# Patient Record
Sex: FEMALE | Race: WHITE | NOT HISPANIC OR LATINO | Employment: OTHER | URBAN - METROPOLITAN AREA
[De-identification: names, ages, dates, MRNs, and addresses within clinical notes are randomized per-mention and may not be internally consistent; named-entity substitution may affect disease eponyms.]

---

## 2017-01-06 ENCOUNTER — HOSPITAL ENCOUNTER (OUTPATIENT)
Dept: CARDIOLOGY | Facility: HOSPITAL | Age: 82
Setting detail: RECURRING SERIES
Discharge: HOME | End: 2017-01-06

## 2017-01-06 PROCEDURE — 36416 COLLJ CAPILLARY BLOOD SPEC: CPT | Performed by: INTERNAL MEDICINE

## 2017-01-06 PROCEDURE — 85610 PROTHROMBIN TIME: CPT | Performed by: INTERNAL MEDICINE

## 2017-01-10 ENCOUNTER — TELEPHONE (OUTPATIENT)
Dept: CARDIOLOGY | Facility: CLINIC | Age: 82
End: 2017-01-10

## 2017-01-10 RX ORDER — WARFARIN SODIUM 5 MG/1
TABLET ORAL
Qty: 30 TABLET | Refills: 2 | Status: SHIPPED | OUTPATIENT
Start: 2017-01-10 | End: 2017-02-14

## 2017-01-10 NOTE — TELEPHONE ENCOUNTER
Pt called to let you know she took a tumble yesterday. She was wearing new slippers that had no  on them and slid and fell. She is fine but had to have EMS come help her. While they were there they did an EKG and said she had a HR of 138. She said she was not having any issues before or after but wanted you to be aware......Dana

## 2017-01-11 ENCOUNTER — HOSPITAL ENCOUNTER (OUTPATIENT)
Dept: CARDIOLOGY | Facility: HOSPITAL | Age: 82
Setting detail: RECURRING SERIES
Discharge: HOME | End: 2017-01-11

## 2017-01-11 PROCEDURE — 85610 PROTHROMBIN TIME: CPT | Performed by: INTERNAL MEDICINE

## 2017-01-11 PROCEDURE — 36416 COLLJ CAPILLARY BLOOD SPEC: CPT | Performed by: INTERNAL MEDICINE

## 2017-01-12 NOTE — TELEPHONE ENCOUNTER
We actually just got a remote on her on 1/9/2017. There was nothing unusual. No abnormal episode. She actually paces very little. Thanks, Amy

## 2017-01-20 ENCOUNTER — HOSPITAL ENCOUNTER (OUTPATIENT)
Dept: CARDIOLOGY | Facility: HOSPITAL | Age: 82
Setting detail: RECURRING SERIES
Discharge: HOME | End: 2017-01-20

## 2017-01-20 PROCEDURE — 36416 COLLJ CAPILLARY BLOOD SPEC: CPT | Performed by: INTERNAL MEDICINE

## 2017-01-20 PROCEDURE — 85610 PROTHROMBIN TIME: CPT | Performed by: INTERNAL MEDICINE

## 2017-01-24 ENCOUNTER — LAB (OUTPATIENT)
Dept: LAB | Facility: HOSPITAL | Age: 82
End: 2017-01-24
Attending: INTERNAL MEDICINE

## 2017-01-24 ENCOUNTER — TRANSCRIBE ORDERS (OUTPATIENT)
Dept: ADMINISTRATIVE | Facility: HOSPITAL | Age: 82
End: 2017-01-24

## 2017-01-24 DIAGNOSIS — I48.19 PERSISTENT ATRIAL FIBRILLATION (HCC): ICD-10-CM

## 2017-01-24 DIAGNOSIS — Z01.810 PRE-OPERATIVE CARDIOVASCULAR EXAMINATION: ICD-10-CM

## 2017-01-24 DIAGNOSIS — Z01.810 PRE-OPERATIVE CARDIOVASCULAR EXAMINATION: Primary | ICD-10-CM

## 2017-01-24 DIAGNOSIS — Z13.6 SCREENING FOR ISCHEMIC HEART DISEASE: ICD-10-CM

## 2017-01-24 LAB
ANION GAP SERPL CALCULATED.3IONS-SCNC: 11.6 MMOL/L
BASOPHILS # BLD AUTO: 0.04 10*3/MM3 (ref 0–0.2)
BASOPHILS NFR BLD AUTO: 0.7 % (ref 0–1.5)
BUN BLD-MCNC: 15 MG/DL (ref 8–23)
BUN/CREAT SERPL: 11.5 (ref 7–25)
CALCIUM SPEC-SCNC: 9.1 MG/DL (ref 8.6–10.5)
CHLORIDE SERPL-SCNC: 106 MMOL/L (ref 98–107)
CO2 SERPL-SCNC: 24.4 MMOL/L (ref 22–29)
CREAT BLD-MCNC: 1.3 MG/DL (ref 0.57–1)
DEPRECATED RDW RBC AUTO: 44 FL (ref 37–54)
EOSINOPHIL # BLD AUTO: 0.23 10*3/MM3 (ref 0–0.7)
EOSINOPHIL NFR BLD AUTO: 4 % (ref 0.3–6.2)
ERYTHROCYTE [DISTWIDTH] IN BLOOD BY AUTOMATED COUNT: 15 % (ref 11.7–13)
GFR SERPL CREATININE-BSD FRML MDRD: 39 ML/MIN/1.73
GLUCOSE BLD-MCNC: 107 MG/DL (ref 65–99)
HCT VFR BLD AUTO: 39 % (ref 35.6–45.5)
HGB BLD-MCNC: 12.2 G/DL (ref 11.9–15.5)
IMM GRANULOCYTES # BLD: 0 10*3/MM3 (ref 0–0.03)
IMM GRANULOCYTES NFR BLD: 0 % (ref 0–0.5)
LYMPHOCYTES # BLD AUTO: 1.98 10*3/MM3 (ref 0.9–4.8)
LYMPHOCYTES NFR BLD AUTO: 34.7 % (ref 19.6–45.3)
MCH RBC QN AUTO: 25.2 PG (ref 26.9–32)
MCHC RBC AUTO-ENTMCNC: 31.3 G/DL (ref 32.4–36.3)
MCV RBC AUTO: 80.6 FL (ref 80.5–98.2)
MONOCYTES # BLD AUTO: 0.64 10*3/MM3 (ref 0.2–1.2)
MONOCYTES NFR BLD AUTO: 11.2 % (ref 5–12)
NEUTROPHILS # BLD AUTO: 2.82 10*3/MM3 (ref 1.9–8.1)
NEUTROPHILS NFR BLD AUTO: 49.4 % (ref 42.7–76)
PLATELET # BLD AUTO: 252 10*3/MM3 (ref 140–500)
PMV BLD AUTO: 10.2 FL (ref 6–12)
POTASSIUM BLD-SCNC: 4.8 MMOL/L (ref 3.5–5.2)
RBC # BLD AUTO: 4.84 10*6/MM3 (ref 3.9–5.2)
SODIUM BLD-SCNC: 142 MMOL/L (ref 136–145)
WBC NRBC COR # BLD: 5.71 10*3/MM3 (ref 4.5–10.7)

## 2017-01-24 PROCEDURE — 85025 COMPLETE CBC W/AUTO DIFF WBC: CPT

## 2017-01-24 PROCEDURE — 80048 BASIC METABOLIC PNL TOTAL CA: CPT

## 2017-01-24 PROCEDURE — 36415 COLL VENOUS BLD VENIPUNCTURE: CPT

## 2017-01-25 NOTE — PERIOPERATIVE NURSING NOTE
SPOKE WITH PT, REMINDED NPO AFTER MN AND MAY TAKE AM MEDS WITH A SIP OF WATER. PT TO TAKE WARFARIN THIS EVENING AS SCHEDULED. DAUGHTER TO ACCOMPANY PT TO CCL IN THE AM

## 2017-01-26 ENCOUNTER — TELEPHONE (OUTPATIENT)
Dept: CARDIOLOGY | Facility: CLINIC | Age: 82
End: 2017-01-26

## 2017-01-26 ENCOUNTER — HOSPITAL ENCOUNTER (OUTPATIENT)
Dept: CARDIOLOGY | Facility: HOSPITAL | Age: 82
Discharge: HOME | End: 2017-01-26
Admitting: NURSE PRACTITIONER

## 2017-01-26 VITALS
DIASTOLIC BLOOD PRESSURE: 87 MMHG | RESPIRATION RATE: 18 BRPM | WEIGHT: 216 LBS | OXYGEN SATURATION: 98 % | TEMPERATURE: 98.5 F | SYSTOLIC BLOOD PRESSURE: 180 MMHG | BODY MASS INDEX: 38.27 KG/M2 | HEART RATE: 68 BPM | HEIGHT: 63 IN

## 2017-01-26 DIAGNOSIS — R06.02 SHORTNESS OF BREATH: ICD-10-CM

## 2017-01-26 DIAGNOSIS — I48.19 PERSISTENT ATRIAL FIBRILLATION (HCC): ICD-10-CM

## 2017-01-26 DIAGNOSIS — R53.83 FATIGUE, UNSPECIFIED TYPE: ICD-10-CM

## 2017-01-26 LAB
INR PPP: 2.3 (ref 0.8–1.2)
PROTHROMBIN TIME: 26.6 SECONDS (ref 12.8–15.2)

## 2017-01-26 PROCEDURE — 93010 ELECTROCARDIOGRAM REPORT: CPT | Performed by: INTERNAL MEDICINE

## 2017-01-26 PROCEDURE — 92960 CARDIOVERSION ELECTRIC EXT: CPT | Performed by: INTERNAL MEDICINE

## 2017-01-26 PROCEDURE — 93005 ELECTROCARDIOGRAM TRACING: CPT | Performed by: INTERNAL MEDICINE

## 2017-01-26 PROCEDURE — 92960 CARDIOVERSION ELECTRIC EXT: CPT

## 2017-01-26 PROCEDURE — 93000 ELECTROCARDIOGRAM COMPLETE: CPT | Performed by: NURSE PRACTITIONER

## 2017-01-26 RX ORDER — PROMETHAZINE HYDROCHLORIDE 25 MG/ML
12.5 INJECTION, SOLUTION INTRAMUSCULAR; INTRAVENOUS EVERY 4 HOURS PRN
Status: DISCONTINUED | OUTPATIENT
Start: 2017-01-26 | End: 2017-01-26 | Stop reason: HOSPADM

## 2017-01-26 RX ORDER — SODIUM CHLORIDE 9 MG/ML
75 INJECTION, SOLUTION INTRAVENOUS CONTINUOUS
Status: DISCONTINUED | OUTPATIENT
Start: 2017-01-26 | End: 2017-01-27 | Stop reason: HOSPADM

## 2017-01-26 RX ORDER — ACETAMINOPHEN 325 MG/1
650 TABLET ORAL EVERY 4 HOURS PRN
Status: DISCONTINUED | OUTPATIENT
Start: 2017-01-26 | End: 2017-01-26 | Stop reason: HOSPADM

## 2017-01-26 RX ORDER — ONDANSETRON 2 MG/ML
4 INJECTION INTRAMUSCULAR; INTRAVENOUS EVERY 6 HOURS PRN
Status: DISCONTINUED | OUTPATIENT
Start: 2017-01-26 | End: 2017-01-26 | Stop reason: HOSPADM

## 2017-01-26 RX ORDER — NITROGLYCERIN 0.4 MG/1
0.4 TABLET SUBLINGUAL
Status: DISCONTINUED | OUTPATIENT
Start: 2017-01-26 | End: 2017-01-26 | Stop reason: HOSPADM

## 2017-01-26 RX ORDER — METOPROLOL SUCCINATE 100 MG/1
50 TABLET, EXTENDED RELEASE ORAL 2 TIMES DAILY
Status: ON HOLD | COMMUNITY
End: 2017-03-23

## 2017-01-26 RX ORDER — LIDOCAINE HYDROCHLORIDE 10 MG/ML
0.1 INJECTION, SOLUTION EPIDURAL; INFILTRATION; INTRACAUDAL; PERINEURAL ONCE AS NEEDED
Status: DISCONTINUED | OUTPATIENT
Start: 2017-01-26 | End: 2017-01-26 | Stop reason: HOSPADM

## 2017-01-26 RX ORDER — HYDROCODONE BITARTRATE AND ACETAMINOPHEN 5; 325 MG/1; MG/1
1 TABLET ORAL EVERY 4 HOURS PRN
Status: DISCONTINUED | OUTPATIENT
Start: 2017-01-26 | End: 2017-01-26 | Stop reason: HOSPADM

## 2017-01-26 RX ORDER — SODIUM CHLORIDE 0.9 % (FLUSH) 0.9 %
1-10 SYRINGE (ML) INJECTION AS NEEDED
Status: DISCONTINUED | OUTPATIENT
Start: 2017-01-26 | End: 2017-01-26 | Stop reason: HOSPADM

## 2017-01-26 RX ORDER — METOPROLOL TARTRATE 5 MG/5ML
INJECTION INTRAVENOUS
Status: COMPLETED | OUTPATIENT
Start: 2017-01-26 | End: 2017-01-26

## 2017-01-26 RX ORDER — METOCLOPRAMIDE HYDROCHLORIDE 5 MG/ML
10 INJECTION INTRAMUSCULAR; INTRAVENOUS EVERY 6 HOURS PRN
Status: DISCONTINUED | OUTPATIENT
Start: 2017-01-26 | End: 2017-01-26 | Stop reason: HOSPADM

## 2017-01-26 RX ADMIN — SODIUM CHLORIDE 75 ML/HR: 9 INJECTION, SOLUTION INTRAVENOUS at 08:27

## 2017-01-26 RX ADMIN — METHOHEXITAL SODIUM 30 MG: 500 INJECTION, POWDER, LYOPHILIZED, FOR SOLUTION INTRAMUSCULAR; INTRAVENOUS; RECTAL at 09:11

## 2017-01-26 RX ADMIN — METHOHEXITAL SODIUM 60 MG: 500 INJECTION, POWDER, LYOPHILIZED, FOR SOLUTION INTRAMUSCULAR; INTRAVENOUS; RECTAL at 09:10

## 2017-01-26 RX ADMIN — METOPROLOL TARTRATE 5 MG: 5 INJECTION, SOLUTION INTRAVENOUS at 09:29

## 2017-01-26 NOTE — PLAN OF CARE
Problem: Patient Care Overview (Adult)  Goal: Discharge Needs Assessment  Outcome: Outcome(s) achieved Date Met:  01/26/17 01/26/17 1055   Discharge Needs Assessment   Concerns To Be Addressed no discharge needs identified

## 2017-01-26 NOTE — IP AVS SNAPSHOT
AFTER VISIT SUMMARY             Miss Anastacia Sarah           About your hospitalization     You were admitted on:  January 26, 2017 You last received care in the:  [facility] CATH LAB       Procedures & Surgeries      * No procedures listed *     1/26/2017     * No surgeons listed *  -------------------      Medications    If you or your caregiver advised us that you are currently taking a medication and that medication is marked below as “Resume”, this simply indicates that we have reviewed those medications to make sure our new therapy recommendations do not interfere.  If you have concerns about medications other than those new ones which we are prescribing today, please consult the physician who prescribed them (or your primary physician).  Our review of your home medications is not meant to indicate that we are directing their use.             Your Medications      CONTINUE taking these medications     CALCIUM PO   Take 600 Units by mouth Daily. With D3, listed separetly           cholecalciferol 1000 UNITS tablet   Take 1,000 Units by mouth daily. In addition to the calcium -D3 pill   Commonly known as:  VITAMIN D3           cycloSPORINE 0.05 % ophthalmic emulsion   Administer 1 drop to both eyes 2 (Two) Times a Day.   Commonly known as:  RESTASIS           HYDROcodone-acetaminophen 7.5-325 MG per tablet   1-2 po q 4-6 hr prn pain   Commonly known as:  NORCO           loperamide 2 MG capsule   Take 2 mg by mouth 4 (four) times a day as needed for diarrhea.   Commonly known as:  IMODIUM           loratadine 10 MG tablet   Take 10 mg by mouth Daily.   Commonly known as:  CLARITIN           metoprolol succinate  MG 24 hr tablet   Take 100 mg by mouth 2 (Two) Times a Day.   Commonly known as:  TOPROL-XL           metoprolol tartrate 100 MG tablet   Take 1 tablet by mouth 2 (Two) Times a Day.   Commonly known as:  LOPRESSOR           NEXIUM 40 MG capsule   Take 1 capsule by mouth every  morning before breakfast.   Generic drug:  esomeprazole           OCUVITE EXTRA PO   Take 1 tablet by mouth Daily.           SUPER B COMPLEX PO   Take 1 tablet by mouth Daily.           SYNTHROID 112 MCG tablet   Take 112 mcg by mouth Daily.   Generic drug:  levothyroxine           VITAMIN B-12 PO   Take 1 tablet by mouth daily.           warfarin 2.5 MG tablet   Take 2.5 mg by mouth See Admin Instructions. 2.5 MG DAILY EXCEPT 5 MG ON SUNDAYS   Commonly known as:  COUMADIN           warfarin 5 MG tablet   Take 5 mg by mouth 1 (One) Time Per Week. 5 MG WEEKLY ON SUNDAYS AND 2.5 MG ALL OTHER DAYS   Commonly known as:  COUMADIN           warfarin 5 MG tablet   TAKE ONE-HALF TABLET BY MOUTH DAILY OR AS DIRECTED   Commonly known as:  COUMADIN                      Your Medications      Your Medication List           Morning Noon Evening Bedtime As Needed    CALCIUM PO   Take 600 Units by mouth Daily. With D3, listed separetly                                cholecalciferol 1000 UNITS tablet   Take 1,000 Units by mouth daily. In addition to the calcium -D3 pill   Commonly known as:  VITAMIN D3                                cycloSPORINE 0.05 % ophthalmic emulsion   Administer 1 drop to both eyes 2 (Two) Times a Day.   Commonly known as:  RESTASIS                                HYDROcodone-acetaminophen 7.5-325 MG per tablet   1-2 po q 4-6 hr prn pain   Commonly known as:  NORCO                                loperamide 2 MG capsule   Take 2 mg by mouth 4 (four) times a day as needed for diarrhea.   Commonly known as:  IMODIUM                                loratadine 10 MG tablet   Take 10 mg by mouth Daily.   Commonly known as:  CLARITIN                                metoprolol succinate  MG 24 hr tablet   Take 100 mg by mouth 2 (Two) Times a Day.   Commonly known as:  TOPROL-XL                                metoprolol tartrate 100 MG tablet   Take 1 tablet by mouth 2 (Two) Times a Day.   Commonly known as:   LOPRESSOR                                NEXIUM 40 MG capsule   Take 1 capsule by mouth every morning before breakfast.   Generic drug:  esomeprazole                                OCUVITE EXTRA PO   Take 1 tablet by mouth Daily.                                SUPER B COMPLEX PO   Take 1 tablet by mouth Daily.                                SYNTHROID 112 MCG tablet   Take 112 mcg by mouth Daily.   Generic drug:  levothyroxine                                VITAMIN B-12 PO   Take 1 tablet by mouth daily.                                * warfarin 2.5 MG tablet   Take 2.5 mg by mouth See Admin Instructions. 2.5 MG DAILY EXCEPT 5 MG ON SUNDAYS   Commonly known as:  COUMADIN                                * warfarin 5 MG tablet   Take 5 mg by mouth 1 (One) Time Per Week. 5 MG WEEKLY ON SUNDAYS AND 2.5 MG ALL OTHER DAYS   Commonly known as:  COUMADIN                                * warfarin 5 MG tablet   TAKE ONE-HALF TABLET BY MOUTH DAILY OR AS DIRECTED   Commonly known as:  COUMADIN                                * Notice:  This list has 3 medication(s) that are the same as other medications prescribed for you. Read the directions carefully, and ask your doctor or other care provider to review them with you.             Instructions for After Discharge        Discharge References/Attachments     CONSCIOUS SEDATION, ADULT, CARE AFTER (ENGLISH)    ELECTRICAL CARDIOVERSION, CARE AFTER (ENGLISH)       Follow-ups for After Discharge        Follow-up Information     Follow up with ALLISON Hanna Follow up in 1 week(s).    Specialty:  Cardiology    Contact information:    [address]  [place] [id number] [phone]        Scheduled Appointments     Mar 09, 2017  1:00 PM EST   PACEMAKER IN OFFICE with FABIO PARRA [facility] CARDIOLOGY (--)    [address] [id number] [phone]            Mar 28, 2017  2:00 PM EDT   Follow Up with Tommy MANZANO  MD [place]   [facility] [place] [facility] (--)    [address]  [facility] [address] [phone]           Arrive 15 minutes prior to appointment.            May 25, 2017 10:45 AM EDT   Consult Sleep Clinic with REYES GANN SLEEP LAB PROVIDER SCHEDULE   [facility] ([place])    [address]  [facility] [address] [phone]              MyChart Signup     Our records indicate that you have an active [facility] Sporterpilot account.    You can view your After Visit Summary by going to Socrative and logging in with your Sporterpilot username and password.  If you don't have a Sporterpilot username and password but a parent or guardian has access to your record, the parent or guardian should login with their own Sporterpilot username and password and access your record to view the After Visit Summary.    If you have questions, you can email [email or web address] or call [phone] to talk to our Sporterpilot staff.  Remember, Sporterpilot is NOT to be used for urgent needs.  For medical emergencies, dial 911.           Summary of Your Hospitalization        Reason for Hospitalization     Your primary diagnosis was:  Not on File    Your diagnoses also included:  Atrial Fibrillation (Irregular Heartbeat)      Care Providers     Not on file      Your Allergies  Date Reviewed: 1/26/2017    Allergen Reactions    Adhesive Tape Other (See Comments)    SKIN BLISTERS         Levaquin (Levofloxacin) Hives         Sulfa Antibiotics Swelling    FACE AND TONGUE      Patient Belongings Returned     Document Return of Belongings Flowsheet     Were the patient bedside belongings sent home?   --   Belongings Retrieved from Security & Sent Home   --    Belongings Sent to Safe   --   Medications Retrieved from Pharmacy & Sent Home   --              More Information      Moderate Conscious Sedation, Adult, Care After  Refer to this sheet in the next few  weeks. These instructions provide you with information on caring for yourself after your procedure. Your health care provider may also give you more specific instructions. Your treatment has been planned according to current medical practices, but problems sometimes occur. Call your health care provider if you have any problems or questions after your procedure.  WHAT TO EXPECT AFTER THE PROCEDURE   After your procedure:  · You may feel sleepy, clumsy, and have poor balance for several hours.  · Vomiting may occur if you eat too soon after the procedure.  HOME CARE INSTRUCTIONS  · Do not participate in any activities where you could become injured for at least 24 hours. Do not:    Drive.    Swim.    Ride a bicycle.    Operate heavy machinery.    Cook.    Use power tools.    Climb ladders.    Work from a high place.  · Do not make important decisions or sign legal documents until you are improved.  · If you vomit, drink water, juice, or soup when you can drink without vomiting. Make sure you have little or no nausea before eating solid foods.  · Only take over-the-counter or prescription medicines for pain, discomfort, or fever as directed by your health care provider.  · Make sure you and your family fully understand everything about the medicines given to you, including what side effects may occur.  · You should not drink alcohol, take sleeping pills, or take medicines that cause drowsiness for at least 24 hours.  · If you smoke, do not smoke without supervision.  · If you are feeling better, you may resume normal activities 24 hours after you were sedated.  · Keep all appointments with your health care provider.  SEEK MEDICAL CARE IF:  · Your skin is pale or bluish in color.  · You continue to feel nauseous or vomit.  · Your pain is getting worse and is not helped by medicine.  · You have bleeding or swelling.  · You are still sleepy or feeling clumsy after 24 hours.  SEEK IMMEDIATE MEDICAL CARE IF:  · You develop  a rash.  · You have difficulty breathing.  · You develop any type of allergic problem.  · You have a fever.  MAKE SURE YOU:  · Understand these instructions.  · Will watch your condition.  · Will get help right away if you are not doing well or get worse.     This information is not intended to replace advice given to you by your health care provider. Make sure you discuss any questions you have with your health care provider.     Document Released: 10/08/2014 Document Revised: 01/08/2016 Document Reviewed: 10/08/2014  [employer] Interactive Patient Education ©2016 [employer]          Electrical Cardioversion, Care After  Refer to this sheet in the next few weeks. These instructions provide you with information on caring for yourself after your procedure. Your health care provider may also give you more specific instructions. Your treatment has been planned according to current medical practices, but problems sometimes occur. Call your health care provider if you have any problems or questions after your procedure.  WHAT TO EXPECT AFTER THE PROCEDURE  After your procedure, it is typical to have the following sensations:  · Some redness on the skin where the shocks were delivered. If this is tender, a sunburn lotion or hydrocortisone cream may help.  · Possible return of an abnormal heart rhythm within hours or days after the procedure.  HOME CARE INSTRUCTIONS  · Take medicines only as directed by your health care provider. Be sure you understand how and when to take your medicine.  · Learn how to feel your pulse and check it often.  · Limit your activity for 48 hours after the procedure or as directed by your health care provider.  · Avoid or minimize caffeine and other stimulants as directed by your health care provider.  SEEK MEDICAL CARE IF:  · You feel like your heart is beating too fast or your pulse is not regular.  · You have any questions about your medicines.  · You have bleeding that will not stop.  SEEK  IMMEDIATE MEDICAL CARE IF:  · You are dizzy or feel faint.  · It is hard to breathe or you feel short of breath.  · There is a change in discomfort in your chest.  · Your speech is slurred or you have trouble moving an arm or leg on one side of your body.  · You get a serious muscle cramp that does not go away.  · Your fingers or toes turn cold or blue.     This information is not intended to replace advice given to you by your health care provider. Make sure you discuss any questions you have with your health care provider.     Document Released: 10/08/2014 Document Revised: 01/08/2016 Document Reviewed: 10/08/2014  [employer] Interactive Patient Education ©2016 [employer] Inc.         PREVENTING SURGICAL SITE INFECTIONS     Surgical Site Infections FAQs  What is a Surgical Site Infection (SSI)?  A surgical site infection is an infection that occurs after surgery in the part of the body where the surgery took place. Most patients who have surgery do not develop an infection. However, infections develop in about 1 to 3 out of every 100 patients who have surgery.  Some of the common symptoms of a surgical site infection are:  · Redness and pain around the area where you had surgery  · Drainage of cloudy fluid from your surgical wound  · Fever  Can SSIs be treated?  Yes. Most surgical site infections can be treated with antibiotics. The antibiotic given to you depends on the bacteria (germs) causing the infection. Sometimes patients with SSIs also need another surgery to treat the infection.  What are some of the things that hospitals are doing to prevent SSIs?  To prevent SSIs, doctors, nurses, and other healthcare providers:  · Clean their hands and arms up to their elbows with an antiseptic agent just before the surgery.  · Clean their hands with soap and water or an alcohol-based hand rub before and after caring for each patient.  · May remove some of your hair immediately before your surgery using electric clippers  if the hair is in the same area where the procedure will occur. They should not shave you with a razor.  · Wear special hair covers, masks, gowns, and gloves during surgery to keep the surgery area clean.  · Give you antibiotics before your surgery starts. In most cases, you should get antibiotics within 60 minutes before the surgery starts and the antibiotics should be stopped within 24 hours after surgery.  · Clean the skin at the site of your surgery with a special soap that kills germs.  What can I do to help prevent SSIs?  Before your surgery:  · Tell your doctor about other medical problems you may have. Health problems such as allergies, diabetes, and obesity could affect your surgery and your treatment.  · Quit smoking. Patients who smoke get more infections. Talk to your doctor about how you can quit before your surgery.  · Do not shave near where you will have surgery. Shaving with a razor can irritate your skin and make it easier to develop an infection.  At the time of your surgery:  · Speak up if someone tries to shave you with a razor before surgery. Ask why you need to be shaved and talk with your surgeon if you have any concerns.  · Ask if you will get antibiotics before surgery.  After your surgery:  · Make sure that your healthcare providers clean their hands before examining you, either with soap and water or an alcohol-based hand rub.    If you do not see your providers clean their hands, please ask them to do so.  · Family and friends who visit you should not touch the surgical wound or dressings.  · Family and friends should clean their hands with soap and water or an alcohol-based hand rub before and after visiting you. If you do not see them clean their hands, ask them to clean their hands.  What do I need to do when I go home from the hospital?  · Before you go home, your doctor or nurse should explain everything you need to know about taking care of your wound. Make sure you understand how  to care for your wound before you leave the hospital.  · Always clean your hands before and after caring for your wound.  · Before you go home, make sure you know who to contact if you have questions or problems after you get home.  · If you have any symptoms of an infection, such as redness and pain at the surgery site, drainage, or fever, call your doctor immediately.  If you have additional questions, please ask your doctor or nurse.  Developed and co-sponsored by The Society for Healthcare Epidemiology of [place] (SHEA); Infectious Diseases Society of [place] (IDSA); American Hospital Association; Association for Professionals in Infection Control and Epidemiology (APIC); Centers for Disease Control and Prevention (CDC); and The Joint Commission.     This information is not intended to replace advice given to you by your health care provider. Make sure you discuss any questions you have with your health care provider.     Document Released: 12/23/2014 Document Revised: 01/08/2016 Document Reviewed: 03/02/2016  [employer] Interactive Patient Education ©2016 [employer] Inc.             SYMPTOMS OF A STROKE    Call 911 or have someone take you to the Emergency Department if you have any of the following:    · Sudden numbness or weakness of your face, arm or leg especially on one side of the body  · Sudden confusion, diffiiculty speaking or trouble understanding   · Changes in your vision or loss of sight in one eye  · Sudden severe headache with no known cause  · sudden dizziness, trouble walking, loss of balance or coordination    It is important to seek emergency care right away if you have further stroke symptoms. If you get emergency help quickly, the powerful clot-dissolving medicines can reduce the disabilities caused by a stroke.     For more information:    American Stroke Association  4-718-2-STROKE  www.strokeassociation.org           IF YOU SMOKE OR USE TOBACCO PLEASE READ THE FOLLOWING:    Why is smoking  bad for me?  Smoking increases the risk of heart disease, lung disease, vascular disease, stroke, and cancer.     If you smoke, STOP!    If you would like more information on quitting smoking, please visit the [employer] (formerly [employer]) website: www.O-CODES/Neronoteate/healthier-together/smoke   This link will provide additional resources including the QUIT line and the Beat the Pack support groups.     For more information:    American Cancer Society  (312) 950-3312    American Heart Association  1-402.339.7002               YOU ARE THE MOST IMPORTANT FACTOR IN YOUR RECOVERY.     Follow all instructions carefully.     I have reviewed my discharge instructions with my nurse, including the following information, if applicable:     Information about my illness and diagnosis   Follow up appointments (including lab draws)   Wound Care   Equipment Needs   Medications (new and continuing) along with side effects   Preventative information such as vaccines and smoking cessations   Diet   Pain   I know when to contact my Doctor's office or seek emergency care      I want my nurse to describe the side effects of my medications: YES NO   If the answer is no, I understand the side effects of my medications: YES NO   My nurse described the side effects of my medications in a way that I could understand: YES NO   I have taken my personal belongings and my own medications with me at discharge: YES NO            I have received this information and my questions have been answered. I have discussed any concerns I see with this plan with the nurse or physician. I understand these instructions.    Signature of Patient or Responsible Person: _____________________________________    Date: _________________  Time: __________________    Signature of Healthcare Provider: _______________________________________  Date: _________________  Time: __________________

## 2017-01-26 NOTE — PLAN OF CARE
Problem: Patient Care Overview (Adult)  Goal: Adult Individualization and Mutuality  Outcome: Outcome(s) achieved Date Met:  01/26/17

## 2017-01-26 NOTE — MR AVS SNAPSHOT
[facility] CATH LAB  [phone]                    Anastacia Sarah   1/26/2017  8:30 AM   Cardioversion visit    Dept Phone:  [phone]   Encounter #:  [id number]    Provider:  AZEEM MATAMOROS   Department:  [facility] CATH LAB                Your Full Care Plan              Your Updated Medication List      TAKE these medications     CALCIUM PO       cholecalciferol 1000 UNITS tablet   Commonly known as:  VITAMIN D3       cycloSPORINE 0.05 % ophthalmic emulsion   Commonly known as:  RESTASIS       HYDROcodone-acetaminophen 7.5-325 MG per tablet   Commonly known as:  NORCO   1-2 po q 4-6 hr prn pain       loperamide 2 MG capsule   Commonly known as:  IMODIUM       loratadine 10 MG tablet   Commonly known as:  CLARITIN       metoprolol succinate  MG 24 hr tablet   Commonly known as:  TOPROL-XL       metoprolol tartrate 100 MG tablet   Commonly known as:  LOPRESSOR   Take 1 tablet by mouth 2 (Two) Times a Day.       NEXIUM 40 MG capsule   Generic drug:  esomeprazole       OCUVITE EXTRA PO       SUPER B COMPLEX PO       SYNTHROID 112 MCG tablet   Generic drug:  levothyroxine       VITAMIN B-12 PO       * warfarin 2.5 MG tablet   Commonly known as:  COUMADIN       * warfarin 5 MG tablet   Commonly known as:  COUMADIN       * warfarin 5 MG tablet   Commonly known as:  COUMADIN   TAKE ONE-HALF TABLET BY MOUTH DAILY OR AS DIRECTED       * Notice:  This list has 3 medication(s) that are the same as other medications prescribed for you. Read the directions carefully, and ask your doctor or other care provider to review them with you.            We Performed the Following     Cardioversion External     Discharge patient     ECG 12 Lead     POC Protime / INR     Saline Lock & Maintain IV Access       You Were Diagnosed With        Codes Comments    Persistent atrial fibrillation     ICD-10-CM: I48.1  ICD-9-CM: 427.31     Shortness of breath     ICD-10-CM: R06.02  ICD-9-CM: 786.05      Fatigue, unspecified type     ICD-10-CM: R53.83  ICD-9-CM: 780.79       Instructions     None    Patient Instructions History      Upcoming Appointments     Visit Type Date Time Department     AZEEM C[facility]ERT [facility]26[facility]2017  [facility]    PACEMAKER ICD - IN OFFICE 1/26/2017  8:00 AM [facility]    PACEMAKER ICD - IN OFFICE 3/9/2017  1:00 PM [facility]    FOLLOW UP 3/28/2017  2:00 PM [facility] AZEEM    CONSULT SLEEP CLINIC 5/25/2017 10:45 AM [facility] SLEEP LAB      MyChart Signup     Our records indicate that you have an active [facility] [employer] account.    You can view your After Visit Summary by going to [employer] and logging in with your [employer] username and password.  If you don't have a [employer] username and password but a parent or guardian has access to your record, the parent or guardian should login with their own [employer] username and password and access your record to view the After Visit Summary.    If you have questions, you can email [email or web address] or call [phone] to talk to our [employer] staff.  Remember, [employer] is NOT to be used for urgent needs.  For medical emergencies, dial 911.               Other Info from Your Visit           Your Appointments     Mar 09, 2017  1:00 PM EST   PACEMAKER IN OFFICE with FABIO PARRA   [facility] CARDIOLOGY (--)    [address]  [place] [facility] [address] [phone]            Mar 28, 2017  2:00 PM EDT   Follow Up with Tommy Avila MD   [facility] (--)    [address]  [facility] [id number] [phone]           Arrive 15 minutes prior to appointment.            May 25, 2017 10:45 AM EDT   Consult Sleep Clinic with  AZEEM SLEEP LAB PROVIDER SCHEDULE   [facility] SLEEP CENTER ([place])    [address] [address] [phone]              Allergies      "Adhesive Tape  Other (See Comments)    SKIN BLISTERS    Levaquin [Levofloxacin]  Hives    Sulfa Antibiotics  Swelling    FACE AND TONGUE      Vital Signs     Blood Pressure Pulse Temperature Respirations Height Weight    180/87 (BP Location: Left arm, Patient Position: Lying) 68 98.5 °F (36.9 °C) (Temporal Artery ) 18 63\" (160 cm) 216 lb (98 kg)    Oxygen Saturation Body Mass Index Smoking Status             98% 38.26 kg/m2 Never Smoker         Problems and Diagnoses Noted     Atrial fibrillation (irregular heartbeat)    Atrial fibrillation (irregular heartbeat)        Shortness of breath        Tiredness          Medications Administered     methohexital (BREVITAL SODIUM) injection                  methohexital (BREVITAL SODIUM) injection                  metoprolol tartrate (LOPRESSOR) injection                  sodium chloride 0.9 % infusion                    Results     Cardioversion External           ECG 12 Lead           POC Protime / INR      Component Value Standard Range & Units    Protime 26.6 12.8 - 15.2 seconds    Serial Number: [id number]    : [id number]    INR 2.3 0.8 - 1.2                    "

## 2017-01-26 NOTE — TELEPHONE ENCOUNTER
----- Message from Charlie Kraft sent at 1/26/2017  2:42 PM EST -----  Pt has question about medication, can you please call her at your earliest convenience. [phone]

## 2017-01-26 NOTE — TELEPHONE ENCOUNTER
I just returned patient's call. She said the procedure went well. She was wondering if she should have her INR checked tomorrow.  She took her first dose of warfarin yesterday and one today.  I've recommended that she come in on Monday to have her INR check.  I told her that she no longer needs to have weekly INR checks.  She can just have her INR level checked according to the nursing protocol and her INR clinic.  She verbalized understanding.

## 2017-01-26 NOTE — PLAN OF CARE
Problem: Patient Care Overview (Adult)  Goal: Plan of Care Review  Outcome: Outcome(s) achieved Date Met:  01/26/17

## 2017-01-27 ENCOUNTER — TELEPHONE (OUTPATIENT)
Dept: CARDIOLOGY | Facility: CLINIC | Age: 82
End: 2017-01-27

## 2017-01-27 NOTE — TELEPHONE ENCOUNTER
Pt called because she is having trouble keeping her BP down. She wants to know if she can go back on her Lisinopril now that her Cardioversion is over and she is doing better. She was on 10mg daily when you D/C'ed it.....Dana

## 2017-01-30 ENCOUNTER — HOSPITAL ENCOUNTER (OUTPATIENT)
Dept: CARDIOLOGY | Facility: HOSPITAL | Age: 82
Setting detail: RECURRING SERIES
Discharge: HOME | End: 2017-01-30

## 2017-01-30 PROCEDURE — 36416 COLLJ CAPILLARY BLOOD SPEC: CPT | Performed by: INTERNAL MEDICINE

## 2017-01-30 PROCEDURE — 85610 PROTHROMBIN TIME: CPT | Performed by: INTERNAL MEDICINE

## 2017-02-06 ENCOUNTER — TELEPHONE (OUTPATIENT)
Dept: CARDIOLOGY | Facility: CLINIC | Age: 82
End: 2017-02-06

## 2017-02-06 ENCOUNTER — HOSPITAL ENCOUNTER (OUTPATIENT)
Dept: CARDIOLOGY | Facility: HOSPITAL | Age: 82
Setting detail: RECURRING SERIES
Discharge: HOME | End: 2017-02-06

## 2017-02-06 DIAGNOSIS — I48.91 ATRIAL FIBRILLATION, UNSPECIFIED TYPE (HCC): ICD-10-CM

## 2017-02-06 DIAGNOSIS — R00.0 TACHYCARDIA: ICD-10-CM

## 2017-02-06 PROCEDURE — 85610 PROTHROMBIN TIME: CPT

## 2017-02-06 PROCEDURE — 36416 COLLJ CAPILLARY BLOOD SPEC: CPT

## 2017-02-06 RX ORDER — METOPROLOL TARTRATE 100 MG/1
TABLET ORAL
Qty: 60 TABLET | Refills: 0 | Status: SHIPPED | OUTPATIENT
Start: 2017-02-06 | End: 2017-02-14

## 2017-02-06 NOTE — TELEPHONE ENCOUNTER
Pt stopped by today to drop off her INR results she had done today in the coumadin clinic.  She c/o being lightheaded since being on the Lopressor.  She is not having any of the symptoms while in the office.   She reported that her Lopressor decreased to 50mg qd.     Pt had a recent cardioversion.

## 2017-02-07 NOTE — TELEPHONE ENCOUNTER
Patient did not come for her appointment this past Friday. I have left her a message to return my call.

## 2017-02-14 ENCOUNTER — OFFICE VISIT (OUTPATIENT)
Dept: CARDIOLOGY | Facility: CLINIC | Age: 82
End: 2017-02-14

## 2017-02-14 ENCOUNTER — HOSPITAL ENCOUNTER (OUTPATIENT)
Dept: CARDIOLOGY | Facility: HOSPITAL | Age: 82
Setting detail: RECURRING SERIES
Discharge: HOME | End: 2017-02-14

## 2017-02-14 VITALS
BODY MASS INDEX: 38.62 KG/M2 | WEIGHT: 218 LBS | HEIGHT: 63 IN | DIASTOLIC BLOOD PRESSURE: 98 MMHG | SYSTOLIC BLOOD PRESSURE: 150 MMHG | HEART RATE: 71 BPM

## 2017-02-14 DIAGNOSIS — E66.9 OBESITY, UNSPECIFIED OBESITY SEVERITY, UNSPECIFIED OBESITY TYPE: ICD-10-CM

## 2017-02-14 DIAGNOSIS — R42 DIZZINESS: ICD-10-CM

## 2017-02-14 DIAGNOSIS — I49.5 SICK SINUS SYNDROME (HCC): ICD-10-CM

## 2017-02-14 DIAGNOSIS — I48.91 ATRIAL FIBRILLATION, UNSPECIFIED TYPE (HCC): Primary | ICD-10-CM

## 2017-02-14 DIAGNOSIS — R53.83 FATIGUE, UNSPECIFIED TYPE: ICD-10-CM

## 2017-02-14 DIAGNOSIS — Z79.01 ON WARFARIN THERAPY: ICD-10-CM

## 2017-02-14 DIAGNOSIS — I10 ESSENTIAL HYPERTENSION: ICD-10-CM

## 2017-02-14 PROCEDURE — 36416 COLLJ CAPILLARY BLOOD SPEC: CPT

## 2017-02-14 PROCEDURE — 99215 OFFICE O/P EST HI 40 MIN: CPT | Performed by: NURSE PRACTITIONER

## 2017-02-14 PROCEDURE — 85610 PROTHROMBIN TIME: CPT

## 2017-02-14 RX ORDER — CARBOXYMETHYLCELLULOSE SODIUM 5 MG/ML
1 SOLUTION/ DROPS OPHTHALMIC 2 TIMES DAILY PRN
COMMUNITY
End: 2018-04-17

## 2017-02-14 NOTE — PROGRESS NOTES
Date of Office Visit: 2017  Encounter Provider: ALLISON Hanna  Place of Service: [facility] CARDIOLOGY  Patient Name: Anastacia Sarah  :1935    Chief Complaint   Patient presents with   • Atrial Fibrillation   :     HPI: Anastacia Sarah is a 81 y.o. female who presents today for Anastacia Sarah is a 81 y.o. female who presents today for evaluation of atrial fibrillation. The patient has a history of paroxysmal atrial fibrillation and status post cardiac ablation. She sees Dr. Maldonado Melendez of our practice.  The patient's symptoms of atrial fibrillation in the past have included shortness of breath with exertion, occasional palpitations, and worsening fatigue.  She remains on warfarin and her INR levels are followed here at our office.  The patient INR levels have been up and down.  Her warfarin level second longer time to become therapeutic and finally in January she was ready to undergo electrical cardioversion.    She had a electrical cardioversion completed 2017 by Dr. Maldonado Melendez which was successful.  The patient was supposed to follow-up with me on 17 and 2/3/17 and her appointments were canceled by the patient.  I then recommended that she make the appointment today cycle reassess her heart rhythm.    The patient is accompanied today by her daughter.  She says she felt good for 3 days after cardioversion.  Since then she has been experiencing extreme fatigue, dizziness, and shortness of breath.  She also says that she has a trouble with urinary frequency and when she is in atrial fibrillation her urinary frequency actually increases.  She does experience wheezing.  Her blood pressures have remained elevated.  She denies chest pain, paroxysmal nocturnal dyspnea, orthopnea, cough, edema, or syncope.  Her weight is up 2 pounds from last visit.  Her INR checked today was subtherapeutic at 1.3.  She denies any bleeding.    Previous  studies:     In November, I ordered a CTA of the chest to rule out pulmonary emboli and this was negative. The CTA did reveal small mediastinal lymph nodes without evidence of mediastinal/hilar or thoracic adenopathy and lungs were clear of acute process. I ordered a 2-D echocardiogram which showed an ejection fraction of 56-60%, mild concentric hypertrophy, right ventricle cavity mildly dilated, left atrial cavity mildly dilated, mild to moderate mitral valve regurgitation, and mild tricuspid valve regurgitation, and mild pulmonary hypertension with RVSP 49 mmHg. She also had a Lexiscan Myoview stress test completed which showed normal ejection fraction and no evidence of ischemia. The patient has sleep apnea and I referred her for sleep apnea consultation to have her settings are machine double checked for accuracy. She has remained on warfarin for stroke prevention. I also ordered some blood work which included a comprehensive metabolic panel which was normal except for elevated creatinine of 1.18 which is been about her baseline.  The patient only has one kidney.  Her pro-BNP with 1730 and within normal limits for her age. CBC revealed a normal hemoglobin, hematocrit, RBC, WBC, and platelet count.        Past Medical History   Diagnosis Date   • Acute bronchitis    • Acute sinusitis    • Anticoagulated on warfarin    • Atrial fibrillation    • Bladder dysfunction    • Cancer of left kidney    • Chronic dysfunction of left eustachian tube    • Diarrhea    • Esophageal reflux    • Fatigue    • Frequent urination    • GERD (gastroesophageal reflux disease)    • Goiter      thyroid removed in november 2015   • Health care maintenance    • History of shingles    • History of transfusion    • Hypertension    • Insomnia    • Left atrial enlargement    • Left ventricular hypertrophy    • Mitral regurgitation      mild to moderate per echocardiogram 2016   • Nerve pain      LUE D/T SHINGLES   • Neuropathy      ARMS   •  NANCY (obstructive sleep apnea)      CPAP   • Paroxysmal atrial fibrillation    • Pulmonary hypertension      per echo 2016   • Right atrial enlargement    • URI (upper respiratory infection)    • Urinary urgency        Past Surgical History   Procedure Laterality Date   • Eye surgery       CATARACT   • Atrial ablation surgery       EXTENSIVE; CATH ATRIAL FIBRILLATION LEFT   • Atrial ablation surgery       2004 FLUTTER   • Hernia repair     • Other surgical history       NEUROMUSCULAR BLOCKERS BOTULINUM TOXIN ONABOTULINUMTOXIN A   • Total thyroidectomy     • Nephrectomy Left      MALIGNANT   • Knee arthroplasty Bilateral    • Tubal abdominal ligation     • Laparoscopic cholecystectomy     • Insert / replace / remove pacemaker       x2   • Cardiac electrophysiology procedure N/A 5/17/2016     Procedure: PPM generator change - dual BOSTON;  Surgeon: Maldonado Melendez MD;  Location: [facility] CATH INVASIVE LOCATION;  Service:    • Cystoscopy botox injection of bladder N/A 6/27/2016     Procedure: CYSTOSCOPY WITH BOTOX DETRUSOR INJECTION;  Surgeon: Salome Bowen MD;  Location: [facility] OR;  Service:    • Knee mini revision Right 9/28/2016     Procedure: RT KNEE POLY INSERT CHANGE ;  Surgeon: Tommy Avila MD;  Location: [facility] OR;  Service:    • Pacemaker implantation         Social History     Social History   • Marital status:      Spouse name: N/A   • Number of children: N/A   • Years of education: N/A     Occupational History   • Not on file.     Social History Main Topics   • Smoking status: Never Smoker   • Smokeless tobacco: Never Used      Comment: caffeine use   • Alcohol use No      Comment: STOPPED   • Drug use: No   • Sexual activity: Defer     Other Topics Concern   • Not on file     Social History Narrative       Family History   Problem Relation Age of Onset   • Heart disease Other    • Breast cancer Sister    • Breast cancer Maternal Aunt    • Breast cancer Sister    • Heart disease Mother     • Hypertension Mother    • Heart disease Father    • Hypertension Father    • Heart disease Daughter    • Hypertension Daughter    • Heart disease Son    • Hypertension Son        Review of Systems   Constitution: Positive for malaise/fatigue. Negative for chills, diaphoresis, fever, night sweats, weight gain and weight loss.   HENT: Negative for hearing loss, nosebleeds, sore throat and tinnitus.    Eyes: Negative for blurred vision, double vision, pain and visual disturbance.   Cardiovascular: Positive for dyspnea on exertion, leg swelling and palpitations. Negative for chest pain, claudication, cyanosis, irregular heartbeat, near-syncope, orthopnea, paroxysmal nocturnal dyspnea and syncope.   Respiratory: Positive for cough and wheezing. Negative for hemoptysis, shortness of breath and snoring.    Endocrine: Positive for cold intolerance and heat intolerance. Negative for polyuria.   Hematologic/Lymphatic: Negative for bleeding problem. Does not bruise/bleed easily.   Skin: Negative for color change, dry skin, flushing and itching.   Musculoskeletal: Negative for falls, joint pain, joint swelling, muscle cramps, muscle weakness and myalgias.   Gastrointestinal: Negative for abdominal pain, constipation, heartburn, melena, nausea and vomiting.   Genitourinary: Positive for frequency. Negative for dysuria and hematuria.   Neurological: Positive for excessive daytime sleepiness and dizziness. Negative for light-headedness, loss of balance, numbness, paresthesias, seizures and vertigo.   Psychiatric/Behavioral: Negative for altered mental status, depression, memory loss and substance abuse. The patient does not have insomnia and is not nervous/anxious.    Allergic/Immunologic: Negative for environmental allergies.       Allergies   Allergen Reactions   • Adhesive Tape Other (See Comments)     SKIN BLISTERS   • Levaquin [Levofloxacin] Hives   • Sulfa Antibiotics Swelling     FACE AND TONGUE         Current  "Outpatient Prescriptions:   •  B Complex-C (SUPER B COMPLEX PO), Take 1 tablet by mouth Daily., Disp: , Rfl:   •  CALCIUM PO, Take 600 Units by mouth Daily. With D3, listed separetly, Disp: , Rfl:   •  carboxymethylcellulose (REFRESH PLUS) 0.5 % solution, 1 drop 2 (Two) Times a Day As Needed for dry eyes., Disp: , Rfl:   •  cholecalciferol (VITAMIN D3) 1000 UNITS tablet, Take 1,000 Units by mouth daily. In addition to the calcium -D3 pill, Disp: , Rfl:   •  Cyanocobalamin (VITAMIN B-12 PO), Take 1 tablet by mouth daily., Disp: , Rfl:   •  esomeprazole (NEXIUM) 40 MG capsule, Take 1 capsule by mouth every morning before breakfast., Disp: , Rfl:   •  HYDROcodone-acetaminophen (NORCO) 7.5-325 MG per tablet, 1-2 po q 4-6 hr prn pain, Disp: 75 tablet, Rfl: 0  •  loperamide (IMODIUM) 2 MG capsule, Take 2 mg by mouth 4 (four) times a day as needed for diarrhea., Disp: , Rfl:   •  loratadine (CLARITIN) 10 MG tablet, Take 10 mg by mouth Daily., Disp: , Rfl:   •  metoprolol succinate XL (TOPROL-XL) 100 MG 24 hr tablet, Take 50 mg by mouth 2 (Two) Times a Day., Disp: , Rfl:   •  Multiple Vitamins-Minerals (OCUVITE EXTRA PO), Take 1 tablet by mouth Daily., Disp: , Rfl:   •  SYNTHROID 112 MCG tablet, Take 112 mcg by mouth Daily., Disp: , Rfl:   •  warfarin (COUMADIN) 2.5 MG tablet, Take 2.5 mg by mouth See Admin Instructions. 2.5 MG DAILY EXCEPT 5 MG ON SUNDAYS, Disp: , Rfl:   •  warfarin (COUMADIN) 5 MG tablet, Take 5 mg by mouth 1 (One) Time Per Week. 5 MG WEEKLY ON SUNDAYS AND 2.5 MG ALL OTHER DAYS, Disp: , Rfl:      Objective:     Vitals:    02/14/17 1421 02/14/17 1422 02/14/17 1423 02/14/17 1424   BP: 146/100 150/100 160/90 150/98   BP Location: Right arm Left arm Left arm Left arm   Patient Position:   Standing Lying   Pulse: 100  82 71   Weight: 218 lb (98.9 kg)      Height: 63\" (160 cm)        Body mass index is 38.62 kg/(m^2).    PHYSICAL EXAM:    Vitals Reviewed.   General Appearance: No acute distress, well developed " and well nourished. Obese.   Eyes: Conjunctiva and lids: No erythema, swelling, or discharge. Sclera non-icteric.   HENT: Atraumatic, normocephalic. External eyes, ears, and nose normal. No hearing loss noted. Mucous membranes normal. Lips not cyanotic. Neck supple with no tenderness.  Respiratory: No signs of respiratory distress. Respiration rhythm and depth normal.   Clear to auscultation. No rales, crackles, rhonchi, or wheezing auscultated.   Cardiovascular:  Jugular Venous Pressure: Normal  Heart Rate and Rhythm: Irregularly, irregular. Heart Sounds: Normal S1 and S2. No S3 or S4 noted.  Murmurs: No murmurs noted. No rubs, thrills, or gallops.   Arterial Pulses: Carotid pulses normal. No carotid bruit noted. Posterior tibialis and dorsalis pedis pulses normal.   Lower Extremities: +1 bilateral lower extremity edema noted around ankles.   Gastrointestinal:  Abdomen soft, non-distended, non-tender. Normal bowel sounds. No hepatomegaly.   Musculoskeletal: Normal movement of extremities  Skin and Nails: General appearance normal. No pallor, cyanosis, diaphoresis. Skin temperature normal. No clubbing of fingernails.   Psychiatric: Patient alert and oriented to person, place, and time. Speech and behavior appropriate. Normal mood and affect.       ECG 12 Lead  Date/Time: 1/26/2017 8:11 AM  Performed by: NILE MARTINEZ  Authorized by: NILE MARTINEZ   Comparison: compared with previous ECG from 2/14/2017  Similar to previous ECG  Rhythm: atrial fibrillation  Ectopy: PVCs  Rate: normal  BPM: 89  Conduction: conduction normal  QRS axis: left  Clinical impression: abnormal ECG  Comments: ST-T abnormalities              Assessment:       Diagnosis Plan   1. Atrial fibrillation, unspecified type     2. Sick sinus syndrome     3. Essential hypertension     4. Dizziness     5. Fatigue, unspecified type     6. Obesity, unspecified obesity severity, unspecified obesity type     7. On warfarin therapy            Plan:        1.   Atrial Fibrillation and Atrial Flutter  Assessment  • The patient has atrial fibrillation-initial episode  • This is non-valvular in etiology  • The patient's CHADS2-VASc score is 5  • A [id number] score of 2 or more is considered a high risk for a thromboembolic event  • Warfarin prescribed    Plan  • Attempt to maintain sinus rhythm  • Continue warfarin for antithrombotic therapy, bleeding issues discussed  • Continue beta blocker for rhythm control  • Continue beta blocker for rate control    Subjective - Objective  • The patient underwent cardioversion   • The patient had atrial fibrillation ablation   • The patient had a recurrence of atrial fibrillation since ablation   •   Despite having a recent electrical cardioversion, the patient continues to be in persistent atrial fibrillation.  Unfortunately, when the patient's atrial fibrillation she is quite symptomatic.  She experiences extreme fatigue, dizziness, shortness of breath, palpitations, and increased urinary frequency.  This really affects her overall quality of life.  She does have a history of sick sinus syndrome and on her beta blocker therapy she is rate controlled.  I need to further discuss with Dr. Maldonado Melendez the next steps in the patient's plan of care.  The patient may benefit from a repeat electrical cardioversion and may need to be on antiarrhythmic therapy.  I am not sure if she is another candidate for an atrial fibrillation ablation.    I'm also quite concerned about her warfarin therapy.  She is subtherapeutic often on her warfarin therapy.  She only has one kidney.  Her last creatinine level was 1.3.  I will further discuss with Dr. Maldonado Melendez if he would recommend one of the newer NOACs therapies.      2.  Essential Hypertension: The patient's blood pressure remains elevated.  I will further address this after my discussion with Dr. Maldonado Melendez.  I think she may benefit from diuretic therapy both for lower extremity  edema, shortness of breath, and her blood pressure.  However, she only has one kidney.  I will follow-up with the patient via telephone.    3.  Obesity: I did recommend weight loss to the patient.    4.  Lower extremity edema: I feel the patient may benefit from a diuretic.    5.  Obstructive Sleep Apnea: The patient is compliant with her CPAP machine.    6.  Follow Up: I will further discuss the patient's plan of care with Dr. Maldonado Melendez and call patient.     Greater than 40 minutes was spent with the patient.    As always, it has been a pleasure to participate in your patient's care.      Sincerely,         ALLISON Lema

## 2017-02-16 DIAGNOSIS — R00.0 TACHYCARDIA: ICD-10-CM

## 2017-02-16 DIAGNOSIS — I48.91 ATRIAL FIBRILLATION, UNSPECIFIED TYPE (HCC): ICD-10-CM

## 2017-02-16 RX ORDER — METOPROLOL TARTRATE 50 MG/1
50 TABLET, FILM COATED ORAL 2 TIMES DAILY
Qty: 180 TABLET | Refills: 1 | Status: SHIPPED | OUTPATIENT
Start: 2017-02-16 | End: 2017-03-22

## 2017-02-16 RX ORDER — METOPROLOL TARTRATE 50 MG/1
50 TABLET, FILM COATED ORAL 2 TIMES DAILY
Qty: 60 TABLET | Refills: 0 | Status: SHIPPED | OUTPATIENT
Start: 2017-02-16 | End: 2017-02-16 | Stop reason: SDUPTHER

## 2017-02-16 NOTE — TELEPHONE ENCOUNTER
Patient said she has been taking Metoprolol TAR 50mg twice daily not Succ like her LOV.  Is it ok to fill Metoprolol Tar 50mg twice daily?  Shauna

## 2017-02-17 ENCOUNTER — HOSPITAL ENCOUNTER (OUTPATIENT)
Dept: CARDIOLOGY | Facility: HOSPITAL | Age: 82
Setting detail: RECURRING SERIES
Discharge: HOME | End: 2017-02-17

## 2017-02-17 ENCOUNTER — DOCUMENTATION (OUTPATIENT)
Dept: CARDIOLOGY | Facility: CLINIC | Age: 82
End: 2017-02-17

## 2017-02-17 DIAGNOSIS — R06.02 SHORTNESS OF BREATH: ICD-10-CM

## 2017-02-17 DIAGNOSIS — I48.19 PERSISTENT ATRIAL FIBRILLATION (HCC): Primary | ICD-10-CM

## 2017-02-17 PROCEDURE — 36416 COLLJ CAPILLARY BLOOD SPEC: CPT

## 2017-02-17 PROCEDURE — 85610 PROTHROMBIN TIME: CPT

## 2017-02-17 RX ORDER — FUROSEMIDE 20 MG/1
20 TABLET ORAL DAILY
Qty: 30 TABLET | Refills: 1 | Status: ON HOLD | OUTPATIENT
Start: 2017-02-17 | End: 2017-03-23

## 2017-02-17 NOTE — PROGRESS NOTES
Discussed the patient's plan of care with Dr. Maldonado Melendez.  The patient remains in atrial fibrillation at times is tachycardic.  She is quite symptomatic. Dr. Melendez will review the patient's chart.  She has a history of cardiac ablation and is currently on beta blocker therapy.  He will determine the next steps for treatment of atrial fibrillation that has been persistent.  The patient remains short of breath and edematous.  I will initiate furosemide 20 mg 1 tablet daily.  I will then follow-up the patient via telephone to reassess her symptoms that week.  If she remains on the furosemide then she will need to have a repeat basic metabolic panel in 7-10 days.    The patient remains on warfarin therapy and her INR levels are frequently subtherapeutic.  We have recommended discontinuing the warfarin and starting the patient on Eliquis 5 mg twice daily.  Samples have been provided to the patient.  I've asked her to avoid aspirin and ibuprofen.

## 2017-02-24 ENCOUNTER — TELEPHONE (OUTPATIENT)
Dept: CARDIOLOGY | Facility: CLINIC | Age: 82
End: 2017-02-24

## 2017-02-24 DIAGNOSIS — R06.02 SHORTNESS OF BREATH: Primary | ICD-10-CM

## 2017-02-24 NOTE — TELEPHONE ENCOUNTER
----- Message from ALLISON May sent at 2/17/2017 12:32 PM EST -----    We discussed this today.  Please review the patient's chart and all the testing that I've done.  She has continued to experience persistent atrial fibrillation with symptoms.  I have switched her from warfarin to Eliquis today.  I'm going to give her a little low dose diuretic to help with her shortness of air.    You told me that he will contact the patient via telephone for the next steps in the plan of care.  Thank you so much.    ----- Message -----     From: ALLISON May     Sent: 2/14/2017   4:58 PM       To: ALLISON May      Discuss plan of care with Dr. Maldonado Melendez

## 2017-02-27 ENCOUNTER — LAB (OUTPATIENT)
Dept: LAB | Facility: HOSPITAL | Age: 82
End: 2017-02-27

## 2017-02-27 ENCOUNTER — TELEPHONE (OUTPATIENT)
Dept: CARDIOLOGY | Facility: HOSPITAL | Age: 82
End: 2017-02-27

## 2017-02-27 DIAGNOSIS — R06.02 SHORTNESS OF BREATH: ICD-10-CM

## 2017-02-27 LAB
ANION GAP SERPL CALCULATED.3IONS-SCNC: 11.3 MMOL/L
BUN BLD-MCNC: 15 MG/DL (ref 8–23)
BUN/CREAT SERPL: 11.8 (ref 7–25)
CALCIUM SPEC-SCNC: 9.2 MG/DL (ref 8.6–10.5)
CHLORIDE SERPL-SCNC: 104 MMOL/L (ref 98–107)
CO2 SERPL-SCNC: 26.7 MMOL/L (ref 22–29)
CREAT BLD-MCNC: 1.27 MG/DL (ref 0.57–1)
GFR SERPL CREATININE-BSD FRML MDRD: 40 ML/MIN/1.73
GLUCOSE BLD-MCNC: 95 MG/DL (ref 65–99)
POTASSIUM BLD-SCNC: 4.3 MMOL/L (ref 3.5–5.2)
SODIUM BLD-SCNC: 142 MMOL/L (ref 136–145)

## 2017-02-27 PROCEDURE — 80048 BASIC METABOLIC PNL TOTAL CA: CPT

## 2017-02-27 NOTE — TELEPHONE ENCOUNTER
Patient had repeat BMP completed. Creatinine still elevated, but no significant change with start of lasix. I will recommend patient continue with same medications.    I spoke with Dr. Maldonado Melendez about plan of care for atrial fibrillation.  He has asked that polyp of the pacemaker nurse interrogate her pacemaker on Wednesday.  He will then talk with her at that time.    I spoke with Amy and she will arrange.    I have left a message for patient to return my call.

## 2017-03-01 DIAGNOSIS — I48.0 PAROXYSMAL ATRIAL FIBRILLATION (HCC): Primary | ICD-10-CM

## 2017-03-01 DIAGNOSIS — I49.5 SICK SINUS SYNDROME (HCC): ICD-10-CM

## 2017-03-01 NOTE — PROGRESS NOTES
The patient came into the office today to have her pacemaker interrogated.  This revealed that she is having increased episodes of paroxysmal atrial fibrillation.  Her heart rates 40% of the time are above 90 and 100.  The patient has been quite symptomatic recently.     Dr. Maldonado Melendez reviewed the pacemaker interrogation.  He discussed the possibilities of treatment with the patient and her daughter.  He has recommended the patient undergo AV node ablation.  He does not feel that she would benefit from an atrial fibrillation ablation or further medication titration.  The patient verbalized understanding and would like to proceed with AV node ablation.

## 2017-03-04 DIAGNOSIS — R00.0 TACHYCARDIA: ICD-10-CM

## 2017-03-04 DIAGNOSIS — I48.91 ATRIAL FIBRILLATION, UNSPECIFIED TYPE (HCC): ICD-10-CM

## 2017-03-06 RX ORDER — METOPROLOL TARTRATE 100 MG/1
TABLET ORAL
Qty: 60 TABLET | Refills: 2 | Status: SHIPPED | OUTPATIENT
Start: 2017-03-06 | End: 2017-03-22

## 2017-03-16 ENCOUNTER — LAB (OUTPATIENT)
Dept: LAB | Facility: HOSPITAL | Age: 82
End: 2017-03-16
Attending: INTERNAL MEDICINE

## 2017-03-16 ENCOUNTER — TRANSCRIBE ORDERS (OUTPATIENT)
Dept: ADMINISTRATIVE | Facility: HOSPITAL | Age: 82
End: 2017-03-16

## 2017-03-16 DIAGNOSIS — Z13.6 SCREENING FOR ISCHEMIC HEART DISEASE: ICD-10-CM

## 2017-03-16 DIAGNOSIS — Z01.810 PRE-OPERATIVE CARDIOVASCULAR EXAMINATION: ICD-10-CM

## 2017-03-16 DIAGNOSIS — Z01.810 PRE-OPERATIVE CARDIOVASCULAR EXAMINATION: Primary | ICD-10-CM

## 2017-03-16 LAB
ANION GAP SERPL CALCULATED.3IONS-SCNC: 11 MMOL/L
BASOPHILS # BLD AUTO: 0.03 10*3/MM3 (ref 0–0.2)
BASOPHILS NFR BLD AUTO: 0.5 % (ref 0–1.5)
BUN BLD-MCNC: 13 MG/DL (ref 8–23)
BUN/CREAT SERPL: 11.6 (ref 7–25)
CALCIUM SPEC-SCNC: 9.7 MG/DL (ref 8.6–10.5)
CHLORIDE SERPL-SCNC: 102 MMOL/L (ref 98–107)
CO2 SERPL-SCNC: 27 MMOL/L (ref 22–29)
CREAT BLD-MCNC: 1.12 MG/DL (ref 0.57–1)
DEPRECATED RDW RBC AUTO: 50 FL (ref 37–54)
EOSINOPHIL # BLD AUTO: 0.24 10*3/MM3 (ref 0–0.7)
EOSINOPHIL NFR BLD AUTO: 4.3 % (ref 0.3–6.2)
ERYTHROCYTE [DISTWIDTH] IN BLOOD BY AUTOMATED COUNT: 17.3 % (ref 11.7–13)
GFR SERPL CREATININE-BSD FRML MDRD: 47 ML/MIN/1.73
GLUCOSE BLD-MCNC: 100 MG/DL (ref 65–99)
HCT VFR BLD AUTO: 40 % (ref 35.6–45.5)
HGB BLD-MCNC: 12.6 G/DL (ref 11.9–15.5)
IMM GRANULOCYTES # BLD: 0 10*3/MM3 (ref 0–0.03)
IMM GRANULOCYTES NFR BLD: 0 % (ref 0–0.5)
LYMPHOCYTES # BLD AUTO: 1.65 10*3/MM3 (ref 0.9–4.8)
LYMPHOCYTES NFR BLD AUTO: 29.6 % (ref 19.6–45.3)
MCH RBC QN AUTO: 25.2 PG (ref 26.9–32)
MCHC RBC AUTO-ENTMCNC: 31.5 G/DL (ref 32.4–36.3)
MCV RBC AUTO: 80 FL (ref 80.5–98.2)
MONOCYTES # BLD AUTO: 0.58 10*3/MM3 (ref 0.2–1.2)
MONOCYTES NFR BLD AUTO: 10.4 % (ref 5–12)
NEUTROPHILS # BLD AUTO: 3.08 10*3/MM3 (ref 1.9–8.1)
NEUTROPHILS NFR BLD AUTO: 55.2 % (ref 42.7–76)
PLATELET # BLD AUTO: 273 10*3/MM3 (ref 140–500)
PMV BLD AUTO: 10.1 FL (ref 6–12)
POTASSIUM BLD-SCNC: 4.8 MMOL/L (ref 3.5–5.2)
RBC # BLD AUTO: 5 10*6/MM3 (ref 3.9–5.2)
SODIUM BLD-SCNC: 140 MMOL/L (ref 136–145)
WBC NRBC COR # BLD: 5.58 10*3/MM3 (ref 4.5–10.7)

## 2017-03-16 PROCEDURE — 36415 COLL VENOUS BLD VENIPUNCTURE: CPT

## 2017-03-16 PROCEDURE — 85025 COMPLETE CBC W/AUTO DIFF WBC: CPT

## 2017-03-16 PROCEDURE — 80048 BASIC METABOLIC PNL TOTAL CA: CPT

## 2017-03-22 RX ORDER — METOPROLOL TARTRATE 50 MG/1
50 TABLET, FILM COATED ORAL 2 TIMES DAILY
Status: ON HOLD | COMMUNITY
End: 2017-03-23

## 2017-03-22 RX ORDER — HYDROCODONE BITARTRATE AND ACETAMINOPHEN 7.5; 325 MG/1; MG/1
.5-1 TABLET ORAL EVERY 6 HOURS PRN
COMMUNITY
End: 2018-05-04

## 2017-03-23 ENCOUNTER — CLINICAL SUPPORT NO REQUIREMENTS (OUTPATIENT)
Dept: CARDIOLOGY | Facility: CLINIC | Age: 82
End: 2017-03-23

## 2017-03-23 ENCOUNTER — HOSPITAL ENCOUNTER (OUTPATIENT)
Facility: HOSPITAL | Age: 82
Setting detail: HOSPITAL OUTPATIENT SURGERY
Discharge: HOME | End: 2017-03-23
Attending: INTERNAL MEDICINE | Admitting: INTERNAL MEDICINE

## 2017-03-23 VITALS
DIASTOLIC BLOOD PRESSURE: 73 MMHG | BODY MASS INDEX: 35.44 KG/M2 | HEIGHT: 63 IN | SYSTOLIC BLOOD PRESSURE: 148 MMHG | OXYGEN SATURATION: 98 % | RESPIRATION RATE: 16 BRPM | WEIGHT: 200 LBS | HEART RATE: 81 BPM | TEMPERATURE: 97.9 F

## 2017-03-23 DIAGNOSIS — I48.0 PAROXYSMAL ATRIAL FIBRILLATION (HCC): ICD-10-CM

## 2017-03-23 DIAGNOSIS — I49.5 SICK SINUS SYNDROME (HCC): ICD-10-CM

## 2017-03-23 PROCEDURE — 93650 ICAR CATH ABLTJ AV NODE FUNC: CPT | Performed by: INTERNAL MEDICINE

## 2017-03-23 PROCEDURE — C1732 CATH, EP, DIAG/ABL, 3D/VECT: HCPCS | Performed by: INTERNAL MEDICINE

## 2017-03-23 PROCEDURE — C1894 INTRO/SHEATH, NON-LASER: HCPCS | Performed by: INTERNAL MEDICINE

## 2017-03-23 PROCEDURE — 25010000002 FENTANYL CITRATE (PF) 100 MCG/2ML SOLUTION: Performed by: INTERNAL MEDICINE

## 2017-03-23 PROCEDURE — 93005 ELECTROCARDIOGRAM TRACING: CPT | Performed by: INTERNAL MEDICINE

## 2017-03-23 PROCEDURE — 93010 ELECTROCARDIOGRAM REPORT: CPT | Performed by: INTERNAL MEDICINE

## 2017-03-23 PROCEDURE — C1893 INTRO/SHEATH, FIXED,NON-PEEL: HCPCS | Performed by: INTERNAL MEDICINE

## 2017-03-23 PROCEDURE — 25010000002 MIDAZOLAM PER 1 MG: Performed by: INTERNAL MEDICINE

## 2017-03-23 PROCEDURE — 25010000002 HYDRALAZINE PER 20 MG: Performed by: INTERNAL MEDICINE

## 2017-03-23 PROCEDURE — 93286 PERI-PX EVAL PM/LDLS PM IP: CPT | Performed by: INTERNAL MEDICINE

## 2017-03-23 RX ORDER — SODIUM CHLORIDE 0.9 % (FLUSH) 0.9 %
1-10 SYRINGE (ML) INJECTION AS NEEDED
Status: DISCONTINUED | OUTPATIENT
Start: 2017-03-23 | End: 2017-03-23 | Stop reason: HOSPADM

## 2017-03-23 RX ORDER — ONDANSETRON 2 MG/ML
4 INJECTION INTRAMUSCULAR; INTRAVENOUS EVERY 6 HOURS PRN
Status: DISCONTINUED | OUTPATIENT
Start: 2017-03-23 | End: 2017-03-23 | Stop reason: HOSPADM

## 2017-03-23 RX ORDER — ACETAMINOPHEN 325 MG/1
650 TABLET ORAL EVERY 4 HOURS PRN
Status: DISCONTINUED | OUTPATIENT
Start: 2017-03-23 | End: 2017-03-23 | Stop reason: HOSPADM

## 2017-03-23 RX ORDER — LIDOCAINE HYDROCHLORIDE 10 MG/ML
0.1 INJECTION, SOLUTION EPIDURAL; INFILTRATION; INTRACAUDAL; PERINEURAL ONCE AS NEEDED
Status: DISCONTINUED | OUTPATIENT
Start: 2017-03-23 | End: 2017-03-23 | Stop reason: HOSPADM

## 2017-03-23 RX ORDER — MIDAZOLAM HYDROCHLORIDE 1 MG/ML
INJECTION INTRAMUSCULAR; INTRAVENOUS AS NEEDED
Status: DISCONTINUED | OUTPATIENT
Start: 2017-03-23 | End: 2017-03-23 | Stop reason: HOSPADM

## 2017-03-23 RX ORDER — NALOXONE HCL 0.4 MG/ML
0.4 VIAL (ML) INJECTION
Status: DISCONTINUED | OUTPATIENT
Start: 2017-03-23 | End: 2017-03-23 | Stop reason: HOSPADM

## 2017-03-23 RX ORDER — LIDOCAINE HYDROCHLORIDE AND EPINEPHRINE 10; 10 MG/ML; UG/ML
INJECTION, SOLUTION INFILTRATION; PERINEURAL AS NEEDED
Status: DISCONTINUED | OUTPATIENT
Start: 2017-03-23 | End: 2017-03-23 | Stop reason: HOSPADM

## 2017-03-23 RX ORDER — HYDROCODONE BITARTRATE AND ACETAMINOPHEN 5; 325 MG/1; MG/1
1 TABLET ORAL EVERY 4 HOURS PRN
Status: DISCONTINUED | OUTPATIENT
Start: 2017-03-23 | End: 2017-03-23 | Stop reason: HOSPADM

## 2017-03-23 RX ORDER — HYDRALAZINE HYDROCHLORIDE 20 MG/ML
INJECTION INTRAMUSCULAR; INTRAVENOUS AS NEEDED
Status: DISCONTINUED | OUTPATIENT
Start: 2017-03-23 | End: 2017-03-23 | Stop reason: HOSPADM

## 2017-03-23 RX ORDER — METOPROLOL TARTRATE 50 MG/1
TABLET, FILM COATED ORAL
Start: 2017-03-23 | End: 2017-05-24

## 2017-03-23 RX ORDER — ACETAMINOPHEN 500 MG
1000 TABLET ORAL ONCE AS NEEDED
Status: COMPLETED | OUTPATIENT
Start: 2017-03-23 | End: 2017-03-23

## 2017-03-23 RX ORDER — SODIUM CHLORIDE 9 MG/ML
75 INJECTION, SOLUTION INTRAVENOUS CONTINUOUS
Status: DISCONTINUED | OUTPATIENT
Start: 2017-03-23 | End: 2017-03-23 | Stop reason: HOSPADM

## 2017-03-23 RX ORDER — FENTANYL CITRATE 50 UG/ML
INJECTION, SOLUTION INTRAMUSCULAR; INTRAVENOUS AS NEEDED
Status: DISCONTINUED | OUTPATIENT
Start: 2017-03-23 | End: 2017-03-23 | Stop reason: HOSPADM

## 2017-03-23 RX ORDER — HYDROCODONE BITARTRATE AND ACETAMINOPHEN 7.5; 325 MG/1; MG/1
1 TABLET ORAL EVERY 4 HOURS PRN
Status: DISCONTINUED | OUTPATIENT
Start: 2017-03-23 | End: 2017-03-23 | Stop reason: HOSPADM

## 2017-03-23 RX ORDER — LISINOPRIL 10 MG/1
10 TABLET ORAL DAILY
COMMUNITY
End: 2017-08-14 | Stop reason: SDUPTHER

## 2017-03-23 RX ADMIN — SODIUM CHLORIDE 75 ML/HR: 9 INJECTION, SOLUTION INTRAVENOUS at 11:29

## 2017-03-23 RX ADMIN — ACETAMINOPHEN 1000 MG: 500 TABLET ORAL at 15:44

## 2017-03-23 NOTE — DISCHARGE INSTRUCTIONS
[facility]  Cardiology  [address] [place] [address]  [phone]    Coronary Ablation After Care    Refer to this sheet in the next few weeks. These instructions provide you with information on caring for yourself after your procedure. Your health care provider may also give you more specific instructions. Your treatment has been planned according to current medical practices, but problems sometimes occur. Call your health care provider if you have any problems or questions after your procedure.      What to Expect After the Procedure:  After your procedure, it is typical to have the following sensations:  · Minor discomfort or tenderness and a small bump at the catheter insertion site(s). The bump(s) should usually decrease in size and tenderness within 1 to 2 weeks.  · Any bruising will usually fade within 2 to 4 weeks.  Home Care Instructions:  · Do not apply powder or lotion to the site.  · Do not take baths, swim, or use a hot tub until your health care provider approves and the site is completely healed.  · Do not bend, squat, or lift anything over 20 lb (9 kg) or as directed by your health care provider. However, we recommend lifting nothing heavier than a gallon of milk.    · You may shower 24 hours after the procedure. Remove the bandage (dressing) and gently wash the site with plain soap and water. Gently pat the site dry. You may apply a band aid daily for 2 days if desired.    · Inspect the site at least twice daily.  · Increase your fluid intake for the next 2 days.    · Limit your activity for the first 48 hours.   · Avoid strenuous activity for 1 week or as advised by your physician.    · Follow instructions about when you can drive or return to work as directed by your physician.    · Hold direct pressure over the site when you cough, sneeze, laugh or change positions.  Do this for the next 2 days.    Call Your Doctor If:  · You have drainage (other than a small amount of  blood on the dressing).  · You have chills or a fever > 101.  · You have redness, warmth, swelling (larger than a walnut), or pain at the insertion   · You develop palpitations, chest pain or shortness of breath, feel faint, or pass out.  · You develop pain, discoloration, coldness, numbness, tingling, or severe bruising in the leg that held the catheter.  · You develop bleeding from any other place, such as the bowels.  · You have heavy bleeding from the site.  If this happens, hold pressure on the site and call 911.        Make Sure You:  · Understand these instructions.  · Will watch your condition.  · Will get help right away if you are not doing well or get worse.    SEDATION DISCHARGE INSTRUCTIONS.    IMPORTANT: The following information will help you return to your best level of health.  Sedation.  You have had a procedure that called for some medicine to reduce anxiety and pain. This medicine (or medicines) is called  sedation. After receiving the medicine, you may be sleepy, but able to breathe on your own. The effects of the medicine may last for several hours.    Follow these instructions after sedation:   Go right home. Rest quietly at home today, then you can be up and about.   Do not drink alcohol, drive or operate machinery for 24 hours.   Do not do anything where light-headedness or clumsiness would be dangerous.   Do not make important decisions or sign any legal papers for the next 24 hours.   Make sure A RESPONSIBLE PERSON stays with you the rest of today and overnight for your protection and safety.   Start your diet with fluids and light foods (jello, soup, juice, toast). Then, slowly progress to your usual diet if you are not sick to your stomach.    Call your doctor if you have:   a gray or blue skin color.   excess sleepiness.   repeated vomiting.   trouble breathing.   any new problems or concerns.

## 2017-03-23 NOTE — PLAN OF CARE
Problem: Patient Care Overview (Adult)  Goal: Plan of Care Review  Outcome: Outcome(s) achieved Date Met:  03/23/17 03/23/17 6431   Coping/Psychosocial Response Interventions   Plan Of Care Reviewed With patient;daughter   Patient Care Overview   Progress improving   Outcome Evaluation   Outcome Summary/Follow up Plan Ready for discharge       Goal: Adult Individualization and Mutuality  Outcome: Outcome(s) achieved Date Met:  03/23/17  Goal: Discharge Needs Assessment  Outcome: Outcome(s) achieved Date Met:  03/23/17    Problem: Perioperative Period (Adult)  Goal: Signs and Symptoms of Listed Potential Problems Will be Absent or Manageable (Perioperative Period)  Outcome: Outcome(s) achieved Date Met:  03/23/17

## 2017-04-10 ENCOUNTER — TELEPHONE (OUTPATIENT)
Dept: CARDIOLOGY | Facility: CLINIC | Age: 82
End: 2017-04-10

## 2017-04-10 NOTE — TELEPHONE ENCOUNTER
04/10/17  Anastacia Sarah  1935    Home Phone [phone]   Mobile [phone]     Ms. Sarah is a patient of Dr. Melendez's who has seen Kenia Guan after getting her ppm checked on 3/1/17. She underwent AV node ablation with Dr. Melendez on 3/2/17. She has an in-clinic device check on 3/23/17 and has follow-up appt with Sayra Julian on 4/14/17.    She reports that she has been awakened during the past two nights due to having trouble breathing. She states she sleeps on her side. She states her chest feels heavy, but she has no chest pain. She has no means of measuring BP/ HR at home. She reports some mild edema in her feet and states her abdomen feels swollen. She also states she becomes SOA with walking.     I asked if she could seen in a remote device check. She has the home monitor but is unsure how to send in a remote check.    Does she need to come in for device check or can someone call her and give her instructions on how to send in a remote check?    Cara GOLDSMITH RN

## 2017-04-10 NOTE — TELEPHONE ENCOUNTER
Pt has a functioning Glow remote monitor. If there are events or measurements that are outside of her set parameters, it will automatically send us a remote transmission. We will interrogate her device prior to her apt with Sayra on 4/14/17 unless Sayra or Dr. Melendez feel she should be checked sooner.

## 2017-04-10 NOTE — TELEPHONE ENCOUNTER
Kriss,     Can you please change pt's apt with Sayra and with pacemaker to the time below? Thanks!

## 2017-04-10 NOTE — TELEPHONE ENCOUNTER
I spoke to patient, I am a little concerned as she says that she is more short of breath and just hasn't felt well since the pacemaker and ablation. She is currently not on diuretic as she only took it for 1 month, she has an appt Friday but I am going to move her up to Wednesday at 11am as that is the next time we are in the office. I informed patient of appt time and will have scheduling change her appt.

## 2017-04-12 ENCOUNTER — OFFICE VISIT (OUTPATIENT)
Dept: CARDIOLOGY | Facility: CLINIC | Age: 82
End: 2017-04-12

## 2017-04-12 ENCOUNTER — CLINICAL SUPPORT NO REQUIREMENTS (OUTPATIENT)
Dept: CARDIOLOGY | Facility: CLINIC | Age: 82
End: 2017-04-12

## 2017-04-12 VITALS
SYSTOLIC BLOOD PRESSURE: 134 MMHG | WEIGHT: 218.8 LBS | HEART RATE: 82 BPM | DIASTOLIC BLOOD PRESSURE: 84 MMHG | BODY MASS INDEX: 38.77 KG/M2 | HEIGHT: 63 IN

## 2017-04-12 DIAGNOSIS — I49.5 SINOATRIAL NODE DYSFUNCTION (HCC): Primary | ICD-10-CM

## 2017-04-12 DIAGNOSIS — I48.91 ATRIAL FIBRILLATION, UNSPECIFIED TYPE (HCC): Primary | ICD-10-CM

## 2017-04-12 PROCEDURE — 99214 OFFICE O/P EST MOD 30 MIN: CPT | Performed by: NURSE PRACTITIONER

## 2017-04-12 PROCEDURE — 93279 PRGRMG DEV EVAL PM/LDLS PM: CPT | Performed by: INTERNAL MEDICINE

## 2017-04-12 PROCEDURE — 93000 ELECTROCARDIOGRAM COMPLETE: CPT | Performed by: NURSE PRACTITIONER

## 2017-04-12 RX ORDER — FUROSEMIDE 40 MG/1
40 TABLET ORAL DAILY
Qty: 90 TABLET | Refills: 3 | Status: SHIPPED | OUTPATIENT
Start: 2017-04-12 | End: 2017-05-24

## 2017-04-12 NOTE — PROGRESS NOTES
Date of Office Visit: 2017  Encounter Provider: ALLISON Isaac  Place of Service: [facility] CARDIOLOGY  Patient Name: Anastacia Sarah  :1935    Chief Complaint   Patient presents with   • Atrial Fibrillation     S/p AV node ablation 3/23/2017   :     HPI: Anastacia Sarah is a 81 y.o. female who is a patient of Dr. Melendez's with a past medical history of PAF, s/p ablation in the past. She was last seen in the office by ALLISON Lema on . At that time she had complaints of incressing shortness of breath, palpitations and worsening fatigue. She has a history of SSS and s/p PPM. Her device interrogation showed and increase in her PAF episodes and 40% of the time her heart rates were above 90 and 100. Dr. Melendez reviewed the data and discussed with the patient and they decided on AV node ablation. She underwent AV node ablation on 2017. She presents today for post procedure follow-up. Today she says she does not feel any better since the procedure. She and her daughter both think her shortness of breath is worse. She is still fatigued and describes a fullness or pressure sensation in her chest/upper abdominal area. She has not had any palpitations. She does have edema and describes a couple of episodes of PND. She does have NANCY and uses her CPAP but she has had the same machine for 5 years and thinks she needs to be re-evaluated. She has an upcoming appointment.     She had a nuclear stress test in 2016 which showed no ischemia and EF 58%. An echocardiogram in 2016 showed mild to moderate MR, mild TR and EF 56-60%.     Exercise tolerance: Poor    There have been no hospital admission since the last visit.     Device interrogation today shows underlying atrial fibrillation, 100% v-paced since ablation and no high rate episodes. She did not have an R wave when decreased to lower rate of 40ppm.      There have been no  bleeding events but she did mention that when she went to bathroom yesterday she did not a very small amount of bright red blood on the toilet paper. She has only seen it once time and has had none today. CBC 3/2016, H/H 12.4/40.      Past Medical History:   Diagnosis Date   • Acute bronchitis    • Acute sinusitis    • Anticoagulated on warfarin    • Atrial fibrillation    • Bladder dysfunction    • Cancer of left kidney    • Chronic dysfunction of left eustachian tube    • Diarrhea    • Esophageal reflux    • Fatigue    • Frequent urination    • GERD (gastroesophageal reflux disease)    • Goiter     thyroid removed in november 2015   • Health care maintenance    • History of shingles    • History of transfusion    • Hypertension    • Insomnia    • Left atrial enlargement    • Left ventricular hypertrophy    • Mitral regurgitation     mild to moderate per echocardiogram 2016   • Nerve pain     LUE D/T SHINGLES   • Neuropathy     ARMS   • NANCY (obstructive sleep apnea)     CPAP   • Paroxysmal atrial fibrillation    • Pulmonary hypertension     per echo 2016   • Right atrial enlargement    • SSS (sick sinus syndrome)    • URI (upper respiratory infection)    • Urinary urgency        Past Surgical History:   Procedure Laterality Date   • ATRIAL ABLATION SURGERY      EXTENSIVE; CATH ATRIAL FIBRILLATION LEFT   • ATRIAL ABLATION SURGERY      2004 FLUTTER   • CARDIAC ELECTROPHYSIOLOGY PROCEDURE N/A 5/17/2016    Procedure: PPM generator change - dual BOSTON;  Surgeon: Maldonado Melendez MD;  Location: [facility] CATH INVASIVE LOCATION;  Service:    • CARDIAC ELECTROPHYSIOLOGY PROCEDURE N/A 3/23/2017    Procedure: AV node ablation pt has BOSTON PPM;  Surgeon: Maldonado Melendez MD;  Location: [facility] CATH INVASIVE LOCATION;  Service:    • CARDIOVERSION     • CYSTOSCOPY BOTOX INJECTION OF BLADDER N/A 6/27/2016    Procedure: CYSTOSCOPY WITH BOTOX DETRUSOR INJECTION;  Surgeon: Salome Bowen MD;  Location: [facility] OR;  Service:    •  EYE SURGERY      CATARACT   • HERNIA REPAIR     • INSERT / REPLACE / REMOVE PACEMAKER      x2   • KNEE ARTHROPLASTY Bilateral    • KNEE MINI REVISION Right 9/28/2016    Procedure: RT KNEE POLY INSERT CHANGE ;  Surgeon: Tommy Avila MD;  Location: [facility] OR;  Service:    • LAPAROSCOPIC CHOLECYSTECTOMY     • NEPHRECTOMY Left     MALIGNANT   • OTHER SURGICAL HISTORY      NEUROMUSCULAR BLOCKERS BOTULINUM TOXIN ONABOTULINUMTOXIN A   • PACEMAKER IMPLANTATION     • TOTAL THYROIDECTOMY     • TUBAL ABDOMINAL LIGATION         Social History     Social History   • Marital status:      Spouse name: N/A   • Number of children: N/A   • Years of education: N/A     Occupational History   • Not on file.     Social History Main Topics   • Smoking status: Never Smoker   • Smokeless tobacco: Never Used      Comment: caffeine use   • Alcohol use No      Comment: STOPPED   • Drug use: No   • Sexual activity: Defer     Other Topics Concern   • Not on file     Social History Narrative       Family History   Problem Relation Age of Onset   • Heart disease Other    • Breast cancer Sister    • Breast cancer Maternal Aunt    • Breast cancer Sister    • Heart disease Mother    • Hypertension Mother    • Heart disease Father    • Hypertension Father    • Heart disease Daughter    • Hypertension Daughter    • Heart disease Son    • Hypertension Son        Review of Systems   Constitution: Positive for weakness and malaise/fatigue. Negative for chills, fever, weight gain and weight loss.   HENT: Negative for ear pain, headaches, hearing loss, nosebleeds and sore throat.    Eyes: Negative for double vision, pain and visual disturbance.   Cardiovascular: Positive for dyspnea on exertion, leg swelling and paroxysmal nocturnal dyspnea. Negative for chest pain, irregular heartbeat, near-syncope, orthopnea, palpitations and syncope.   Respiratory: Positive for shortness of breath. Negative for cough, sleep disturbances due to breathing,  snoring and wheezing.    Endocrine: Negative for cold intolerance, heat intolerance and polyuria.   Hematologic/Lymphatic: Positive for bleeding problem (scant bright red blood on toilet paper yesterday once, no recurrence).   Skin: Negative for itching and rash.   Musculoskeletal: Negative for joint pain, joint swelling and myalgias.   Gastrointestinal: Negative for abdominal pain, diarrhea, melena, nausea and vomiting.   Genitourinary: Negative for frequency, hematuria and hesitancy.   Neurological: Negative for excessive daytime sleepiness, light-headedness, numbness, paresthesias and seizures.   Psychiatric/Behavioral: Negative for altered mental status and depression.   Allergic/Immunologic: Negative.    All other systems reviewed and are negative.      Allergies   Allergen Reactions   • Adhesive Tape Other (See Comments)     SKIN BLISTERS   • Levaquin [Levofloxacin] Hives   • Sulfa Antibiotics Swelling     FACE AND TONGUE         Current Outpatient Prescriptions:   •  apixaban (ELIQUIS) 5 MG tablet tablet, Take 1 tablet by mouth 2 (Two) Times a Day., Disp: 180 tablet, Rfl: 3  •  B Complex-C (SUPER B COMPLEX PO), Take 1 tablet by mouth Daily., Disp: , Rfl:   •  CALCIUM PO, Take 600 Units by mouth Daily. With D3, listed separetly, Disp: , Rfl:   •  carboxymethylcellulose (REFRESH PLUS) 0.5 % solution, 1 drop 2 (Two) Times a Day As Needed for dry eyes., Disp: , Rfl:   •  cholecalciferol (VITAMIN D3) 1000 UNITS tablet, Take 1,000 Units by mouth daily. In addition to the calcium -D3 pill, Disp: , Rfl:   •  Cyanocobalamin (VITAMIN B-12 PO), Take 1 tablet by mouth daily., Disp: , Rfl:   •  esomeprazole (NEXIUM) 40 MG capsule, Take 40 mg by mouth Every Morning Before Breakfast., Disp: , Rfl:   •  HYDROcodone-acetaminophen (NORCO) 7.5-325 MG per tablet, Take 1 tablet by mouth Every 6 (Six) Hours As Needed for Moderate Pain (4-6)., Disp: , Rfl:   •  lisinopril (PRINIVIL,ZESTRIL) 10 MG tablet, Take 10 mg by mouth Daily.,  "Disp: , Rfl:   •  loperamide (IMODIUM) 2 MG capsule, Take 2 mg by mouth 4 (four) times a day as needed for diarrhea., Disp: , Rfl:   •  loratadine (CLARITIN) 10 MG tablet, Take 10 mg by mouth Daily., Disp: , Rfl:   •  metoprolol tartrate (LOPRESSOR) 50 MG tablet, Take 1/2 tab q12 (25 mg in am and 25 mg in the pm), Disp: , Rfl:   •  Multiple Vitamins-Minerals (PRESERVISION AREDS PO), Take 1 tablet by mouth Daily., Disp: , Rfl:   •  SYNTHROID 112 MCG tablet, Take 112 mcg by mouth Daily., Disp: , Rfl:   •  furosemide (LASIX) 40 MG tablet, Take 1 tablet by mouth Daily., Disp: 90 tablet, Rfl: 3     Objective:     Vitals:    04/12/17 1053   BP: 134/84   Pulse: 82   Weight: 218 lb 12.8 oz (99.2 kg)   Height: 63\" (160 cm)     Body mass index is 38.76 kg/(m^2).    PHYSICAL EXAM:    Vitals Reviewed.   General Appearance: No acute distress, well developed and well nourished.   Eyes: Conjunctiva and lids: No erythema, swelling, or discharge. Sclera non-icteric.   HENT: Atraumatic, normocephalic. External eyes, ears, and nose normal. No hearing loss noted. Mucous membranes normal. Lips not cyanotic. Neck supple with no tenderness.  Respiratory: No signs of respiratory distress. Respiration rhythm and depth normal.   Clear to auscultation. No rales, crackles, rhonchi, or wheezing auscultated.   Cardiovascular:  Jugular Venous Pressure: Normal  Heart Rate and Rhythm: Normal, Heart Sounds: Normal S1 and S2. No S3 or S4 noted.  Murmurs: No murmurs noted. No rubs, thrills, or gallops.   Arterial Pulses: Carotid pulses normal. No carotid bruit noted. Posterior tibialis and dorsalis pedis pulses normal.   Lower Extremities: No edema noted.  Gastrointestinal:  Abdomen soft, non-distended, non-tender. Normal bowel sounds. No hepatomegaly.   Musculoskeletal: Normal movement of extremities  Skin and Nails: General appearance normal. No pallor, cyanosis, diaphoresis. Skin temperature normal. No clubbing of fingernails.   Psychiatric: " Patient alert and oriented to person, place, and time. Speech and behavior appropriate. Normal mood and affect.       ECG 12 Lead  Date/Time: 4/12/2017 12:43 PM  Performed by: IRMA AYOUB  Authorized by: IRMA AYOUB   Comparison: compared with previous ECG from 3/23/2017  Comparison to previous ECG: She is now ventricularly paced with underlying atrial fibriallation  Rhythm: atrial fibrillation and paced  Ectopy: PVCs  Clinical impression: abnormal ECG  Comments: Clinical indication: atrial fibrillation              Assessment:       Diagnosis Plan   1. Atrial fibrillation, unspecified type  Basic Metabolic Panel          Plan:       1. Atrial Fibrillation and Atrial Flutter  Assessment  • The patient has persistent atrial fibrillation  • The patient's CHADS2-VASc score is 4  • A [id number] score of 2 or more is considered a high risk for a thromboembolic event  • Apixaban prescribed  • She recently had AV node ablation. She does not feel any better. We are going to decrease her lower pacing rate to 70 ppm and add furosemide 40mg daily. We will check a BMP in 1 week. Her last potassium was 4.8, she is on lisinopril so we will not add potassium supplementation at this time. It may be that she is not tolerating the ventricular pacing. She is going to call us in 1 month to let us know how she is doing. If she is no better we will check and echocardiogram.     Subjective - Objective  • The patient underwent cardioversion   • The patient had atrial fibrillation ablation           As always, it has been a pleasure to participate in your patient's care.      Sincerely,         ALLISON Winn

## 2017-04-13 ENCOUNTER — OFFICE VISIT (OUTPATIENT)
Dept: ORTHOPEDIC SURGERY | Facility: CLINIC | Age: 82
End: 2017-04-13

## 2017-04-13 VITALS — TEMPERATURE: 98.3 F | HEIGHT: 63 IN | WEIGHT: 200 LBS | BODY MASS INDEX: 35.44 KG/M2

## 2017-04-13 DIAGNOSIS — Z96.651 STATUS POST TOTAL RIGHT KNEE REPLACEMENT: Primary | ICD-10-CM

## 2017-04-13 PROCEDURE — 99213 OFFICE O/P EST LOW 20 MIN: CPT | Performed by: ORTHOPAEDIC SURGERY

## 2017-04-13 NOTE — PROGRESS NOTES
Patient: Anastacia Sarah  YOB: 1935 81 y.o. female  Medical Record Number: [id number]    Chief Complaints:   Chief Complaint   Patient presents with   • Right Knee - Follow-up, Pain       History of Present Illness:Anastacia Sarah is a 81 y.o. female who presents for follow-up of  Right knee poly-change 6 months out overall doing well but she's had a lot of medical issues and she has been off her feet quite a bit her legs are quite weak and she's having difficulty getting around.  Not much in way of knee pain.    Allergies:   Allergies   Allergen Reactions   • Adhesive Tape Other (See Comments)     SKIN BLISTERS   • Levaquin [Levofloxacin] Hives   • Sulfa Antibiotics Swelling     FACE AND TONGUE       Medications:   Current Outpatient Prescriptions   Medication Sig Dispense Refill   • apixaban (ELIQUIS) 5 MG tablet tablet Take 1 tablet by mouth 2 (Two) Times a Day. 180 tablet 3   • B Complex-C (SUPER B COMPLEX PO) Take 1 tablet by mouth Daily.     • CALCIUM PO Take 600 Units by mouth Daily. With D3, listed separetly     • carboxymethylcellulose (REFRESH PLUS) 0.5 % solution 1 drop 2 (Two) Times a Day As Needed for dry eyes.     • cholecalciferol (VITAMIN D3) 1000 UNITS tablet Take 1,000 Units by mouth daily. In addition to the calcium -D3 pill     • Cyanocobalamin (VITAMIN B-12 PO) Take 1 tablet by mouth daily.     • esomeprazole (NEXIUM) 40 MG capsule Take 40 mg by mouth Every Morning Before Breakfast.     • furosemide (LASIX) 40 MG tablet Take 1 tablet by mouth Daily. 90 tablet 3   • HYDROcodone-acetaminophen (NORCO) 7.5-325 MG per tablet Take 1 tablet by mouth Every 6 (Six) Hours As Needed for Moderate Pain (4-6).     • lisinopril (PRINIVIL,ZESTRIL) 10 MG tablet Take 10 mg by mouth Daily.     • loperamide (IMODIUM) 2 MG capsule Take 2 mg by mouth 4 (four) times a day as needed for diarrhea.     • loratadine (CLARITIN) 10 MG tablet Take 10 mg by mouth Daily.     • metoprolol tartrate  "(LOPRESSOR) 50 MG tablet Take 1/2 tab q12 (25 mg in am and 25 mg in the pm)     • Multiple Vitamins-Minerals (PRESERVISION AREDS PO) Take 1 tablet by mouth Daily.     • SYNTHROID 112 MCG tablet Take 112 mcg by mouth Daily.       No current facility-administered medications for this visit.          The following portions of the patient's history were reviewed and updated as appropriate: allergies, current medications, past family history, past medical history, past social history, past surgical history and problem list.    Review of Systems:   A 14 point review of systems was performed. All systems negative except pertinent positives/negative listed in HPI above    Physical Exam:   Vitals:    04/13/17 1124   Temp: 98.3 °F (36.8 °C)   Weight: 200 lb (90.7 kg)   Height: 63\" (160 cm)       General: A and O x 3, ASA, NAD    SCLERA:    Normal    DENTITION:   Normal  Right knee shows good range of motion 0-129 instability no effusion.  Her previous cellulitis is essentially resolved.        Assessment/Plan:  6 months out from right knee poly-change she needs physical therapy to work on strengthening her quads and range of motion.  I'll see her back in 6 months with repeat x-rays.  "

## 2017-04-18 ENCOUNTER — TELEPHONE (OUTPATIENT)
Dept: CARDIOLOGY | Facility: CLINIC | Age: 82
End: 2017-04-18

## 2017-04-18 NOTE — TELEPHONE ENCOUNTER
Spoke to patient, informed that the other NOAC's are basically as expensive as apixiban. The only other option would be to go back to warfarin which she was on before and had some trouble keeping her INR therapeutic. She elected to stay on apixiban for now. She said that she is about the same as far as her fatigue, weakness and shortness of breath. She is going to call us with a report in a few weeks..

## 2017-04-18 NOTE — TELEPHONE ENCOUNTER
Pt called because she cannot tolerate the 40 mg of Lasix you want her to take. She says that she is having back pain, pain in her calf muscles and her hand has started to draw back and twitch. She wants to know if she can go back to the 20mg.........Dana

## 2017-04-18 NOTE — TELEPHONE ENCOUNTER
I called and told pt to go back to the 20mg. She had another issue that came up after she left her message. She got a call from [facility] and her Eliquis will be $600.00. She can get it at [facility] for $150.00 but she is wondering if she should do that or change to something different all together........Dana

## 2017-04-28 DIAGNOSIS — I48.19 PERSISTENT ATRIAL FIBRILLATION (HCC): ICD-10-CM

## 2017-04-28 DIAGNOSIS — R06.02 SHORTNESS OF BREATH: ICD-10-CM

## 2017-04-28 RX ORDER — FUROSEMIDE 20 MG/1
TABLET ORAL
Qty: 30 TABLET | Refills: 3 | Status: SHIPPED | OUTPATIENT
Start: 2017-04-28 | End: 2017-05-24

## 2017-04-28 RX ORDER — APIXABAN 5 MG/1
TABLET, FILM COATED ORAL
Qty: 60 TABLET | Refills: 3 | Status: SHIPPED | OUTPATIENT
Start: 2017-04-28 | End: 2017-05-24

## 2017-05-04 ENCOUNTER — TELEPHONE (OUTPATIENT)
Dept: CARDIOLOGY | Facility: CLINIC | Age: 82
End: 2017-05-04

## 2017-05-04 DIAGNOSIS — R06.02 SHORTNESS OF BREATH: Primary | ICD-10-CM

## 2017-05-04 DIAGNOSIS — I48.20 CHRONIC ATRIAL FIBRILLATION (HCC): ICD-10-CM

## 2017-05-16 ENCOUNTER — HOSPITAL ENCOUNTER (OUTPATIENT)
Dept: CARDIOLOGY | Facility: HOSPITAL | Age: 82
Discharge: HOME | End: 2017-05-16
Admitting: NURSE PRACTITIONER

## 2017-05-16 VITALS
OXYGEN SATURATION: 98 % | DIASTOLIC BLOOD PRESSURE: 84 MMHG | SYSTOLIC BLOOD PRESSURE: 130 MMHG | HEART RATE: 70 BPM | HEIGHT: 63 IN | WEIGHT: 200 LBS | BODY MASS INDEX: 35.44 KG/M2

## 2017-05-16 DIAGNOSIS — I48.20 CHRONIC ATRIAL FIBRILLATION (HCC): ICD-10-CM

## 2017-05-16 DIAGNOSIS — R06.02 SHORTNESS OF BREATH: ICD-10-CM

## 2017-05-16 LAB
ASCENDING AORTA: 3.3 CM
BH CV ECHO MEAS - ACS: 1.9 CM
BH CV ECHO MEAS - AO MAX PG (FULL): 2.5 MMHG
BH CV ECHO MEAS - AO MAX PG: 5.2 MMHG
BH CV ECHO MEAS - AO MEAN PG (FULL): 1 MMHG
BH CV ECHO MEAS - AO MEAN PG: 2.7 MMHG
BH CV ECHO MEAS - AO ROOT AREA (BSA CORRECTED): 1.7
BH CV ECHO MEAS - AO ROOT AREA: 8.1 CM^2
BH CV ECHO MEAS - AO ROOT DIAM: 3.2 CM
BH CV ECHO MEAS - AO V2 MAX: 114.4 CM/SEC
BH CV ECHO MEAS - AO V2 MEAN: 78.4 CM/SEC
BH CV ECHO MEAS - AO V2 VTI: 24.3 CM
BH CV ECHO MEAS - AVA(I,A): 2 CM^2
BH CV ECHO MEAS - AVA(I,D): 2 CM^2
BH CV ECHO MEAS - AVA(V,A): 2.1 CM^2
BH CV ECHO MEAS - AVA(V,D): 2.1 CM^2
BH CV ECHO MEAS - BSA(HAYCOCK): 2 M^2
BH CV ECHO MEAS - BSA: 1.9 M^2
BH CV ECHO MEAS - BZI_BMI: 35.4 KILOGRAMS/M^2
BH CV ECHO MEAS - BZI_METRIC_HEIGHT: 160 CM
BH CV ECHO MEAS - BZI_METRIC_WEIGHT: 90.7 KG
BH CV ECHO MEAS - CONTRAST EF (2CH): 65.6 ML/M^2
BH CV ECHO MEAS - CONTRAST EF 4CH: 64 ML/M^2
BH CV ECHO MEAS - EDV(MOD-SP2): 90 ML
BH CV ECHO MEAS - EDV(MOD-SP4): 89 ML
BH CV ECHO MEAS - EDV(TEICH): 104.1 ML
BH CV ECHO MEAS - EF(CUBED): 74.6 %
BH CV ECHO MEAS - EF(MOD-SP2): 65.6 %
BH CV ECHO MEAS - EF(MOD-SP4): 64 %
BH CV ECHO MEAS - EF(TEICH): 66.5 %
BH CV ECHO MEAS - ESV(MOD-SP2): 31 ML
BH CV ECHO MEAS - ESV(MOD-SP4): 32 ML
BH CV ECHO MEAS - ESV(TEICH): 34.9 ML
BH CV ECHO MEAS - FS: 36.7 %
BH CV ECHO MEAS - IVS/LVPW: 1.1
BH CV ECHO MEAS - IVSD: 1.4 CM
BH CV ECHO MEAS - LAT PEAK E' VEL: 8 CM/SEC
BH CV ECHO MEAS - LV DIASTOLIC VOL/BSA (35-75): 46 ML/M^2
BH CV ECHO MEAS - LV MASS(C)D: 265.3 GRAMS
BH CV ECHO MEAS - LV MASS(C)DI: 137.2 GRAMS/M^2
BH CV ECHO MEAS - LV MAX PG: 2.8 MMHG
BH CV ECHO MEAS - LV MEAN PG: 1.7 MMHG
BH CV ECHO MEAS - LV SYSTOLIC VOL/BSA (12-30): 16.5 ML/M^2
BH CV ECHO MEAS - LV V1 MAX: 82.9 CM/SEC
BH CV ECHO MEAS - LV V1 MEAN: 61 CM/SEC
BH CV ECHO MEAS - LV V1 VTI: 17.2 CM
BH CV ECHO MEAS - LVIDD: 4.7 CM
BH CV ECHO MEAS - LVIDS: 3 CM
BH CV ECHO MEAS - LVLD AP2: 6.7 CM
BH CV ECHO MEAS - LVLD AP4: 7.2 CM
BH CV ECHO MEAS - LVLS AP2: 5.6 CM
BH CV ECHO MEAS - LVLS AP4: 6 CM
BH CV ECHO MEAS - LVOT AREA (M): 2.8 CM^2
BH CV ECHO MEAS - LVOT AREA: 2.8 CM^2
BH CV ECHO MEAS - LVOT DIAM: 1.9 CM
BH CV ECHO MEAS - LVPWD: 1.3 CM
BH CV ECHO MEAS - MED PEAK E' VEL: 7 CM/SEC
BH CV ECHO MEAS - MR MAX PG: 75.5 MMHG
BH CV ECHO MEAS - MR MAX VEL: 434.4 CM/SEC
BH CV ECHO MEAS - MV DEC SLOPE: 816.2 CM/SEC^2
BH CV ECHO MEAS - MV DEC TIME: 0.14 SEC
BH CV ECHO MEAS - MV E MAX VEL: 111.6 CM/SEC
BH CV ECHO MEAS - MV MAX PG: 6.6 MMHG
BH CV ECHO MEAS - MV MEAN PG: 2.2 MMHG
BH CV ECHO MEAS - MV P1/2T MAX VEL: 114.5 CM/SEC
BH CV ECHO MEAS - MV P1/2T: 41.1 MSEC
BH CV ECHO MEAS - MV V2 MAX: 128 CM/SEC
BH CV ECHO MEAS - MV V2 MEAN: 67 CM/SEC
BH CV ECHO MEAS - MV V2 VTI: 27.9 CM
BH CV ECHO MEAS - MVA P1/2T LCG: 1.9 CM^2
BH CV ECHO MEAS - MVA(P1/2T): 5.4 CM^2
BH CV ECHO MEAS - MVA(VTI): 1.8 CM^2
BH CV ECHO MEAS - PA ACC TIME: 0.08 SEC
BH CV ECHO MEAS - PA MAX PG (FULL): 1.5 MMHG
BH CV ECHO MEAS - PA MAX PG: 3.2 MMHG
BH CV ECHO MEAS - PA PR(ACCEL): 41 MMHG
BH CV ECHO MEAS - PA V2 MAX: 89.6 CM/SEC
BH CV ECHO MEAS - PULM DIAS VEL: 56.2 CM/SEC
BH CV ECHO MEAS - PULM S/D: 0.6
BH CV ECHO MEAS - PULM SYS VEL: 33.8 CM/SEC
BH CV ECHO MEAS - PVA(V,A): 2.8 CM^2
BH CV ECHO MEAS - PVA(V,D): 2.8 CM^2
BH CV ECHO MEAS - QP/QS: 1.1
BH CV ECHO MEAS - RAP SYSTOLE: 3 MMHG
BH CV ECHO MEAS - RV MAX PG: 1.7 MMHG
BH CV ECHO MEAS - RV MEAN PG: 0.74 MMHG
BH CV ECHO MEAS - RV V1 MAX: 65.5 CM/SEC
BH CV ECHO MEAS - RV V1 MEAN: 39.9 CM/SEC
BH CV ECHO MEAS - RV V1 VTI: 14.1 CM
BH CV ECHO MEAS - RVOT AREA: 3.8 CM^2
BH CV ECHO MEAS - RVOT DIAM: 2.2 CM
BH CV ECHO MEAS - RVSP: 38.9 MMHG
BH CV ECHO MEAS - SI(AO): 102.1 ML/M^2
BH CV ECHO MEAS - SI(CUBED): 41 ML/M^2
BH CV ECHO MEAS - SI(LVOT): 25.3 ML/M^2
BH CV ECHO MEAS - SI(MOD-SP2): 30.5 ML/M^2
BH CV ECHO MEAS - SI(MOD-SP4): 29.5 ML/M^2
BH CV ECHO MEAS - SI(TEICH): 35.8 ML/M^2
BH CV ECHO MEAS - SUP REN AO DIAM: 1.5 CM
BH CV ECHO MEAS - SV(AO): 197.5 ML
BH CV ECHO MEAS - SV(CUBED): 79.2 ML
BH CV ECHO MEAS - SV(LVOT): 48.8 ML
BH CV ECHO MEAS - SV(MOD-SP2): 59 ML
BH CV ECHO MEAS - SV(MOD-SP4): 57 ML
BH CV ECHO MEAS - SV(RVOT): 53.2 ML
BH CV ECHO MEAS - SV(TEICH): 69.2 ML
BH CV ECHO MEAS - TAPSE (>1.6): 1.3 CM2
BH CV ECHO MEAS - TR MAX VEL: 299.6 CM/SEC
BH CV XLRA - RV BASE: 3 CM
BH CV XLRA - TDI S': 9 CM/SEC
E/E' RATIO: 16
LEFT ATRIUM VOLUME INDEX: 36 ML/M2
LV EF 2D ECHO EST: 64 %
SINUS: 2.9 CM
STJ: 2.4 CM

## 2017-05-16 PROCEDURE — 0399T ADULT TRANSTHORACIC ECHO COMPLETE WITH CONTRAST: CPT | Performed by: INTERNAL MEDICINE

## 2017-05-16 PROCEDURE — 0399T HC MYOCARDL STRAIN IMAG QUAN ASSMT PER SESS: CPT

## 2017-05-16 PROCEDURE — 93306 TTE W/DOPPLER COMPLETE: CPT | Performed by: INTERNAL MEDICINE

## 2017-05-16 PROCEDURE — 25010000002 PERFLUTREN (DEFINITY) 8.476 MG IN SODIUM CHLORIDE 10 ML INJECTION: Performed by: NURSE PRACTITIONER

## 2017-05-16 PROCEDURE — C8929 TTE W OR WO FOL WCON,DOPPLER: HCPCS

## 2017-05-16 RX ADMIN — PERFLUTREN 1.5 ML: 6.52 INJECTION, SUSPENSION INTRAVENOUS at 10:32

## 2017-05-22 ENCOUNTER — TELEPHONE (OUTPATIENT)
Dept: CARDIOLOGY | Facility: CLINIC | Age: 82
End: 2017-05-22

## 2017-05-22 DIAGNOSIS — I48.19 PERSISTENT ATRIAL FIBRILLATION (HCC): Primary | ICD-10-CM

## 2017-05-22 RX ORDER — PROPAFENONE HYDROCHLORIDE 150 MG/1
150 TABLET, COATED ORAL EVERY 8 HOURS
Qty: 90 TABLET | Refills: 0 | Status: SHIPPED | OUTPATIENT
Start: 2017-05-22 | End: 2017-05-24

## 2017-05-23 ENCOUNTER — TRANSCRIBE ORDERS (OUTPATIENT)
Dept: CARDIOLOGY | Facility: CLINIC | Age: 82
End: 2017-05-23

## 2017-05-23 ENCOUNTER — LAB (OUTPATIENT)
Dept: LAB | Facility: HOSPITAL | Age: 82
End: 2017-05-23
Attending: INTERNAL MEDICINE

## 2017-05-23 DIAGNOSIS — Z13.6 SCREENING FOR ISCHEMIC HEART DISEASE: ICD-10-CM

## 2017-05-23 DIAGNOSIS — Z01.810 PRE-OPERATIVE CARDIOVASCULAR EXAMINATION: ICD-10-CM

## 2017-05-23 DIAGNOSIS — Z01.810 PRE-OPERATIVE CARDIOVASCULAR EXAMINATION: Primary | ICD-10-CM

## 2017-05-23 DIAGNOSIS — I48.91 ATRIAL FIBRILLATION, UNSPECIFIED TYPE (HCC): ICD-10-CM

## 2017-05-23 LAB
ANION GAP SERPL CALCULATED.3IONS-SCNC: 12.7 MMOL/L
BASOPHILS # BLD AUTO: 0.05 10*3/MM3 (ref 0–0.2)
BASOPHILS NFR BLD AUTO: 0.8 % (ref 0–1.5)
BUN BLD-MCNC: 14 MG/DL (ref 8–23)
BUN/CREAT SERPL: 11.1 (ref 7–25)
CALCIUM SPEC-SCNC: 9.6 MG/DL (ref 8.6–10.5)
CHLORIDE SERPL-SCNC: 104 MMOL/L (ref 98–107)
CO2 SERPL-SCNC: 27.3 MMOL/L (ref 22–29)
CREAT BLD-MCNC: 1.26 MG/DL (ref 0.57–1)
DEPRECATED RDW RBC AUTO: 48.1 FL (ref 37–54)
EOSINOPHIL # BLD AUTO: 0.28 10*3/MM3 (ref 0–0.7)
EOSINOPHIL NFR BLD AUTO: 4.6 % (ref 0.3–6.2)
ERYTHROCYTE [DISTWIDTH] IN BLOOD BY AUTOMATED COUNT: 16.6 % (ref 11.7–13)
GFR SERPL CREATININE-BSD FRML MDRD: 41 ML/MIN/1.73
GLUCOSE BLD-MCNC: 97 MG/DL (ref 65–99)
HCT VFR BLD AUTO: 38.4 % (ref 35.6–45.5)
HGB BLD-MCNC: 12.3 G/DL (ref 11.9–15.5)
IMM GRANULOCYTES # BLD: 0 10*3/MM3 (ref 0–0.03)
IMM GRANULOCYTES NFR BLD: 0 % (ref 0–0.5)
LYMPHOCYTES # BLD AUTO: 1.93 10*3/MM3 (ref 0.9–4.8)
LYMPHOCYTES NFR BLD AUTO: 32 % (ref 19.6–45.3)
MCH RBC QN AUTO: 25.4 PG (ref 26.9–32)
MCHC RBC AUTO-ENTMCNC: 32 G/DL (ref 32.4–36.3)
MCV RBC AUTO: 79.3 FL (ref 80.5–98.2)
MONOCYTES # BLD AUTO: 0.53 10*3/MM3 (ref 0.2–1.2)
MONOCYTES NFR BLD AUTO: 8.8 % (ref 5–12)
NEUTROPHILS # BLD AUTO: 3.24 10*3/MM3 (ref 1.9–8.1)
NEUTROPHILS NFR BLD AUTO: 53.8 % (ref 42.7–76)
PLATELET # BLD AUTO: 274 10*3/MM3 (ref 140–500)
PMV BLD AUTO: 9.9 FL (ref 6–12)
POTASSIUM BLD-SCNC: 4.5 MMOL/L (ref 3.5–5.2)
RBC # BLD AUTO: 4.84 10*6/MM3 (ref 3.9–5.2)
SODIUM BLD-SCNC: 144 MMOL/L (ref 136–145)
WBC NRBC COR # BLD: 6.03 10*3/MM3 (ref 4.5–10.7)

## 2017-05-23 PROCEDURE — 85025 COMPLETE CBC W/AUTO DIFF WBC: CPT

## 2017-05-23 PROCEDURE — 80048 BASIC METABOLIC PNL TOTAL CA: CPT

## 2017-05-23 PROCEDURE — 36415 COLL VENOUS BLD VENIPUNCTURE: CPT

## 2017-05-24 RX ORDER — PROPAFENONE HYDROCHLORIDE 150 MG/1
150 TABLET, COATED ORAL EVERY 8 HOURS
COMMUNITY
End: 2017-06-21

## 2017-05-24 RX ORDER — METOPROLOL TARTRATE 50 MG/1
25 TABLET, FILM COATED ORAL 2 TIMES DAILY
COMMUNITY
End: 2017-10-12 | Stop reason: DRUGHIGH

## 2017-05-24 RX ORDER — FUROSEMIDE 20 MG/1
20 TABLET ORAL 2 TIMES DAILY
COMMUNITY
End: 2017-09-21 | Stop reason: SDUPTHER

## 2017-05-24 RX ORDER — FUROSEMIDE 40 MG/1
40 TABLET ORAL 2 TIMES DAILY
COMMUNITY
End: 2017-06-13

## 2017-05-25 ENCOUNTER — CLINICAL SUPPORT NO REQUIREMENTS (OUTPATIENT)
Dept: CARDIOLOGY | Facility: CLINIC | Age: 82
End: 2017-05-25

## 2017-05-25 ENCOUNTER — APPOINTMENT (OUTPATIENT)
Dept: SLEEP MEDICINE | Facility: HOSPITAL | Age: 82
End: 2017-05-25

## 2017-05-25 ENCOUNTER — HOSPITAL ENCOUNTER (OUTPATIENT)
Dept: CARDIOLOGY | Facility: HOSPITAL | Age: 82
Setting detail: HOSPITAL OUTPATIENT SURGERY
Discharge: HOME | End: 2017-05-25
Admitting: INTERNAL MEDICINE

## 2017-05-25 VITALS
DIASTOLIC BLOOD PRESSURE: 80 MMHG | BODY MASS INDEX: 34.16 KG/M2 | SYSTOLIC BLOOD PRESSURE: 169 MMHG | HEIGHT: 65 IN | HEART RATE: 86 BPM | OXYGEN SATURATION: 96 % | WEIGHT: 205 LBS | TEMPERATURE: 98.6 F | RESPIRATION RATE: 16 BRPM

## 2017-05-25 DIAGNOSIS — I48.19 PERSISTENT ATRIAL FIBRILLATION (HCC): ICD-10-CM

## 2017-05-25 PROCEDURE — 93005 ELECTROCARDIOGRAM TRACING: CPT | Performed by: INTERNAL MEDICINE

## 2017-05-25 PROCEDURE — 99152 MOD SED SAME PHYS/QHP 5/>YRS: CPT | Performed by: INTERNAL MEDICINE

## 2017-05-25 PROCEDURE — 92960 CARDIOVERSION ELECTRIC EXT: CPT | Performed by: INTERNAL MEDICINE

## 2017-05-25 PROCEDURE — S0260 H&P FOR SURGERY: HCPCS | Performed by: INTERNAL MEDICINE

## 2017-05-25 PROCEDURE — 92960 CARDIOVERSION ELECTRIC EXT: CPT

## 2017-05-25 RX ORDER — PROPAFENONE HYDROCHLORIDE 150 MG/1
150 TABLET, COATED ORAL EVERY 8 HOURS
COMMUNITY
End: 2017-06-13

## 2017-05-25 RX ORDER — LIDOCAINE HYDROCHLORIDE 10 MG/ML
0.1 INJECTION, SOLUTION EPIDURAL; INFILTRATION; INTRACAUDAL; PERINEURAL ONCE AS NEEDED
Status: DISCONTINUED | OUTPATIENT
Start: 2017-05-25 | End: 2017-05-25 | Stop reason: HOSPADM

## 2017-05-25 RX ORDER — SODIUM CHLORIDE 9 MG/ML
75 INJECTION, SOLUTION INTRAVENOUS CONTINUOUS
Status: DISCONTINUED | OUTPATIENT
Start: 2017-05-25 | End: 2017-05-26 | Stop reason: HOSPADM

## 2017-05-25 RX ADMIN — METHOHEXITAL SODIUM 60 MG: 500 INJECTION, POWDER, LYOPHILIZED, FOR SOLUTION INTRAMUSCULAR; INTRAVENOUS; RECTAL at 13:14

## 2017-05-25 RX ADMIN — SODIUM CHLORIDE 75 ML/HR: 9 INJECTION, SOLUTION INTRAVENOUS at 12:48

## 2017-05-31 ENCOUNTER — TELEPHONE (OUTPATIENT)
Dept: CARDIOLOGY | Facility: CLINIC | Age: 82
End: 2017-05-31

## 2017-06-03 ENCOUNTER — CLINICAL SUPPORT NO REQUIREMENTS (OUTPATIENT)
Dept: CARDIOLOGY | Facility: CLINIC | Age: 82
End: 2017-06-03

## 2017-06-05 ENCOUNTER — TELEPHONE (OUTPATIENT)
Dept: CARDIOLOGY | Facility: CLINIC | Age: 82
End: 2017-06-05

## 2017-06-10 ENCOUNTER — CLINICAL SUPPORT NO REQUIREMENTS (OUTPATIENT)
Dept: CARDIOLOGY | Facility: CLINIC | Age: 82
End: 2017-06-10

## 2017-06-13 ENCOUNTER — HOSPITAL ENCOUNTER (OUTPATIENT)
Dept: GENERAL RADIOLOGY | Facility: HOSPITAL | Age: 82
Discharge: HOME | End: 2017-06-13
Admitting: FAMILY MEDICINE

## 2017-06-13 ENCOUNTER — OFFICE VISIT (OUTPATIENT)
Dept: FAMILY MEDICINE CLINIC | Facility: CLINIC | Age: 82
End: 2017-06-13

## 2017-06-13 VITALS
BODY MASS INDEX: 36.15 KG/M2 | HEIGHT: 65 IN | DIASTOLIC BLOOD PRESSURE: 80 MMHG | OXYGEN SATURATION: 98 % | TEMPERATURE: 98.1 F | SYSTOLIC BLOOD PRESSURE: 132 MMHG | HEART RATE: 70 BPM | WEIGHT: 217 LBS

## 2017-06-13 DIAGNOSIS — R06.09 DYSPNEA ON EXERTION: Primary | ICD-10-CM

## 2017-06-13 PROCEDURE — 71020 HC CHEST PA AND LATERAL: CPT

## 2017-06-13 PROCEDURE — 99214 OFFICE O/P EST MOD 30 MIN: CPT | Performed by: FAMILY MEDICINE

## 2017-06-13 NOTE — PROGRESS NOTES
"Subjective   Anastacia Sarah is a 81 y.o. female.     Chief Complaint   Patient presents with   • Shortness of Breath     x 2 month it will even wake her up at night, even just sitting not doing anything        History of Present Illness    History of atrial fibrillation.  Previous ablation.  She underwent AV node ablation in March of this year.  At the follow-up appointment in May she stated there was no change in her shortness of breath and they thought it was worse.  Still fatigued.  She described episodes of PN D then, this was one month ago.  She has obstructive sleep apnea and uses CPAP.  However the machine has not been reevaluated.  She has reported upcoming pulmonary visit.  Nuclear stress test in 2016 demonstrated no ischemia and ejection fraction of 50%.  Echocardiogram November 2016 revealed mild to moderate MR, and ejection fraction of 56-60%.  They adjusted her pacer rate.  They added Frohse my 40 mg a day.  She is to follow-up with the cardiologist later this month.    Upon further history she had only one episode of PND.  Was about 4 weeks ago.  Felt like \"trouble breathing in\".  She has an appointment coming up with sleep medicine specialist.  Possibly pulmonologist.  They don't know the name.  She has had worsening dyspnea with exertion including walking across the parking lot.  She's gained about 20 pounds.  She definitely has physical conditioning issues.  She denies any exertional chest pain.  No diaphoresis.  No focal neurological deficits.  She complains of some anxiety and depression symptoms.    Family history is concerned that she scheduled to go to visit family in [place] in about a month.  They are concerned about the altitude.      The following portions of the patient's history were reviewed and updated as appropriate: allergies, current medications, past family history, past medical history, past social history, past surgical history and problem list.          Review of " "Systems   Constitutional: Negative for fever.   HENT: Negative.    Respiratory: Positive for shortness of breath. Negative for cough, wheezing and stridor.    Cardiovascular: Negative.    Musculoskeletal: Positive for back pain.   Neurological: Negative.    Psychiatric/Behavioral: Positive for dysphoric mood and sleep disturbance. The patient is nervous/anxious.        Objective   Blood pressure 132/80, pulse 70, temperature 98.1 °F (36.7 °C), temperature source Oral, height 65\" (165.1 cm), weight 217 lb (98.4 kg), SpO2 98 %, not currently breastfeeding.  Physical Exam   Constitutional: She appears well-developed and well-nourished. No distress.   HENT:   Head: Atraumatic.   Eyes: Conjunctivae are normal.   Neck: No thyromegaly present.   Cardiovascular: Normal rate, regular rhythm, normal heart sounds and intact distal pulses.    Pulmonary/Chest: Effort normal and breath sounds normal.   Musculoskeletal: She exhibits no edema.   Skin: Skin is warm and dry.   Psychiatric: She has a normal mood and affect. Her behavior is normal. Judgment and thought content normal.   Nursing note and vitals reviewed.      Assessment/Plan   Anastacia was seen today for shortness of breath.    Diagnoses and all orders for this visit:    Dyspnea on exertion  -     XR Chest PA & Lateral  -     Ambulatory Referral to Pulmonology  -     Full Pulmonary Function Test With Bronchodilator; Future    Dyspnea on exertion.  At this time cardiac cause is likely ruled out.  I'm concerned about physical deconditioning as a #1 cause.  However pulmonary evaluation is needed.  I'm ordering pulmonary function testing a chest x-ray.  Also referring to a pulmonologist.  She does have a sleep medicine specialist coming up later this month.  Hopefully that is 1 and the same.  We'll see her back as needed otherwise.  To consider a long-acting bronchodilator if the pulmonary function testing warrants.         "

## 2017-06-19 RX ORDER — PROPAFENONE HYDROCHLORIDE 150 MG/1
TABLET, COATED ORAL
Qty: 90 TABLET | Refills: 0 | Status: SHIPPED | OUTPATIENT
Start: 2017-06-19 | End: 2017-06-21 | Stop reason: SDUPTHER

## 2017-06-21 ENCOUNTER — OFFICE VISIT (OUTPATIENT)
Dept: CARDIOLOGY | Facility: CLINIC | Age: 82
End: 2017-06-21

## 2017-06-21 ENCOUNTER — CLINICAL SUPPORT NO REQUIREMENTS (OUTPATIENT)
Dept: CARDIOLOGY | Facility: CLINIC | Age: 82
End: 2017-06-21

## 2017-06-21 VITALS
WEIGHT: 220 LBS | HEART RATE: 72 BPM | HEIGHT: 65 IN | BODY MASS INDEX: 36.65 KG/M2 | SYSTOLIC BLOOD PRESSURE: 138 MMHG | DIASTOLIC BLOOD PRESSURE: 80 MMHG

## 2017-06-21 DIAGNOSIS — Z95.0 S/P PLACEMENT OF CARDIAC PACEMAKER: ICD-10-CM

## 2017-06-21 DIAGNOSIS — I48.19 PERSISTENT ATRIAL FIBRILLATION (HCC): Primary | ICD-10-CM

## 2017-06-21 DIAGNOSIS — R06.02 SHORTNESS OF BREATH: ICD-10-CM

## 2017-06-21 DIAGNOSIS — I48.0 PAROXYSMAL ATRIAL FIBRILLATION (HCC): Primary | ICD-10-CM

## 2017-06-21 DIAGNOSIS — I49.5 SICK SINUS SYNDROME (HCC): ICD-10-CM

## 2017-06-21 PROCEDURE — 93280 PM DEVICE PROGR EVAL DUAL: CPT | Performed by: INTERNAL MEDICINE

## 2017-06-21 PROCEDURE — 93000 ELECTROCARDIOGRAM COMPLETE: CPT | Performed by: NURSE PRACTITIONER

## 2017-06-21 PROCEDURE — 99214 OFFICE O/P EST MOD 30 MIN: CPT | Performed by: NURSE PRACTITIONER

## 2017-06-21 NOTE — PROGRESS NOTES
Date of Office Visit: 2017  Encounter Provider: ALLISON Isaac  Place of Service: [facility] CARDIOLOGY  Patient Name: Anastacia Sarah  :1935    Chief Complaint   Patient presents with   • Atrial Fibrillation   • Shortness of Breath   • Pacemaker Check       HPI: Anastacia Sarah is a 81 y.o. female who is a patient of Dr. Melendez's, who I have seen as well. She has a past medical history of PAF, s/p ablation in the past. She has been seen in the office a couple of times starting in February with complaints of increasing shortness of breath, palpitations and worsening fatigue. She had undergone a cardioversion on 17 but only stayed in SR for about 3 days. She has a history of SSS and s/p PPM. Her device was interrogated and showed and increase in her PAF episodes and 40% of the time her heart rates were above 90 and 100. Dr. Melendez reviewed the data and discussed with the patient and they decided on AV node ablation. She underwent AV node ablation on 2017. I saw her on 17 and she did not feel any better since the procedure. She had a nuclear stress test in 2016 which showed no ischemia and EF 58%. An echocardiogram in 2016 showed mild to moderate MR, mild TR and EF 56-60%. I repeated her echo to be sure she was not decompensating due to she was now 100% ventricularly pacing. The 17 echo showed normal LV systolic function, EF = 64%, all left ventricular wall segments contract normally, mild-to-moderate concentric hypertrophy, mild-to-moderate mitral valve regurgitation is present with a posteriorly-directed jet noted and mild to moderate tricuspid valve regurgitation.     She had said that when she had her cardioversion in January that she felt better for about 3 days, which is how long she stayed in SR. I discussed with Dr. Melendez and we decided to try to get her back into SR. We started her on propafenone and had her  come in for cardioversion on 5/25/2017 a couple of days after starting the propafenone. She did convert to SR and she was discharged home after the procedure. She had called on 5/31/17 to double check that she was taking her medications correctly. I spoke with her that day and she said that she maybe felt a little better but no real improvement in her shortness of breath since the cardioversion. We then received an alert from her pacemaker that she had converted back into afib on 5/31/17. I decided to wait until her follow up visit to see how she felt, which is why she is here today.     She is accompanied by her daughter. She and her daughter both say that she really is no better and that she really did not notice any difference after the cardioversion. She did maintain SR for about 5-6 days but her symptoms did not improve. She still has significant shortness of breath with minimal activity. Her daughter said she got to thinking that maybe it wasn't her heart and maybe it was her lungs. She called her PCP and they are actually initiating a pulmonary work up. She is having some testing tomorrow.     Her device interrogation today shows that she is in atrial flutter. She has been in AF since 5/31/2017 continuously. She had 2:1 atrial tracking at a rate of 105 bpm on presentation. We reprogrammed her to DDIR and increased her ventricular output to 5.0V to allow for a 2x safety margin. She is pacer dependent. Likely in the future if she remains in chronic AF them we can program her to VVIR.            Past Medical History:   Diagnosis Date   • Acute bronchitis    • Acute sinusitis    • Anticoagulated on warfarin    • Atrial fibrillation    • Bladder dysfunction    • Cancer of left kidney    • Chronic dysfunction of left eustachian tube    • Diarrhea    • Esophageal reflux    • Fatigue    • Frequent urination    • GERD (gastroesophageal reflux disease)    • Goiter     thyroid removed in november 2015   • Health care  maintenance    • History of shingles    • History of transfusion    • Hypertension    • Insomnia    • Left atrial enlargement    • Left ventricular hypertrophy    • Mitral regurgitation     mild to moderate per echocardiogram 2016   • Nerve pain     LUE D/T SHINGLES   • Neuropathy     ARMS   • NANCY (obstructive sleep apnea)     CPAP   • Paroxysmal atrial fibrillation    • Pulmonary hypertension     per echo 2016   • Right atrial enlargement    • SSS (sick sinus syndrome)    • URI (upper respiratory infection)    • Urinary urgency        Past Surgical History:   Procedure Laterality Date   • ATRIAL ABLATION SURGERY      EXTENSIVE; CATH ATRIAL FIBRILLATION LEFT   • ATRIAL ABLATION SURGERY      2004 FLUTTER   • CARDIAC ELECTROPHYSIOLOGY PROCEDURE N/A 5/17/2016    Procedure: PPM generator change - dual BOSTON;  Surgeon: Maldonado Melendez MD;  Location: [facility] CATH INVASIVE LOCATION;  Service:    • CARDIAC ELECTROPHYSIOLOGY PROCEDURE N/A 3/23/2017    Procedure: AV node ablation pt has BOSTON PPM;  Surgeon: Maldonado Melendez MD;  Location: [facility] CATH INVASIVE LOCATION;  Service:    • CARDIOVERSION     • CYSTOSCOPY BOTOX INJECTION OF BLADDER N/A 6/27/2016    Procedure: CYSTOSCOPY WITH BOTOX DETRUSOR INJECTION;  Surgeon: Salome Bowen MD;  Location: [facility] OR;  Service:    • EYE SURGERY      CATARACT   • HERNIA REPAIR     • INSERT / REPLACE / REMOVE PACEMAKER      x2   • KNEE ARTHROPLASTY Bilateral    • KNEE MINI REVISION Right 9/28/2016    Procedure: RT KNEE POLY INSERT CHANGE ;  Surgeon: Tommy Avila MD;  Location: [facility] OR;  Service:    • LAPAROSCOPIC CHOLECYSTECTOMY     • NEPHRECTOMY Left     MALIGNANT   • OTHER SURGICAL HISTORY      NEUROMUSCULAR BLOCKERS BOTULINUM TOXIN ONABOTULINUMTOXIN A   • PACEMAKER IMPLANTATION     • TOTAL THYROIDECTOMY     • TUBAL ABDOMINAL LIGATION         Social History     Social History   • Marital status:      Spouse name: N/A   • Number of children: N/A   • Years of  education: N/A     Occupational History   • Not on file.     Social History Main Topics   • Smoking status: Never Smoker   • Smokeless tobacco: Never Used      Comment: caffeine use   • Alcohol use No      Comment: STOPPED   • Drug use: No   • Sexual activity: Defer     Other Topics Concern   • Not on file     Social History Narrative       Family History   Problem Relation Age of Onset   • Heart disease Other    • Breast cancer Sister    • Breast cancer Maternal Aunt    • Breast cancer Sister    • Heart disease Mother    • Hypertension Mother    • Heart disease Father    • Hypertension Father    • Heart disease Daughter    • Hypertension Daughter    • Heart disease Son    • Hypertension Son        Review of Systems   Constitution: Positive for malaise/fatigue. Negative for chills, fever, weight gain and weight loss.   HENT: Negative for ear pain, headaches, hearing loss, nosebleeds and sore throat.    Eyes: Negative for double vision, pain and visual disturbance.   Cardiovascular: Positive for dyspnea on exertion. Negative for chest pain, irregular heartbeat, leg swelling, near-syncope, orthopnea, palpitations, paroxysmal nocturnal dyspnea and syncope.   Respiratory: Positive for shortness of breath. Negative for cough, sleep disturbances due to breathing, snoring and wheezing.    Endocrine: Negative for cold intolerance, heat intolerance and polyuria.   Hematologic/Lymphatic: Negative.    Skin: Negative for itching and rash.   Musculoskeletal: Negative for joint pain, joint swelling and myalgias.   Gastrointestinal: Negative for abdominal pain, diarrhea, melena, nausea and vomiting.   Genitourinary: Negative for frequency, hematuria and hesitancy.   Neurological: Negative for excessive daytime sleepiness, light-headedness, numbness, paresthesias and seizures.   Psychiatric/Behavioral: Negative for altered mental status and depression.   Allergic/Immunologic: Negative.    All other systems reviewed and are  "negative.      Allergies   Allergen Reactions   • Adhesive Tape Other (See Comments)     SKIN BLISTERS   • Levaquin [Levofloxacin] Hives   • Sulfa Antibiotics Swelling     FACE AND TONGUE         Current Outpatient Prescriptions:   •  apixaban (ELIQUIS) 5 MG tablet tablet, Take 5 mg by mouth 2 (Two) Times a Day., Disp: , Rfl:   •  B Complex-C (SUPER B COMPLEX PO), Take 1 tablet by mouth Daily., Disp: , Rfl:   •  CALCIUM PO, Take 600 Units by mouth Daily. With D3, listed separetly, Disp: , Rfl:   •  carboxymethylcellulose (REFRESH PLUS) 0.5 % solution, 1 drop 2 (Two) Times a Day As Needed for dry eyes., Disp: , Rfl:   •  cholecalciferol (VITAMIN D3) 1000 UNITS tablet, Take 1,000 Units by mouth daily. In addition to the calcium -D3 pill, Disp: , Rfl:   •  Cyanocobalamin (VITAMIN B-12 PO), Take 1 tablet by mouth daily., Disp: , Rfl:   •  esomeprazole (NEXIUM) 40 MG capsule, Take 40 mg by mouth Every Morning Before Breakfast., Disp: , Rfl:   •  furosemide (LASIX) 20 MG tablet, Take 20 mg by mouth 2 (Two) Times a Day., Disp: , Rfl:   •  HYDROcodone-acetaminophen (NORCO) 7.5-325 MG per tablet, Take 1 tablet by mouth Every 6 (Six) Hours As Needed for Moderate Pain (4-6)., Disp: , Rfl:   •  lisinopril (PRINIVIL,ZESTRIL) 10 MG tablet, Take 10 mg by mouth Daily., Disp: , Rfl:   •  loperamide (IMODIUM) 2 MG capsule, Take 2 mg by mouth 4 (four) times a day as needed for diarrhea., Disp: , Rfl:   •  loratadine (CLARITIN) 10 MG tablet, Take 10 mg by mouth Daily., Disp: , Rfl:   •  Multiple Vitamins-Minerals (PRESERVISION AREDS PO), Take 1 tablet by mouth Daily., Disp: , Rfl:   •  SYNTHROID 112 MCG tablet, Take 112 mcg by mouth Daily., Disp: , Rfl:   •  metoprolol tartrate (LOPRESSOR) 50 MG tablet, Take 25 mg by mouth 2 (Two) Times a Day., Disp: , Rfl:      Objective:     Vitals:    06/21/17 1136   BP: 138/80   Pulse: 72   Weight: 220 lb (99.8 kg)   Height: 65\" (165.1 cm)     Body mass index is 36.61 kg/(m^2).    PHYSICAL " EXAM:    Vitals Reviewed.   General Appearance: No acute distress, well developed and well nourished.   Eyes: Conjunctiva and lids: No erythema, swelling, or discharge. Sclera non-icteric.   HENT: Atraumatic, normocephalic. External eyes, ears, and nose normal. No hearing loss noted. Mucous membranes normal. Lips not cyanotic. Neck supple with no tenderness.  Respiratory: No signs of respiratory distress. Respiration rhythm and depth normal.   Clear to auscultation. No rales, crackles, rhonchi, or wheezing auscultated.   Cardiovascular:  Jugular Venous Pressure: Normal  Heart Rate and Rhythm: Normal, Heart Sounds: Normal S1 and S2. No S3 or S4 noted.  Murmurs: II/VI systolic murmur noted. No rubs, thrills, or gallops.   Arterial Pulses: Carotid pulses normal. No carotid bruit noted. Posterior tibialis and dorsalis pedis pulses normal.   Lower Extremities: No edema noted.  Gastrointestinal:  Abdomen soft, non-distended, non-tender. Normal bowel sounds. No hepatomegaly.   Musculoskeletal: Normal movement of extremities  Skin and Nails: General appearance normal. No pallor, cyanosis, diaphoresis. Skin temperature normal. No clubbing of fingernails.   Psychiatric: Patient alert and oriented to person, place, and time. Speech and behavior appropriate. Normal mood and affect.       ECG 12 Lead  Date/Time: 2017 1:41 PM  Performed by: IRMA AYOUB  Authorized by: IRMA AYOUB   Comparison: compared with previous ECG from 2017  Similar to previous ECG  Rhythm: atrial flutter and paced  Pacin% capture  Clinical impression: abnormal ECG  Comments: Clinical indication; PAF              Assessment:       Diagnosis Plan   1. Persistent atrial fibrillation     2. Sick sinus syndrome     3. S/P placement of cardiac pacemaker     4. Shortness of breath            Plan:       1. Atrial Fibrillation and Atrial Flutter  Assessment  • The patient has persistent atrial fibrillation  • The patient's CHADS2-VASc score  is 4  • A [id number] score of 2 or more is considered a high risk for a thromboembolic event  • Apixaban prescribed  • She converted back into AF shortly after cardioversion and felt no better in SR. Reprogrammed to DDIR, will stop propafenone and continue apixiban and metoprolol. She is going to pursue pulmonary work up, which I think is a great idea and we will see her back in 6 months. I will review with Dr. Melendez to see if he has any further recommendations.     Plan  • Continue in atrial fibrillation with rate control  • Continue apixaban for antithrombotic therapy, bleeding issues discussed  • Continue beta blocker for rate control    Subjective - Objective  • The patient underwent cardioversion on 5/25/2017   • The patient had atrial fibrillation ablation   • The patient had a recurrence of atrial fibrillation since ablation         2. & 3. Pacemaker function wnl. Remote check in 3 months.     4. Shortness of breath, pulmonary work up in progress.     As always, it has been a pleasure to participate in your patient's care.      Sincerely,         ALLISON Winn

## 2017-06-22 ENCOUNTER — HOSPITAL ENCOUNTER (OUTPATIENT)
Dept: SLEEP MEDICINE | Facility: HOSPITAL | Age: 82
Discharge: HOME | End: 2017-06-22
Admitting: INTERNAL MEDICINE

## 2017-06-22 DIAGNOSIS — E61.1 IRON DEFICIENCY: ICD-10-CM

## 2017-06-22 DIAGNOSIS — G25.81 RLS (RESTLESS LEGS SYNDROME): Primary | ICD-10-CM

## 2017-06-22 PROCEDURE — G0463 HOSPITAL OUTPT CLINIC VISIT: HCPCS

## 2017-06-22 RX ORDER — ROPINIROLE 0.5 MG/1
0.5 TABLET, FILM COATED ORAL NIGHTLY
Qty: 30 TABLET | Refills: 3 | Status: SHIPPED | OUTPATIENT
Start: 2017-06-22 | End: 2017-11-10 | Stop reason: SDUPTHER

## 2017-06-23 NOTE — PROGRESS NOTES
PRIMARY CARE PHYSICIAN/REFERRING PHYSICIAN: Shree Loza MD    CHIEF COMPLAINT: Shortness of breath.     HISTORY OF PRESENT ILLNESS: This is an 81-year-old female patient with a history of obstructive sleep apnea, on CPAP therapy, who presented for evaluation of her sleep apnea. She has not been seen by a sleep physician for a while. Apparently she was diagnosed with mild obstructive sleep apnea back in 2011, and has been on CPAP therapy since then. She has recently started to experience significant shortness of breath at rest as well as on exertion. She had a chest x-ray by her primary care physician which was unrevealing. I have reviewed the report of the chest x-ray. She thought that this could be related to her CPAP and stopped using it intermittently over the last month. She has noted that her breathing has improved a little bit during the day. However, she has not been able to sleep well at night and she has been feeling sleepy during the day when she goes off CPAP. Her dyspnea occurs on minimal exertion as reported above, as well as at night when she sleeps. She did not have any specific problems with her CPAP. She uses nasal pillows. Her [employer] is [employer] She denies significant air leak or dryness. Without the CPAP, she would snore loudly and stop breathing.     Her sleep schedule is viable. She goes to bed anywhere in between 11 p.m. and 1 in the morning. She can usually fall asleep quickly with the CPAP, but it takes her up to an hour to fall asleep without it. She usually wakes up about 8:30 a.m. She feels sleepy sometimes if she does not use her CPAP. She does take 1-2 naps during the day as well.     She also reported frequent leg jerks and urge to move the legs at night. This usually improves with movement. In addition to that she reported some aching in her legs as well. She thinks that the urge to move the legs interferes with her sleep about a few times a month. She has previously been  diagnosed with iron deficiency anemia. As a matter of fact, she has received blood transfusion and iron transfusion about 5 years ago when she had her knee surgery. She endorsed craving for ice as well as salt.     She denies sleep paralysis, hallucination, or cataplexy.     She reported frequent awakening at night for up to 5 times to use the restroom. She does take Lasix 20 mg b.i.d. and her latest dose is around suppertime (5-6 p.m.).     REVIEW OF SYSTEMS:  CONSTITUTIONAL: No fever or changes in appetite.   HEENT: She reported postnasal drip, but denies nasal congestion.   CARDIOVASCULAR: She reported irregular heartbeats, but denies chest pain. She endorsed leg swelling.   RESPIRATORY: She has shortness of breath on minimal exertion and cough.   NEUROLOGIC/PSYCHIATRY: She reported dizziness, but denies anxiety or depression.   MUSCULOSKELETAL: She reported pain and swelling in her joints.   GENITOURINARY: She denies dysuria or blood in urine, but reported frequent urination.   GASTROINTESTINAL: She reported heartburn but denies diarrhea or constipation.   SKIN: She denies rash.   ENDOCRINE: She reported excessive thirst but denies cold or warm intolerance.   HEMATOLOGY/LYMPHATIC: She denies swollen glands.     PAST MEDICAL HISTORY:  1.  Atrial fibrillation.   2.  Hypertension.   3.  Sick sinus syndrome.   4.  Gastroesophageal reflux disease.   5.  Status post pacemaker.     MEDICATIONS:  1.  Eliquis.   2.  Vitamin D   3.  Omeprazole.   4.  Lasix 20 mg b.i.d.   5.  Lortab.   6.  Lisinopril.   7.  Loratadine.   8.  Multivitamin.   9.  Synthroid.     ALLERGIES:   1.  LEVOFLOXACIN.   2.  SULFA ANTIBIOTICS.     FAMILY HISTORY: Positive for hypertension, obesity, and sleep apnea.     SOCIAL HISTORY: She is retired. She does not smoke. She does not drink alcohol either. She drinks about 1 caffeinated beverage during the day, either tea or soda.         PHYSICAL EXAMINATION:  VITAL SIGNS: Blood pressure was not  recorded, her weight is 224 pounds, height 62 inches, BMI is 41, neck size is 16.25 inches.   CONSTITUTIONAL: Patient is not in acute distress.   EYES: Injected conjunctivae. Extraocular movements intact.   HENT: She has a Rivera score of 3 with narrow distance in between the posterior pharyngeal pillars estimated to be less than 50% of the base of the tongue.   NECK: Large. No thyromegaly.   LYMPHATIC: No palpable cervical lymph nodes.   LUNGS: Clear to auscultation bilaterally. No crackles or wheezing. There is decreased air entry bilaterally at the bases.   HEART: Regular rhythm and rate. No audible murmur.   MUSCULOSKELETAL: No cyanosis or clubbing. She has grade 2 pitting edema in both feet.   NEUROLOGIC: She is conscious, alert, and oriented. She has appropriate mood and affect.     DIAGNOSTIC DATA: In-lab polysomnography was performed at [facility] on 04/21/2011. AHI equals 5.4 events per hour; RDI equals 16.6 events per hour; PLM index equals 131 events per hour; PLM arousal index equals 25.5 events per hour. Sleep efficiency was 54%; REM sleep was 0%; her weight was 187 pounds at that time.    CPAP titration was performed on 04/28/2011. PLM index was 145 events per hour with PLM arousal index of 0.4 events per hour; REM sleep was 3.6%; optimal CPAP pressure was not reached due to lack of REM sleep. However, at a CPAP pressure of 15 cmH2O, the patient slept for 1 hour and 4 minutes with residual AHI of 0.9 events per hour.     CPAP download was performed in the clinic today. Usage was 47/60 days (78%); usage more than 4 hours was 51%; average use on the days used was 5 hours; leak equals 6 minutes; average AHI equals 1.7; CPAP pressure is set at 11 cmH2O.     ASSESSMENT:  1.  Obstructive sleep apnea, overall mild per previous study done in 2011.   2.  Restless leg syndrome.  3.  Hypertension.   4.  Morbid obesity (BMI equals 41).   5.  Dyspnea.     RECOMMENDATIONS: The patient's CPAP  pressure is too old and needs to be replaced. I will provide her with a new CPAP set at the same pressure at 11 cmH2O using nasal pillows. I explained the importance of CPAP compliance, especially in the setting of hypertension and cardiac disease (sick sinus syndrome). I explained that her dyspnea is unlikely to be related to the CPAP machine and, as a matter of fact, the CPAP should help her breathing rather than causing breathing issues.     I explained the association between obstructive sleep apnea and cardiovascular disease, including worsening hypertension or major cardiovascular events and urged her to use the CPAP every night.     It appears that she has a major component of restless leg syndrome/periodic limb movement disorder and therefore, I will place her on Ropinirole 0.5 mg 1 hour prior to bedtime. I will also check a ferritin level on her especially in the setting of previous iron deficiency. I reviewed her laboratory workup and her MCV was reduced down to 79 on the last CBC performed on 05/23/2017.     Concerning her dyspnea, this appears to be likely related to heart disease versus restrictive lung disease from obesity. She was never a smoker. She would benefit from pulmonary function test which is currently being investigated by her primary care physician.          Debby Cuevas MD  RJ:co  D:   06/22/2017 17:29:21  T:   06/23/2017 01:33:14  Job ID:   [id number]  Document ID:   [id number]  cc:   Shree Loza M.D.

## 2017-06-27 ENCOUNTER — APPOINTMENT (OUTPATIENT)
Dept: RESPIRATORY THERAPY | Facility: HOSPITAL | Age: 82
End: 2017-06-27

## 2017-07-07 ENCOUNTER — LAB (OUTPATIENT)
Dept: LAB | Facility: HOSPITAL | Age: 82
End: 2017-07-07

## 2017-07-07 ENCOUNTER — TRANSCRIBE ORDERS (OUTPATIENT)
Dept: ADMINISTRATIVE | Facility: HOSPITAL | Age: 82
End: 2017-07-07

## 2017-07-07 DIAGNOSIS — E89.0 POSTSURGICAL HYPOTHYROIDISM: ICD-10-CM

## 2017-07-07 DIAGNOSIS — E61.1 IRON DEFICIENCY: ICD-10-CM

## 2017-07-07 DIAGNOSIS — C73 MALIGNANT NEOPLASM OF THYROID GLAND (HCC): ICD-10-CM

## 2017-07-07 DIAGNOSIS — C73 MALIGNANT NEOPLASM OF THYROID GLAND (HCC): Primary | ICD-10-CM

## 2017-07-07 LAB
FERRITIN SERPL-MCNC: 21.11 NG/ML (ref 13–150)
T3FREE SERPL-MCNC: 2.59 PG/ML (ref 2–4.4)
T4 SERPL-MCNC: 7.94 MCG/DL (ref 4.5–11.7)
TSH SERPL DL<=0.05 MIU/L-ACNC: 4.14 MIU/ML (ref 0.27–4.2)

## 2017-07-07 PROCEDURE — 84443 ASSAY THYROID STIM HORMONE: CPT

## 2017-07-07 PROCEDURE — 84436 ASSAY OF TOTAL THYROXINE: CPT

## 2017-07-07 PROCEDURE — 82728 ASSAY OF FERRITIN: CPT

## 2017-07-07 PROCEDURE — 36415 COLL VENOUS BLD VENIPUNCTURE: CPT

## 2017-07-07 PROCEDURE — 84481 FREE ASSAY (FT-3): CPT

## 2017-07-07 PROCEDURE — 84432 ASSAY OF THYROGLOBULIN: CPT

## 2017-07-12 LAB — THYROGLOBULIN (TG-RIA): <2 NG/ML

## 2017-07-24 RX ORDER — PROPAFENONE HYDROCHLORIDE 150 MG/1
TABLET, COATED ORAL
Qty: 90 TABLET | Refills: 0 | Status: SHIPPED | OUTPATIENT
Start: 2017-07-24 | End: 2017-08-07

## 2017-08-07 ENCOUNTER — OFFICE VISIT (OUTPATIENT)
Dept: FAMILY MEDICINE CLINIC | Facility: CLINIC | Age: 82
End: 2017-08-07

## 2017-08-07 VITALS
DIASTOLIC BLOOD PRESSURE: 80 MMHG | SYSTOLIC BLOOD PRESSURE: 126 MMHG | WEIGHT: 217.5 LBS | HEIGHT: 65 IN | HEART RATE: 81 BPM | BODY MASS INDEX: 36.24 KG/M2 | RESPIRATION RATE: 16 BRPM | OXYGEN SATURATION: 96 % | TEMPERATURE: 97.7 F

## 2017-08-07 DIAGNOSIS — R53.83 FATIGUE, UNSPECIFIED TYPE: ICD-10-CM

## 2017-08-07 DIAGNOSIS — R39.9 UTI SYMPTOMS: Primary | ICD-10-CM

## 2017-08-07 DIAGNOSIS — N30.00 ACUTE CYSTITIS WITHOUT HEMATURIA: Primary | ICD-10-CM

## 2017-08-07 LAB
ALBUMIN SERPL-MCNC: 3.5 G/DL (ref 3.5–5.2)
ALBUMIN/GLOB SERPL: 1 G/DL
ALP SERPL-CCNC: 101 U/L (ref 39–117)
ALT SERPL-CCNC: 15 U/L (ref 1–33)
AST SERPL-CCNC: 17 U/L (ref 1–32)
BASOPHILS # BLD AUTO: 0.04 10*3/MM3 (ref 0–0.2)
BASOPHILS NFR BLD AUTO: 0.6 % (ref 0–1.5)
BILIRUB BLD-MCNC: NEGATIVE MG/DL
BILIRUB SERPL-MCNC: 0.4 MG/DL (ref 0.1–1.2)
BUN SERPL-MCNC: 15 MG/DL (ref 8–23)
BUN/CREAT SERPL: 12.5 (ref 7–25)
CALCIUM SERPL-MCNC: 9 MG/DL (ref 8.6–10.5)
CHLORIDE SERPL-SCNC: 106 MMOL/L (ref 98–107)
CLARITY, POC: ABNORMAL
CO2 SERPL-SCNC: 25 MMOL/L (ref 22–29)
COLOR UR: YELLOW
CREAT SERPL-MCNC: 1.2 MG/DL (ref 0.57–1)
EOSINOPHIL # BLD AUTO: 0.18 10*3/MM3 (ref 0–0.7)
EOSINOPHIL NFR BLD AUTO: 2.9 % (ref 0.3–6.2)
ERYTHROCYTE [DISTWIDTH] IN BLOOD BY AUTOMATED COUNT: 16.1 % (ref 11.7–13)
GLOBULIN SER CALC-MCNC: 3.4 GM/DL
GLUCOSE SERPL-MCNC: 102 MG/DL (ref 65–99)
GLUCOSE UR STRIP-MCNC: NEGATIVE MG/DL
HCT VFR BLD AUTO: 36.8 % (ref 35.6–45.5)
HGB BLD-MCNC: 11.1 G/DL (ref 11.9–15.5)
IMM GRANULOCYTES # BLD: 0 10*3/MM3 (ref 0–0.03)
IMM GRANULOCYTES NFR BLD: 0 % (ref 0–0.5)
KETONES UR QL: NEGATIVE
LEUKOCYTE EST, POC: ABNORMAL
LYMPHOCYTES # BLD AUTO: 1.61 10*3/MM3 (ref 0.9–4.8)
LYMPHOCYTES NFR BLD AUTO: 26.1 % (ref 19.6–45.3)
MCH RBC QN AUTO: 25.3 PG (ref 26.9–32)
MCHC RBC AUTO-ENTMCNC: 30.2 G/DL (ref 32.4–36.3)
MCV RBC AUTO: 83.8 FL (ref 80.5–98.2)
MONOCYTES # BLD AUTO: 0.58 10*3/MM3 (ref 0.2–1.2)
MONOCYTES NFR BLD AUTO: 9.4 % (ref 5–12)
NEUTROPHILS # BLD AUTO: 3.77 10*3/MM3 (ref 1.9–8.1)
NEUTROPHILS NFR BLD AUTO: 61 % (ref 42.7–76)
NITRITE UR-MCNC: POSITIVE MG/ML
PH UR: 6 [PH] (ref 5–8)
PLATELET # BLD AUTO: 373 10*3/MM3 (ref 140–500)
POTASSIUM SERPL-SCNC: 4.5 MMOL/L (ref 3.5–5.2)
PROT SERPL-MCNC: 6.9 G/DL (ref 6–8.5)
PROT UR STRIP-MCNC: ABNORMAL MG/DL
RBC # BLD AUTO: 4.39 10*6/MM3 (ref 3.9–5.2)
RBC # UR STRIP: ABNORMAL /UL
SODIUM SERPL-SCNC: 146 MMOL/L (ref 136–145)
SP GR UR: 1 (ref 1–1.03)
UROBILINOGEN UR QL: NORMAL
WBC # BLD AUTO: 6.18 10*3/MM3 (ref 4.5–10.7)

## 2017-08-07 PROCEDURE — 81003 URINALYSIS AUTO W/O SCOPE: CPT | Performed by: INTERNAL MEDICINE

## 2017-08-07 PROCEDURE — 99213 OFFICE O/P EST LOW 20 MIN: CPT | Performed by: INTERNAL MEDICINE

## 2017-08-07 RX ORDER — CEFUROXIME AXETIL 250 MG/1
250 TABLET ORAL 2 TIMES DAILY
Qty: 10 TABLET | Refills: 0 | Status: SHIPPED | OUTPATIENT
Start: 2017-08-07 | End: 2017-09-21

## 2017-08-07 RX ORDER — LEVOTHYROXINE SODIUM 125 MCG
TABLET ORAL
COMMUNITY
Start: 2017-07-20 | End: 2017-08-14 | Stop reason: DRUGHIGH

## 2017-08-07 NOTE — PROGRESS NOTES
"Subjective   Patient ID: Anastacia Sarah is a 81 y.o. female presents with   Chief Complaint   Patient presents with   • Urinary Tract Infection       HPI - This patient has had her history of renal cell cancer has only one kidney.  She feels like she's got a UTI that's been going on for about a week she has dysuria and frequency no flank pain or fever.  She's allergic to Levaquin and sulfa she's off Rythmol.    Assessment plan    Acute cystitis urinalysis was suggestive of a UTI Ceftin 250 twice daily for 5 days we'll get a urine culture    Fatigue fatigue labs.    Allergies   Allergen Reactions   • Adhesive Tape Other (See Comments)     SKIN BLISTERS   • Levaquin [Levofloxacin] Hives   • Sulfa Antibiotics Swelling     FACE AND TONGUE       The following portions of the patient's history were reviewed and updated as appropriate: allergies, current medications, past family history, past medical history, past social history, past surgical history and problem list.      Review of Systems   Constitutional: Positive for fatigue and fever.   Genitourinary: Positive for dysuria and frequency. Negative for flank pain.   Psychiatric/Behavioral: Negative.        Objective     Vitals:    08/07/17 1051   BP: 126/80   Pulse: 81   Resp: 16   Temp: 97.7 °F (36.5 °C)   TempSrc: Oral   SpO2: 96%   Weight: 217 lb 8 oz (98.7 kg)   Height: 65\" (165.1 cm)         Physical Exam   Constitutional: She is oriented to person, place, and time. She appears well-developed and well-nourished.   Cardiovascular: Normal rate, regular rhythm and normal heart sounds.    Neurological: She is alert and oriented to person, place, and time.   Psychiatric: She has a normal mood and affect. Her behavior is normal.   Nursing note and vitals reviewed.        Anastacia was seen today for urinary tract infection.    Diagnoses and all orders for this visit:    Acute cystitis without hematuria  -     CBC & Differential  -     Comprehensive Metabolic " Panel    Fatigue, unspecified type  -     CBC & Differential  -     Comprehensive Metabolic Panel    Other orders  -     cefuroxime (CEFTIN) 250 MG tablet; Take 1 tablet by mouth 2 (Two) Times a Day.        Call or return to clinic prn if these symptoms worsen or fail to improve as anticipated.

## 2017-08-10 LAB
BACTERIA UR CULT: ABNORMAL
BACTERIA UR CULT: ABNORMAL
OTHER ANTIBIOTIC SUSC ISLT: ABNORMAL

## 2017-08-14 ENCOUNTER — OFFICE VISIT (OUTPATIENT)
Dept: SURGERY | Facility: CLINIC | Age: 82
End: 2017-08-14

## 2017-08-14 VITALS — HEART RATE: 73 BPM | OXYGEN SATURATION: 98 % | WEIGHT: 218.2 LBS | HEIGHT: 63 IN | BODY MASS INDEX: 38.66 KG/M2

## 2017-08-14 DIAGNOSIS — R10.13 EPIGASTRIC PAIN: ICD-10-CM

## 2017-08-14 DIAGNOSIS — K21.9 GASTROESOPHAGEAL REFLUX DISEASE, ESOPHAGITIS PRESENCE NOT SPECIFIED: ICD-10-CM

## 2017-08-14 DIAGNOSIS — D50.9 IRON DEFICIENCY ANEMIA, UNSPECIFIED IRON DEFICIENCY ANEMIA TYPE: ICD-10-CM

## 2017-08-14 DIAGNOSIS — R06.02 SHORTNESS OF BREATH: ICD-10-CM

## 2017-08-14 DIAGNOSIS — I48.19 PERSISTENT ATRIAL FIBRILLATION (HCC): Primary | ICD-10-CM

## 2017-08-14 PROBLEM — Z79.01 ON WARFARIN THERAPY: Status: RESOLVED | Noted: 2017-02-14 | Resolved: 2017-08-14

## 2017-08-14 PROCEDURE — 99214 OFFICE O/P EST MOD 30 MIN: CPT | Performed by: SURGERY

## 2017-08-14 RX ORDER — LEVOTHYROXINE SODIUM 112 UG/1
112 TABLET ORAL DAILY
COMMUNITY
End: 2018-04-17 | Stop reason: DRUGHIGH

## 2017-08-14 RX ORDER — SODIUM CHLORIDE 0.9 % (FLUSH) 0.9 %
1-10 SYRINGE (ML) INJECTION AS NEEDED
Status: CANCELLED | OUTPATIENT
Start: 2017-09-27

## 2017-08-14 RX ORDER — LISINOPRIL 10 MG/1
TABLET ORAL
Qty: 90 TABLET | Refills: 2 | Status: SHIPPED | OUTPATIENT
Start: 2017-08-14 | End: 2017-08-14 | Stop reason: SDUPTHER

## 2017-08-14 RX ORDER — LISINOPRIL 10 MG/1
10 TABLET ORAL DAILY
Qty: 90 TABLET | Refills: 2 | Status: SHIPPED | OUTPATIENT
Start: 2017-08-14 | End: 2018-10-22

## 2017-08-14 NOTE — PROGRESS NOTES
"SURGERY  Anastacia Sarah   1935  08/14/17     Chief Complaint:  \"i feel bad\"    HPI    Patient is a 81 y.o. female who states she just doesn't feel well.  She has fatigue and is worried that she has a recurrent hernia.  She had an incarcerated incisional hernia repair with ventralex ST mesh, 4.3 cm, in June of 2015.  I reviewed the old records from our prior system in order to get this information.    She has upper abdominal pain, associated with eating spicy foods.  She is s/p lap chol by someone else maybe 10 years ago.  She has a history of anemia, with a large antral polyp, that i had to remove piecemeal over 4 interventions in the past.  The last intervention was in 2015.  She had to go off coumadin each time.  She is now on eliquis.    She has anemia again, now 11.1, vs 12.6 in 3/17 and 13.4 in 6/16.  She received iron infusions for her anemia previously.  She also describes shortness of breath, and she has an appt with the pulmonologist.  It will sometimes awaken her at night, she having a new CPAP machine.  At times, it occurs during the day, as well, not necessarily associated with exertion.    Past Medical History:   Diagnosis Date   • Acute bronchitis    • Acute sinusitis    • Anemia    • Anticoagulated on warfarin    • Atrial fibrillation    • Bladder dysfunction    • Cancer of left kidney 06/08/2004    S/P Left Radical Neprectomy   • Chronic dysfunction of left eustachian tube    • Depression    • Diarrhea    • Diverticulosis 08/08/2011    Descending Colon & Sigmoid Colon multiple diverticula   • Fatigue    • Frequent urination    • GERD (gastroesophageal reflux disease)    • Goiter     thyroid removed in november 2015   • Health care maintenance    • History of shingles    • History of transfusion    • Hypertension    • Insomnia    • Left atrial enlargement    • Left ventricular hypertrophy    • Mitral regurgitation     mild to moderate per echocardiogram 2016   • Nerve pain     LUE D/T " SHINGLES   • Neuropathy     ARMS   • NANCY (obstructive sleep apnea)     CPAP   • Paroxysmal atrial fibrillation    • Pulmonary hypertension     per echo 2016   • Right atrial enlargement    • SSS (sick sinus syndrome)    • URI (upper respiratory infection)    • Urinary urgency      Past Surgical History:   Procedure Laterality Date   • ATRIAL ABLATION SURGERY N/A 09/13/2011    Comprehensive electrophysiology study, Left atrial pace & sense, 3-dimensional cardia mapping, Radiofrequency catheter ablation, Transseptal hear catheterization, Dr. Maldonado Melendez   • ATRIAL ABLATION SURGERY N/A 02/11/2004    Dr. Maldonado Melendez   • CARDIAC ELECTROPHYSIOLOGY PROCEDURE N/A 5/17/2016    Procedure: PPM generator change - dual BOSTON;  Surgeon: Maldonado Melendez MD;  Location: [facility] CATH INVASIVE LOCATION;  Service:    • CARDIAC ELECTROPHYSIOLOGY PROCEDURE N/A 3/23/2017    Procedure: AV node ablation pt has BOSTON PPM;  Surgeon: Maldonado Melendez MD;  Location: [facility] CATH INVASIVE LOCATION;  Service:    • CARDIOVERSION     • COLONOSCOPY N/A 04/01/2003    Normal colonoscopy except for an occasional diverticulosis, Dr. Nathan Macias   • CYSTOSCOPY BOTOX INJECTION OF BLADDER N/A 6/27/2016    Procedure: CYSTOSCOPY WITH BOTOX DETRUSOR INJECTION;  Surgeon: Salome Bowen MD;  Location: [facility] OR;  Service:    • CYSTOSCOPY BOTOX INJECTION OF BLADDER N/A 05/29/2015    Dr. Salome Bowen   • ENDOSCOPY N/A 06/10/2015    Antral Polyp: Gastric Mucosa w/ marked cautery artifact,  Hiatal Hernia, Dr. Rebeca Curran   • ENDOSCOPY N/A 10/28/2013    Gastric polyp: polypoid hyperplastic gastric mucosa w/ focal ulceration, mixed acute & chronic inflammation.  Negative for intestinal metaplasia, no helicobacter organisms identified on diff-wuik stain, Dr. Rebeca Curran   • ENDOSCOPY N/A 03/18/2013    Large prepyloric polyp, partially removed: fragment of polypoid hyperplastic gastric mucosa w/ foci of erosion & patchy mixed acute & chronic  inflammation. No helicobacter organisms identified on diff-quick stain. Negative for intestinal metaplasia, negative for dysplasia, Dr. Rebeca Curran   • ENDOSCOPY N/A 10/24/2012    large prepyloric mass (3cm) w/ adjacent nodules: hyperplastic polyp w/ mild chronic active gastritis, focal erosion & associated, Multiple fundic polyps: polypoid mucosal hyperplasia, Dr. Rebeca Curran   • ENDOSCOPY N/A 03/11/2003    Gastric Antral Biopsies: Fragments of Gastric Mucosa & Necrotic Tissue (Compatible w/ Ulcer) w/ chronic active inflammation.  Negative stain for Helicobacter organisms. Dr. Nathan Macias   • ENDOSCOPY AND COLONOSCOPY N/A 08/08/2011    4cm hiatal hernia, Schatzki's ring, Diverticulosis, Large antral polyps, Dr. Rebeca Curran   • EYE SURGERY      CATARACT   • INCISIONAL HERNIA REPAIR N/A 06/11/2015    Defect approximately 2.5 cm w/ a mass of incarcerated tissue approximately the size of a nectarine, Dr. Rebeca Curran   • KNEE MINI REVISION Right 9/28/2016    Procedure: RT KNEE POLY INSERT CHANGE ;  Surgeon: Tommy Avila MD;  Location: [facility];  Service:    • LAPAROSCOPIC CHOLECYSTECTOMY     • NEPHRECTOMY RADICAL Left 06/08/2004    Incidentially found left renal mass 3-4 cm renal cell carcinoma, left lower pole, Dr. Salome Bowen   • OTHER SURGICAL HISTORY      NEUROMUSCULAR BLOCKERS BOTULINUM TOXIN ONABOTULINUMTOXIN A   • PACEMAKER IMPLANTATION     • PACEMAKER REPLACEMENT N/A 09/24/2010    Dr. Chavez Jimenez   • TOTAL KNEE ARTHROPLASTY Left 05/21/2013    Dr. Miguel Pacheco   • TOTAL THYROIDECTOMY N/A 11/16/2015    Dr. Gideon Ashley, [facility]   • TRANSVAGINAL TAPING SUSPENSION N/A 03/16/2009    Mini Sling, Closed suprapubic cystostomy, Dr. Salome Bowen   • TUBAL ABDOMINAL LIGATION       Family History   Problem Relation Age of Onset   • Heart disease Other    • Breast cancer Sister    • Breast cancer Maternal Aunt    • Breast cancer Sister    • Heart disease Mother    • Hypertension Mother    • Heart disease Father     • Hypertension Father    • Heart disease Daughter    • Hypertension Daughter    • Heart disease Son    • Hypertension Son      Social History     Social History   • Marital status:      Spouse name: N/A   • Number of children: N/A   • Years of education: N/A     Occupational History   • Homemaker      Social History Main Topics   • Smoking status: Former Smoker   • Smokeless tobacco: Never Used      Comment: caffeine use   • Alcohol use No      Comment: STOPPED   • Drug use: No   • Sexual activity: Defer     Other Topics Concern   • Not on file     Social History Narrative       Current Outpatient Prescriptions:   •  apixaban (ELIQUIS) 5 MG tablet tablet, Take 5 mg by mouth 2 (Two) Times a Day., Disp: , Rfl:   •  B Complex-C (SUPER B COMPLEX PO), Take 1 tablet by mouth Daily., Disp: , Rfl:   •  CALCIUM PO, Take 600 Units by mouth Daily. With D3, listed separetly, Disp: , Rfl:   •  carboxymethylcellulose (REFRESH PLUS) 0.5 % solution, 1 drop 2 (Two) Times a Day As Needed for dry eyes., Disp: , Rfl:   •  cefuroxime (CEFTIN) 250 MG tablet, Take 1 tablet by mouth 2 (Two) Times a Day., Disp: 10 tablet, Rfl: 0  •  cholecalciferol (VITAMIN D3) 1000 UNITS tablet, Take 1,000 Units by mouth daily. In addition to the calcium -D3 pill, Disp: , Rfl:   •  Cyanocobalamin (VITAMIN B-12 PO), Take 1 tablet by mouth daily., Disp: , Rfl:   •  esomeprazole (NEXIUM) 40 MG capsule, Take 40 mg by mouth Every Morning Before Breakfast., Disp: , Rfl:   •  furosemide (LASIX) 20 MG tablet, Take 20 mg by mouth 2 (Two) Times a Day., Disp: , Rfl:   •  levothyroxine (SYNTHROID, LEVOTHROID) 112 MCG tablet, Take 112 mcg by mouth Daily., Disp: , Rfl:   •  lisinopril (PRINIVIL,ZESTRIL) 10 MG tablet, Take 1 tablet by mouth Daily., Disp: 90 tablet, Rfl: 2  •  loratadine (CLARITIN) 10 MG tablet, Take 10 mg by mouth Daily., Disp: , Rfl:   •  metoprolol tartrate (LOPRESSOR) 50 MG tablet, Take 25 mg by mouth 2 (Two) Times a Day., Disp: , Rfl:   •   "Multiple Vitamins-Minerals (PRESERVISION AREDS PO), Take 1 tablet by mouth Daily., Disp: , Rfl:   •  HYDROcodone-acetaminophen (NORCO) 7.5-325 MG per tablet, Take 1 tablet by mouth Every 6 (Six) Hours As Needed for Moderate Pain (4-6)., Disp: , Rfl:   •  loperamide (IMODIUM) 2 MG capsule, Take 2 mg by mouth 4 (four) times a day as needed for diarrhea., Disp: , Rfl:   •  rOPINIRole (REQUIP) 0.5 MG tablet, Take 1 tablet by mouth Every Night. Take 1 hour before bedtime. (Patient taking differently: Take 0.5 mg by mouth At Night As Needed. Take 1 hour before bedtime.), Disp: 30 tablet, Rfl: 3    Allergies   Allergen Reactions   • Adhesive Tape Other (See Comments)     SKIN BLISTERS   • Levaquin [Levofloxacin] Hives   • Sulfa Antibiotics Swelling     FACE AND TONGUE     Review of Systems   Constitutional: Positive for chills, fatigue and unexpected weight change.   HENT: Positive for postnasal drip, rhinorrhea and sneezing.    Eyes: Positive for photophobia, redness, itching and visual disturbance.   Respiratory: Positive for apnea, shortness of breath and wheezing.    Cardiovascular: Positive for palpitations and leg swelling.   Gastrointestinal: Positive for constipation and diarrhea.   Endocrine: Positive for polydipsia, polyphagia and polyuria.   Genitourinary: Positive for decreased urine volume, dysuria, enuresis, frequency and urgency.   Musculoskeletal: Positive for joint swelling.   Neurological: Positive for weakness, light-headedness and headaches.   Hematological: Bruises/bleeds easily.   Psychiatric/Behavioral: Positive for sleep disturbance.   All other systems reviewed and are negative.      Vitals:    08/14/17 1323   Pulse: 73   SpO2: 98%   Weight: 218 lb 3.2 oz (99 kg)   Height: 62.5\" (158.8 cm)       PHYSICAL EXAM:    Pulse 73  Ht 62.5\" (158.8 cm)  Wt 218 lb 3.2 oz (99 kg)  SpO2 98%  BMI 39.27 kg/m2  Body mass index is 39.27 kg/(m^2).    Constitutional: well developed, well nourished, Appears stated " age  Eyes: sclera nonicteric, conjunctiva not injected or edematous  ENMT: Hearing intact, trachea midline, thyroid without masses  CVS: RRR, no murmur, peripheral edema not present  Respiratory: CTA, normal respiratory effort   Gastrointestinal: no hepatosplenomegaly, abdomen soft, she does have a role at the upper abdomen, in a line with her prior incisional scar, that does protrude, but I think it is actually subcutaneous adipose tissue not a recurrent hernia, there being none that's palpably defined.  She has an incisional scar at the upper abdomen, slightly larger than what would be expected for a trocar site, were her original incision was for her cholecystectomy  Genitourinary: inguinal hernia not detected  Musculoskeletal: gait a little slow, muscle mass normal for her age  Skin: warm and dry, no rashes visible  Neurological: awake and alert, seems to have reasonable capacity for understanding for medical decision making  Psychiatric: appears to have reasonable judgement, pleasant  Lymphatics: no cervical adenopathy     Radiographic Findings: None    Lab Findings: Hemoglobin 11.1 down from 13.4    IMPRESSION:  · Recurrent anemia, with prior large prepyloric polyp, that had to be removed piecemeal, there was felt to be a source of her anemia previously  · History of iron infusions, with none presently  · A. fib on Eliquis  · Hypertension  · Sleep apnea on CPAP  · New shortness of breath, with visit planned with pulmonologist  · Depression  · Chronic kidney disease status post left kidney removal    PLAN:  · EGD if OK with pulmonary.  I think is quite possible that she has recurrence of this tissue which in fact could make her tend towards anemia.  On the other hand I think her shortness of breath needs to be worked up first to make sure there is not something else going on that would make her intervention endoscopically unwise, particularly in the context of her sleep apnea.  I've asked her to schedule only  after pulmonary agrees.  Case request entered  · Hold eliquis 3 days.

## 2017-09-02 DIAGNOSIS — R06.02 SHORTNESS OF BREATH: ICD-10-CM

## 2017-09-02 DIAGNOSIS — I48.19 PERSISTENT ATRIAL FIBRILLATION (HCC): ICD-10-CM

## 2017-09-05 NOTE — TELEPHONE ENCOUNTER
In patient's chart I saw that Furosemide 20mg twice daily was prescribed. I spoke with patient and she is only taking this medication once daily because it causes her to use the restroom too much.  Is it ok to fill Furosemide 20mg daily?  Shauna

## 2017-09-07 NOTE — TELEPHONE ENCOUNTER
I haven't seen her since February. She is a patient of Sayra Julian NP and Dr. Maldonado Melendez. Can you send to them to verify? Thanks.

## 2017-09-08 RX ORDER — FUROSEMIDE 20 MG/1
TABLET ORAL
Qty: 30 TABLET | Refills: 5 | Status: SHIPPED | OUTPATIENT
Start: 2017-09-08 | End: 2017-09-25 | Stop reason: SDUPTHER

## 2017-09-21 ENCOUNTER — OFFICE VISIT (OUTPATIENT)
Dept: ONCOLOGY | Facility: CLINIC | Age: 82
End: 2017-09-21

## 2017-09-21 ENCOUNTER — LAB (OUTPATIENT)
Dept: LAB | Facility: HOSPITAL | Age: 82
End: 2017-09-21

## 2017-09-21 VITALS
OXYGEN SATURATION: 97 % | SYSTOLIC BLOOD PRESSURE: 152 MMHG | DIASTOLIC BLOOD PRESSURE: 86 MMHG | RESPIRATION RATE: 14 BRPM | WEIGHT: 219.2 LBS | HEIGHT: 63 IN | TEMPERATURE: 98.1 F | HEART RATE: 69 BPM | BODY MASS INDEX: 38.84 KG/M2

## 2017-09-21 DIAGNOSIS — R53.82 CHRONIC FATIGUE: ICD-10-CM

## 2017-09-21 DIAGNOSIS — D50.9 IRON DEFICIENCY ANEMIA, UNSPECIFIED IRON DEFICIENCY ANEMIA TYPE: ICD-10-CM

## 2017-09-21 DIAGNOSIS — D63.1 ANEMIA IN STAGE 3 CHRONIC KIDNEY DISEASE (HCC): ICD-10-CM

## 2017-09-21 DIAGNOSIS — D50.8 OTHER IRON DEFICIENCY ANEMIA: Primary | ICD-10-CM

## 2017-09-21 DIAGNOSIS — N18.30 ANEMIA IN STAGE 3 CHRONIC KIDNEY DISEASE (HCC): ICD-10-CM

## 2017-09-21 DIAGNOSIS — I48.19 PERSISTENT ATRIAL FIBRILLATION (HCC): Primary | ICD-10-CM

## 2017-09-21 LAB
BASOPHILS # BLD AUTO: 0.05 10*3/MM3 (ref 0–0.1)
BASOPHILS NFR BLD AUTO: 0.8 % (ref 0–1.1)
DEPRECATED RDW RBC AUTO: 44 FL (ref 37–49)
EOSINOPHIL # BLD AUTO: 0.34 10*3/MM3 (ref 0–0.36)
EOSINOPHIL NFR BLD AUTO: 5.3 % (ref 1–5)
ERYTHROCYTE [DISTWIDTH] IN BLOOD BY AUTOMATED COUNT: 15.4 % (ref 11.7–14.5)
FERRITIN SERPL-MCNC: 20.5 NG/ML
FOLATE SERPL-MCNC: 10.52 NG/ML (ref 4.78–24.2)
HCT VFR BLD AUTO: 37 % (ref 34–45)
HGB BLD-MCNC: 12 G/DL (ref 11.5–14.9)
IMM GRANULOCYTES # BLD: 0.03 10*3/MM3 (ref 0–0.03)
IMM GRANULOCYTES NFR BLD: 0.5 % (ref 0–0.5)
IRON 24H UR-MRATE: 37 MCG/DL (ref 37–145)
IRON SATN MFR SERPL: 8 % (ref 14–48)
LYMPHOCYTES # BLD AUTO: 1.6 10*3/MM3 (ref 1–3.5)
LYMPHOCYTES NFR BLD AUTO: 24.8 % (ref 20–49)
MCH RBC QN AUTO: 25.7 PG (ref 27–33)
MCHC RBC AUTO-ENTMCNC: 32.4 G/DL (ref 32–35)
MCV RBC AUTO: 79.2 FL (ref 83–97)
MONOCYTES # BLD AUTO: 0.69 10*3/MM3 (ref 0.25–0.8)
MONOCYTES NFR BLD AUTO: 10.7 % (ref 4–12)
NEUTROPHILS # BLD AUTO: 3.74 10*3/MM3 (ref 1.5–7)
NEUTROPHILS NFR BLD AUTO: 57.9 % (ref 39–75)
NRBC BLD MANUAL-RTO: 0 /100 WBC (ref 0–0)
PLATELET # BLD AUTO: 237 10*3/MM3 (ref 150–375)
PMV BLD AUTO: 10.2 FL (ref 8.9–12.1)
RBC # BLD AUTO: 4.67 10*6/MM3 (ref 3.9–5)
TIBC SERPL-MCNC: 441 MCG/DL (ref 249–505)
TRANSFERRIN SERPL-MCNC: 315 MG/DL (ref 200–360)
VIT B12 BLD-MCNC: 398 PG/ML (ref 211–946)
WBC NRBC COR # BLD: 6.45 10*3/MM3 (ref 4–10)

## 2017-09-21 PROCEDURE — 82607 VITAMIN B-12: CPT | Performed by: INTERNAL MEDICINE

## 2017-09-21 PROCEDURE — 82746 ASSAY OF FOLIC ACID SERUM: CPT | Performed by: INTERNAL MEDICINE

## 2017-09-21 PROCEDURE — 82728 ASSAY OF FERRITIN: CPT | Performed by: INTERNAL MEDICINE

## 2017-09-21 PROCEDURE — 83540 ASSAY OF IRON: CPT | Performed by: INTERNAL MEDICINE

## 2017-09-21 PROCEDURE — 36415 COLL VENOUS BLD VENIPUNCTURE: CPT | Performed by: INTERNAL MEDICINE

## 2017-09-21 PROCEDURE — 85025 COMPLETE CBC W/AUTO DIFF WBC: CPT | Performed by: INTERNAL MEDICINE

## 2017-09-21 PROCEDURE — 99214 OFFICE O/P EST MOD 30 MIN: CPT | Performed by: INTERNAL MEDICINE

## 2017-09-21 PROCEDURE — 84466 ASSAY OF TRANSFERRIN: CPT | Performed by: INTERNAL MEDICINE

## 2017-09-22 ENCOUNTER — CLINICAL SUPPORT NO REQUIREMENTS (OUTPATIENT)
Dept: CARDIOLOGY | Facility: CLINIC | Age: 82
End: 2017-09-22

## 2017-09-22 ENCOUNTER — TELEPHONE (OUTPATIENT)
Dept: CARDIOLOGY | Facility: CLINIC | Age: 82
End: 2017-09-22

## 2017-09-22 DIAGNOSIS — I49.5 SICK SINUS SYNDROME (HCC): Primary | ICD-10-CM

## 2017-09-22 LAB — ETHNIC BACKGROUND STATED: 32.6 MIU/ML (ref 2.6–18.5)

## 2017-09-22 PROCEDURE — 93296 REM INTERROG EVL PM/IDS: CPT | Performed by: INTERNAL MEDICINE

## 2017-09-22 PROCEDURE — 93294 REM INTERROG EVL PM/LDLS PM: CPT | Performed by: INTERNAL MEDICINE

## 2017-09-22 NOTE — PROGRESS NOTES
Subjective .     REASONS FOR FOLLOWUP:    1. Anemia secondary to iron deficiency, intolerant of oral iron, status post Venofer 100 mg on 09/21/11, followed by Feraheme x two doses, 09/28 and 10/05/11.  Recurrent iron deficiency requiring Feraheme again on 09/24/12 and 10/05/12.    2. No definitive evidence of GI blood loss, stool cards negative for occult blood, status post GI evaluation with Dr. Curran with no bleeding source identified.   3. History of gastric polyps, status post staged excision via EGD.    4. Status post left nephrectomy in 2004 for renal cell carcinoma.   5. Stage III chronic kidney disease.     HISTORY OF PRESENT ILLNESS:  The patient is a 81 y.o. year old female who is here for follow-up with the above-mentioned history.    History of Present Illness   the patient is here today for a delayed follow-up visit due to recurrent iron deficiency.  The patient has had recent difficulty with fatigue, reports that this has been ongoing for about the past year.  She does have associated dyspnea on exertion.  She was diagnosed with obstructive sleep apnea and is been continuing on CPAP.  This has not helped.  She has been following up with cardiology in regards to her atrial fibrillation and is felt that this may be part of her difficulty.  She underwent AV deshawn ablation in March 2017 and subsequent cardioversion in May 2017 however this has not led to any improvement. Labs 7/7/17 showed a ferritin of 21.1. On 8/7/17 hemoglobin was 11.1.   She denies any clinical evidence of GI blood loss. She does remain on Eliquis for atrial fibrillation. Plans for upcoming EGD with Dr Curran.    PAST MEDICAL HISTORY:    1.  Atrial fibrillation.  The patient underwent ablation procedure in 2004, repeat ablation procedure on 09/13/11 by Dr. Melendez.  Subsequent AV deshawn ablation and March 2017 and subsequent cardioversion in May 2017.  2.  Gastroesophageal reflux disease.  3.  Hypertension.   4.  Status post left  knee surgery.   5.  Status post pacemaker placement.   6.  Status post laparoscopic cholecystectomy.   7.  Status post bladder tacking procedure.   8.  Status post EGD and colonoscopy with Dr. Curran 08/08/11.  Finding of gastric polyps, one of which was resected showing a hyperplastic polyp with gastritis.  There was no source of bleeding identified.  Schatzki's ring was identified as well as diverticulosis.  Repeat EGD on 03/18/13 with resection of a 3 cm prepyloric hyperplastic polyp of acute and chronic inflammatory change. Repeat EGD, October 2013 with further resection of polyp, benign findings.    9.  Bladder suspension surgery in 5/12.  10.  Status post left knee replacement in May 2013.    Past Medical History:   Diagnosis Date   • Acute bronchitis    • Acute sinusitis    • Anemia     Iron deficiency   • Anticoagulated on warfarin    • Atrial fibrillation    • Bladder dysfunction    • Cancer of left kidney 06/08/2004    S/P Left Radical Neprectomy   • Chronic dysfunction of left eustachian tube    • CKD (chronic kidney disease)     stage 3   • Depression    • Diarrhea    • Diverticulosis 08/08/2011    Descending Colon & Sigmoid Colon multiple diverticula   • Fatigue    • Frequent urination    • GERD (gastroesophageal reflux disease)    • Goiter     thyroid removed in november 2015   • Health care maintenance    • History of shingles    • History of transfusion    • Hypertension    • Insomnia    • Left atrial enlargement    • Left ventricular hypertrophy    • Mitral regurgitation     mild to moderate per echocardiogram 2016   • Nerve pain     LUE D/T SHINGLES   • Neuropathy     ARMS   • NANCY (obstructive sleep apnea)     CPAP   • Paroxysmal atrial fibrillation    • Pulmonary hypertension     per echo 2016   • Right atrial enlargement    • SSS (sick sinus syndrome)    • URI (upper respiratory infection)    • Urinary urgency      Past Surgical History:   Procedure Laterality Date   • ATRIAL ABLATION SURGERY  N/A 09/13/2011    Comprehensive electrophysiology study, Left atrial pace & sense, 3-dimensional cardia mapping, Radiofrequency catheter ablation, Transseptal hear catheterization, Dr. Maldonado Melendez   • ATRIAL ABLATION SURGERY N/A 02/11/2004    Dr. Maldonado Melendez   • BLADDER SURGERY      Bladder tacking procedure   • BLADDER SUSPENSION  05/2012   • CARDIAC ELECTROPHYSIOLOGY PROCEDURE N/A 5/17/2016    Procedure: PPM generator change - dual BOSTON;  Surgeon: Maldonado Melendez MD;  Location:  [facility];  Service:    • CARDIAC ELECTROPHYSIOLOGY PROCEDURE N/A 3/23/2017    Procedure: AV node ablation pt has BOSTON PPM;  Surgeon: Maldonado Melendez MD;  Location:  [facility];  Service:    • CARDIOVERSION     • COLONOSCOPY N/A 04/01/2003    Normal colonoscopy except for an occasional diverticulosis, Dr. Nathan Macias   • CYSTOSCOPY BOTOX INJECTION OF BLADDER N/A 6/27/2016    Procedure: CYSTOSCOPY WITH BOTOX DETRUSOR INJECTION;  Surgeon: Salome Bowen MD;  Location: [facility] OR;  Service:    • CYSTOSCOPY BOTOX INJECTION OF BLADDER N/A 05/29/2015    Dr. Salome Bowen   • ENDOSCOPY N/A 06/10/2015    Antral Polyp: Gastric Mucosa w/ marked cautery artifact,  Hiatal Hernia, Dr. Rebeca Curran   • ENDOSCOPY N/A 10/28/2013    Gastric polyp: polypoid hyperplastic gastric mucosa w/ focal ulceration, mixed acute & chronic inflammation.  Negative for intestinal metaplasia, no helicobacter organisms identified on diff-wuik stain, Dr. Rebeca Curran   • ENDOSCOPY N/A 03/18/2013    Large prepyloric polyp, partially removed: fragment of polypoid hyperplastic gastric mucosa w/ foci of erosion & patchy mixed acute & chronic inflammation. No helicobacter organisms identified on diff-quick stain. Negative for intestinal metaplasia, negative for dysplasia, Dr. Rebeca Curran   • ENDOSCOPY N/A 10/24/2012    large prepyloric mass (3cm) w/ adjacent nodules: hyperplastic polyp w/ mild chronic active gastritis, focal  erosion & associated, Multiple fundic polyps: polypoid mucosal hyperplasia, Dr. Rebeca Curran   • ENDOSCOPY N/A 03/11/2003    Gastric Antral Biopsies: Fragments of Gastric Mucosa & Necrotic Tissue (Compatible w/ Ulcer) w/ chronic active inflammation.  Negative stain for Helicobacter organisms. Dr. Nathan Macias   • ENDOSCOPY AND COLONOSCOPY N/A 08/08/2011    4cm hiatal hernia, Schatzki's ring, Diverticulosis, Large antral polyps, Dr. Rebeca Curran   • EYE SURGERY      CATARACT   • INCISIONAL HERNIA REPAIR N/A 06/11/2015    Defect approximately 2.5 cm w/ a mass of incarcerated tissue approximately the size of a nectarine, Dr. Rebeca Curran   • KNEE MINI REVISION Right 9/28/2016    Procedure: RT KNEE POLY INSERT CHANGE ;  Surgeon: Tommy Avila MD;  Location: [facility];  Service:    • LAPAROSCOPIC CHOLECYSTECTOMY     • NEPHRECTOMY RADICAL Left 06/08/2004    Incidentially found left renal mass 3-4 cm renal cell carcinoma, left lower pole, Dr. Salome Bowen   • OTHER SURGICAL HISTORY      NEUROMUSCULAR BLOCKERS BOTULINUM TOXIN ONABOTULINUMTOXIN A   • PACEMAKER IMPLANTATION     • PACEMAKER REPLACEMENT N/A 09/24/2010    Dr. Chavez Jimenez   • TOTAL KNEE ARTHROPLASTY Left 05/21/2013    Dr. Miguel Pacheco   • TOTAL THYROIDECTOMY N/A 11/16/2015    Dr. Gideon Ashley, [facility]   • TRANSVAGINAL TAPING SUSPENSION N/A 03/16/2009    Mini Sling, Closed suprapubic cystostomy, Dr. Salome Bowen   • TUBAL ABDOMINAL LIGATION           HEMATOLOGICONCOLOGIC HISTORY:    The patient was seen during a hospitalization at [facility] in September 2011.  She was admitted with left lower lobe pneumonia following a cardiac ablation procedure.  She has anemia dating back to 2006, at that time with a hematocrit of 36.7 and MCV of 77.  Her platelets and white count have remained normal.  Hemoglobin in September 2010 was 10.3, MCV 74.  In December 2010 hemoglobin was 10.5 with MCV of 72.  On 08/09/11 hemoglobin was down to 9.8 with MCV of 73.   During the hospitalization hemoglobin had dropped to 8.5 with MCV of 72 and decreased further to 8.0 at which point she was transfused two units of PRBCs.  She was on Coumadin but had no obvious clinical evidence of GI blood loss.  She was evaluated by Dr. Curran on 08/08/11 with EGD and colonoscopy showing evidence of gastric polyps.  One polyp was biopsied showing evidence of a hyperplastic polyp with gastritis.  There was no definitive evidence of bleeding.  She was having some dysphagia and had a Schatzki's ring which was dilated.  She developed rapid ventricular response and therefore the procedure was stopped.  She then underwent cardiac ablation and subsequently developed pneumonia.      During her hospitalization, lab studies were sent including iron studies showing an iron of 11, TIBC 438, transferrin 294, iron percent sat of 3 with a ferritin of 10.8.  B12 was low-normal at 291, folate normal.  Creatinine was in the 1.2 range with estimated GFR in the 40 range (stage III chronic kidney disease).  Erythropoietin level was 66.  A hemoglobin electrophoresis was sent showing hemoglobin A1 of 97.5%, and hemoglobin A2 of 2.2%.  The patient had stool checked for occult blood in the hospital which again was negative, therefore, with no definitive evidence of GI blood loss.  I suspect she may have a malabsorption issue.  She was intolerant of oral iron and treated with Venofer 100 mg on 09/21/11 and subsequently was discharged from the hospital and treated as an outpatient with Feraheme 510 mg on 09/28/11 and again on 10/05/11.      The patient returned for followup studies on 10/19/11 with hemoglobin that improved to the 13 range and MCV up to 79.  TIBC was 284, iron percent sat 32, ferritin 335.  Reticulated hemoglobin had improved to 32.9.  B12 was 476.  We will follow her counts over time.  She will followup with Dr. Curran regarding her gastric polyps.      Laboratory studies on 02/15/12 showed a ferritin  stable at 112.  We will recheck at a 6 month interval.     Lab studies from 9/10/12 shows a hemoglobin 11.9, reticulated hemoglobin 29.9, TIBC 325, iron percent SAT of 11.  Ferritin down to 18.      Feraheme administered on 9/24/12 and 10/5/12.  Repeat iron studies on 3/15/13 showed a ferritin of 101, iron percent saturation of 31.       Labs 10/10/2013 with a ferritin of 85, iron percent sat down to 15, hemoglobin of 12.9. Recheck in six months.    Labs 04/20/2015 showed a ferritin stable in the normal range at 76.           ONCOLOGIC HISTORY:   Status post left nephrectomy in 2004 for renal cell carcinoma with Dr. Bowen.  This was a Darnell grade 2 lesion measuring 3.0 cm, confined to the kidney, with no extension to perinephric adipose tissue, and no evidence of lymphovascular invasion.  Margins were negative.  Adrenal gland was negative.      CT scan of the abdomen and pelvis o 02/26/12 showed no evidence of recurrent disease.            Current Outpatient Prescriptions on File Prior to Visit   Medication Sig Dispense Refill   • apixaban (ELIQUIS) 5 MG tablet tablet Take 5 mg by mouth 2 (Two) Times a Day.     • B Complex-C (SUPER B COMPLEX PO) Take 1 tablet by mouth Daily.     • CALCIUM PO Take 600 Units by mouth Daily. With D3, listed separetly     • carboxymethylcellulose (REFRESH PLUS) 0.5 % solution 1 drop 2 (Two) Times a Day As Needed for dry eyes.     • cholecalciferol (VITAMIN D3) 1000 UNITS tablet Take 1,000 Units by mouth daily. In addition to the calcium -D3 pill     • Cyanocobalamin (VITAMIN B-12 PO) Take 1 tablet by mouth daily.     • esomeprazole (NEXIUM) 40 MG capsule Take 40 mg by mouth Every Morning Before Breakfast.     • furosemide (LASIX) 20 MG tablet TAKE ONE TABLET BY MOUTH DAILY 30 tablet 5   • HYDROcodone-acetaminophen (NORCO) 7.5-325 MG per tablet Take 1 tablet by mouth Every 6 (Six) Hours As Needed for Moderate Pain (4-6).     • levothyroxine (SYNTHROID, LEVOTHROID) 112 MCG tablet Take  112 mcg by mouth Daily.     • lisinopril (PRINIVIL,ZESTRIL) 10 MG tablet Take 1 tablet by mouth Daily. 90 tablet 2   • loperamide (IMODIUM) 2 MG capsule Take 2 mg by mouth 4 (four) times a day as needed for diarrhea.     • loratadine (CLARITIN) 10 MG tablet Take 10 mg by mouth Daily.     • metoprolol tartrate (LOPRESSOR) 50 MG tablet Take 25 mg by mouth 2 (Two) Times a Day.     • Multiple Vitamins-Minerals (PRESERVISION AREDS PO) Take 1 tablet by mouth Daily.     • rOPINIRole (REQUIP) 0.5 MG tablet Take 1 tablet by mouth Every Night. Take 1 hour before bedtime. (Patient taking differently: Take 0.5 mg by mouth At Night As Needed. Take 1 hour before bedtime.) 30 tablet 3   • [DISCONTINUED] cefuroxime (CEFTIN) 250 MG tablet Take 1 tablet by mouth 2 (Two) Times a Day. 10 tablet 0   • [DISCONTINUED] furosemide (LASIX) 20 MG tablet Take 20 mg by mouth 2 (Two) Times a Day.       No current facility-administered medications on file prior to visit.        ALLERGIES:     Allergies   Allergen Reactions   • Adhesive Tape Other (See Comments)     SKIN BLISTERS   • Levaquin [Levofloxacin] Hives   • Sulfa Antibiotics Swelling     FACE AND TONGUE     Social History     Social History   • Marital status:      Spouse name: N/A   • Number of children: N/A   • Years of education: N/A     Occupational History   • Homemaker      Social History Main Topics   • Smoking status: Never Smoker   • Smokeless tobacco: Never Used      Comment: caffeine use   • Alcohol use No      Comment: STOPPED   • Drug use: No   • Sexual activity: Defer     Other Topics Concern   • Not on file     Social History Narrative     Family History   Problem Relation Age of Onset   • Heart disease Other    • Breast cancer Sister      In her 60s.   • Breast cancer Maternal Aunt    • Breast cancer Sister      In her 60s.   • Heart disease Mother    • Hypertension Mother    • Heart disease Father    • Hypertension Father    • Heart disease Daughter    •  Hypertension Daughter    • Heart disease Son    • Hypertension Son        Review of Systems   Constitutional: Positive for fatigue. Negative for activity change, appetite change, fever and unexpected weight change.   HENT: Negative for congestion, mouth sores, nosebleeds, sore throat and voice change.    Respiratory: Positive for shortness of breath. Negative for cough and wheezing.    Cardiovascular: Negative for chest pain, palpitations and leg swelling.   Gastrointestinal: Negative for abdominal distention, abdominal pain, blood in stool, constipation, diarrhea, nausea and vomiting.   Endocrine: Negative for cold intolerance and heat intolerance.   Genitourinary: Negative for difficulty urinating, dysuria, frequency and hematuria.   Musculoskeletal: Negative for arthralgias, back pain, joint swelling and myalgias.   Skin: Negative for rash.   Neurological: Negative for dizziness, syncope, weakness, light-headedness, numbness and headaches.   Hematological: Negative for adenopathy. Does not bruise/bleed easily.   Psychiatric/Behavioral: Negative for confusion and sleep disturbance. The patient is not nervous/anxious.          Objective      Vitals:    09/21/17 0957   BP: 152/86   Pulse: 69   Resp: 14   Temp: 98.1 °F (36.7 °C)   SpO2: 97%      Current Status 9/21/2017   ECOG score 0       Physical Exam   Constitutional: She is oriented to person, place, and time. She appears well-developed and well-nourished.   HENT:   Mouth/Throat: Oropharynx is clear and moist.   Eyes: Conjunctivae are normal.   Neck: No thyromegaly present.   Cardiovascular: Normal rate and regular rhythm.  Exam reveals no gallop and no friction rub.    No murmur heard.  Pulmonary/Chest: Breath sounds normal. No respiratory distress.   Abdominal: Soft. Bowel sounds are normal. She exhibits no distension. There is no tenderness.   Musculoskeletal: She exhibits no edema.   Lymphadenopathy:        Head (right side): No submandibular adenopathy  present.     She has no cervical adenopathy.     She has no axillary adenopathy.        Right: No inguinal and no supraclavicular adenopathy present.        Left: No inguinal and no supraclavicular adenopathy present.   Neurological: She is alert and oriented to person, place, and time. She displays normal reflexes. No cranial nerve deficit. She exhibits normal muscle tone.   Skin: Skin is warm and dry. No rash noted.   Psychiatric: She has a normal mood and affect. Her behavior is normal.       RECENT LABS:  Hematology WBC   Date Value Ref Range Status   09/21/2017 6.45 4.00 - 10.00 10*3/mm3 Final     RBC   Date Value Ref Range Status   09/21/2017 4.67 3.90 - 5.00 10*6/mm3 Final     Hemoglobin   Date Value Ref Range Status   09/21/2017 12.0 11.5 - 14.9 g/dL Final     Hematocrit   Date Value Ref Range Status   09/21/2017 37.0 34.0 - 45.0 % Final     Platelets   Date Value Ref Range Status   09/21/2017 237 150 - 375 10*3/mm3 Final          Assessment/Plan      1. History of recurrent iron deficiency anemia: The patient is intolerant of oral iron. Prior GI evaluation showed no definitive evidence of GI blood loss. She has underlying stage III chronic kidney disease, which is a contributing factor to her mild borderline anemia. She has been treated in the past with IV iron in 2011 and 2012.  She has not been seen in our office since 2015.  She returns currently with ongoing symptoms of fatigue and dyspnea.  It is felt that this is likely related to her persistent atrial fibrillation.  She does however have evidence of recurrent iron deficiency, likely related to malabsorption.  Her ferritin on 7/7/17 was 21 and hemoglobin on 8/7/17 was 11.1.  Hemoglobin today however is improved at 12.0.  She has had no clinical signs of GI blood loss.  Given her history however Dr. Curran plans to proceed with EGD in the near future.  I have asked her to collect stool cards for occult blood ×3.  We will repeat an anemia evaluation  today with iron panel, ferritin, B12, and folate.  I will see her back in 2 weeks and we will have potential plans that day to proceed with the first dose of Feraheme anticipating a second dose the following week.  2. History of gastric polyps: The patient is planning to proceed with EGD with Dr. Curran in the near future for reevaluation.  3. History of renal cell carcinoma: The patient underwent a left nephrectomy in 2004 with no clinical evidence of recurrent disease.  Note that CT chest from 11/14/16 showed no evidence of recurrence.    PLAN:     1. Additional labs today with iron count, ferritin, B12, folate.  The patient will also return stool cards for occult blood ×3.  2. The patient will proceed with EGD with Dr. Curran as scheduled  3. M.D. visit in 2 weeks with CBC and we will plan to proceed likely with the first dose of Feraheme pending her iron studies.

## 2017-09-25 DIAGNOSIS — I48.19 PERSISTENT ATRIAL FIBRILLATION (HCC): ICD-10-CM

## 2017-09-25 DIAGNOSIS — R06.02 SHORTNESS OF BREATH: ICD-10-CM

## 2017-09-25 RX ORDER — FUROSEMIDE 20 MG/1
40 TABLET ORAL DAILY
Qty: 60 TABLET | Refills: 5 | Status: SHIPPED | OUTPATIENT
Start: 2017-09-25 | End: 2018-04-05 | Stop reason: SDUPTHER

## 2017-10-04 PROBLEM — T45.4X5A IRON AND ITS COMPOUNDS CAUSING ADVERSE EFFECT IN THERAPEUTIC USE: Status: ACTIVE | Noted: 2017-10-04

## 2017-10-05 ENCOUNTER — OFFICE VISIT (OUTPATIENT)
Dept: ONCOLOGY | Facility: CLINIC | Age: 82
End: 2017-10-05

## 2017-10-05 ENCOUNTER — INFUSION (OUTPATIENT)
Dept: ONCOLOGY | Facility: HOSPITAL | Age: 82
End: 2017-10-05

## 2017-10-05 ENCOUNTER — LAB (OUTPATIENT)
Dept: LAB | Facility: HOSPITAL | Age: 82
End: 2017-10-05

## 2017-10-05 VITALS
TEMPERATURE: 98 F | DIASTOLIC BLOOD PRESSURE: 90 MMHG | BODY MASS INDEX: 39.16 KG/M2 | HEIGHT: 63 IN | HEART RATE: 72 BPM | WEIGHT: 221 LBS | RESPIRATION RATE: 16 BRPM | SYSTOLIC BLOOD PRESSURE: 142 MMHG

## 2017-10-05 VITALS — DIASTOLIC BLOOD PRESSURE: 86 MMHG | HEART RATE: 74 BPM | SYSTOLIC BLOOD PRESSURE: 156 MMHG

## 2017-10-05 DIAGNOSIS — D63.1 ANEMIA IN STAGE 3 CHRONIC KIDNEY DISEASE (HCC): ICD-10-CM

## 2017-10-05 DIAGNOSIS — N18.30 ANEMIA IN STAGE 3 CHRONIC KIDNEY DISEASE (HCC): ICD-10-CM

## 2017-10-05 DIAGNOSIS — IMO0001 IRON AND ITS COMPOUNDS CAUSING ADVERSE EFFECT IN THERAPEUTIC USE, SUBSEQUENT ENCOUNTER: ICD-10-CM

## 2017-10-05 DIAGNOSIS — IMO0001 IRON AND ITS COMPOUNDS CAUSING ADVERSE EFFECT IN THERAPEUTIC USE, SUBSEQUENT ENCOUNTER: Primary | ICD-10-CM

## 2017-10-05 DIAGNOSIS — R53.82 CHRONIC FATIGUE: ICD-10-CM

## 2017-10-05 DIAGNOSIS — D50.8 OTHER IRON DEFICIENCY ANEMIA: Primary | ICD-10-CM

## 2017-10-05 DIAGNOSIS — D50.8 OTHER IRON DEFICIENCY ANEMIA: ICD-10-CM

## 2017-10-05 LAB
BASOPHILS # BLD AUTO: 0.06 10*3/MM3 (ref 0–0.1)
BASOPHILS NFR BLD AUTO: 1 % (ref 0–1.1)
COLLECT DATE SP2 STL: NORMAL
COLLECT DATE STL: NORMAL
DEPRECATED RDW RBC AUTO: 42.1 FL (ref 37–49)
EOSINOPHIL # BLD AUTO: 0.27 10*3/MM3 (ref 0–0.36)
EOSINOPHIL NFR BLD AUTO: 4.3 % (ref 1–5)
ERYTHROCYTE [DISTWIDTH] IN BLOOD BY AUTOMATED COUNT: 14.8 % (ref 11.7–14.5)
HCT VFR BLD AUTO: 35.3 % (ref 34–45)
HEMOCCULT STL QL: NEGATIVE
HGB BLD-MCNC: 11.4 G/DL (ref 11.5–14.9)
IMM GRANULOCYTES # BLD: 0.03 10*3/MM3 (ref 0–0.03)
IMM GRANULOCYTES NFR BLD: 0.5 % (ref 0–0.5)
LYMPHOCYTES # BLD AUTO: 1.7 10*3/MM3 (ref 1–3.5)
LYMPHOCYTES NFR BLD AUTO: 27.2 % (ref 20–49)
Lab: NORMAL
Lab: NORMAL
MCH RBC QN AUTO: 25.3 PG (ref 27–33)
MCHC RBC AUTO-ENTMCNC: 32.3 G/DL (ref 32–35)
MCV RBC AUTO: 78.3 FL (ref 83–97)
MONOCYTES # BLD AUTO: 0.65 10*3/MM3 (ref 0.25–0.8)
MONOCYTES NFR BLD AUTO: 10.4 % (ref 4–12)
NEUTROPHILS # BLD AUTO: 3.54 10*3/MM3 (ref 1.5–7)
NEUTROPHILS NFR BLD AUTO: 56.6 % (ref 39–75)
NRBC BLD MANUAL-RTO: 0 /100 WBC (ref 0–0)
PLATELET # BLD AUTO: 245 10*3/MM3 (ref 150–375)
PMV BLD AUTO: 10.4 FL (ref 8.9–12.1)
RBC # BLD AUTO: 4.51 10*6/MM3 (ref 3.9–5)
WBC NRBC COR # BLD: 6.25 10*3/MM3 (ref 4–10)

## 2017-10-05 PROCEDURE — 85025 COMPLETE CBC W/AUTO DIFF WBC: CPT | Performed by: INTERNAL MEDICINE

## 2017-10-05 PROCEDURE — 96375 TX/PRO/DX INJ NEW DRUG ADDON: CPT

## 2017-10-05 PROCEDURE — 36416 COLLJ CAPILLARY BLOOD SPEC: CPT | Performed by: INTERNAL MEDICINE

## 2017-10-05 PROCEDURE — 63710000001 DIPHENHYDRAMINE PER 50 MG: Performed by: INTERNAL MEDICINE

## 2017-10-05 PROCEDURE — 25010000002 FERUMOXYTOL 510 MG/17ML SOLUTION 510 MG VIAL: Performed by: INTERNAL MEDICINE

## 2017-10-05 PROCEDURE — 82272 OCCULT BLD FECES 1-3 TESTS: CPT | Performed by: INTERNAL MEDICINE

## 2017-10-05 PROCEDURE — 99213 OFFICE O/P EST LOW 20 MIN: CPT | Performed by: INTERNAL MEDICINE

## 2017-10-05 PROCEDURE — 96374 THER/PROPH/DIAG INJ IV PUSH: CPT

## 2017-10-05 RX ORDER — FAMOTIDINE 10 MG/ML
20 INJECTION, SOLUTION INTRAVENOUS ONCE
Status: COMPLETED | OUTPATIENT
Start: 2017-10-05 | End: 2017-10-05

## 2017-10-05 RX ORDER — DIPHENHYDRAMINE HCL 25 MG
25 CAPSULE ORAL ONCE
Status: CANCELLED | OUTPATIENT
Start: 2017-10-12

## 2017-10-05 RX ORDER — DIPHENHYDRAMINE HCL 25 MG
25 CAPSULE ORAL ONCE
Status: COMPLETED | OUTPATIENT
Start: 2017-10-05 | End: 2017-10-05

## 2017-10-05 RX ORDER — FAMOTIDINE 10 MG/ML
20 INJECTION, SOLUTION INTRAVENOUS ONCE
Status: CANCELLED | OUTPATIENT
Start: 2017-10-05

## 2017-10-05 RX ORDER — SODIUM CHLORIDE 9 MG/ML
250 INJECTION, SOLUTION INTRAVENOUS ONCE
Status: CANCELLED | OUTPATIENT
Start: 2017-10-05

## 2017-10-05 RX ORDER — SODIUM CHLORIDE 9 MG/ML
250 INJECTION, SOLUTION INTRAVENOUS ONCE
Status: CANCELLED | OUTPATIENT
Start: 2017-10-12

## 2017-10-05 RX ORDER — DIPHENHYDRAMINE HCL 25 MG
25 CAPSULE ORAL ONCE
Status: CANCELLED | OUTPATIENT
Start: 2017-10-05

## 2017-10-05 RX ORDER — SODIUM CHLORIDE 9 MG/ML
250 INJECTION, SOLUTION INTRAVENOUS ONCE
Status: COMPLETED | OUTPATIENT
Start: 2017-10-05 | End: 2017-10-05

## 2017-10-05 RX ADMIN — FAMOTIDINE 20 MG: 10 INJECTION, SOLUTION INTRAVENOUS at 10:52

## 2017-10-05 RX ADMIN — DIPHENHYDRAMINE HYDROCHLORIDE 25 MG: 25 CAPSULE ORAL at 10:52

## 2017-10-05 RX ADMIN — SODIUM CHLORIDE 250 ML: 900 INJECTION, SOLUTION INTRAVENOUS at 10:52

## 2017-10-05 RX ADMIN — FERUMOXYTOL 510 MG: 510 INJECTION INTRAVENOUS at 11:19

## 2017-10-06 NOTE — PROGRESS NOTES
Subjective .     REASONS FOR FOLLOWUP:    1. Anemia secondary to iron deficiency, intolerant of oral iron, status post Venofer 100 mg on 09/21/11, followed by Feraheme x two doses, 09/28 and 10/05/11.  Recurrent iron deficiency requiring Feraheme again on 09/24/12 and 10/05/12.    2. No definitive evidence of GI blood loss, stool cards negative for occult blood, status post GI evaluation with Dr. Curran with no bleeding source identified.   3. History of gastric polyps, status post staged excision via EGD.    4. Status post left nephrectomy in 2004 for renal cell carcinoma.   5. Stage III chronic kidney disease.     HISTORY OF PRESENT ILLNESS:  The patient is a 81 y.o. year old female who is here for follow-up with the above-mentioned history.    History of Present Illness   the patient returns in follow-up after having been seen in our office on 9/21/17 after a lengthy delay.  The patient has had recent difficulty with fatigue, reports that this has been ongoing for about the past year.  She does have associated dyspnea on exertion.  She was diagnosed with obstructive sleep apnea and is been continuing on CPAP.  This has not helped.  She has been following up with cardiology in regards to her atrial fibrillation and is felt that this may be part of her difficulty.  She underwent AV deshawn ablation in March 2017 and subsequent cardioversion in May 2017 however this has not led to any improvement. Labs 7/7/17 showed a ferritin of 21.1. On 8/7/17 hemoglobin was 11.1.   She denies any clinical evidence of GI blood loss. She does remain on Eliquis for atrial fibrillation. Plans for upcoming EGD with Dr Curran which is scheduled for 10/23/17.  At her last visit, we did pursue anemia evaluation and she is back today to begin treatment with Feraheme for recurrent iron deficiency.  She also has return stool cards today for occult blood that are negative ×3.  She has no new complaints in the interval since the last visit  with the exception of her ongoing fatigue.    PAST MEDICAL HISTORY:    1.  Atrial fibrillation.  The patient underwent ablation procedure in 2004, repeat ablation procedure on 09/13/11 by Dr. Melendez.  Subsequent AV deshawn ablation and March 2017 and subsequent cardioversion in May 2017.  2.  Gastroesophageal reflux disease.  3.  Hypertension.   4.  Status post left knee surgery.   5.  Status post pacemaker placement.   6.  Status post laparoscopic cholecystectomy.   7.  Status post bladder tacking procedure.   8.  Status post EGD and colonoscopy with Dr. Curran 08/08/11.  Finding of gastric polyps, one of which was resected showing a hyperplastic polyp with gastritis.  There was no source of bleeding identified.  Schatzki's ring was identified as well as diverticulosis.  Repeat EGD on 03/18/13 with resection of a 3 cm prepyloric hyperplastic polyp of acute and chronic inflammatory change. Repeat EGD, October 2013 with further resection of polyp, benign findings.    9.  Bladder suspension surgery in 5/12.  10.  Status post left knee replacement in May 2013.    Past Medical History:   Diagnosis Date   • Acute bronchitis    • Acute sinusitis    • Anemia     Iron deficiency   • Anticoagulated on warfarin    • Atrial fibrillation    • Bladder dysfunction    • Cancer of left kidney 06/08/2004    S/P Left Radical Neprectomy   • Chronic dysfunction of left eustachian tube    • CKD (chronic kidney disease)     stage 3   • Depression    • Diarrhea    • Diverticulosis 08/08/2011    Descending Colon & Sigmoid Colon multiple diverticula   • Fatigue    • Frequent urination    • GERD (gastroesophageal reflux disease)    • Goiter     thyroid removed in november 2015   • Health care maintenance    • History of shingles    • History of transfusion    • Hypertension    • Insomnia    • Left atrial enlargement    • Left ventricular hypertrophy    • Mitral regurgitation     mild to moderate per echocardiogram 2016   • Nerve pain      LUE D/T SHINGLES   • Neuropathy     ARMS   • NANCY (obstructive sleep apnea)     CPAP   • Paroxysmal atrial fibrillation    • Pulmonary hypertension     per echo 2016   • Right atrial enlargement    • SSS (sick sinus syndrome)    • URI (upper respiratory infection)    • Urinary urgency      Past Surgical History:   Procedure Laterality Date   • ATRIAL ABLATION SURGERY N/A 09/13/2011    Comprehensive electrophysiology study, Left atrial pace & sense, 3-dimensional cardia mapping, Radiofrequency catheter ablation, Transseptal hear catheterization, Dr. Maldonado Melendez   • ATRIAL ABLATION SURGERY N/A 02/11/2004    Dr. Maldonado Melendez   • BLADDER SURGERY      Bladder tacking procedure   • BLADDER SUSPENSION  05/2012   • CARDIAC ELECTROPHYSIOLOGY PROCEDURE N/A 5/17/2016    Procedure: PPM generator change - dual BOSTON;  Surgeon: Maldonado Melendez MD;  Location: [facility] CATH INVASIVE LOCATION;  Service:    • CARDIAC ELECTROPHYSIOLOGY PROCEDURE N/A 3/23/2017    Procedure: AV node ablation pt has BOSTON PPM;  Surgeon: Maldonado Melendez MD;  Location: [facility] CATH INVASIVE LOCATION;  Service:    • CARDIOVERSION     • COLONOSCOPY N/A 04/01/2003    Normal colonoscopy except for an occasional diverticulosis, Dr. Nathan Macias   • CYSTOSCOPY BOTOX INJECTION OF BLADDER N/A 6/27/2016    Procedure: CYSTOSCOPY WITH BOTOX DETRUSOR INJECTION;  Surgeon: Salome Bowen MD;  Location: [facility] OR;  Service:    • CYSTOSCOPY BOTOX INJECTION OF BLADDER N/A 05/29/2015    Dr. Salome Bowen   • ENDOSCOPY N/A 06/10/2015    Antral Polyp: Gastric Mucosa w/ marked cautery artifact,  Hiatal Hernia, Dr. Rebeca Curran   • ENDOSCOPY N/A 10/28/2013    Gastric polyp: polypoid hyperplastic gastric mucosa w/ focal ulceration, mixed acute & chronic inflammation.  Negative for intestinal metaplasia, no helicobacter organisms identified on diff-wuik stain, Dr. Rebeca Curran   • ENDOSCOPY N/A 03/18/2013    Large prepyloric polyp, partially removed: fragment of polypoid  hyperplastic gastric mucosa w/ foci of erosion & patchy mixed acute & chronic inflammation. No helicobacter organisms identified on diff-quick stain. Negative for intestinal metaplasia, negative for dysplasia, Dr. Rebeca Curran   • ENDOSCOPY N/A 10/24/2012    large prepyloric mass (3cm) w/ adjacent nodules: hyperplastic polyp w/ mild chronic active gastritis, focal erosion & associated, Multiple fundic polyps: polypoid mucosal hyperplasia, Dr. Rebeca Curran   • ENDOSCOPY N/A 03/11/2003    Gastric Antral Biopsies: Fragments of Gastric Mucosa & Necrotic Tissue (Compatible w/ Ulcer) w/ chronic active inflammation.  Negative stain for Helicobacter organisms. Dr. Nathan Macias   • ENDOSCOPY AND COLONOSCOPY N/A 08/08/2011    4cm hiatal hernia, Schatzki's ring, Diverticulosis, Large antral polyps, Dr. Rebeca Curran   • EYE SURGERY      CATARACT   • INCISIONAL HERNIA REPAIR N/A 06/11/2015    Defect approximately 2.5 cm w/ a mass of incarcerated tissue approximately the size of a nectarine, Dr. Rebeca Curran   • KNEE MINI REVISION Right 9/28/2016    Procedure: RT KNEE POLY INSERT CHANGE ;  Surgeon: Tommy Avila MD;  Location: [facility];  Service:    • LAPAROSCOPIC CHOLECYSTECTOMY     • NEPHRECTOMY RADICAL Left 06/08/2004    Incidentially found left renal mass 3-4 cm renal cell carcinoma, left lower pole, Dr. Salome Bowen   • OTHER SURGICAL HISTORY      NEUROMUSCULAR BLOCKERS BOTULINUM TOXIN ONABOTULINUMTOXIN A   • PACEMAKER IMPLANTATION     • PACEMAKER REPLACEMENT N/A 09/24/2010    Dr. Chavez Jimenez   • TOTAL KNEE ARTHROPLASTY Left 05/21/2013    Dr. Miguel Pacheco   • TOTAL THYROIDECTOMY N/A 11/16/2015    Dr. Gideon Ashley, [facility]   • TRANSVAGINAL TAPING SUSPENSION N/A 03/16/2009    Mini Sling, Closed suprapubic cystostomy, Dr. Salome Bowen   • TUBAL ABDOMINAL LIGATION           HEMATOLOGICONCOLOGIC HISTORY:    The patient was seen during a hospitalization at [facility] in September 2011.  She was admitted with left  lower lobe pneumonia following a cardiac ablation procedure.  She has anemia dating back to 2006, at that time with a hematocrit of 36.7 and MCV of 77.  Her platelets and white count have remained normal.  Hemoglobin in September 2010 was 10.3, MCV 74.  In December 2010 hemoglobin was 10.5 with MCV of 72.  On 08/09/11 hemoglobin was down to 9.8 with MCV of 73.  During the hospitalization hemoglobin had dropped to 8.5 with MCV of 72 and decreased further to 8.0 at which point she was transfused two units of PRBCs.  She was on Coumadin but had no obvious clinical evidence of GI blood loss.  She was evaluated by Dr. Curran on 08/08/11 with EGD and colonoscopy showing evidence of gastric polyps.  One polyp was biopsied showing evidence of a hyperplastic polyp with gastritis.  There was no definitive evidence of bleeding.  She was having some dysphagia and had a Schatzki's ring which was dilated.  She developed rapid ventricular response and therefore the procedure was stopped.  She then underwent cardiac ablation and subsequently developed pneumonia.      During her hospitalization, lab studies were sent including iron studies showing an iron of 11, TIBC 438, transferrin 294, iron percent sat of 3 with a ferritin of 10.8.  B12 was low-normal at 291, folate normal.  Creatinine was in the 1.2 range with estimated GFR in the 40 range (stage III chronic kidney disease).  Erythropoietin level was 66.  A hemoglobin electrophoresis was sent showing hemoglobin A1 of 97.5%, and hemoglobin A2 of 2.2%.  The patient had stool checked for occult blood in the hospital which again was negative, therefore, with no definitive evidence of GI blood loss.  I suspect she may have a malabsorption issue.  She was intolerant of oral iron and treated with Venofer 100 mg on 09/21/11 and subsequently was discharged from the hospital and treated as an outpatient with Feraheme 510 mg on 09/28/11 and again on 10/05/11.      The patient returned for  followup studies on 10/19/11 with hemoglobin that improved to the 13 range and MCV up to 79.  TIBC was 284, iron percent sat 32, ferritin 335.  Reticulated hemoglobin had improved to 32.9.  B12 was 476.  We will follow her counts over time.  She will followup with Dr. Curran regarding her gastric polyps.      Laboratory studies on 02/15/12 showed a ferritin stable at 112.  We will recheck at a 6 month interval.     Lab studies from 9/10/12 shows a hemoglobin 11.9, reticulated hemoglobin 29.9, TIBC 325, iron percent SAT of 11.  Ferritin down to 18.      Feraheme administered on 9/24/12 and 10/5/12.  Repeat iron studies on 3/15/13 showed a ferritin of 101, iron percent saturation of 31.       Labs 10/10/2013 with a ferritin of 85, iron percent sat down to 15, hemoglobin of 12.9. Recheck in six months.    Labs 04/20/2015 showed a ferritin stable in the normal range at 76.     The patient returned after a prolonged absence from our office on 9/21/17 with hemoglobin of 12.0, MCV 79.2, iron 37, ferritin 20, iron saturation 8%, TIBC 441 CONSISTENT with recurrent iron deficiency.  Folate was normal at 10.52, B12 normal at 398, erythropoietin 32.6.  Additional Feraheme initiated on 10/5/17 for 2 doses.  Repeat EGD scheduled with Dr. Curran on 10/23/17.       ONCOLOGIC HISTORY:   Status post left nephrectomy in 2004 for renal cell carcinoma with Dr. Bowen.  This was a Darnell grade 2 lesion measuring 3.0 cm, confined to the kidney, with no extension to perinephric adipose tissue, and no evidence of lymphovascular invasion.  Margins were negative.  Adrenal gland was negative.      CT scan of the abdomen and pelvis o 02/26/12 showed no evidence of recurrent disease.            Current Outpatient Prescriptions on File Prior to Visit   Medication Sig Dispense Refill   • apixaban (ELIQUIS) 5 MG tablet tablet Take 5 mg by mouth 2 (Two) Times a Day.     • B Complex-C (SUPER B COMPLEX PO) Take 1 tablet by mouth Daily.     • CALCIUM  PO Take 600 Units by mouth Daily. With D3, listed separetly     • carboxymethylcellulose (REFRESH PLUS) 0.5 % solution 1 drop 2 (Two) Times a Day As Needed for dry eyes.     • cholecalciferol (VITAMIN D3) 1000 UNITS tablet Take 1,000 Units by mouth daily. In addition to the calcium -D3 pill     • Cyanocobalamin (VITAMIN B-12 PO) Take 1 tablet by mouth daily.     • esomeprazole (NEXIUM) 40 MG capsule Take 40 mg by mouth Every Morning Before Breakfast.     • furosemide (LASIX) 20 MG tablet Take 2 tablets by mouth Daily. 60 tablet 5   • HYDROcodone-acetaminophen (NORCO) 7.5-325 MG per tablet Take 1 tablet by mouth Every 6 (Six) Hours As Needed for Moderate Pain (4-6).     • levothyroxine (SYNTHROID, LEVOTHROID) 112 MCG tablet Take 112 mcg by mouth Daily.     • lisinopril (PRINIVIL,ZESTRIL) 10 MG tablet Take 1 tablet by mouth Daily. 90 tablet 2   • loperamide (IMODIUM) 2 MG capsule Take 2 mg by mouth 4 (four) times a day as needed for diarrhea.     • loratadine (CLARITIN) 10 MG tablet Take 10 mg by mouth Daily.     • metoprolol tartrate (LOPRESSOR) 50 MG tablet Take 25 mg by mouth 2 (Two) Times a Day.     • Multiple Vitamins-Minerals (PRESERVISION AREDS PO) Take 1 tablet by mouth Daily.     • rOPINIRole (REQUIP) 0.5 MG tablet Take 1 tablet by mouth Every Night. Take 1 hour before bedtime. (Patient taking differently: Take 0.5 mg by mouth At Night As Needed. Take 1 hour before bedtime.) 30 tablet 3     No current facility-administered medications on file prior to visit.        ALLERGIES:     Allergies   Allergen Reactions   • Adhesive Tape Other (See Comments)     SKIN BLISTERS   • Levaquin [Levofloxacin] Hives   • Sulfa Antibiotics Swelling     FACE AND TONGUE     Social History     Social History   • Marital status:      Spouse name: N/A   • Number of children: N/A   • Years of education: N/A     Occupational History   • Homemaker      Social History Main Topics   • Smoking status: Never Smoker   • Smokeless  tobacco: Never Used      Comment: caffeine use   • Alcohol use No      Comment: STOPPED   • Drug use: No   • Sexual activity: Defer     Other Topics Concern   • Not on file     Social History Narrative     Family History   Problem Relation Age of Onset   • Heart disease Other    • Breast cancer Sister      In her 60s.   • Breast cancer Maternal Aunt    • Breast cancer Sister      In her 60s.   • Heart disease Mother    • Hypertension Mother    • Heart disease Father    • Hypertension Father    • Heart disease Daughter    • Hypertension Daughter    • Heart disease Son    • Hypertension Son        Review of Systems   Constitutional: Positive for fatigue. Negative for activity change, appetite change, fever and unexpected weight change.   HENT: Negative for congestion, mouth sores, nosebleeds, sore throat and voice change.    Respiratory: Positive for shortness of breath. Negative for cough and wheezing.    Cardiovascular: Negative for chest pain, palpitations and leg swelling.   Gastrointestinal: Negative for abdominal distention, abdominal pain, blood in stool, constipation, diarrhea, nausea and vomiting.   Endocrine: Negative for cold intolerance and heat intolerance.   Genitourinary: Negative for difficulty urinating, dysuria, frequency and hematuria.   Musculoskeletal: Negative for arthralgias, back pain, joint swelling and myalgias.   Skin: Negative for rash.   Neurological: Negative for dizziness, syncope, weakness, light-headedness, numbness and headaches.   Hematological: Negative for adenopathy. Does not bruise/bleed easily.   Psychiatric/Behavioral: Negative for confusion and sleep disturbance. The patient is not nervous/anxious.          Objective      Vitals:    10/05/17 0945   BP: 142/90   Pulse: 72   Resp: 16   Temp: 98 °F (36.7 °C)      Current Status 10/5/2017   ECOG score 0       Physical Exam   Constitutional: She is oriented to person, place, and time. She appears well-developed and well-nourished.    HENT:   Mouth/Throat: Oropharynx is clear and moist.   Eyes: Conjunctivae are normal.   Neck: No thyromegaly present.   Cardiovascular: Normal rate and regular rhythm.  Exam reveals no gallop and no friction rub.    No murmur heard.  Pulmonary/Chest: Breath sounds normal. No respiratory distress.   Abdominal: Soft. Bowel sounds are normal. She exhibits no distension. There is no tenderness.   Musculoskeletal: She exhibits no edema.   Lymphadenopathy:        Head (right side): No submandibular adenopathy present.     She has no cervical adenopathy.     She has no axillary adenopathy.        Right: No inguinal and no supraclavicular adenopathy present.        Left: No inguinal and no supraclavicular adenopathy present.   Neurological: She is alert and oriented to person, place, and time. She displays normal reflexes. No cranial nerve deficit. She exhibits normal muscle tone.   Skin: Skin is warm and dry. No rash noted.   Psychiatric: She has a normal mood and affect. Her behavior is normal.       RECENT LABS:  Hematology WBC   Date Value Ref Range Status   10/05/2017 6.25 4.00 - 10.00 10*3/mm3 Final     RBC   Date Value Ref Range Status   10/05/2017 4.51 3.90 - 5.00 10*6/mm3 Final     Hemoglobin   Date Value Ref Range Status   10/05/2017 11.4 (L) 11.5 - 14.9 g/dL Final     Hematocrit   Date Value Ref Range Status   10/05/2017 35.3 34.0 - 45.0 % Final     Platelets   Date Value Ref Range Status   10/05/2017 245 150 - 375 10*3/mm3 Final        Lab Results   Component Value Date    GLUCOSE 97 05/23/2017    BUN 15 08/07/2017    CREATININE 1.20 (H) 08/07/2017    EGFRIFNONA 43 (L) 08/07/2017    EGFRIFAFRI 52 (L) 08/07/2017    BCR 12.5 08/07/2017    K 4.5 08/07/2017    CO2 25.0 08/07/2017    CALCIUM 9.0 08/07/2017    PROTENTOTREF 6.9 08/07/2017    ALBUMIN 3.50 08/07/2017    LABIL2 1.0 08/07/2017    AST 17 08/07/2017    ALT 15 08/07/2017     Additional labs 9/21/17: Iron 37, ferritin 20.5, iron saturation 8%, TIBC 441,  folate 10.52, B12 398, erythropoietin 32.6.    Stool cards negative for occult blood ×3    Assessment/Plan      1. Recurrent iron deficiency anemia: The patient is intolerant of oral iron. Prior GI evaluation showed no definitive evidence of GI blood loss. She has underlying stage III chronic kidney disease, which is a contributing factor to her mild borderline anemia. She has been treated in the past with IV iron in 2011 and 2012.  She had a lengthy absence from our office from 2015 until she was referred back on 9/21/17 with ongoing symptoms of fatigue and dyspnea.  It is felt that this is likely related to her persistent atrial fibrillation.  She does however have evidence of recurrent iron deficiency, likely related to malabsorption.  Her ferritin on 7/7/17 was 21 and hemoglobin on 8/7/17 was 11.1.  Her degree of anemia is mild with a hemoglobin today of 11.4, microcytic indices with an MCV of 78.  Labs on 9/21/17 show ferritin 20.5, iron saturation 8%, TIBC 441.  We did also check B12 and folate which were normal.  Erythropoietin was 32.  Therefore she does have a component of anemia secondary to chronic kidney disease however we do need to correct her iron deficiency at this point.  Today, we will proceed with the first dose of Feraheme 510 mg IV.  She will return in 1 week for her second dose.  She did return stool cards today for occult blood were negative ×3.  She does have a follow-up EGD scheduled with Dr. Curran on 10/23/17.  I will plan to see her back in 3 months and just prior to this we will draw a repeat iron studies.  2. History of gastric polyps: The patient is planning to proceed with EGD with Dr. Curran 10/23/17 for reevaluation.  3. History of renal cell carcinoma: The patient underwent a left nephrectomy in 2004 with no clinical evidence of recurrent disease.  Note that CT chest from 11/14/16 showed no evidence of recurrence.    PLAN:     1. Proceed with Feraheme 510 mg IV today and return  in one week for second dose.  2. EGD with Dr. Curran on 10/23/17  3. M.D. visit in 3 months.  One week prior we'll draw labs with CBC, iron panel, ferritin, reticulated hemoglobin.

## 2017-10-09 RX ORDER — APIXABAN 5 MG/1
TABLET, FILM COATED ORAL
Qty: 60 TABLET | Refills: 2 | Status: SHIPPED | OUTPATIENT
Start: 2017-10-09 | End: 2018-01-16 | Stop reason: SDUPTHER

## 2017-10-10 NOTE — TELEPHONE ENCOUNTER
Called, home number busy, left message on cell. Will have scheduling call with appt in next 1-2 weeks.

## 2017-10-11 DIAGNOSIS — I48.91 ATRIAL FIBRILLATION, UNSPECIFIED TYPE (HCC): ICD-10-CM

## 2017-10-11 DIAGNOSIS — R00.0 TACHYCARDIA: ICD-10-CM

## 2017-10-12 ENCOUNTER — INFUSION (OUTPATIENT)
Dept: ONCOLOGY | Facility: HOSPITAL | Age: 82
End: 2017-10-12

## 2017-10-12 VITALS
HEART RATE: 93 BPM | SYSTOLIC BLOOD PRESSURE: 159 MMHG | DIASTOLIC BLOOD PRESSURE: 90 MMHG | TEMPERATURE: 98.6 F | BODY MASS INDEX: 39.05 KG/M2 | WEIGHT: 220.4 LBS

## 2017-10-12 DIAGNOSIS — D50.8 OTHER IRON DEFICIENCY ANEMIA: ICD-10-CM

## 2017-10-12 DIAGNOSIS — IMO0001 IRON AND ITS COMPOUNDS CAUSING ADVERSE EFFECT IN THERAPEUTIC USE, SUBSEQUENT ENCOUNTER: Primary | ICD-10-CM

## 2017-10-12 LAB
COLLECT DATE SP2 STL: ABNORMAL
COLLECT DATE STL: ABNORMAL
HEMOCCULT STL QL: ABNORMAL
HEMOCCULT STL QL: ABNORMAL
HEMOCCULT STL QL: POSITIVE
Lab: ABNORMAL
Lab: ABNORMAL

## 2017-10-12 PROCEDURE — 96374 THER/PROPH/DIAG INJ IV PUSH: CPT

## 2017-10-12 PROCEDURE — 63710000001 DIPHENHYDRAMINE PER 50 MG: Performed by: INTERNAL MEDICINE

## 2017-10-12 PROCEDURE — 25010000002 FERUMOXYTOL 510 MG/17ML SOLUTION 510 MG VIAL: Performed by: INTERNAL MEDICINE

## 2017-10-12 PROCEDURE — 82270 OCCULT BLOOD FECES: CPT

## 2017-10-12 RX ORDER — METOPROLOL TARTRATE 50 MG/1
TABLET, FILM COATED ORAL
Qty: 60 TABLET | Refills: 0 | OUTPATIENT
Start: 2017-10-12

## 2017-10-12 RX ORDER — DIPHENHYDRAMINE HCL 25 MG
25 CAPSULE ORAL ONCE
Status: COMPLETED | OUTPATIENT
Start: 2017-10-12 | End: 2017-10-12

## 2017-10-12 RX ORDER — SODIUM CHLORIDE 9 MG/ML
250 INJECTION, SOLUTION INTRAVENOUS ONCE
Status: COMPLETED | OUTPATIENT
Start: 2017-10-12 | End: 2017-10-12

## 2017-10-12 RX ADMIN — SODIUM CHLORIDE 250 ML: 900 INJECTION, SOLUTION INTRAVENOUS at 13:15

## 2017-10-12 RX ADMIN — FERUMOXYTOL 510 MG: 510 INJECTION INTRAVENOUS at 13:36

## 2017-10-12 RX ADMIN — DIPHENHYDRAMINE HYDROCHLORIDE 25 MG: 25 CAPSULE ORAL at 13:21

## 2017-10-12 NOTE — PROGRESS NOTES
Pt here for week 2 of Feraheme. A large brown discoloration was noted on her right arm. Pt states that last week her IV infiltrated. No redness or swelling to effected arm. Arm just appears discolored. S/W pharmacy and there is no suggested treatment for this. Picture obtained so that we can document any changes in discoloration.    Pt's occult blood card came back positive (one card). Pt has an apt for an EGD with Dr. Curran scheduled for 10/23. She is currently on Eliquis for Afib. Reviewed with Gloria Johnston NP. Per lGoria, have pt call her cardiologist to let them know she has a positive occult blood card. She also messaged Dr. Brower to see what he recommends. Informed pt and she v/u.

## 2017-10-23 ENCOUNTER — ANESTHESIA EVENT (OUTPATIENT)
Dept: GASTROENTEROLOGY | Facility: HOSPITAL | Age: 82
End: 2017-10-23

## 2017-10-23 ENCOUNTER — ANESTHESIA (OUTPATIENT)
Dept: GASTROENTEROLOGY | Facility: HOSPITAL | Age: 82
End: 2017-10-23

## 2017-10-23 ENCOUNTER — HOSPITAL ENCOUNTER (OUTPATIENT)
Facility: HOSPITAL | Age: 82
Setting detail: HOSPITAL OUTPATIENT SURGERY
Discharge: HOME | End: 2017-10-23
Attending: SURGERY | Admitting: SURGERY

## 2017-10-23 VITALS
TEMPERATURE: 97.6 F | BODY MASS INDEX: 38.33 KG/M2 | OXYGEN SATURATION: 95 % | HEIGHT: 63 IN | SYSTOLIC BLOOD PRESSURE: 110 MMHG | DIASTOLIC BLOOD PRESSURE: 72 MMHG | RESPIRATION RATE: 16 BRPM | HEART RATE: 70 BPM | WEIGHT: 216.31 LBS

## 2017-10-23 DIAGNOSIS — R10.13 EPIGASTRIC PAIN: ICD-10-CM

## 2017-10-23 PROCEDURE — 43235 EGD DIAGNOSTIC BRUSH WASH: CPT | Performed by: SURGERY

## 2017-10-23 PROCEDURE — 25010000002 PROPOFOL 10 MG/ML EMULSION: Performed by: ANESTHESIOLOGY

## 2017-10-23 RX ORDER — LIDOCAINE HYDROCHLORIDE 20 MG/ML
INJECTION, SOLUTION INFILTRATION; PERINEURAL AS NEEDED
Status: DISCONTINUED | OUTPATIENT
Start: 2017-10-23 | End: 2017-10-23 | Stop reason: SURG

## 2017-10-23 RX ORDER — SODIUM CHLORIDE 0.9 % (FLUSH) 0.9 %
1-10 SYRINGE (ML) INJECTION AS NEEDED
Status: DISCONTINUED | OUTPATIENT
Start: 2017-10-23 | End: 2017-10-23 | Stop reason: HOSPADM

## 2017-10-23 RX ORDER — PROPOFOL 10 MG/ML
VIAL (ML) INTRAVENOUS CONTINUOUS PRN
Status: DISCONTINUED | OUTPATIENT
Start: 2017-10-23 | End: 2017-10-23 | Stop reason: SURG

## 2017-10-23 RX ORDER — SODIUM CHLORIDE 9 MG/ML
30 INJECTION, SOLUTION INTRAVENOUS CONTINUOUS PRN
Status: DISCONTINUED | OUTPATIENT
Start: 2017-10-23 | End: 2017-10-23 | Stop reason: HOSPADM

## 2017-10-23 RX ORDER — PROPOFOL 10 MG/ML
VIAL (ML) INTRAVENOUS AS NEEDED
Status: DISCONTINUED | OUTPATIENT
Start: 2017-10-23 | End: 2017-10-23 | Stop reason: SURG

## 2017-10-23 RX ORDER — SODIUM CHLORIDE, SODIUM LACTATE, POTASSIUM CHLORIDE, CALCIUM CHLORIDE 600; 310; 30; 20 MG/100ML; MG/100ML; MG/100ML; MG/100ML
30 INJECTION, SOLUTION INTRAVENOUS CONTINUOUS PRN
Status: DISCONTINUED | OUTPATIENT
Start: 2017-10-23 | End: 2017-10-23

## 2017-10-23 RX ADMIN — PROPOFOL 150 MG: 10 INJECTION, EMULSION INTRAVENOUS at 11:43

## 2017-10-23 RX ADMIN — Medication 100 ML: at 11:36

## 2017-10-23 RX ADMIN — SODIUM CHLORIDE 30 ML/HR: 9 INJECTION, SOLUTION INTRAVENOUS at 10:52

## 2017-10-23 RX ADMIN — PROPOFOL 140 MCG/KG/MIN: 10 INJECTION, EMULSION INTRAVENOUS at 11:43

## 2017-10-23 RX ADMIN — LIDOCAINE HYDROCHLORIDE 50 MG: 20 INJECTION, SOLUTION INFILTRATION; PERINEURAL at 11:43

## 2017-10-23 NOTE — PLAN OF CARE
Problem: Patient Care Overview (Adult)  Goal: Plan of Care Review  Outcome: Ongoing (interventions implemented as appropriate)    10/23/17 1029   Coping/Psychosocial Response Interventions   Plan Of Care Reviewed With patient   Patient Care Overview   Progress improving       Goal: Adult Individualization and Mutuality  Outcome: Ongoing (interventions implemented as appropriate)  Goal: Discharge Needs Assessment  Outcome: Ongoing (interventions implemented as appropriate)    10/23/17 1029   Discharge Needs Assessment   Concerns To Be Addressed no discharge needs identified   Discharge Disposition home or self-care   Living Environment   Transportation Available car;family or friend will provide         Problem: GI Endoscopy (Adult)  Goal: Signs and Symptoms of Listed Potential Problems Will be Absent or Manageable (GI Endoscopy)  Outcome: Ongoing (interventions implemented as appropriate)    10/23/17 1029   GI Endoscopy   Problems Present (GI Endoscopy) none

## 2017-10-23 NOTE — OP NOTE
EGD Procedure Note    Pre-operative Diagnosis:   · Anemia, iron deficiency, requiring iron infusions  · History of large gastric polyps    Post-operative Diagnosis:  · Minimal prepyloric inflammation  · Small gastric polyps, less than 6 mm    Procedure:  EGD     Surgeon: Bina    Anesthetic: MAC per Maldonado Segovia MD    Indications:   · Same     Findings/Treatments:  · Minimal prepyloric inflammation dramatic improvement over prior endoscopies;    · Small gastric polyps, less than 6 mm    Recommendations:  · Continue with iron infusions as felt appropriate by hematology  · Give patient a copy of the procedural photographs    Technique:    MAC anesthesia was induced.  Bite block was placed and the gastroscope was inserted thru such and advanced under direct vision to the second portion of the duodenum .  A careful inspection was made as the scope was withdrawn and photographs taken, including a retroflexed view of the stomach.   Findings and interventions are described above.      The area of inflammation was somewhat longitudinally oriented, only about a centimeter and a half in length, characterized by erythema and induration, dramatically improved over prior endoscopies.  Blue ink was identified.  Very small gastric polyps were identified on retroflexion, there were no bleeding.    Air was removed at the end of the procedure    Rebeca Curran MD  11:43 AM

## 2017-10-23 NOTE — ANESTHESIA PREPROCEDURE EVALUATION
Anesthesia Evaluation     Patient summary reviewed          Airway   Mallampati: III  TM distance: <3 FB  Neck ROM: limited  possible difficult intubation  Dental - normal exam     Pulmonary     breath sounds clear to auscultation  Cardiovascular   Exercise tolerance: poor (<4 METS)    Rhythm: regular  Rate: normal        Neuro/Psych  GI/Hepatic/Renal/Endo      Musculoskeletal     Abdominal    Substance History      OB/GYN          Other                                        Anesthesia Plan    ASA 3     MAC     intravenous induction   Anesthetic plan and risks discussed with patient.

## 2017-10-23 NOTE — H&P
"SURGERY  Anastacia Sarah   1935  08/14/17      Chief Complaint:  \"i feel bad\"     HPI    Patient here for EGD with prior history of large gastric polyps, with the instruction to hold her eliquis for 3 days.  Unfortunately, thru rescheduling several times and her daughter fixing her meds, and the patient forgetting to mention to the daughter, she took her eliquis yesterday.  We discussed today that she will likely only be able to undergo a scope without intervention.  We did decide to proceed, noting the risk of going off the eliquis, as well.    Her prior history is as listed below.  Dr Fish did OK her to go ahead with the procedure.     Patient is a 81 y.o. female who states she just doesn't feel well.  She has fatigue and is worried that she has a recurrent hernia.  She had an incarcerated incisional hernia repair with ventralex ST mesh, 4.3 cm, in June of 2015.  I reviewed the old records from our prior system in order to get this information.     She has upper abdominal pain, associated with eating spicy foods.  She is s/p lap chol by someone else maybe 10 years ago.  She has a history of anemia, with a large antral polyp, that i had to remove piecemeal over 4 interventions in the past.  The last intervention was in 2015.  She had to go off coumadin each time.  She is now on eliquis.     She has anemia again, now 11.1, vs 12.6 in 3/17 and 13.4 in 6/16.  She received iron infusions for her anemia previously.  She also describes shortness of breath, and she has an appt with the pulmonologist.  It will sometimes awaken her at night, she having a new CPAP machine.  At times, it occurs during the day, as well, not necessarily associated with exertion.      Medical History         Past Medical History:   Diagnosis Date   • Acute bronchitis     • Acute sinusitis     • Anemia     • Anticoagulated on warfarin     • Atrial fibrillation     • Bladder dysfunction     • Cancer of left kidney 06/08/2004     S/P " Left Radical Neprectomy   • Chronic dysfunction of left eustachian tube     • Depression     • Diarrhea     • Diverticulosis 08/08/2011     Descending Colon & Sigmoid Colon multiple diverticula   • Fatigue     • Frequent urination     • GERD (gastroesophageal reflux disease)     • Goiter       thyroid removed in november 2015   • Health care maintenance     • History of shingles     • History of transfusion     • Hypertension     • Insomnia     • Left atrial enlargement     • Left ventricular hypertrophy     • Mitral regurgitation       mild to moderate per echocardiogram 2016   • Nerve pain       LUE D/T SHINGLES   • Neuropathy       ARMS   • NANCY (obstructive sleep apnea)       CPAP   • Paroxysmal atrial fibrillation     • Pulmonary hypertension       per echo 2016   • Right atrial enlargement     • SSS (sick sinus syndrome)     • URI (upper respiratory infection)     • Urinary urgency            Surgical History    Past Surgical History:   Procedure Laterality Date   • ATRIAL ABLATION SURGERY N/A 09/13/2011     Comprehensive electrophysiology study, Left atrial pace & sense, 3-dimensional cardia mapping, Radiofrequency catheter ablation, Transseptal hear catheterization, Dr. Maldonado Melendez   • ATRIAL ABLATION SURGERY N/A 02/11/2004     Dr. Maldonado Melendez   • CARDIAC ELECTROPHYSIOLOGY PROCEDURE N/A 5/17/2016     Procedure: PPM generator change - dual BOSTON;  Surgeon: Maldonado Melendez MD;  Location: [facility] INVASIVE LOCATION;  Service:    • CARDIAC ELECTROPHYSIOLOGY PROCEDURE N/A 3/23/2017     Procedure: AV node ablation pt has BOSTON PPM;  Surgeon: Maldonado Melendez MD;  Location: [facility] INVASIVE LOCATION;  Service:    • CARDIOVERSION       • COLONOSCOPY N/A 04/01/2003     Normal colonoscopy except for an occasional diverticulosis, Dr. Nathan Macias   • CYSTOSCOPY BOTOX INJECTION OF BLADDER N/A 6/27/2016     Procedure: CYSTOSCOPY WITH BOTOX DETRUSOR INJECTION;  Surgeon: Salome Bowen MD;  Location: [facility]  OR;  Service:    • CYSTOSCOPY BOTOX INJECTION OF BLADDER N/A 05/29/2015     Dr. Salome Bowen   • ENDOSCOPY N/A 06/10/2015     Antral Polyp: Gastric Mucosa w/ marked cautery artifact,  Hiatal Hernia, Dr. Rebeca Curran   • ENDOSCOPY N/A 10/28/2013     Gastric polyp: polypoid hyperplastic gastric mucosa w/ focal ulceration, mixed acute & chronic inflammation.  Negative for intestinal metaplasia, no helicobacter organisms identified on diff-wuik stain, Dr. Rebeca Curran   • ENDOSCOPY N/A 03/18/2013     Large prepyloric polyp, partially removed: fragment of polypoid hyperplastic gastric mucosa w/ foci of erosion & patchy mixed acute & chronic inflammation. No helicobacter organisms identified on diff-quick stain. Negative for intestinal metaplasia, negative for dysplasia, Dr. Rebeca Curran   • ENDOSCOPY N/A 10/24/2012     large prepyloric mass (3cm) w/ adjacent nodules: hyperplastic polyp w/ mild chronic active gastritis, focal erosion & associated, Multiple fundic polyps: polypoid mucosal hyperplasia, Dr. Rebeca Curran   • ENDOSCOPY N/A 03/11/2003     Gastric Antral Biopsies: Fragments of Gastric Mucosa & Necrotic Tissue (Compatible w/ Ulcer) w/ chronic active inflammation.  Negative stain for Helicobacter organisms. Dr. Nathan Macias   • ENDOSCOPY AND COLONOSCOPY N/A 08/08/2011     4cm hiatal hernia, Schatzki's ring, Diverticulosis, Large antral polyps, Dr. Rebeca Curran   • EYE SURGERY         CATARACT   • INCISIONAL HERNIA REPAIR N/A 06/11/2015     Defect approximately 2.5 cm w/ a mass of incarcerated tissue approximately the size of a nectarine, Dr. Rebeca Curran   • KNEE MINI REVISION Right 9/28/2016     Procedure: RT KNEE POLY INSERT CHANGE ;  Surgeon: Tommy Avila MD;  Location: [facility] OR;  Service:    • LAPAROSCOPIC CHOLECYSTECTOMY       • NEPHRECTOMY RADICAL Left 06/08/2004     Incidentially found left renal mass 3-4 cm renal cell carcinoma, left lower pole, Dr. Salome Bowen   • OTHER SURGICAL HISTORY          NEUROMUSCULAR BLOCKERS BOTULINUM TOXIN ONABOTULINUMTOXIN A   • PACEMAKER IMPLANTATION       • PACEMAKER REPLACEMENT N/A 09/24/2010     Dr. Chavez Jimenez   • TOTAL KNEE ARTHROPLASTY Left 05/21/2013     Dr. Miguel Pacheco   • TOTAL THYROIDECTOMY N/A 11/16/2015     Dr. Gideon Ashley, [facility]   • TRANSVAGINAL TAPING SUSPENSION N/A 03/16/2009     Mini Sling, Closed suprapubic cystostomy, Dr. Salome Bowen   • TUBAL ABDOMINAL LIGATION                   Family History   Problem Relation Age of Onset   • Heart disease Other     • Breast cancer Sister     • Breast cancer Maternal Aunt     • Breast cancer Sister     • Heart disease Mother     • Hypertension Mother     • Heart disease Father     • Hypertension Father     • Heart disease Daughter     • Hypertension Daughter     • Heart disease Son     • Hypertension Son         Social History    Social History            Social History   • Marital status:        Spouse name: N/A   • Number of children: N/A   • Years of education: N/A           Occupational History   • Homemaker        Social History Main Topics   • Smoking status: Former Smoker   • Smokeless tobacco: Never Used         Comment: caffeine use   • Alcohol use No         Comment: STOPPED   • Drug use: No   • Sexual activity: Defer           Other Topics Concern   • Not on file      Social History Narrative            Current Outpatient Prescriptions:   •  apixaban (ELIQUIS) 5 MG tablet tablet, Take 5 mg by mouth 2 (Two) Times a Day., Disp: , Rfl:   •  B Complex-C (SUPER B COMPLEX PO), Take 1 tablet by mouth Daily., Disp: , Rfl:   •  CALCIUM PO, Take 600 Units by mouth Daily. With D3, listed separetly, Disp: , Rfl:   •  carboxymethylcellulose (REFRESH PLUS) 0.5 % solution, 1 drop 2 (Two) Times a Day As Needed for dry eyes., Disp: , Rfl:   •  cefuroxime (CEFTIN) 250 MG tablet, Take 1 tablet by mouth 2 (Two) Times a Day., Disp: 10 tablet, Rfl: 0  •  cholecalciferol (VITAMIN D3) 1000 UNITS tablet, Take 1,000 Units by  mouth daily. In addition to the calcium -D3 pill, Disp: , Rfl:   •  Cyanocobalamin (VITAMIN B-12 PO), Take 1 tablet by mouth daily., Disp: , Rfl:   •  esomeprazole (NEXIUM) 40 MG capsule, Take 40 mg by mouth Every Morning Before Breakfast., Disp: , Rfl:   •  furosemide (LASIX) 20 MG tablet, Take 20 mg by mouth 2 (Two) Times a Day., Disp: , Rfl:   •  levothyroxine (SYNTHROID, LEVOTHROID) 112 MCG tablet, Take 112 mcg by mouth Daily., Disp: , Rfl:   •  lisinopril (PRINIVIL,ZESTRIL) 10 MG tablet, Take 1 tablet by mouth Daily., Disp: 90 tablet, Rfl: 2  •  loratadine (CLARITIN) 10 MG tablet, Take 10 mg by mouth Daily., Disp: , Rfl:   •  metoprolol tartrate (LOPRESSOR) 50 MG tablet, Take 25 mg by mouth 2 (Two) Times a Day., Disp: , Rfl:   •  Multiple Vitamins-Minerals (PRESERVISION AREDS PO), Take 1 tablet by mouth Daily., Disp: , Rfl:   •  HYDROcodone-acetaminophen (NORCO) 7.5-325 MG per tablet, Take 1 tablet by mouth Every 6 (Six) Hours As Needed for Moderate Pain (4-6)., Disp: , Rfl:   •  loperamide (IMODIUM) 2 MG capsule, Take 2 mg by mouth 4 (four) times a day as needed for diarrhea., Disp: , Rfl:   •  rOPINIRole (REQUIP) 0.5 MG tablet, Take 1 tablet by mouth Every Night. Take 1 hour before bedtime. (Patient taking differently: Take 0.5 mg by mouth At Night As Needed. Take 1 hour before bedtime.), Disp: 30 tablet, Rfl: 3           Allergies   Allergen Reactions   • Adhesive Tape Other (See Comments)       SKIN BLISTERS   • Levaquin [Levofloxacin] Hives   • Sulfa Antibiotics Swelling       FACE AND TONGUE      Review of Systems   Constitutional: Positive for chills, fatigue and unexpected weight change.   HENT: Positive for postnasal drip, rhinorrhea and sneezing.    Eyes: Positive for photophobia, redness, itching and visual disturbance.   Respiratory: Positive for apnea, shortness of breath and wheezing.    Cardiovascular: Positive for palpitations and leg swelling.   Gastrointestinal: Positive for constipation and  "diarrhea.   Endocrine: Positive for polydipsia, polyphagia and polyuria.   Genitourinary: Positive for decreased urine volume, dysuria, enuresis, frequency and urgency.   Musculoskeletal: Positive for joint swelling.   Neurological: Positive for weakness, light-headedness and headaches.   Hematological: Bruises/bleeds easily.   Psychiatric/Behavioral: Positive for sleep disturbance.   All other systems reviewed and are negative.         Vitals        Vitals:     08/14/17 1323   Pulse: 73   SpO2: 98%   Weight: 218 lb 3.2 oz (99 kg)   Height: 62.5\" (158.8 cm)            PHYSICAL EXAM:     Pulse 73  Ht 62.5\" (158.8 cm)  Wt 218 lb 3.2 oz (99 kg)  SpO2 98%  BMI 39.27 kg/m2  Body mass index is 39.27 kg/(m^2).     Constitutional: well developed, well nourished, Appears stated age  Eyes: sclera nonicteric, conjunctiva not injected or edematous  ENMT: Hearing intact, trachea midline, thyroid without masses  CVS: RRR, no murmur, peripheral edema not present  Respiratory: CTA, normal respiratory effort   Gastrointestinal: no hepatosplenomegaly, abdomen soft, she does have a role at the upper abdomen, in a line with her prior incisional scar, that does protrude, but I think it is actually subcutaneous adipose tissue not a recurrent hernia, there being none that's palpably defined.  She has an incisional scar at the upper abdomen, slightly larger than what would be expected for a trocar site, were her original incision was for her cholecystectomy  Genitourinary: inguinal hernia not detected  Musculoskeletal: gait a little slow, muscle mass normal for her age  Skin: warm and dry, no rashes visible  Neurological: awake and alert, seems to have reasonable capacity for understanding for medical decision making  Psychiatric: appears to have reasonable judgement, pleasant  Lymphatics: no cervical adenopathy      Radiographic Findings: None     Lab Findings: Hemoglobin 11.1 down from 13.4     IMPRESSION:  · Recurrent anemia, with " prior large prepyloric polyp, that had to be removed piecemeal, there was felt to be a source of her anemia previously  · History of iron infusions, with none presently  · A. fib on Eliquis  · Hypertension  · Sleep apnea on CPAP  · New shortness of breath, with visit planned with pulmonologist  · Depression  · Chronic kidney disease status post left kidney removal     PLAN:  · EGD if OK with pulmonary.  I think is quite possible that she has recurrence of this tissue which in fact could make her tend towards anemia.  On the other hand I think her shortness of breath needs to be worked up first to make sure there is not something else going on that would make her intervention endoscopically unwise, particularly in the context of her sleep apnea.  I've asked her to schedule only after pulmonary agrees.  Case request entered  · Hold eliquis 3 days.

## 2017-10-23 NOTE — ANESTHESIA POSTPROCEDURE EVALUATION
"Patient: Anastacia Sarah    Procedure Summary     Date Anesthesia Start Anesthesia Stop Room / Location    10/23/17 1136 1151  [facility] /  [facility]       Procedure Diagnosis Surgeon Provider    ESOPHAGOGASTRODUODENOSCOPY (N/A Esophagus) Epigastric pain  (Epigastric pain [R10.13]) MD Maldonado Long MD          Anesthesia Type: MAC  Last vitals  BP   110/72 (10/23/17 1215)   Temp   36.4 °C (97.6 °F) (10/23/17 1209)   Pulse   70 (10/23/17 1215)   Resp   16 (10/23/17 1215)     SpO2   95 % (10/23/17 1215)     Post Anesthesia Care and Evaluation    Patient location during evaluation: PACU  Patient participation: complete - patient participated  Level of consciousness: awake  Pain score: 0  Pain management: adequate  Airway patency: patent  Anesthetic complications: No anesthetic complications  PONV Status: none  Cardiovascular status: acceptable  Respiratory status: acceptable  Hydration status: acceptable    Comments: /72 (BP Location: Left arm, Patient Position: Lying)  Pulse 70  Temp 36.4 °C (97.6 °F) (Oral)   Resp 16  Ht 62.5\" (158.8 cm)  Wt 216 lb 5 oz (98.1 kg)  SpO2 95%  BMI 38.93 kg/m2      "

## 2017-10-27 ENCOUNTER — OFFICE VISIT (OUTPATIENT)
Dept: ORTHOPEDIC SURGERY | Facility: CLINIC | Age: 82
End: 2017-10-27

## 2017-10-27 VITALS — TEMPERATURE: 97.9 F | HEIGHT: 63 IN | BODY MASS INDEX: 37.92 KG/M2 | WEIGHT: 214 LBS

## 2017-10-27 DIAGNOSIS — M70.51 PES ANSERINUS BURSITIS OF BOTH KNEES: ICD-10-CM

## 2017-10-27 DIAGNOSIS — M70.52 PES ANSERINUS BURSITIS OF BOTH KNEES: ICD-10-CM

## 2017-10-27 DIAGNOSIS — Z96.653 S/P TOTAL KNEE ARTHROPLASTY, BILATERAL: Primary | ICD-10-CM

## 2017-10-27 PROCEDURE — 73562 X-RAY EXAM OF KNEE 3: CPT | Performed by: NURSE PRACTITIONER

## 2017-10-27 PROCEDURE — 99213 OFFICE O/P EST LOW 20 MIN: CPT | Performed by: NURSE PRACTITIONER

## 2017-10-27 NOTE — PROGRESS NOTES
Patient: Anastacia Sarah  YOB: 1935 82 y.o. female  Medical Record Number: [id number]    Chief Complaints:   Chief Complaint   Patient presents with   • Right Knee - Follow-up, Pain       History of Present Illness:Anastacia Sarah is a 82 y.o. female who presents for follow-up of  Bilateral total knee replacements with recent right knee poly-exchange.  Overall her knees been feeling okay, started with bilateral anterior knee pain a couple months ago.  She denies any injury.  She describes the knee pain as a mild to moderate dull ache with some mild swelling.    Allergies:   Allergies   Allergen Reactions   • Adhesive Tape Other (See Comments)     SKIN BLISTERS   • Levaquin [Levofloxacin] Hives   • Sulfa Antibiotics Swelling     FACE AND TONGUE       Medications:   Current Outpatient Prescriptions   Medication Sig Dispense Refill   • CALCIUM PO Take 600 Units by mouth Daily. With D3, listed separetly     • carboxymethylcellulose (REFRESH PLUS) 0.5 % solution 1 drop 2 (Two) Times a Day As Needed for dry eyes.     • cholecalciferol (VITAMIN D3) 1000 UNITS tablet Take 1,000 Units by mouth daily. In addition to the calcium -D3 pill     • Cyanocobalamin (VITAMIN B-12 PO) Take 1 tablet by mouth daily.     • ELIQUIS 5 MG tablet tablet TAKE ONE TABLET BY MOUTH EVERY 12 HOURS 60 tablet 2   • esomeprazole (NEXIUM) 40 MG capsule Take 40 mg by mouth Every Morning Before Breakfast.     • furosemide (LASIX) 20 MG tablet Take 2 tablets by mouth Daily. 60 tablet 5   • levothyroxine (SYNTHROID, LEVOTHROID) 112 MCG tablet Take 112 mcg by mouth Daily.     • lisinopril (PRINIVIL,ZESTRIL) 10 MG tablet Take 1 tablet by mouth Daily. 90 tablet 2   • metoprolol tartrate (LOPRESSOR) 25 MG tablet Take 1 tablet by mouth 2 (Two) Times a Day. 180 tablet 1   • Multiple Vitamins-Minerals (PRESERVISION AREDS PO) Take 1 tablet by mouth Daily.     • B Complex-C (SUPER B COMPLEX PO) Take 1 tablet by mouth Daily.     •  "HYDROcodone-acetaminophen (NORCO) 7.5-325 MG per tablet Take 1 tablet by mouth Every 6 (Six) Hours As Needed for Moderate Pain (4-6).     • loperamide (IMODIUM) 2 MG capsule Take 2 mg by mouth 4 (four) times a day as needed for diarrhea.     • rOPINIRole (REQUIP) 0.5 MG tablet Take 1 tablet by mouth Every Night. Take 1 hour before bedtime. (Patient taking differently: Take 0.5 mg by mouth At Night As Needed. Take 1 hour before bedtime.) 30 tablet 3     No current facility-administered medications for this visit.          The following portions of the patient's history were reviewed and updated as appropriate: allergies, current medications, past family history, past medical history, past social history, past surgical history and problem list.    Review of Systems:   A 14 point review of systems was performed. All systems negative except pertinent positives/negative listed in HPI above    Physical Exam:   Vitals:    10/27/17 1548   Temp: 97.9 °F (36.6 °C)   TempSrc: Temporal Artery    Weight: 214 lb (97.1 kg)   Height: 62.5\" (158.8 cm)       General: A and O x 3, ASA, NAD    SCLERA:    Normal    DENTITION:   Normal  Skin clear no unusual lesions noted  Bilateral knees patient has well-healed midline surgical incisions noted with excellent range of motion no instability however she is very tender over bilateral PES ANSERINE BURSA    Radiology:  Xrays 3views (ap,lateral, sunrise) right knee was ordered and reviewed today secondary to recent surgery and shows well-placed well-positioned bilateral total knee replacements.  Comparative views are unchanged     Assessment/Plan:  Status post bilateral TKA with recent right knee poly-exchange  Bilateral knee pes anserine bursitis    Patient was given samples of Voltaren gel to use twice a day as needed, was referred to outpatient physical therapy, and will otherwise return to the office as needed    "

## 2017-11-02 ENCOUNTER — OFFICE VISIT (OUTPATIENT)
Dept: CARDIOLOGY | Facility: CLINIC | Age: 82
End: 2017-11-02

## 2017-11-02 ENCOUNTER — CLINICAL SUPPORT NO REQUIREMENTS (OUTPATIENT)
Dept: CARDIOLOGY | Facility: CLINIC | Age: 82
End: 2017-11-02

## 2017-11-02 VITALS
DIASTOLIC BLOOD PRESSURE: 82 MMHG | BODY MASS INDEX: 38.62 KG/M2 | WEIGHT: 218 LBS | SYSTOLIC BLOOD PRESSURE: 134 MMHG | HEIGHT: 63 IN | HEART RATE: 72 BPM

## 2017-11-02 DIAGNOSIS — I49.5 SICK SINUS SYNDROME (HCC): ICD-10-CM

## 2017-11-02 DIAGNOSIS — E66.09 NON MORBID OBESITY DUE TO EXCESS CALORIES: ICD-10-CM

## 2017-11-02 DIAGNOSIS — G47.33 OSA ON CPAP: ICD-10-CM

## 2017-11-02 DIAGNOSIS — I48.21 PERMANENT ATRIAL FIBRILLATION (HCC): Primary | ICD-10-CM

## 2017-11-02 DIAGNOSIS — I49.5 SICK SINUS SYNDROME (HCC): Primary | ICD-10-CM

## 2017-11-02 DIAGNOSIS — I10 ESSENTIAL HYPERTENSION: ICD-10-CM

## 2017-11-02 DIAGNOSIS — D50.8 OTHER IRON DEFICIENCY ANEMIA: ICD-10-CM

## 2017-11-02 DIAGNOSIS — Z99.89 OSA ON CPAP: ICD-10-CM

## 2017-11-02 DIAGNOSIS — R06.02 SHORTNESS OF BREATH: ICD-10-CM

## 2017-11-02 DIAGNOSIS — Z95.0 S/P PLACEMENT OF CARDIAC PACEMAKER: ICD-10-CM

## 2017-11-02 PROCEDURE — 93000 ELECTROCARDIOGRAM COMPLETE: CPT | Performed by: NURSE PRACTITIONER

## 2017-11-02 PROCEDURE — 93279 PRGRMG DEV EVAL PM/LDLS PM: CPT | Performed by: INTERNAL MEDICINE

## 2017-11-02 PROCEDURE — 99214 OFFICE O/P EST MOD 30 MIN: CPT | Performed by: NURSE PRACTITIONER

## 2017-11-02 NOTE — PROGRESS NOTES
Date of Office Visit: 2017  Encounter Provider: ALLISON Isaac  Place of Service: [facility] CARDIOLOGY  Patient Name: Anastacia Sarah  :1935    No chief complaint on file.  :     HPI: Anastacia Sarah is a 82 y.o. female who  is a patient of Dr. Clement, who is know to me. She has a past medical history of PAF, s/p ablation in the past. She has been seen in the office a couple of times starting in February with complaints of increasing shortness of breath, palpitations and worsening fatigue. She had undergone a cardioversion on 17 but only stayed in SR for about 3 days. She has a history of SSS and s/p PPM. Her device was interrogated and showed and increase in her PAF episodes and 40% of the time her heart rates were above 90 and 100. Dr. Melendez reviewed the data and discussed with the patient and they decided on AV node ablation. She underwent AV node ablation on 2017. I saw her on 17 and she did not feel any better since the procedure. She had a nuclear stress test in 2016 which showed no ischemia and EF 58%. An echocardiogram in 2016 showed mild to moderate MR, mild TR and EF 56-60%. I repeated her echo to be sure she was not decompensating due to she was now 100% ventricularly pacing. The 17 echo showed normal LV systolic function, EF = 64%, all left ventricular wall segments contract normally, mild-to-moderate concentric hypertrophy, mild-to-moderate mitral valve regurgitation is present with a posteriorly-directed jet noted and mild to moderate tricuspid valve regurgitation.      She had said that when she had her cardioversion in January that she felt better for about 3 days, which is how long she stayed in SR. I discussed with Dr. Melendez and we decided to try to get her back into SR. We started her on propafenone and had her come in for cardioversion on 2017 a couple of days after starting the  propafenone. She did convert to SR and she was discharged home after the procedure. She had called on 5/31/17 to double check that she was taking her medications correctly. I spoke with her that day and she said that she maybe felt a little better but no real improvement in her shortness of breath since the cardioversion. We then received an alert from her pacemaker that she had converted back into afib on 5/31/17. I saw her on 6/21/17 and she was really no better and that she really did not notice any difference after the cardioversion. She did maintain SR for about 5-6 days but her symptoms did not improve. She still had significant shortness of breath with minimal activity.      She has been to see pulmonary for her sleep apnea. She does use her CPAP. PFT's did not show any restrictive lung disease but they felt like she had vocal cord dysfunction that was contributing to her shortness of breath and recommended evaluation. She does not remember who they referred her to but she said she had enough doctors so she has not followed up. She has been taking the increased dose of furosemide about 4 days a week. She says that if she takes it more than that it hurts her one kidney. She thinks it helps some of her abdominal bloating but really does not make a difference in her shortness of breath. She has not done well with weight loss as she says she eats too many sweets. She had not had any chest pain, dizziness or palpitations.    Device interrogation today shows normal pacemaker function and testing. Her RV threshold continues to be elevated but stable and she is pacer dependent.           Past Medical History:   Diagnosis Date   • Acute bronchitis    • Acute sinusitis    • Anemia     Iron deficiency   • Anticoagulated on warfarin    • Atrial fibrillation    • Bladder dysfunction    • Cancer of left kidney 06/08/2004    S/P Left Radical Neprectomy   • Chronic dysfunction of left eustachian tube    • CKD (chronic kidney  disease)     stage 3   • Depression    • Diarrhea    • Diverticulosis 08/08/2011    Descending Colon & Sigmoid Colon multiple diverticula   • Epigastric pain    • Fatigue    • Frequent urination    • GERD (gastroesophageal reflux disease)    • Goiter     thyroid removed in november 2015   • Health care maintenance    • History of shingles    • History of transfusion    • Hypertension    • Insomnia    • Left atrial enlargement    • Left ventricular hypertrophy    • Mitral regurgitation     mild to moderate per echocardiogram 2016   • Nerve pain     LUE D/T SHINGLES   • Neuropathy     ARMS   • NANCY (obstructive sleep apnea)     CPAP   • Paroxysmal atrial fibrillation    • Pulmonary hypertension     per echo 2016   • Right atrial enlargement    • SSS (sick sinus syndrome)    • URI (upper respiratory infection)    • Urinary urgency        Past Surgical History:   Procedure Laterality Date   • ATRIAL ABLATION SURGERY N/A 09/13/2011    Comprehensive electrophysiology study, Left atrial pace & sense, 3-dimensional cardia mapping, Radiofrequency catheter ablation, Transseptal hear catheterization, Dr. Maldonado Melendez   • ATRIAL ABLATION SURGERY N/A 02/11/2004    Dr. Maldonado Melendez   • BLADDER SURGERY      Bladder tacking procedure   • BLADDER SUSPENSION  05/2012   • CARDIAC ELECTROPHYSIOLOGY PROCEDURE N/A 5/17/2016    Procedure: PPM generator change - dual BOSTON;  Surgeon: Maldonado Melendez MD;  Location: [facility] INVASIVE LOCATION;  Service:    • CARDIAC ELECTROPHYSIOLOGY PROCEDURE N/A 3/23/2017    Procedure: AV node ablation pt has BOSTON PPM;  Surgeon: Maldonado Melendez MD;  Location: [facility] INVASIVE LOCATION;  Service:    • CARDIOVERSION     • COLONOSCOPY N/A 04/01/2003    Normal colonoscopy except for an occasional diverticulosis, Dr. Nathan Macias   • CYSTOSCOPY BOTOX INJECTION OF BLADDER N/A 6/27/2016    Procedure: CYSTOSCOPY WITH BOTOX DETRUSOR INJECTION;  Surgeon: Salome Bowen MD;  Location: [facility] OR;   Service:    • CYSTOSCOPY BOTOX INJECTION OF BLADDER N/A 05/29/2015    Dr. Salome Bowen   • ENDOSCOPY N/A 06/10/2015    Antral Polyp: Gastric Mucosa w/ marked cautery artifact,  Hiatal Hernia, Dr. Rebeca Curran   • ENDOSCOPY N/A 10/28/2013    Gastric polyp: polypoid hyperplastic gastric mucosa w/ focal ulceration, mixed acute & chronic inflammation.  Negative for intestinal metaplasia, no helicobacter organisms identified on diff-wuik stain, Dr. Rebeca Curran   • ENDOSCOPY N/A 03/18/2013    Large prepyloric polyp, partially removed: fragment of polypoid hyperplastic gastric mucosa w/ foci of erosion & patchy mixed acute & chronic inflammation. No helicobacter organisms identified on diff-quick stain. Negative for intestinal metaplasia, negative for dysplasia, Dr. Rebeca Curran   • ENDOSCOPY N/A 10/24/2012    large prepyloric mass (3cm) w/ adjacent nodules: hyperplastic polyp w/ mild chronic active gastritis, focal erosion & associated, Multiple fundic polyps: polypoid mucosal hyperplasia, Dr. Rebeca Curran   • ENDOSCOPY N/A 03/11/2003    Gastric Antral Biopsies: Fragments of Gastric Mucosa & Necrotic Tissue (Compatible w/ Ulcer) w/ chronic active inflammation.  Negative stain for Helicobacter organisms. Dr. Nathan Macias   • ENDOSCOPY N/A 10/23/2017    Procedure: ESOPHAGOGASTRODUODENOSCOPY;  Surgeon: Rebeca Curran MD;  Location: [facility] ENDOSCOPY;  Service:    • ENDOSCOPY AND COLONOSCOPY N/A 08/08/2011    4cm hiatal hernia, Schatzki's ring, Diverticulosis, Large antral polyps, Dr. Rebeca Curran   • EYE SURGERY      CATARACT   • INCISIONAL HERNIA REPAIR N/A 06/11/2015    Defect approximately 2.5 cm w/ a mass of incarcerated tissue approximately the size of a nectarine, Dr. Rebeca Curran   • KNEE MINI REVISION Right 9/28/2016    Procedure: RT KNEE POLY INSERT CHANGE ;  Surgeon: Tommy Avila MD;  Location: [facility] OR;  Service:    • LAPAROSCOPIC CHOLECYSTECTOMY     • NEPHRECTOMY RADICAL Left 06/08/2004     Incidentially found left renal mass 3-4 cm renal cell carcinoma, left lower pole, Dr. Salome Bowen   • OTHER SURGICAL HISTORY      NEUROMUSCULAR BLOCKERS BOTULINUM TOXIN ONABOTULINUMTOXIN A   • PACEMAKER IMPLANTATION     • PACEMAKER REPLACEMENT N/A 09/24/2010    Dr. Chavez Jimenez   • TOTAL KNEE ARTHROPLASTY Left 05/21/2013    Dr. Miguel Pacheco   • TOTAL THYROIDECTOMY N/A 11/16/2015    Dr. Gideon Ashley, [facility]   • TRANSVAGINAL TAPING SUSPENSION N/A 03/16/2009    Mini Sling, Closed suprapubic cystostomy, Dr. Salome Bowen   • TUBAL ABDOMINAL LIGATION         Social History     Social History   • Marital status:      Spouse name: N/A   • Number of children: N/A   • Years of education: N/A     Occupational History   • Homemaker      Social History Main Topics   • Smoking status: Never Smoker   • Smokeless tobacco: Never Used      Comment: caffeine use   • Alcohol use No      Comment: STOPPED   • Drug use: No   • Sexual activity: Defer     Other Topics Concern   • Not on file     Social History Narrative       Family History   Problem Relation Age of Onset   • Heart disease Other    • Breast cancer Sister      In her 60s.   • Breast cancer Maternal Aunt    • Breast cancer Sister      In her 60s.   • Heart disease Mother    • Hypertension Mother    • Heart disease Father    • Hypertension Father    • Heart disease Daughter    • Hypertension Daughter    • Heart disease Son    • Hypertension Son        Review of Systems   Constitution: Positive for malaise/fatigue. Negative for chills, fever, weight gain and weight loss.   HENT: Negative for ear pain, hearing loss, nosebleeds and sore throat.    Eyes: Negative for double vision, pain and visual disturbance.   Cardiovascular: Positive for dyspnea on exertion and leg swelling. Negative for chest pain, irregular heartbeat, near-syncope, orthopnea, palpitations, paroxysmal nocturnal dyspnea and syncope.   Respiratory: Negative for cough, shortness of breath, sleep disturbances  due to breathing, snoring and wheezing.    Endocrine: Positive for cold intolerance and heat intolerance. Negative for polyuria.   Hematologic/Lymphatic: Negative.    Skin: Negative for itching and rash.   Musculoskeletal: Positive for joint pain and joint swelling. Negative for myalgias.   Gastrointestinal: Negative for abdominal pain, diarrhea, melena, nausea and vomiting.   Genitourinary: Positive for frequency. Negative for hematuria and hesitancy.   Neurological: Negative for excessive daytime sleepiness, headaches, light-headedness, numbness, paresthesias and seizures.   Psychiatric/Behavioral: Negative for altered mental status and depression.   Allergic/Immunologic: Negative.    All other systems reviewed and are negative.      Allergies   Allergen Reactions   • Adhesive Tape Other (See Comments)     SKIN BLISTERS   • Levaquin [Levofloxacin] Hives   • Sulfa Antibiotics Swelling     FACE AND TONGUE         Current Outpatient Prescriptions:   •  B Complex-C (SUPER B COMPLEX PO), Take 1 tablet by mouth Daily., Disp: , Rfl:   •  CALCIUM PO, Take 600 Units by mouth Daily. With D3, listed separetly, Disp: , Rfl:   •  carboxymethylcellulose (REFRESH PLUS) 0.5 % solution, 1 drop 2 (Two) Times a Day As Needed for dry eyes., Disp: , Rfl:   •  cholecalciferol (VITAMIN D3) 1000 UNITS tablet, Take 1,000 Units by mouth daily. In addition to the calcium -D3 pill, Disp: , Rfl:   •  Cyanocobalamin (VITAMIN B-12 PO), Take 1 tablet by mouth daily., Disp: , Rfl:   •  ELIQUIS 5 MG tablet tablet, TAKE ONE TABLET BY MOUTH EVERY 12 HOURS, Disp: 60 tablet, Rfl: 2  •  esomeprazole (NEXIUM) 40 MG capsule, Take 40 mg by mouth Every Morning Before Breakfast., Disp: , Rfl:   •  furosemide (LASIX) 20 MG tablet, Take 2 tablets by mouth Daily., Disp: 60 tablet, Rfl: 5  •  HYDROcodone-acetaminophen (NORCO) 7.5-325 MG per tablet, Take 1 tablet by mouth Every 6 (Six) Hours As Needed for Moderate Pain (4-6)., Disp: , Rfl:   •  levothyroxine  "(SYNTHROID, LEVOTHROID) 112 MCG tablet, Take 112 mcg by mouth Daily., Disp: , Rfl:   •  lisinopril (PRINIVIL,ZESTRIL) 10 MG tablet, Take 1 tablet by mouth Daily., Disp: 90 tablet, Rfl: 2  •  loperamide (IMODIUM) 2 MG capsule, Take 2 mg by mouth 4 (four) times a day as needed for diarrhea., Disp: , Rfl:   •  metoprolol tartrate (LOPRESSOR) 25 MG tablet, Take 1 tablet by mouth 2 (Two) Times a Day., Disp: 180 tablet, Rfl: 1  •  Multiple Vitamins-Minerals (PRESERVISION AREDS PO), Take 1 tablet by mouth Daily., Disp: , Rfl:   •  rOPINIRole (REQUIP) 0.5 MG tablet, Take 1 tablet by mouth Every Night. Take 1 hour before bedtime. (Patient taking differently: Take 0.5 mg by mouth At Night As Needed. Take 1 hour before bedtime.), Disp: 30 tablet, Rfl: 3     Objective:     Vitals:    11/02/17 0959   BP: 134/82   Pulse: 72   Weight: 218 lb (98.9 kg)   Height: 62.5\" (158.8 cm)     Body mass index is 39.24 kg/(m^2).    PHYSICAL EXAM:    Vitals Reviewed.   General Appearance: No acute distress, well developed and well nourished.   Eyes: Conjunctiva and lids: No erythema, swelling, or discharge. Sclera non-icteric.   HENT: Atraumatic, normocephalic. External eyes, ears, and nose normal. Lips not cyanotic. Neck supple with no tenderness.  Respiratory: No signs of respiratory distress. Respiration rhythm and depth normal.   Clear to auscultation. No rales, crackles, rhonchi, or wheezing auscultated.   Cardiovascular:  Heart Rate and Rhythm: Normal, Heart Sounds: Normal S1 and S2. No S3 or S4 noted.  Murmurs: No murmurs noted. No rubs, thrills, or gallops.   Arterial Pulses:  Posterior tibialis and dorsalis pedis pulses normal.   Lower Extremities: trace to 1+bilateral pretibial edema.   Gastrointestinal:  Abdomen soft, non-distended, non-tender.   Musculoskeletal: Normal movement of extremities  Skin and Nails: General appearance normal. No pallor, cyanosis, diaphoresis. Skin temperature normal. No clubbing of fingernails. "   Psychiatric: Patient alert and oriented to person, place, and time. Speech and behavior appropriate. Normal mood and affect.       ECG 12 Lead  Date/Time: 2017 12:06 PM  Performed by: IRMA AYOUB  Authorized by: IRMA AYOUB   Comparison: compared with previous ECG from 2017  Similar to previous ECG  Rhythm: paced  BPM: 72  Pacin% capture  Comments: 100% Vpaced              Assessment:       Diagnosis Plan   1. Permanent atrial fibrillation     2. Sick sinus syndrome     3. S/P placement of cardiac pacemaker     4. Shortness of breath     5. Non morbid obesity due to excess calories     6. Other iron deficiency anemia     7. NANCY on CPAP     8. Essential hypertension            Plan:       1.-4.   Atrial Fibrillation and Atrial Flutter  Assessment  • The patient has permanent atrial fibrillation  • The patient's CHADS2-VASc score is 4  • A [id number] score of 2 or more is considered a high risk for a thromboembolic event  • Apixaban prescribed  • Permanent AF. S/p AV node ablation. Pacer dependent. Normal pacemaker function and testing. She continues to have chronic dyspnea with minimal exertion. This is likely multifactorial but she also may have vocal cord dysfunction that may be playing a roll. She already sees Dr. Ashley, ENT. I have ask her to call and make an appointment to see him and let him evaluate her. She says that she is agreeable. We discussed risk factor modification in detail, especially weight loss.     Subjective - Objective  • The patient underwent cardioversion   • The patient had atrial fibrillation ablation         5. For the problem of overweight/obesity, we discussed the importance of lifestyle measures and strategies for weight loss, such as improved nutrition, regular exercise and sleep hygiene.      6. PRITESH, she is followed by Dr. Brower and is doing intermittent iron infusions.    7. HTN, controlled.    She has appointment with Dr. Melendez in December and pacemaker  check at that time.     As always, it has been a pleasure to participate in your patient's care.      Sincerely,         ALLISON Winn

## 2017-11-10 RX ORDER — ROPINIROLE 0.5 MG/1
TABLET, FILM COATED ORAL
Qty: 30 TABLET | Refills: 2 | Status: SHIPPED | OUTPATIENT
Start: 2017-11-10 | End: 2018-10-22 | Stop reason: ALTCHOICE

## 2017-12-04 ENCOUNTER — TELEPHONE (OUTPATIENT)
Dept: FAMILY MEDICINE CLINIC | Facility: CLINIC | Age: 82
End: 2017-12-04

## 2017-12-04 NOTE — TELEPHONE ENCOUNTER
Anastacia said that she has been very dizzy. She wants to know if you can either call something in or do you want to see her?

## 2017-12-06 ENCOUNTER — OFFICE VISIT (OUTPATIENT)
Dept: FAMILY MEDICINE CLINIC | Facility: CLINIC | Age: 82
End: 2017-12-06

## 2017-12-06 VITALS
RESPIRATION RATE: 16 BRPM | HEART RATE: 73 BPM | TEMPERATURE: 97.8 F | SYSTOLIC BLOOD PRESSURE: 110 MMHG | BODY MASS INDEX: 51.35 KG/M2 | HEIGHT: 55 IN | DIASTOLIC BLOOD PRESSURE: 80 MMHG | OXYGEN SATURATION: 95 % | WEIGHT: 221.9 LBS

## 2017-12-06 DIAGNOSIS — R39.9 UTI SYMPTOMS: Primary | ICD-10-CM

## 2017-12-06 DIAGNOSIS — I10 ESSENTIAL HYPERTENSION: ICD-10-CM

## 2017-12-06 DIAGNOSIS — E89.0 POSTOPERATIVE HYPOTHYROIDISM: ICD-10-CM

## 2017-12-06 DIAGNOSIS — Z23 NEED FOR VACCINATION: ICD-10-CM

## 2017-12-06 DIAGNOSIS — I48.21 PERMANENT ATRIAL FIBRILLATION (HCC): ICD-10-CM

## 2017-12-06 LAB
BILIRUB BLD-MCNC: NEGATIVE MG/DL
CLARITY, POC: CLEAR
COLOR UR: YELLOW
GLUCOSE UR STRIP-MCNC: NEGATIVE MG/DL
KETONES UR QL: NEGATIVE
LEUKOCYTE EST, POC: ABNORMAL
NITRITE UR-MCNC: POSITIVE MG/ML
PH UR: 5 [PH] (ref 5–8)
PROT UR STRIP-MCNC: NEGATIVE MG/DL
RBC # UR STRIP: NEGATIVE /UL
SP GR UR: 1 (ref 1–1.03)
UROBILINOGEN UR QL: NORMAL

## 2017-12-06 PROCEDURE — 99214 OFFICE O/P EST MOD 30 MIN: CPT | Performed by: INTERNAL MEDICINE

## 2017-12-06 PROCEDURE — 81003 URINALYSIS AUTO W/O SCOPE: CPT | Performed by: INTERNAL MEDICINE

## 2017-12-06 PROCEDURE — G0008 ADMIN INFLUENZA VIRUS VAC: HCPCS | Performed by: INTERNAL MEDICINE

## 2017-12-06 PROCEDURE — 90662 IIV NO PRSV INCREASED AG IM: CPT | Performed by: INTERNAL MEDICINE

## 2017-12-06 RX ORDER — NITROFURANTOIN 25; 75 MG/1; MG/1
100 CAPSULE ORAL 2 TIMES DAILY
Qty: 10 CAPSULE | Refills: 0 | Status: SHIPPED | OUTPATIENT
Start: 2017-12-06 | End: 2018-04-03

## 2017-12-06 NOTE — PROGRESS NOTES
Subjective   Patient ID: Anastacia Sarah is a 82 y.o. female presents with   Chief Complaint   Patient presents with   • Dizziness       HPI - This patient's been having dizziness fatigue and dysuria for about 2 weeks.  She also has urinary frequency.  We checked a urinalysis and it was suggestive of a UTI.  She does self catheterizations she has only one kidney because of having had renal cell carcinoma.  She has had no fever.    Assessment plan    UTI-we will culture the urine and put her on Macrobid    Need for vaccination she received an influenza vaccine    History of atrial fibrillation her pulse and blood pressure are good she is on Lasix but she does not appear to be dehydrated I will check a CMP and she is anticoagulated    Hypothyroidism-we'll check a thyroid panel.  If she's not getting better with treatment of the UTI she her daughter are to let me know and we will work this up further.  They agreed.        Allergies   Allergen Reactions   • Adhesive Tape Other (See Comments)     SKIN BLISTERS   • Levaquin [Levofloxacin] Hives   • Sulfa Antibiotics Swelling     FACE AND TONGUE       The following portions of the patient's history were reviewed and updated as appropriate: allergies, current medications, past family history, past medical history, past social history, past surgical history and problem list.      Review of Systems   Constitutional: Positive for fatigue. Negative for fever.   HENT: Negative.    Eyes: Negative.    Respiratory: Negative.    Cardiovascular: Negative.    Gastrointestinal: Negative.    Endocrine: Negative.    Genitourinary: Negative.    Musculoskeletal: Negative.    Skin: Negative.    Allergic/Immunologic: Negative.    Neurological: Positive for dizziness and light-headedness. Negative for weakness, numbness and headaches.   Hematological: Negative.    Psychiatric/Behavioral: Negative.        Objective     Vitals:    12/06/17 1104   BP: 110/80   Pulse: 73   Resp: 16   Temp: 97.8  "°F (36.6 °C)   TempSrc: Oral   SpO2: 95%   Weight: 101 kg (221 lb 14.4 oz)   Height: 62.5 cm (24.61\")         Physical Exam   Constitutional: She is oriented to person, place, and time. She appears well-developed and well-nourished. No distress.   HENT:   Head: Normocephalic and atraumatic.   Eyes: Conjunctivae and EOM are normal. Pupils are equal, round, and reactive to light. Right eye exhibits no discharge. Left eye exhibits no discharge. No scleral icterus.   Neck: Normal range of motion. Neck supple. No tracheal deviation present. No thyromegaly present.   Cardiovascular: Normal rate, regular rhythm, normal heart sounds and normal pulses.  Exam reveals no gallop.    No murmur heard.  Pulmonary/Chest: Effort normal and breath sounds normal. No respiratory distress. She has no wheezes. She has no rales.   Abdominal: Soft. Bowel sounds are normal. There is no tenderness.   Musculoskeletal: Normal range of motion.   Neurological: She is alert and oriented to person, place, and time.   Skin: Skin is warm and dry. No rash noted. No erythema. No pallor.   Psychiatric: She has a normal mood and affect. Her behavior is normal. Judgment and thought content normal.   Nursing note and vitals reviewed.        Anastacia was seen today for dizziness.    Diagnoses and all orders for this visit:    UTI symptoms  -     Urine Culture - Urine, Urine, Clean Catch  -     POCT urinalysis dipstick, automated  -     CBC & Differential  -     Comprehensive Metabolic Panel  -     TSH+Free T4    Need for vaccination  -     Flu Vaccine High Dose PF 65YR+ ([id number])  -     CBC & Differential  -     Comprehensive Metabolic Panel  -     TSH+Free T4    Permanent atrial fibrillation  -     CBC & Differential  -     Comprehensive Metabolic Panel  -     TSH+Free T4    Essential hypertension  -     CBC & Differential  -     Comprehensive Metabolic Panel  -     TSH+Free T4    Postoperative hypothyroidism  -     CBC & Differential  -     " Comprehensive Metabolic Panel  -     TSH+Free T4    Other orders  -     nitrofurantoin, macrocrystal-monohydrate, (MACROBID) 100 MG capsule; Take 1 capsule by mouth 2 (Two) Times a Day.        Call or return to clinic prn if these symptoms worsen or fail to improve as anticipated.

## 2017-12-07 LAB
ALBUMIN SERPL-MCNC: 4.2 G/DL (ref 3.5–5.2)
ALBUMIN/GLOB SERPL: 1.3 G/DL
ALP SERPL-CCNC: 93 U/L (ref 39–117)
ALT SERPL-CCNC: 19 U/L (ref 1–33)
AST SERPL-CCNC: 21 U/L (ref 1–32)
BASOPHILS # BLD AUTO: 0.04 10*3/MM3 (ref 0–0.2)
BASOPHILS NFR BLD AUTO: 0.7 % (ref 0–1.5)
BILIRUB SERPL-MCNC: 0.5 MG/DL (ref 0.1–1.2)
BUN SERPL-MCNC: 21 MG/DL (ref 8–23)
BUN/CREAT SERPL: 16.8 (ref 7–25)
CALCIUM SERPL-MCNC: 9.5 MG/DL (ref 8.6–10.5)
CHLORIDE SERPL-SCNC: 102 MMOL/L (ref 98–107)
CO2 SERPL-SCNC: 28.5 MMOL/L (ref 22–29)
CREAT SERPL-MCNC: 1.25 MG/DL (ref 0.57–1)
EOSINOPHIL # BLD AUTO: 0.3 10*3/MM3 (ref 0–0.7)
EOSINOPHIL NFR BLD AUTO: 5 % (ref 0.3–6.2)
ERYTHROCYTE [DISTWIDTH] IN BLOOD BY AUTOMATED COUNT: 21.2 % (ref 11.7–13)
GFR SERPLBLD CREATININE-BSD FMLA CKD-EPI: 41 ML/MIN/1.73
GFR SERPLBLD CREATININE-BSD FMLA CKD-EPI: 50 ML/MIN/1.73
GLOBULIN SER CALC-MCNC: 3.2 GM/DL
GLUCOSE SERPL-MCNC: 107 MG/DL (ref 65–99)
HCT VFR BLD AUTO: 41.7 % (ref 35.6–45.5)
HGB BLD-MCNC: 13.3 G/DL (ref 11.9–15.5)
IMM GRANULOCYTES # BLD: 0 10*3/MM3 (ref 0–0.03)
IMM GRANULOCYTES NFR BLD: 0 % (ref 0–0.5)
LYMPHOCYTES # BLD AUTO: 1.69 10*3/MM3 (ref 0.9–4.8)
LYMPHOCYTES NFR BLD AUTO: 28.4 % (ref 19.6–45.3)
MCH RBC QN AUTO: 28.3 PG (ref 26.9–32)
MCHC RBC AUTO-ENTMCNC: 31.9 G/DL (ref 32.4–36.3)
MCV RBC AUTO: 88.7 FL (ref 80.5–98.2)
MONOCYTES # BLD AUTO: 0.62 10*3/MM3 (ref 0.2–1.2)
MONOCYTES NFR BLD AUTO: 10.4 % (ref 5–12)
NEUTROPHILS # BLD AUTO: 3.31 10*3/MM3 (ref 1.9–8.1)
NEUTROPHILS NFR BLD AUTO: 55.5 % (ref 42.7–76)
PLATELET # BLD AUTO: 248 10*3/MM3 (ref 140–500)
POTASSIUM SERPL-SCNC: 4.6 MMOL/L (ref 3.5–5.2)
PROT SERPL-MCNC: 7.4 G/DL (ref 6–8.5)
RBC # BLD AUTO: 4.7 10*6/MM3 (ref 3.9–5.2)
SODIUM SERPL-SCNC: 143 MMOL/L (ref 136–145)
T4 FREE SERPL-MCNC: 1.37 NG/DL (ref 0.93–1.7)
TSH SERPL DL<=0.005 MIU/L-ACNC: 2.16 MIU/ML (ref 0.27–4.2)
WBC # BLD AUTO: 5.96 10*3/MM3 (ref 4.5–10.7)

## 2017-12-10 LAB
BACTERIA UR CULT: ABNORMAL
BACTERIA UR CULT: ABNORMAL
OTHER ANTIBIOTIC SUSC ISLT: ABNORMAL

## 2017-12-15 ENCOUNTER — OFFICE VISIT (OUTPATIENT)
Dept: CARDIOLOGY | Facility: CLINIC | Age: 82
End: 2017-12-15

## 2017-12-15 ENCOUNTER — CLINICAL SUPPORT NO REQUIREMENTS (OUTPATIENT)
Dept: CARDIOLOGY | Facility: CLINIC | Age: 82
End: 2017-12-15

## 2017-12-15 VITALS
WEIGHT: 218 LBS | HEIGHT: 62 IN | DIASTOLIC BLOOD PRESSURE: 82 MMHG | BODY MASS INDEX: 40.12 KG/M2 | HEART RATE: 70 BPM | SYSTOLIC BLOOD PRESSURE: 154 MMHG

## 2017-12-15 DIAGNOSIS — Z95.0 S/P PLACEMENT OF CARDIAC PACEMAKER: ICD-10-CM

## 2017-12-15 DIAGNOSIS — I44.2 THIRD DEGREE AV BLOCK (HCC): ICD-10-CM

## 2017-12-15 DIAGNOSIS — I48.21 PERMANENT ATRIAL FIBRILLATION (HCC): Primary | ICD-10-CM

## 2017-12-15 DIAGNOSIS — I10 ESSENTIAL HYPERTENSION: ICD-10-CM

## 2017-12-15 PROCEDURE — 93000 ELECTROCARDIOGRAM COMPLETE: CPT | Performed by: INTERNAL MEDICINE

## 2017-12-15 PROCEDURE — 93279 PRGRMG DEV EVAL PM/LDLS PM: CPT | Performed by: INTERNAL MEDICINE

## 2017-12-15 PROCEDURE — 99213 OFFICE O/P EST LOW 20 MIN: CPT | Performed by: INTERNAL MEDICINE

## 2017-12-15 RX ORDER — SPIRONOLACTONE 25 MG/1
25 TABLET ORAL DAILY
Qty: 90 TABLET | Refills: 3 | Status: SHIPPED | OUTPATIENT
Start: 2017-12-15 | End: 2018-12-18 | Stop reason: SDUPTHER

## 2017-12-15 NOTE — PROGRESS NOTES
Date of Office Visit: 12/15/2017  Encounter Provider: Maldonado Melendez MD  Place of Service: [facility] CARDIOLOGY  Patient Name: Anastacia Sarah  : 1935    Subjective:     Encounter Date:12/15/2017      Patient ID: Anastacia Sarah is a 82 y.o. female who has a cc of perm af and now heart block with a pacer.     High RV threshold but stable.     Still soa with exertion. Vocal cord test-- ok.     The patient had a good year.   No anginal chest pain,    No soa,   No fainting,  No orthostasis.   No edema.   Exercise tolerance: poor     There have been no hospital admission since the last visit.     There have been no bleeding events.       Past Medical History:   Diagnosis Date   • Acute bronchitis    • Acute sinusitis    • Anemia     Iron deficiency   • Anticoagulated on warfarin    • Atrial fibrillation    • Bladder dysfunction    • Cancer of left kidney 2004    S/P Left Radical Neprectomy   • Chronic dysfunction of left eustachian tube    • CKD (chronic kidney disease)     stage 3   • Depression    • Diarrhea    • Diverticulosis 2011    Descending Colon & Sigmoid Colon multiple diverticula   • Epigastric pain    • Fatigue    • Frequent urination    • GERD (gastroesophageal reflux disease)    • Goiter     thyroid removed in 2015   • Health care maintenance    • History of shingles    • History of transfusion    • Hypertension    • Insomnia    • Left atrial enlargement    • Left ventricular hypertrophy    • Mitral regurgitation     mild to moderate per echocardiogram 2016   • Nerve pain     LUE D/T SHINGLES   • Neuropathy     ARMS   • NANCY (obstructive sleep apnea)     CPAP   • Paroxysmal atrial fibrillation    • Pulmonary hypertension     per echo 2016   • Right atrial enlargement    • SSS (sick sinus syndrome)    • URI (upper respiratory infection)    • Urinary urgency        Social History     Social History   • Marital status:      Spouse name: N/A  "  • Number of children: N/A   • Years of education: N/A     Occupational History   • Homemaker      Social History Main Topics   • Smoking status: Never Smoker   • Smokeless tobacco: Never Used      Comment: caffeine use   • Alcohol use No      Comment: STOPPED   • Drug use: No   • Sexual activity: Defer     Other Topics Concern   • Not on file     Social History Narrative       Review of Systems   Constitution: Negative for fever and night sweats.   HENT: Negative for ear pain and stridor.    Eyes: Negative for discharge and visual halos.   Cardiovascular: Negative for cyanosis.   Respiratory: Negative for hemoptysis and sputum production.    Hematologic/Lymphatic: Negative for adenopathy.   Skin: Negative for nail changes and unusual hair distribution.   Musculoskeletal: Negative for gout and joint swelling.   Gastrointestinal: Negative for bowel incontinence and flatus.   Genitourinary: Negative for dysuria and flank pain.   Neurological: Negative for seizures and tremors.   Psychiatric/Behavioral: Negative for altered mental status. The patient is not nervous/anxious.             Objective:     Vitals:    12/15/17 0849   BP: 154/82   Pulse: 70   Weight: 98.9 kg (218 lb)   Height: 157.5 cm (62\")         Physical Exam   Constitutional: She is oriented to person, place, and time.   HENT:   Head: Normocephalic and atraumatic.   Eyes: Right eye exhibits no discharge. Left eye exhibits no discharge.   Neck: No JVD present. No thyromegaly present.   Cardiovascular: Normal rate and regular rhythm.  Exam reveals no gallop and no friction rub.    No murmur heard.  Pulmonary/Chest: Effort normal and breath sounds normal. She has no rales.   Abdominal: Soft. Bowel sounds are normal. There is no tenderness.   Musculoskeletal: Normal range of motion. She exhibits no edema or deformity.   Neurological: She is alert and oriented to person, place, and time. She exhibits normal muscle tone.   Skin: Skin is warm and dry. No " erythema.   Psychiatric: She has a normal mood and affect. Her behavior is normal. Thought content normal.         ECG 12 Lead  Date/Time: 12/15/2017 9:30 AM  Performed by: ANGELITA BARNES  Authorized by: ANGELITA BARNES   Comparison: compared with previous ECG   Rhythm: atrial fibrillation and paced  Clinical impression: abnormal ECG            Lab Review:       Assessment:          Diagnosis Plan   1. Permanent atrial fibrillation     2. Essential hypertension     3. S/P placement of cardiac pacemaker     4. Third degree AV block            Plan:         I think the KIM is multifactorial and mostly due to obesity and htn  I did make two changes today -- one was to increase rate responsiveness of the pacer -- the idea being to give her higher heart rate with exercise, as the -egrams show mostly pacing at the lower rate.  The other change is to add aldactone and then recheck labs in a week. Here the idea is to relieve right heart pressure as she is obese.

## 2017-12-20 ENCOUNTER — LAB (OUTPATIENT)
Dept: LAB | Facility: HOSPITAL | Age: 82
End: 2017-12-20

## 2017-12-20 DIAGNOSIS — I44.2 THIRD DEGREE AV BLOCK (HCC): ICD-10-CM

## 2017-12-20 DIAGNOSIS — I48.21 PERMANENT ATRIAL FIBRILLATION (HCC): ICD-10-CM

## 2017-12-20 DIAGNOSIS — Z95.0 S/P PLACEMENT OF CARDIAC PACEMAKER: ICD-10-CM

## 2017-12-20 DIAGNOSIS — N18.30 ANEMIA IN STAGE 3 CHRONIC KIDNEY DISEASE (HCC): ICD-10-CM

## 2017-12-20 DIAGNOSIS — D63.1 ANEMIA IN STAGE 3 CHRONIC KIDNEY DISEASE (HCC): ICD-10-CM

## 2017-12-20 DIAGNOSIS — I10 ESSENTIAL HYPERTENSION: ICD-10-CM

## 2017-12-20 DIAGNOSIS — D50.8 OTHER IRON DEFICIENCY ANEMIA: ICD-10-CM

## 2017-12-20 LAB
ANION GAP SERPL CALCULATED.3IONS-SCNC: 12.6 MMOL/L
BASOPHILS # BLD AUTO: 0.05 10*3/MM3 (ref 0–0.1)
BASOPHILS NFR BLD AUTO: 0.7 % (ref 0–1.1)
BUN BLD-MCNC: 16 MG/DL (ref 6–20)
BUN/CREAT SERPL: 13.1 (ref 7.3–30)
CALCIUM SPEC-SCNC: 9 MG/DL (ref 8.5–10.2)
CHLORIDE SERPL-SCNC: 103 MMOL/L (ref 98–107)
CO2 SERPL-SCNC: 23.4 MMOL/L (ref 22–29)
CREAT BLD-MCNC: 1.22 MG/DL (ref 0.6–1.1)
DEPRECATED RDW RBC AUTO: 61.7 FL (ref 37–49)
EOSINOPHIL # BLD AUTO: 0.22 10*3/MM3 (ref 0–0.36)
EOSINOPHIL NFR BLD AUTO: 3.2 % (ref 1–5)
ERYTHROCYTE [DISTWIDTH] IN BLOOD BY AUTOMATED COUNT: 20 % (ref 11.7–14.5)
FERRITIN SERPL-MCNC: 97.9 NG/ML
GFR SERPL CREATININE-BSD FRML MDRD: 42 ML/MIN/1.73
GLUCOSE BLD-MCNC: 107 MG/DL (ref 74–124)
HCT VFR BLD AUTO: 40.4 % (ref 34–45)
HGB BLD-MCNC: 13.3 G/DL (ref 11.5–14.9)
HGB RETIC QN: 33.4 PG (ref 29.8–36.1)
IMM GRANULOCYTES # BLD: 0.01 10*3/MM3 (ref 0–0.03)
IMM GRANULOCYTES NFR BLD: 0.1 % (ref 0–0.5)
IMM RETICS NFR: 8.7 % (ref 3–15.8)
IRON 24H UR-MRATE: 54 MCG/DL (ref 37–145)
IRON SATN MFR SERPL: 16 % (ref 14–48)
LYMPHOCYTES # BLD AUTO: 1.59 10*3/MM3 (ref 1–3.5)
LYMPHOCYTES NFR BLD AUTO: 22.8 % (ref 20–49)
MCH RBC QN AUTO: 28.7 PG (ref 27–33)
MCHC RBC AUTO-ENTMCNC: 32.9 G/DL (ref 32–35)
MCV RBC AUTO: 87.1 FL (ref 83–97)
MONOCYTES # BLD AUTO: 0.61 10*3/MM3 (ref 0.25–0.8)
MONOCYTES NFR BLD AUTO: 8.8 % (ref 4–12)
NEUTROPHILS # BLD AUTO: 4.48 10*3/MM3 (ref 1.5–7)
NEUTROPHILS NFR BLD AUTO: 64.4 % (ref 39–75)
NRBC BLD MANUAL-RTO: 0 /100 WBC (ref 0–0)
PLATELET # BLD AUTO: 261 10*3/MM3 (ref 150–375)
PMV BLD AUTO: 9.5 FL (ref 8.9–12.1)
POTASSIUM BLD-SCNC: 4.2 MMOL/L (ref 3.5–4.7)
RBC # BLD AUTO: 4.64 10*6/MM3 (ref 3.9–5)
RETICS/RBC NFR AUTO: 1.39 % (ref 0.6–2)
SODIUM BLD-SCNC: 139 MMOL/L (ref 134–145)
T3FREE SERPL-MCNC: 2.67 PG/ML (ref 2–4.4)
T4 FREE SERPL-MCNC: 1.43 NG/DL (ref 0.93–1.7)
TIBC SERPL-MCNC: 336 MCG/DL (ref 249–505)
TRANSFERRIN SERPL-MCNC: 240 MG/DL (ref 200–360)
TSH SERPL DL<=0.05 MIU/L-ACNC: 2.28 MIU/ML (ref 0.27–4.2)
WBC NRBC COR # BLD: 6.96 10*3/MM3 (ref 4–10)

## 2017-12-20 PROCEDURE — 82728 ASSAY OF FERRITIN: CPT | Performed by: INTERNAL MEDICINE

## 2017-12-20 PROCEDURE — 80048 BASIC METABOLIC PNL TOTAL CA: CPT | Performed by: INTERNAL MEDICINE

## 2017-12-20 PROCEDURE — 36415 COLL VENOUS BLD VENIPUNCTURE: CPT | Performed by: INTERNAL MEDICINE

## 2017-12-20 PROCEDURE — 83540 ASSAY OF IRON: CPT | Performed by: INTERNAL MEDICINE

## 2017-12-20 PROCEDURE — 85025 COMPLETE CBC W/AUTO DIFF WBC: CPT | Performed by: INTERNAL MEDICINE

## 2017-12-20 PROCEDURE — 84466 ASSAY OF TRANSFERRIN: CPT | Performed by: INTERNAL MEDICINE

## 2017-12-20 PROCEDURE — 85046 RETICYTE/HGB CONCENTRATE: CPT | Performed by: INTERNAL MEDICINE

## 2017-12-20 PROCEDURE — 84439 ASSAY OF FREE THYROXINE: CPT | Performed by: OTOLARYNGOLOGY

## 2017-12-20 PROCEDURE — 84481 FREE ASSAY (FT-3): CPT | Performed by: OTOLARYNGOLOGY

## 2017-12-20 PROCEDURE — 84443 ASSAY THYROID STIM HORMONE: CPT | Performed by: OTOLARYNGOLOGY

## 2017-12-27 ENCOUNTER — OFFICE VISIT (OUTPATIENT)
Dept: ONCOLOGY | Facility: CLINIC | Age: 82
End: 2017-12-27

## 2017-12-27 ENCOUNTER — APPOINTMENT (OUTPATIENT)
Dept: LAB | Facility: HOSPITAL | Age: 82
End: 2017-12-27

## 2017-12-27 VITALS
TEMPERATURE: 97.5 F | HEIGHT: 63 IN | SYSTOLIC BLOOD PRESSURE: 148 MMHG | BODY MASS INDEX: 38.48 KG/M2 | HEART RATE: 71 BPM | RESPIRATION RATE: 16 BRPM | WEIGHT: 217.2 LBS | OXYGEN SATURATION: 96 % | DIASTOLIC BLOOD PRESSURE: 78 MMHG

## 2017-12-27 DIAGNOSIS — D50.8 OTHER IRON DEFICIENCY ANEMIA: Primary | ICD-10-CM

## 2017-12-27 DIAGNOSIS — N18.30 ANEMIA IN STAGE 3 CHRONIC KIDNEY DISEASE (HCC): ICD-10-CM

## 2017-12-27 DIAGNOSIS — D63.1 ANEMIA IN STAGE 3 CHRONIC KIDNEY DISEASE (HCC): ICD-10-CM

## 2017-12-27 PROCEDURE — 99213 OFFICE O/P EST LOW 20 MIN: CPT | Performed by: INTERNAL MEDICINE

## 2017-12-27 PROCEDURE — G0463 HOSPITAL OUTPT CLINIC VISIT: HCPCS | Performed by: INTERNAL MEDICINE

## 2017-12-28 NOTE — PROGRESS NOTES
Subjective .     REASONS FOR FOLLOWUP:    1. Anemia secondary to iron deficiency, intolerant of oral iron, status post Venofer 100 mg on 09/21/11, followed by Feraheme x two doses, 09/28 and 10/05/11.  Recurrent iron deficiency requiring Feraheme again on 09/24/12 and 10/05/12.  Further recurrence of iron deficiency requiring Feraheme on 10/5 in 10/12/17.  2. No definitive evidence of GI blood loss, stool cards negative for occult blood, status post GI evaluation with Dr. Curran with no bleeding source identified.   3. History of gastric polyps, status post staged excision via EGD.    4. Status post left nephrectomy in 2004 for renal cell carcinoma.   5. Stage III chronic kidney disease.     HISTORY OF PRESENT ILLNESS:  The patient is a 82 y.o. year old female who is here for follow-up with the above-mentioned history.    History of Present Illness  patient returns today in follow-up having received in the interval Feraheme on 10/5 in 10/12/17.  Unfortunately, she did have infiltration of her IV in the right arm and has an area of discoloration.  She did not experience any discomfort from this.  She did see Dr. Curran and underwent an EGD on 10/23/17 which showed some small gastric polyps less than 6 mm which were not removed.  She did have a urinary tract infection and completed a course of antibiotics.  She does report some intermittent occasional dysuria.  She is receiving diuretic therapy with Lasix and spironolactone with improvement in her lower extremity edema and shortness of breath.  She does continue with persistent fatigue despite correction of her iron deficiency.  She does continue on anticoagulation with eliquis, no obvious evidence of GI blood loss.    PAST MEDICAL HISTORY:    1.  Atrial fibrillation.  The patient underwent ablation procedure in 2004, repeat ablation procedure on 09/13/11 by Dr. Melendez.  Subsequent AV deshawn ablation and March 2017 and subsequent cardioversion in May 2017.  2.   Gastroesophageal reflux disease.  3.  Hypertension.   4.  Status post left knee surgery.   5.  Status post pacemaker placement.   6.  Status post laparoscopic cholecystectomy.   7.  Status post bladder tacking procedure.   8.  Status post EGD and colonoscopy with Dr. Curran 08/08/11.  Finding of gastric polyps, one of which was resected showing a hyperplastic polyp with gastritis.  There was no source of bleeding identified.  Schatzki's ring was identified as well as diverticulosis.  Repeat EGD on 03/18/13 with resection of a 3 cm prepyloric hyperplastic polyp of acute and chronic inflammatory change. Repeat EGD, October 2013 with further resection of polyp, benign findings.  EGD 10/23/17 with small gastric polyps identified, no evidence of active bleeding.  9.  Bladder suspension surgery in 5/12.  10.  Status post left knee replacement in May 2013.    Past Medical History:   Diagnosis Date   • Acute bronchitis    • Acute sinusitis    • Anemia     Iron deficiency   • Anticoagulated on warfarin    • Atrial fibrillation    • Bladder dysfunction    • Cancer of left kidney 06/08/2004    S/P Left Radical Neprectomy   • Chronic dysfunction of left eustachian tube    • CKD (chronic kidney disease)     stage 3   • Depression    • Diarrhea    • Diverticulosis 08/08/2011    Descending Colon & Sigmoid Colon multiple diverticula   • Epigastric pain    • Fatigue    • Frequent urination    • GERD (gastroesophageal reflux disease)    • Goiter     thyroid removed in november 2015   • Health care maintenance    • History of shingles    • History of transfusion    • Hypertension    • Insomnia    • Left atrial enlargement    • Left ventricular hypertrophy    • Mitral regurgitation     mild to moderate per echocardiogram 2016   • Nerve pain     LUE D/T SHINGLES   • Neuropathy     ARMS   • NANCY (obstructive sleep apnea)     CPAP   • Paroxysmal atrial fibrillation    • Pulmonary hypertension     per echo 2016   • Right atrial enlargement     • SSS (sick sinus syndrome)    • URI (upper respiratory infection)    • Urinary urgency      Past Surgical History:   Procedure Laterality Date   • ATRIAL ABLATION SURGERY N/A 09/13/2011    Comprehensive electrophysiology study, Left atrial pace & sense, 3-dimensional cardia mapping, Radiofrequency catheter ablation, Transseptal hear catheterization, Dr. Maldonado Melendez   • ATRIAL ABLATION SURGERY N/A 02/11/2004    Dr. Maldonado Melendez   • BLADDER SURGERY      Bladder tacking procedure   • BLADDER SUSPENSION  05/2012   • CARDIAC ELECTROPHYSIOLOGY PROCEDURE N/A 5/17/2016    Procedure: PPM generator change - dual BOSTON;  Surgeon: Maldonado Melendez MD;  Location: [facility] CATH INVASIVE LOCATION;  Service:    • CARDIAC ELECTROPHYSIOLOGY PROCEDURE N/A 3/23/2017    Procedure: AV node ablation pt has BOSTON PPM;  Surgeon: Maldonado Melendez MD;  Location: [facility] CATH INVASIVE LOCATION;  Service:    • CARDIOVERSION     • COLONOSCOPY N/A 04/01/2003    Normal colonoscopy except for an occasional diverticulosis, Dr. Nathan Macias   • CYSTOSCOPY BOTOX INJECTION OF BLADDER N/A 6/27/2016    Procedure: CYSTOSCOPY WITH BOTOX DETRUSOR INJECTION;  Surgeon: Salome Bowen MD;  Location: [facility] OR;  Service:    • CYSTOSCOPY BOTOX INJECTION OF BLADDER N/A 05/29/2015    Dr. Salome Bowen   • ENDOSCOPY N/A 06/10/2015    Antral Polyp: Gastric Mucosa w/ marked cautery artifact,  Hiatal Hernia, Dr. Rebeca Curran   • ENDOSCOPY N/A 10/28/2013    Gastric polyp: polypoid hyperplastic gastric mucosa w/ focal ulceration, mixed acute & chronic inflammation.  Negative for intestinal metaplasia, no helicobacter organisms identified on diff-wuik stain, Dr. Rebeca Curran   • ENDOSCOPY N/A 03/18/2013    Large prepyloric polyp, partially removed: fragment of polypoid hyperplastic gastric mucosa w/ foci of erosion & patchy mixed acute & chronic inflammation. No helicobacter organisms identified on diff-quick stain. Negative for intestinal metaplasia,  negative for dysplasia, Dr. Rebeca Curran   • ENDOSCOPY N/A 10/24/2012    large prepyloric mass (3cm) w/ adjacent nodules: hyperplastic polyp w/ mild chronic active gastritis, focal erosion & associated, Multiple fundic polyps: polypoid mucosal hyperplasia, Dr. Rebeca Curran   • ENDOSCOPY N/A 03/11/2003    Gastric Antral Biopsies: Fragments of Gastric Mucosa & Necrotic Tissue (Compatible w/ Ulcer) w/ chronic active inflammation.  Negative stain for Helicobacter organisms. Dr. Nathan Macias   • ENDOSCOPY N/A 10/23/2017    Procedure: ESOPHAGOGASTRODUODENOSCOPY;  Surgeon: Rebeca Curran MD;  Location: [facility] ENDOSCOPY;  Service:    • ENDOSCOPY AND COLONOSCOPY N/A 08/08/2011    4cm hiatal hernia, Schatzki's ring, Diverticulosis, Large antral polyps, Dr. Rebeca Curran   • EYE SURGERY      CATARACT   • INCISIONAL HERNIA REPAIR N/A 06/11/2015    Defect approximately 2.5 cm w/ a mass of incarcerated tissue approximately the size of a nectarine, Dr. Rebeca Curran   • KNEE MINI REVISION Right 9/28/2016    Procedure: RT KNEE POLY INSERT CHANGE ;  Surgeon: Tommy Avila MD;  Location: [facility] OR;  Service:    • LAPAROSCOPIC CHOLECYSTECTOMY     • NEPHRECTOMY RADICAL Left 06/08/2004    Incidentially found left renal mass 3-4 cm renal cell carcinoma, left lower pole, Dr. Salome Bowen   • OTHER SURGICAL HISTORY      NEUROMUSCULAR BLOCKERS BOTULINUM TOXIN ONABOTULINUMTOXIN A   • PACEMAKER IMPLANTATION     • PACEMAKER REPLACEMENT N/A 09/24/2010    Dr. Chavez Jimenez   • TOTAL KNEE ARTHROPLASTY Left 05/21/2013    Dr. Miguel Pacheco   • TOTAL THYROIDECTOMY N/A 11/16/2015    Dr. Gideon Ashley, [facility]   • TRANSVAGINAL TAPING SUSPENSION N/A 03/16/2009    Mini Sling, Closed suprapubic cystostomy, Dr. Salome Bowen   • TUBAL ABDOMINAL LIGATION           HEMATOLOGICONCOLOGIC HISTORY:    The patient was seen during a hospitalization at [facility] in September 2011.  She was admitted with left lower lobe pneumonia following a cardiac  ablation procedure.  She has anemia dating back to 2006, at that time with a hematocrit of 36.7 and MCV of 77.  Her platelets and white count have remained normal.  Hemoglobin in September 2010 was 10.3, MCV 74.  In December 2010 hemoglobin was 10.5 with MCV of 72.  On 08/09/11 hemoglobin was down to 9.8 with MCV of 73.  During the hospitalization hemoglobin had dropped to 8.5 with MCV of 72 and decreased further to 8.0 at which point she was transfused two units of PRBCs.  She was on Coumadin but had no obvious clinical evidence of GI blood loss.  She was evaluated by Dr. Curran on 08/08/11 with EGD and colonoscopy showing evidence of gastric polyps.  One polyp was biopsied showing evidence of a hyperplastic polyp with gastritis.  There was no definitive evidence of bleeding.  She was having some dysphagia and had a Schatzki's ring which was dilated.  She developed rapid ventricular response and therefore the procedure was stopped.  She then underwent cardiac ablation and subsequently developed pneumonia.      During her hospitalization, lab studies were sent including iron studies showing an iron of 11, TIBC 438, transferrin 294, iron percent sat of 3 with a ferritin of 10.8.  B12 was low-normal at 291, folate normal.  Creatinine was in the 1.2 range with estimated GFR in the 40 range (stage III chronic kidney disease).  Erythropoietin level was 66.  A hemoglobin electrophoresis was sent showing hemoglobin A1 of 97.5%, and hemoglobin A2 of 2.2%.  The patient had stool checked for occult blood in the hospital which again was negative, therefore, with no definitive evidence of GI blood loss.  I suspect she may have a malabsorption issue.  She was intolerant of oral iron and treated with Venofer 100 mg on 09/21/11 and subsequently was discharged from the hospital and treated as an outpatient with Feraheme 510 mg on 09/28/11 and again on 10/05/11.      The patient returned for followup studies on 10/19/11 with  hemoglobin that improved to the 13 range and MCV up to 79.  TIBC was 284, iron percent sat 32, ferritin 335.  Reticulated hemoglobin had improved to 32.9.  B12 was 476.  We will follow her counts over time.  She will followup with Dr. Curran regarding her gastric polyps.      Laboratory studies on 02/15/12 showed a ferritin stable at 112.  We will recheck at a 6 month interval.     Lab studies from 9/10/12 shows a hemoglobin 11.9, reticulated hemoglobin 29.9, TIBC 325, iron percent SAT of 11.  Ferritin down to 18.      Feraheme administered on 9/24/12 and 10/5/12.  Repeat iron studies on 3/15/13 showed a ferritin of 101, iron percent saturation of 31.       Labs 10/10/2013 with a ferritin of 85, iron percent sat down to 15, hemoglobin of 12.9. Recheck in six months.    Labs 04/20/2015 showed a ferritin stable in the normal range at 76.     The patient returned after a prolonged absence from our office on 9/21/17 with hemoglobin of 12.0, MCV 79.2, iron 37, ferritin 20, iron saturation 8%, TIBC 441 CONSISTENT with recurrent iron deficiency.  Folate was normal at 10.52, B12 normal at 398, erythropoietin 32.6.  Additional Feraheme initiated on 10/5/17 for 2 doses.  Repeat EGD with Dr. Curran on 10/23/17 with no active bleeding, small less than 6 mm gastric polyps identified.       ONCOLOGIC HISTORY:   Status post left nephrectomy in 2004 for renal cell carcinoma with Dr. Bowen.  This was a Darnell grade 2 lesion measuring 3.0 cm, confined to the kidney, with no extension to perinephric adipose tissue, and no evidence of lymphovascular invasion.  Margins were negative.  Adrenal gland was negative.      CT scan of the abdomen and pelvis o 02/26/12 showed no evidence of recurrent disease.            Current Outpatient Prescriptions on File Prior to Visit   Medication Sig Dispense Refill   • B Complex-C (SUPER B COMPLEX PO) Take 1 tablet by mouth Daily.     • CALCIUM PO Take 600 Units by mouth Daily. With D3, listed  separetly     • carboxymethylcellulose (REFRESH PLUS) 0.5 % solution 1 drop 2 (Two) Times a Day As Needed for dry eyes.     • cholecalciferol (VITAMIN D3) 1000 UNITS tablet Take 1,000 Units by mouth daily. In addition to the calcium -D3 pill     • Cyanocobalamin (VITAMIN B-12 PO) Take 1 tablet by mouth daily.     • ELIQUIS 5 MG tablet tablet TAKE ONE TABLET BY MOUTH EVERY 12 HOURS 60 tablet 2   • esomeprazole (NEXIUM) 40 MG capsule Take 40 mg by mouth Every Morning Before Breakfast.     • furosemide (LASIX) 20 MG tablet Take 2 tablets by mouth Daily. 60 tablet 5   • HYDROcodone-acetaminophen (NORCO) 7.5-325 MG per tablet Take 1 tablet by mouth Every 6 (Six) Hours As Needed for Moderate Pain (4-6).     • levothyroxine (SYNTHROID, LEVOTHROID) 112 MCG tablet Take 112 mcg by mouth Daily.     • lisinopril (PRINIVIL,ZESTRIL) 10 MG tablet Take 1 tablet by mouth Daily. 90 tablet 2   • loperamide (IMODIUM) 2 MG capsule Take 2 mg by mouth 4 (four) times a day as needed for diarrhea.     • metoprolol tartrate (LOPRESSOR) 25 MG tablet Take 1 tablet by mouth 2 (Two) Times a Day. 180 tablet 1   • Multiple Vitamins-Minerals (PRESERVISION AREDS PO) Take 1 tablet by mouth Daily.     • nitrofurantoin, macrocrystal-monohydrate, (MACROBID) 100 MG capsule Take 1 capsule by mouth 2 (Two) Times a Day. 10 capsule 0   • rOPINIRole (REQUIP) 0.5 MG tablet Take 1 hour before bedtime. 30 tablet 2   • spironolactone (ALDACTONE) 25 MG tablet Take 1 tablet by mouth Daily. 90 tablet 3     No current facility-administered medications on file prior to visit.        ALLERGIES:     Allergies   Allergen Reactions   • Adhesive Tape Other (See Comments)     SKIN BLISTERS   • Levaquin [Levofloxacin] Hives   • Sulfa Antibiotics Swelling     FACE AND TONGUE     Social History     Social History   • Marital status:      Spouse name: N/A   • Number of children: N/A   • Years of education: N/A     Occupational History   • Homemaker      Social History  Main Topics   • Smoking status: Never Smoker   • Smokeless tobacco: Never Used      Comment: caffeine use   • Alcohol use No      Comment: STOPPED   • Drug use: No   • Sexual activity: Defer     Other Topics Concern   • Not on file     Social History Narrative     Family History   Problem Relation Age of Onset   • Heart disease Other    • Breast cancer Sister      In her 60s.   • Breast cancer Maternal Aunt    • Breast cancer Sister      In her 60s.   • Heart disease Mother    • Hypertension Mother    • Heart disease Father    • Hypertension Father    • Heart disease Daughter    • Hypertension Daughter    • Heart disease Son    • Hypertension Son        Review of Systems   Constitutional: Positive for fatigue. Negative for activity change, appetite change, fever and unexpected weight change.   HENT: Negative for congestion, mouth sores, nosebleeds, sore throat and voice change.    Respiratory: Positive for shortness of breath. Negative for cough and wheezing.    Cardiovascular: Negative for chest pain, palpitations and leg swelling.   Gastrointestinal: Negative for abdominal distention, abdominal pain, blood in stool, constipation, diarrhea, nausea and vomiting.   Endocrine: Negative for cold intolerance and heat intolerance.   Genitourinary: Positive for dysuria. Negative for difficulty urinating, frequency and hematuria.   Musculoskeletal: Negative for arthralgias, back pain, joint swelling and myalgias.   Skin: Negative for rash.   Neurological: Negative for dizziness, syncope, weakness, light-headedness, numbness and headaches.   Hematological: Negative for adenopathy. Does not bruise/bleed easily.   Psychiatric/Behavioral: Negative for confusion and sleep disturbance. The patient is not nervous/anxious.          Objective      Vitals:    12/27/17 1156   BP: 148/78   Pulse: 71   Resp: 16   Temp: 97.5 °F (36.4 °C)   SpO2: 96%      Current Status 12/27/2017   ECOG score 0       Physical Exam   Constitutional: She  is oriented to person, place, and time. She appears well-developed and well-nourished.   HENT:   Mouth/Throat: Oropharynx is clear and moist.   Eyes: Conjunctivae are normal.   Neck: No thyromegaly present.   Cardiovascular: Normal rate and regular rhythm.  Exam reveals no gallop and no friction rub.    No murmur heard.  Pulmonary/Chest: Breath sounds normal. No respiratory distress.   Abdominal: Soft. Bowel sounds are normal. She exhibits no distension. There is no tenderness.   Musculoskeletal: She exhibits no edema.   Lymphadenopathy:        Head (right side): No submandibular adenopathy present.     She has no cervical adenopathy.     She has no axillary adenopathy.        Right: No inguinal and no supraclavicular adenopathy present.        Left: No inguinal and no supraclavicular adenopathy present.   Neurological: She is alert and oriented to person, place, and time. She displays normal reflexes. No cranial nerve deficit. She exhibits normal muscle tone.   Skin: Skin is warm and dry. No rash noted.   Psychiatric: She has a normal mood and affect. Her behavior is normal.       RECENT LABS:  Hematology WBC   Date Value Ref Range Status   12/20/2017 6.96 4.00 - 10.00 10*3/mm3 Final     RBC   Date Value Ref Range Status   12/20/2017 4.64 3.90 - 5.00 10*6/mm3 Final     Hemoglobin   Date Value Ref Range Status   12/20/2017 13.3 11.5 - 14.9 g/dL Final     Hematocrit   Date Value Ref Range Status   12/20/2017 40.4 34.0 - 45.0 % Final     Platelets   Date Value Ref Range Status   12/20/2017 261 150 - 375 10*3/mm3 Final        Lab Results   Component Value Date    GLUCOSE 107 12/20/2017    BUN 16 12/20/2017    CREATININE 1.22 (H) 12/20/2017    EGFRIFNONA 42 (L) 12/20/2017    EGFRIFAFRI 50 (L) 12/06/2017    BCR 13.1 12/20/2017    K 4.2 12/20/2017    CO2 23.4 12/20/2017    CALCIUM 9.0 12/20/2017    PROTENTOTREF 7.4 12/06/2017    ALBUMIN 4.20 12/06/2017    LABIL2 1.3 12/06/2017    AST 21 12/06/2017    ALT 19 12/06/2017      Additional labs 9/21/17: Iron 37, ferritin 20.5, iron saturation 8%, TIBC 441, folate 10.52, B12 398, erythropoietin 32.6.    Stool cards negative for occult blood ×3    Additional labs 12/20/17: Iron 54, ferritin 97.9, iron saturation 16%, TIBC 336    Assessment/Plan      1. Recurrent iron deficiency anemia: The patient is intolerant of oral iron. Prior GI evaluation showed no definitive evidence of GI blood loss. She has underlying stage III chronic kidney disease, which is a contributing factor to her mild borderline anemia. She has been treated in the past with IV iron in 2011 and 2012.  She had a lengthy absence from our office from 2015 until she was referred back on 9/21/17 with ongoing symptoms of fatigue and dyspnea.  It is felt that this is likely related to her persistent atrial fibrillation.  She does however have evidence of recurrent iron deficiency, likely related to malabsorption.  Her ferritin on 7/7/17 was 21 and hemoglobin on 8/7/17 was 11.1.  Her degree of anemia was mild with a hemoglobin of 11.4, microcytic indices with an MCV of 78.  Labs on 9/21/17 showed ferritin 20.5, iron saturation 8%, TIBC 441.  We did also check B12 and folate which were normal.  Erythropoietin was 32.  Therefore she does have a component of anemia secondary to chronic kidney disease. She did return stool cards for occult blood whichwere negative ×3 10/5/17.  She received additional Feraheme on 10/5 and 10/12/17.  She underwent EGD with Dr. Curran 10/23/17 with no evidence of active bleeding, small gastric polyps identified.  She returns today with improvement in her hemoglobin up to 13.3.  Ferritin is up to 97.9 from 12/20/17 with an iron saturation of 16%.  At this point, we will monitor her given in 4 months, anticipating that likely in the future she will require additional Feraheme at some point.  2. History of gastric polyps: EGD 10/23/17 with small less than 6 mm gastric polyps, not resected.  3. History of  renal cell carcinoma: The patient underwent a left nephrectomy in 2004 with no clinical evidence of recurrent disease.  Note that CT chest from 11/14/16 showed no evidence of recurrence.    PLAN:     1. MD visit in 4 months.  Labs 1 week before with CBC, iron panel, ferritin.

## 2018-01-03 ENCOUNTER — TELEPHONE (OUTPATIENT)
Dept: ORTHOPEDIC SURGERY | Facility: CLINIC | Age: 83
End: 2018-01-03

## 2018-01-04 DIAGNOSIS — M25.561 CHRONIC PAIN OF BOTH KNEES: Primary | ICD-10-CM

## 2018-01-04 DIAGNOSIS — G89.29 CHRONIC PAIN OF BOTH KNEES: Primary | ICD-10-CM

## 2018-01-04 DIAGNOSIS — M25.562 CHRONIC PAIN OF BOTH KNEES: Primary | ICD-10-CM

## 2018-01-08 ENCOUNTER — CLINICAL SUPPORT NO REQUIREMENTS (OUTPATIENT)
Dept: CARDIOLOGY | Facility: CLINIC | Age: 83
End: 2018-01-08

## 2018-01-08 DIAGNOSIS — Z98.890 H/O ATRIOVENTRICULAR NODAL ABLATION: Primary | ICD-10-CM

## 2018-01-08 PROCEDURE — 93280 PM DEVICE PROGR EVAL DUAL: CPT | Performed by: INTERNAL MEDICINE

## 2018-01-17 RX ORDER — APIXABAN 5 MG/1
TABLET, FILM COATED ORAL
Qty: 180 TABLET | Refills: 0 | Status: SHIPPED | OUTPATIENT
Start: 2018-01-17 | End: 2018-03-16 | Stop reason: SDUPTHER

## 2018-01-17 RX ORDER — APIXABAN 5 MG/1
TABLET, FILM COATED ORAL
Qty: 60 TABLET | Refills: 1 | Status: ON HOLD | OUTPATIENT
Start: 2018-01-17 | End: 2018-04-25

## 2018-01-29 RX ORDER — OSELTAMIVIR PHOSPHATE 75 MG/1
75 CAPSULE ORAL 2 TIMES DAILY
Qty: 10 CAPSULE | Refills: 0 | Status: SHIPPED | OUTPATIENT
Start: 2018-01-29 | End: 2018-04-03

## 2018-03-16 ENCOUNTER — OFFICE VISIT (OUTPATIENT)
Dept: CARDIOLOGY | Facility: CLINIC | Age: 83
End: 2018-03-16

## 2018-03-16 ENCOUNTER — CLINICAL SUPPORT NO REQUIREMENTS (OUTPATIENT)
Dept: CARDIOLOGY | Facility: CLINIC | Age: 83
End: 2018-03-16

## 2018-03-16 VITALS
SYSTOLIC BLOOD PRESSURE: 130 MMHG | HEIGHT: 63 IN | BODY MASS INDEX: 38.09 KG/M2 | WEIGHT: 215 LBS | HEART RATE: 70 BPM | DIASTOLIC BLOOD PRESSURE: 68 MMHG

## 2018-03-16 DIAGNOSIS — E66.09 NON MORBID OBESITY DUE TO EXCESS CALORIES: ICD-10-CM

## 2018-03-16 DIAGNOSIS — Z95.0 S/P PLACEMENT OF CARDIAC PACEMAKER: ICD-10-CM

## 2018-03-16 DIAGNOSIS — I49.5 SICK SINUS SYNDROME (HCC): Primary | ICD-10-CM

## 2018-03-16 DIAGNOSIS — I49.5 SICK SINUS SYNDROME (HCC): ICD-10-CM

## 2018-03-16 DIAGNOSIS — Z99.89 OSA ON CPAP: ICD-10-CM

## 2018-03-16 DIAGNOSIS — G47.33 OSA ON CPAP: ICD-10-CM

## 2018-03-16 DIAGNOSIS — I44.2 THIRD DEGREE AV BLOCK (HCC): ICD-10-CM

## 2018-03-16 DIAGNOSIS — I48.21 PERMANENT ATRIAL FIBRILLATION (HCC): Primary | ICD-10-CM

## 2018-03-16 DIAGNOSIS — I10 ESSENTIAL HYPERTENSION: ICD-10-CM

## 2018-03-16 PROCEDURE — 93000 ELECTROCARDIOGRAM COMPLETE: CPT | Performed by: NURSE PRACTITIONER

## 2018-03-16 PROCEDURE — 99213 OFFICE O/P EST LOW 20 MIN: CPT | Performed by: NURSE PRACTITIONER

## 2018-03-16 PROCEDURE — 93279 PRGRMG DEV EVAL PM/LDLS PM: CPT | Performed by: INTERNAL MEDICINE

## 2018-03-16 RX ORDER — LEVOTHYROXINE SODIUM 112 MCG
125 TABLET ORAL EVERY OTHER DAY
COMMUNITY
Start: 2018-03-06

## 2018-03-16 RX ORDER — LIFITEGRAST 50 MG/ML
1 SOLUTION/ DROPS OPHTHALMIC 2 TIMES DAILY
COMMUNITY
Start: 2018-02-20 | End: 2020-06-29

## 2018-03-16 NOTE — PROGRESS NOTES
Date of Office Visit: 2018  Encounter Provider: ALLISON Isaac  Place of Service: [facility] CARDIOLOGY  Patient Name: Anastacia Sarah  :1935    No chief complaint on file.  :     HPI: Anastacia Sarah is a 82 y.o. female who is a patient of Dr. Clement, who is well known to me. She has a past medical history of PAF, s/p ablation in the past. She has been seen in the office a couple of times starting in 2017 with complaints of increasing shortness of breath, palpitations and worsening fatigue. She had undergone a cardioversion on 17 but only stayed in SR for about 3 days. She has a history of SSS and s/p PPM. Her device was interrogated and showed and increase in her PAF episodes and 40% of the time her heart rates were above 90 and 100. Dr. Melendez reviewed the data and discussed with the patient and they decided on AV node ablation. She underwent AV node ablation on 2017. There have been multiple attempts to keep her is SR but she is now in permanent AF.      She has been to see pulmonary for her sleep apnea. She does use her CPAP. PFT's did not show any restrictive lung disease but they felt like she had vocal cord dysfunction that was contributing to her shortness of breath and recommended evaluation. She was evaluated and her vocal cords were okay.     She saws Dr. Melendez in 2017 and he continued to encourage her to work on weight loss and he made some pacer adjustments. Today she reports that she is some better. Her dyspnea with exertion is not as bad and she still has fatigue but maybe a little less. She does not have chest pain, dizziness or edema.      Device interrogation today shows normal pacemaker function and testing with good HR response on her histogram. Her RV threshold continues to be elevated but stable and she is pacer dependent. She is in permanent AF so we programmed her to VVIR from DDIR for battery  conservation.           Past Medical History:   Diagnosis Date   • Acute bronchitis    • Acute sinusitis    • Anemia     Iron deficiency   • Anticoagulated on warfarin    • Atrial fibrillation    • Bladder dysfunction    • Cancer of left kidney 06/08/2004    S/P Left Radical Neprectomy   • Chronic dysfunction of left eustachian tube    • CKD (chronic kidney disease)     stage 3   • Depression    • Diarrhea    • Diverticulosis 08/08/2011    Descending Colon & Sigmoid Colon multiple diverticula   • Epigastric pain    • Fatigue    • Frequent urination    • GERD (gastroesophageal reflux disease)    • Goiter     thyroid removed in november 2015   • Health care maintenance    • History of shingles    • History of transfusion    • Hypertension    • Insomnia    • Left atrial enlargement    • Left ventricular hypertrophy    • Mitral regurgitation     mild to moderate per echocardiogram 2016   • Nerve pain     LUE D/T SHINGLES   • Neuropathy     ARMS   • NANCY (obstructive sleep apnea)     CPAP   • Paroxysmal atrial fibrillation    • Pulmonary hypertension     per echo 2016   • Right atrial enlargement    • SSS (sick sinus syndrome)    • URI (upper respiratory infection)    • Urinary urgency        Past Surgical History:   Procedure Laterality Date   • ATRIAL ABLATION SURGERY N/A 09/13/2011    Comprehensive electrophysiology study, Left atrial pace & sense, 3-dimensional cardia mapping, Radiofrequency catheter ablation, Transseptal hear catheterization, Dr. Maldonado Melendez   • ATRIAL ABLATION SURGERY N/A 02/11/2004    Dr. Maldonado Melendez   • BLADDER SURGERY      Bladder tacking procedure   • BLADDER SUSPENSION  05/2012   • CARDIAC ELECTROPHYSIOLOGY PROCEDURE N/A 5/17/2016    Procedure: PPM generator change - dual BOSTON;  Surgeon: Maldonado Melendez MD;  Location: [facility] INVASIVE LOCATION;  Service:    • CARDIAC ELECTROPHYSIOLOGY PROCEDURE N/A 3/23/2017    Procedure: AV node ablation pt has BOSTON PPM;  Surgeon: Maldonado Melendez MD;   Location: [facility] CATH INVASIVE LOCATION;  Service:    • CARDIOVERSION     • COLONOSCOPY N/A 04/01/2003    Normal colonoscopy except for an occasional diverticulosis, Dr. Nathan Macias   • CYSTOSCOPY BOTOX INJECTION OF BLADDER N/A 6/27/2016    Procedure: CYSTOSCOPY WITH BOTOX DETRUSOR INJECTION;  Surgeon: Salome Bowen MD;  Location: [facility] OR;  Service:    • CYSTOSCOPY BOTOX INJECTION OF BLADDER N/A 05/29/2015    Dr. Salome Bowen   • ENDOSCOPY N/A 06/10/2015    Antral Polyp: Gastric Mucosa w/ marked cautery artifact,  Hiatal Hernia, Dr. Rebeca Curran   • ENDOSCOPY N/A 10/28/2013    Gastric polyp: polypoid hyperplastic gastric mucosa w/ focal ulceration, mixed acute & chronic inflammation.  Negative for intestinal metaplasia, no helicobacter organisms identified on diff-wuik stain, Dr. Rebeca Curran   • ENDOSCOPY N/A 03/18/2013    Large prepyloric polyp, partially removed: fragment of polypoid hyperplastic gastric mucosa w/ foci of erosion & patchy mixed acute & chronic inflammation. No helicobacter organisms identified on diff-quick stain. Negative for intestinal metaplasia, negative for dysplasia, Dr. Rebeca Curran   • ENDOSCOPY N/A 10/24/2012    large prepyloric mass (3cm) w/ adjacent nodules: hyperplastic polyp w/ mild chronic active gastritis, focal erosion & associated, Multiple fundic polyps: polypoid mucosal hyperplasia, Dr. Rebeca Curran   • ENDOSCOPY N/A 03/11/2003    Gastric Antral Biopsies: Fragments of Gastric Mucosa & Necrotic Tissue (Compatible w/ Ulcer) w/ chronic active inflammation.  Negative stain for Helicobacter organisms. Dr. Nathan Macias   • ENDOSCOPY N/A 10/23/2017    Procedure: ESOPHAGOGASTRODUODENOSCOPY;  Surgeon: Rebeca Curran MD;  Location: [facility] ENDOSCOPY;  Service:    • ENDOSCOPY AND COLONOSCOPY N/A 08/08/2011    4cm hiatal hernia, Schatzki's ring, Diverticulosis, Large antral polyps, Dr. Rebeca Curran   • EYE SURGERY      CATARACT   • INCISIONAL HERNIA REPAIR N/A 06/11/2015     Defect approximately 2.5 cm w/ a mass of incarcerated tissue approximately the size of a nectarine, Dr. Rebeca Curran   • KNEE MINI REVISION Right 9/28/2016    Procedure: RT KNEE POLY INSERT CHANGE ;  Surgeon: Tommy Avila MD;  Location: [facility] OR;  Service:    • LAPAROSCOPIC CHOLECYSTECTOMY     • NEPHRECTOMY RADICAL Left 06/08/2004    Incidentially found left renal mass 3-4 cm renal cell carcinoma, left lower pole, Dr. Salome Bowen   • OTHER SURGICAL HISTORY      NEUROMUSCULAR BLOCKERS BOTULINUM TOXIN ONABOTULINUMTOXIN A   • PACEMAKER IMPLANTATION     • PACEMAKER REPLACEMENT N/A 09/24/2010    Dr. Chavez Jimenez   • TOTAL KNEE ARTHROPLASTY Left 05/21/2013    Dr. Miguel Pacheco   • TOTAL THYROIDECTOMY N/A 11/16/2015    Dr. Gideon Ashley, [facility]   • TRANSVAGINAL TAPING SUSPENSION N/A 03/16/2009    Mini Sling, Closed suprapubic cystostomy, Dr. Salome Bowen   • TUBAL ABDOMINAL LIGATION         Social History     Social History   • Marital status:      Spouse name: N/A   • Number of children: N/A   • Years of education: N/A     Occupational History   • Homemaker      Social History Main Topics   • Smoking status: Never Smoker   • Smokeless tobacco: Never Used      Comment: caffeine use   • Alcohol use No      Comment: STOPPED   • Drug use: No   • Sexual activity: Defer     Other Topics Concern   • Not on file     Social History Narrative   • No narrative on file       Family History   Problem Relation Age of Onset   • Heart disease Other    • Breast cancer Sister      In her 60s.   • Breast cancer Maternal Aunt    • Breast cancer Sister      In her 60s.   • Heart disease Mother    • Hypertension Mother    • Heart disease Father    • Hypertension Father    • Heart disease Daughter    • Hypertension Daughter    • Heart disease Son    • Hypertension Son        Review of Systems   Constitution: Positive for malaise/fatigue. Negative for chills and fever.   Cardiovascular: Positive for dyspnea on exertion. Negative for  chest pain, leg swelling, near-syncope, orthopnea, palpitations, paroxysmal nocturnal dyspnea and syncope.   Respiratory: Negative for cough and shortness of breath.    Hematologic/Lymphatic: Negative.    Musculoskeletal: Negative for joint pain, joint swelling and myalgias.   Gastrointestinal: Negative for abdominal pain, diarrhea, melena, nausea and vomiting.   Genitourinary: Negative for frequency and hematuria.   Neurological: Negative for light-headedness, numbness, paresthesias and seizures.   Allergic/Immunologic: Negative.    All other systems reviewed and are negative.      Allergies   Allergen Reactions   • Adhesive Tape Other (See Comments)     SKIN BLISTERS   • Levaquin [Levofloxacin] Hives   • Sulfa Antibiotics Swelling     FACE AND TONGUE         Current Outpatient Prescriptions:   •  B Complex-C (SUPER B COMPLEX PO), Take 1 tablet by mouth Daily., Disp: , Rfl:   •  CALCIUM PO, Take 600 Units by mouth Daily. With D3, listed separetly, Disp: , Rfl:   •  carboxymethylcellulose (REFRESH PLUS) 0.5 % solution, 1 drop 2 (Two) Times a Day As Needed for dry eyes., Disp: , Rfl:   •  cholecalciferol (VITAMIN D3) 1000 UNITS tablet, Take 1,000 Units by mouth daily. In addition to the calcium -D3 pill, Disp: , Rfl:   •  Cyanocobalamin (VITAMIN B-12 PO), Take 1 tablet by mouth daily., Disp: , Rfl:   •  ELIQUIS 5 MG tablet tablet, TAKE ONE TABLET BY MOUTH EVERY 12 HOURS, Disp: 60 tablet, Rfl: 1  •  esomeprazole (NEXIUM) 40 MG capsule, Take 40 mg by mouth Every Morning Before Breakfast., Disp: , Rfl:   •  furosemide (LASIX) 20 MG tablet, Take 2 tablets by mouth Daily., Disp: 60 tablet, Rfl: 5  •  HYDROcodone-acetaminophen (NORCO) 7.5-325 MG per tablet, Take 1 tablet by mouth Every 6 (Six) Hours As Needed for Moderate Pain (4-6)., Disp: , Rfl:   •  levothyroxine (SYNTHROID, LEVOTHROID) 112 MCG tablet, Take 112 mcg by mouth Daily., Disp: , Rfl:   •  lisinopril (PRINIVIL,ZESTRIL) 10 MG tablet, Take 1 tablet by mouth  "Daily., Disp: 90 tablet, Rfl: 2  •  loperamide (IMODIUM) 2 MG capsule, Take 2 mg by mouth 4 (four) times a day as needed for diarrhea., Disp: , Rfl:   •  metoprolol tartrate (LOPRESSOR) 25 MG tablet, Take 1 tablet by mouth 2 (Two) Times a Day., Disp: 180 tablet, Rfl: 1  •  Multiple Vitamins-Minerals (PRESERVISION AREDS PO), Take 1 tablet by mouth Daily., Disp: , Rfl:   •  nitrofurantoin, macrocrystal-monohydrate, (MACROBID) 100 MG capsule, Take 1 capsule by mouth 2 (Two) Times a Day., Disp: 10 capsule, Rfl: 0  •  oseltamivir (TAMIFLU) 75 MG capsule, Take 1 capsule by mouth 2 (Two) Times a Day., Disp: 10 capsule, Rfl: 0  •  rOPINIRole (REQUIP) 0.5 MG tablet, Take 1 hour before bedtime., Disp: 30 tablet, Rfl: 2  •  spironolactone (ALDACTONE) 25 MG tablet, Take 1 tablet by mouth Daily., Disp: 90 tablet, Rfl: 3  •  SYNTHROID 125 MCG tablet, , Disp: , Rfl:   •  XIIDRA 5 % solution, , Disp: , Rfl:       Objective:     Vitals:    03/16/18 1035   BP: 130/68   Pulse: 70   Weight: 97.5 kg (215 lb)   Height: 160 cm (62.99\")     Body mass index is 38.1 kg/m².    PHYSICAL EXAM:    Vitals Reviewed.   General Appearance: No acute distress, well developed and well nourished.   Eyes: Conjunctiva and lids: No erythema, swelling, or discharge. Sclera non-icteric.   HENT: Atraumatic, normocephalic. External eyes, ears, and nose normal.   Respiratory: No signs of respiratory distress. Respiration rhythm and depth normal.   Clear to auscultation. No rales, crackles, rhonchi, or wheezing auscultated.   Cardiovascular:  Jugular Venous Pressure: Normal  Heart Rate and Rhythm: Normal, Heart Sounds: Normal S1 and S2. No S3 or S4 noted.  Murmurs: No murmurs noted. No rubs, thrills, or gallops.   Arterial Pulses:  Posterior tibialis and dorsalis pedis pulses normal.   Lower Extremities: No edema noted.  Gastrointestinal:  Abdomen soft, non-distended, non-tender.   Musculoskeletal: Normal movement of extremities  Skin and Nails: General " appearance normal. No pallor, cyanosis, diaphoresis. Skin temperature normal. No clubbing of fingernails.   Psychiatric: Patient alert and oriented to person, place, and time. Speech and behavior appropriate. Normal mood and affect.       ECG 12 Lead  Date/Time: 3/16/2018 2:03 PM  Performed by: IRMA AYOUB  Authorized by: IRMA AYOUB   Comparison: compared with previous ECG from 12/15/2017  Similar to previous ECG  Rhythm: atrial fibrillation and paced  BPM: 70  Pacin% capture                Assessment:       Diagnosis Plan   1. Permanent atrial fibrillation     2. Sick sinus syndrome     3. Third degree AV block     4. S/P placement of cardiac pacemaker     5. Essential hypertension     6. NANCY on CPAP     7. Non morbid obesity due to excess calories            Plan:       1.-4. Atrial Fibrillation and Atrial Flutter  Assessment  • The patient has permanent atrial fibrillation  • The patient's CHADS2-VASc score is 5  • A [id number] score of 2 or more is considered a high risk for a thromboembolic event  • Apixaban prescribed  • Permanent AF/SSS/PPM/CHB-S/p AV node ablation, PPM with normal function and testing. Rate response adjustments made at there last visit seem to have helped her some. Encourage her to continue with weight loss and exercise to increase her stamina. Return in 6 months with remote pacer check in 3 months.     Subjective - Objective  • The patient underwent cardioversion         5. HTN, controlled.    6. NANCY, compliant with CPAP.    7. For the problem of overweight/obesity, we discussed the importance of lifestyle measures and strategies for weight loss, such as improved nutrition, regular exercise and sleep hygiene.      As always, it has been a pleasure to participate in your patient's care.      Sincerely,         ALLISON Winn

## 2018-04-03 ENCOUNTER — OFFICE VISIT (OUTPATIENT)
Dept: FAMILY MEDICINE CLINIC | Facility: CLINIC | Age: 83
End: 2018-04-03

## 2018-04-03 VITALS
OXYGEN SATURATION: 98 % | DIASTOLIC BLOOD PRESSURE: 60 MMHG | TEMPERATURE: 98.3 F | SYSTOLIC BLOOD PRESSURE: 120 MMHG | WEIGHT: 219.5 LBS | HEIGHT: 63 IN | BODY MASS INDEX: 38.89 KG/M2 | HEART RATE: 70 BPM

## 2018-04-03 DIAGNOSIS — N30.90 CYSTITIS: Primary | ICD-10-CM

## 2018-04-03 LAB
BILIRUB BLD-MCNC: NEGATIVE MG/DL
CLARITY, POC: ABNORMAL
COLOR UR: YELLOW
GLUCOSE UR STRIP-MCNC: NEGATIVE MG/DL
KETONES UR QL: NEGATIVE
LEUKOCYTE EST, POC: ABNORMAL
NITRITE UR-MCNC: NEGATIVE MG/ML
PH UR: 6 [PH] (ref 5–8)
PROT UR STRIP-MCNC: NEGATIVE MG/DL
RBC # UR STRIP: ABNORMAL /UL
SP GR UR: 1 (ref 1–1.03)
UROBILINOGEN UR QL: NORMAL

## 2018-04-03 PROCEDURE — 81003 URINALYSIS AUTO W/O SCOPE: CPT | Performed by: FAMILY MEDICINE

## 2018-04-03 PROCEDURE — 99213 OFFICE O/P EST LOW 20 MIN: CPT | Performed by: FAMILY MEDICINE

## 2018-04-03 RX ORDER — NITROFURANTOIN 25; 75 MG/1; MG/1
100 CAPSULE ORAL 2 TIMES DAILY
Qty: 14 CAPSULE | Refills: 0 | Status: SHIPPED | OUTPATIENT
Start: 2018-04-03 | End: 2018-04-13 | Stop reason: SDUPTHER

## 2018-04-03 NOTE — PROGRESS NOTES
"Burt Sarah is a 82 y.o. female.     Chief Complaint   Patient presents with   • Cystitis     X2 wks   • Blood in Urine   • Fatigue   • Diarrhea   • Back Pain        History of Present Illness      Cystitis symptoms.  2 weeks.  Burning with urination frequently.  Some darker urine.  Perhaps some blood in the urine.  Some ongoing fatigue which is long-standing.  Some low back pain recently.  Ongoing diarrhea syndrome.  Followed by specialists.  No fever.  Otherwise feels okay.  The patient's primary care physician is out of town this week.    The following portions of the patient's history were reviewed and updated as appropriate: allergies, current medications, past family history, past medical history, past social history, past surgical history and problem list.          Review of Systems   Constitutional: Negative for fever.   Genitourinary: Positive for difficulty urinating, dysuria and frequency.       Objective   Blood pressure 120/60, pulse 70, temperature 98.3 °F (36.8 °C), temperature source Oral, height 160 cm (62.99\"), weight 99.6 kg (219 lb 8 oz), SpO2 98 %, not currently breastfeeding.  Physical Exam   Constitutional: No distress.   Abdominal: Soft. There is no tenderness.   No CVA percussion tenderness.   Psychiatric: She has a normal mood and affect. Her behavior is normal.       Urinalysis today with trace blood, negative nitrates, moderate leukocytes.    Assessment/Plan   Anastacia was seen today for cystitis, blood in urine, fatigue, diarrhea and back pain.    Diagnoses and all orders for this visit:    Cystitis  -     POC Urinalysis Dipstick, Automated  -     Urine Culture - Urine, Urine, Clean Catch    Other orders  -     nitrofurantoin, macrocrystal-monohydrate, (MACROBID) 100 MG capsule; Take 1 capsule by mouth 2 (Two) Times a Day.        Very probable cystitis.  Previous urine culture showed Escherichia coli sensitive to all medication.  I'm prescribing Macrobid twice a day " for 7 days.  Urine culture should be back within 72 hours.  The patient and family will call us if they do not hear from us within 72 hours.  With severe symptoms seek medical attention

## 2018-04-05 DIAGNOSIS — I48.19 PERSISTENT ATRIAL FIBRILLATION (HCC): ICD-10-CM

## 2018-04-05 DIAGNOSIS — R06.02 SHORTNESS OF BREATH: ICD-10-CM

## 2018-04-05 RX ORDER — FUROSEMIDE 20 MG/1
TABLET ORAL
Qty: 60 TABLET | Refills: 4 | Status: SHIPPED | OUTPATIENT
Start: 2018-04-05 | End: 2018-04-26

## 2018-04-06 LAB
BACTERIA UR CULT: ABNORMAL
BACTERIA UR CULT: ABNORMAL
OTHER ANTIBIOTIC SUSC ISLT: ABNORMAL

## 2018-04-09 ENCOUNTER — TRANSCRIBE ORDERS (OUTPATIENT)
Dept: ADMINISTRATIVE | Facility: HOSPITAL | Age: 83
End: 2018-04-09

## 2018-04-09 DIAGNOSIS — Z12.39 SCREENING BREAST EXAMINATION: Primary | ICD-10-CM

## 2018-04-12 ENCOUNTER — TELEPHONE (OUTPATIENT)
Dept: FAMILY MEDICINE CLINIC | Facility: CLINIC | Age: 83
End: 2018-04-12

## 2018-04-12 NOTE — TELEPHONE ENCOUNTER
Pt called and stated that she needed another dose of nitrofurantoin because she feels that she still has the uti

## 2018-04-13 RX ORDER — NITROFURANTOIN 25; 75 MG/1; MG/1
100 CAPSULE ORAL 2 TIMES DAILY
Qty: 14 CAPSULE | Refills: 0 | Status: SHIPPED | OUTPATIENT
Start: 2018-04-13 | End: 2018-05-04

## 2018-04-17 ENCOUNTER — OFFICE VISIT (OUTPATIENT)
Dept: SURGERY | Facility: CLINIC | Age: 83
End: 2018-04-17

## 2018-04-17 ENCOUNTER — TELEPHONE (OUTPATIENT)
Dept: CARDIOLOGY | Facility: CLINIC | Age: 83
End: 2018-04-17

## 2018-04-17 VITALS — WEIGHT: 218 LBS | HEIGHT: 62 IN | BODY MASS INDEX: 40.12 KG/M2 | OXYGEN SATURATION: 98 % | HEART RATE: 71 BPM

## 2018-04-17 DIAGNOSIS — Z99.89 OSA ON CPAP: ICD-10-CM

## 2018-04-17 DIAGNOSIS — I48.21 PERMANENT ATRIAL FIBRILLATION (HCC): ICD-10-CM

## 2018-04-17 DIAGNOSIS — Z85.528 HISTORY OF KIDNEY CANCER: ICD-10-CM

## 2018-04-17 DIAGNOSIS — R19.7 DIARRHEA, UNSPECIFIED TYPE: Primary | ICD-10-CM

## 2018-04-17 DIAGNOSIS — G47.33 OSA ON CPAP: ICD-10-CM

## 2018-04-17 PROCEDURE — 99214 OFFICE O/P EST MOD 30 MIN: CPT | Performed by: PHYSICIAN ASSISTANT

## 2018-04-17 NOTE — TELEPHONE ENCOUNTER
Pt is scheduled to have a colonoscopy wed 4/25 and he is wanting to know if it is ok to DC xarelto 3 days prior. Pt has NO history of stroke or valve replacement...Jeannette

## 2018-04-17 NOTE — PROGRESS NOTES
"CC:     Chief Complaint   Patient presents with   • Diarrhea     x3 weeks   • Rectal Bleeding     one occurance of bright red blood from rectum    • Nausea        Subjective:   Anastacia Sarah is a very pleasant 82 y.o. female who is seen today for intermittent diarrhea for 3 weeks. She was accompanied to today's office visit by her daughter, Hien.     She describes a history of \"continuous\" diarrhea that has been present for approximately 3 weeks. This is accompanied by occasional diffuse cramping pain, bloating and occasional nausea. She describes frequent fecal urgency, with or without defecation, and the need to move her bowels even when she urinates. The consistency of her stools is \"like brown water\" but also occasionally small pieces. She has been wearing pads on her underwear and describes that even though she wipes thoroughly after a bowel movement she will have a brown stain on them the next time that she goes to the restroom. She has tried Immodium which she said she thinks helped with the cramping but did not completely eradicate the diarrhea. She has not tried anything else like Pepto Bismol. She denies any recent travel or any food triggers. She relates one episode of bright red blood on the toilet tissue when she wiped during this time, but that it hasn't happened since. She has never had an episode of GI distress like this before.     She has GERD, for which she takes Nexium. She states that it is well-controlled and she has no refractory symptoms. She did not feel the need to have an EGD performed at the time of her colonoscopy.     She underwent an EGD in 2017 with Dr. Curran due to iron deficiency anemia that required infusion as well as a history of large gastric polyps. Findings included prepyloric inflammation that had improved over prior endoscopies and a small gastric polyp of less than 6 mm.     Last colonoscopy was performed in 2011 for screening. An EGD and dilatation was performed at " "the same time for dysphagia. Findings included diverticulosis. On the EGD she was found to have a 4 cm hiatal hernia, a Schatzki's ring, and large antral polyps that were found to be hyperplastic on pathology.     Her medical history is significant for kidney cancer, for which she had a nephrectomy approximately 14 years ago.     She also has a history of A-fib, for which she takes Eliquis, prescribed by Dr. Melendez.     She also has Sleep apnea, for which she uses a CPAP, and says that she \"loves\" her CPAP, which her pulmonologist told her that not many patients say.     Currently she has a gum infection, which has been going on \"for a while.\" She relates that she will be off her Eliquis for 2 days for that procedure.     She describes that her A-fib \"acted up\" during an endoscopy in 2011 as a reaction to the anesthesia. She is not aware of any issues that occurred during her endoscopy in 2017.     Family history is significant for a daughter with Ulcerative Colitis. She denies any other family history of Crohn's or Celiac disease.     Past Medical History:   Diagnosis Date   • Acute bronchitis    • Acute sinusitis    • Anemia     Iron deficiency   • Anesthesia complication    • Anticoagulated on warfarin    • Arthritis    • Bladder dysfunction    • Cancer of left kidney 06/08/2004    S/P Left Radical Neprectomy   • Chronic dysfunction of left eustachian tube    • CKD (chronic kidney disease)     stage 3   • Depression    • Diarrhea    • Diverticulosis 08/08/2011    Descending Colon & Sigmoid Colon multiple diverticula   • Epigastric pain    • Fatigue    • Frequent urination    • GERD (gastroesophageal reflux disease)    • Goiter     thyroid removed in november 2015   • Health care maintenance    • History of shingles    • History of transfusion    • Hypertension    • Insomnia    • Left atrial enlargement    • Left ventricular hypertrophy    • Mitral regurgitation     mild to moderate per echocardiogram 2016   • " Nerve pain     LUE D/T SHINGLES   • Neuropathy     ARMS   • NANCY (obstructive sleep apnea)     uses CPAP   • Paroxysmal atrial fibrillation    • Pulmonary hypertension     per echo 2016   • Right atrial enlargement    • SSS (sick sinus syndrome)    • Thyroid cancer    • URI (upper respiratory infection)    • Urinary urgency      Past Surgical History:   Procedure Laterality Date   • ATRIAL ABLATION SURGERY N/A 09/13/2011    Comprehensive electrophysiology study, Left atrial pace & sense, 3-dimensional cardia mapping, Radiofrequency catheter ablation, Transseptal hear catheterization, Dr. Maldonado Melendez   • ATRIAL ABLATION SURGERY N/A 02/11/2004    Dr. Maldonado Melendez   • BLADDER SURGERY      Bladder tacking procedure   • BLADDER SUSPENSION N/A 05/2012   • CARDIAC ELECTROPHYSIOLOGY PROCEDURE N/A 5/17/2016    Procedure: PPM generator change - dual BOSTON;  Surgeon: Maldonado Melendez MD;  Location: [facility] CATH INVASIVE LOCATION;  Service:    • CARDIAC ELECTROPHYSIOLOGY PROCEDURE N/A 3/23/2017    Procedure: AV node ablation pt has BOSTON PPM;  Surgeon: Maldonado Melendez MD;  Location: [facility] CATH INVASIVE LOCATION;  Service:    • CARDIOVERSION     • CATARACT EXTRACTION Bilateral     Dr. Gramajo   • COLONOSCOPY N/A 04/01/2003    Normal colonoscopy except for an occasional diverticulosis, Dr. Nathan Macias   • CYSTOSCOPY BOTOX INJECTION OF BLADDER N/A 6/27/2016    Procedure: CYSTOSCOPY WITH BOTOX DETRUSOR INJECTION;  Surgeon: Salome Bowen MD;  Location: [facility] OR;  Service:    • CYSTOSCOPY BOTOX INJECTION OF BLADDER N/A 05/29/2015    Dr. Salome Bowen   • ENDOSCOPY N/A 06/10/2015    Antral Polyp: Gastric Mucosa w/ marked cautery artifact,  Hiatal Hernia, Dr. Rebeca Curran   • ENDOSCOPY N/A 10/28/2013    Gastric polyp: polypoid hyperplastic gastric mucosa w/ focal ulceration, mixed acute & chronic inflammation.  Negative for intestinal metaplasia, no helicobacter organisms identified on diff-wuik stain, Dr. Rebeca Curran    • ENDOSCOPY N/A 03/18/2013    Large prepyloric polyp, partially removed: fragment of polypoid hyperplastic gastric mucosa w/ foci of erosion & patchy mixed acute & chronic inflammation. No helicobacter organisms identified on diff-quick stain. Negative for intestinal metaplasia, negative for dysplasia, Dr. Rebeca Curran   • ENDOSCOPY N/A 10/24/2012    large prepyloric mass (3cm) w/ adjacent nodules: hyperplastic polyp w/ mild chronic active gastritis, focal erosion & associated, Multiple fundic polyps: polypoid mucosal hyperplasia, Dr. Rebeca Curran   • ENDOSCOPY N/A 03/11/2003    Gastric Antral Biopsies: Fragments of Gastric Mucosa & Necrotic Tissue (Compatible w/ Ulcer) w/ chronic active inflammation.  Negative stain for Helicobacter organisms. Dr. Nathan Macias   • ENDOSCOPY N/A 10/23/2017    Procedure: ESOPHAGOGASTRODUODENOSCOPY;  Surgeon: Rebeca Curran MD;  Location: [facility] ENDOSCOPY;  Service:    • ENDOSCOPY AND COLONOSCOPY N/A 08/08/2011    4cm hiatal hernia, Schatzki's ring, Diverticulosis, Large antral polyps, Dr. Rebeca Curran   • INCISIONAL HERNIA REPAIR N/A 06/11/2015    Defect approximately 2.5 cm w/ a mass of incarcerated tissue approximately the size of a nectarine, Dr. Rebeca Curran   • KNEE MINI REVISION Right 9/28/2016    Procedure: RT KNEE POLY INSERT CHANGE ;  Surgeon: Tommy Avila MD;  Location: [facility] OR;  Service:    • LAPAROSCOPIC CHOLECYSTECTOMY N/A    • NEPHRECTOMY RADICAL Left 06/08/2004    Incidentially found left renal mass 3-4 cm renal cell carcinoma, left lower pole, Dr. Salome Bowen   • OTHER SURGICAL HISTORY      NEUROMUSCULAR BLOCKERS BOTULINUM TOXIN ONABOTULINUMTOXIN A   • PACEMAKER IMPLANTATION N/A     Dr. Casillas   • PACEMAKER REPLACEMENT N/A 09/24/2010    Implanted Generator is a [employer] ALTRUA 60 model S603DR, serial # [id number], Dr. Chavez Jimenez   • TOTAL KNEE ARTHROPLASTY Left 05/21/2013    Dr. Miguel Pacheco   • TOTAL THYROIDECTOMY N/A 11/16/2015    Dr. Meneses  [place], [facility]   • TRANSVAGINAL TAPING SUSPENSION N/A 03/16/2009    Mini Sling, Closed suprapubic cystostomy, Dr. Salome Bowen   • TUBAL ABDOMINAL LIGATION Bilateral        Current Outpatient Prescriptions:   •  B Complex-C (SUPER B COMPLEX PO), Take 1 tablet by mouth Daily., Disp: , Rfl:   •  CALCIUM PO, Take 600 Units by mouth Daily. With D3, listed separetly, Disp: , Rfl:   •  cholecalciferol (VITAMIN D3) 1000 UNITS tablet, Take 1,000 Units by mouth daily. In addition to the calcium -D3 pill, Disp: , Rfl:   •  ELIQUIS 5 MG tablet tablet, TAKE ONE TABLET BY MOUTH EVERY 12 HOURS, Disp: 60 tablet, Rfl: 1  •  esomeprazole (NEXIUM) 40 MG capsule, Take 40 mg by mouth Every Morning Before Breakfast., Disp: , Rfl:   •  furosemide (LASIX) 20 MG tablet, TAKE TWO TABLETS BY MOUTH DAILY, Disp: 60 tablet, Rfl: 4  •  GLUCOSAMINE-CHONDROITIN PO, Take 1 tablet by mouth Daily., Disp: , Rfl:   •  HYDROcodone-acetaminophen (NORCO) 7.5-325 MG per tablet, Take 0.5-1 tablets by mouth Every 6 (Six) Hours As Needed for Moderate Pain ., Disp: , Rfl:   •  lisinopril (PRINIVIL,ZESTRIL) 10 MG tablet, Take 1 tablet by mouth Daily., Disp: 90 tablet, Rfl: 2  •  loperamide (IMODIUM) 2 MG capsule, Take 2 mg by mouth 4 (four) times a day as needed for diarrhea., Disp: , Rfl:   •  metoprolol tartrate (LOPRESSOR) 25 MG tablet, TAKE ONE TABLET BY MOUTH TWICE A DAY, Disp: 180 tablet, Rfl: 0  •  Multiple Vitamins-Minerals (PRESERVISION AREDS PO), Take 1 tablet by mouth Daily., Disp: , Rfl:   •  nitrofurantoin, macrocrystal-monohydrate, (MACROBID) 100 MG capsule, Take 1 capsule by mouth 2 (Two) Times a Day., Disp: 14 capsule, Rfl: 0  •  spironolactone (ALDACTONE) 25 MG tablet, Take 1 tablet by mouth Daily., Disp: 90 tablet, Rfl: 3  •  SYNTHROID 125 MCG tablet, Take 125 mcg by mouth Daily., Disp: , Rfl:   •  XIIDRA 5 % solution, Apply 1 drop to eye 2 (Two) Times a Day., Disp: , Rfl:   •  rOPINIRole (REQUIP) 0.5 MG tablet, Take 1 hour before bedtime., Disp:  "30 tablet, Rfl: 2    Allergies   Allergen Reactions   • Sulfa Antibiotics Swelling     FACE AND TONGUE   • Adhesive Tape Rash   • Levaquin [Levofloxacin] Rash     Family History   Problem Relation Age of Onset   • Heart disease Other    • Breast cancer Sister      In her 60s.   • Breast cancer Maternal Aunt    • Breast cancer Sister      In her 60s.   • Heart disease Mother    • Hypertension Mother    • Heart disease Father    • Hypertension Father    • Heart disease Daughter    • Hypertension Daughter    • Heart disease Son    • Hypertension Son      Social History   Substance Use Topics   • Smoking status: Never Smoker   • Smokeless tobacco: Never Used      Comment: caffeine use   • Alcohol use No      Comment: STOPPED     Occupation/Social: She was accompanied by her daughter, Hien Rowan, to today's office visit. She does not smoke or drink and she is under a mild level of stress.     Preventative Medicine  Colonoscopy: 2011 with Dr. Curran, diverticulosis found    Review of Systems   Constitutional: Positive for chills and fatigue.   HENT: Positive for dental problem, postnasal drip and rhinorrhea.    Eyes: Positive for photophobia, itching and visual disturbance.   Respiratory: Positive for shortness of breath and stridor.    Cardiovascular: Positive for palpitations.   Gastrointestinal: Positive for anal bleeding, diarrhea and nausea.   Endocrine: Positive for polyuria.   Genitourinary: Positive for dysuria, flank pain and frequency.   Musculoskeletal: Positive for arthralgias, back pain, gait problem, neck pain and neck stiffness.   Neurological: Positive for dizziness, tremors, weakness, numbness and headaches.   Hematological: Bruises/bleeds easily.   Psychiatric/Behavioral: Positive for confusion and decreased concentration.   All other systems reviewed and are negative.    Objective:   Vitals:    04/17/18 1031   Pulse: 71   SpO2: 98%   Weight: 98.9 kg (218 lb)   Height: 157.5 cm (62\")     Body mass index " is 39.87 kg/m².    Physical Exam   Constitutional: She is oriented to person, place, and time. She appears well-developed and well-nourished.   HENT:   Head: Normocephalic and atraumatic.   Eyes: Conjunctivae are normal. No scleral icterus.   Cardiovascular: Normal rate and regular rhythm.    No murmur heard.  Pulmonary/Chest: Effort normal and breath sounds normal. She has no wheezes.   Abdominal: Soft. She exhibits no distension. There is no tenderness.   Hyperactive, gurgling bowel sounds noted   Musculoskeletal: Normal range of motion. She exhibits no deformity.   Neurological: She is alert and oriented to person, place, and time.   Skin: Skin is warm and dry.   Psychiatric: She has a normal mood and affect. Her behavior is normal.     1. Diarrhea    2. A-fib on Eliquis    3. NANCY on CPAP    4. History of kidney cancer s/p nephrectomy      Discussed today were her symptoms of diarrhea that include fecal urgency, abdominal bloating, cramping, and nausea. It was discussed that this would be best evaluated with a colonoscopy.     The risks and rationale associated with colonoscopy were discussed with the patient at the time of the visit. Any additional follow up will be arranged after the results of the colonoscopy have been reviewed    Also discussed was her Eliquis for A-fib. She was instructed to call her prescribing physician, Dr. Melendez, to discuss holding for 3 days prior to the procedure.     She was instructed to bring her CPAP the day of the procedure.     She verbalized agreement with and understanding of the plan.

## 2018-04-17 NOTE — PATIENT INSTRUCTIONS
Please call Dr. Melendez's office to ensure that it is ok to hold your Eliquis for 3 days prior to the procedure.     Please bring your CPAP the day of the procedure.

## 2018-04-18 ENCOUNTER — APPOINTMENT (OUTPATIENT)
Dept: LAB | Facility: HOSPITAL | Age: 83
End: 2018-04-18

## 2018-04-19 ENCOUNTER — APPOINTMENT (OUTPATIENT)
Dept: LAB | Facility: HOSPITAL | Age: 83
End: 2018-04-19

## 2018-04-20 NOTE — TELEPHONE ENCOUNTER
Pt returned your call    I returned call and informed pt to stop Apixaban 2 days prior to colonoscopy

## 2018-04-23 ENCOUNTER — TELEPHONE (OUTPATIENT)
Dept: SURGERY | Facility: CLINIC | Age: 83
End: 2018-04-23

## 2018-04-23 NOTE — TELEPHONE ENCOUNTER
Addend    She should be OK.  If she is having trouble tomorrow, she should call to cancel prior to starting her prep.    Rebeca Curran MD  Pt having tooth pulled today wants to know if she is ok to go through with c-scope on Wed 4/25.

## 2018-04-25 ENCOUNTER — PREP FOR SURGERY (OUTPATIENT)
Dept: OTHER | Facility: HOSPITAL | Age: 83
End: 2018-04-25

## 2018-04-25 ENCOUNTER — ANESTHESIA EVENT (OUTPATIENT)
Dept: GASTROENTEROLOGY | Facility: HOSPITAL | Age: 83
End: 2018-04-25

## 2018-04-25 ENCOUNTER — HOSPITAL ENCOUNTER (OUTPATIENT)
Facility: HOSPITAL | Age: 83
Setting detail: HOSPITAL OUTPATIENT SURGERY
Discharge: HOME | End: 2018-04-25
Attending: SURGERY | Admitting: SURGERY

## 2018-04-25 ENCOUNTER — ANESTHESIA (OUTPATIENT)
Dept: GASTROENTEROLOGY | Facility: HOSPITAL | Age: 83
End: 2018-04-25

## 2018-04-25 ENCOUNTER — APPOINTMENT (OUTPATIENT)
Dept: ONCOLOGY | Facility: CLINIC | Age: 83
End: 2018-04-25

## 2018-04-25 ENCOUNTER — APPOINTMENT (OUTPATIENT)
Dept: GENERAL RADIOLOGY | Facility: HOSPITAL | Age: 83
End: 2018-04-25

## 2018-04-25 ENCOUNTER — APPOINTMENT (OUTPATIENT)
Dept: LAB | Facility: HOSPITAL | Age: 83
End: 2018-04-25

## 2018-04-25 VITALS
DIASTOLIC BLOOD PRESSURE: 87 MMHG | WEIGHT: 214.4 LBS | OXYGEN SATURATION: 99 % | SYSTOLIC BLOOD PRESSURE: 145 MMHG | HEIGHT: 62 IN | HEART RATE: 71 BPM | RESPIRATION RATE: 22 BRPM | BODY MASS INDEX: 39.45 KG/M2 | TEMPERATURE: 98.2 F

## 2018-04-25 DIAGNOSIS — K63.5 COLON POLYP: Primary | ICD-10-CM

## 2018-04-25 DIAGNOSIS — R19.7 DIARRHEA, UNSPECIFIED TYPE: ICD-10-CM

## 2018-04-25 DIAGNOSIS — K63.5 POLYP OF COLON, UNSPECIFIED PART OF COLON, UNSPECIFIED TYPE: Primary | ICD-10-CM

## 2018-04-25 LAB
ABO GROUP BLD: NORMAL
ALBUMIN SERPL-MCNC: 4 G/DL (ref 3.5–5.2)
ALBUMIN/GLOB SERPL: 1.1 G/DL
ALP SERPL-CCNC: 90 U/L (ref 39–117)
ALT SERPL W P-5'-P-CCNC: 23 U/L (ref 1–33)
ANION GAP SERPL CALCULATED.3IONS-SCNC: 16.5 MMOL/L
AST SERPL-CCNC: 26 U/L (ref 1–32)
BASOPHILS # BLD AUTO: 0.04 10*3/MM3 (ref 0–0.2)
BASOPHILS NFR BLD AUTO: 0.3 % (ref 0–1.5)
BILIRUB SERPL-MCNC: 0.4 MG/DL (ref 0.1–1.2)
BLD GP AB SCN SERPL QL: NEGATIVE
BUN BLD-MCNC: 18 MG/DL (ref 8–23)
BUN/CREAT SERPL: 13.1 (ref 7–25)
CALCIUM SPEC-SCNC: 9.5 MG/DL (ref 8.6–10.5)
CEA SERPL-MCNC: 1.65 NG/ML
CHLORIDE SERPL-SCNC: 106 MMOL/L (ref 98–107)
CO2 SERPL-SCNC: 22.5 MMOL/L (ref 22–29)
CREAT BLD-MCNC: 1.37 MG/DL (ref 0.57–1)
DEPRECATED RDW RBC AUTO: 45.1 FL (ref 37–54)
EOSINOPHIL # BLD AUTO: 0.14 10*3/MM3 (ref 0–0.7)
EOSINOPHIL NFR BLD AUTO: 1.2 % (ref 0.3–6.2)
ERYTHROCYTE [DISTWIDTH] IN BLOOD BY AUTOMATED COUNT: 13.5 % (ref 11.7–13)
GFR SERPL CREATININE-BSD FRML MDRD: 37 ML/MIN/1.73
GLOBULIN UR ELPH-MCNC: 3.5 GM/DL
GLUCOSE BLD-MCNC: 128 MG/DL (ref 65–99)
HCT VFR BLD AUTO: 39.8 % (ref 35.6–45.5)
HGB BLD-MCNC: 12.6 G/DL (ref 11.9–15.5)
IMM GRANULOCYTES # BLD: 0.02 10*3/MM3 (ref 0–0.03)
IMM GRANULOCYTES NFR BLD: 0.2 % (ref 0–0.5)
LYMPHOCYTES # BLD AUTO: 2.25 10*3/MM3 (ref 0.9–4.8)
LYMPHOCYTES NFR BLD AUTO: 18.8 % (ref 19.6–45.3)
MCH RBC QN AUTO: 29.1 PG (ref 26.9–32)
MCHC RBC AUTO-ENTMCNC: 31.7 G/DL (ref 32.4–36.3)
MCV RBC AUTO: 91.9 FL (ref 80.5–98.2)
MONOCYTES # BLD AUTO: 0.88 10*3/MM3 (ref 0.2–1.2)
MONOCYTES NFR BLD AUTO: 7.3 % (ref 5–12)
NEUTROPHILS # BLD AUTO: 8.65 10*3/MM3 (ref 1.9–8.1)
NEUTROPHILS NFR BLD AUTO: 72.2 % (ref 42.7–76)
PLATELET # BLD AUTO: 318 10*3/MM3 (ref 140–500)
PMV BLD AUTO: 9.7 FL (ref 6–12)
POTASSIUM BLD-SCNC: 4.6 MMOL/L (ref 3.5–5.2)
PROT SERPL-MCNC: 7.5 G/DL (ref 6–8.5)
RBC # BLD AUTO: 4.33 10*6/MM3 (ref 3.9–5.2)
RH BLD: NEGATIVE
SODIUM BLD-SCNC: 145 MMOL/L (ref 136–145)
T&S EXPIRATION DATE: NORMAL
WBC NRBC COR # BLD: 11.98 10*3/MM3 (ref 4.5–10.7)

## 2018-04-25 PROCEDURE — 86900 BLOOD TYPING SEROLOGIC ABO: CPT | Performed by: SURGERY

## 2018-04-25 PROCEDURE — 45385 COLONOSCOPY W/LESION REMOVAL: CPT | Performed by: SURGERY

## 2018-04-25 PROCEDURE — 86901 BLOOD TYPING SEROLOGIC RH(D): CPT | Performed by: SURGERY

## 2018-04-25 PROCEDURE — 86850 RBC ANTIBODY SCREEN: CPT | Performed by: SURGERY

## 2018-04-25 PROCEDURE — 45388 COLONOSCOPY W/ABLATION: CPT | Performed by: SURGERY

## 2018-04-25 PROCEDURE — 85025 COMPLETE CBC W/AUTO DIFF WBC: CPT | Performed by: SURGERY

## 2018-04-25 PROCEDURE — 80053 COMPREHEN METABOLIC PANEL: CPT | Performed by: SURGERY

## 2018-04-25 PROCEDURE — 82378 CARCINOEMBRYONIC ANTIGEN: CPT | Performed by: SURGERY

## 2018-04-25 PROCEDURE — 25010000002 GLUCAGON (RDNA) PER 1 MG: Performed by: SURGERY

## 2018-04-25 PROCEDURE — 25010000002 PROPOFOL 1000 MG/ML EMULSION: Performed by: ANESTHESIOLOGY

## 2018-04-25 PROCEDURE — 45380 COLONOSCOPY AND BIOPSY: CPT | Performed by: SURGERY

## 2018-04-25 PROCEDURE — 45381 COLONOSCOPY SUBMUCOUS NJX: CPT | Performed by: SURGERY

## 2018-04-25 PROCEDURE — 71045 X-RAY EXAM CHEST 1 VIEW: CPT

## 2018-04-25 PROCEDURE — 25010000002 PROPOFOL 10 MG/ML EMULSION: Performed by: ANESTHESIOLOGY

## 2018-04-25 PROCEDURE — 88305 TISSUE EXAM BY PATHOLOGIST: CPT | Performed by: SURGERY

## 2018-04-25 DEVICE — DEV CLIP ENDO RESOLUTION360 CONTRL ROT 235CM: Type: IMPLANTABLE DEVICE | Site: CECUM | Status: FUNCTIONAL

## 2018-04-25 RX ORDER — PROPOFOL 10 MG/ML
VIAL (ML) INTRAVENOUS AS NEEDED
Status: DISCONTINUED | OUTPATIENT
Start: 2018-04-25 | End: 2018-04-25 | Stop reason: SURG

## 2018-04-25 RX ORDER — SODIUM CHLORIDE 9 MG/ML
1000 INJECTION, SOLUTION INTRAVENOUS CONTINUOUS
Status: DISCONTINUED | OUTPATIENT
Start: 2018-04-25 | End: 2018-04-25 | Stop reason: HOSPADM

## 2018-04-25 RX ORDER — ALVIMOPAN 12 MG/1
12 CAPSULE ORAL ONCE
Status: CANCELLED | OUTPATIENT
Start: 2018-04-25 | End: 2018-04-25

## 2018-04-25 RX ORDER — LIDOCAINE HYDROCHLORIDE 10 MG/ML
INJECTION, SOLUTION INFILTRATION; PERINEURAL AS NEEDED
Status: DISCONTINUED | OUTPATIENT
Start: 2018-04-25 | End: 2018-04-25 | Stop reason: SURG

## 2018-04-25 RX ORDER — SODIUM CHLORIDE 0.9 % (FLUSH) 0.9 %
3 SYRINGE (ML) INJECTION AS NEEDED
Status: DISCONTINUED | OUTPATIENT
Start: 2018-04-25 | End: 2018-04-25 | Stop reason: HOSPADM

## 2018-04-25 RX ADMIN — SODIUM CHLORIDE 1000 ML: 9 INJECTION, SOLUTION INTRAVENOUS at 06:47

## 2018-04-25 RX ADMIN — PROPOFOL 130 MG: 10 INJECTION, EMULSION INTRAVENOUS at 07:57

## 2018-04-25 RX ADMIN — LIDOCAINE HYDROCHLORIDE 40 MG: 10 INJECTION, SOLUTION INFILTRATION; PERINEURAL at 07:57

## 2018-04-25 RX ADMIN — PROPOFOL 140 MCG/KG/MIN: 10 INJECTION, EMULSION INTRAVENOUS at 07:57

## 2018-04-25 NOTE — ANESTHESIA PREPROCEDURE EVALUATION
Anesthesia Evaluation     Patient summary reviewed   NPO Solid Status: > 8 hours  NPO Liquid Status: > 8 hours           Airway   Mallampati: II  TM distance: >3 FB  Dental      Pulmonary    (+) shortness of breath, sleep apnea,   Cardiovascular     Rhythm: regular  Rate: normal    (+) pacemaker, hypertension, dysrhythmias Atrial Fib,       Neuro/Psych  GI/Hepatic/Renal/Endo    (+) obesity,  GERD,  hypothyroidism,     Musculoskeletal     Abdominal    Substance History      OB/GYN          Other   (+) arthritis   history of cancer remission                    Anesthesia Plan    ASA 3     MAC   total IV anesthesia  Anesthetic plan and risks discussed with patient.

## 2018-04-25 NOTE — ANESTHESIA POSTPROCEDURE EVALUATION
"Patient: Anastacia Sarah    Procedure Summary     Date:  04/25/18 Room / Location:  [facility] ENDOSCOPY 10 /  [facility]    Anesthesia Start:  0748 Anesthesia Stop:  0832    Procedure:  COLONOSCOPY INTO CECUM AND TI WITH SALINE LIFT, HOT SNARE POLYPECTOMIES, BX'S, SPOT INJECTION, RESOLUTION CLIPS X2 (N/A ) Diagnosis:       Diarrhea, unspecified type      (Diarrhea, unspecified type [R19.7])    Surgeon:  Rebeca Curran MD Provider:  Cely Solis MD    Anesthesia Type:  MAC ASA Status:  3          Anesthesia Type: MAC  Last vitals  BP   145/87 (04/25/18 0903)   Temp   36.8 °C (98.2 °F) (04/25/18 0903)   Pulse   71 (04/25/18 0903)   Resp   22 (04/25/18 0635)     SpO2   99 % (04/25/18 0903)     Post Anesthesia Care and Evaluation    Patient location during evaluation: PACU  Patient participation: complete - patient participated  Level of consciousness: awake  Pain score: 0  Pain management: adequate  Airway patency: patent  Anesthetic complications: No anesthetic complications    Cardiovascular status: acceptable  Respiratory status: acceptable  Hydration status: acceptable    Comments: Blood pressure 145/87, pulse 71, temperature 36.8 °C (98.2 °F), temperature source Oral, resp. rate 22, height 157.5 cm (62\"), weight 97.3 kg (214 lb 6.4 oz), SpO2 99 %, not currently breastfeeding.    No anesthesia care post op    "

## 2018-04-26 ENCOUNTER — HOSPITAL ENCOUNTER (OUTPATIENT)
Dept: CT IMAGING | Facility: HOSPITAL | Age: 83
Discharge: HOME | End: 2018-04-26
Attending: SURGERY | Admitting: SURGERY

## 2018-04-26 ENCOUNTER — APPOINTMENT (OUTPATIENT)
Dept: PREADMISSION TESTING | Facility: HOSPITAL | Age: 83
End: 2018-04-26

## 2018-04-26 DIAGNOSIS — K63.5 POLYP OF COLON, UNSPECIFIED PART OF COLON, UNSPECIFIED TYPE: ICD-10-CM

## 2018-04-26 LAB
CYTO UR: NORMAL
LAB AP CASE REPORT: NORMAL
Lab: NORMAL
PATH REPORT.FINAL DX SPEC: NORMAL
PATH REPORT.GROSS SPEC: NORMAL

## 2018-04-26 PROCEDURE — 74176 CT ABD & PELVIS W/O CONTRAST: CPT

## 2018-04-26 RX ORDER — FUROSEMIDE 40 MG/1
40 TABLET ORAL DAILY
COMMUNITY
End: 2019-11-20

## 2018-04-27 ENCOUNTER — ANESTHESIA EVENT (OUTPATIENT)
Dept: PERIOP | Facility: HOSPITAL | Age: 83
End: 2018-04-27

## 2018-04-27 ENCOUNTER — ANESTHESIA (OUTPATIENT)
Dept: PERIOP | Facility: HOSPITAL | Age: 83
End: 2018-04-27

## 2018-04-27 ENCOUNTER — APPOINTMENT (OUTPATIENT)
Dept: MAMMOGRAPHY | Facility: HOSPITAL | Age: 83
End: 2018-04-27

## 2018-04-27 ENCOUNTER — HOSPITAL ENCOUNTER (INPATIENT)
Facility: HOSPITAL | Age: 83
LOS: 3 days | Discharge: HOME | End: 2018-04-30
Attending: SURGERY | Admitting: SURGERY

## 2018-04-27 DIAGNOSIS — K63.5 COLON POLYP: ICD-10-CM

## 2018-04-27 PROCEDURE — 25010000002 PHENYLEPHRINE PER 1 ML: Performed by: NURSE ANESTHETIST, CERTIFIED REGISTERED

## 2018-04-27 PROCEDURE — 25010000002 HYDROMORPHONE PER 4 MG: Performed by: NURSE ANESTHETIST, CERTIFIED REGISTERED

## 2018-04-27 PROCEDURE — 25010000002 DEXAMETHASONE PER 1 MG: Performed by: NURSE ANESTHETIST, CERTIFIED REGISTERED

## 2018-04-27 PROCEDURE — 25010000002 ONDANSETRON PER 1 MG: Performed by: NURSE ANESTHETIST, CERTIFIED REGISTERED

## 2018-04-27 PROCEDURE — 44204 LAPARO PARTIAL COLECTOMY: CPT | Performed by: PHYSICIAN ASSISTANT

## 2018-04-27 PROCEDURE — 25010000002 HYDRALAZINE PER 20 MG: Performed by: NURSE ANESTHETIST, CERTIFIED REGISTERED

## 2018-04-27 PROCEDURE — 25010000002 CEFOXITIN PER 1 G: Performed by: SURGERY

## 2018-04-27 PROCEDURE — 25010000002 HYDROMORPHONE PER 4 MG: Performed by: SURGERY

## 2018-04-27 PROCEDURE — 44204 LAPARO PARTIAL COLECTOMY: CPT | Performed by: SURGERY

## 2018-04-27 PROCEDURE — 25010000002 FENTANYL CITRATE (PF) 100 MCG/2ML SOLUTION: Performed by: ANESTHESIOLOGY

## 2018-04-27 PROCEDURE — 0DTF4ZZ RESECTION OF RIGHT LARGE INTESTINE, PERCUTANEOUS ENDOSCOPIC APPROACH: ICD-10-PCS | Performed by: SURGERY

## 2018-04-27 PROCEDURE — 25010000002 FENTANYL CITRATE (PF) 100 MCG/2ML SOLUTION: Performed by: NURSE ANESTHETIST, CERTIFIED REGISTERED

## 2018-04-27 PROCEDURE — 88309 TISSUE EXAM BY PATHOLOGIST: CPT | Performed by: SURGERY

## 2018-04-27 PROCEDURE — 25010000002 PROPOFOL 10 MG/ML EMULSION: Performed by: NURSE ANESTHETIST, CERTIFIED REGISTERED

## 2018-04-27 RX ORDER — NALOXONE HCL 0.4 MG/ML
0.2 VIAL (ML) INJECTION AS NEEDED
Status: DISCONTINUED | OUTPATIENT
Start: 2018-04-27 | End: 2018-04-27 | Stop reason: HOSPADM

## 2018-04-27 RX ORDER — HYDROMORPHONE HCL 110MG/55ML
0.5 PATIENT CONTROLLED ANALGESIA SYRINGE INTRAVENOUS
Status: DISCONTINUED | OUTPATIENT
Start: 2018-04-27 | End: 2018-04-27 | Stop reason: HOSPADM

## 2018-04-27 RX ORDER — OXYCODONE AND ACETAMINOPHEN 7.5; 325 MG/1; MG/1
1 TABLET ORAL ONCE AS NEEDED
Status: DISCONTINUED | OUTPATIENT
Start: 2018-04-27 | End: 2018-04-30 | Stop reason: HOSPADM

## 2018-04-27 RX ORDER — FLUMAZENIL 0.1 MG/ML
0.2 INJECTION INTRAVENOUS AS NEEDED
Status: DISCONTINUED | OUTPATIENT
Start: 2018-04-27 | End: 2018-04-27 | Stop reason: HOSPADM

## 2018-04-27 RX ORDER — SPIRONOLACTONE 25 MG/1
25 TABLET ORAL DAILY
Status: DISCONTINUED | OUTPATIENT
Start: 2018-04-27 | End: 2018-04-30 | Stop reason: HOSPADM

## 2018-04-27 RX ORDER — DEXTROSE, SODIUM CHLORIDE, AND POTASSIUM CHLORIDE 5; .45; .15 G/100ML; G/100ML; G/100ML
50 INJECTION INTRAVENOUS CONTINUOUS
Status: DISCONTINUED | OUTPATIENT
Start: 2018-04-27 | End: 2018-04-30 | Stop reason: HOSPADM

## 2018-04-27 RX ORDER — HYDRALAZINE HYDROCHLORIDE 20 MG/ML
5 INJECTION INTRAMUSCULAR; INTRAVENOUS
Status: DISCONTINUED | OUTPATIENT
Start: 2018-04-27 | End: 2018-04-27 | Stop reason: HOSPADM

## 2018-04-27 RX ORDER — OXYCODONE AND ACETAMINOPHEN 7.5; 325 MG/1; MG/1
1 TABLET ORAL ONCE AS NEEDED
Status: DISCONTINUED | OUTPATIENT
Start: 2018-04-27 | End: 2018-04-27 | Stop reason: HOSPADM

## 2018-04-27 RX ORDER — PROPOFOL 10 MG/ML
VIAL (ML) INTRAVENOUS AS NEEDED
Status: DISCONTINUED | OUTPATIENT
Start: 2018-04-27 | End: 2018-04-27 | Stop reason: SURG

## 2018-04-27 RX ORDER — PROMETHAZINE HYDROCHLORIDE 25 MG/1
25 SUPPOSITORY RECTAL ONCE AS NEEDED
Status: DISCONTINUED | OUTPATIENT
Start: 2018-04-27 | End: 2018-04-27 | Stop reason: HOSPADM

## 2018-04-27 RX ORDER — ONDANSETRON 4 MG/1
4 TABLET, ORALLY DISINTEGRATING ORAL EVERY 6 HOURS PRN
Status: DISCONTINUED | OUTPATIENT
Start: 2018-04-27 | End: 2018-04-30 | Stop reason: HOSPADM

## 2018-04-27 RX ORDER — LISINOPRIL 10 MG/1
10 TABLET ORAL DAILY
Status: DISCONTINUED | OUTPATIENT
Start: 2018-04-28 | End: 2018-04-30 | Stop reason: HOSPADM

## 2018-04-27 RX ORDER — HYDROCODONE BITARTRATE AND ACETAMINOPHEN 5; 325 MG/1; MG/1
1 TABLET ORAL EVERY 4 HOURS PRN
Status: COMPLETED | OUTPATIENT
Start: 2018-04-27 | End: 2018-04-28

## 2018-04-27 RX ORDER — SODIUM CHLORIDE, SODIUM LACTATE, POTASSIUM CHLORIDE, CALCIUM CHLORIDE 600; 310; 30; 20 MG/100ML; MG/100ML; MG/100ML; MG/100ML
INJECTION, SOLUTION INTRAVENOUS CONTINUOUS PRN
Status: DISCONTINUED | OUTPATIENT
Start: 2018-04-27 | End: 2018-04-27 | Stop reason: SURG

## 2018-04-27 RX ORDER — ROCURONIUM BROMIDE 10 MG/ML
INJECTION, SOLUTION INTRAVENOUS AS NEEDED
Status: DISCONTINUED | OUTPATIENT
Start: 2018-04-27 | End: 2018-04-27 | Stop reason: SURG

## 2018-04-27 RX ORDER — PANTOPRAZOLE SODIUM 40 MG/1
40 TABLET, DELAYED RELEASE ORAL EVERY MORNING
Status: DISCONTINUED | OUTPATIENT
Start: 2018-04-27 | End: 2018-04-30 | Stop reason: HOSPADM

## 2018-04-27 RX ORDER — ROPINIROLE 0.5 MG/1
0.5 TABLET, FILM COATED ORAL NIGHTLY
Status: DISCONTINUED | OUTPATIENT
Start: 2018-04-27 | End: 2018-04-30 | Stop reason: HOSPADM

## 2018-04-27 RX ORDER — PROMETHAZINE HYDROCHLORIDE 25 MG/ML
12.5 INJECTION, SOLUTION INTRAMUSCULAR; INTRAVENOUS ONCE AS NEEDED
Status: DISCONTINUED | OUTPATIENT
Start: 2018-04-27 | End: 2018-04-27 | Stop reason: HOSPADM

## 2018-04-27 RX ORDER — LIDOCAINE HYDROCHLORIDE 10 MG/ML
0.5 INJECTION, SOLUTION EPIDURAL; INFILTRATION; INTRACAUDAL; PERINEURAL ONCE AS NEEDED
Status: DISCONTINUED | OUTPATIENT
Start: 2018-04-27 | End: 2018-04-27 | Stop reason: HOSPADM

## 2018-04-27 RX ORDER — FENTANYL CITRATE 50 UG/ML
50 INJECTION, SOLUTION INTRAMUSCULAR; INTRAVENOUS
Status: DISCONTINUED | OUTPATIENT
Start: 2018-04-27 | End: 2018-04-27 | Stop reason: HOSPADM

## 2018-04-27 RX ORDER — EPHEDRINE SULFATE 50 MG/ML
5 INJECTION, SOLUTION INTRAVENOUS ONCE AS NEEDED
Status: DISCONTINUED | OUTPATIENT
Start: 2018-04-27 | End: 2018-04-27 | Stop reason: HOSPADM

## 2018-04-27 RX ORDER — ALVIMOPAN 12 MG/1
12 CAPSULE ORAL ONCE
Status: DISCONTINUED | OUTPATIENT
Start: 2018-04-27 | End: 2018-04-27 | Stop reason: HOSPADM

## 2018-04-27 RX ORDER — METOCLOPRAMIDE HYDROCHLORIDE 5 MG/ML
10 INJECTION INTRAMUSCULAR; INTRAVENOUS EVERY 6 HOURS PRN
Status: DISCONTINUED | OUTPATIENT
Start: 2018-04-27 | End: 2018-04-30 | Stop reason: HOSPADM

## 2018-04-27 RX ORDER — LABETALOL HYDROCHLORIDE 5 MG/ML
5 INJECTION, SOLUTION INTRAVENOUS
Status: DISCONTINUED | OUTPATIENT
Start: 2018-04-27 | End: 2018-04-27 | Stop reason: HOSPADM

## 2018-04-27 RX ORDER — DIPHENHYDRAMINE HYDROCHLORIDE 50 MG/ML
12.5 INJECTION INTRAMUSCULAR; INTRAVENOUS
Status: DISCONTINUED | OUTPATIENT
Start: 2018-04-27 | End: 2018-04-27 | Stop reason: HOSPADM

## 2018-04-27 RX ORDER — FAMOTIDINE 10 MG/ML
20 INJECTION, SOLUTION INTRAVENOUS ONCE
Status: COMPLETED | OUTPATIENT
Start: 2018-04-27 | End: 2018-04-27

## 2018-04-27 RX ORDER — SODIUM CHLORIDE 9 MG/ML
INJECTION, SOLUTION INTRAVENOUS AS NEEDED
Status: DISCONTINUED | OUTPATIENT
Start: 2018-04-27 | End: 2018-04-27 | Stop reason: HOSPADM

## 2018-04-27 RX ORDER — ONDANSETRON 2 MG/ML
4 INJECTION INTRAMUSCULAR; INTRAVENOUS EVERY 6 HOURS PRN
Status: DISCONTINUED | OUTPATIENT
Start: 2018-04-27 | End: 2018-04-30 | Stop reason: HOSPADM

## 2018-04-27 RX ORDER — SODIUM CHLORIDE 0.9 % (FLUSH) 0.9 %
1-10 SYRINGE (ML) INJECTION AS NEEDED
Status: DISCONTINUED | OUTPATIENT
Start: 2018-04-27 | End: 2018-04-27 | Stop reason: HOSPADM

## 2018-04-27 RX ORDER — ALBUTEROL SULFATE 2.5 MG/3ML
2.5 SOLUTION RESPIRATORY (INHALATION) ONCE AS NEEDED
Status: DISCONTINUED | OUTPATIENT
Start: 2018-04-27 | End: 2018-04-27 | Stop reason: HOSPADM

## 2018-04-27 RX ORDER — LIDOCAINE HYDROCHLORIDE 20 MG/ML
INJECTION, SOLUTION INFILTRATION; PERINEURAL AS NEEDED
Status: DISCONTINUED | OUTPATIENT
Start: 2018-04-27 | End: 2018-04-27 | Stop reason: SURG

## 2018-04-27 RX ORDER — DEXAMETHASONE SODIUM PHOSPHATE 10 MG/ML
INJECTION INTRAMUSCULAR; INTRAVENOUS AS NEEDED
Status: DISCONTINUED | OUTPATIENT
Start: 2018-04-27 | End: 2018-04-27 | Stop reason: SURG

## 2018-04-27 RX ORDER — ONDANSETRON 2 MG/ML
INJECTION INTRAMUSCULAR; INTRAVENOUS AS NEEDED
Status: DISCONTINUED | OUTPATIENT
Start: 2018-04-27 | End: 2018-04-27 | Stop reason: SURG

## 2018-04-27 RX ORDER — HYDROMORPHONE HYDROCHLORIDE 1 MG/ML
0.5 INJECTION, SOLUTION INTRAMUSCULAR; INTRAVENOUS; SUBCUTANEOUS
Status: DISCONTINUED | OUTPATIENT
Start: 2018-04-27 | End: 2018-04-30 | Stop reason: HOSPADM

## 2018-04-27 RX ORDER — PROMETHAZINE HYDROCHLORIDE 25 MG/1
12.5 TABLET ORAL ONCE AS NEEDED
Status: DISCONTINUED | OUTPATIENT
Start: 2018-04-27 | End: 2018-04-27 | Stop reason: HOSPADM

## 2018-04-27 RX ORDER — SODIUM CHLORIDE, SODIUM LACTATE, POTASSIUM CHLORIDE, CALCIUM CHLORIDE 600; 310; 30; 20 MG/100ML; MG/100ML; MG/100ML; MG/100ML
9 INJECTION, SOLUTION INTRAVENOUS CONTINUOUS
Status: DISCONTINUED | OUTPATIENT
Start: 2018-04-27 | End: 2018-04-27

## 2018-04-27 RX ORDER — NALOXONE HCL 0.4 MG/ML
0.1 VIAL (ML) INJECTION
Status: DISCONTINUED | OUTPATIENT
Start: 2018-04-27 | End: 2018-04-30 | Stop reason: HOSPADM

## 2018-04-27 RX ORDER — ONDANSETRON 4 MG/1
4 TABLET, FILM COATED ORAL EVERY 6 HOURS PRN
Status: DISCONTINUED | OUTPATIENT
Start: 2018-04-27 | End: 2018-04-30 | Stop reason: HOSPADM

## 2018-04-27 RX ORDER — PROMETHAZINE HYDROCHLORIDE 25 MG/1
25 TABLET ORAL ONCE AS NEEDED
Status: DISCONTINUED | OUTPATIENT
Start: 2018-04-27 | End: 2018-04-27 | Stop reason: HOSPADM

## 2018-04-27 RX ORDER — HYDROCODONE BITARTRATE AND ACETAMINOPHEN 5; 325 MG/1; MG/1
2 TABLET ORAL EVERY 4 HOURS PRN
Status: COMPLETED | OUTPATIENT
Start: 2018-04-27 | End: 2018-04-28

## 2018-04-27 RX ORDER — HYDROCODONE BITARTRATE AND ACETAMINOPHEN 5; 325 MG/1; MG/1
1 TABLET ORAL EVERY 4 HOURS PRN
Status: DISCONTINUED | OUTPATIENT
Start: 2018-04-27 | End: 2018-04-27 | Stop reason: SDUPTHER

## 2018-04-27 RX ORDER — FUROSEMIDE 40 MG/1
40 TABLET ORAL DAILY
Status: DISCONTINUED | OUTPATIENT
Start: 2018-04-27 | End: 2018-04-30 | Stop reason: HOSPADM

## 2018-04-27 RX ORDER — BUPIVACAINE HYDROCHLORIDE AND EPINEPHRINE 5; 5 MG/ML; UG/ML
INJECTION, SOLUTION PERINEURAL AS NEEDED
Status: DISCONTINUED | OUTPATIENT
Start: 2018-04-27 | End: 2018-04-27 | Stop reason: HOSPADM

## 2018-04-27 RX ORDER — MAGNESIUM HYDROXIDE 1200 MG/15ML
LIQUID ORAL AS NEEDED
Status: DISCONTINUED | OUTPATIENT
Start: 2018-04-27 | End: 2018-04-27 | Stop reason: HOSPADM

## 2018-04-27 RX ORDER — LEVOTHYROXINE SODIUM 0.12 MG/1
125 TABLET ORAL
Status: DISCONTINUED | OUTPATIENT
Start: 2018-04-28 | End: 2018-04-30 | Stop reason: HOSPADM

## 2018-04-27 RX ORDER — HYDROMORPHONE HCL 110MG/55ML
PATIENT CONTROLLED ANALGESIA SYRINGE INTRAVENOUS AS NEEDED
Status: DISCONTINUED | OUTPATIENT
Start: 2018-04-27 | End: 2018-04-27 | Stop reason: SURG

## 2018-04-27 RX ORDER — HYDROCODONE BITARTRATE AND ACETAMINOPHEN 7.5; 325 MG/1; MG/1
1 TABLET ORAL ONCE AS NEEDED
Status: DISCONTINUED | OUTPATIENT
Start: 2018-04-27 | End: 2018-04-27 | Stop reason: HOSPADM

## 2018-04-27 RX ORDER — ONDANSETRON 2 MG/ML
4 INJECTION INTRAMUSCULAR; INTRAVENOUS ONCE AS NEEDED
Status: DISCONTINUED | OUTPATIENT
Start: 2018-04-27 | End: 2018-04-27 | Stop reason: HOSPADM

## 2018-04-27 RX ADMIN — CEFOXITIN SODIUM 2 G: 2 POWDER, FOR SOLUTION INTRAVENOUS at 22:11

## 2018-04-27 RX ADMIN — HYDROMORPHONE HYDROCHLORIDE 0.5 MG: 2 INJECTION INTRAMUSCULAR; INTRAVENOUS; SUBCUTANEOUS at 11:26

## 2018-04-27 RX ADMIN — HYDROMORPHONE HYDROCHLORIDE 0.5 MG: 1 INJECTION, SOLUTION INTRAMUSCULAR; INTRAVENOUS; SUBCUTANEOUS at 22:15

## 2018-04-27 RX ADMIN — ROCURONIUM BROMIDE 30 MG: 10 INJECTION INTRAVENOUS at 09:23

## 2018-04-27 RX ADMIN — FAMOTIDINE 20 MG: 10 INJECTION, SOLUTION INTRAVENOUS at 08:06

## 2018-04-27 RX ADMIN — FENTANYL CITRATE 50 MCG: 50 INJECTION INTRAMUSCULAR; INTRAVENOUS at 09:23

## 2018-04-27 RX ADMIN — ROPINIROLE 0.5 MG: 0.5 TABLET, FILM COATED ORAL at 20:58

## 2018-04-27 RX ADMIN — ROCURONIUM BROMIDE 15 MG: 10 INJECTION INTRAVENOUS at 11:01

## 2018-04-27 RX ADMIN — HYDROMORPHONE HYDROCHLORIDE 0.5 MG: 2 INJECTION INTRAMUSCULAR; INTRAVENOUS; SUBCUTANEOUS at 14:35

## 2018-04-27 RX ADMIN — FENTANYL CITRATE 50 MCG: 50 INJECTION INTRAMUSCULAR; INTRAVENOUS at 13:03

## 2018-04-27 RX ADMIN — PHENYLEPHRINE HYDROCHLORIDE 100 MCG: 10 INJECTION INTRAVENOUS at 09:35

## 2018-04-27 RX ADMIN — HYDROMORPHONE HYDROCHLORIDE 0.5 MG: 2 INJECTION INTRAMUSCULAR; INTRAVENOUS; SUBCUTANEOUS at 10:21

## 2018-04-27 RX ADMIN — HYDROMORPHONE HYDROCHLORIDE 0.5 MG: 1 INJECTION, SOLUTION INTRAMUSCULAR; INTRAVENOUS; SUBCUTANEOUS at 17:05

## 2018-04-27 RX ADMIN — FENTANYL CITRATE 25 MCG: 50 INJECTION INTRAMUSCULAR; INTRAVENOUS at 10:05

## 2018-04-27 RX ADMIN — POTASSIUM CHLORIDE, DEXTROSE MONOHYDRATE AND SODIUM CHLORIDE 100 ML/HR: 150; 5; 450 INJECTION, SOLUTION INTRAVENOUS at 21:01

## 2018-04-27 RX ADMIN — METOPROLOL TARTRATE 25 MG: 25 TABLET ORAL at 20:58

## 2018-04-27 RX ADMIN — PROPOFOL 130 MG: 10 INJECTION, EMULSION INTRAVENOUS at 09:23

## 2018-04-27 RX ADMIN — ROCURONIUM BROMIDE 15 MG: 10 INJECTION INTRAVENOUS at 10:24

## 2018-04-27 RX ADMIN — FENTANYL CITRATE 50 MCG: 50 INJECTION INTRAMUSCULAR; INTRAVENOUS at 13:55

## 2018-04-27 RX ADMIN — SODIUM CHLORIDE, POTASSIUM CHLORIDE, SODIUM LACTATE AND CALCIUM CHLORIDE 9 ML/HR: 600; 310; 30; 20 INJECTION, SOLUTION INTRAVENOUS at 08:06

## 2018-04-27 RX ADMIN — CEFOXITIN SODIUM 2 G: 2 POWDER, FOR SOLUTION INTRAVENOUS at 17:50

## 2018-04-27 RX ADMIN — SODIUM CHLORIDE, POTASSIUM CHLORIDE, SODIUM LACTATE AND CALCIUM CHLORIDE: 600; 310; 30; 20 INJECTION, SOLUTION INTRAVENOUS at 08:43

## 2018-04-27 RX ADMIN — LIDOCAINE HYDROCHLORIDE 100 MG: 20 INJECTION, SOLUTION INFILTRATION; PERINEURAL at 09:23

## 2018-04-27 RX ADMIN — ONDANSETRON 4 MG: 4 TABLET, FILM COATED ORAL at 17:05

## 2018-04-27 RX ADMIN — FENTANYL CITRATE 25 MCG: 50 INJECTION INTRAMUSCULAR; INTRAVENOUS at 10:09

## 2018-04-27 RX ADMIN — CEFOXITIN SODIUM 2 G: 1 POWDER, FOR SOLUTION INTRAVENOUS at 09:19

## 2018-04-27 RX ADMIN — POTASSIUM CHLORIDE, DEXTROSE MONOHYDRATE AND SODIUM CHLORIDE 100 ML/HR: 150; 5; 450 INJECTION, SOLUTION INTRAVENOUS at 17:05

## 2018-04-27 RX ADMIN — SUGAMMADEX 200 MG: 100 INJECTION, SOLUTION INTRAVENOUS at 12:11

## 2018-04-27 RX ADMIN — ONDANSETRON 4 MG: 2 INJECTION INTRAMUSCULAR; INTRAVENOUS at 12:10

## 2018-04-27 RX ADMIN — SODIUM CHLORIDE, POTASSIUM CHLORIDE, SODIUM LACTATE AND CALCIUM CHLORIDE: 600; 310; 30; 20 INJECTION, SOLUTION INTRAVENOUS at 10:33

## 2018-04-27 RX ADMIN — DEXAMETHASONE SODIUM PHOSPHATE 6 MG: 10 INJECTION INTRAMUSCULAR; INTRAVENOUS at 09:44

## 2018-04-27 RX ADMIN — Medication 5 MG: at 14:03

## 2018-04-27 RX ADMIN — PHENYLEPHRINE HYDROCHLORIDE 100 MCG: 10 INJECTION INTRAVENOUS at 09:52

## 2018-04-27 NOTE — ANESTHESIA POSTPROCEDURE EVALUATION
"Patient: Anastacia Sarah    Procedure Summary     Date:  04/27/18 Room / Location:  [facility] OR 09 / [facility] MAIN OR    Anesthesia Start:  0917 Anesthesia Stop:  1231    Procedure:  COLON RESECTION RIGHT LAPAROSCOPIC, possible open (Right Abdomen) Diagnosis:       Colon polyp      (Colon polyp [K63.5])    Surgeon:  Rebeca Curran MD Provider:  Campbell Dey MD    Anesthesia Type:  general ASA Status:  3          Anesthesia Type: general  Last vitals  BP   162/72 (04/27/18 1415)   Temp   36.4 °C (97.6 °F) (04/27/18 1235)   Pulse   70 (04/27/18 1415)   Resp   16 (04/27/18 1415)     SpO2   94 % (04/27/18 1415)     Post Anesthesia Care and Evaluation    Patient location during evaluation: bedside  Patient participation: complete - patient participated  Level of consciousness: awake and alert  Pain management: adequate  Airway patency: patent  Anesthetic complications: No anesthetic complications    Cardiovascular status: acceptable  Respiratory status: acceptable  Hydration status: acceptable    Comments: /72   Pulse 70   Temp 36.4 °C (97.6 °F) (Oral)   Resp 16   Ht 158.8 cm (62.5\")   Wt 96.2 kg (212 lb 1.6 oz)   SpO2 94%   BMI 38.18 kg/m²       "

## 2018-04-27 NOTE — ANESTHESIA PREPROCEDURE EVALUATION
Anesthesia Evaluation     Patient summary reviewed and Nursing notes reviewed   no history of anesthetic complications:  NPO Solid Status: > 8 hours  NPO Liquid Status: > 2 hours           Airway   Mallampati: II  TM distance: >3 FB  Neck ROM: full  no difficulty expected  Dental - normal exam     Pulmonary - normal exam   (+) shortness of breath, recent URI resolved, sleep apnea on CPAP,   (-) COPD, asthma, not a smoker  Cardiovascular - normal exam  Exercise tolerance: good (4-7 METS)    ECG reviewed  PT is on anticoagulation therapy  Patient on routine beta blocker and Beta blocker given within 24 hours of surgery  Rhythm: irregular  Rate: abnormal    (+) pacemaker pacemaker interrogated unknown, hypertension, valvular problems/murmurs MR, dysrhythmias Atrial Fib,       Neuro/Psych  (+) dizziness/light headedness, numbness, psychiatric history Depression,     (-) seizures, TIA, CVA  GI/Hepatic/Renal/Endo    (+) obesity,  GERD well controlled,  renal disease CRI, hypothyroidism,   (-) hepatitis, liver disease    ROS Comment: Hx/o of Renal cell CA, s/p nephrectomy.  Now with CRI.    Musculoskeletal     Abdominal  - normal exam   Substance History - negative use     OB/GYN          Other   (+) arthritis   history of cancer      Other Comment: Hx/o RCC and thyroid CA.                  Anesthesia Plan    ASA 3     general     intravenous induction   Anesthetic plan and risks discussed with patient and child.    Plan discussed with CRNA and attending.

## 2018-04-27 NOTE — ANESTHESIA PROCEDURE NOTES
Airway  Urgency: elective    Date/Time: 4/27/2018 9:26 AM  Airway not difficult    General Information and Staff    Patient location during procedure: OR  Anesthesiologist: MUKUND BLACK  CRNA: HELEN YOUNG    Indications and Patient Condition  Indications for airway management: airway protection    Preoxygenated: yes  MILS not maintained throughout  Mask difficulty assessment: 1 - vent by mask    Final Airway Details  Final airway type: endotracheal airway      Successful airway: ETT  Cuffed: yes   Successful intubation technique: direct laryngoscopy  Facilitating devices/methods: anterior pressure/BURP  Endotracheal tube insertion site: oral  Blade: Juan M  Blade size: #3  ETT size: 7.0 mm  Cormack-Lehane Classification: grade I - full view of glottis  Placement verified by: chest auscultation   Inital cuff pressure (cm H2O): 5  Measured from: lips  ETT to lips (cm): 20  Number of attempts at approach: 1    Additional Comments  Pre O2, SIAI

## 2018-04-28 LAB
ANION GAP SERPL CALCULATED.3IONS-SCNC: 11.1 MMOL/L
BASOPHILS # BLD AUTO: 0.01 10*3/MM3 (ref 0–0.2)
BASOPHILS NFR BLD AUTO: 0.1 % (ref 0–1.5)
BUN BLD-MCNC: 19 MG/DL (ref 8–23)
BUN/CREAT SERPL: 13.2 (ref 7–25)
CALCIUM SPEC-SCNC: 8.8 MG/DL (ref 8.6–10.5)
CHLORIDE SERPL-SCNC: 105 MMOL/L (ref 98–107)
CO2 SERPL-SCNC: 21.9 MMOL/L (ref 22–29)
CREAT BLD-MCNC: 1.44 MG/DL (ref 0.57–1)
DEPRECATED RDW RBC AUTO: 45.8 FL (ref 37–54)
EOSINOPHIL # BLD AUTO: 0 10*3/MM3 (ref 0–0.7)
EOSINOPHIL NFR BLD AUTO: 0 % (ref 0.3–6.2)
ERYTHROCYTE [DISTWIDTH] IN BLOOD BY AUTOMATED COUNT: 13.9 % (ref 11.7–13)
GFR SERPL CREATININE-BSD FRML MDRD: 35 ML/MIN/1.73
GLUCOSE BLD-MCNC: 163 MG/DL (ref 65–99)
HCT VFR BLD AUTO: 35.7 % (ref 35.6–45.5)
HGB BLD-MCNC: 11.4 G/DL (ref 11.9–15.5)
IMM GRANULOCYTES # BLD: 0.04 10*3/MM3 (ref 0–0.03)
IMM GRANULOCYTES NFR BLD: 0.2 % (ref 0–0.5)
LYMPHOCYTES # BLD AUTO: 1.06 10*3/MM3 (ref 0.9–4.8)
LYMPHOCYTES NFR BLD AUTO: 6.4 % (ref 19.6–45.3)
MCH RBC QN AUTO: 29.2 PG (ref 26.9–32)
MCHC RBC AUTO-ENTMCNC: 31.9 G/DL (ref 32.4–36.3)
MCV RBC AUTO: 91.3 FL (ref 80.5–98.2)
MONOCYTES # BLD AUTO: 1.13 10*3/MM3 (ref 0.2–1.2)
MONOCYTES NFR BLD AUTO: 6.8 % (ref 5–12)
NEUTROPHILS # BLD AUTO: 14.43 10*3/MM3 (ref 1.9–8.1)
NEUTROPHILS NFR BLD AUTO: 86.7 % (ref 42.7–76)
PLATELET # BLD AUTO: 290 10*3/MM3 (ref 140–500)
PMV BLD AUTO: 10 FL (ref 6–12)
POTASSIUM BLD-SCNC: 5.1 MMOL/L (ref 3.5–5.2)
RBC # BLD AUTO: 3.91 10*6/MM3 (ref 3.9–5.2)
SODIUM BLD-SCNC: 138 MMOL/L (ref 136–145)
WBC NRBC COR # BLD: 16.63 10*3/MM3 (ref 4.5–10.7)

## 2018-04-28 PROCEDURE — 85025 COMPLETE CBC W/AUTO DIFF WBC: CPT | Performed by: SURGERY

## 2018-04-28 PROCEDURE — 80048 BASIC METABOLIC PNL TOTAL CA: CPT | Performed by: SURGERY

## 2018-04-28 PROCEDURE — 25010000002 CEFOXITIN PER 1 G: Performed by: SURGERY

## 2018-04-28 PROCEDURE — 99024 POSTOP FOLLOW-UP VISIT: CPT | Performed by: SURGERY

## 2018-04-28 RX ORDER — HYDROCODONE BITARTRATE AND ACETAMINOPHEN 5; 325 MG/1; MG/1
2 TABLET ORAL EVERY 4 HOURS PRN
Status: DISCONTINUED | OUTPATIENT
Start: 2018-04-28 | End: 2018-04-30 | Stop reason: HOSPADM

## 2018-04-28 RX ORDER — HYDROCODONE BITARTRATE AND ACETAMINOPHEN 5; 325 MG/1; MG/1
1 TABLET ORAL EVERY 4 HOURS PRN
Status: DISCONTINUED | OUTPATIENT
Start: 2018-04-28 | End: 2018-04-30 | Stop reason: HOSPADM

## 2018-04-28 RX ADMIN — CEFOXITIN SODIUM 2 G: 2 POWDER, FOR SOLUTION INTRAVENOUS at 03:54

## 2018-04-28 RX ADMIN — HYDROCODONE BITARTRATE AND ACETAMINOPHEN 2 TABLET: 5; 325 TABLET ORAL at 16:53

## 2018-04-28 RX ADMIN — ONDANSETRON 4 MG: 4 TABLET, FILM COATED ORAL at 06:03

## 2018-04-28 RX ADMIN — POTASSIUM CHLORIDE, DEXTROSE MONOHYDRATE AND SODIUM CHLORIDE 100 ML/HR: 150; 5; 450 INJECTION, SOLUTION INTRAVENOUS at 09:34

## 2018-04-28 RX ADMIN — HYDROCODONE BITARTRATE AND ACETAMINOPHEN 1 TABLET: 5; 325 TABLET ORAL at 11:03

## 2018-04-28 RX ADMIN — METOPROLOL TARTRATE 25 MG: 25 TABLET ORAL at 09:34

## 2018-04-28 RX ADMIN — HYDROCODONE BITARTRATE AND ACETAMINOPHEN 2 TABLET: 5; 325 TABLET ORAL at 22:18

## 2018-04-28 RX ADMIN — SPIRONOLACTONE 25 MG: 25 TABLET ORAL at 09:34

## 2018-04-28 RX ADMIN — LEVOTHYROXINE SODIUM 125 MCG: 125 TABLET ORAL at 06:00

## 2018-04-28 RX ADMIN — LISINOPRIL 10 MG: 10 TABLET ORAL at 09:34

## 2018-04-28 RX ADMIN — METOPROLOL TARTRATE 25 MG: 25 TABLET ORAL at 21:25

## 2018-04-28 RX ADMIN — HYDROCODONE BITARTRATE AND ACETAMINOPHEN 1 TABLET: 5; 325 TABLET ORAL at 09:34

## 2018-04-28 RX ADMIN — FUROSEMIDE 40 MG: 40 TABLET ORAL at 09:34

## 2018-04-28 RX ADMIN — POTASSIUM CHLORIDE, DEXTROSE MONOHYDRATE AND SODIUM CHLORIDE 100 ML/HR: 150; 5; 450 INJECTION, SOLUTION INTRAVENOUS at 18:11

## 2018-04-28 RX ADMIN — PANTOPRAZOLE SODIUM 40 MG: 40 TABLET, DELAYED RELEASE ORAL at 06:00

## 2018-04-29 PROCEDURE — 99024 POSTOP FOLLOW-UP VISIT: CPT | Performed by: SURGERY

## 2018-04-29 RX ADMIN — FUROSEMIDE 40 MG: 40 TABLET ORAL at 09:29

## 2018-04-29 RX ADMIN — LISINOPRIL 10 MG: 10 TABLET ORAL at 09:29

## 2018-04-29 RX ADMIN — METOPROLOL TARTRATE 25 MG: 25 TABLET ORAL at 09:29

## 2018-04-29 RX ADMIN — SPIRONOLACTONE 25 MG: 25 TABLET ORAL at 09:29

## 2018-04-29 RX ADMIN — HYDROCODONE BITARTRATE AND ACETAMINOPHEN 2 TABLET: 5; 325 TABLET ORAL at 19:22

## 2018-04-29 RX ADMIN — METOPROLOL TARTRATE 25 MG: 25 TABLET ORAL at 22:23

## 2018-04-29 RX ADMIN — ONDANSETRON 4 MG: 4 TABLET, FILM COATED ORAL at 19:22

## 2018-04-29 RX ADMIN — POTASSIUM CHLORIDE, DEXTROSE MONOHYDRATE AND SODIUM CHLORIDE 100 ML/HR: 150; 5; 450 INJECTION, SOLUTION INTRAVENOUS at 03:17

## 2018-04-29 RX ADMIN — PANTOPRAZOLE SODIUM 40 MG: 40 TABLET, DELAYED RELEASE ORAL at 06:31

## 2018-04-29 RX ADMIN — POTASSIUM CHLORIDE, DEXTROSE MONOHYDRATE AND SODIUM CHLORIDE 50 ML/HR: 150; 5; 450 INJECTION, SOLUTION INTRAVENOUS at 13:51

## 2018-04-29 RX ADMIN — LEVOTHYROXINE SODIUM 125 MCG: 125 TABLET ORAL at 06:31

## 2018-04-29 RX ADMIN — ROPINIROLE 0.5 MG: 0.5 TABLET, FILM COATED ORAL at 22:23

## 2018-04-29 RX ADMIN — HYDROCODONE BITARTRATE AND ACETAMINOPHEN 2 TABLET: 5; 325 TABLET ORAL at 09:33

## 2018-04-30 VITALS
BODY MASS INDEX: 37.58 KG/M2 | SYSTOLIC BLOOD PRESSURE: 156 MMHG | RESPIRATION RATE: 16 BRPM | HEART RATE: 70 BPM | WEIGHT: 212.1 LBS | TEMPERATURE: 98.5 F | OXYGEN SATURATION: 95 % | DIASTOLIC BLOOD PRESSURE: 76 MMHG | HEIGHT: 63 IN

## 2018-04-30 LAB
ANION GAP SERPL CALCULATED.3IONS-SCNC: 10.1 MMOL/L
BASOPHILS # BLD AUTO: 0.03 10*3/MM3 (ref 0–0.2)
BASOPHILS NFR BLD AUTO: 0.4 % (ref 0–1.5)
BUN BLD-MCNC: 14 MG/DL (ref 8–23)
BUN/CREAT SERPL: 11.2 (ref 7–25)
CALCIUM SPEC-SCNC: 8.8 MG/DL (ref 8.6–10.5)
CHLORIDE SERPL-SCNC: 103 MMOL/L (ref 98–107)
CO2 SERPL-SCNC: 22.9 MMOL/L (ref 22–29)
CREAT BLD-MCNC: 1.25 MG/DL (ref 0.57–1)
DEPRECATED RDW RBC AUTO: 45.6 FL (ref 37–54)
EOSINOPHIL # BLD AUTO: 0.32 10*3/MM3 (ref 0–0.7)
EOSINOPHIL NFR BLD AUTO: 4.4 % (ref 0.3–6.2)
ERYTHROCYTE [DISTWIDTH] IN BLOOD BY AUTOMATED COUNT: 13.6 % (ref 11.7–13)
GFR SERPL CREATININE-BSD FRML MDRD: 41 ML/MIN/1.73
GLUCOSE BLD-MCNC: 103 MG/DL (ref 65–99)
HCT VFR BLD AUTO: 31.7 % (ref 35.6–45.5)
HGB BLD-MCNC: 10.2 G/DL (ref 11.9–15.5)
IMM GRANULOCYTES # BLD: 0.02 10*3/MM3 (ref 0–0.03)
IMM GRANULOCYTES NFR BLD: 0.3 % (ref 0–0.5)
LYMPHOCYTES # BLD AUTO: 1.62 10*3/MM3 (ref 0.9–4.8)
LYMPHOCYTES NFR BLD AUTO: 22.3 % (ref 19.6–45.3)
MCH RBC QN AUTO: 29.5 PG (ref 26.9–32)
MCHC RBC AUTO-ENTMCNC: 32.2 G/DL (ref 32.4–36.3)
MCV RBC AUTO: 91.6 FL (ref 80.5–98.2)
MONOCYTES # BLD AUTO: 0.6 10*3/MM3 (ref 0.2–1.2)
MONOCYTES NFR BLD AUTO: 8.3 % (ref 5–12)
NEUTROPHILS # BLD AUTO: 4.7 10*3/MM3 (ref 1.9–8.1)
NEUTROPHILS NFR BLD AUTO: 64.6 % (ref 42.7–76)
PLATELET # BLD AUTO: 223 10*3/MM3 (ref 140–500)
PMV BLD AUTO: 10.3 FL (ref 6–12)
POTASSIUM BLD-SCNC: 4.4 MMOL/L (ref 3.5–5.2)
RBC # BLD AUTO: 3.46 10*6/MM3 (ref 3.9–5.2)
SODIUM BLD-SCNC: 136 MMOL/L (ref 136–145)
WBC NRBC COR # BLD: 7.27 10*3/MM3 (ref 4.5–10.7)

## 2018-04-30 PROCEDURE — 99024 POSTOP FOLLOW-UP VISIT: CPT | Performed by: SURGERY

## 2018-04-30 PROCEDURE — 85025 COMPLETE CBC W/AUTO DIFF WBC: CPT | Performed by: SURGERY

## 2018-04-30 PROCEDURE — 80048 BASIC METABOLIC PNL TOTAL CA: CPT | Performed by: SURGERY

## 2018-04-30 RX ORDER — HYDROCODONE BITARTRATE AND ACETAMINOPHEN 5; 325 MG/1; MG/1
1 TABLET ORAL EVERY 4 HOURS PRN
Qty: 20 TABLET | Refills: 0 | Status: SHIPPED | OUTPATIENT
Start: 2018-04-30 | End: 2018-05-14

## 2018-04-30 RX ADMIN — HYDROCODONE BITARTRATE AND ACETAMINOPHEN 2 TABLET: 5; 325 TABLET ORAL at 02:35

## 2018-04-30 RX ADMIN — FUROSEMIDE 40 MG: 40 TABLET ORAL at 08:03

## 2018-04-30 RX ADMIN — SPIRONOLACTONE 25 MG: 25 TABLET ORAL at 08:03

## 2018-04-30 RX ADMIN — METOPROLOL TARTRATE 25 MG: 25 TABLET ORAL at 08:03

## 2018-04-30 RX ADMIN — LISINOPRIL 10 MG: 10 TABLET ORAL at 08:03

## 2018-04-30 RX ADMIN — LEVOTHYROXINE SODIUM 125 MCG: 125 TABLET ORAL at 06:41

## 2018-04-30 RX ADMIN — PANTOPRAZOLE SODIUM 40 MG: 40 TABLET, DELAYED RELEASE ORAL at 06:41

## 2018-05-02 NOTE — DISCHARGE SUMMARY
Discharge Summary    Patient name: Anastacia Sarah    Medical record number: [id number]    Admission date: 4/27/2018  Discharge date:  4/30/2018    Attending physician: Dr. Rebeca Curran    Primary care physician: Shree Loza MD    Referring physician: Rebeca Curran MD  [address] [address]    Consulting physician(s):  none    Primary Diagnoses:    · Right colon cancer    Secondary Diagnoses:     · History of thyroid cancer, papillary  · History of renal cell cancer, status post nephrectomy  · Stage III chronic kidney disease  · Body mass index is 38.18 kg/m².  · Sleep apnea on CPAP  · A. fib     Procedure/Proc Date:      · Laparoscopic right hemicolectomy: 4/27/2018    Hospital Course:   The patient is a very pleasant 82 y.o. female that was admitted to the hospital on 4/27/2018 for the elective procedure named above, due to a right colon mass.  Her pathology revealed a T3 lesion, with only 10 lymph nodes, but all negative.  She did extremely well postoperatively.  She has already localized that she will not do chemotherapy.    She was found on CT scan to have a new mass in the right kidney with recommendation for MRI evaluation, which she is unable to do based on her pacer.  In addition her creatinine is elevated and thus I'm uncertain if this is worthy of his CT with IV contrast.  She sees Dr. Brower on a routine basis, and I will defer to him.    Having done extremely well postoperatively, she is discharged on postoperative day 3, and I'll follow up with her in the office...    Pathology:   · T3 colon cancer, N0 10 lymph nodes    Discharge medications:      Your medication list      CHANGE how you take these medications      Instructions Last Dose Given Next Dose Due   HYDROcodone-acetaminophen 5-325 MG per tablet  Commonly known as:  NORCO  What changed:  You were already taking a medication with the same name, and this prescription was added. Make sure you understand how and when  to take each.      Take 1 tablet by mouth Every 4 (Four) Hours As Needed for Moderate Pain  for up to 20 days.       HYDROcodone-acetaminophen 7.5-325 MG per tablet  Commonly known as:  NORCO  What changed:  Another medication with the same name was added. Make sure you understand how and when to take each.      Take 0.5-1 tablets by mouth Every 6 (Six) Hours As Needed for Moderate Pain .          CONTINUE taking these medications      Instructions Last Dose Given Next Dose Due   apixaban 5 MG tablet tablet  Commonly known as:  ELIQUIS      Take 1 tablet by mouth Every 12 (Twelve) Hours.       CALCIUM PO      Take 600 Units by mouth Daily. With D3, listed separetly       cholecalciferol 1000 units tablet  Commonly known as:  VITAMIN D3      Take 1,000 Units by mouth daily. In addition to the calcium -D3 pill       furosemide 40 MG tablet  Commonly known as:  LASIX      Take 40 mg by mouth Daily.       GLUCOSAMINE-CHONDROITIN PO      Take 1 tablet by mouth Daily.       lisinopril 10 MG tablet  Commonly known as:  PRINIVIL,ZESTRIL      Take 1 tablet by mouth Daily.       loperamide 2 MG capsule  Commonly known as:  IMODIUM      Take 2 mg by mouth 4 (four) times a day as needed for diarrhea.       metoprolol tartrate 25 MG tablet  Commonly known as:  LOPRESSOR      Take 25 mg by mouth 2 (Two) Times a Day.       NEXIUM 40 MG capsule  Generic drug:  esomeprazole      Take 40 mg by mouth Every Morning Before Breakfast.       nitrofurantoin (macrocrystal-monohydrate) 100 MG capsule  Commonly known as:  MACROBID      Take 1 capsule by mouth 2 (Two) Times a Day.       PRESERVISION AREDS PO      Take 1 tablet by mouth Daily.       rOPINIRole 0.5 MG tablet  Commonly known as:  REQUIP      Take 1 hour before bedtime.       spironolactone 25 MG tablet  Commonly known as:  ALDACTONE      Take 1 tablet by mouth Daily.       SUPER B COMPLEX PO      Take 1 tablet by mouth Daily.       SYNTHROID 125 MCG tablet  Generic drug:   levothyroxine      Take 125 mcg by mouth Daily.       XIIDRA 5 % solution  Generic drug:  Lifitegrast      Apply 1 drop to eye 2 (Two) Times a Day.             Where to Get Your Medications      These medications were sent to [address] [facility] - [phone] [facility] [phone]   [address] [place] [id number]    Phone:  [phone]    HYDROcodone-acetaminophen 5-325 MG per tablet         Discharge diet:  · Low residue    Recommendations:    · Resume Eliquis tomorrow        Rebeca Curran MD   Office Number: [phone].

## 2018-05-04 ENCOUNTER — APPOINTMENT (OUTPATIENT)
Dept: LAB | Facility: HOSPITAL | Age: 83
End: 2018-05-04
Attending: INTERNAL MEDICINE

## 2018-05-04 ENCOUNTER — OFFICE VISIT (OUTPATIENT)
Dept: ONCOLOGY | Facility: CLINIC | Age: 83
End: 2018-05-04

## 2018-05-04 VITALS
BODY MASS INDEX: 37.95 KG/M2 | TEMPERATURE: 98 F | SYSTOLIC BLOOD PRESSURE: 130 MMHG | WEIGHT: 214.2 LBS | RESPIRATION RATE: 16 BRPM | HEIGHT: 63 IN | OXYGEN SATURATION: 99 % | HEART RATE: 73 BPM | DIASTOLIC BLOOD PRESSURE: 68 MMHG

## 2018-05-04 DIAGNOSIS — C18.2 MALIGNANT NEOPLASM OF ASCENDING COLON (HCC): ICD-10-CM

## 2018-05-04 DIAGNOSIS — D50.8 OTHER IRON DEFICIENCY ANEMIA: Primary | ICD-10-CM

## 2018-05-04 DIAGNOSIS — D63.1 ANEMIA IN STAGE 3 CHRONIC KIDNEY DISEASE (HCC): ICD-10-CM

## 2018-05-04 DIAGNOSIS — N18.30 ANEMIA IN STAGE 3 CHRONIC KIDNEY DISEASE (HCC): ICD-10-CM

## 2018-05-04 LAB
ALBUMIN SERPL-MCNC: 3.7 G/DL (ref 3.5–5.2)
ALBUMIN/GLOB SERPL: 1.1 G/DL (ref 1.1–2.4)
ALP SERPL-CCNC: 81 U/L (ref 38–116)
ALT SERPL W P-5'-P-CCNC: 15 U/L (ref 0–33)
ANION GAP SERPL CALCULATED.3IONS-SCNC: 11.6 MMOL/L
AST SERPL-CCNC: 20 U/L (ref 0–32)
BASOPHILS # BLD AUTO: 0.04 10*3/MM3 (ref 0–0.1)
BASOPHILS NFR BLD AUTO: 0.5 % (ref 0–1.1)
BILIRUB SERPL-MCNC: 0.4 MG/DL (ref 0.1–1.2)
BUN BLD-MCNC: 15 MG/DL (ref 6–20)
BUN/CREAT SERPL: 10.4 (ref 7.3–30)
CALCIUM SPEC-SCNC: 9.4 MG/DL (ref 8.5–10.2)
CHLORIDE SERPL-SCNC: 98 MMOL/L (ref 98–107)
CO2 SERPL-SCNC: 28.4 MMOL/L (ref 22–29)
CREAT BLD-MCNC: 1.44 MG/DL (ref 0.6–1.1)
DEPRECATED RDW RBC AUTO: 43 FL (ref 37–49)
EOSINOPHIL # BLD AUTO: 0.45 10*3/MM3 (ref 0–0.36)
EOSINOPHIL NFR BLD AUTO: 6 % (ref 1–5)
ERYTHROCYTE [DISTWIDTH] IN BLOOD BY AUTOMATED COUNT: 12.9 % (ref 11.7–14.5)
FERRITIN SERPL-MCNC: 99.4 NG/ML
GFR SERPL CREATININE-BSD FRML MDRD: 35 ML/MIN/1.73
GLOBULIN UR ELPH-MCNC: 3.4 GM/DL (ref 1.8–3.5)
GLUCOSE BLD-MCNC: 114 MG/DL (ref 74–124)
HCT VFR BLD AUTO: 36.4 % (ref 34–45)
HGB BLD-MCNC: 11.4 G/DL (ref 11.5–14.9)
IMM GRANULOCYTES # BLD: 0.03 10*3/MM3 (ref 0–0.03)
IMM GRANULOCYTES NFR BLD: 0.4 % (ref 0–0.5)
IRON 24H UR-MRATE: 36 MCG/DL (ref 37–145)
IRON SATN MFR SERPL: 10 % (ref 14–48)
LYMPHOCYTES # BLD AUTO: 1.79 10*3/MM3 (ref 1–3.5)
LYMPHOCYTES NFR BLD AUTO: 24 % (ref 20–49)
MCH RBC QN AUTO: 28.5 PG (ref 27–33)
MCHC RBC AUTO-ENTMCNC: 31.3 G/DL (ref 32–35)
MCV RBC AUTO: 91 FL (ref 83–97)
MONOCYTES # BLD AUTO: 0.8 10*3/MM3 (ref 0.25–0.8)
MONOCYTES NFR BLD AUTO: 10.7 % (ref 4–12)
NEUTROPHILS # BLD AUTO: 4.36 10*3/MM3 (ref 1.5–7)
NEUTROPHILS NFR BLD AUTO: 58.4 % (ref 39–75)
NRBC BLD MANUAL-RTO: 0 /100 WBC (ref 0–0)
PLATELET # BLD AUTO: 359 10*3/MM3 (ref 150–375)
PMV BLD AUTO: 9.2 FL (ref 8.9–12.1)
POTASSIUM BLD-SCNC: 5.1 MMOL/L (ref 3.5–4.7)
PROT SERPL-MCNC: 7.1 G/DL (ref 6.3–8)
RBC # BLD AUTO: 4 10*6/MM3 (ref 3.9–5)
SODIUM BLD-SCNC: 138 MMOL/L (ref 134–145)
TIBC SERPL-MCNC: 349 MCG/DL (ref 249–505)
TRANSFERRIN SERPL-MCNC: 249 MG/DL (ref 200–360)
WBC NRBC COR # BLD: 7.47 10*3/MM3 (ref 4–10)

## 2018-05-04 PROCEDURE — 82728 ASSAY OF FERRITIN: CPT | Performed by: INTERNAL MEDICINE

## 2018-05-04 PROCEDURE — 36415 COLL VENOUS BLD VENIPUNCTURE: CPT | Performed by: INTERNAL MEDICINE

## 2018-05-04 PROCEDURE — 85025 COMPLETE CBC W/AUTO DIFF WBC: CPT | Performed by: INTERNAL MEDICINE

## 2018-05-04 PROCEDURE — 84466 ASSAY OF TRANSFERRIN: CPT | Performed by: INTERNAL MEDICINE

## 2018-05-04 PROCEDURE — 80053 COMPREHEN METABOLIC PANEL: CPT | Performed by: INTERNAL MEDICINE

## 2018-05-04 PROCEDURE — 83540 ASSAY OF IRON: CPT | Performed by: INTERNAL MEDICINE

## 2018-05-04 PROCEDURE — 99215 OFFICE O/P EST HI 40 MIN: CPT | Performed by: INTERNAL MEDICINE

## 2018-05-04 NOTE — PROGRESS NOTES
"  Subjective .     REASONS FOR FOLLOWUP:    1. Anemia secondary to iron deficiency, intolerant of oral iron, status post Venofer 100 mg on 09/21/11, followed by Feraheme x two doses, 09/28 and 10/05/11.  Recurrent iron deficiency requiring Feraheme again on 09/24/12 and 10/05/12.  Further recurrence of iron deficiency requiring Feraheme on 10/5 in 10/12/17.  2. No definitive evidence of GI blood loss, stool cards negative for occult blood, status post GI evaluation with Dr. Curran with no bleeding source identified.   3. History of gastric polyps, status post staged excision via EGD.    4. Status post left nephrectomy in 2004 for renal cell carcinoma.   5. Stage III chronic kidney disease.   6. Stage I (T1tH2iV5) left papillary thyroid cancer (3mm): Status post total thyroidectomy 11/16/15, 2 lymph nodes removed.  7. Stage IIa (T3N0M0) ascending colon cancer: Preoperative CEA 1.65 on 4/25/18.  Right hemicolectomy 4/27/18, invasive moderately differentiated adenocarcinoma, negative margins, positive lymphovascular invasion, negative perineural invasion, 10 lymph nodes negative, ulcerated small adjacent pericolonic abscess (microscopic perforation).  MMR IHC pending.  8. 2.2 cm right renal lesion, indeterminate on CT 4/26/18, urology evaluation pending.  9. Atrial fibrillation continuing on anticoagulation with Eliquis.    HISTORY OF PRESENT ILLNESS:  The patient is a 82 y.o. year old female who is here for follow-up with the above-mentioned history.    History of Present Illness the patient returns today in follow-up with multiple issues to address since her last visit here in December 2017.  She developed severe diarrhea as well as some minimal bright red blood per rectum associated with fatigue and shortness of breath.  She underwent colonoscopy 4/25/18 with identification of polyps in the rectum, cecal polyp, mass in the right colon.  The cecal polyp was a sessile serrated adenoma, ascending colon \"mass\" showed " hyperplastic polyp, rectal polyp was a tubulovillous adenoma with low-grade dysplasia.  CEA on 4/25/18 was normal at 1.65.  Chest x-ray 4/25/18 was unrevealing.  CT of the abdomen and pelvis 4/26/18 showed a 4 cm annular mass in the ascending colon with adjacent haziness in the mesentery but no lymphadenopathy, no evidence of metastatic disease.  There was also an incidental note of a 2.2 cm hyperdense right renal lesion which was indeterminate, may represent proteinaceous cyst however could not exclude solid mass and recommended CT renal mass protocol or MRI.  The patient underwent a right hemicolectomy on 4/27/18 with pathology showing an invasive moderately differentiated adenocarcinoma arising in a sessile serrated adenoma with negative margins measuring 2.5 cm extending through the muscularis propria into brad-colorectal tissue, positive lymphovascular invasion, negative perineural invasion, 10 lymph nodes negative.  There was a comment regarding an ulcerated small adjacent pericolonic abscess, raising the question of possible microscopic perforation.    The patient today has recovered remarkably well from her surgery.  She notes some continued loose stools.  She has some ongoing fatigue.  Her postoperative pain is improving.  She is adamant that she would not want to take any adjuvant chemotherapy today even prior to our conversation.  She is accompanied by her daughter.    PAST MEDICAL HISTORY:    1.  Atrial fibrillation.  The patient underwent ablation procedure in 2004, repeat ablation procedure on 09/13/11 by Dr. Melendez.  Subsequent AV deshawn ablation and March 2017 and subsequent cardioversion in May 2017.  Continuation of anticoagulation with Eliquis  2.  Gastroesophageal reflux disease.  3.  Hypertension.   4.  Status post left knee surgery.   5.  Status post pacemaker placement.   6.  Status post laparoscopic cholecystectomy.   7.  Status post bladder tacking procedure.   8.  Status post EGD and  colonoscopy with Dr. Curran 08/08/11.  Finding of gastric polyps, one of which was resected showing a hyperplastic polyp with gastritis.  There was no source of bleeding identified.  Schatzki's ring was identified as well as diverticulosis.  Repeat EGD on 03/18/13 with resection of a 3 cm prepyloric hyperplastic polyp of acute and chronic inflammatory change. Repeat EGD, October 2013 with further resection of polyp, benign findings.  EGD 10/23/17 with small gastric polyps identified, no evidence of active bleeding.  9.  Bladder suspension surgery in 5/12.  10.  Status post left knee replacement in May 2013.    Past Medical History:   Diagnosis Date   • Acute bronchitis    • Acute sinusitis    • Anemia     Iron deficiency   • Anesthesia complication    • Anticoagulated on warfarin    • Arthritis    • Bladder dysfunction    • Cancer of left kidney 06/08/2004    S/P Left Radical Neprectomy   • Chronic dysfunction of left eustachian tube    • CKD (chronic kidney disease)     stage 3   • Depression    • Diarrhea    • Diverticulosis 08/08/2011    Descending Colon & Sigmoid Colon multiple diverticula   • Epigastric pain    • Fatigue    • Frequent urination    • GERD (gastroesophageal reflux disease)    • Goiter     thyroid removed in november 2015   • Health care maintenance    • History of shingles    • History of transfusion    • Hypertension    • Insomnia    • Left atrial enlargement    • Left ventricular hypertrophy    • Mitral regurgitation     mild to moderate per echocardiogram 2016   • Nerve pain     LUE D/T SHINGLES   • Neuropathy     ARMS   • NANCY (obstructive sleep apnea)     uses CPAP   • Paroxysmal atrial fibrillation    • Pulmonary hypertension     per echo 2016   • Right atrial enlargement    • SSS (sick sinus syndrome)    • Thyroid cancer    • URI (upper respiratory infection)    • Urinary urgency      Past Surgical History:   Procedure Laterality Date   • ATRIAL ABLATION SURGERY N/A 09/13/2011     Comprehensive electrophysiology study, Left atrial pace & sense, 3-dimensional cardia mapping, Radiofrequency catheter ablation, Transseptal hear catheterization, Dr. Maldonado Melendez   • ATRIAL ABLATION SURGERY N/A 02/11/2004    Dr. Maldonado Melendez   • BLADDER SURGERY      Bladder tacking procedure   • BLADDER SUSPENSION N/A 05/2012   • CARDIAC ELECTROPHYSIOLOGY PROCEDURE N/A 5/17/2016    Procedure: PPM generator change - dual BOSTON;  Surgeon: Maldonado Melendez MD;  Location: [facility] CATH INVASIVE LOCATION;  Service:    • CARDIAC ELECTROPHYSIOLOGY PROCEDURE N/A 3/23/2017    Procedure: AV node ablation pt has BOSTON PPM;  Surgeon: Maldonado Melendez MD;  Location: [facility] CATH INVASIVE LOCATION;  Service:    • CARDIOVERSION     • CATARACT EXTRACTION Bilateral     Dr. Gramajo   • COLON RESECTION Right 4/27/2018    Procedure: COLON RESECTION RIGHT LAPAROSCOPIC, possible open;  Surgeon: Rebeca Curran MD;  Location: [facility] OR;  Service: General   • COLONOSCOPY N/A 04/01/2003    Normal colonoscopy except for an occasional diverticulosis, Dr. Nathan Macias   • COLONOSCOPY N/A 4/25/2018    Procedure: COLONOSCOPY INTO CECUM AND TI WITH SALINE LIFT, HOT SNARE POLYPECTOMIES, BX'S, SPOT INJECTION, RESOLUTION CLIPS X2;  Surgeon: Rebeca Curran MD;  Location: [facility] ENDOSCOPY;  Service: Gastroenterology   • CYSTOSCOPY BOTOX INJECTION OF BLADDER N/A 6/27/2016    Procedure: CYSTOSCOPY WITH BOTOX DETRUSOR INJECTION;  Surgeon: Salome Bowen MD;  Location: [facility] OR;  Service:    • CYSTOSCOPY BOTOX INJECTION OF BLADDER N/A 05/29/2015    Dr. Salome Bowen   • ENDOSCOPY N/A 06/10/2015    Antral Polyp: Gastric Mucosa w/ marked cautery artifact,  Hiatal Hernia, Dr. Rebeca Curran   • ENDOSCOPY N/A 10/28/2013    Gastric polyp: polypoid hyperplastic gastric mucosa w/ focal ulceration, mixed acute & chronic inflammation.  Negative for intestinal metaplasia, no helicobacter organisms identified on diff-wuik stain, Dr. Rebeca Curran   •  ENDOSCOPY N/A 03/18/2013    Large prepyloric polyp, partially removed: fragment of polypoid hyperplastic gastric mucosa w/ foci of erosion & patchy mixed acute & chronic inflammation. No helicobacter organisms identified on diff-quick stain. Negative for intestinal metaplasia, negative for dysplasia, Dr. Rebeca Curran   • ENDOSCOPY N/A 10/24/2012    large prepyloric mass (3cm) w/ adjacent nodules: hyperplastic polyp w/ mild chronic active gastritis, focal erosion & associated, Multiple fundic polyps: polypoid mucosal hyperplasia, Dr. Rebeca Curran   • ENDOSCOPY N/A 03/11/2003    Gastric Antral Biopsies: Fragments of Gastric Mucosa & Necrotic Tissue (Compatible w/ Ulcer) w/ chronic active inflammation.  Negative stain for Helicobacter organisms. Dr. Nathan Macias   • ENDOSCOPY N/A 10/23/2017    Procedure: ESOPHAGOGASTRODUODENOSCOPY;  Surgeon: Rebeca Curran MD;  Location: [facility] ENDOSCOPY;  Service:    • ENDOSCOPY AND COLONOSCOPY N/A 08/08/2011    4cm hiatal hernia, Schatzki's ring, Diverticulosis, Large antral polyps, Dr. Rebeca Curran   • INCISIONAL HERNIA REPAIR N/A 06/11/2015    Defect approximately 2.5 cm w/ a mass of incarcerated tissue approximately the size of a nectarine, Dr. Rebeca Curran   • KNEE MINI REVISION Right 9/28/2016    Procedure: RT KNEE POLY INSERT CHANGE ;  Surgeon: Tommy Avila MD;  Location: [facility] OR;  Service:    • LAPAROSCOPIC CHOLECYSTECTOMY N/A    • NEPHRECTOMY RADICAL Left 06/08/2004    Incidentially found left renal mass 3-4 cm renal cell carcinoma, left lower pole, Dr. Salome Bowen   • OTHER SURGICAL HISTORY      NEUROMUSCULAR BLOCKERS BOTULINUM TOXIN ONABOTULINUMTOXIN A   • PACEMAKER IMPLANTATION N/A     Dr. Casillas   • PACEMAKER REPLACEMENT N/A 09/24/2010    Implanted Generator is a Little Black Bag ALTRUA 60 model S603DR, serial # [id number], Dr. Chavez Jimenez   • TOTAL KNEE ARTHROPLASTY Left 05/21/2013    Dr. Miguel Pacheco   • TOTAL THYROIDECTOMY N/A 11/16/2015    Dr. Gideon Ashley,  [facility]   • TRANSVAGINAL TAPING SUSPENSION N/A 03/16/2009    Mini Sling, Closed suprapubic cystostomy, Dr. Salome Bowen   • TUBAL ABDOMINAL LIGATION Bilateral          HEMATOLOGICONCOLOGIC HISTORY:    The patient was seen during a hospitalization at [facility], [place] in September 2011.  She was admitted with left lower lobe pneumonia following a cardiac ablation procedure.  She has anemia dating back to 2006, at that time with a hematocrit of 36.7 and MCV of 77.  Her platelets and white count have remained normal.  Hemoglobin in September 2010 was 10.3, MCV 74.  In December 2010 hemoglobin was 10.5 with MCV of 72.  On 08/09/11 hemoglobin was down to 9.8 with MCV of 73.  During the hospitalization hemoglobin had dropped to 8.5 with MCV of 72 and decreased further to 8.0 at which point she was transfused two units of PRBCs.  She was on Coumadin but had no obvious clinical evidence of GI blood loss.  She was evaluated by Dr. Curran on 08/08/11 with EGD and colonoscopy showing evidence of gastric polyps.  One polyp was biopsied showing evidence of a hyperplastic polyp with gastritis.  There was no definitive evidence of bleeding.  She was having some dysphagia and had a Schatzki's ring which was dilated.  She developed rapid ventricular response and therefore the procedure was stopped.  She then underwent cardiac ablation and subsequently developed pneumonia.      During her hospitalization, lab studies were sent including iron studies showing an iron of 11, TIBC 438, transferrin 294, iron percent sat of 3 with a ferritin of 10.8.  B12 was low-normal at 291, folate normal.  Creatinine was in the 1.2 range with estimated GFR in the 40 range (stage III chronic kidney disease).  Erythropoietin level was 66.  A hemoglobin electrophoresis was sent showing hemoglobin A1 of 97.5%, and hemoglobin A2 of 2.2%.  The patient had stool checked for occult blood in the hospital which again was negative, therefore, with no definitive  evidence of GI blood loss.  I suspect she may have a malabsorption issue.  She was intolerant of oral iron and treated with Venofer 100 mg on 09/21/11 and subsequently was discharged from the hospital and treated as an outpatient with Feraheme 510 mg on 09/28/11 and again on 10/05/11.      The patient returned for followup studies on 10/19/11 with hemoglobin that improved to the 13 range and MCV up to 79.  TIBC was 284, iron percent sat 32, ferritin 335.  Reticulated hemoglobin had improved to 32.9.  B12 was 476.  We will follow her counts over time.  She will followup with Dr. Curran regarding her gastric polyps.      Laboratory studies on 02/15/12 showed a ferritin stable at 112.  We will recheck at a 6 month interval.     Lab studies from 9/10/12 shows a hemoglobin 11.9, reticulated hemoglobin 29.9, TIBC 325, iron percent SAT of 11.  Ferritin down to 18.      Feraheme administered on 9/24/12 and 10/5/12.  Repeat iron studies on 3/15/13 showed a ferritin of 101, iron percent saturation of 31.       Labs 10/10/2013 with a ferritin of 85, iron percent sat down to 15, hemoglobin of 12.9. Recheck in six months.    Labs 04/20/2015 showed a ferritin stable in the normal range at 76.     The patient returned after a prolonged absence from our office on 9/21/17 with hemoglobin of 12.0, MCV 79.2, iron 37, ferritin 20, iron saturation 8%, TIBC 441 CONSISTENT with recurrent iron deficiency.  Folate was normal at 10.52, B12 normal at 398, erythropoietin 32.6.  Additional Feraheme initiated on 10/5/17 for 2 doses.  Repeat EGD with Dr. Curran on 10/23/17 with no active bleeding, small less than 6 mm gastric polyps identified.       ONCOLOGIC HISTORY:   Status post left nephrectomy in 2004 for renal cell carcinoma with Dr. Bowen.  This was a Darnell grade 2 lesion measuring 3.0 cm, confined to the kidney, with no extension to perinephric adipose tissue, and no evidence of lymphovascular invasion.  Margins were negative.   Adrenal gland was negative.      CT scan of the abdomen and pelvis o 02/26/12 showed no evidence of recurrent disease.      Stage I ([id number]) left papillary thyroid cancer (3mm): Status post total thyroidectomy 11/16/15, 2 lymph nodes removed.      Stage IIa (T3N0M0) ascending colon cancer: Preoperative CEA 1.65 on 4/25/18.  Right hemicolectomy 4/27/18, invasive moderately differentiated adenocarcinoma, negative margins, positive lymphovascular invasion, negative perineural invasion, 10 lymph nodes negative, ulcerated small adjacent pericolonic abscess (microscopic perforation).  MMR IHC pending.    Current Outpatient Prescriptions on File Prior to Visit   Medication Sig Dispense Refill   • apixaban (ELIQUIS) 5 MG tablet tablet Take 1 tablet by mouth Every 12 (Twelve) Hours. 60 tablet 1   • B Complex-C (SUPER B COMPLEX PO) Take 1 tablet by mouth Daily.     • CALCIUM PO Take 600 Units by mouth Daily. With D3, listed separetly     • cholecalciferol (VITAMIN D3) 1000 UNITS tablet Take 1,000 Units by mouth daily. In addition to the calcium -D3 pill     • esomeprazole (NEXIUM) 40 MG capsule Take 40 mg by mouth Every Morning Before Breakfast.     • furosemide (LASIX) 40 MG tablet Take 40 mg by mouth Daily.     • GLUCOSAMINE-CHONDROITIN PO Take 1 tablet by mouth Daily.     • HYDROcodone-acetaminophen (NORCO) 5-325 MG per tablet Take 1 tablet by mouth Every 4 (Four) Hours As Needed for Moderate Pain  for up to 20 days. 20 tablet 0   • lisinopril (PRINIVIL,ZESTRIL) 10 MG tablet Take 1 tablet by mouth Daily. 90 tablet 2   • loperamide (IMODIUM) 2 MG capsule Take 2 mg by mouth 4 (four) times a day as needed for diarrhea.     • metoprolol tartrate (LOPRESSOR) 25 MG tablet Take 25 mg by mouth 2 (Two) Times a Day.     • Multiple Vitamins-Minerals (PRESERVISION AREDS PO) Take 1 tablet by mouth Daily.     • rOPINIRole (REQUIP) 0.5 MG tablet Take 1 hour before bedtime. 30 tablet 2   • spironolactone (ALDACTONE) 25 MG tablet Take 1  tablet by mouth Daily. 90 tablet 3   • SYNTHROID 125 MCG tablet Take 125 mcg by mouth Daily.     • XIIDRA 5 % solution Apply 1 drop to eye 2 (Two) Times a Day.     • [DISCONTINUED] HYDROcodone-acetaminophen (NORCO) 7.5-325 MG per tablet Take 0.5-1 tablets by mouth Every 6 (Six) Hours As Needed for Moderate Pain .     • [DISCONTINUED] nitrofurantoin, macrocrystal-monohydrate, (MACROBID) 100 MG capsule Take 1 capsule by mouth 2 (Two) Times a Day. 14 capsule 0     No current facility-administered medications on file prior to visit.        ALLERGIES:     Allergies   Allergen Reactions   • Sulfa Antibiotics Swelling     FACE AND TONGUE   • Adhesive Tape Rash   • Levaquin [Levofloxacin] Rash     Social History     Social History   • Marital status:      Spouse name: N/A   • Number of children: 0   • Years of education: N/A     Occupational History   • Homemaker Unemployed     Social History Main Topics   • Smoking status: Never Smoker   • Smokeless tobacco: Never Used      Comment: caffeine use   • Alcohol use No   • Drug use: No   • Sexual activity: Defer     Other Topics Concern   • Not on file     Social History Narrative   • No narrative on file     Family History   Problem Relation Age of Onset   • Heart disease Other    • Breast cancer Sister      In her 60s.   • Breast cancer Maternal Aunt    • Breast cancer Sister      In her 60s.   • Heart disease Mother    • Hypertension Mother    • Heart disease Father    • Hypertension Father    • Heart disease Daughter    • Hypertension Daughter    • Heart disease Son    • Hypertension Son    • Malig Hyperthermia Neg Hx        Review of Systems   Constitutional: Positive for fatigue. Negative for activity change, appetite change, fever and unexpected weight change.   HENT: Negative for congestion, mouth sores, nosebleeds, sore throat and voice change.    Respiratory: Positive for shortness of breath. Negative for cough and wheezing.    Cardiovascular: Negative for  chest pain, palpitations and leg swelling.   Gastrointestinal: Positive for abdominal pain and diarrhea. Negative for abdominal distention, blood in stool, constipation, nausea and vomiting.   Endocrine: Negative for cold intolerance and heat intolerance.   Genitourinary: Positive for dysuria. Negative for difficulty urinating, frequency and hematuria.   Musculoskeletal: Negative for arthralgias, back pain, joint swelling and myalgias.   Skin: Negative for rash.   Neurological: Negative for dizziness, syncope, weakness, light-headedness, numbness and headaches.   Hematological: Negative for adenopathy. Does not bruise/bleed easily.   Psychiatric/Behavioral: Negative for confusion and sleep disturbance. The patient is not nervous/anxious.          Objective      Vitals:    05/04/18 1510   BP: 130/68   Pulse: 73   Resp: 16   Temp: 98 °F (36.7 °C)   SpO2: 99%      Current Status 5/4/2018   ECOG score 0   pain 0/10    Physical Exam   Constitutional: She is oriented to person, place, and time. She appears well-developed and well-nourished.   HENT:   Mouth/Throat: Oropharynx is clear and moist.   Eyes: Conjunctivae are normal.   Neck: No thyromegaly present.   Cardiovascular: Normal rate and regular rhythm.  Exam reveals no gallop and no friction rub.    No murmur heard.  Pulmonary/Chest: Breath sounds normal. No respiratory distress.   Abdominal: Soft. Bowel sounds are normal. She exhibits no distension. There is no tenderness.   Laparoscopic surgical incisions healing well.   Musculoskeletal: She exhibits no edema.   Lymphadenopathy:        Head (right side): No submandibular adenopathy present.     She has no cervical adenopathy.     She has no axillary adenopathy.        Right: No inguinal and no supraclavicular adenopathy present.        Left: No inguinal and no supraclavicular adenopathy present.   Neurological: She is alert and oriented to person, place, and time. She displays normal reflexes. No cranial nerve  deficit. She exhibits normal muscle tone.   Skin: Skin is warm and dry. No rash noted.   Psychiatric: She has a normal mood and affect. Her behavior is normal.       RECENT LABS:  Hematology WBC   Date Value Ref Range Status   05/04/2018 7.47 4.00 - 10.00 10*3/mm3 Final     RBC   Date Value Ref Range Status   05/04/2018 4.00 3.90 - 5.00 10*6/mm3 Final     Hemoglobin   Date Value Ref Range Status   05/04/2018 11.4 (L) 11.5 - 14.9 g/dL Final     Hematocrit   Date Value Ref Range Status   05/04/2018 36.4 34.0 - 45.0 % Final     Platelets   Date Value Ref Range Status   05/04/2018 359 150 - 375 10*3/mm3 Final        Lab Results   Component Value Date    GLUCOSE 114 05/04/2018    BUN 15 05/04/2018    CREATININE 1.44 (H) 05/04/2018    EGFRIFNONA 35 (L) 05/04/2018    EGFRIFAFRI 50 (L) 12/06/2017    BCR 10.4 05/04/2018    K 5.1 (H) 05/04/2018    CO2 28.4 05/04/2018    CALCIUM 9.4 05/04/2018    PROTENTOTREF 7.4 12/06/2017    ALBUMIN 3.70 05/04/2018    LABIL2 1.1 05/04/2018    AST 20 05/04/2018    ALT 15 05/04/2018     Records reviewed including office notes from general surgery, colonoscopy report, operative note from right hemicolectomy, laboratory studies, CT abdomen and pelvis 4/26/18, pathology results 4/27/18 and 4/25/18 all as outlined in the history of present illness above.    Assessment/Plan      1. Recurrent iron deficiency anemia: The patient is intolerant of oral iron. Prior GI evaluation showed no definitive evidence of GI blood loss. She has underlying stage III chronic kidney disease, which is a contributing factor to her mild borderline anemia. She has been treated in the past with IV iron in 2011 and 2012.  She had a lengthy absence from our office from 2015 until she was referred back on 9/21/17 with evidence of recurrent iron deficiency, likely related to malabsorption.  Her ferritin on 7/7/17 was 21 and hemoglobin on 8/7/17 was 11.1.  Her degree of anemia was mild with a hemoglobin of 11.4, microcytic  "indices with an MCV of 78.  Labs on 9/21/17 showed ferritin 20.5, iron saturation 8%, TIBC 441.  We did also check B12 and folate which were normal.  Erythropoietin was 32 consistent with a component of anemia secondary to chronic kidney disease. She did return stool cards for occult blood which were negative ×3 10/5/17.  She received additional Feraheme on 10/5 and 10/12/17.  She underwent EGD with Dr. Curran 10/23/17 with no evidence of active bleeding, small gastric polyps identified.  Labs 12/20/17 with hemoglobin up to 13.3, ferritin 97.9, iron saturation of 16%.  Since then, the patient has been diagnosed with colon cancer as will be discussed below having undergone right hemicolectomy on 4/27/18.  Preoperative hemoglobin was 12.6, hemoglobin today is 11.4.  I did suggest that we obtain repeat iron studies today.  2. Stage IIa (T3N0M0) ascending colon cancer: She develop significant diarrhea with blood per rectum.  She underwent colonoscopy 4/25/18 with identification of polyps in the rectum, cecal polyp, mass in the right colon.  The cecal polyp was a sessile serrated adenoma, ascending colon \"mass\" showed hyperplastic polyp, rectal polyp was a tubulovillous adenoma with low-grade dysplasia.  CEA on 4/25/18 was normal at 1.65.  Chest x-ray 4/25/18 was unrevealing.  CT of the abdomen and pelvis 4/26/18 showed a 4 cm annular mass in the ascending colon with adjacent haziness in the mesentery but no lymphadenopathy, no evidence of metastatic disease.  The patient underwent a right hemicolectomy on 4/27/18 with pathology showing an invasive moderately differentiated adenocarcinoma arising in a sessile serrated adenoma with negative margins measuring 2.5 cm extending through the muscularis propria into brad-colorectal tissue, positive lymphovascular invasion, negative perineural invasion, 10 lymph nodes negative.  There was a comment regarding an ulcerated small adjacent pericolonic abscess, raising the question " of possible microscopic perforation.  I had a lengthy conversation with the patient and her daughter today regarding her pathologic findings.  We discussed stage of her disease.  We discussed the survival benefit from adjuvant chemotherapy in stage III colon cancer in the lack of definitive statistically significant benefit for stage II disease.  We discussed risk stratification of stage II colon cancer in consideration of adjuvant chemotherapy.  She does have a few high risk features with less than 12 lymph nodes removed, positive lymphovascular invasion, and some possibility of microscopic perforation.  We will check MMR IHC.  Given her age and comorbidities however, the patient is a poor candidate for adjuvant chemotherapy and I have not recommended this to her.  Independent of this recommendation, the patient states she is not interested in taking any adjuvant chemotherapy.  Therefore we will plan to monitor her clinically for evidence of recurrent disease.  Standard monitoring his with routine CEA levels however this does not appear to be informative for her given her normal preoperative value.  We will consider annual interval follow-up CT and will discuss this in the future.  3. 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  We did review her family history which includes a sister with breast cancer around age 60, another sister with breast cancer around age 60, maternal aunt with breast cancer over the age of 50, maternal uncle with lung cancer.  Given the patient's multiple malignancies and family history, we are going to refer to the genetics clinic to discuss potential genetic testing, mainly for benefit of the patient's children and other family members.  We are sending off MMR IHC her pathology  specimen today in relation to her colon cancer.  4. History of gastric polyps: EGD 10/23/17 with small less than 6 mm gastric polyps, not resected.  5. History of renal cell carcinoma: The patient underwent a left nephrectomy in 2004 with no clinical evidence of recurrent disease.  CT chest from 11/14/16 showed no evidence of recurrence.  Recent CT abdomen and pelvis however from 4/26/18 shows a 2.2 cm right renal lesion which was indeterminate, possibly a proteinaceous cyst but could not rule out mass lesion.  The study was performed without contrast due to the patient's underlying chronic kidney disease.  She is unable to undergo renal mass protocol CT with contrast.  She is unable to undergo MRI due to her pacemaker.  Therefore we discussed possible ultrasound for further characterization or interval follow-up CT in 3 months.  She has been referred to urology and is scheduled to see Dr. Trujillo next week.    PLAN:     1. Additional labs today with iron panel, ferritin, CMP.  2. Send pathology from 4/27/18 surgical resection for MMR IHC analysis.  3. Await upcoming urology evaluation next week regarding right renal lesion seen on recent CT 4/26/18.  Unable to perform CT renal mass protocol or MRI.  4. Referral to genetics clinic as noted above  5. M.D. visit in 3 weeks with CBC.

## 2018-05-10 ENCOUNTER — TELEPHONE (OUTPATIENT)
Dept: SURGERY | Facility: CLINIC | Age: 83
End: 2018-05-10

## 2018-05-11 ENCOUNTER — EPISODE CHANGES (OUTPATIENT)
Dept: CASE MANAGEMENT | Facility: OTHER | Age: 83
End: 2018-05-11

## 2018-05-11 NOTE — TELEPHONE ENCOUNTER
Addend  I called her this morning, and she relates that it is related to the meds that dr daugherty prescribed her, and she is better since she quit taking those.  She was weak and couldn't eat.  no abdo pain and eating better this morning.  No redness at incision, no fever.  She states she will see me on Monday.  She was started on meds due to a suspected UTI.  I suggested that she contact his office to check on the culture and perhaps a different abx (other than macrobid) could be initiated.    Please let the daughter know.  Rebeca Curran MD

## 2018-05-14 ENCOUNTER — OFFICE VISIT (OUTPATIENT)
Dept: SURGERY | Facility: CLINIC | Age: 83
End: 2018-05-14

## 2018-05-14 DIAGNOSIS — C18.2 MALIGNANT NEOPLASM OF ASCENDING COLON (HCC): Primary | ICD-10-CM

## 2018-05-14 PROCEDURE — 99024 POSTOP FOLLOW-UP VISIT: CPT | Performed by: SURGERY

## 2018-05-14 RX ORDER — TOLTERODINE 2 MG/1
2 CAPSULE, EXTENDED RELEASE ORAL DAILY
COMMUNITY
Start: 2018-05-08 | End: 2018-10-22 | Stop reason: ALTCHOICE

## 2018-05-14 RX ORDER — NITROFURANTOIN 25; 75 MG/1; MG/1
CAPSULE ORAL
COMMUNITY
Start: 2018-05-08 | End: 2018-05-14

## 2018-05-14 NOTE — PROGRESS NOTES
SURGERY: [facility]  Post op Visit  Anastacia Sarah  05/14/18    Ariana comes in today after a laparoscopic right hemicolectomy on 4/27/18 for which turned out to be a T3 N0, stage IIA colon cancer.  Her specimen had 10 lymph nodes in it and thus is not felt to be ideal in representation as to her deshawn status.  She had already indicated prior to surgery that she wasn't going to do chemotherapy, and she still maintains that.  She's been in to see Dr. Brower who is aware as well.    She has a history of a left renal cancer, status post nephrectomy, history of a thyroid papillary cancer as well.  Happily she's going to go forward genetic testing.    Her incision looks great and I think she's done really well.  I'll see her in 1 year for colonoscopy.     Rebeca Curran MD

## 2018-05-15 RX ORDER — LISINOPRIL 10 MG/1
TABLET ORAL
Qty: 90 TABLET | Refills: 1 | Status: SHIPPED | OUTPATIENT
Start: 2018-05-15 | End: 2018-11-08 | Stop reason: SDUPTHER

## 2018-05-16 ENCOUNTER — HOSPITAL ENCOUNTER (OUTPATIENT)
Dept: MAMMOGRAPHY | Facility: HOSPITAL | Age: 83
Discharge: HOME | End: 2018-05-16
Admitting: INTERNAL MEDICINE

## 2018-05-16 DIAGNOSIS — Z12.39 SCREENING BREAST EXAMINATION: ICD-10-CM

## 2018-05-16 PROCEDURE — 77067 SCR MAMMO BI INCL CAD: CPT

## 2018-05-16 PROCEDURE — 77063 BREAST TOMOSYNTHESIS BI: CPT

## 2018-05-22 LAB
CYTO UR: NORMAL
LAB AP CASE REPORT: NORMAL
Lab: NORMAL
Lab: NORMAL
PATH REPORT.ADDENDUM SPEC: NORMAL
PATH REPORT.FINAL DX SPEC: NORMAL
PATH REPORT.GROSS SPEC: NORMAL

## 2018-05-30 ENCOUNTER — OFFICE VISIT (OUTPATIENT)
Dept: ONCOLOGY | Facility: CLINIC | Age: 83
End: 2018-05-30
Attending: INTERNAL MEDICINE

## 2018-05-30 ENCOUNTER — LAB (OUTPATIENT)
Dept: LAB | Facility: HOSPITAL | Age: 83
End: 2018-05-30
Attending: INTERNAL MEDICINE

## 2018-05-30 VITALS
HEIGHT: 63 IN | TEMPERATURE: 97.8 F | OXYGEN SATURATION: 97 % | DIASTOLIC BLOOD PRESSURE: 80 MMHG | SYSTOLIC BLOOD PRESSURE: 136 MMHG | HEART RATE: 75 BPM | WEIGHT: 208 LBS | BODY MASS INDEX: 36.86 KG/M2

## 2018-05-30 DIAGNOSIS — D63.1 ANEMIA IN STAGE 3 CHRONIC KIDNEY DISEASE (HCC): ICD-10-CM

## 2018-05-30 DIAGNOSIS — N18.30 ANEMIA IN STAGE 3 CHRONIC KIDNEY DISEASE (HCC): ICD-10-CM

## 2018-05-30 DIAGNOSIS — D50.8 OTHER IRON DEFICIENCY ANEMIA: ICD-10-CM

## 2018-05-30 DIAGNOSIS — C18.2 MALIGNANT NEOPLASM OF ASCENDING COLON (HCC): Primary | ICD-10-CM

## 2018-05-30 DIAGNOSIS — C18.2 MALIGNANT NEOPLASM OF ASCENDING COLON (HCC): ICD-10-CM

## 2018-05-30 LAB
BASOPHILS # BLD AUTO: 0.04 10*3/MM3 (ref 0–0.1)
BASOPHILS NFR BLD AUTO: 0.6 % (ref 0–1.1)
DEPRECATED RDW RBC AUTO: 40.9 FL (ref 37–49)
EOSINOPHIL # BLD AUTO: 0.49 10*3/MM3 (ref 0–0.36)
EOSINOPHIL NFR BLD AUTO: 7.7 % (ref 1–5)
ERYTHROCYTE [DISTWIDTH] IN BLOOD BY AUTOMATED COUNT: 13.2 % (ref 11.7–14.5)
HCT VFR BLD AUTO: 36.4 % (ref 34–45)
HGB BLD-MCNC: 12.3 G/DL (ref 11.5–14.9)
IMM GRANULOCYTES # BLD: 0.02 10*3/MM3 (ref 0–0.03)
IMM GRANULOCYTES NFR BLD: 0.3 % (ref 0–0.5)
LYMPHOCYTES # BLD AUTO: 1.79 10*3/MM3 (ref 1–3.5)
LYMPHOCYTES NFR BLD AUTO: 28 % (ref 20–49)
MCH RBC QN AUTO: 29 PG (ref 27–33)
MCHC RBC AUTO-ENTMCNC: 33.8 G/DL (ref 32–35)
MCV RBC AUTO: 85.8 FL (ref 83–97)
MONOCYTES # BLD AUTO: 0.63 10*3/MM3 (ref 0.25–0.8)
MONOCYTES NFR BLD AUTO: 9.9 % (ref 4–12)
NEUTROPHILS # BLD AUTO: 3.42 10*3/MM3 (ref 1.5–7)
NEUTROPHILS NFR BLD AUTO: 53.5 % (ref 39–75)
NRBC BLD MANUAL-RTO: 0 /100 WBC (ref 0–0)
PLATELET # BLD AUTO: 178 10*3/MM3 (ref 150–375)
PMV BLD AUTO: 10.7 FL (ref 8.9–12.1)
RBC # BLD AUTO: 4.24 10*6/MM3 (ref 3.9–5)
WBC NRBC COR # BLD: 6.39 10*3/MM3 (ref 4–10)

## 2018-05-30 PROCEDURE — 99214 OFFICE O/P EST MOD 30 MIN: CPT | Performed by: INTERNAL MEDICINE

## 2018-05-30 PROCEDURE — 85025 COMPLETE CBC W/AUTO DIFF WBC: CPT | Performed by: INTERNAL MEDICINE

## 2018-05-30 PROCEDURE — 36415 COLL VENOUS BLD VENIPUNCTURE: CPT | Performed by: INTERNAL MEDICINE

## 2018-05-31 NOTE — PROGRESS NOTES
Subjective .     REASONS FOR FOLLOWUP:    1. Anemia secondary to iron deficiency, intolerant of oral iron, status post Venofer 100 mg on 09/21/11, followed by Feraheme x two doses, 09/28 and 10/05/11.  Recurrent iron deficiency requiring Feraheme again on 09/24/12 and 10/05/12.  Further recurrence of iron deficiency requiring Feraheme on 10/5 in 10/12/17.  2. No definitive evidence of GI blood loss, stool cards negative for occult blood, status post GI evaluation with Dr. Curran with no bleeding source identified.   3. History of gastric polyps, status post staged excision via EGD.    4. Status post left nephrectomy in 2004 for renal cell carcinoma.   5. Stage III chronic kidney disease.   6. Stage I ([id number]) left papillary thyroid cancer (3mm): Status post total thyroidectomy 11/16/15, 2 lymph nodes removed.  7. Stage IIa (T3N0M0) ascending colon cancer: Preoperative CEA 1.65 on 4/25/18.  Right hemicolectomy 4/27/18, invasive moderately differentiated adenocarcinoma, negative margins, positive lymphovascular invasion, negative perineural invasion, 10 lymph nodes negative, ulcerated small adjacent pericolonic abscess (microscopic perforation).  Microsatellite stable.  8. 2.2 cm right renal lesion, indeterminate on CT 4/26/18, felt to represent proteinaceous cyst by urology.  An one-year follow-up CT.  9. Atrial fibrillation continuing on anticoagulation with Eliquis.    HISTORY OF PRESENT ILLNESS:  The patient is a 82 y.o. year old female who is here for follow-up with the above-mentioned history.    History of Present Illness the patient returns today in follow-up with laboratory studies to review.  In the interval, she has continued to recover well from her surgery.  She has only some minimal pain around her incision when she coughs.  Her performance status is back to her previous baseline area did she has no complaints today.  She did return to see Dr. Trujillo in urology and he felt that the finding on CT  represented a proteinaceous cyst and plans a one year follow-up CT.  The patient has not yet scheduled her visit in genetics.      PAST MEDICAL HISTORY:    1.  Atrial fibrillation.  The patient underwent ablation procedure in 2004, repeat ablation procedure on 09/13/11 by Dr. Melendez.  Subsequent AV deshawn ablation and March 2017 and subsequent cardioversion in May 2017.  Continuation of anticoagulation with Eliquis  2.  Gastroesophageal reflux disease.  3.  Hypertension.   4.  Status post left knee surgery.   5.  Status post pacemaker placement.   6.  Status post laparoscopic cholecystectomy.   7.  Status post bladder tacking procedure.   8.  Status post EGD and colonoscopy with Dr. Curran 08/08/11.  Finding of gastric polyps, one of which was resected showing a hyperplastic polyp with gastritis.  There was no source of bleeding identified.  Schatzki's ring was identified as well as diverticulosis.  Repeat EGD on 03/18/13 with resection of a 3 cm prepyloric hyperplastic polyp of acute and chronic inflammatory change. Repeat EGD, October 2013 with further resection of polyp, benign findings.  EGD 10/23/17 with small gastric polyps identified, no evidence of active bleeding.  9.  Bladder suspension surgery in 5/12.  10.  Status post left knee replacement in May 2013.    Past Medical History:   Diagnosis Date   • Acute bronchitis    • Acute sinusitis    • Anemia     Iron deficiency   • Anesthesia complication    • Anticoagulated on warfarin    • Arthritis    • Bladder dysfunction    • Cancer of left kidney 06/08/2004    S/P Left Radical Neprectomy   • Chronic dysfunction of left eustachian tube    • CKD (chronic kidney disease)     stage 3   • Depression    • Diarrhea    • Diverticulosis 08/08/2011    Descending Colon & Sigmoid Colon multiple diverticula   • Epigastric pain    • Fatigue    • Frequent urination    • GERD (gastroesophageal reflux disease)    • Goiter     thyroid removed in november 2015   • Health care  maintenance    • History of shingles    • History of transfusion    • Hypertension    • Insomnia    • Left atrial enlargement    • Left ventricular hypertrophy    • Mitral regurgitation     mild to moderate per echocardiogram 2016   • Nerve pain     LUE D/T SHINGLES   • Neuropathy     ARMS   • NANCY (obstructive sleep apnea)     uses CPAP   • Paroxysmal atrial fibrillation    • Pulmonary hypertension     per echo 2016   • Right atrial enlargement    • SSS (sick sinus syndrome)    • Thyroid cancer    • URI (upper respiratory infection)    • Urinary urgency      Past Surgical History:   Procedure Laterality Date   • ATRIAL ABLATION SURGERY N/A 09/13/2011    Comprehensive electrophysiology study, Left atrial pace & sense, 3-dimensional cardia mapping, Radiofrequency catheter ablation, Transseptal hear catheterization, Dr. Maldonado Melendez   • ATRIAL ABLATION SURGERY N/A 02/11/2004    Dr. Maldonado Melendez   • BLADDER SURGERY      Bladder tacking procedure   • BLADDER SUSPENSION N/A 05/2012   • CARDIAC ELECTROPHYSIOLOGY PROCEDURE N/A 5/17/2016    Procedure: PPM generator change - dual BOSTON;  Surgeon: Maldonado Melendez MD;  Location: [facility] CATH INVASIVE LOCATION;  Service:    • CARDIAC ELECTROPHYSIOLOGY PROCEDURE N/A 3/23/2017    Procedure: AV node ablation pt has BOSTON PPM;  Surgeon: Maldonado Melendez MD;  Location: [facility] CATH INVASIVE LOCATION;  Service:    • CARDIOVERSION     • CATARACT EXTRACTION Bilateral     Dr. Gramajo   • COLON RESECTION Right 4/27/2018    Procedure: COLON RESECTION RIGHT LAPAROSCOPIC, possible open;  Surgeon: Rebeca Curran MD;  Location: [facility] MAIN OR;  Service: General   • COLONOSCOPY N/A 04/01/2003    Normal colonoscopy except for an occasional diverticulosis, Dr. Nathan Macias   • COLONOSCOPY N/A 4/25/2018    Procedure: COLONOSCOPY INTO CECUM AND TI WITH SALINE LIFT, HOT SNARE POLYPECTOMIES, BX'S, SPOT INJECTION, RESOLUTION CLIPS X2;  Surgeon: Rebeca Curran MD;  Location: [facility] ENDOSCOPY;  Service:  Gastroenterology   • CYSTOSCOPY BOTOX INJECTION OF BLADDER N/A 6/27/2016    Procedure: CYSTOSCOPY WITH BOTOX DETRUSOR INJECTION;  Surgeon: Salome Bowen MD;  Location: [facility] OR;  Service:    • CYSTOSCOPY BOTOX INJECTION OF BLADDER N/A 05/29/2015    Dr. Salome Bowen   • ENDOSCOPY N/A 06/10/2015    Antral Polyp: Gastric Mucosa w/ marked cautery artifact,  Hiatal Hernia, Dr. Rebeca Curran   • ENDOSCOPY N/A 10/28/2013    Gastric polyp: polypoid hyperplastic gastric mucosa w/ focal ulceration, mixed acute & chronic inflammation.  Negative for intestinal metaplasia, no helicobacter organisms identified on diff-wuik stain, Dr. Rebeca Curran   • ENDOSCOPY N/A 03/18/2013    Large prepyloric polyp, partially removed: fragment of polypoid hyperplastic gastric mucosa w/ foci of erosion & patchy mixed acute & chronic inflammation. No helicobacter organisms identified on diff-quick stain. Negative for intestinal metaplasia, negative for dysplasia, Dr. Rebeca Curran   • ENDOSCOPY N/A 10/24/2012    large prepyloric mass (3cm) w/ adjacent nodules: hyperplastic polyp w/ mild chronic active gastritis, focal erosion & associated, Multiple fundic polyps: polypoid mucosal hyperplasia, Dr. Rebeca Curran   • ENDOSCOPY N/A 03/11/2003    Gastric Antral Biopsies: Fragments of Gastric Mucosa & Necrotic Tissue (Compatible w/ Ulcer) w/ chronic active inflammation.  Negative stain for Helicobacter organisms. Dr. Nathan Macias   • ENDOSCOPY N/A 10/23/2017    Procedure: ESOPHAGOGASTRODUODENOSCOPY;  Surgeon: Rebeca Curran MD;  Location: [facility] ENDOSCOPY;  Service:    • ENDOSCOPY AND COLONOSCOPY N/A 08/08/2011    4cm hiatal hernia, Schatzki's ring, Diverticulosis, Large antral polyps, Dr. Rebeca Curran   • INCISIONAL HERNIA REPAIR N/A 06/11/2015    Defect approximately 2.5 cm w/ a mass of incarcerated tissue approximately the size of a nectarine, Dr. Rebeca Curran   • KNEE MINI REVISION Right 9/28/2016    Procedure: RT KNEE POLY INSERT  CHANGE ;  Surgeon: Tommy Avila MD;  Location: [facility] OR;  Service:    • LAPAROSCOPIC CHOLECYSTECTOMY N/A    • NEPHRECTOMY RADICAL Left 06/08/2004    Incidentially found left renal mass 3-4 cm renal cell carcinoma, left lower pole, Dr. Salome Bowen   • OTHER SURGICAL HISTORY      NEUROMUSCULAR BLOCKERS BOTULINUM TOXIN ONABOTULINUMTOXIN A   • PACEMAKER IMPLANTATION N/A     Dr. Casillas   • PACEMAKER REPLACEMENT N/A 09/24/2010    Implanted Generator is a [employer] ALTRUA 60 model S603DR, serial # [id number], Dr. Chavez Jimenez   • TOTAL KNEE ARTHROPLASTY Left 05/21/2013    Dr. Miguel Pacheco   • TOTAL THYROIDECTOMY N/A 11/16/2015    Dr. Gideon Ashley, [facility]   • TRANSVAGINAL TAPING SUSPENSION N/A 03/16/2009    Mini Sling, Closed suprapubic cystostomy, Dr. Salome Bowen   • TUBAL ABDOMINAL LIGATION Bilateral          HEMATOLOGICONCOLOGIC HISTORY:    The patient was seen during a hospitalization at [facility] in September 2011.  She was admitted with left lower lobe pneumonia following a cardiac ablation procedure.  She has anemia dating back to 2006, at that time with a hematocrit of 36.7 and MCV of 77.  Her platelets and white count have remained normal.  Hemoglobin in September 2010 was 10.3, MCV 74.  In December 2010 hemoglobin was 10.5 with MCV of 72.  On 08/09/11 hemoglobin was down to 9.8 with MCV of 73.  During the hospitalization hemoglobin had dropped to 8.5 with MCV of 72 and decreased further to 8.0 at which point she was transfused two units of PRBCs.  She was on Coumadin but had no obvious clinical evidence of GI blood loss.  She was evaluated by Dr. Curran on 08/08/11 with EGD and colonoscopy showing evidence of gastric polyps.  One polyp was biopsied showing evidence of a hyperplastic polyp with gastritis.  There was no definitive evidence of bleeding.  She was having some dysphagia and had a Schatzki's ring which was dilated.  She developed rapid ventricular response and therefore the procedure  was stopped.  She then underwent cardiac ablation and subsequently developed pneumonia.      During her hospitalization, lab studies were sent including iron studies showing an iron of 11, TIBC 438, transferrin 294, iron percent sat of 3 with a ferritin of 10.8.  B12 was low-normal at 291, folate normal.  Creatinine was in the 1.2 range with estimated GFR in the 40 range (stage III chronic kidney disease).  Erythropoietin level was 66.  A hemoglobin electrophoresis was sent showing hemoglobin A1 of 97.5%, and hemoglobin A2 of 2.2%.  The patient had stool checked for occult blood in the hospital which again was negative, therefore, with no definitive evidence of GI blood loss.  I suspect she may have a malabsorption issue.  She was intolerant of oral iron and treated with Venofer 100 mg on 09/21/11 and subsequently was discharged from the hospital and treated as an outpatient with Feraheme 510 mg on 09/28/11 and again on 10/05/11.      The patient returned for followup studies on 10/19/11 with hemoglobin that improved to the 13 range and MCV up to 79.  TIBC was 284, iron percent sat 32, ferritin 335.  Reticulated hemoglobin had improved to 32.9.  B12 was 476.  We will follow her counts over time.  She will followup with Dr. Curran regarding her gastric polyps.      Laboratory studies on 02/15/12 showed a ferritin stable at 112.  We will recheck at a 6 month interval.     Lab studies from 9/10/12 shows a hemoglobin 11.9, reticulated hemoglobin 29.9, TIBC 325, iron percent SAT of 11.  Ferritin down to 18.      Feraheme administered on 9/24/12 and 10/5/12.  Repeat iron studies on 3/15/13 showed a ferritin of 101, iron percent saturation of 31.       Labs 10/10/2013 with a ferritin of 85, iron percent sat down to 15, hemoglobin of 12.9. Recheck in six months.    Labs 04/20/2015 showed a ferritin stable in the normal range at 76.     The patient returned after a prolonged absence from our office on 9/21/17 with  hemoglobin of 12.0, MCV 79.2, iron 37, ferritin 20, iron saturation 8%, TIBC 441 CONSISTENT with recurrent iron deficiency.  Folate was normal at 10.52, B12 normal at 398, erythropoietin 32.6.  Additional Feraheme initiated on 10/5/17 for 2 doses.  Repeat EGD with Dr. Curran on 10/23/17 with no active bleeding, small less than 6 mm gastric polyps identified.       ONCOLOGIC HISTORY:   Status post left nephrectomy in 2004 for renal cell carcinoma with Dr. Bowen.  This was a Darnell grade 2 lesion measuring 3.0 cm, confined to the kidney, with no extension to perinephric adipose tissue, and no evidence of lymphovascular invasion.  Margins were negative.  Adrenal gland was negative.      CT scan of the abdomen and pelvis o 02/26/12 showed no evidence of recurrent disease.      Stage I ([id number]) left papillary thyroid cancer (3mm): Status post total thyroidectomy 11/16/15, 2 lymph nodes removed.      Stage IIa (T3N0M0) ascending colon cancer: Preoperative CEA 1.65 on 4/25/18.  Right hemicolectomy 4/27/18, invasive moderately differentiated adenocarcinoma, negative margins, positive lymphovascular invasion, negative perineural invasion, 10 lymph nodes negative, ulcerated small adjacent pericolonic abscess (microscopic perforation).  Microsatellite stable.    Current Outpatient Prescriptions on File Prior to Visit   Medication Sig Dispense Refill   • apixaban (ELIQUIS) 5 MG tablet tablet Take 1 tablet by mouth Every 12 (Twelve) Hours. 60 tablet 1   • B Complex-C (SUPER B COMPLEX PO) Take 1 tablet by mouth Daily.     • CALCIUM PO Take 600 Units by mouth Daily. With D3, listed separetly     • cholecalciferol (VITAMIN D3) 1000 UNITS tablet Take 1,000 Units by mouth daily. In addition to the calcium -D3 pill     • esomeprazole (NEXIUM) 40 MG capsule Take 40 mg by mouth Every Morning Before Breakfast.     • furosemide (LASIX) 40 MG tablet Take 40 mg by mouth Daily.     • GLUCOSAMINE-CHONDROITIN PO Take 1 tablet by mouth  Daily.     • lisinopril (PRINIVIL,ZESTRIL) 10 MG tablet Take 1 tablet by mouth Daily. 90 tablet 2   • lisinopril (PRINIVIL,ZESTRIL) 10 MG tablet TAKE ONE TABLET BY MOUTH DAILY 90 tablet 1   • loperamide (IMODIUM) 2 MG capsule Take 2 mg by mouth 4 (four) times a day as needed for diarrhea.     • metoprolol tartrate (LOPRESSOR) 25 MG tablet Take 25 mg by mouth 2 (Two) Times a Day.     • Multiple Vitamins-Minerals (PRESERVISION AREDS PO) Take 1 tablet by mouth Daily.     • rOPINIRole (REQUIP) 0.5 MG tablet Take 1 hour before bedtime. 30 tablet 2   • spironolactone (ALDACTONE) 25 MG tablet Take 1 tablet by mouth Daily. 90 tablet 3   • SYNTHROID 125 MCG tablet Take 125 mcg by mouth Daily.     • tolterodine LA (DETROL LA) 2 MG 24 hr capsule Take 2 mg by mouth Daily.     • XIIDRA 5 % solution Apply 1 drop to eye 2 (Two) Times a Day.       No current facility-administered medications on file prior to visit.        ALLERGIES:     Allergies   Allergen Reactions   • Sulfa Antibiotics Swelling     FACE AND TONGUE   • Adhesive Tape Rash   • Levaquin [Levofloxacin] Rash     Social History     Social History   • Marital status:      Spouse name: N/A   • Number of children: 0   • Years of education: N/A     Occupational History   • Homemaker Unemployed     Social History Main Topics   • Smoking status: Never Smoker   • Smokeless tobacco: Never Used      Comment: caffeine use   • Alcohol use No   • Drug use: No   • Sexual activity: Defer     Other Topics Concern   • Not on file     Social History Narrative   • No narrative on file     Family History   Problem Relation Age of Onset   • Heart disease Other    • Breast cancer Sister         In her 60s.   • Breast cancer Maternal Aunt    • Breast cancer Sister         In her 60s.   • Heart disease Mother    • Hypertension Mother    • Heart disease Father    • Hypertension Father    • Heart disease Daughter    • Hypertension Daughter    • Heart disease Son    • Hypertension Son     • Malig Hyperthermia Neg Hx        Review of Systems   Constitutional: Positive for fatigue. Negative for activity change, appetite change, fever and unexpected weight change.   HENT: Negative for congestion, mouth sores, nosebleeds, sore throat and voice change.    Respiratory: Negative for cough, shortness of breath and wheezing.    Cardiovascular: Negative for chest pain, palpitations and leg swelling.   Gastrointestinal: Positive for abdominal pain. Negative for abdominal distention, blood in stool, constipation, diarrhea, nausea and vomiting.   Endocrine: Negative for cold intolerance and heat intolerance.   Genitourinary: Negative for difficulty urinating, dysuria, frequency and hematuria.   Musculoskeletal: Negative for arthralgias, back pain, joint swelling and myalgias.   Skin: Negative for rash.   Neurological: Negative for dizziness, syncope, weakness, light-headedness, numbness and headaches.   Hematological: Negative for adenopathy. Does not bruise/bleed easily.   Psychiatric/Behavioral: Negative for confusion and sleep disturbance. The patient is not nervous/anxious.          Objective      Vitals:    05/30/18 1407   BP: 136/80   Pulse: 75   Temp: 97.8 °F (36.6 °C)   SpO2: 97%      Current Status 5/30/2018   ECOG score 0   pain 0/10    Physical Exam   Constitutional: She is oriented to person, place, and time. She appears well-developed and well-nourished.   HENT:   Mouth/Throat: Oropharynx is clear and moist.   Eyes: Conjunctivae are normal.   Neck: No thyromegaly present.   Cardiovascular: Normal rate and regular rhythm.  Exam reveals no gallop and no friction rub.    No murmur heard.  Pulmonary/Chest: Breath sounds normal. No respiratory distress.   Abdominal: Soft. Bowel sounds are normal. She exhibits no distension. There is no tenderness.   Surgical incisions healed   Musculoskeletal: She exhibits no edema.   Lymphadenopathy:        Head (right side): No submandibular adenopathy present.      She has no cervical adenopathy.     She has no axillary adenopathy.        Right: No inguinal and no supraclavicular adenopathy present.        Left: No inguinal and no supraclavicular adenopathy present.   Neurological: She is alert and oriented to person, place, and time. She displays normal reflexes. No cranial nerve deficit. She exhibits normal muscle tone.   Skin: Skin is warm and dry. No rash noted.   Psychiatric: She has a normal mood and affect. Her behavior is normal.       RECENT LABS:  Hematology WBC   Date Value Ref Range Status   05/30/2018 6.39 4.00 - 10.00 10*3/mm3 Final     RBC   Date Value Ref Range Status   05/30/2018 4.24 3.90 - 5.00 10*6/mm3 Final     Hemoglobin   Date Value Ref Range Status   05/30/2018 12.3 11.5 - 14.9 g/dL Final     Hematocrit   Date Value Ref Range Status   05/30/2018 36.4 34.0 - 45.0 % Final     Platelets   Date Value Ref Range Status   05/30/2018 178 150 - 375 10*3/mm3 Final        Lab Results   Component Value Date    GLUCOSE 114 05/04/2018    BUN 15 05/04/2018    CREATININE 1.44 (H) 05/04/2018    EGFRIFNONA 35 (L) 05/04/2018    EGFRIFAFRI 50 (L) 12/06/2017    BCR 10.4 05/04/2018    K 5.1 (H) 05/04/2018    CO2 28.4 05/04/2018    CALCIUM 9.4 05/04/2018    PROTENTOTREF 7.4 12/06/2017    ALBUMIN 3.70 05/04/2018    LABIL2 1.1 05/04/2018    AST 20 05/04/2018    ALT 15 05/04/2018       Assessment/Plan      1. Recurrent iron deficiency anemia: The patient is intolerant of oral iron. Prior GI evaluation showed no definitive evidence of GI blood loss. She has underlying stage III chronic kidney disease, which is a contributing factor to her mild borderline anemia. She has been treated in the past with IV iron in 2011 and 2012.  She had a lengthy absence from our office from 2015 until she was referred back on 9/21/17 with evidence of recurrent iron deficiency, likely related to malabsorption.  Her ferritin on 7/7/17 was 21 and hemoglobin on 8/7/17 was 11.1.  Her degree of anemia  "was mild with a hemoglobin of 11.4, microcytic indices with an MCV of 78.  Labs on 9/21/17 showed ferritin 20.5, iron saturation 8%, TIBC 441.  We did also check B12 and folate which were normal.  Erythropoietin was 32 consistent with a component of anemia secondary to chronic kidney disease. She did return stool cards for occult blood which were negative ×3 10/5/17.  She received additional Feraheme on 10/5 and 10/12/17.  She underwent EGD with Dr. Curran 10/23/17 with no evidence of active bleeding, small gastric polyps identified.  Labs 12/20/17 with hemoglobin up to 13.3, ferritin 97.9, iron saturation of 16%.  Repeat labs performed postoperatively from colon resection on 5/4/18 showed iron 36, ferritin 99, iron saturation 10%, TIBC 349.  Today, hemoglobin has improved up to 12.3.  We will repeat iron studies just prior to the patient's return visit in 3 months.  2. Stage IIa (T3N0M0) ascending colon cancer: She develop significant diarrhea with blood per rectum.  She underwent colonoscopy 4/25/18 with identification of polyps in the rectum, cecal polyp, mass in the right colon.  The cecal polyp was a sessile serrated adenoma, ascending colon \"mass\" showed hyperplastic polyp, rectal polyp was a tubulovillous adenoma with low-grade dysplasia.  CEA on 4/25/18 was normal at 1.65.  Chest x-ray 4/25/18 was unrevealing.  CT of the abdomen and pelvis 4/26/18 showed a 4 cm annular mass in the ascending colon with adjacent haziness in the mesentery but no lymphadenopathy, no evidence of metastatic disease.  The patient underwent a right hemicolectomy on 4/27/18 with pathology showing an invasive moderately differentiated adenocarcinoma arising in a sessile serrated adenoma with negative margins measuring 2.5 cm extending through the muscularis propria into brad-colorectal tissue, positive lymphovascular invasion, negative perineural invasion, 10 lymph nodes negative.  There was a comment regarding an ulcerated small " adjacent pericolonic abscess, raising the question of possible microscopic perforation.  Patient did have a few high risk features with less than 12 lymph nodes removed, positive lymphovascular invasion, and some possibility of microscopic perforation.  The tumor was microsatellite stable.  Given her age and comorbidities, the patient was a poor candidate for adjuvant chemotherapy and was not recommended.  Independent of this recommendation, the patient stated she was not interested in taking any adjuvant chemotherapy.  Therefore we will plan to monitor her clinically for evidence of recurrent disease.  Standard monitoring is with routine CEA levels however this does not appear to be informative for her given her normal preoperative value.  We will consider annual interval follow-up CT and will discuss this in the future.  The patient has currently recovered well from surgery.  I will see her back in 3 months for follow-up with repeat CEA to be performed one week prior to the visit.  3. 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  We did review her family history which includes a sister with breast cancer around age 60, another sister with breast cancer around age 60, maternal aunt with breast cancer over the age of 50, maternal uncle with lung cancer.  Given the patient's multiple malignancies and family history, she was referred to the genetics clinic to discuss potential genetic testing, mainly for benefit of the patient's children and other family members.  Her colon cancer was microsatellite stable.  She has not yet arranged the appointment in the genetics clinic, likely will not occur until August.  4. History of gastric polyps: EGD 10/23/17 with small less than 6 mm gastric polyps, not  resected.  5. History of renal cell carcinoma: The patient underwent a left nephrectomy in 2004 with no clinical evidence of recurrent disease.  CT chest from 11/14/16 showed no evidence of recurrence.  CT abdomen and pelvis however from 4/26/18 showed a 2.2 cm right renal lesion which was indeterminate, possibly a proteinaceous cyst but could not rule out mass lesion.  The study was performed without contrast due to the patient's underlying chronic kidney disease.  She is unable to undergo renal mass protocol CT with contrast.  She is unable to undergo MRI due to her pacemaker.  The patient did see urology with confirmation of proteinaceous cyst with repeat CT planned in one year.    PLAN:     1. The patient will arrange appointment in genetics clinic  2. M.D. visit in 3 months.  One week prior we will draw labs with CBC, CMP, CEA, iron panel, ferritin.

## 2018-06-15 ENCOUNTER — CLINICAL SUPPORT NO REQUIREMENTS (OUTPATIENT)
Dept: CARDIOLOGY | Facility: CLINIC | Age: 83
End: 2018-06-15

## 2018-06-15 DIAGNOSIS — I48.21 PERMANENT ATRIAL FIBRILLATION (HCC): Primary | ICD-10-CM

## 2018-06-15 PROCEDURE — 93296 REM INTERROG EVL PM/IDS: CPT | Performed by: INTERNAL MEDICINE

## 2018-06-15 PROCEDURE — 93294 REM INTERROG EVL PM/LDLS PM: CPT | Performed by: INTERNAL MEDICINE

## 2018-06-18 ENCOUNTER — TELEPHONE (OUTPATIENT)
Dept: SURGERY | Facility: CLINIC | Age: 83
End: 2018-06-18

## 2018-06-18 NOTE — TELEPHONE ENCOUNTER
ADDEND  Not unless it recurs.  Only thing we could do is proceed with a cscope.  Rebeca Curran MD  Patient called stating she had an episode of rectal bleeding over the weekend s/p laparoscopic right hemicolectomy for colon cancer on 4/27. On Saturday pt was using the restroom, passed gas (no bowel movement) and noticed bright red blood on the toilet paper. She has not experienced any other symptoms and it has not happened again since then, she just wanted you to be aware. Would you like to see in the office? Please advise.

## 2018-06-29 RX ORDER — APIXABAN 5 MG/1
TABLET, FILM COATED ORAL
Qty: 60 TABLET | Refills: 0 | OUTPATIENT
Start: 2018-06-29

## 2018-07-31 ENCOUNTER — CLINICAL SUPPORT (OUTPATIENT)
Dept: OTHER | Facility: HOSPITAL | Age: 83
End: 2018-07-31
Attending: INTERNAL MEDICINE

## 2018-07-31 DIAGNOSIS — C18.2 MALIGNANT NEOPLASM OF ASCENDING COLON (HCC): Primary | ICD-10-CM

## 2018-07-31 PROCEDURE — G0463 HOSPITAL OUTPT CLINIC VISIT: HCPCS

## 2018-07-31 NOTE — PROGRESS NOTES
Pt. Here for genetic counseling. Labs drawn per right AC vein per Dr. DANIA Omalley. Sterile 2 x 2 applied. 1 large lavender tube sent to [facility].

## 2018-08-15 ENCOUNTER — LAB (OUTPATIENT)
Dept: LAB | Facility: HOSPITAL | Age: 83
End: 2018-08-15
Attending: INTERNAL MEDICINE

## 2018-08-15 DIAGNOSIS — D63.1 ANEMIA IN STAGE 3 CHRONIC KIDNEY DISEASE (HCC): ICD-10-CM

## 2018-08-15 DIAGNOSIS — D50.8 OTHER IRON DEFICIENCY ANEMIA: ICD-10-CM

## 2018-08-15 DIAGNOSIS — N18.30 ANEMIA IN STAGE 3 CHRONIC KIDNEY DISEASE (HCC): ICD-10-CM

## 2018-08-15 DIAGNOSIS — C18.2 MALIGNANT NEOPLASM OF ASCENDING COLON (HCC): ICD-10-CM

## 2018-08-15 LAB
ALBUMIN SERPL-MCNC: 3.8 G/DL (ref 3.5–5.2)
ALBUMIN/GLOB SERPL: 1.2 G/DL (ref 1.1–2.4)
ALP SERPL-CCNC: 90 U/L (ref 38–116)
ALT SERPL W P-5'-P-CCNC: 22 U/L (ref 0–33)
ANION GAP SERPL CALCULATED.3IONS-SCNC: 13.3 MMOL/L
AST SERPL-CCNC: 24 U/L (ref 0–32)
BASOPHILS # BLD AUTO: 0.04 10*3/MM3 (ref 0–0.1)
BASOPHILS NFR BLD AUTO: 0.6 % (ref 0–1.1)
BILIRUB SERPL-MCNC: 0.4 MG/DL (ref 0.1–1.2)
BUN BLD-MCNC: 30 MG/DL (ref 6–20)
BUN/CREAT SERPL: 19 (ref 7.3–30)
CALCIUM SPEC-SCNC: 9 MG/DL (ref 8.5–10.2)
CEA SERPL-MCNC: 1.33 NG/ML
CHLORIDE SERPL-SCNC: 104 MMOL/L (ref 98–107)
CO2 SERPL-SCNC: 21.7 MMOL/L (ref 22–29)
CREAT BLD-MCNC: 1.58 MG/DL (ref 0.6–1.1)
DEPRECATED RDW RBC AUTO: 47.9 FL (ref 37–49)
EOSINOPHIL # BLD AUTO: 0.27 10*3/MM3 (ref 0–0.36)
EOSINOPHIL NFR BLD AUTO: 4.1 % (ref 1–5)
ERYTHROCYTE [DISTWIDTH] IN BLOOD BY AUTOMATED COUNT: 14.9 % (ref 11.7–14.5)
FERRITIN SERPL-MCNC: 53 NG/ML
GFR SERPL CREATININE-BSD FRML MDRD: 31 ML/MIN/1.73
GLOBULIN UR ELPH-MCNC: 3.3 GM/DL (ref 1.8–3.5)
GLUCOSE BLD-MCNC: 93 MG/DL (ref 74–124)
HCT VFR BLD AUTO: 37.2 % (ref 34–45)
HGB BLD-MCNC: 12.2 G/DL (ref 11.5–14.9)
IMM GRANULOCYTES # BLD: 0.03 10*3/MM3 (ref 0–0.03)
IMM GRANULOCYTES NFR BLD: 0.5 % (ref 0–0.5)
IRON 24H UR-MRATE: 59 MCG/DL (ref 37–145)
IRON SATN MFR SERPL: 16 % (ref 14–48)
LYMPHOCYTES # BLD AUTO: 1.97 10*3/MM3 (ref 1–3.5)
LYMPHOCYTES NFR BLD AUTO: 30.2 % (ref 20–49)
MCH RBC QN AUTO: 28.6 PG (ref 27–33)
MCHC RBC AUTO-ENTMCNC: 32.8 G/DL (ref 32–35)
MCV RBC AUTO: 87.3 FL (ref 83–97)
MONOCYTES # BLD AUTO: 0.76 10*3/MM3 (ref 0.25–0.8)
MONOCYTES NFR BLD AUTO: 11.7 % (ref 4–12)
NEUTROPHILS # BLD AUTO: 3.45 10*3/MM3 (ref 1.5–7)
NEUTROPHILS NFR BLD AUTO: 52.9 % (ref 39–75)
NRBC BLD MANUAL-RTO: 0 /100 WBC (ref 0–0)
PLATELET # BLD AUTO: 259 10*3/MM3 (ref 150–375)
PMV BLD AUTO: 9.5 FL (ref 8.9–12.1)
POTASSIUM BLD-SCNC: 4.7 MMOL/L (ref 3.5–4.7)
PROT SERPL-MCNC: 7.1 G/DL (ref 6.3–8)
RBC # BLD AUTO: 4.26 10*6/MM3 (ref 3.9–5)
SODIUM BLD-SCNC: 139 MMOL/L (ref 134–145)
TIBC SERPL-MCNC: 363 MCG/DL (ref 249–505)
TRANSFERRIN SERPL-MCNC: 259 MG/DL (ref 200–360)
WBC NRBC COR # BLD: 6.52 10*3/MM3 (ref 4–10)

## 2018-08-15 PROCEDURE — 82378 CARCINOEMBRYONIC ANTIGEN: CPT | Performed by: INTERNAL MEDICINE

## 2018-08-15 PROCEDURE — 84466 ASSAY OF TRANSFERRIN: CPT | Performed by: INTERNAL MEDICINE

## 2018-08-15 PROCEDURE — 82728 ASSAY OF FERRITIN: CPT | Performed by: INTERNAL MEDICINE

## 2018-08-15 PROCEDURE — 36415 COLL VENOUS BLD VENIPUNCTURE: CPT | Performed by: INTERNAL MEDICINE

## 2018-08-15 PROCEDURE — 85025 COMPLETE CBC W/AUTO DIFF WBC: CPT | Performed by: INTERNAL MEDICINE

## 2018-08-15 PROCEDURE — 80053 COMPREHEN METABOLIC PANEL: CPT | Performed by: INTERNAL MEDICINE

## 2018-08-15 PROCEDURE — 83540 ASSAY OF IRON: CPT | Performed by: INTERNAL MEDICINE

## 2018-08-22 ENCOUNTER — OFFICE VISIT (OUTPATIENT)
Dept: ONCOLOGY | Facility: CLINIC | Age: 83
End: 2018-08-22
Attending: INTERNAL MEDICINE

## 2018-08-22 ENCOUNTER — APPOINTMENT (OUTPATIENT)
Dept: LAB | Facility: HOSPITAL | Age: 83
End: 2018-08-22
Attending: INTERNAL MEDICINE

## 2018-08-22 VITALS
HEIGHT: 63 IN | RESPIRATION RATE: 12 BRPM | TEMPERATURE: 98 F | OXYGEN SATURATION: 98 % | SYSTOLIC BLOOD PRESSURE: 120 MMHG | HEART RATE: 70 BPM | DIASTOLIC BLOOD PRESSURE: 70 MMHG | BODY MASS INDEX: 37.42 KG/M2 | WEIGHT: 211.2 LBS

## 2018-08-22 DIAGNOSIS — C18.2 MALIGNANT NEOPLASM OF ASCENDING COLON (HCC): Primary | ICD-10-CM

## 2018-08-22 DIAGNOSIS — D50.8 OTHER IRON DEFICIENCY ANEMIA: ICD-10-CM

## 2018-08-22 PROCEDURE — 99214 OFFICE O/P EST MOD 30 MIN: CPT | Performed by: INTERNAL MEDICINE

## 2018-08-22 PROCEDURE — G0463 HOSPITAL OUTPT CLINIC VISIT: HCPCS | Performed by: INTERNAL MEDICINE

## 2018-08-23 ENCOUNTER — OFFICE VISIT (OUTPATIENT)
Dept: ORTHOPEDIC SURGERY | Facility: CLINIC | Age: 83
End: 2018-08-23

## 2018-08-23 VITALS — HEIGHT: 63 IN | WEIGHT: 211 LBS | TEMPERATURE: 97.7 F | BODY MASS INDEX: 37.39 KG/M2

## 2018-08-23 DIAGNOSIS — G89.29 BILATERAL CHRONIC KNEE PAIN: Primary | ICD-10-CM

## 2018-08-23 DIAGNOSIS — M25.562 BILATERAL CHRONIC KNEE PAIN: Primary | ICD-10-CM

## 2018-08-23 DIAGNOSIS — M25.561 BILATERAL CHRONIC KNEE PAIN: Primary | ICD-10-CM

## 2018-08-23 PROCEDURE — 20610 DRAIN/INJ JOINT/BURSA W/O US: CPT | Performed by: ORTHOPAEDIC SURGERY

## 2018-08-23 PROCEDURE — 99213 OFFICE O/P EST LOW 20 MIN: CPT | Performed by: ORTHOPAEDIC SURGERY

## 2018-08-23 PROCEDURE — 73562 X-RAY EXAM OF KNEE 3: CPT | Performed by: ORTHOPAEDIC SURGERY

## 2018-08-23 RX ORDER — LIDOCAINE HYDROCHLORIDE 10 MG/ML
2 INJECTION, SOLUTION EPIDURAL; INFILTRATION; INTRACAUDAL; PERINEURAL
Status: COMPLETED | OUTPATIENT
Start: 2018-08-23 | End: 2018-08-23

## 2018-08-23 RX ORDER — METHYLPREDNISOLONE ACETATE 80 MG/ML
80 INJECTION, SUSPENSION INTRA-ARTICULAR; INTRALESIONAL; INTRAMUSCULAR; SOFT TISSUE
Status: COMPLETED | OUTPATIENT
Start: 2018-08-23 | End: 2018-08-23

## 2018-08-23 RX ADMIN — METHYLPREDNISOLONE ACETATE 80 MG: 80 INJECTION, SUSPENSION INTRA-ARTICULAR; INTRALESIONAL; INTRAMUSCULAR; SOFT TISSUE at 11:59

## 2018-08-23 RX ADMIN — LIDOCAINE HYDROCHLORIDE 2 ML: 10 INJECTION, SOLUTION EPIDURAL; INFILTRATION; INTRACAUDAL; PERINEURAL at 11:59

## 2018-08-23 NOTE — PROGRESS NOTES
Patient: Anastacia Sarah  YOB: 1935 82 y.o. female  Medical Record Number: [id number]    Chief Complaints:   Chief Complaint   Patient presents with   • Left Knee - Establish Care, Pain   • Right Knee - Establish Care, Pain       History of Present Illness:Anastacia Sarah is a 82 y.o. female who presents for follow-up of  right medial knee pain.  She had a knee poly-exchange about a year ago her see [facility] in the past and is undergone some physical therapy and applied gel to the medial aspect of the right knee with mild intermittent relief.  She now has an aching pain worse with activity which is slightly progressed over the last month or 2    Allergies:   Allergies   Allergen Reactions   • Sulfa Antibiotics Swelling     FACE AND TONGUE   • Adhesive Tape Rash   • Levaquin [Levofloxacin] Rash       Medications:   Current Outpatient Prescriptions   Medication Sig Dispense Refill   • apixaban (ELIQUIS) 5 MG tablet tablet Take 1 tablet by mouth Every 12 (Twelve) Hours. 60 tablet 3   • B Complex-C (SUPER B COMPLEX PO) Take 1 tablet by mouth Daily.     • CALCIUM PO Take 600 Units by mouth Daily. With D3, listed separetly     • cholecalciferol (VITAMIN D3) 1000 UNITS tablet Take 1,000 Units by mouth daily. In addition to the calcium -D3 pill     • esomeprazole (NEXIUM) 40 MG capsule Take 40 mg by mouth Every Morning Before Breakfast.     • furosemide (LASIX) 40 MG tablet Take 40 mg by mouth Daily.     • GLUCOSAMINE-CHONDROITIN PO Take 1 tablet by mouth Daily.     • lisinopril (PRINIVIL,ZESTRIL) 10 MG tablet Take 1 tablet by mouth Daily. 90 tablet 2   • lisinopril (PRINIVIL,ZESTRIL) 10 MG tablet TAKE ONE TABLET BY MOUTH DAILY 90 tablet 1   • loperamide (IMODIUM) 2 MG capsule Take 2 mg by mouth 4 (four) times a day as needed for diarrhea.     • metoprolol tartrate (LOPRESSOR) 25 MG tablet Take 25 mg by mouth 2 (Two) Times a Day.     • metoprolol tartrate (LOPRESSOR) 25 MG tablet TAKE ONE TABLET BY MOUTH  "TWICE A  tablet 0   • Multiple Vitamins-Minerals (PRESERVISION AREDS PO) Take 1 tablet by mouth Daily.     • rOPINIRole (REQUIP) 0.5 MG tablet Take 1 hour before bedtime. 30 tablet 2   • spironolactone (ALDACTONE) 25 MG tablet Take 1 tablet by mouth Daily. 90 tablet 3   • SYNTHROID 125 MCG tablet Take 125 mcg by mouth Daily.     • tolterodine LA (DETROL LA) 2 MG 24 hr capsule Take 2 mg by mouth Daily.     • XIIDRA 5 % solution Apply 1 drop to eye 2 (Two) Times a Day.       No current facility-administered medications for this visit.          The following portions of the patient's history were reviewed and updated as appropriate: allergies, current medications, past family history, past medical history, past social history, past surgical history and problem list.    Review of Systems:   A 14 point review of systems was performed. All systems negative except pertinent positives/negative listed in HPI above    Physical Exam:   Vitals:    08/23/18 1132   Temp: 97.7 °F (36.5 °C)   Weight: 95.7 kg (211 lb)   Height: 160 cm (63\")       General: A and O x 3, ASA, NAD    SCLERA:    Normal    DENTITION:   Normal  Knee:  right    ALIGNMENT:     Neutral  ,   Patella tracks   midline    GAIT:     Nonantalgic    SKIN:    No abnormality, well-healed midline incision    RANGE OF MOTION:   0  -  135   DEG    STRENGTH:   5 / 5    LIGAMENTS:    No varus / valgus instability.   Negative  Lachman.    MENISCUS:     Negative   Donnie       DISTAL PULSES:    Paplable    DISTAL SENSATION :   Intact    LYMPHATICS:     No   lymphadenopathy    OTHER:          - No  effusion      - No crepitance with ROM      +Swelling and tenderness to palpation pes anserine bursa     Radiology:  Xrays 3views both knees (ap,lateral, sunrise) were ordered and reviewed for evaluation of knee pain demonstrating well positioned knee replacements without evidence of wear, loosening or osteolysis  todays xrays were compared to previous xrays and " demonstrate no change    Assessment/Plan:  Right knee pad as anserine bursitis ROM total knee replacement.  I injected the peds anserine bursa as below.  She does have some back issues to plan I have encouraged her to talk to her spine specialist for further evaluation  Large Joint Arthrocentesis  Date/Time: 8/23/2018 11:59 AM  Consent given by: patient  Site marked: site marked  Timeout: Immediately prior to procedure a time out was called to verify the correct patient, procedure, equipment, support staff and site/side marked as required   Supporting Documentation  Indications: pain and joint swelling   Procedure Details  Location: knee - R knee  Preparation: Patient was prepped and draped in the usual sterile fashion  Needle size: 18 G  Approach: medial  Medications administered: 2 mL lidocaine PF 1% 1 %; 80 mg methylPREDNISolone acetate 80 MG/ML

## 2018-08-23 NOTE — PROGRESS NOTES
Subjective .     REASONS FOR FOLLOWUP:    1. Anemia secondary to iron deficiency, intolerant of oral iron, status post Venofer 100 mg on 09/21/11, followed by Feraheme x two doses, 09/28 and 10/05/11.  Recurrent iron deficiency requiring Feraheme again on 09/24/12 and 10/05/12.  Further recurrence of iron deficiency requiring Feraheme on 10/5 in 10/12/17.  2. No definitive evidence of GI blood loss, stool cards negative for occult blood, status post GI evaluation with Dr. Curran with no bleeding source identified.   3. History of gastric polyps, status post staged excision via EGD.    4. Status post left nephrectomy in 2004 for renal cell carcinoma.   5. Stage III chronic kidney disease.   6. Stage I ([id number]) left papillary thyroid cancer (3mm): Status post total thyroidectomy 11/16/15, 2 lymph nodes removed.  7. Stage IIa (T3N0M0) ascending colon cancer: Preoperative CEA 1.65 on 4/25/18.  Right hemicolectomy 4/27/18, invasive moderately differentiated adenocarcinoma, negative margins, positive lymphovascular invasion, negative perineural invasion, 10 lymph nodes negative, ulcerated small adjacent pericolonic abscess (microscopic perforation).  Microsatellite stable.  8. 2.2 cm right renal lesion, indeterminate on CT 4/26/18, felt to represent proteinaceous cyst by urology.  Plan one-year follow-up CT.  9. Atrial fibrillation continuing on anticoagulation with Eliquis.    HISTORY OF PRESENT ILLNESS:  The patient is a 82 y.o. year old female who is here for follow-up with the above-mentioned history.    History of Present Illness the patient returns today in follow-up with laboratory studies to review.  In the interval, she has done very well.  She does have some intermittent difficulties with constipation ever since time of her surgery.  She did have one episode of bright red blood per rectum and contacted Dr. Curran's office.  It was felt that this likely was hemorrhoidal in nature and she was instructed to  call with any further bleeding however that has not occurred.  She does note mild fatigue which is unchanged.  She did undergo genetic testing 7/31/18 however has not yet heard the results.      PAST MEDICAL HISTORY:    1.  Atrial fibrillation.  The patient underwent ablation procedure in 2004, repeat ablation procedure on 09/13/11 by Dr. Melendez.  Subsequent AV dehsawn ablation and March 2017 and subsequent cardioversion in May 2017.  Continuation of anticoagulation with Eliquis  2.  Gastroesophageal reflux disease.  3.  Hypertension.   4.  Status post left knee surgery.   5.  Status post pacemaker placement.   6.  Status post laparoscopic cholecystectomy.   7.  Status post bladder tacking procedure.   8.  Status post EGD and colonoscopy with Dr. Curran 08/08/11.  Finding of gastric polyps, one of which was resected showing a hyperplastic polyp with gastritis.  There was no source of bleeding identified.  Schatzki's ring was identified as well as diverticulosis.  Repeat EGD on 03/18/13 with resection of a 3 cm prepyloric hyperplastic polyp of acute and chronic inflammatory change. Repeat EGD, October 2013 with further resection of polyp, benign findings.  EGD 10/23/17 with small gastric polyps identified, no evidence of active bleeding.  9.  Bladder suspension surgery in 5/12.  10.  Status post left knee replacement in May 2013.    Past Medical History:   Diagnosis Date   • Acute bronchitis    • Acute sinusitis    • Anemia     Iron deficiency   • Anesthesia complication    • Anticoagulated on warfarin    • Arthritis    • Bladder dysfunction    • Cancer of left kidney (CMS/HCC) 06/08/2004    S/P Left Radical Neprectomy   • Chronic dysfunction of left eustachian tube    • CKD (chronic kidney disease)     stage 3   • Depression    • Diarrhea    • Diverticulosis 08/08/2011    Descending Colon & Sigmoid Colon multiple diverticula   • Epigastric pain    • Fatigue    • Frequent urination    • GERD (gastroesophageal reflux  disease)    • Goiter     thyroid removed in november 2015   • Health care maintenance    • History of shingles    • History of transfusion    • Hypertension    • Insomnia    • Left atrial enlargement    • Left ventricular hypertrophy    • Mitral regurgitation     mild to moderate per echocardiogram 2016   • Nerve pain     LUE D/T SHINGLES   • Neuropathy     ARMS   • NANCY (obstructive sleep apnea)     uses CPAP   • Paroxysmal atrial fibrillation (CMS/HCC)    • Pulmonary hypertension     per echo 2016   • Right atrial enlargement    • SSS (sick sinus syndrome) (CMS/HCC)    • Thyroid cancer (CMS/HCC)    • URI (upper respiratory infection)    • Urinary urgency      Past Surgical History:   Procedure Laterality Date   • ATRIAL ABLATION SURGERY N/A 09/13/2011    Comprehensive electrophysiology study, Left atrial pace & sense, 3-dimensional cardia mapping, Radiofrequency catheter ablation, Transseptal hear catheterization, Dr. Maldonado Melendez   • ATRIAL ABLATION SURGERY N/A 02/11/2004    Dr. Maldonado Melendez   • BLADDER SURGERY      Bladder tacking procedure   • BLADDER SUSPENSION N/A 05/2012   • CARDIAC ELECTROPHYSIOLOGY PROCEDURE N/A 5/17/2016    Procedure: PPM generator change - dual BOSTON;  Surgeon: Maldonado Melendez MD;  Location: [facility] CATH INVASIVE LOCATION;  Service:    • CARDIAC ELECTROPHYSIOLOGY PROCEDURE N/A 3/23/2017    Procedure: AV node ablation pt has BOSTON PPM;  Surgeon: Maldonado Melendez MD;  Location: [facility] CATH INVASIVE LOCATION;  Service:    • CARDIOVERSION     • CATARACT EXTRACTION Bilateral     Dr. Gramaoj   • COLON RESECTION Right 4/27/2018    Procedure: COLON RESECTION RIGHT LAPAROSCOPIC, possible open;  Surgeon: Rebeca Curran MD;  Location: [facility] MAIN OR;  Service: General   • COLONOSCOPY N/A 04/01/2003    Normal colonoscopy except for an occasional diverticulosis, Dr. Nathan Macias   • COLONOSCOPY N/A 4/25/2018    Procedure: COLONOSCOPY INTO CECUM AND TI WITH SALINE LIFT, HOT SNARE POLYPECTOMIES, BX'S,  SPOT INJECTION, RESOLUTION CLIPS X2;  Surgeon: Rebeca Curran MD;  Location: [facility] ENDOSCOPY;  Service: Gastroenterology   • CYSTOSCOPY BOTOX INJECTION OF BLADDER N/A 6/27/2016    Procedure: CYSTOSCOPY WITH BOTOX DETRUSOR INJECTION;  Surgeon: Salome Bowen MD;  Location: [facility] OR;  Service:    • CYSTOSCOPY BOTOX INJECTION OF BLADDER N/A 05/29/2015    Dr. Salome Bowen   • ENDOSCOPY N/A 06/10/2015    Antral Polyp: Gastric Mucosa w/ marked cautery artifact,  Hiatal Hernia, Dr. Rebeca Curran   • ENDOSCOPY N/A 10/28/2013    Gastric polyp: polypoid hyperplastic gastric mucosa w/ focal ulceration, mixed acute & chronic inflammation.  Negative for intestinal metaplasia, no helicobacter organisms identified on diff-wuik stain, Dr. Rebeca Curran   • ENDOSCOPY N/A 03/18/2013    Large prepyloric polyp, partially removed: fragment of polypoid hyperplastic gastric mucosa w/ foci of erosion & patchy mixed acute & chronic inflammation. No helicobacter organisms identified on diff-quick stain. Negative for intestinal metaplasia, negative for dysplasia, Dr. Rebeca Curran   • ENDOSCOPY N/A 10/24/2012    large prepyloric mass (3cm) w/ adjacent nodules: hyperplastic polyp w/ mild chronic active gastritis, focal erosion & associated, Multiple fundic polyps: polypoid mucosal hyperplasia, Dr. Rebeca Curran   • ENDOSCOPY N/A 03/11/2003    Gastric Antral Biopsies: Fragments of Gastric Mucosa & Necrotic Tissue (Compatible w/ Ulcer) w/ chronic active inflammation.  Negative stain for Helicobacter organisms. Dr. Nathan Macias   • ENDOSCOPY N/A 10/23/2017    Procedure: ESOPHAGOGASTRODUODENOSCOPY;  Surgeon: Rebeca Curran MD;  Location: [facility] ENDOSCOPY;  Service:    • ENDOSCOPY AND COLONOSCOPY N/A 08/08/2011    4cm hiatal hernia, Schatzki's ring, Diverticulosis, Large antral polyps, Dr. Rebeca Curran   • INCISIONAL HERNIA REPAIR N/A 06/11/2015    Defect approximately 2.5 cm w/ a mass of incarcerated tissue approximately the size of a  joel, Dr. Rebeca Curran   • KNEE MINI REVISION Right 9/28/2016    Procedure: RT KNEE POLY INSERT CHANGE ;  Surgeon: Tommy Avila MD;  Location: [facility] OR;  Service:    • LAPAROSCOPIC CHOLECYSTECTOMY N/A    • NEPHRECTOMY RADICAL Left 06/08/2004    Incidentially found left renal mass 3-4 cm renal cell carcinoma, left lower pole, Dr. Salome Bowen   • OTHER SURGICAL HISTORY      NEUROMUSCULAR BLOCKERS BOTULINUM TOXIN ONABOTULINUMTOXIN A   • PACEMAKER IMPLANTATION N/A     Dr. Casillas   • PACEMAKER REPLACEMENT N/A 09/24/2010    Implanted Generator is a [employer] ALTRUA 60 model S603DR, serial # [id number], Dr. Chavez Jimenez   • TOTAL KNEE ARTHROPLASTY Left 05/21/2013    Dr. Miguel Pacheco   • TOTAL THYROIDECTOMY N/A 11/16/2015    Dr. Gideon Ashley, [facility]   • TRANSVAGINAL TAPING SUSPENSION N/A 03/16/2009    Mini Sling, Closed suprapubic cystostomy, Dr. Salome Bowen   • TUBAL ABDOMINAL LIGATION Bilateral          HEMATOLOGICONCOLOGIC HISTORY:    The patient was seen during a hospitalization at [facility] in September 2011.  She was admitted with left lower lobe pneumonia following a cardiac ablation procedure.  She has anemia dating back to 2006, at that time with a hematocrit of 36.7 and MCV of 77.  Her platelets and white count have remained normal.  Hemoglobin in September 2010 was 10.3, MCV 74.  In December 2010 hemoglobin was 10.5 with MCV of 72.  On 08/09/11 hemoglobin was down to 9.8 with MCV of 73.  During the hospitalization hemoglobin had dropped to 8.5 with MCV of 72 and decreased further to 8.0 at which point she was transfused two units of PRBCs.  She was on Coumadin but had no obvious clinical evidence of GI blood loss.  She was evaluated by Dr. Curran on 08/08/11 with EGD and colonoscopy showing evidence of gastric polyps.  One polyp was biopsied showing evidence of a hyperplastic polyp with gastritis.  There was no definitive evidence of bleeding.  She was having some dysphagia and had a  Schatzki's ring which was dilated.  She developed rapid ventricular response and therefore the procedure was stopped.  She then underwent cardiac ablation and subsequently developed pneumonia.      During her hospitalization, lab studies were sent including iron studies showing an iron of 11, TIBC 438, transferrin 294, iron percent sat of 3 with a ferritin of 10.8.  B12 was low-normal at 291, folate normal.  Creatinine was in the 1.2 range with estimated GFR in the 40 range (stage III chronic kidney disease).  Erythropoietin level was 66.  A hemoglobin electrophoresis was sent showing hemoglobin A1 of 97.5%, and hemoglobin A2 of 2.2%.  The patient had stool checked for occult blood in the hospital which again was negative, therefore, with no definitive evidence of GI blood loss.  I suspect she may have a malabsorption issue.  She was intolerant of oral iron and treated with Venofer 100 mg on 09/21/11 and subsequently was discharged from the hospital and treated as an outpatient with Feraheme 510 mg on 09/28/11 and again on 10/05/11.      The patient returned for followup studies on 10/19/11 with hemoglobin that improved to the 13 range and MCV up to 79.  TIBC was 284, iron percent sat 32, ferritin 335.  Reticulated hemoglobin had improved to 32.9.  B12 was 476.  We will follow her counts over time.  She will followup with Dr. Curran regarding her gastric polyps.      Laboratory studies on 02/15/12 showed a ferritin stable at 112.  We will recheck at a 6 month interval.     Lab studies from 9/10/12 shows a hemoglobin 11.9, reticulated hemoglobin 29.9, TIBC 325, iron percent SAT of 11.  Ferritin down to 18.      Feraheme administered on 9/24/12 and 10/5/12.  Repeat iron studies on 3/15/13 showed a ferritin of 101, iron percent saturation of 31.       Labs 10/10/2013 with a ferritin of 85, iron percent sat down to 15, hemoglobin of 12.9. Recheck in six months.    Labs 04/20/2015 showed a ferritin stable in the normal  range at 76.     The patient returned after a prolonged absence from our office on 9/21/17 with hemoglobin of 12.0, MCV 79.2, iron 37, ferritin 20, iron saturation 8%, TIBC 441 CONSISTENT with recurrent iron deficiency.  Folate was normal at 10.52, B12 normal at 398, erythropoietin 32.6.  Additional Feraheme initiated on 10/5/17 for 2 doses.  Repeat EGD with Dr. Curran on 10/23/17 with no active bleeding, small less than 6 mm gastric polyps identified.       ONCOLOGIC HISTORY:   Status post left nephrectomy in 2004 for renal cell carcinoma with Dr. Bowen.  This was a Darnell grade 2 lesion measuring 3.0 cm, confined to the kidney, with no extension to perinephric adipose tissue, and no evidence of lymphovascular invasion.  Margins were negative.  Adrenal gland was negative.      CT scan of the abdomen and pelvis o 02/26/12 showed no evidence of recurrent disease.      Stage I ([id number]) left papillary thyroid cancer (3mm): Status post total thyroidectomy 11/16/15, 2 lymph nodes removed.      Stage IIa (T3N0M0) ascending colon cancer: Preoperative CEA 1.65 on 4/25/18.  Right hemicolectomy 4/27/18, invasive moderately differentiated adenocarcinoma, negative margins, positive lymphovascular invasion, negative perineural invasion, 10 lymph nodes negative, ulcerated small adjacent pericolonic abscess (microscopic perforation).  Microsatellite stable.    Current Outpatient Prescriptions on File Prior to Visit   Medication Sig Dispense Refill   • apixaban (ELIQUIS) 5 MG tablet tablet Take 1 tablet by mouth Every 12 (Twelve) Hours. 60 tablet 3   • B Complex-C (SUPER B COMPLEX PO) Take 1 tablet by mouth Daily.     • CALCIUM PO Take 600 Units by mouth Daily. With D3, listed separetly     • cholecalciferol (VITAMIN D3) 1000 UNITS tablet Take 1,000 Units by mouth daily. In addition to the calcium -D3 pill     • esomeprazole (NEXIUM) 40 MG capsule Take 40 mg by mouth Every Morning Before Breakfast.     • furosemide (LASIX) 40  MG tablet Take 40 mg by mouth Daily.     • GLUCOSAMINE-CHONDROITIN PO Take 1 tablet by mouth Daily.     • lisinopril (PRINIVIL,ZESTRIL) 10 MG tablet Take 1 tablet by mouth Daily. 90 tablet 2   • lisinopril (PRINIVIL,ZESTRIL) 10 MG tablet TAKE ONE TABLET BY MOUTH DAILY 90 tablet 1   • loperamide (IMODIUM) 2 MG capsule Take 2 mg by mouth 4 (four) times a day as needed for diarrhea.     • metoprolol tartrate (LOPRESSOR) 25 MG tablet Take 25 mg by mouth 2 (Two) Times a Day.     • metoprolol tartrate (LOPRESSOR) 25 MG tablet TAKE ONE TABLET BY MOUTH TWICE A  tablet 0   • Multiple Vitamins-Minerals (PRESERVISION AREDS PO) Take 1 tablet by mouth Daily.     • spironolactone (ALDACTONE) 25 MG tablet Take 1 tablet by mouth Daily. 90 tablet 3   • SYNTHROID 125 MCG tablet Take 125 mcg by mouth Daily.     • tolterodine LA (DETROL LA) 2 MG 24 hr capsule Take 2 mg by mouth Daily.     • XIIDRA 5 % solution Apply 1 drop to eye 2 (Two) Times a Day.     • rOPINIRole (REQUIP) 0.5 MG tablet Take 1 hour before bedtime. 30 tablet 2     No current facility-administered medications on file prior to visit.        ALLERGIES:     Allergies   Allergen Reactions   • Sulfa Antibiotics Swelling     FACE AND TONGUE   • Adhesive Tape Rash   • Levaquin [Levofloxacin] Rash     Social History     Social History   • Marital status:      Spouse name: N/A   • Number of children: 0   • Years of education: N/A     Occupational History   • Homemaker Unemployed     Social History Main Topics   • Smoking status: Never Smoker   • Smokeless tobacco: Never Used      Comment: caffeine use   • Alcohol use No   • Drug use: No   • Sexual activity: Defer     Other Topics Concern   • Not on file     Social History Narrative   • No narrative on file     Family History   Problem Relation Age of Onset   • Heart disease Other    • Breast cancer Sister         In her 60s.   • Breast cancer Maternal Aunt    • Breast cancer Sister         In her 60s.   • Heart  disease Mother    • Hypertension Mother    • Heart disease Father    • Hypertension Father    • Heart disease Daughter    • Hypertension Daughter    • Heart disease Son    • Hypertension Son    • Malig Hyperthermia Neg Hx        Review of Systems   Constitutional: Positive for fatigue. Negative for activity change, appetite change, fever and unexpected weight change.   HENT: Negative for congestion, mouth sores, nosebleeds, sore throat and voice change.    Respiratory: Negative for cough, shortness of breath and wheezing.    Cardiovascular: Negative for chest pain, palpitations and leg swelling.   Gastrointestinal: Positive for constipation. Negative for abdominal distention, abdominal pain, blood in stool, diarrhea, nausea and vomiting.   Endocrine: Negative for cold intolerance and heat intolerance.   Genitourinary: Negative for difficulty urinating, dysuria, frequency and hematuria.   Musculoskeletal: Negative for arthralgias, back pain, joint swelling and myalgias.   Skin: Negative for rash.   Neurological: Negative for dizziness, syncope, weakness, light-headedness, numbness and headaches.   Hematological: Negative for adenopathy. Does not bruise/bleed easily.   Psychiatric/Behavioral: Negative for confusion and sleep disturbance. The patient is not nervous/anxious.          Objective      Vitals:    08/22/18 1514   BP: 120/70   Pulse: 70   Resp: 12   Temp: 98 °F (36.7 °C)   SpO2: 98%      Current Status 8/22/2018   ECOG score 0   pain 0/10    Physical Exam   Constitutional: She is oriented to person, place, and time. She appears well-developed and well-nourished.   HENT:   Mouth/Throat: Oropharynx is clear and moist.   Eyes: Conjunctivae are normal.   Neck: No thyromegaly present.   Cardiovascular: Normal rate and regular rhythm.  Exam reveals no gallop and no friction rub.    No murmur heard.  Pulmonary/Chest: Breath sounds normal. No respiratory distress.   Abdominal: Soft. Bowel sounds are normal. She  exhibits no distension. There is no tenderness.   Musculoskeletal: She exhibits no edema.   Lymphadenopathy:        Head (right side): No submandibular adenopathy present.     She has no cervical adenopathy.     She has no axillary adenopathy.        Right: No inguinal and no supraclavicular adenopathy present.        Left: No inguinal and no supraclavicular adenopathy present.   Neurological: She is alert and oriented to person, place, and time. She displays normal reflexes. No cranial nerve deficit. She exhibits normal muscle tone.   Skin: Skin is warm and dry. No rash noted.   Psychiatric: She has a normal mood and affect. Her behavior is normal.       RECENT LABS:  Hematology WBC   Date Value Ref Range Status   08/15/2018 6.52 4.00 - 10.00 10*3/mm3 Final     RBC   Date Value Ref Range Status   08/15/2018 4.26 3.90 - 5.00 10*6/mm3 Final     Hemoglobin   Date Value Ref Range Status   08/15/2018 12.2 11.5 - 14.9 g/dL Final     Hematocrit   Date Value Ref Range Status   08/15/2018 37.2 34.0 - 45.0 % Final     Platelets   Date Value Ref Range Status   08/15/2018 259 150 - 375 10*3/mm3 Final        Lab Results   Component Value Date    GLUCOSE 93 08/15/2018    BUN 30 (H) 08/15/2018    CREATININE 1.58 (H) 08/15/2018    EGFRIFNONA 31 (L) 08/15/2018    EGFRIFAFRI 50 (L) 12/06/2017    BCR 19.0 08/15/2018    K 4.7 08/15/2018    CO2 21.7 (L) 08/15/2018    CALCIUM 9.0 08/15/2018    PROTENTOTREF 7.4 12/06/2017    ALBUMIN 3.80 08/15/2018    LABIL2 1.3 12/06/2017    AST 24 08/15/2018    ALT 22 08/15/2018     Additional labs 8/15/18: Iron 59, ferritin 53, iron saturation 16%, TIBC 363, CEA 1.33    Assessment/Plan      1. Recurrent iron deficiency anemia: The patient is intolerant of oral iron. Prior GI evaluation showed no definitive evidence of GI blood loss. She has underlying stage III chronic kidney disease, which is a contributing factor to her mild borderline anemia. She has been treated in the past with IV iron in 2011  "and 2012.  She had a lengthy absence from our office from 2015 until she was referred back on 9/21/17 with evidence of recurrent iron deficiency, likely related to malabsorption.  Her ferritin on 7/7/17 was 21 and hemoglobin on 8/7/17 was 11.1.  Her degree of anemia was mild with a hemoglobin of 11.4, microcytic indices with an MCV of 78.  Labs on 9/21/17 showed ferritin 20.5, iron saturation 8%, TIBC 441.  We did also check B12 and folate which were normal.  Erythropoietin was 32 consistent with a component of anemia secondary to chronic kidney disease. She did return stool cards for occult blood which were negative ×3 10/5/17.  She received additional Feraheme on 10/5 and 10/12/17.  She underwent EGD with Dr. Curran 10/23/17 with no evidence of active bleeding, small gastric polyps identified.  Labs 12/20/17 with hemoglobin up to 13.3, ferritin 97.9, iron saturation of 16%.  Repeat labs performed postoperatively from colon resection on 5/4/18 showed iron 36, ferritin 99, iron saturation 10%, TIBC 349.  The patient returns today with repeat labs from 8/15/18 showing a ferritin that has declined to 53 however iron saturation is slightly improved at 16%, TIBC of 363.  Hemoglobin is stable at 12.2.  We did discuss that the patient likely will require additional IV iron in the future however does not yet require iron treatment and we will recheck studies when she returns in 3 months.  2. Stage IIa (T3N0M0) ascending colon cancer: She develop significant diarrhea with blood per rectum.  She underwent colonoscopy 4/25/18 with identification of polyps in the rectum, cecal polyp, mass in the right colon.  The cecal polyp was a sessile serrated adenoma, ascending colon \"mass\" showed hyperplastic polyp, rectal polyp was a tubulovillous adenoma with low-grade dysplasia.  CEA on 4/25/18 was normal at 1.65.  Chest x-ray 4/25/18 was unrevealing.  CT of the abdomen and pelvis 4/26/18 showed a 4 cm annular mass in the ascending " colon with adjacent haziness in the mesentery but no lymphadenopathy, no evidence of metastatic disease.  The patient underwent a right hemicolectomy on 4/27/18 with pathology showing an invasive moderately differentiated adenocarcinoma arising in a sessile serrated adenoma with negative margins measuring 2.5 cm extending through the muscularis propria into brad-colorectal tissue, positive lymphovascular invasion, negative perineural invasion, 10 lymph nodes negative.  There was a comment regarding an ulcerated small adjacent pericolonic abscess, raising the question of possible microscopic perforation.  Patient did have a few high risk features with less than 12 lymph nodes removed, positive lymphovascular invasion, and some possibility of microscopic perforation.  The tumor was microsatellite stable.  Given her age and comorbidities, the patient was a poor candidate for adjuvant chemotherapy and was not recommended.  Independent of this recommendation, the patient stated she was not interested in taking any adjuvant chemotherapy.  Therefore we will plan to monitor her clinically for evidence of recurrent disease.  Standard monitoring is with routine CEA levels however this does not appear to be informative for her given her normal preoperative value.  We will consider annual interval follow-up CT and will discuss this in the future.  The patient returns today with no clinical evidence of recurrent disease and a normal CEA from 8/15/18 at 1.33.  3. 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  We did review her family history which includes a sister with breast cancer around age 60, another sister with breast cancer around age 60, maternal aunt with breast cancer over the age of 50, maternal uncle with lung  cancer.  Given the patient's multiple malignancies and family history, she was referred to the genetics clinic to discuss potential genetic testing, mainly for benefit of the patient's children and other family members.  Her colon cancer was microsatellite stable.  The patient did have genetic testing drawn 7/31/18 however has not yet been notified of the results.  4. History of gastric polyps: EGD 10/23/17 with small less than 6 mm gastric polyps, not resected.  5. History of renal cell carcinoma: The patient underwent a left nephrectomy in 2004 with no clinical evidence of recurrent disease.  CT chest from 11/14/16 showed no evidence of recurrence.  CT abdomen and pelvis however from 4/26/18 showed a 2.2 cm right renal lesion which was indeterminate, possibly a proteinaceous cyst but could not rule out mass lesion.  The study was performed without contrast due to the patient's underlying chronic kidney disease.  She is unable to undergo renal mass protocol CT with contrast.  She is unable to undergo MRI due to her pacemaker.  The patient did see urology with confirmation of proteinaceous cyst with repeat CT planned in one year.    PLAN:     1. Await results from genetic testing performed 7/31/18  2. M.D. visit in 3 months.  One week prior we will draw labs with CBC, CMP, CEA, iron panel, ferritin.

## 2018-09-04 ENCOUNTER — APPOINTMENT (OUTPATIENT)
Dept: OTHER | Facility: HOSPITAL | Age: 83
End: 2018-09-04

## 2018-09-04 DIAGNOSIS — I48.19 PERSISTENT ATRIAL FIBRILLATION (HCC): ICD-10-CM

## 2018-09-04 DIAGNOSIS — R06.02 SHORTNESS OF BREATH: ICD-10-CM

## 2018-09-06 ENCOUNTER — TRANSCRIBE ORDERS (OUTPATIENT)
Dept: ADMINISTRATIVE | Facility: HOSPITAL | Age: 83
End: 2018-09-06

## 2018-09-06 ENCOUNTER — LAB (OUTPATIENT)
Dept: LAB | Facility: HOSPITAL | Age: 83
End: 2018-09-06
Attending: OTOLARYNGOLOGY

## 2018-09-06 DIAGNOSIS — C73 THYROID CANCER (HCC): ICD-10-CM

## 2018-09-06 DIAGNOSIS — E03.9 HYPOTHYROIDISM, ADULT: Primary | ICD-10-CM

## 2018-09-06 DIAGNOSIS — E03.9 HYPOTHYROIDISM, ADULT: ICD-10-CM

## 2018-09-06 LAB
T3FREE SERPL-MCNC: 2.69 PG/ML (ref 2–4.4)
T4 FREE SERPL-MCNC: 1.79 NG/DL (ref 0.93–1.7)
TSH SERPL DL<=0.05 MIU/L-ACNC: 0.06 MIU/ML (ref 0.27–4.2)

## 2018-09-06 PROCEDURE — 84481 FREE ASSAY (FT-3): CPT

## 2018-09-06 PROCEDURE — 84439 ASSAY OF FREE THYROXINE: CPT

## 2018-09-06 PROCEDURE — 84443 ASSAY THYROID STIM HORMONE: CPT

## 2018-09-06 PROCEDURE — 36415 COLL VENOUS BLD VENIPUNCTURE: CPT

## 2018-09-06 RX ORDER — FUROSEMIDE 20 MG/1
TABLET ORAL
Qty: 60 TABLET | Refills: 3 | Status: SHIPPED | OUTPATIENT
Start: 2018-09-06 | End: 2019-05-28 | Stop reason: SDUPTHER

## 2018-10-19 ENCOUNTER — CLINICAL SUPPORT NO REQUIREMENTS (OUTPATIENT)
Dept: CARDIOLOGY | Facility: CLINIC | Age: 83
End: 2018-10-19

## 2018-10-19 ENCOUNTER — OFFICE VISIT (OUTPATIENT)
Dept: CARDIOLOGY | Facility: CLINIC | Age: 83
End: 2018-10-19

## 2018-10-19 VITALS
DIASTOLIC BLOOD PRESSURE: 82 MMHG | SYSTOLIC BLOOD PRESSURE: 120 MMHG | HEART RATE: 70 BPM | WEIGHT: 207 LBS | HEIGHT: 62 IN | BODY MASS INDEX: 38.09 KG/M2

## 2018-10-19 DIAGNOSIS — I49.5 SICK SINUS SYNDROME (HCC): ICD-10-CM

## 2018-10-19 DIAGNOSIS — I48.21 PERMANENT ATRIAL FIBRILLATION (HCC): Primary | ICD-10-CM

## 2018-10-19 DIAGNOSIS — I10 ESSENTIAL HYPERTENSION: ICD-10-CM

## 2018-10-19 DIAGNOSIS — I44.2 THIRD DEGREE AV BLOCK (HCC): ICD-10-CM

## 2018-10-19 DIAGNOSIS — I48.20 CHRONIC ATRIAL FIBRILLATION (HCC): Primary | ICD-10-CM

## 2018-10-19 PROCEDURE — 99213 OFFICE O/P EST LOW 20 MIN: CPT | Performed by: INTERNAL MEDICINE

## 2018-10-19 PROCEDURE — 93000 ELECTROCARDIOGRAM COMPLETE: CPT | Performed by: INTERNAL MEDICINE

## 2018-10-19 PROCEDURE — 93279 PRGRMG DEV EVAL PM/LDLS PM: CPT | Performed by: INTERNAL MEDICINE

## 2018-10-19 NOTE — PROGRESS NOTES
Date of Office Visit: 10/19/2018  Encounter Provider: Maldonado Melendez MD  Place of Service: [facility] CARDIOLOGY  Patient Name: Anastacia Sarah  : 1935    Subjective:     Encounter Date:10/19/2018      Patient ID: Anastacia Sarah is a 83 y.o. female who has a cc of perm af and av node ablation and singe chamber pacer     Had 12 inches colon removed in April by dr keenan for colon ca       No anginal chest pain,   Mild winters,   No soa,   No fainting,  No orthostasis.   No edema.   Exercise tolerance: limited but stable --     There have been no bleeding events.       Past Medical History:   Diagnosis Date   • Acute bronchitis    • Acute sinusitis    • Anemia     Iron deficiency   • Anesthesia complication    • Anticoagulated on warfarin    • Arthritis    • Bladder dysfunction    • Cancer of left kidney (CMS/HCC) 2004    S/P Left Radical Neprectomy   • Cardiac disease    • Chronic dysfunction of left eustachian tube    • CKD (chronic kidney disease)     stage 3   • Colon cancer (CMS/HCC)    • Depression    • Diarrhea    • Diverticulosis 2011    Descending Colon & Sigmoid Colon multiple diverticula   • Epigastric pain    • Fatigue    • Frequent urination    • GERD (gastroesophageal reflux disease)    • Goiter     thyroid removed in 2015   • Health care maintenance    • History of colitis    • History of shingles    • History of transfusion    • Hypertension    • Insomnia    • Left atrial enlargement    • Left ventricular hypertrophy    • Mitral regurgitation     mild to moderate per echocardiogram 2016   • Nerve pain     LUE D/T SHINGLES   • Neuropathy     ARMS   • NANCY (obstructive sleep apnea)     uses CPAP   • Paroxysmal atrial fibrillation (CMS/HCC)    • Pulmonary hypertension (CMS/HCC)     per echo 2016   • Right atrial enlargement    • Sleep apnea    • SSS (sick sinus syndrome) (CMS/HCC)    • Thyroid cancer (CMS/HCC)    • URI (upper respiratory  "infection)    • Urinary urgency        Social History     Social History   • Marital status:      Spouse name: N/A   • Number of children: 0   • Years of education: N/A     Occupational History   • Homemaker Retired     Social History Main Topics   • Smoking status: Never Smoker   • Smokeless tobacco: Never Used      Comment: caffeine use   • Alcohol use No   • Drug use: No   • Sexual activity: Defer     Other Topics Concern   • Not on file     Social History Narrative   • No narrative on file       Review of Systems   Constitution: Negative for fever and night sweats.   HENT: Negative for ear pain and stridor.    Eyes: Negative for discharge and visual halos.   Cardiovascular: Negative for cyanosis.   Respiratory: Negative for hemoptysis and sputum production.    Hematologic/Lymphatic: Negative for adenopathy.   Skin: Negative for nail changes and unusual hair distribution.   Musculoskeletal: Negative for gout and joint swelling.   Gastrointestinal: Negative for bowel incontinence and flatus.   Genitourinary: Negative for dysuria and flank pain.   Neurological: Negative for seizures and tremors.   Psychiatric/Behavioral: Negative for altered mental status. The patient is not nervous/anxious.             Objective:     Vitals:    10/19/18 1110   BP: 120/82   Pulse: 70   Weight: 93.9 kg (207 lb)   Height: 157.5 cm (62\")         Physical Exam   Constitutional: She is oriented to person, place, and time.   HENT:   Head: Normocephalic and atraumatic.   Eyes: Right eye exhibits no discharge. Left eye exhibits no discharge.   Neck: No JVD present. No thyromegaly present.   Cardiovascular: Normal rate and regular rhythm.  Exam reveals no gallop and no friction rub.    No murmur heard.  Pulmonary/Chest: Effort normal and breath sounds normal. She has no rales.   Abdominal: Soft. Bowel sounds are normal. There is no tenderness.   Musculoskeletal: Normal range of motion. She exhibits no edema or deformity. "   Neurological: She is alert and oriented to person, place, and time. She exhibits normal muscle tone.   Skin: Skin is warm and dry. No erythema.   Psychiatric: She has a normal mood and affect. Her behavior is normal. Thought content normal.         ECG 12 Lead  Date/Time: 10/19/2018 11:45 AM  Performed by: ANGELITA BARNES  Authorized by: ANGELITA BARNES   Comparison: compared with previous ECG   Similar to previous ECG  Rhythm: atrial flutter and paced  Clinical impression: abnormal ECG            Lab Review:       Assessment:          Diagnosis Plan   1. Permanent atrial fibrillation (CMS/HCC)     2. Essential hypertension     3. Sick sinus syndrome (CMS/HCC)     4. Third degree AV block (CMS/HCC)            Plan:         Atrial Fibrillation and Atrial Flutter  Assessment  • The patient has permanent atrial fibrillation  • This is non-valvular in etiology    Plan  • Continue apixaban for antithrombotic therapy, bleeding issues discussed        She is doing ok  No HF  No concerning symptoms   Pacer ok

## 2018-10-22 ENCOUNTER — OFFICE VISIT (OUTPATIENT)
Dept: INTERNAL MEDICINE | Facility: CLINIC | Age: 83
End: 2018-10-22

## 2018-10-22 VITALS
WEIGHT: 205 LBS | DIASTOLIC BLOOD PRESSURE: 80 MMHG | TEMPERATURE: 98.2 F | OXYGEN SATURATION: 98 % | BODY MASS INDEX: 37.73 KG/M2 | HEART RATE: 87 BPM | SYSTOLIC BLOOD PRESSURE: 124 MMHG | HEIGHT: 62 IN

## 2018-10-22 DIAGNOSIS — G47.33 OSA ON CPAP: ICD-10-CM

## 2018-10-22 DIAGNOSIS — E55.9 VITAMIN D DEFICIENCY: ICD-10-CM

## 2018-10-22 DIAGNOSIS — E89.0 POSTOPERATIVE HYPOTHYROIDISM: ICD-10-CM

## 2018-10-22 DIAGNOSIS — I10 ESSENTIAL HYPERTENSION: Primary | ICD-10-CM

## 2018-10-22 DIAGNOSIS — Z99.89 OSA ON CPAP: ICD-10-CM

## 2018-10-22 DIAGNOSIS — R05.9 COUGH: ICD-10-CM

## 2018-10-22 PROBLEM — N30.00 ACUTE CYSTITIS WITHOUT HEMATURIA: Status: RESOLVED | Noted: 2017-08-07 | Resolved: 2018-10-22

## 2018-10-22 PROBLEM — R53.83 FATIGUE: Status: RESOLVED | Noted: 2017-02-14 | Resolved: 2018-10-22

## 2018-10-22 PROBLEM — Z23 NEED FOR VACCINATION: Status: RESOLVED | Noted: 2017-12-06 | Resolved: 2018-10-22

## 2018-10-22 PROBLEM — R10.13 EPIGASTRIC PAIN: Status: RESOLVED | Noted: 2017-08-14 | Resolved: 2018-10-22

## 2018-10-22 PROBLEM — R19.7 DIARRHEA: Status: RESOLVED | Noted: 2018-04-17 | Resolved: 2018-10-22

## 2018-10-22 PROBLEM — R06.02 SHORTNESS OF BREATH: Status: RESOLVED | Noted: 2017-06-21 | Resolved: 2018-10-22

## 2018-10-22 PROBLEM — E66.9 OBESITY: Status: RESOLVED | Noted: 2017-02-14 | Resolved: 2018-10-22

## 2018-10-22 PROBLEM — R39.9 UTI SYMPTOMS: Status: RESOLVED | Noted: 2017-12-06 | Resolved: 2018-10-22

## 2018-10-22 LAB
EXPIRATION DATE: NORMAL
FLUAV AG NPH QL: NORMAL
FLUBV AG NPH QL: NORMAL
INTERNAL CONTROL: NORMAL
Lab: NORMAL

## 2018-10-22 PROCEDURE — 87804 INFLUENZA ASSAY W/OPTIC: CPT | Performed by: FAMILY MEDICINE

## 2018-10-22 PROCEDURE — 99214 OFFICE O/P EST MOD 30 MIN: CPT | Performed by: FAMILY MEDICINE

## 2018-10-22 PROCEDURE — G0438 PPPS, INITIAL VISIT: HCPCS | Performed by: FAMILY MEDICINE

## 2018-10-22 NOTE — PROGRESS NOTES
Subjective   Anastacia Sarah is a 83 y.o. female.   Chief Complaint   Patient presents with   • Hypertension   • Cough   • Vitamin D Deficiency       History of Present Illness     This is a new patient in our office.    #1 hypertension-patient was diagnosed years ago.  She has hypertension and A. fib and she follows up with  and Dr. Holt.  She is on furosemide 20 mg twice a day, spironolactone 25 mg once a day, lisinopril 10 mg a day and metoprolol 25 mg twice a day.  She is also on Eliquids at 5 mg twice a day and she takes it as prescribed.  She has no chest pain.  She reports easy bruising.    #2 sleep apnea-patient has CPAP and uses it almost every night.    #3 hypothyroidism-patient has a history of thyroid cancer.  She is post total thyroidectomy in 2015.  She is treated with Synthroid 125 µg a day.  It is managed by her ENT.    #4 Cough-started 3 days ago after flu vaccine and pneumonia vaccine.  It is a dry cough,. She has fatigue, chills and body aches, but no fever.  Cough is dry.  No other symptoms associated.  She used Delsym last night and it helped a little.    #5 vitamin D deficiency-patient takes vitamin D 3 at 1000 units a day.     #6 chronic kidney disease-patient has chronic kidney disease.  She avoids NSAIDs.    She has iron deficiency anemia which is treated with iron infusions by Dr. Brower.  Patient has a history of renal cancer diagnosed in 2003.  She is post total nephrectomy.     She was recently diagnosed with colon cancer.  She had colon resection in April 2018.  No chemotherapy or radiation were needed.  She follows up with Dr. Brower.  She had renal cancer, thyroid cancer and colon cancer.  She had genetic tests done - negative.    The following portions of the patient's history were reviewed and updated as appropriate: allergies, current medications, past family history, past medical history, past social history, past surgical history and problem list.    Review of Systems    Constitutional: Positive for chills. Negative for fever.   HENT: Positive for postnasal drip and rhinorrhea. Negative for sore throat.    Respiratory: Positive for cough. Negative for shortness of breath and wheezing.    Cardiovascular: Negative.          Objective   Wt Readings from Last 3 Encounters:   10/22/18 93 kg (205 lb)   10/19/18 93.9 kg (207 lb)   08/23/18 95.7 kg (211 lb)      Vitals:    10/22/18 1355   BP: 124/80   Pulse: 87   Temp: 98.2 °F (36.8 °C)   SpO2: 98%     Temp Readings from Last 3 Encounters:   10/22/18 98.2 °F (36.8 °C)   08/23/18 97.7 °F (36.5 °C)   08/22/18 98 °F (36.7 °C) (Oral)     BP Readings from Last 3 Encounters:   10/22/18 124/80   10/19/18 120/82   08/22/18 120/70     Pulse Readings from Last 3 Encounters:   10/22/18 87   10/19/18 70   08/22/18 70       Physical Exam   Constitutional: She is oriented to person, place, and time. She appears well-developed and well-nourished.   HENT:   Head: Normocephalic and atraumatic.   Right Ear: Tympanic membrane, external ear and ear canal normal.   Left Ear: Tympanic membrane, external ear and ear canal normal.   Nose: Rhinorrhea present. No mucosal edema.   Mouth/Throat: No posterior oropharyngeal erythema.   Neck: Neck supple. Carotid bruit is not present.   Cardiovascular: Normal rate, regular rhythm and normal heart sounds.    Pulmonary/Chest: Effort normal and breath sounds normal.   Lymphadenopathy:     She has no cervical adenopathy.   Neurological: She is alert and oriented to person, place, and time.   Skin: Skin is warm, dry and intact.   Psychiatric: She has a normal mood and affect. Her behavior is normal.       Assessment/Plan   Anastacia was seen today for hypertension, cough, vitamin d deficiency and sleep apnea.    Diagnoses and all orders for this visit:    Essential hypertension  -     Lipid Panel With LDL / HDL Ratio    Cough  -     POCT Influenza A/B    Vitamin D deficiency    Postoperative hypothyroidism    NANCY on  CPAP        #1 hypertension-will continue current treatment.  We are checking cholesterol level.  Follow-up in 6-12 months.      #2 vitamin D deficiency-check labs.  Follow-up in 12 months.      #3 URI/ side effects of flu vaccine-symptomatic treatment.  Flu test negative.  Tylenol if needed, Mucinex is congested.  Delsym for night cough only.  Return to clinic symptoms are not better in a few days, or sooner if worsening.    #4 hypothyroidism-by ENT.     #5 sleep apnea-continue CPAP.  Follow-up with pulmonologist.

## 2018-10-22 NOTE — PROGRESS NOTES
QUICK REFERENCE INFORMATION:  The ABCs of the Annual Wellness Visit    Initial Medicare Wellness Visit    HEALTH RISK ASSESSMENT    1935    Recent Hospitalizations:  Recently treated at the following:  [facility].        Current Medical Providers:  Patient Care Team:  Gabriella Paige MD as PCP - General (Family Medicine)  Kenia Guan APRN as PCP - Claims Attributed  Maldonado Melendez MD as Referring Physician (Cardiology)  Maldonado Brower Jr., MD as Consulting Physician (Hematology and Oncology)  Maldonado Trujillo Jr., MD as Consulting Physician (Urology)  Rebeca Curran MD as Surgeon (General Surgery)  Gideon Ashley MD as Consulting Physician (Otolaryngology)        Smoking Status:  History   Smoking Status   • Never Smoker   Smokeless Tobacco   • Never Used     Comment: caffeine use       Alcohol Consumption:  History   Alcohol Use No       Depression Screen:   PHQ-2/PHQ-9 Depression Screening 10/22/2018   Little interest or pleasure in doing things 0   Feeling down, depressed, or hopeless 1   Trouble falling or staying asleep, or sleeping too much 0   Feeling tired or having little energy 0   Poor appetite or overeating 0   Feeling bad about yourself - or that you are a failure or have let yourself or your family down 0   Trouble concentrating on things, such as reading the newspaper or watching television 0   Moving or speaking so slowly that other people could have noticed. Or the opposite - being so fidgety or restless that you have been moving around a lot more than usual 0   Thoughts that you would be better off dead, or of hurting yourself in some way 0   Total Score 1   If you checked off any problems, how difficult have these problems made it for you to do your work, take care of things at home, or get along with other people? Not difficult at all       Health Habits and Functional and Cognitive Screening:  Functional & Cognitive Status 10/22/2018   Do you have difficulty  preparing food and eating? No   Do you have difficulty bathing yourself, getting dressed or grooming yourself? No   Do you have difficulty using the toilet? No   Do you have difficulty moving around from place to place? No   Do you have trouble with steps or getting out of a bed or a chair? No   In the past year have you fallen or experienced a near fall? No   Current Diet Well Balanced Diet   Dental Exam Up to date   Eye Exam Up to date   Exercise (times per week) 0 times per week   Current Exercise Activities Include None   Do you need help using the phone?  No   Are you deaf or do you have serious difficulty hearing?  No   Do you need help with transportation? Yes   Do you need help shopping? No   Do you need help preparing meals?  No   Do you need help with housework?  No   Do you need help with laundry? No   Do you need help taking your medications? Yes   Do you need help managing money? No   Do you ever drive or ride in a car without wearing a seat belt? No   Have you felt unusual stress, anger or loneliness in the last month? No   Who do you live with? Alone   If you need help, do you have trouble finding someone available to you? No   Have you been bothered in the last four weeks by sexual problems? No   Do you have difficulty concentrating, remembering or making decisions? No           Does the patient have evidence of cognitive impairment? No          Recent Lab Results:    Visual Acuity:  No exam data present    Age-appropriate Screening Schedule:  Refer to the list below for future screening recommendations based on patient's age, sex and/or medical conditions. Orders for these recommended tests are listed in the plan section. The patient has been provided with a written plan.    Health Maintenance   Topic Date Due   • ZOSTER VACCINE (2 of 2) 10/02/2017   • PNEUMOCOCCAL VACCINES (65+ LOW/MEDIUM RISK) (2 of 2 - PPSV23) 08/07/2018   • COLONOSCOPY  04/25/2019   • MAMMOGRAM  05/16/2020   • TDAP/TD VACCINES  (2 - Td) 08/07/2027   • INFLUENZA VACCINE  Completed        Subjective   History of Present Illness    Anastacia Sarah is a 83 y.o. female who presents for an Annual Wellness Visit.    The following portions of the patient's history were reviewed and updated as appropriate: allergies, current medications, past family history, past medical history, past social history, past surgical history and problem list.    Outpatient Medications Prior to Visit   Medication Sig Dispense Refill   • apixaban (ELIQUIS) 5 MG tablet tablet Take 1 tablet by mouth Every 12 (Twelve) Hours. 60 tablet 3   • B Complex-C (SUPER B COMPLEX PO) Take 1 tablet by mouth Daily.     • CALCIUM PO Take 600 Units by mouth Daily. With D3, listed separetly     • cholecalciferol (VITAMIN D3) 1000 UNITS tablet Take 1,000 Units by mouth daily. In addition to the calcium -D3 pill     • esomeprazole (NEXIUM) 40 MG capsule Take 40 mg by mouth Every Morning Before Breakfast.     • furosemide (LASIX) 20 MG tablet TAKE TWO TABLETS BY MOUTH DAILY 60 tablet 3   • furosemide (LASIX) 40 MG tablet Take 40 mg by mouth Daily.     • GLUCOSAMINE-CHONDROITIN PO Take 1 tablet by mouth Daily.     • lisinopril (PRINIVIL,ZESTRIL) 10 MG tablet TAKE ONE TABLET BY MOUTH DAILY 90 tablet 1   • loperamide (IMODIUM) 2 MG capsule Take 2 mg by mouth 4 (four) times a day as needed for diarrhea.     • metoprolol tartrate (LOPRESSOR) 25 MG tablet TAKE ONE TABLET BY MOUTH TWICE A  tablet 1   • Multiple Vitamins-Minerals (PRESERVISION AREDS PO) Take 1 tablet by mouth Daily.     • spironolactone (ALDACTONE) 25 MG tablet Take 1 tablet by mouth Daily. 90 tablet 3   • SYNTHROID 125 MCG tablet Take 125 mcg by mouth Daily.     • XIIDRA 5 % solution Apply 1 drop to eye 2 (Two) Times a Day.     • lisinopril (PRINIVIL,ZESTRIL) 10 MG tablet Take 1 tablet by mouth Daily. 90 tablet 2   • metoprolol tartrate (LOPRESSOR) 25 MG tablet Take 25 mg by mouth 2 (Two) Times a Day.     • rOPINIRole  "(REQUIP) 0.5 MG tablet Take 1 hour before bedtime. 30 tablet 2   • tolterodine LA (DETROL LA) 2 MG 24 hr capsule Take 2 mg by mouth Daily.       No facility-administered medications prior to visit.        Patient Active Problem List   Diagnosis   • Atrial fibrillation (CMS/HCC)   • Bladder dysfunction   • Gastroesophageal reflux disease   • Essential hypertension   • Insomnia   • Non morbid obesity due to excess calories   • Sick sinus syndrome (CMS/HCC)   • Knee pain, chronic   • Dizziness   • S/P placement of cardiac pacemaker   • Iron deficiency anemia   • Anemia in stage 3 chronic kidney disease (CMS/HCC)   • Iron and its compounds causing adverse effect in therapeutic use   • NANCY on CPAP   • Postoperative hypothyroidism   • Third degree AV block (CMS/HCC)   • Diarrhea   • Colon polyp   • Malignant neoplasm of ascending colon (CMS/HCC)   • Vitamin D deficiency       Advance Care Planning:  has an advance directive - a copy has been provided and is in file    Identification of Risk Factors:  Risk factors include: weight , cardiovascular risk, inactivity, increased fall risk, vision limitations, hearing limitations and incontinence.    Review of Systems    Compared to one year ago, the patient feels her physical health is the same.  Compared to one year ago, the patient feels her mental health is the same.    Objective     Physical Exam    Vitals:    10/22/18 1355   BP: 124/80   Pulse: 87   Temp: 98.2 °F (36.8 °C)   SpO2: 98%   Weight: 93 kg (205 lb)   Height: 157.5 cm (62.01\")   PainSc:   6       Patient's Body mass index is 37.49 kg/m². BMI is above normal parameters. Recommendations include: exercise counseling and nutrition counseling.      Assessment/Plan   Patient Self-Management and Personalized Health Advice  The patient has been provided with information about: diet, exercise, weight management, prevention of cardiac or vascular disease and fall prevention and preventive services including:   · Bone " densitometry screening, Exercise counseling provided, Fall Risk assessment done, Fall Risk plan of care done, Zostavax vaccine (Herpes Zoster).  · DEXA declined.  · Start to exercise with 5 minutes on the bike 5 days a week, adjust as tolerated.  Consider Silver Snickers, yoga for seniors.  · Fall prevention discussed.  Use cane all the time.  · Patient is advised to get shingrix at the pharmacy.  · Smaler portion size, less sweets, more fruits and vegetables.    Visit Diagnoses:    ICD-10-CM ICD-9-CM   1. Essential hypertension I10 401.9   2. Cough R05 786.2   3. Vitamin D deficiency E55.9 268.9       Orders Placed This Encounter   Procedures   • Lipid Panel With LDL / HDL Ratio   • POCT Influenza A/B       Outpatient Encounter Prescriptions as of 10/22/2018   Medication Sig Dispense Refill   • apixaban (ELIQUIS) 5 MG tablet tablet Take 1 tablet by mouth Every 12 (Twelve) Hours. 60 tablet 3   • B Complex-C (SUPER B COMPLEX PO) Take 1 tablet by mouth Daily.     • CALCIUM PO Take 600 Units by mouth Daily. With D3, listed separetly     • cholecalciferol (VITAMIN D3) 1000 UNITS tablet Take 1,000 Units by mouth daily. In addition to the calcium -D3 pill     • esomeprazole (NEXIUM) 40 MG capsule Take 40 mg by mouth Every Morning Before Breakfast.     • furosemide (LASIX) 20 MG tablet TAKE TWO TABLETS BY MOUTH DAILY 60 tablet 3   • furosemide (LASIX) 40 MG tablet Take 40 mg by mouth Daily.     • GLUCOSAMINE-CHONDROITIN PO Take 1 tablet by mouth Daily.     • lisinopril (PRINIVIL,ZESTRIL) 10 MG tablet TAKE ONE TABLET BY MOUTH DAILY 90 tablet 1   • loperamide (IMODIUM) 2 MG capsule Take 2 mg by mouth 4 (four) times a day as needed for diarrhea.     • metoprolol tartrate (LOPRESSOR) 25 MG tablet TAKE ONE TABLET BY MOUTH TWICE A  tablet 1   • Multiple Vitamins-Minerals (PRESERVISION AREDS PO) Take 1 tablet by mouth Daily.     • spironolactone (ALDACTONE) 25 MG tablet Take 1 tablet by mouth Daily. 90 tablet 3   •  SYNTHROID 125 MCG tablet Take 125 mcg by mouth Daily.     • XIIDRA 5 % solution Apply 1 drop to eye 2 (Two) Times a Day.     • [DISCONTINUED] lisinopril (PRINIVIL,ZESTRIL) 10 MG tablet Take 1 tablet by mouth Daily. 90 tablet 2   • [DISCONTINUED] metoprolol tartrate (LOPRESSOR) 25 MG tablet Take 25 mg by mouth 2 (Two) Times a Day.     • [DISCONTINUED] rOPINIRole (REQUIP) 0.5 MG tablet Take 1 hour before bedtime. 30 tablet 2   • [DISCONTINUED] tolterodine LA (DETROL LA) 2 MG 24 hr capsule Take 2 mg by mouth Daily.       No facility-administered encounter medications on file as of 10/22/2018.        Reviewed use of high risk medication in the elderly: yes  Reviewed for potential of harmful drug interactions in the elderly: yes    Follow Up:  Return in about 1 year (around 10/22/2019).     An After Visit Summary and PPPS with all of these plans were given to the patient.

## 2018-10-22 NOTE — PATIENT INSTRUCTIONS
Medicare Wellness  Personal Prevention Plan of Service     Date of Office Visit:  10/22/2018  Encounter Provider:  Gabriella Paige MD  Place of Service:  [facility] INTERNAL MEDICINE  Patient Name: Anastacia Sarah  :  1935    As part of the Medicare Wellness portion of your visit today, we are providing you with this personalized preventive plan of services (PPPS). This plan is based upon recommendations of the [place] Preventive Services Task Force (USPSTF) and the Advisory Committee on Immunization Practices (ACIP).    This lists the preventive care services that should be considered, and provides dates of when you are due. Items listed as completed are up-to-date and do not require any further intervention.    Health Maintenance   Topic Date Due   • ZOSTER VACCINE (2 of 2) 10/02/2017   • MEDICARE ANNUAL WELLNESS  2018   • PNEUMOCOCCAL VACCINES (65+ LOW/MEDIUM RISK) (2 of 2 - PPSV23) 2018   • COLONOSCOPY  2019   • MAMMOGRAM  2020   • TDAP/TD VACCINES (2 - Td) 2027   • INFLUENZA VACCINE  Completed       Orders Placed This Encounter   Procedures   • Lipid Panel With LDL / HDL Ratio   • POCT Influenza A/B       Return in about 1 year (around 10/22/2019).

## 2018-10-23 RX ORDER — APIXABAN 5 MG/1
TABLET, FILM COATED ORAL
Qty: 60 TABLET | Refills: 2 | Status: SHIPPED | OUTPATIENT
Start: 2018-10-23 | End: 2019-01-25 | Stop reason: SDUPTHER

## 2018-11-05 DIAGNOSIS — C18.2 MALIGNANT NEOPLASM OF ASCENDING COLON (HCC): ICD-10-CM

## 2018-11-05 DIAGNOSIS — D50.8 OTHER IRON DEFICIENCY ANEMIA: ICD-10-CM

## 2018-11-07 ENCOUNTER — APPOINTMENT (OUTPATIENT)
Dept: LAB | Facility: HOSPITAL | Age: 83
End: 2018-11-07
Attending: INTERNAL MEDICINE

## 2018-11-07 ENCOUNTER — TELEPHONE (OUTPATIENT)
Dept: ONCOLOGY | Facility: HOSPITAL | Age: 83
End: 2018-11-07

## 2018-11-07 LAB
ALBUMIN SERPL-MCNC: 3.8 G/DL (ref 3.5–5.2)
ALBUMIN/GLOB SERPL: 1.1 G/DL (ref 1.1–2.4)
ALP SERPL-CCNC: 98 U/L (ref 38–116)
ALT SERPL W P-5'-P-CCNC: 19 U/L (ref 0–33)
ANION GAP SERPL CALCULATED.3IONS-SCNC: 12.6 MMOL/L
AST SERPL-CCNC: 24 U/L (ref 0–32)
BASOPHILS # BLD AUTO: 0.07 10*3/MM3 (ref 0–0.1)
BASOPHILS NFR BLD AUTO: 1.2 % (ref 0–1.1)
BILIRUB SERPL-MCNC: 0.4 MG/DL (ref 0.1–1.2)
BUN BLD-MCNC: 32 MG/DL (ref 6–20)
BUN/CREAT SERPL: 19.9 (ref 7.3–30)
CALCIUM SPEC-SCNC: 9.1 MG/DL (ref 8.5–10.2)
CEA SERPL-MCNC: 1.42 NG/ML
CHLORIDE SERPL-SCNC: 104 MMOL/L (ref 98–107)
CO2 SERPL-SCNC: 21.4 MMOL/L (ref 22–29)
CREAT BLD-MCNC: 1.61 MG/DL (ref 0.6–1.1)
DEPRECATED RDW RBC AUTO: 47.5 FL (ref 37–49)
EOSINOPHIL # BLD AUTO: 0.25 10*3/MM3 (ref 0–0.36)
EOSINOPHIL NFR BLD AUTO: 4.1 % (ref 1–5)
ERYTHROCYTE [DISTWIDTH] IN BLOOD BY AUTOMATED COUNT: 14.2 % (ref 11.7–14.5)
FERRITIN SERPL-MCNC: 126.4 NG/ML
GFR SERPL CREATININE-BSD FRML MDRD: 31 ML/MIN/1.73
GLOBULIN UR ELPH-MCNC: 3.5 GM/DL (ref 1.8–3.5)
GLUCOSE BLD-MCNC: 112 MG/DL (ref 74–124)
HCT VFR BLD AUTO: 36.3 % (ref 34–45)
HGB BLD-MCNC: 11.5 G/DL (ref 11.5–14.9)
IMM GRANULOCYTES # BLD: 0.02 10*3/MM3 (ref 0–0.03)
IMM GRANULOCYTES NFR BLD: 0.3 % (ref 0–0.5)
IRON 24H UR-MRATE: 109 MCG/DL (ref 37–145)
IRON SATN MFR SERPL: 30 % (ref 14–48)
LYMPHOCYTES # BLD AUTO: 1.52 10*3/MM3 (ref 1–3.5)
LYMPHOCYTES NFR BLD AUTO: 25.2 % (ref 20–49)
MCH RBC QN AUTO: 28.9 PG (ref 27–33)
MCHC RBC AUTO-ENTMCNC: 31.7 G/DL (ref 32–35)
MCV RBC AUTO: 91.2 FL (ref 83–97)
MONOCYTES # BLD AUTO: 0.57 10*3/MM3 (ref 0.25–0.8)
MONOCYTES NFR BLD AUTO: 9.5 % (ref 4–12)
NEUTROPHILS # BLD AUTO: 3.6 10*3/MM3 (ref 1.5–7)
NEUTROPHILS NFR BLD AUTO: 59.7 % (ref 39–75)
NRBC BLD MANUAL-RTO: 0 /100 WBC (ref 0–0)
PLATELET # BLD AUTO: 355 10*3/MM3 (ref 150–375)
PMV BLD AUTO: 9.1 FL (ref 8.9–12.1)
POTASSIUM BLD-SCNC: 5.2 MMOL/L (ref 3.5–4.7)
PROT SERPL-MCNC: 7.3 G/DL (ref 6.3–8)
RBC # BLD AUTO: 3.98 10*6/MM3 (ref 3.9–5)
SODIUM BLD-SCNC: 138 MMOL/L (ref 134–145)
TIBC SERPL-MCNC: 361 MCG/DL (ref 249–505)
TRANSFERRIN SERPL-MCNC: 258 MG/DL (ref 200–360)
WBC NRBC COR # BLD: 6.03 10*3/MM3 (ref 4–10)

## 2018-11-07 PROCEDURE — 82378 CARCINOEMBRYONIC ANTIGEN: CPT | Performed by: INTERNAL MEDICINE

## 2018-11-07 PROCEDURE — 83540 ASSAY OF IRON: CPT | Performed by: INTERNAL MEDICINE

## 2018-11-07 PROCEDURE — 85025 COMPLETE CBC W/AUTO DIFF WBC: CPT | Performed by: INTERNAL MEDICINE

## 2018-11-07 PROCEDURE — 84466 ASSAY OF TRANSFERRIN: CPT | Performed by: INTERNAL MEDICINE

## 2018-11-07 PROCEDURE — 36415 COLL VENOUS BLD VENIPUNCTURE: CPT | Performed by: FAMILY MEDICINE

## 2018-11-07 PROCEDURE — 80053 COMPREHEN METABOLIC PANEL: CPT | Performed by: INTERNAL MEDICINE

## 2018-11-07 PROCEDURE — 82728 ASSAY OF FERRITIN: CPT | Performed by: INTERNAL MEDICINE

## 2018-11-07 NOTE — TELEPHONE ENCOUNTER
Called patient, no answer.  Left message.  Labs forwarded to Dr. Paige via [employer].     ----- Message from Maldonado Brower Jr., MD sent at 11/7/2018  3:40 PM EST -----  Please make sure patient is not taking potassium supplement and forward result on cmp to Dr Paige  ----- Message -----  From: Lab, Background User  Sent: 11/7/2018   2:38 PM  To: Maldonado Brower Jr., MD

## 2018-11-08 LAB
CHOLEST SERPL-MCNC: 179 MG/DL (ref 100–199)
HDLC SERPL-MCNC: 36 MG/DL
LDLC SERPL CALC-MCNC: 91 MG/DL (ref 0–99)
LDLC/HDLC SERPL: 2.5 RATIO (ref 0–3.2)
TRIGL SERPL-MCNC: 260 MG/DL (ref 0–149)
VLDLC SERPL-MCNC: 52 MG/DL (ref 5–40)

## 2018-11-08 RX ORDER — LISINOPRIL 10 MG/1
TABLET ORAL
Qty: 90 TABLET | Refills: 0 | Status: SHIPPED | OUTPATIENT
Start: 2018-11-08 | End: 2019-02-08 | Stop reason: SDUPTHER

## 2018-11-09 RX ORDER — LISINOPRIL 10 MG/1
TABLET ORAL
Qty: 90 TABLET | Refills: 0 | Status: SHIPPED | OUTPATIENT
Start: 2018-11-09 | End: 2019-04-26

## 2018-11-10 ENCOUNTER — PREP FOR SURGERY (OUTPATIENT)
Dept: OTHER | Facility: HOSPITAL | Age: 83
End: 2018-11-10

## 2018-11-10 DIAGNOSIS — K62.5 RECTAL BLEEDING: Primary | ICD-10-CM

## 2018-11-10 RX ORDER — SODIUM CHLORIDE 0.9 % (FLUSH) 0.9 %
3 SYRINGE (ML) INJECTION EVERY 12 HOURS SCHEDULED
Status: CANCELLED | OUTPATIENT
Start: 2018-12-17

## 2018-11-10 RX ORDER — SODIUM CHLORIDE 0.9 % (FLUSH) 0.9 %
1-10 SYRINGE (ML) INJECTION AS NEEDED
Status: CANCELLED | OUTPATIENT
Start: 2018-12-17

## 2018-11-12 PROBLEM — K62.5 RECTAL BLEEDING: Status: ACTIVE | Noted: 2018-11-12

## 2018-11-14 ENCOUNTER — APPOINTMENT (OUTPATIENT)
Dept: LAB | Facility: HOSPITAL | Age: 83
End: 2018-11-14
Attending: INTERNAL MEDICINE

## 2018-11-14 ENCOUNTER — OFFICE VISIT (OUTPATIENT)
Dept: ONCOLOGY | Facility: CLINIC | Age: 83
End: 2018-11-14
Attending: INTERNAL MEDICINE

## 2018-11-14 VITALS
TEMPERATURE: 99 F | BODY MASS INDEX: 36.62 KG/M2 | HEIGHT: 62 IN | SYSTOLIC BLOOD PRESSURE: 125 MMHG | RESPIRATION RATE: 12 BRPM | DIASTOLIC BLOOD PRESSURE: 60 MMHG | WEIGHT: 199 LBS | OXYGEN SATURATION: 97 % | HEART RATE: 70 BPM

## 2018-11-14 DIAGNOSIS — D50.8 OTHER IRON DEFICIENCY ANEMIA: ICD-10-CM

## 2018-11-14 DIAGNOSIS — N18.30 ANEMIA IN STAGE 3 CHRONIC KIDNEY DISEASE (HCC): ICD-10-CM

## 2018-11-14 DIAGNOSIS — D63.1 ANEMIA IN STAGE 3 CHRONIC KIDNEY DISEASE (HCC): ICD-10-CM

## 2018-11-14 DIAGNOSIS — C18.2 MALIGNANT NEOPLASM OF ASCENDING COLON (HCC): Primary | ICD-10-CM

## 2018-11-14 PROCEDURE — 99214 OFFICE O/P EST MOD 30 MIN: CPT | Performed by: INTERNAL MEDICINE

## 2018-11-14 PROCEDURE — G0463 HOSPITAL OUTPT CLINIC VISIT: HCPCS | Performed by: INTERNAL MEDICINE

## 2018-11-15 ENCOUNTER — TELEPHONE (OUTPATIENT)
Dept: SURGERY | Facility: CLINIC | Age: 83
End: 2018-11-15

## 2018-11-15 NOTE — PROGRESS NOTES
Subjective .     REASONS FOR FOLLOWUP:    1. Anemia secondary to iron deficiency, intolerant of oral iron, status post Venofer 100 mg on 09/21/11, followed by Feraheme x two doses, 09/28 and 10/05/11.  Recurrent iron deficiency requiring Feraheme again on 09/24/12 and 10/05/12.  Further recurrence of iron deficiency requiring Feraheme on 10/5 in 10/12/17.  2. No definitive evidence of GI blood loss, stool cards negative for occult blood, status post GI evaluation with Dr. Curran with no bleeding source identified.   3. History of gastric polyps, status post staged excision via EGD.    4. Status post left nephrectomy in 2004 for renal cell carcinoma.   5. Stage III chronic kidney disease.   6. Stage I ([id number]) left papillary thyroid cancer (3mm): Status post total thyroidectomy 11/16/15, 2 lymph nodes removed.  7. Stage IIa (T3N0M0) ascending colon cancer: Preoperative CEA 1.65 on 4/25/18.  Right hemicolectomy 4/27/18, invasive moderately differentiated adenocarcinoma, negative margins, positive lymphovascular invasion, negative perineural invasion, 10 lymph nodes negative, ulcerated small adjacent pericolonic abscess (microscopic perforation).  Microsatellite stable.  8. Genetics evaluation 7/31/18 with INVITAE panel test showing VUS MLH3 and MSH6  9. 2.2 cm right renal lesion, indeterminate on CT 4/26/18, felt to represent proteinaceous cyst by urology.  Plan one-year follow-up CT.  10. Atrial fibrillation continuing on anticoagulation with Eliquis.    HISTORY OF PRESENT ILLNESS:  The patient is a 83 y.o. year old female who is here for follow-up with the above-mentioned history.    History of Present Illness the patient returns today in follow-up with laboratory studies to review.  In the interval, she did follow-up in the genetics clinic and underwent gene panel test indicating variant of unknown significance in MLH3 and MSH6.  She has had recent difficulty with diarrhea and bright red blood per rectum that  is presumed to be hemorrhoidal in nature.  She has noted a decline in her appetite and had lost proximally 6 pounds.  She has had fatigue.  She notes that the bright red blood per rectum recurs with increased levels of activity.  She is managing the diarrhea with Imodium and Kaopectate per Dr. Curran's recommendations with some improvement.  She is scheduled for colonoscopy with Dr. Curran on 12/17/18 however is not sure whether she is going to proceed with this due to the prep.      PAST MEDICAL HISTORY:    1.  Atrial fibrillation.  The patient underwent ablation procedure in 2004, repeat ablation procedure on 09/13/11 by Dr. Melendez.  Subsequent AV deshawn ablation and March 2017 and subsequent cardioversion in May 2017.  Continuation of anticoagulation with Eliquis  2.  Gastroesophageal reflux disease.  3.  Hypertension.   4.  Status post left knee surgery.   5.  Status post pacemaker placement.   6.  Status post laparoscopic cholecystectomy.   7.  Status post bladder tacking procedure.   8.  Status post EGD and colonoscopy with Dr. Curran 08/08/11.  Finding of gastric polyps, one of which was resected showing a hyperplastic polyp with gastritis.  There was no source of bleeding identified.  Schatzki's ring was identified as well as diverticulosis.  Repeat EGD on 03/18/13 with resection of a 3 cm prepyloric hyperplastic polyp of acute and chronic inflammatory change. Repeat EGD, October 2013 with further resection of polyp, benign findings.  EGD 10/23/17 with small gastric polyps identified, no evidence of active bleeding.  9.  Bladder suspension surgery in 5/12.  10.  Status post left knee replacement in May 2013.    Past Medical History:   Diagnosis Date   • Acute bronchitis    • Acute sinusitis    • Anemia     Iron deficiency   • Anesthesia complication    • Anticoagulated on warfarin    • Arthritis    • Bladder dysfunction    • Cancer of left kidney (CMS/HCC) 06/08/2004    S/P Left Radical Neprectomy   •  Cardiac disease    • Chronic dysfunction of left eustachian tube    • CKD (chronic kidney disease)     stage 3   • Colon cancer (CMS/HCC)    • Depression    • Diarrhea    • Diverticulosis 08/08/2011    Descending Colon & Sigmoid Colon multiple diverticula   • Epigastric pain    • Fatigue    • Frequent urination    • GERD (gastroesophageal reflux disease)    • Goiter     thyroid removed in november 2015   • Health care maintenance    • History of colitis    • History of shingles    • History of transfusion    • Hypertension    • Insomnia    • Left atrial enlargement    • Left ventricular hypertrophy    • Mitral regurgitation     mild to moderate per echocardiogram 2016   • Nerve pain     LUE D/T SHINGLES   • Neuropathy     ARMS   • NANCY (obstructive sleep apnea)     uses CPAP   • Paroxysmal atrial fibrillation (CMS/HCC)    • Pulmonary hypertension (CMS/HCC)     per echo 2016   • Right atrial enlargement    • Sleep apnea    • SSS (sick sinus syndrome) (CMS/HCC)    • Thyroid cancer (CMS/HCC)    • URI (upper respiratory infection)    • Urinary urgency      Past Surgical History:   Procedure Laterality Date   • ATRIAL ABLATION SURGERY N/A 09/13/2011    Comprehensive electrophysiology study, Left atrial pace & sense, 3-dimensional cardia mapping, Radiofrequency catheter ablation, Transseptal hear catheterization, Dr. Maldonado Melendez   • ATRIAL ABLATION SURGERY N/A 02/11/2004    Dr. Maldonado Melendez   • BLADDER SURGERY      Bladder tacking procedure   • BLADDER SUSPENSION N/A 05/2012   • CARDIOVERSION     • CATARACT EXTRACTION Bilateral     Dr. Gramajo   • COLONOSCOPY N/A 04/01/2003    Normal colonoscopy except for an occasional diverticulosis, Dr. Nathan Macias   • CYSTOSCOPY BOTOX INJECTION OF BLADDER N/A 05/29/2015    Dr. Salome Bowen   • ENDOSCOPY N/A 06/10/2015    Antral Polyp: Gastric Mucosa w/ marked cautery artifact,  Hiatal Hernia, Dr. Rebeca Curran   • ENDOSCOPY N/A 10/28/2013    Gastric polyp: polypoid hyperplastic  gastric mucosa w/ focal ulceration, mixed acute & chronic inflammation.  Negative for intestinal metaplasia, no helicobacter organisms identified on diff-wuik stain, Dr. Rebeca Curran   • ENDOSCOPY N/A 03/18/2013    Large prepyloric polyp, partially removed: fragment of polypoid hyperplastic gastric mucosa w/ foci of erosion & patchy mixed acute & chronic inflammation. No helicobacter organisms identified on diff-quick stain. Negative for intestinal metaplasia, negative for dysplasia, Dr. Rebeca Curran   • ENDOSCOPY N/A 10/24/2012    large prepyloric mass (3cm) w/ adjacent nodules: hyperplastic polyp w/ mild chronic active gastritis, focal erosion & associated, Multiple fundic polyps: polypoid mucosal hyperplasia, Dr. Rebeca Curran   • ENDOSCOPY N/A 03/11/2003    Gastric Antral Biopsies: Fragments of Gastric Mucosa & Necrotic Tissue (Compatible w/ Ulcer) w/ chronic active inflammation.  Negative stain for Helicobacter organisms. Dr. Nathan Macias   • ENDOSCOPY AND COLONOSCOPY N/A 08/08/2011    4cm hiatal hernia, Schatzki's ring, Diverticulosis, Large antral polyps, Dr. Rebeca Curran   • INCISIONAL HERNIA REPAIR N/A 06/11/2015    Defect approximately 2.5 cm w/ a mass of incarcerated tissue approximately the size of a nectarine, Dr. Rebeca Curran   • KNEE ARTHROPLASTY     • LAPAROSCOPIC CHOLECYSTECTOMY N/A    • NEPHRECTOMY RADICAL Left 06/08/2004    Incidentially found left renal mass 3-4 cm renal cell carcinoma, left lower pole, Dr. Salome Bowen   • OTHER SURGICAL HISTORY      NEUROMUSCULAR BLOCKERS BOTULINUM TOXIN ONABOTULINUMTOXIN A   • PACEMAKER IMPLANTATION N/A     Dr. Casillas   • PACEMAKER REPLACEMENT N/A 09/24/2010    Implanted Generator is a [employer] ALTRUA 60 model [id number], serial # [id number], Dr. Chavez Jimenez   • TOTAL KNEE ARTHROPLASTY Left 05/21/2013    Dr. Miguel Pacheco   • TOTAL THYROIDECTOMY N/A 11/16/2015    Dr. Gideon Ashley, [facility]   • TRANSVAGINAL TAPING SUSPENSION N/A 03/16/2009    Mini Sling, Closed  suprapubic cystostomy, Dr. Salome Bowen   • TUBAL ABDOMINAL LIGATION Bilateral          HEMATOLOGICONCOLOGIC HISTORY:    The patient was seen during a hospitalization at [facility] in September 2011.  She was admitted with left lower lobe pneumonia following a cardiac ablation procedure.  She has anemia dating back to 2006, at that time with a hematocrit of 36.7 and MCV of 77.  Her platelets and white count have remained normal.  Hemoglobin in September 2010 was 10.3, MCV 74.  In December 2010 hemoglobin was 10.5 with MCV of 72.  On 08/09/11 hemoglobin was down to 9.8 with MCV of 73.  During the hospitalization hemoglobin had dropped to 8.5 with MCV of 72 and decreased further to 8.0 at which point she was transfused two units of PRBCs.  She was on Coumadin but had no obvious clinical evidence of GI blood loss.  She was evaluated by Dr. Curran on 08/08/11 with EGD and colonoscopy showing evidence of gastric polyps.  One polyp was biopsied showing evidence of a hyperplastic polyp with gastritis.  There was no definitive evidence of bleeding.  She was having some dysphagia and had a Schatzki's ring which was dilated.  She developed rapid ventricular response and therefore the procedure was stopped.  She then underwent cardiac ablation and subsequently developed pneumonia.      During her hospitalization, lab studies were sent including iron studies showing an iron of 11, TIBC 438, transferrin 294, iron percent sat of 3 with a ferritin of 10.8.  B12 was low-normal at 291, folate normal.  Creatinine was in the 1.2 range with estimated GFR in the 40 range (stage III chronic kidney disease).  Erythropoietin level was 66.  A hemoglobin electrophoresis was sent showing hemoglobin A1 of 97.5%, and hemoglobin A2 of 2.2%.  The patient had stool checked for occult blood in the hospital which again was negative, therefore, with no definitive evidence of GI blood loss.  I suspect she may have a malabsorption issue.   She was intolerant of oral iron and treated with Venofer 100 mg on 09/21/11 and subsequently was discharged from the hospital and treated as an outpatient with Feraheme 510 mg on 09/28/11 and again on 10/05/11.      The patient returned for followup studies on 10/19/11 with hemoglobin that improved to the 13 range and MCV up to 79.  TIBC was 284, iron percent sat 32, ferritin 335.  Reticulated hemoglobin had improved to 32.9.  B12 was 476.  We will follow her counts over time.  She will followup with Dr. Curran regarding her gastric polyps.      Laboratory studies on 02/15/12 showed a ferritin stable at 112.  We will recheck at a 6 month interval.     Lab studies from 9/10/12 shows a hemoglobin 11.9, reticulated hemoglobin 29.9, TIBC 325, iron percent SAT of 11.  Ferritin down to 18.      Feraheme administered on 9/24/12 and 10/5/12.  Repeat iron studies on 3/15/13 showed a ferritin of 101, iron percent saturation of 31.       Labs 10/10/2013 with a ferritin of 85, iron percent sat down to 15, hemoglobin of 12.9. Recheck in six months.    Labs 04/20/2015 showed a ferritin stable in the normal range at 76.     The patient returned after a prolonged absence from our office on 9/21/17 with hemoglobin of 12.0, MCV 79.2, iron 37, ferritin 20, iron saturation 8%, TIBC 441 CONSISTENT with recurrent iron deficiency.  Folate was normal at 10.52, B12 normal at 398, erythropoietin 32.6.  Additional Feraheme initiated on 10/5/17 for 2 doses.  Repeat EGD with Dr. Curran on 10/23/17 with no active bleeding, small less than 6 mm gastric polyps identified.       ONCOLOGIC HISTORY:   Status post left nephrectomy in 2004 for renal cell carcinoma with Dr. Bowen.  This was a Darnell grade 2 lesion measuring 3.0 cm, confined to the kidney, with no extension to perinephric adipose tissue, and no evidence of lymphovascular invasion.  Margins were negative.  Adrenal gland was negative.      CT scan of the abdomen and pelvis o 02/26/12  showed no evidence of recurrent disease.      Stage I ([id number]) left papillary thyroid cancer (3mm): Status post total thyroidectomy 11/16/15, 2 lymph nodes removed.      Stage IIa (T3N0M0) ascending colon cancer: Preoperative CEA 1.65 on 4/25/18.  Right hemicolectomy 4/27/18, invasive moderately differentiated adenocarcinoma, negative margins, positive lymphovascular invasion, negative perineural invasion, 10 lymph nodes negative, ulcerated small adjacent pericolonic abscess (microscopic perforation).  Microsatellite stable.    Genetics evaluation 7/31/18 with INVITAE panel test showing VUS MLH3 and MSH6      Current Outpatient Medications on File Prior to Visit   Medication Sig Dispense Refill   • B Complex-C (SUPER B COMPLEX PO) Take 1 tablet by mouth Daily.     • CALCIUM PO Take 600 Units by mouth Daily. With D3, listed separetly     • cholecalciferol (VITAMIN D3) 1000 UNITS tablet Take 1,000 Units by mouth daily. In addition to the calcium -D3 pill     • ELIQUIS 5 MG tablet tablet TAKE ONE TABLET BY MOUTH EVERY 12 HOURS 60 tablet 2   • esomeprazole (NEXIUM) 40 MG capsule Take 40 mg by mouth Every Morning Before Breakfast.     • furosemide (LASIX) 20 MG tablet TAKE TWO TABLETS BY MOUTH DAILY 60 tablet 3   • furosemide (LASIX) 40 MG tablet Take 40 mg by mouth Daily.     • GLUCOSAMINE-CHONDROITIN PO Take 1 tablet by mouth Daily.     • lisinopril (PRINIVIL,ZESTRIL) 10 MG tablet TAKE ONE TABLET BY MOUTH DAILY 90 tablet 0   • lisinopril (PRINIVIL,ZESTRIL) 10 MG tablet TAKE ONE TABLET BY MOUTH DAILY 90 tablet 0   • loperamide (IMODIUM) 2 MG capsule Take 2 mg by mouth 4 (four) times a day as needed for diarrhea.     • metoprolol tartrate (LOPRESSOR) 25 MG tablet TAKE ONE TABLET BY MOUTH TWICE A  tablet 1   • Multiple Vitamins-Minerals (PRESERVISION AREDS PO) Take 1 tablet by mouth Daily.     • spironolactone (ALDACTONE) 25 MG tablet Take 1 tablet by mouth Daily. 90 tablet 3   • SYNTHROID 125 MCG tablet Take 125  mcg by mouth Daily.     • XIIDRA 5 % solution Apply 1 drop to eye 2 (Two) Times a Day.       No current facility-administered medications on file prior to visit.        ALLERGIES:     Allergies   Allergen Reactions   • Sulfa Antibiotics Swelling     FACE AND TONGUE   • Adhesive Tape Rash   • Levaquin [Levofloxacin] Rash     Social History     Socioeconomic History   • Marital status:      Spouse name: Not on file   • Number of children: 0   • Years of education: Not on file   • Highest education level: Not on file   Social Needs   • Financial resource strain: Not on file   • Food insecurity - worry: Not on file   • Food insecurity - inability: Not on file   • Transportation needs - medical: Not on file   • Transportation needs - non-medical: Not on file   Occupational History   • Occupation: Homemaker     Employer: RETIRED   Tobacco Use   • Smoking status: Never Smoker   • Smokeless tobacco: Never Used   • Tobacco comment: caffeine use   Substance and Sexual Activity   • Alcohol use: No   • Drug use: No   • Sexual activity: Defer   Other Topics Concern   • Not on file   Social History Narrative   • Not on file     Family History   Problem Relation Age of Onset   • Heart disease Other    • Deep vein thrombosis Other    • Hypertension Other    • Breast cancer Sister         In her 60s.   • Breast cancer Maternal Aunt    • Breast cancer Sister         In her 60s.   • Heart disease Mother    • Hypertension Mother    • Heart disease Father    • Hypertension Father    • Heart disease Daughter    • Hypertension Daughter    • Heart disease Son    • Hypertension Son    • Malig Hyperthermia Neg Hx        Review of Systems   Constitutional: Positive for fatigue. Negative for activity change, appetite change, fever and unexpected weight change.   HENT: Negative for congestion, mouth sores, nosebleeds, sore throat and voice change.    Respiratory: Negative for cough, shortness of breath and wheezing.    Cardiovascular:  Negative for chest pain, palpitations and leg swelling.   Gastrointestinal: Positive for blood in stool and diarrhea. Negative for abdominal distention, abdominal pain, constipation, nausea and vomiting.   Endocrine: Negative for cold intolerance and heat intolerance.   Genitourinary: Negative for difficulty urinating, dysuria, frequency and hematuria.   Musculoskeletal: Negative for arthralgias, back pain, joint swelling and myalgias.   Skin: Negative for rash.   Neurological: Negative for dizziness, syncope, weakness, light-headedness, numbness and headaches.   Hematological: Negative for adenopathy. Does not bruise/bleed easily.   Psychiatric/Behavioral: Negative for confusion and sleep disturbance. The patient is not nervous/anxious.          Objective      Vitals:    11/14/18 1407   BP: 125/60   Pulse: 70   Resp: 12   Temp: 99 °F (37.2 °C)   SpO2: 97%      Current Status 11/14/2018   ECOG score 0   pain 0/10    Physical Exam   Constitutional: She is oriented to person, place, and time. She appears well-developed and well-nourished.   HENT:   Mouth/Throat: Oropharynx is clear and moist.   Eyes: Conjunctivae are normal.   Neck: No thyromegaly present.   Cardiovascular: Normal rate and regular rhythm. Exam reveals no gallop and no friction rub.   No murmur heard.  Pulmonary/Chest: Breath sounds normal. No respiratory distress.   Abdominal: Soft. Bowel sounds are normal. She exhibits no distension. There is no tenderness.   Musculoskeletal: She exhibits no edema.   Lymphadenopathy:        Head (right side): No submandibular adenopathy present.     She has no cervical adenopathy.     She has no axillary adenopathy.        Right: No inguinal and no supraclavicular adenopathy present.        Left: No inguinal and no supraclavicular adenopathy present.   Neurological: She is alert and oriented to person, place, and time. She displays normal reflexes. No cranial nerve deficit. She exhibits normal muscle tone.   Skin:  Skin is warm and dry. No rash noted.   Psychiatric: She has a normal mood and affect. Her behavior is normal.       RECENT LABS:  Hematology WBC   Date Value Ref Range Status   11/07/2018 6.03 4.00 - 10.00 10*3/mm3 Final     RBC   Date Value Ref Range Status   11/07/2018 3.98 3.90 - 5.00 10*6/mm3 Final     Hemoglobin   Date Value Ref Range Status   11/07/2018 11.5 11.5 - 14.9 g/dL Final     Hematocrit   Date Value Ref Range Status   11/07/2018 36.3 34.0 - 45.0 % Final     Platelets   Date Value Ref Range Status   11/07/2018 355 150 - 375 10*3/mm3 Final        Lab Results   Component Value Date    GLUCOSE 112 11/07/2018    BUN 32 (H) 11/07/2018    CREATININE 1.61 (H) 11/07/2018    EGFRIFNONA 31 (L) 11/07/2018    EGFRIFAFRI 50 (L) 12/06/2017    BCR 19.9 11/07/2018    K 5.2 (H) 11/07/2018    CO2 21.4 (L) 11/07/2018    CALCIUM 9.1 11/07/2018    PROTENTOTREF 7.4 12/06/2017    ALBUMIN 3.80 11/07/2018    LABIL2 1.3 12/06/2017    AST 24 11/07/2018    ALT 19 11/07/2018     Additional labs 11/7/18: Iron 109, ferritin 126.4, iron saturation 30, TIBC 361, CEA 1.42    Assessment/Plan      1. Recurrent iron deficiency anemia: The patient is intolerant of oral iron. Prior GI evaluation showed no definitive evidence of GI blood loss. She has underlying stage III chronic kidney disease, which is a contributing factor to her mild borderline anemia. She has been treated in the past with IV iron in 2011 and 2012.  She had a lengthy absence from our office from 2015 until she was referred back on 9/21/17 with evidence of recurrent iron deficiency, likely related to malabsorption.  Her ferritin on 7/7/17 was 21 and hemoglobin on 8/7/17 was 11.1.  Her degree of anemia was mild with a hemoglobin of 11.4, microcytic indices with an MCV of 78.  Labs on 9/21/17 showed ferritin 20.5, iron saturation 8%, TIBC 441.  We did also check B12 and folate which were normal.  Erythropoietin was 32 consistent with a component of anemia secondary to  "chronic kidney disease. She did return stool cards for occult blood which were negative ×3 10/5/17.  She received additional Feraheme on 10/5 and 10/12/17.  She underwent EGD with Dr. Curran 10/23/17 with no evidence of active bleeding, small gastric polyps identified.  Labs 12/20/17 with hemoglobin up to 13.3, ferritin 97.9, iron saturation of 16%.  Repeat labs performed postoperatively from colon resection on 5/4/18 showed iron 36, ferritin 99, iron saturation 10%, TIBC 349.  The patient returns today with repeat iron studies from 11/7/18 showing ferritin that is increased from 53 up to 126.4, iron saturation up to 30%, TIBC 361.  Hemoglobin is slightly decreased at 11.5 although this is likely related to stage 3 CKD. The patient will return again in 3 months with repeat iron studies prior to the visit  2. Stage IIa (T3N0M0) ascending colon cancer: She develop significant diarrhea with blood per rectum.  She underwent colonoscopy 4/25/18 with identification of polyps in the rectum, cecal polyp, mass in the right colon.  The cecal polyp was a sessile serrated adenoma, ascending colon \"mass\" showed hyperplastic polyp, rectal polyp was a tubulovillous adenoma with low-grade dysplasia.  CEA on 4/25/18 was normal at 1.65.  Chest x-ray 4/25/18 was unrevealing.  CT of the abdomen and pelvis 4/26/18 showed a 4 cm annular mass in the ascending colon with adjacent haziness in the mesentery but no lymphadenopathy, no evidence of metastatic disease.  The patient underwent a right hemicolectomy on 4/27/18 with pathology showing an invasive moderately differentiated adenocarcinoma arising in a sessile serrated adenoma with negative margins measuring 2.5 cm extending through the muscularis propria into brad-colorectal tissue, positive lymphovascular invasion, negative perineural invasion, 10 lymph nodes negative.  There was a comment regarding an ulcerated small adjacent pericolonic abscess, raising the question of possible " microscopic perforation.  Patient did have a few high risk features with less than 12 lymph nodes removed, positive lymphovascular invasion, and some possibility of microscopic perforation.  The tumor was microsatellite stable.  Given her age and comorbidities, the patient was a poor candidate for adjuvant chemotherapy and was not recommended.  Independent of this recommendation, the patient stated she was not interested in taking any adjuvant chemotherapy.  Therefore we will plan to monitor her clinically for evidence of recurrent disease.  Standard monitoring is with routine CEA levels however this does not appear to be informative for her given her normal preoperative value.  We will consider annual interval follow-up CT and will discuss this in the future.  The patient has no clinical evidence of recurrent disease at this time.  It is suspected that her recent diarrhea issues are postoperative in nature and that her bright blood per rectum his hemorrhoidal in nature.  She will be undergoing an upcoming colonoscopy with Dr. Curran in December.  I did encourage her to proceed with this.   CEA is currently normal at 1.42.  Will repeat this when she returns in 3 months.  3. 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  We did review her family history which includes a sister with breast cancer around age 60, another sister with breast cancer around age 60, maternal aunt with breast cancer over the age of 50, maternal uncle with lung cancer.  Given the patient's multiple malignancies and family history, she was referred to the genetics clinic to discuss potential genetic testing, mainly for benefit of the patient's children and other family members.  Her colon cancer was microsatellite stable.  She did follow-up  in the genetics clinic and underwent INVITAE panel test 7/31/18 showing VUS in MLH3 and MSH6.  We did discuss this result today and his current lack of significance.   4. History of gastric polyps: EGD 10/23/17 with small less than 6 mm gastric polyps, not resected.  5. History of renal cell carcinoma: The patient underwent a left nephrectomy in 2004 with no clinical evidence of recurrent disease.  CT chest from 11/14/16 showed no evidence of recurrence.  CT abdomen and pelvis however from 4/26/18 showed a 2.2 cm right renal lesion which was indeterminate, possibly a proteinaceous cyst but could not rule out mass lesion.  The study was performed without contrast due to the patient's underlying chronic kidney disease.  She is unable to undergo renal mass protocol CT with contrast.  She is unable to undergo MRI due to her pacemaker.  The patient did see urology with confirmation of proteinaceous cyst with repeat CT planned in one year.  6. Mild hypokalemia: Potassium is 5.2.  Verify that the patient is not receiving potassium supplement.  Likely related to lisinopril and spironolactone use.    PLAN:     1. Patient will follow-up with Dr. Curran with colonoscopy 12/17/18 as scheduled.  2. M.D. visit in 3 months.  One week prior we will draw labs with CBC, CMP, CEA, iron panel, ferritin.

## 2018-11-15 NOTE — TELEPHONE ENCOUNTER
Mrs. Sarah called the office today to cancel her colonoscopy, but didn't give a full reason for cancelling. Due to the fact that the patient had intermittent rectal bleeding reported by her daughter, Hien, I called Hien to just inquire why the colonoscopy was cancelled just to ensure the patient's symptoms were being addressed. Per Hien, Dr. Brower saw the patient yesterday and said the patient's symptoms were more equivalent to hemorrhoids. Her iron counts are up and her symptoms have subsided this past week. This is why she is cancelling, but will call us if her symptoms recur. I thanked Hien and they will be in contact if they need Dr. Curran in the future.

## 2018-11-19 ENCOUNTER — TELEPHONE (OUTPATIENT)
Dept: INTERNAL MEDICINE | Facility: CLINIC | Age: 83
End: 2018-11-19

## 2018-11-19 DIAGNOSIS — E87.5 HYPERKALEMIA: Primary | ICD-10-CM

## 2018-11-19 NOTE — TELEPHONE ENCOUNTER
We received lab result from dr Brower with elevated potassium. Pls call pt to come for nonfasting labs to recheck potassium level.

## 2018-11-26 ENCOUNTER — APPOINTMENT (OUTPATIENT)
Dept: LAB | Facility: HOSPITAL | Age: 83
End: 2018-11-26

## 2018-11-26 ENCOUNTER — TRANSCRIBE ORDERS (OUTPATIENT)
Dept: LAB | Facility: HOSPITAL | Age: 83
End: 2018-11-26

## 2018-11-26 ENCOUNTER — LAB (OUTPATIENT)
Dept: LAB | Facility: HOSPITAL | Age: 83
End: 2018-11-26

## 2018-11-26 DIAGNOSIS — C73: ICD-10-CM

## 2018-11-26 DIAGNOSIS — E87.6 HYPOKALEMIA: Primary | ICD-10-CM

## 2018-11-26 DIAGNOSIS — C73: Primary | ICD-10-CM

## 2018-11-26 DIAGNOSIS — E03.9 HYPOTHYROIDISM, UNSPECIFIED TYPE: ICD-10-CM

## 2018-11-26 LAB
POTASSIUM BLD-SCNC: 4.5 MMOL/L (ref 3.5–5.2)
T3FREE SERPL-MCNC: 2.67 PG/ML (ref 2–4.4)
T4 FREE SERPL-MCNC: 1.7 NG/DL (ref 0.93–1.7)
TSH SERPL DL<=0.05 MIU/L-ACNC: 0.02 MIU/ML (ref 0.27–4.2)

## 2018-11-26 PROCEDURE — 84481 FREE ASSAY (FT-3): CPT

## 2018-11-26 PROCEDURE — 84443 ASSAY THYROID STIM HORMONE: CPT

## 2018-11-26 PROCEDURE — 86800 THYROGLOBULIN ANTIBODY: CPT | Performed by: PHYSICIAN ASSISTANT

## 2018-11-26 PROCEDURE — 84439 ASSAY OF FREE THYROXINE: CPT

## 2018-11-26 PROCEDURE — 84432 ASSAY OF THYROGLOBULIN: CPT | Performed by: PHYSICIAN ASSISTANT

## 2018-11-26 PROCEDURE — 36415 COLL VENOUS BLD VENIPUNCTURE: CPT

## 2018-11-26 PROCEDURE — 84132 ASSAY OF SERUM POTASSIUM: CPT | Performed by: FAMILY MEDICINE

## 2018-11-27 LAB
THYROGLOB AB SERPL-ACNC: <1 IU/ML (ref 0–0.9)
THYROGLOBULIN BY IMA: 0.1 NG/ML (ref 1.5–38.5)

## 2018-12-18 RX ORDER — SPIRONOLACTONE 25 MG/1
TABLET ORAL
Qty: 90 TABLET | Refills: 2 | Status: SHIPPED | OUTPATIENT
Start: 2018-12-18 | End: 2019-09-03

## 2019-01-24 ENCOUNTER — CLINICAL SUPPORT NO REQUIREMENTS (OUTPATIENT)
Dept: CARDIOLOGY | Facility: CLINIC | Age: 84
End: 2019-01-24

## 2019-01-24 DIAGNOSIS — I44.2 COMPLETE HEART BLOCK (HCC): Primary | ICD-10-CM

## 2019-01-24 PROCEDURE — 93294 REM INTERROG EVL PM/LDLS PM: CPT | Performed by: INTERNAL MEDICINE

## 2019-01-24 PROCEDURE — 93296 REM INTERROG EVL PM/IDS: CPT | Performed by: INTERNAL MEDICINE

## 2019-01-30 ENCOUNTER — LAB (OUTPATIENT)
Dept: LAB | Facility: HOSPITAL | Age: 84
End: 2019-01-30
Attending: INTERNAL MEDICINE

## 2019-01-30 DIAGNOSIS — D50.8 OTHER IRON DEFICIENCY ANEMIA: ICD-10-CM

## 2019-01-30 DIAGNOSIS — C18.2 MALIGNANT NEOPLASM OF ASCENDING COLON (HCC): ICD-10-CM

## 2019-01-30 DIAGNOSIS — N18.30 ANEMIA IN STAGE 3 CHRONIC KIDNEY DISEASE (HCC): ICD-10-CM

## 2019-01-30 DIAGNOSIS — D63.1 ANEMIA IN STAGE 3 CHRONIC KIDNEY DISEASE (HCC): ICD-10-CM

## 2019-01-30 LAB
BASOPHILS # BLD AUTO: 0.06 10*3/MM3 (ref 0–0.1)
BASOPHILS NFR BLD AUTO: 0.9 % (ref 0–1.1)
CEA SERPL-MCNC: 1.49 NG/ML
DEPRECATED RDW RBC AUTO: 42.5 FL (ref 37–49)
EOSINOPHIL # BLD AUTO: 0.38 10*3/MM3 (ref 0–0.36)
EOSINOPHIL NFR BLD AUTO: 5.8 % (ref 1–5)
ERYTHROCYTE [DISTWIDTH] IN BLOOD BY AUTOMATED COUNT: 13 % (ref 11.7–14.5)
FERRITIN SERPL-MCNC: 53.4 NG/ML
HCT VFR BLD AUTO: 38.6 % (ref 34–45)
HGB BLD-MCNC: 12.1 G/DL (ref 11.5–14.9)
IMM GRANULOCYTES # BLD AUTO: 0.02 10*3/MM3 (ref 0–0.03)
IMM GRANULOCYTES NFR BLD AUTO: 0.3 % (ref 0–0.5)
IRON 24H UR-MRATE: 65 MCG/DL (ref 37–145)
IRON SATN MFR SERPL: 16 % (ref 14–48)
LYMPHOCYTES # BLD AUTO: 1.73 10*3/MM3 (ref 1–3.5)
LYMPHOCYTES NFR BLD AUTO: 26.4 % (ref 20–49)
MCH RBC QN AUTO: 28.7 PG (ref 27–33)
MCHC RBC AUTO-ENTMCNC: 31.3 G/DL (ref 32–35)
MCV RBC AUTO: 91.5 FL (ref 83–97)
MONOCYTES # BLD AUTO: 0.64 10*3/MM3 (ref 0.25–0.8)
MONOCYTES NFR BLD AUTO: 9.8 % (ref 4–12)
NEUTROPHILS # BLD AUTO: 3.73 10*3/MM3 (ref 1.5–7)
NEUTROPHILS NFR BLD AUTO: 56.8 % (ref 39–75)
NRBC BLD AUTO-RTO: 0 /100 WBC (ref 0–0)
PLATELET # BLD AUTO: 261 10*3/MM3 (ref 150–375)
PMV BLD AUTO: 9.5 FL (ref 8.9–12.1)
RBC # BLD AUTO: 4.22 10*6/MM3 (ref 3.9–5)
TIBC SERPL-MCNC: 406 MCG/DL (ref 249–505)
TRANSFERRIN SERPL-MCNC: 290 MG/DL (ref 200–360)
WBC NRBC COR # BLD: 6.56 10*3/MM3 (ref 4–10)

## 2019-01-30 PROCEDURE — 82378 CARCINOEMBRYONIC ANTIGEN: CPT | Performed by: INTERNAL MEDICINE

## 2019-01-30 PROCEDURE — 83540 ASSAY OF IRON: CPT

## 2019-01-30 PROCEDURE — 36415 COLL VENOUS BLD VENIPUNCTURE: CPT | Performed by: INTERNAL MEDICINE

## 2019-01-30 PROCEDURE — 82728 ASSAY OF FERRITIN: CPT

## 2019-01-30 PROCEDURE — 84466 ASSAY OF TRANSFERRIN: CPT

## 2019-01-30 PROCEDURE — 85025 COMPLETE CBC W/AUTO DIFF WBC: CPT | Performed by: INTERNAL MEDICINE

## 2019-02-06 ENCOUNTER — OFFICE VISIT (OUTPATIENT)
Dept: ONCOLOGY | Facility: CLINIC | Age: 84
End: 2019-02-06
Attending: INTERNAL MEDICINE

## 2019-02-06 ENCOUNTER — APPOINTMENT (OUTPATIENT)
Dept: LAB | Facility: HOSPITAL | Age: 84
End: 2019-02-06
Attending: INTERNAL MEDICINE

## 2019-02-06 VITALS
RESPIRATION RATE: 16 BRPM | HEIGHT: 62 IN | OXYGEN SATURATION: 96 % | WEIGHT: 206 LBS | SYSTOLIC BLOOD PRESSURE: 130 MMHG | DIASTOLIC BLOOD PRESSURE: 78 MMHG | HEART RATE: 74 BPM | BODY MASS INDEX: 37.91 KG/M2 | TEMPERATURE: 98.2 F

## 2019-02-06 DIAGNOSIS — C18.2 MALIGNANT NEOPLASM OF ASCENDING COLON (HCC): Primary | ICD-10-CM

## 2019-02-06 DIAGNOSIS — D50.8 OTHER IRON DEFICIENCY ANEMIA: ICD-10-CM

## 2019-02-06 DIAGNOSIS — N18.30 ANEMIA IN STAGE 3 CHRONIC KIDNEY DISEASE (HCC): ICD-10-CM

## 2019-02-06 DIAGNOSIS — D63.1 ANEMIA IN STAGE 3 CHRONIC KIDNEY DISEASE (HCC): ICD-10-CM

## 2019-02-06 PROCEDURE — 99214 OFFICE O/P EST MOD 30 MIN: CPT | Performed by: INTERNAL MEDICINE

## 2019-02-06 PROCEDURE — G0463 HOSPITAL OUTPT CLINIC VISIT: HCPCS | Performed by: INTERNAL MEDICINE

## 2019-02-06 NOTE — PROGRESS NOTES
Subjective .     REASONS FOR FOLLOWUP:    1. Anemia secondary to iron deficiency, intolerant of oral iron, status post Venofer 100 mg on 09/21/11, followed by Feraheme x two doses, 09/28 and 10/05/11.  Recurrent iron deficiency requiring Feraheme again on 09/24/12 and 10/05/12.  Further recurrence of iron deficiency requiring Feraheme on 10/5 in 10/12/17.  2. No definitive evidence of GI blood loss, stool cards negative for occult blood, status post GI evaluation with Dr. Curran with no bleeding source identified.   3. History of gastric polyps, status post staged excision via EGD.    4. Status post left nephrectomy in 2004 for renal cell carcinoma.   5. Stage III chronic kidney disease.   6. Stage I ([id number]) left papillary thyroid cancer (3mm): Status post total thyroidectomy 11/16/15, 2 lymph nodes removed.  7. Stage IIa (T3N0M0) ascending colon cancer: Preoperative CEA 1.65 on 4/25/18.  Right hemicolectomy 4/27/18, invasive moderately differentiated adenocarcinoma, negative margins, positive lymphovascular invasion, negative perineural invasion, 10 lymph nodes negative, ulcerated small adjacent pericolonic abscess (microscopic perforation).  Microsatellite stable.  8. Genetics evaluation 7/31/18 with INVITAE panel test showing VUS MLH3 and MSH6  9. 2.2 cm right renal lesion, indeterminate on CT 4/26/18, felt to represent proteinaceous cyst by urology.  Plan one-year follow-up CT.  10. Atrial fibrillation continuing on anticoagulation with Eliquis.    HISTORY OF PRESENT ILLNESS:  The patient is a 83 y.o. year old female who is here for follow-up with the above-mentioned history.    History of Present Illness the patient returns today in follow-up with laboratory studies to review.  In the interval, she was to have undergone colonoscopy with Dr. Curran in December however the patient canceled the appointment.  It appears by the notes that she indicated I had instructed her to do so however that was not the  case.  In fact I encouraged her to pursue the colonoscopy due to her ongoing bright red blood per rectum and diarrhea.  The patient appears canceled the visit as she did not wish to pursue the colonoscopy around the holiday season.  In the interval she has decreased her Synthroid dose and her diarrhea has actually improved.  She does continue with intermittent bright blood per rectum.  She does note some difficulty with frequent urination.  She has no other complaints today.      PAST MEDICAL HISTORY:    1.  Atrial fibrillation.  The patient underwent ablation procedure in 2004, repeat ablation procedure on 09/13/11 by Dr. Melendez.  Subsequent AV deshawn ablation and March 2017 and subsequent cardioversion in May 2017.  Continuation of anticoagulation with Eliquis  2.  Gastroesophageal reflux disease.  3.  Hypertension.   4.  Status post left knee surgery.   5.  Status post pacemaker placement.   6.  Status post laparoscopic cholecystectomy.   7.  Status post bladder tacking procedure.   8.  Status post EGD and colonoscopy with Dr. Curran 08/08/11.  Finding of gastric polyps, one of which was resected showing a hyperplastic polyp with gastritis.  There was no source of bleeding identified.  Schatzki's ring was identified as well as diverticulosis.  Repeat EGD on 03/18/13 with resection of a 3 cm prepyloric hyperplastic polyp of acute and chronic inflammatory change. Repeat EGD, October 2013 with further resection of polyp, benign findings.  EGD 10/23/17 with small gastric polyps identified, no evidence of active bleeding.  9.  Bladder suspension surgery in 5/12.  10.  Status post left knee replacement in May 2013.    Past Medical History:   Diagnosis Date   • Acute bronchitis    • Acute sinusitis    • Anemia     Iron deficiency   • Anesthesia complication    • Anticoagulated on warfarin    • Arthritis    • Bladder dysfunction    • Cancer of left kidney (CMS/HCC) 06/08/2004    S/P Left Radical Neprectomy   • Cardiac  disease    • Chronic dysfunction of left eustachian tube    • CKD (chronic kidney disease)     stage 3   • Colon cancer (CMS/HCC)    • Depression    • Diarrhea    • Diverticulosis 08/08/2011    Descending Colon & Sigmoid Colon multiple diverticula   • Epigastric pain    • Fatigue    • Frequent urination    • GERD (gastroesophageal reflux disease)    • Goiter     thyroid removed in november 2015   • Health care maintenance    • History of colitis    • History of shingles    • History of transfusion    • Hypertension    • Insomnia    • Left atrial enlargement    • Left ventricular hypertrophy    • Mitral regurgitation     mild to moderate per echocardiogram 2016   • Nerve pain     LUE D/T SHINGLES   • Neuropathy     ARMS   • NANCY (obstructive sleep apnea)     uses CPAP   • Paroxysmal atrial fibrillation (CMS/HCC)    • Pulmonary hypertension (CMS/HCC)     per echo 2016   • Right atrial enlargement    • Sleep apnea    • SSS (sick sinus syndrome) (CMS/HCC)    • Thyroid cancer (CMS/HCC)    • URI (upper respiratory infection)    • Urinary urgency      Past Surgical History:   Procedure Laterality Date   • ATRIAL ABLATION SURGERY N/A 09/13/2011    Comprehensive electrophysiology study, Left atrial pace & sense, 3-dimensional cardia mapping, Radiofrequency catheter ablation, Transseptal hear catheterization, Dr. Maldonado Melendez   • ATRIAL ABLATION SURGERY N/A 02/11/2004    Dr. Maldonado Melendez   • BLADDER SURGERY      Bladder tacking procedure   • BLADDER SUSPENSION N/A 05/2012   • CARDIAC ELECTROPHYSIOLOGY PROCEDURE N/A 5/17/2016    Procedure: PPM generator change - dual BOSTON;  Surgeon: Maldonado Melendez MD;  Location: [facility] CATH INVASIVE LOCATION;  Service:    • CARDIAC ELECTROPHYSIOLOGY PROCEDURE N/A 3/23/2017    Procedure: AV node ablation pt has BOSTON PPM;  Surgeon: Maldonado Melendez MD;  Location: [facility] CATH INVASIVE LOCATION;  Service:    • CARDIOVERSION     • CATARACT EXTRACTION Bilateral     Dr. Gramajo   • COLON  RESECTION Right 4/27/2018    Procedure: COLON RESECTION RIGHT LAPAROSCOPIC, possible open;  Surgeon: Rebeca Curran MD;  Location: [facility] MAIN OR;  Service: General   • COLONOSCOPY N/A 04/01/2003    Normal colonoscopy except for an occasional diverticulosis, Dr. Nathan Macias   • COLONOSCOPY N/A 4/25/2018    Procedure: COLONOSCOPY INTO CECUM AND TI WITH SALINE LIFT, HOT SNARE POLYPECTOMIES, BX'S, SPOT INJECTION, RESOLUTION CLIPS X2;  Surgeon: Rebeca Curran MD;  Location: [facility] ENDOSCOPY;  Service: Gastroenterology   • CYSTOSCOPY BOTOX INJECTION OF BLADDER N/A 6/27/2016    Procedure: CYSTOSCOPY WITH BOTOX DETRUSOR INJECTION;  Surgeon: Salome Bowen MD;  Location: [facility] MAIN OR;  Service:    • CYSTOSCOPY BOTOX INJECTION OF BLADDER N/A 05/29/2015    Dr. Salome Bowen   • ENDOSCOPY N/A 06/10/2015    Antral Polyp: Gastric Mucosa w/ marked cautery artifact,  Hiatal Hernia, Dr. Rebeca Curran   • ENDOSCOPY N/A 10/28/2013    Gastric polyp: polypoid hyperplastic gastric mucosa w/ focal ulceration, mixed acute & chronic inflammation.  Negative for intestinal metaplasia, no helicobacter organisms identified on diff-wuik stain, Dr. Rebeca Curran   • ENDOSCOPY N/A 03/18/2013    Large prepyloric polyp, partially removed: fragment of polypoid hyperplastic gastric mucosa w/ foci of erosion & patchy mixed acute & chronic inflammation. No helicobacter organisms identified on diff-quick stain. Negative for intestinal metaplasia, negative for dysplasia, Dr. Rebeca Curran   • ENDOSCOPY N/A 10/24/2012    large prepyloric mass (3cm) w/ adjacent nodules: hyperplastic polyp w/ mild chronic active gastritis, focal erosion & associated, Multiple fundic polyps: polypoid mucosal hyperplasia, Dr. Reebca Curran   • ENDOSCOPY N/A 03/11/2003    Gastric Antral Biopsies: Fragments of Gastric Mucosa & Necrotic Tissue (Compatible w/ Ulcer) w/ chronic active inflammation.  Negative stain for Helicobacter organisms. Dr. Nathan Macias   • ENDOSCOPY N/A  10/23/2017    Procedure: ESOPHAGOGASTRODUODENOSCOPY;  Surgeon: Rebeca Curran MD;  Location: [facility] ENDOSCOPY;  Service:    • ENDOSCOPY AND COLONOSCOPY N/A 08/08/2011    4cm hiatal hernia, Schatzki's ring, Diverticulosis, Large antral polyps, Dr. Rebeca Curran   • INCISIONAL HERNIA REPAIR N/A 06/11/2015    Defect approximately 2.5 cm w/ a mass of incarcerated tissue approximately the size of a nectarine, Dr. Rebeca Curran   • KNEE ARTHROPLASTY     • KNEE MINI REVISION Right 9/28/2016    Procedure: RT KNEE POLY INSERT CHANGE ;  Surgeon: Tommy Avila MD;  Location: [facility] MAIN OR;  Service:    • LAPAROSCOPIC CHOLECYSTECTOMY N/A    • NEPHRECTOMY RADICAL Left 06/08/2004    Incidentially found left renal mass 3-4 cm renal cell carcinoma, left lower pole, Dr. Salome Bowen   • OTHER SURGICAL HISTORY      NEUROMUSCULAR BLOCKERS BOTULINUM TOXIN ONABOTULINUMTOXIN A   • PACEMAKER IMPLANTATION N/A     Dr. Casillas   • PACEMAKER REPLACEMENT N/A 09/24/2010    Implanted Generator is a [employer] ALTRUA 60 model S603DR, serial # [id number], Dr. Chavez Jimenez   • TOTAL KNEE ARTHROPLASTY Left 05/21/2013    Dr. Miguel Pacheco   • TOTAL THYROIDECTOMY N/A 11/16/2015    Dr. Gideon Ashley, [facility]   • TRANSVAGINAL TAPING SUSPENSION N/A 03/16/2009    Mini Sling, Closed suprapubic cystostomy, Dr. Salome Bowen   • TUBAL ABDOMINAL LIGATION Bilateral          HEMATOLOGICONCOLOGIC HISTORY:    The patient was seen during a hospitalization at [facility] in September 2011.  She was admitted with left lower lobe pneumonia following a cardiac ablation procedure.  She has anemia dating back to 2006, at that time with a hematocrit of 36.7 and MCV of 77.  Her platelets and white count have remained normal.  Hemoglobin in September 2010 was 10.3, MCV 74.  In December 2010 hemoglobin was 10.5 with MCV of 72.  On 08/09/11 hemoglobin was down to 9.8 with MCV of 73.  During the hospitalization hemoglobin had dropped to 8.5 with MCV of 72 and decreased  further to 8.0 at which point she was transfused two units of PRBCs.  She was on Coumadin but had no obvious clinical evidence of GI blood loss.  She was evaluated by Dr. Curran on 08/08/11 with EGD and colonoscopy showing evidence of gastric polyps.  One polyp was biopsied showing evidence of a hyperplastic polyp with gastritis.  There was no definitive evidence of bleeding.  She was having some dysphagia and had a Schatzki's ring which was dilated.  She developed rapid ventricular response and therefore the procedure was stopped.  She then underwent cardiac ablation and subsequently developed pneumonia.      During her hospitalization, lab studies were sent including iron studies showing an iron of 11, TIBC 438, transferrin 294, iron percent sat of 3 with a ferritin of 10.8.  B12 was low-normal at 291, folate normal.  Creatinine was in the 1.2 range with estimated GFR in the 40 range (stage III chronic kidney disease).  Erythropoietin level was 66.  A hemoglobin electrophoresis was sent showing hemoglobin A1 of 97.5%, and hemoglobin A2 of 2.2%.  The patient had stool checked for occult blood in the hospital which again was negative, therefore, with no definitive evidence of GI blood loss.  I suspect she may have a malabsorption issue.  She was intolerant of oral iron and treated with Venofer 100 mg on 09/21/11 and subsequently was discharged from the hospital and treated as an outpatient with Feraheme 510 mg on 09/28/11 and again on 10/05/11.      The patient returned for followup studies on 10/19/11 with hemoglobin that improved to the 13 range and MCV up to 79.  TIBC was 284, iron percent sat 32, ferritin 335.  Reticulated hemoglobin had improved to 32.9.  B12 was 476.  We will follow her counts over time.  She will followup with Dr. Curran regarding her gastric polyps.      Laboratory studies on 02/15/12 showed a ferritin stable at 112.  We will recheck at a 6 month interval.     Lab studies from 9/10/12  shows a hemoglobin 11.9, reticulated hemoglobin 29.9, TIBC 325, iron percent SAT of 11.  Ferritin down to 18.      Feraheme administered on 9/24/12 and 10/5/12.  Repeat iron studies on 3/15/13 showed a ferritin of 101, iron percent saturation of 31.       Labs 10/10/2013 with a ferritin of 85, iron percent sat down to 15, hemoglobin of 12.9. Recheck in six months.    Labs 04/20/2015 showed a ferritin stable in the normal range at 76.     The patient returned after a prolonged absence from our office on 9/21/17 with hemoglobin of 12.0, MCV 79.2, iron 37, ferritin 20, iron saturation 8%, TIBC 441 CONSISTENT with recurrent iron deficiency.  Folate was normal at 10.52, B12 normal at 398, erythropoietin 32.6.  Additional Feraheme initiated on 10/5/17 for 2 doses.  Repeat EGD with Dr. Curran on 10/23/17 with no active bleeding, small less than 6 mm gastric polyps identified.       ONCOLOGIC HISTORY:   Status post left nephrectomy in 2004 for renal cell carcinoma with Dr. Bowen.  This was a Darnell grade 2 lesion measuring 3.0 cm, confined to the kidney, with no extension to perinephric adipose tissue, and no evidence of lymphovascular invasion.  Margins were negative.  Adrenal gland was negative.      CT scan of the abdomen and pelvis o 02/26/12 showed no evidence of recurrent disease.      Stage I ([id number]) left papillary thyroid cancer (3mm): Status post total thyroidectomy 11/16/15, 2 lymph nodes removed.      Stage IIa (T3N0M0) ascending colon cancer: Preoperative CEA 1.65 on 4/25/18.  Right hemicolectomy 4/27/18, invasive moderately differentiated adenocarcinoma, negative margins, positive lymphovascular invasion, negative perineural invasion, 10 lymph nodes negative, ulcerated small adjacent pericolonic abscess (microscopic perforation).  Microsatellite stable.    Genetics evaluation 7/31/18 with INVITAE panel test showing VUS MLH3 and MSH6      Current Outpatient Medications on File Prior to Visit   Medication  Sig Dispense Refill   • apixaban (ELIQUIS) 5 MG tablet tablet Take 1 tablet by mouth Every 12 (Twelve) Hours. 60 tablet 2   • B Complex-C (SUPER B COMPLEX PO) Take 1 tablet by mouth Daily.     • CALCIUM PO Take 600 Units by mouth Daily. With D3, listed separetly     • cholecalciferol (VITAMIN D3) 1000 UNITS tablet Take 1,000 Units by mouth daily. In addition to the calcium -D3 pill     • esomeprazole (NEXIUM) 40 MG capsule Take 40 mg by mouth Every Morning Before Breakfast.     • furosemide (LASIX) 20 MG tablet TAKE TWO TABLETS BY MOUTH DAILY 60 tablet 3   • furosemide (LASIX) 40 MG tablet Take 40 mg by mouth Daily.     • GLUCOSAMINE-CHONDROITIN PO Take 1 tablet by mouth Daily.     • lisinopril (PRINIVIL,ZESTRIL) 10 MG tablet TAKE ONE TABLET BY MOUTH DAILY 90 tablet 0   • lisinopril (PRINIVIL,ZESTRIL) 10 MG tablet TAKE ONE TABLET BY MOUTH DAILY 90 tablet 0   • loperamide (IMODIUM) 2 MG capsule Take 2 mg by mouth 4 (four) times a day as needed for diarrhea.     • metoprolol tartrate (LOPRESSOR) 25 MG tablet TAKE ONE TABLET BY MOUTH TWICE A  tablet 1   • Multiple Vitamins-Minerals (PRESERVISION AREDS PO) Take 1 tablet by mouth Daily.     • spironolactone (ALDACTONE) 25 MG tablet TAKE ONE TABLET BY MOUTH DAILY 90 tablet 2   • SYNTHROID 125 MCG tablet Take 125 mcg by mouth Daily.     • XIIDRA 5 % solution Apply 1 drop to eye 2 (Two) Times a Day.       No current facility-administered medications on file prior to visit.        ALLERGIES:     Allergies   Allergen Reactions   • Sulfa Antibiotics Swelling     FACE AND TONGUE   • Adhesive Tape Rash   • Levaquin [Levofloxacin] Rash     Social History     Socioeconomic History   • Marital status:      Spouse name: Not on file   • Number of children: 0   • Years of education: Not on file   • Highest education level: Not on file   Social Needs   • Financial resource strain: Not on file   • Food insecurity - worry: Not on file   • Food insecurity - inability: Not on  file   • Transportation needs - medical: Not on file   • Transportation needs - non-medical: Not on file   Occupational History   • Occupation: Homemaker     Employer: RETIRED   Tobacco Use   • Smoking status: Never Smoker   • Smokeless tobacco: Never Used   • Tobacco comment: caffeine use   Substance and Sexual Activity   • Alcohol use: No   • Drug use: No   • Sexual activity: Defer   Other Topics Concern   • Not on file   Social History Narrative   • Not on file     Family History   Problem Relation Age of Onset   • Heart disease Other    • Deep vein thrombosis Other    • Hypertension Other    • Breast cancer Sister         In her 60s.   • Breast cancer Maternal Aunt    • Breast cancer Sister         In her 60s.   • Heart disease Mother    • Hypertension Mother    • Heart disease Father    • Hypertension Father    • Heart disease Daughter    • Hypertension Daughter    • Heart disease Son    • Hypertension Son    • Malig Hyperthermia Neg Hx        Review of Systems   Constitutional: Positive for fatigue. Negative for activity change, appetite change, fever and unexpected weight change.   HENT: Negative for congestion, mouth sores, nosebleeds, sore throat and voice change.    Respiratory: Negative for cough, shortness of breath and wheezing.    Cardiovascular: Negative for chest pain, palpitations and leg swelling.   Gastrointestinal: Positive for blood in stool and diarrhea. Negative for abdominal distention, abdominal pain, constipation, nausea and vomiting.   Endocrine: Negative for cold intolerance and heat intolerance.   Genitourinary: Negative for difficulty urinating, dysuria, frequency and hematuria.   Musculoskeletal: Negative for arthralgias, back pain, joint swelling and myalgias.   Skin: Negative for rash.   Neurological: Negative for dizziness, syncope, weakness, light-headedness, numbness and headaches.   Hematological: Negative for adenopathy. Does not bruise/bleed easily.   Psychiatric/Behavioral:  Negative for confusion and sleep disturbance. The patient is not nervous/anxious.          Objective      Vitals:    02/06/19 1405   BP: 130/78   Pulse: 74   Resp: 16   Temp: 98.2 °F (36.8 °C)   SpO2: 96%      Current Status 2/6/2019   ECOG score 0   pain 0/10    Physical Exam   Constitutional: She is oriented to person, place, and time. She appears well-developed and well-nourished.   HENT:   Mouth/Throat: Oropharynx is clear and moist.   Eyes: Conjunctivae are normal.   Neck: No thyromegaly present.   Cardiovascular: Normal rate and regular rhythm. Exam reveals no gallop and no friction rub.   No murmur heard.  Pulmonary/Chest: Breath sounds normal. No respiratory distress.   Abdominal: Soft. Bowel sounds are normal. She exhibits no distension. There is no tenderness.   Musculoskeletal: She exhibits no edema.   Lymphadenopathy:        Head (right side): No submandibular adenopathy present.     She has no cervical adenopathy.     She has no axillary adenopathy.        Right: No inguinal and no supraclavicular adenopathy present.        Left: No inguinal and no supraclavicular adenopathy present.   Neurological: She is alert and oriented to person, place, and time. She displays normal reflexes. No cranial nerve deficit. She exhibits normal muscle tone.   Skin: Skin is warm and dry. No rash noted.   Psychiatric: She has a normal mood and affect. Her behavior is normal.       RECENT LABS:  Hematology WBC   Date Value Ref Range Status   01/30/2019 6.56 4.00 - 10.00 10*3/mm3 Final     RBC   Date Value Ref Range Status   01/30/2019 4.22 3.90 - 5.00 10*6/mm3 Final     Hemoglobin   Date Value Ref Range Status   01/30/2019 12.1 11.5 - 14.9 g/dL Final     Hematocrit   Date Value Ref Range Status   01/30/2019 38.6 34.0 - 45.0 % Final     Platelets   Date Value Ref Range Status   01/30/2019 261 150 - 375 10*3/mm3 Final        Lab Results   Component Value Date    GLUCOSE 112 11/07/2018    BUN 32 (H) 11/07/2018    CREATININE  1.61 (H) 11/07/2018    EGFRIFNONA 31 (L) 11/07/2018    EGFRIFAFRI 50 (L) 12/06/2017    BCR 19.9 11/07/2018    K 4.5 11/26/2018    CO2 21.4 (L) 11/07/2018    CALCIUM 9.1 11/07/2018    PROTENTOTREF 7.4 12/06/2017    ALBUMIN 3.80 11/07/2018    LABIL2 1.3 12/06/2017    AST 24 11/07/2018    ALT 19 11/07/2018     Additional labs 1/30/19: Iron 65, ferritin 53.4, iron saturation 16%, TIBC 406, CEA 1.49    Assessment/Plan      1. Recurrent iron deficiency anemia: The patient is intolerant of oral iron. Prior GI evaluation showed no definitive evidence of GI blood loss. She has underlying stage III chronic kidney disease, which is a contributing factor to her mild borderline anemia. She has been treated in the past with IV iron in 2011 and 2012.  She had a lengthy absence from our office from 2015 until she was referred back on 9/21/17 with evidence of recurrent iron deficiency, likely related to malabsorption.  Her ferritin on 7/7/17 was 21 and hemoglobin on 8/7/17 was 11.1.  Her degree of anemia was mild with a hemoglobin of 11.4, microcytic indices with an MCV of 78.  Labs on 9/21/17 showed ferritin 20.5, iron saturation 8%, TIBC 441.  We did also check B12 and folate which were normal.  Erythropoietin was 32 consistent with a component of anemia secondary to chronic kidney disease. She did return stool cards for occult blood which were negative ×3 10/5/17.  She received additional Feraheme on 10/5 and 10/12/17.  She underwent EGD with Dr. Curran 10/23/17 with no evidence of active bleeding, small gastric polyps identified.  Labs 12/20/17 with hemoglobin up to 13.3, ferritin 97.9, iron saturation of 16%.  Repeat labs performed postoperatively from colon resection on 5/4/18 showed iron 36, ferritin 99, iron saturation 10%, TIBC 349.  The patient returns today with repeat iron studies from 1/30/19.  Her hemoglobin has actually improved up to 12.1.  Her ferritin however has declined from 126 down to 53 with iron saturation  "declining from 30 down to 16 and TIBC increasing up to 406.  This may be indicative of evolving recurrence of iron deficiency however she is not yet at a point where we would proceed with any further IV iron.  I did suggest monitoring her again in 3 more months and if she has declined further we would consider additional iron at that time.  2. Stage IIa (T3N0M0) ascending colon cancer: She develop significant diarrhea with blood per rectum.  She underwent colonoscopy 4/25/18 with identification of polyps in the rectum, cecal polyp, mass in the right colon.  The cecal polyp was a sessile serrated adenoma, ascending colon \"mass\" showed hyperplastic polyp, rectal polyp was a tubulovillous adenoma with low-grade dysplasia.  CEA on 4/25/18 was normal at 1.65.  Chest x-ray 4/25/18 was unrevealing.  CT of the abdomen and pelvis 4/26/18 showed a 4 cm annular mass in the ascending colon with adjacent haziness in the mesentery but no lymphadenopathy, no evidence of metastatic disease.  The patient underwent a right hemicolectomy on 4/27/18 with pathology showing an invasive moderately differentiated adenocarcinoma arising in a sessile serrated adenoma with negative margins measuring 2.5 cm extending through the muscularis propria into brad-colorectal tissue, positive lymphovascular invasion, negative perineural invasion, 10 lymph nodes negative.  There was a comment regarding an ulcerated small adjacent pericolonic abscess, raising the question of possible microscopic perforation.  Patient did have a few high risk features with less than 12 lymph nodes removed, positive lymphovascular invasion, and some possibility of microscopic perforation.  The tumor was microsatellite stable.  Given her age and comorbidities, the patient was a poor candidate for adjuvant chemotherapy and was not recommended.  Independent of this recommendation, the patient stated she was not interested in taking any adjuvant chemotherapy.  Therefore we " will plan to monitor her clinically for evidence of recurrent disease.  Standard monitoring is with routine CEA levels however this does not appear to be informative for her given her normal preoperative value.  We will consider annual interval follow-up CT.  The patient returns today in follow-up doing fairly well.  Her diarrhea is improved with reduction in her Synthroid dose.  She is still having intermittent episodes of bright blood per rectum.  Although this does seem like evidence of hemorrhoidal bleeding, I did encourage her to follow-up with Dr. Curran to pursue colonoscopy that was originally scheduled in December.  Her CEA is normal at 1.49 on 1/30/19.  We did discuss the possibility of annual surveillance CT scans given her stage II disease with high risk features.  She is agreeable.  Therefore in 3 months when she returns we will have a CT of the chest abdomen and pelvis to review.  3. 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  We did review her family history which includes a sister with breast cancer around age 60, another sister with breast cancer around age 60, maternal aunt with breast cancer over the age of 50, maternal uncle with lung cancer.  Given the patient's multiple malignancies and family history, she was referred to the genetics clinic to discuss potential genetic testing, mainly for benefit of the patient's children and other family members.  Her colon cancer was microsatellite stable.  She did follow-up in the genetics clinic and underwent INVITAE panel test 7/31/18 showing VUS in MLH3 and MSH6 with no known clinical significance at this time.   4. History of gastric polyps: EGD 10/23/17 with small less than 6 mm gastric polyps, not resected.  5. History of renal cell carcinoma:  The patient underwent a left nephrectomy in 2004 with no clinical evidence of recurrent disease.  CT chest from 11/14/16 showed no evidence of recurrence.  CT abdomen and pelvis however from 4/26/18 showed a 2.2 cm right renal lesion which was indeterminate, possibly a proteinaceous cyst but could not rule out mass lesion.  The study was performed without contrast due to the patient's underlying chronic kidney disease.  She is unable to undergo renal mass protocol CT with contrast.  She is unable to undergo MRI due to her pacemaker.  We are planning follow-up CT scan in 3 months for evaluation.    PLAN:     1. Patient will follow-up with Dr. Curran for colonoscopy that was originally to have been scheduled in December (patient canceled).  2. M.D. visit in 3 months.  One week prior we will draw labs with CBC, CMP, CEA, iron panel, ferritin.  The patient will also undergo CT chest abdomen pelvis the week prior.

## 2019-02-08 RX ORDER — LISINOPRIL 10 MG/1
TABLET ORAL
Qty: 90 TABLET | Refills: 0 | Status: SHIPPED | OUTPATIENT
Start: 2019-02-08 | End: 2019-08-02 | Stop reason: SDUPTHER

## 2019-03-22 NOTE — TELEPHONE ENCOUNTER
Ok -thanks  
Received remote transmission today that shows patient has been in persistent AF since 5/31/2017. She had a previous AV node ablation and had a cardioversion on 5/22/2017. She has a follow up appointment with you on 6/21/2017. Thanks, Amy  
No

## 2019-04-24 ENCOUNTER — HOSPITAL ENCOUNTER (OUTPATIENT)
Dept: PET IMAGING | Facility: HOSPITAL | Age: 84
Discharge: HOME | End: 2019-04-24
Admitting: INTERNAL MEDICINE

## 2019-04-24 ENCOUNTER — LAB (OUTPATIENT)
Dept: LAB | Facility: HOSPITAL | Age: 84
End: 2019-04-24
Attending: INTERNAL MEDICINE

## 2019-04-24 DIAGNOSIS — N18.30 ANEMIA IN STAGE 3 CHRONIC KIDNEY DISEASE (HCC): ICD-10-CM

## 2019-04-24 DIAGNOSIS — D63.1 ANEMIA IN STAGE 3 CHRONIC KIDNEY DISEASE (HCC): ICD-10-CM

## 2019-04-24 DIAGNOSIS — C18.2 MALIGNANT NEOPLASM OF ASCENDING COLON (HCC): ICD-10-CM

## 2019-04-24 DIAGNOSIS — D50.8 OTHER IRON DEFICIENCY ANEMIA: ICD-10-CM

## 2019-04-24 DIAGNOSIS — G47.00 INSOMNIA, UNSPECIFIED TYPE: Primary | ICD-10-CM

## 2019-04-24 LAB
ALBUMIN SERPL-MCNC: 4.1 G/DL (ref 3.5–5.2)
ALBUMIN/GLOB SERPL: 1.2 G/DL (ref 1.1–2.4)
ALP SERPL-CCNC: 111 U/L (ref 38–116)
ALT SERPL W P-5'-P-CCNC: 21 U/L (ref 0–33)
ANION GAP SERPL CALCULATED.3IONS-SCNC: 11.2 MMOL/L
AST SERPL-CCNC: 23 U/L (ref 0–32)
BASOPHILS # BLD AUTO: 0.04 10*3/MM3 (ref 0–0.2)
BASOPHILS NFR BLD AUTO: 0.6 % (ref 0–1.5)
BILIRUB SERPL-MCNC: 0.4 MG/DL (ref 0.2–1.2)
BUN BLD-MCNC: 36 MG/DL (ref 6–20)
BUN/CREAT SERPL: 19.5 (ref 7.3–30)
CALCIUM SPEC-SCNC: 9.2 MG/DL (ref 8.5–10.2)
CEA SERPL-MCNC: 1.63 NG/ML
CHLORIDE SERPL-SCNC: 103 MMOL/L (ref 98–107)
CO2 SERPL-SCNC: 23.8 MMOL/L (ref 22–29)
CREAT BLD-MCNC: 1.85 MG/DL (ref 0.6–1.1)
DEPRECATED RDW RBC AUTO: 49 FL (ref 37–54)
EOSINOPHIL # BLD AUTO: 0.22 10*3/MM3 (ref 0–0.4)
EOSINOPHIL NFR BLD AUTO: 3.4 % (ref 0.3–6.2)
ERYTHROCYTE [DISTWIDTH] IN BLOOD BY AUTOMATED COUNT: 15 % (ref 12.3–15.4)
FERRITIN SERPL-MCNC: 62.2 NG/ML (ref 13–150)
GFR SERPL CREATININE-BSD FRML MDRD: 26 ML/MIN/1.73
GLOBULIN UR ELPH-MCNC: 3.5 GM/DL (ref 1.8–3.5)
GLUCOSE BLD-MCNC: 99 MG/DL (ref 74–124)
HCT VFR BLD AUTO: 38.8 % (ref 34–46.6)
HGB BLD-MCNC: 12.5 G/DL (ref 12–15.9)
IMM GRANULOCYTES # BLD AUTO: 0.02 10*3/MM3 (ref 0–0.05)
IMM GRANULOCYTES NFR BLD AUTO: 0.3 % (ref 0–0.5)
IRON 24H UR-MRATE: 54 MCG/DL (ref 37–145)
IRON SATN MFR SERPL: 13 % (ref 14–48)
LYMPHOCYTES # BLD AUTO: 1.55 10*3/MM3 (ref 0.7–3.1)
LYMPHOCYTES NFR BLD AUTO: 24.1 % (ref 19.6–45.3)
MCH RBC QN AUTO: 28.9 PG (ref 26.6–33)
MCHC RBC AUTO-ENTMCNC: 32.2 G/DL (ref 31.5–35.7)
MCV RBC AUTO: 89.8 FL (ref 79–97)
MONOCYTES # BLD AUTO: 0.63 10*3/MM3 (ref 0.1–0.9)
MONOCYTES NFR BLD AUTO: 9.8 % (ref 5–12)
NEUTROPHILS # BLD AUTO: 3.98 10*3/MM3 (ref 1.7–7)
NEUTROPHILS NFR BLD AUTO: 61.8 % (ref 42.7–76)
NRBC BLD AUTO-RTO: 0 /100 WBC (ref 0–0.2)
PLATELET # BLD AUTO: 289 10*3/MM3 (ref 140–450)
PMV BLD AUTO: 10.2 FL (ref 6–12)
POTASSIUM BLD-SCNC: 5.2 MMOL/L (ref 3.5–4.7)
PROT SERPL-MCNC: 7.6 G/DL (ref 6.3–8)
RBC # BLD AUTO: 4.32 10*6/MM3 (ref 3.77–5.28)
SODIUM BLD-SCNC: 138 MMOL/L (ref 134–145)
T3FREE SERPL-MCNC: 2.85 PG/ML (ref 2–4.4)
T4 FREE SERPL-MCNC: 1.41 NG/DL (ref 0.93–1.7)
TIBC SERPL-MCNC: 407 MCG/DL (ref 249–505)
TRANSFERRIN SERPL-MCNC: 291 MG/DL (ref 200–360)
TSH SERPL DL<=0.05 MIU/L-ACNC: 0.5 MIU/ML (ref 0.27–4.2)
WBC NRBC COR # BLD: 6.44 10*3/MM3 (ref 3.4–10.8)

## 2019-04-24 PROCEDURE — 82378 CARCINOEMBRYONIC ANTIGEN: CPT | Performed by: INTERNAL MEDICINE

## 2019-04-24 PROCEDURE — 84481 FREE ASSAY (FT-3): CPT | Performed by: PHYSICIAN ASSISTANT

## 2019-04-24 PROCEDURE — 0 DIATRIZOATE MEGLUMINE & SODIUM PER 1 ML: Performed by: INTERNAL MEDICINE

## 2019-04-24 PROCEDURE — 36415 COLL VENOUS BLD VENIPUNCTURE: CPT | Performed by: INTERNAL MEDICINE

## 2019-04-24 PROCEDURE — 84443 ASSAY THYROID STIM HORMONE: CPT | Performed by: PHYSICIAN ASSISTANT

## 2019-04-24 PROCEDURE — 85025 COMPLETE CBC W/AUTO DIFF WBC: CPT | Performed by: INTERNAL MEDICINE

## 2019-04-24 PROCEDURE — 83540 ASSAY OF IRON: CPT | Performed by: INTERNAL MEDICINE

## 2019-04-24 PROCEDURE — 71250 CT THORAX DX C-: CPT

## 2019-04-24 PROCEDURE — 82728 ASSAY OF FERRITIN: CPT | Performed by: INTERNAL MEDICINE

## 2019-04-24 PROCEDURE — 84439 ASSAY OF FREE THYROXINE: CPT | Performed by: PHYSICIAN ASSISTANT

## 2019-04-24 PROCEDURE — 80053 COMPREHEN METABOLIC PANEL: CPT | Performed by: INTERNAL MEDICINE

## 2019-04-24 PROCEDURE — 74176 CT ABD & PELVIS W/O CONTRAST: CPT

## 2019-04-24 PROCEDURE — 84466 ASSAY OF TRANSFERRIN: CPT | Performed by: INTERNAL MEDICINE

## 2019-04-24 RX ADMIN — DIATRIZOATE MEGLUMINE AND DIATRIZOATE SODIUM 30 ML: 660; 100 LIQUID ORAL; RECTAL at 14:03

## 2019-04-26 ENCOUNTER — CLINICAL SUPPORT NO REQUIREMENTS (OUTPATIENT)
Dept: CARDIOLOGY | Facility: CLINIC | Age: 84
End: 2019-04-26

## 2019-04-26 ENCOUNTER — TELEPHONE (OUTPATIENT)
Dept: ONCOLOGY | Facility: CLINIC | Age: 84
End: 2019-04-26

## 2019-04-26 ENCOUNTER — OFFICE VISIT (OUTPATIENT)
Dept: CARDIOLOGY | Facility: CLINIC | Age: 84
End: 2019-04-26

## 2019-04-26 VITALS
HEART RATE: 70 BPM | DIASTOLIC BLOOD PRESSURE: 80 MMHG | BODY MASS INDEX: 38.64 KG/M2 | WEIGHT: 210 LBS | SYSTOLIC BLOOD PRESSURE: 132 MMHG | HEIGHT: 62 IN

## 2019-04-26 DIAGNOSIS — I44.2 THIRD DEGREE AV BLOCK (HCC): ICD-10-CM

## 2019-04-26 DIAGNOSIS — C18.2 MALIGNANT NEOPLASM OF ASCENDING COLON (HCC): Primary | ICD-10-CM

## 2019-04-26 DIAGNOSIS — I44.2 COMPLETE HEART BLOCK (HCC): Primary | ICD-10-CM

## 2019-04-26 DIAGNOSIS — Z98.890 S/P AV NODAL ABLATION: ICD-10-CM

## 2019-04-26 DIAGNOSIS — Z95.0 S/P PLACEMENT OF CARDIAC PACEMAKER: ICD-10-CM

## 2019-04-26 DIAGNOSIS — I48.21 PERMANENT ATRIAL FIBRILLATION (HCC): Primary | ICD-10-CM

## 2019-04-26 PROCEDURE — 93279 PRGRMG DEV EVAL PM/LDLS PM: CPT | Performed by: INTERNAL MEDICINE

## 2019-04-26 PROCEDURE — 93000 ELECTROCARDIOGRAM COMPLETE: CPT | Performed by: NURSE PRACTITIONER

## 2019-04-26 PROCEDURE — 99213 OFFICE O/P EST LOW 20 MIN: CPT | Performed by: NURSE PRACTITIONER

## 2019-04-26 NOTE — PROGRESS NOTES
Date of Office Visit: 2019  Encounter Provider: ALLISON Isaac  Place of Service: [facility] CARDIOLOGY  Patient Name: Anastacia Sarah  :1935    Chief Complaint   Patient presents with   • Pacemaker Check   • Atrial Fibrillation   :     HPI: Anastacia Sarah is a 83 y.o. female who is a patient of Dr. Clement.  She has a history of paroxysmal atrial fib which is now permanent, sick sinus syndrome status post permanent pacemaker and due to issues with rate control of her A. fib she underwent an AV node ablation in 2018.  She presents today for routine follow-up.    She is doing pretty good.  She denies any chest pain, shortness of breath at rest, PND, orthopnea, dizziness or edema.  She does have some mild dyspnea on exertion this is unchanged.  She did have a colon resection in 2018 for colon cancer and has problems with diarrhea since that time but otherwise is doing pretty good.  She also complains of some left shoulder and bicep that discomfort that she thought might be due to her pacemaker however it hurts in her shoulder joint and her bicep whenever she moves it certain ways and sometimes she notes whenever she is sleeping and laying on that side.    Device interrogation today shows normal function and testing.  She does have a chronically elevated RV threshold which is unchanged.          Past Medical History:   Diagnosis Date   • Acute bronchitis    • Acute sinusitis    • Anemia     Iron deficiency   • Anesthesia complication    • Anticoagulated on warfarin    • Arthritis    • Bladder dysfunction    • Cancer of left kidney (CMS/HCC) 2004    S/P Left Radical Neprectomy   • Cardiac disease    • Chronic dysfunction of left eustachian tube    • CKD (chronic kidney disease)     stage 3   • Colon cancer (CMS/HCC)    • Depression    • Diarrhea    • Diverticulosis 2011    Descending Colon & Sigmoid Colon multiple diverticula   • Epigastric  pain    • Fatigue    • Frequent urination    • GERD (gastroesophageal reflux disease)    • Goiter     thyroid removed in november 2015   • Health care maintenance    • History of colitis    • History of shingles    • History of transfusion    • Hypertension    • Insomnia    • Left atrial enlargement    • Left ventricular hypertrophy    • Mitral regurgitation     mild to moderate per echocardiogram 2016   • Nerve pain     LUE D/T SHINGLES   • Neuropathy     ARMS   • Non morbid obesity due to excess calories    • NANCY (obstructive sleep apnea)     uses CPAP   • Paroxysmal atrial fibrillation (CMS/HCC)    • Permanent atrial fibrillation (CMS/HCC)    • Pulmonary hypertension (CMS/HCC)     per echo 2016   • Right atrial enlargement    • Sleep apnea     on CPAP   • SSS (sick sinus syndrome) (CMS/HCC)    • Third degree AV block (CMS/HCC)    • Thyroid cancer (CMS/HCC)    • URI (upper respiratory infection)    • Urinary urgency        Past Surgical History:   Procedure Laterality Date   • ATRIAL ABLATION SURGERY N/A 09/13/2011    Comprehensive electrophysiology study, Left atrial pace & sense, 3-dimensional cardia mapping, Radiofrequency catheter ablation, Transseptal hear catheterization, Dr. Maldonado Melendez   • ATRIAL ABLATION SURGERY N/A 02/11/2004    Dr. Maldonado Melendez   • BLADDER SURGERY      Bladder tacking procedure   • BLADDER SUSPENSION N/A 05/2012   • CARDIAC ELECTROPHYSIOLOGY PROCEDURE N/A 5/17/2016    Procedure: PPM generator change - dual BOSTON;  Surgeon: Maldonado Melendez MD;  Location:  [facility];  Service:    • CARDIAC ELECTROPHYSIOLOGY PROCEDURE N/A 3/23/2017    Procedure: AV node ablation pt has BOSTON PPM;  Surgeon: Maldonado Melendez MD;  Location:  [facility];  Service:    • CARDIOVERSION  01/26/2017    Dr. Melendez   • CARDIOVERSION  05/25/2017    Dr. Melendez   • CATARACT EXTRACTION Bilateral     Dr. Gramajo   • COLON RESECTION Right 4/27/2018    Procedure: COLON RESECTION  RIGHT LAPAROSCOPIC, possible open;  Surgeon: Rebeca Curran MD;  Location: [facility] OR;  Service: General   • COLONOSCOPY N/A 04/01/2003    Normal colonoscopy except for an occasional diverticulosis, Dr. Nathan Macias   • COLONOSCOPY N/A 4/25/2018    Procedure: COLONOSCOPY INTO CECUM AND TI WITH SALINE LIFT, HOT SNARE POLYPECTOMIES, BX'S, SPOT INJECTION, RESOLUTION CLIPS X2;  Surgeon: Rebeca Curran MD;  Location: [facility] ENDOSCOPY;  Service: Gastroenterology   • CYSTOSCOPY BOTOX INJECTION OF BLADDER N/A 6/27/2016    Procedure: CYSTOSCOPY WITH BOTOX DETRUSOR INJECTION;  Surgeon: Salome Bowen MD;  Location: [facility] MAIN OR;  Service:    • CYSTOSCOPY BOTOX INJECTION OF BLADDER N/A 05/29/2015    Dr. Salome Bowen   • ENDOSCOPY N/A 06/10/2015    Antral Polyp: Gastric Mucosa w/ marked cautery artifact,  Hiatal Hernia, Dr. Rebeca Curran   • ENDOSCOPY N/A 10/28/2013    Gastric polyp: polypoid hyperplastic gastric mucosa w/ focal ulceration, mixed acute & chronic inflammation.  Negative for intestinal metaplasia, no helicobacter organisms identified on diff-wuik stain, Dr. Rebeca Curran   • ENDOSCOPY N/A 03/18/2013    Large prepyloric polyp, partially removed: fragment of polypoid hyperplastic gastric mucosa w/ foci of erosion & patchy mixed acute & chronic inflammation. No helicobacter organisms identified on diff-quick stain. Negative for intestinal metaplasia, negative for dysplasia, Dr. Rebeca Curran   • ENDOSCOPY N/A 10/24/2012    large prepyloric mass (3cm) w/ adjacent nodules: hyperplastic polyp w/ mild chronic active gastritis, focal erosion & associated, Multiple fundic polyps: polypoid mucosal hyperplasia, Dr. Rebeca Curran   • ENDOSCOPY N/A 03/11/2003    Gastric Antral Biopsies: Fragments of Gastric Mucosa & Necrotic Tissue (Compatible w/ Ulcer) w/ chronic active inflammation.  Negative stain for Helicobacter organisms. Dr. Nathan Macias   • ENDOSCOPY N/A 10/23/2017    Procedure: ESOPHAGOGASTRODUODENOSCOPY;   Surgeon: Rebeca Curran MD;  Location: [facility] ENDOSCOPY;  Service:    • ENDOSCOPY AND COLONOSCOPY N/A 08/08/2011    4cm hiatal hernia, Schatzki's ring, Diverticulosis, Large antral polyps, Dr. Rebeca Curran   • INCISIONAL HERNIA REPAIR N/A 06/11/2015    Defect approximately 2.5 cm w/ a mass of incarcerated tissue approximately the size of a nectarine, Dr. Rbeeca Curran   • KNEE ARTHROPLASTY     • KNEE MINI REVISION Right 9/28/2016    Procedure: RT KNEE POLY INSERT CHANGE ;  Surgeon: Tommy Avila MD;  Location: [facility] MAIN OR;  Service:    • LAPAROSCOPIC CHOLECYSTECTOMY N/A    • NEPHRECTOMY RADICAL Left 06/08/2004    Incidentially found left renal mass 3-4 cm renal cell carcinoma, left lower pole, Dr. Salome Bowen   • OTHER SURGICAL HISTORY      NEUROMUSCULAR BLOCKERS BOTULINUM TOXIN ONABOTULINUMTOXIN A   • PACEMAKER IMPLANTATION N/A     Dr. Casillas   • PACEMAKER REPLACEMENT N/A 09/24/2010    Implanted Generator is a [employer] ALTRUA 60 model [id number], serial # [id number], Dr. Chavez Jimenez   • TOTAL KNEE ARTHROPLASTY Left 05/21/2013    Dr. Miguel Pacheco   • TOTAL THYROIDECTOMY N/A 11/16/2015    Dr. Gideon Ashley, [facility]   • TRANSVAGINAL TAPING SUSPENSION N/A 03/16/2009    Mini Sling, Closed suprapubic cystostomy, Dr. Salome Bowen   • TUBAL ABDOMINAL LIGATION Bilateral        Social History     Socioeconomic History   • Marital status:      Spouse name: Not on file   • Number of children: 0   • Years of education: Not on file   • Highest education level: Not on file   Occupational History   • Occupation: Homemaker     Employer: RETIRED   Tobacco Use   • Smoking status: Never Smoker   • Smokeless tobacco: Never Used   • Tobacco comment: caffeine use   Substance and Sexual Activity   • Alcohol use: No   • Drug use: No   • Sexual activity: Defer       Family History   Problem Relation Age of Onset   • Heart disease Other    • Deep vein thrombosis Other    • Hypertension Other    • Breast cancer Sister         In her  60s.   • Breast cancer Maternal Aunt    • Breast cancer Sister         In her 60s.   • Heart disease Mother    • Hypertension Mother    • Heart disease Father    • Hypertension Father    • Heart disease Daughter    • Hypertension Daughter    • Heart disease Son    • Hypertension Son    • Malig Hyperthermia Neg Hx        Review of Systems   Constitution: Negative for chills, fever and malaise/fatigue.   Cardiovascular: Positive for dyspnea on exertion (stable). Negative for chest pain, leg swelling, near-syncope, orthopnea, palpitations, paroxysmal nocturnal dyspnea and syncope.   Respiratory: Negative for cough and shortness of breath.    Hematologic/Lymphatic: Negative.    Musculoskeletal: Positive for joint pain (left shoulder). Negative for joint swelling and myalgias.   Gastrointestinal: Positive for diarrhea. Negative for abdominal pain, melena, nausea and vomiting.   Genitourinary: Negative for frequency and hematuria.   Neurological: Negative for light-headedness, numbness, paresthesias and seizures.   Allergic/Immunologic: Negative.    All other systems reviewed and are negative.      Allergies   Allergen Reactions   • Sulfa Antibiotics Swelling     FACE AND TONGUE   • Adhesive Tape Rash   • Levaquin [Levofloxacin] Rash         Current Outpatient Medications:   •  apixaban (ELIQUIS) 5 MG tablet tablet, Take 1 tablet by mouth Every 12 (Twelve) Hours., Disp: 60 tablet, Rfl: 2  •  B Complex-C (SUPER B COMPLEX PO), Take 1 tablet by mouth Daily., Disp: , Rfl:   •  CALCIUM PO, Take 600 Units by mouth Daily. With D3, listed separetly, Disp: , Rfl:   •  cholecalciferol (VITAMIN D3) 1000 UNITS tablet, Take 1,000 Units by mouth daily. In addition to the calcium -D3 pill, Disp: , Rfl:   •  furosemide (LASIX) 20 MG tablet, TAKE TWO TABLETS BY MOUTH DAILY, Disp: 60 tablet, Rfl: 3  •  furosemide (LASIX) 40 MG tablet, Take 40 mg by mouth Daily., Disp: , Rfl:   •  GLUCOSAMINE-CHONDROITIN PO, Take 1 tablet by mouth Daily.,  "Disp: , Rfl:   •  lisinopril (PRINIVIL,ZESTRIL) 10 MG tablet, TAKE ONE TABLET BY MOUTH DAILY, Disp: 90 tablet, Rfl: 0  •  loperamide (IMODIUM) 2 MG capsule, Take 2 mg by mouth 4 (four) times a day as needed for diarrhea., Disp: , Rfl:   •  metoprolol tartrate (LOPRESSOR) 25 MG tablet, TAKE ONE TABLET BY MOUTH TWICE A DAY, Disp: 180 tablet, Rfl: 0  •  Multiple Vitamins-Minerals (PRESERVISION AREDS PO), Take 1 tablet by mouth Daily., Disp: , Rfl:   •  spironolactone (ALDACTONE) 25 MG tablet, TAKE ONE TABLET BY MOUTH DAILY, Disp: 90 tablet, Rfl: 2  •  SYNTHROID 125 MCG tablet, Take 125 mcg by mouth Daily., Disp: , Rfl:   •  XIIDRA 5 % solution, Apply 1 drop to eye 2 (Two) Times a Day., Disp: , Rfl:       Objective:     Vitals:    04/26/19 1053   BP: 132/80   Pulse: 70   Weight: 95.3 kg (210 lb)   Height: 157.5 cm (62.01\")     Body mass index is 38.4 kg/m².    PHYSICAL EXAM:    Vitals Reviewed.   General Appearance: No acute distress, well developed and well nourished.   Eyes: Conjunctiva and lids: No erythema, swelling, or discharge. Sclera non-icteric.   HENT: Atraumatic, normocephalic. External eyes, ears, and nose normal.   Respiratory: No signs of respiratory distress. Respiration rhythm and depth normal.   Clear to auscultation. No rales, crackles, rhonchi, or wheezing auscultated.   Cardiovascular:  Heart Rate and Rhythm: Normal, Heart Sounds: Normal S1 and S2. No S3 or S4 noted.  Murmurs: No murmurs noted. No rubs, thrills, or gallops.   Arterial Pulses:  Posterior tibialis and dorsalis pedis pulses normal.   Lower Extremities: No edema noted.  Gastrointestinal:  Abdomen soft, non-distended, non-tender.   Musculoskeletal: Normal movement of extremities  Skin and Nails: General appearance normal. No pallor, cyanosis, diaphoresis. Skin temperature normal. No clubbing of fingernails.   Psychiatric: Patient alert and oriented to person, place, and time. Speech and behavior appropriate. Normal mood and affect. "       ECG 12 Lead  Date/Time: 4/26/2019 11:19 AM  Performed by: Sayra Julian APRN  Authorized by: Sayra Julian APRN   Comparison: compared with previous ECG from 10/19/2018  Similar to previous ECG  Rhythm: paced  BPM: 70  Pacing: ventricular paced rhythmComments: Underlying AFib              Assessment:       Diagnosis Plan   1. Permanent atrial fibrillation (CMS/HCC)     2. Third degree AV block (CMS/HCC)     3. S/P AV deshawn ablation     4. S/P placement of cardiac pacemaker            Plan:       1. Atrial Fibrillation and Atrial Flutter  Assessment  • The patient has permanent atrial fibrillation  • The patient's CHADS2-VASc score is 4  • A [id number] score of 2 or more is considered a high risk for a thromboembolic event  • Apixaban prescribed  • Permanent A. fib, status post permanent pacemaker and AV node ablation.    Apixaban for anticoagulation with no bleeding issues.    Plan  • Continue in atrial fibrillation with rate control  • Continue apixaban for antithrombotic therapy, bleeding issues discussed    2-4.  Third-degree AV block, status post permanent pacemaker and AV node ablation.  Normal function and testing with chronically elevated RV thresholds it is stable.    Follow-up with Dr. Melendez in 6 months.  As always, it has been a pleasure to participate in your patient's care.      Sincerely,         ALLISON Winn

## 2019-04-26 NOTE — TELEPHONE ENCOUNTER
----- Message from Edyta Whitlye RN sent at 4/26/2019  2:00 PM EDT -----  Please add lab appt for this pt on May 1 with STAT BMP per Dr Brower  Thanks  Joseph

## 2019-04-29 RX ORDER — APIXABAN 5 MG/1
TABLET, FILM COATED ORAL
Qty: 60 TABLET | Refills: 1 | Status: SHIPPED | OUTPATIENT
Start: 2019-04-29 | End: 2019-06-28 | Stop reason: SDUPTHER

## 2019-05-01 ENCOUNTER — LAB (OUTPATIENT)
Dept: LAB | Facility: HOSPITAL | Age: 84
End: 2019-05-01
Attending: INTERNAL MEDICINE

## 2019-05-01 ENCOUNTER — APPOINTMENT (OUTPATIENT)
Dept: LAB | Facility: HOSPITAL | Age: 84
End: 2019-05-01
Attending: INTERNAL MEDICINE

## 2019-05-01 ENCOUNTER — OFFICE VISIT (OUTPATIENT)
Dept: ONCOLOGY | Facility: CLINIC | Age: 84
End: 2019-05-01
Attending: INTERNAL MEDICINE

## 2019-05-01 VITALS
TEMPERATURE: 98.1 F | OXYGEN SATURATION: 97 % | BODY MASS INDEX: 38.81 KG/M2 | HEART RATE: 75 BPM | RESPIRATION RATE: 16 BRPM | SYSTOLIC BLOOD PRESSURE: 135 MMHG | DIASTOLIC BLOOD PRESSURE: 64 MMHG | HEIGHT: 62 IN | WEIGHT: 210.9 LBS

## 2019-05-01 DIAGNOSIS — C18.2 MALIGNANT NEOPLASM OF ASCENDING COLON (HCC): ICD-10-CM

## 2019-05-01 DIAGNOSIS — C18.2 MALIGNANT NEOPLASM OF ASCENDING COLON (HCC): Primary | ICD-10-CM

## 2019-05-01 LAB
ANION GAP SERPL CALCULATED.3IONS-SCNC: 10.1 MMOL/L
BUN BLD-MCNC: 41 MG/DL (ref 6–20)
BUN/CREAT SERPL: 18.8 (ref 7.3–30)
CALCIUM SPEC-SCNC: 9.5 MG/DL (ref 8.5–10.2)
CHLORIDE SERPL-SCNC: 104 MMOL/L (ref 98–107)
CO2 SERPL-SCNC: 24.9 MMOL/L (ref 22–29)
CREAT BLD-MCNC: 2.18 MG/DL (ref 0.6–1.1)
GFR SERPL CREATININE-BSD FRML MDRD: 22 ML/MIN/1.73
GLUCOSE BLD-MCNC: 100 MG/DL (ref 74–124)
POTASSIUM BLD-SCNC: 5.6 MMOL/L (ref 3.5–4.7)
SODIUM BLD-SCNC: 139 MMOL/L (ref 134–145)

## 2019-05-01 PROCEDURE — 80048 BASIC METABOLIC PNL TOTAL CA: CPT | Performed by: INTERNAL MEDICINE

## 2019-05-01 PROCEDURE — 99214 OFFICE O/P EST MOD 30 MIN: CPT | Performed by: INTERNAL MEDICINE

## 2019-05-01 PROCEDURE — 36415 COLL VENOUS BLD VENIPUNCTURE: CPT | Performed by: INTERNAL MEDICINE

## 2019-05-01 NOTE — PROGRESS NOTES
Subjective .     REASONS FOR FOLLOWUP:    1. Anemia secondary to iron deficiency, intolerant of oral iron, status post Venofer 100 mg on 09/21/11, followed by Feraheme x two doses, 09/28 and 10/05/11.  Recurrent iron deficiency requiring Feraheme again on 09/24/12 and 10/05/12.  Further recurrence of iron deficiency requiring Feraheme on 10/5 in 10/12/17.  2. No definitive evidence of GI blood loss, stool cards negative for occult blood, status post GI evaluation with Dr. Curran with no bleeding source identified.   3. History of gastric polyps, status post staged excision via EGD.    4. Status post left nephrectomy in 2004 for renal cell carcinoma.   5. Stage III chronic kidney disease.   6. Stage I (A1nT9pM7) left papillary thyroid cancer (3mm): Status post total thyroidectomy 11/16/15, 2 lymph nodes removed.  7. Stage IIa (T3N0M0) ascending colon cancer: Preoperative CEA 1.65 on 4/25/18.  Right hemicolectomy 4/27/18, invasive moderately differentiated adenocarcinoma, negative margins, positive lymphovascular invasion, negative perineural invasion, 10 lymph nodes negative, ulcerated small adjacent pericolonic abscess (microscopic perforation).  Microsatellite stable.  8. Genetics evaluation 7/31/18 with INVITAE panel test showing VUS MLH3 and MSH6  9. 2.2 cm right renal lesion, indeterminate on CT 4/26/18, felt to represent proteinaceous cyst by urology.  Stable on follow-up CT 4/24/2019.  10. Atrial fibrillation continuing on anticoagulation with Eliquis.    HISTORY OF PRESENT ILLNESS:  The patient is a 83 y.o. year old female who is here for follow-up with the above-mentioned history.    History of Present Illness patient returns today in follow-up with laboratory studies and CT scan to review.  In the interval, she was to have followed up with Dr. Curran to pursue colonoscopy however she has delayed this.  She is due to be seen in May and is still contemplating whether she will proceed on with colonoscopy  after the visit.  She does note chronic diarrhea ever since the time of her knee.  She does note some occasional blood in the stool, bright red and presumed hemorrhoidal in nature.  Continues with ECOG performance status of 0-1.  She has no significant new complaints today.      PAST MEDICAL HISTORY:    1.  Atrial fibrillation.  The patient underwent ablation procedure in 2004, repeat ablation procedure on 09/13/11 by Dr. Melendez.  Subsequent AV deshawn ablation and March 2017 and subsequent cardioversion in May 2017.  Continuation of anticoagulation with Eliquis  2.  Gastroesophageal reflux disease.  3.  Hypertension.   4.  Status post left knee surgery.   5.  Status post pacemaker placement.   6.  Status post laparoscopic cholecystectomy.   7.  Status post bladder tacking procedure.   8.  Status post EGD and colonoscopy with Dr. Curran 08/08/11.  Finding of gastric polyps, one of which was resected showing a hyperplastic polyp with gastritis.  There was no source of bleeding identified.  Schatzki's ring was identified as well as diverticulosis.  Repeat EGD on 03/18/13 with resection of a 3 cm prepyloric hyperplastic polyp of acute and chronic inflammatory change. Repeat EGD, October 2013 with further resection of polyp, benign findings.  EGD 10/23/17 with small gastric polyps identified, no evidence of active bleeding.  9.  Bladder suspension surgery in 5/12.  10.  Status post left knee replacement in May 2013.    Past Medical History:   Diagnosis Date   • Acute bronchitis    • Acute sinusitis    • Anemia     Iron deficiency   • Anesthesia complication    • Anticoagulated on warfarin    • Arthritis    • Bladder dysfunction    • Cancer of left kidney (CMS/HCC) 06/08/2004    S/P Left Radical Neprectomy   • Cardiac disease    • Chronic dysfunction of left eustachian tube    • CKD (chronic kidney disease)     stage 3   • Colon cancer (CMS/HCC)    • Depression    • Diarrhea    • Diverticulosis 08/08/2011    Descending  Colon & Sigmoid Colon multiple diverticula   • Epigastric pain    • Fatigue    • Frequent urination    • GERD (gastroesophageal reflux disease)    • Goiter     thyroid removed in november 2015   • Health care maintenance    • History of colitis    • History of shingles    • History of transfusion    • Hypertension    • Insomnia    • Left atrial enlargement    • Left ventricular hypertrophy    • Mitral regurgitation     mild to moderate per echocardiogram 2016   • Nerve pain     LUE D/T SHINGLES   • Neuropathy     ARMS   • Non morbid obesity due to excess calories    • NANCY (obstructive sleep apnea)     uses CPAP   • Paroxysmal atrial fibrillation (CMS/HCC)    • Permanent atrial fibrillation (CMS/HCC)    • Pulmonary hypertension (CMS/HCC)     per echo 2016   • Right atrial enlargement    • Sleep apnea     on CPAP   • SSS (sick sinus syndrome) (CMS/HCC)    • Third degree AV block (CMS/HCC)    • Thyroid cancer (CMS/HCC)    • URI (upper respiratory infection)    • Urinary urgency      Past Surgical History:   Procedure Laterality Date   • ATRIAL ABLATION SURGERY N/A 09/13/2011    Comprehensive electrophysiology study, Left atrial pace & sense, 3-dimensional cardia mapping, Radiofrequency catheter ablation, Transseptal hear catheterization, Dr. Maldonado Melendez   • ATRIAL ABLATION SURGERY N/A 02/11/2004    Dr. Maldonado Melendez   • BLADDER SURGERY      Bladder tacking procedure   • BLADDER SUSPENSION N/A 05/2012   • CARDIAC ELECTROPHYSIOLOGY PROCEDURE N/A 5/17/2016    Procedure: PPM generator change - dual BOSTON;  Surgeon: Maldonado Melendez MD;  Location: [facility] CATH INVASIVE LOCATION;  Service:    • CARDIAC ELECTROPHYSIOLOGY PROCEDURE N/A 3/23/2017    Procedure: AV node ablation pt has BOSTON PPM;  Surgeon: Maldonado Melnedez MD;  Location:  [facility] CATH INVASIVE LOCATION;  Service:    • CARDIOVERSION  01/26/2017    Dr. Melendez   • CARDIOVERSION  05/25/2017    Dr. Melendez   • CATARACT EXTRACTION Bilateral     Dr. Gramajo   • COLON  RESECTION Right 4/27/2018    Procedure: COLON RESECTION RIGHT LAPAROSCOPIC, possible open;  Surgeon: Rebeca Curran MD;  Location: [facility] MAIN OR;  Service: General   • COLONOSCOPY N/A 04/01/2003    Normal colonoscopy except for an occasional diverticulosis, Dr. Nathan Macias   • COLONOSCOPY N/A 4/25/2018    Procedure: COLONOSCOPY INTO CECUM AND TI WITH SALINE LIFT, HOT SNARE POLYPECTOMIES, BX'S, SPOT INJECTION, RESOLUTION CLIPS X2;  Surgeon: Rebeca Curran MD;  Location: [facility] ENDOSCOPY;  Service: Gastroenterology   • CYSTOSCOPY BOTOX INJECTION OF BLADDER N/A 6/27/2016    Procedure: CYSTOSCOPY WITH BOTOX DETRUSOR INJECTION;  Surgeon: Salome Bowen MD;  Location: [facility] MAIN OR;  Service:    • CYSTOSCOPY BOTOX INJECTION OF BLADDER N/A 05/29/2015    Dr. Salome Bowen   • ENDOSCOPY N/A 06/10/2015    Antral Polyp: Gastric Mucosa w/ marked cautery artifact,  Hiatal Hernia, Dr. Rebeca Curran   • ENDOSCOPY N/A 10/28/2013    Gastric polyp: polypoid hyperplastic gastric mucosa w/ focal ulceration, mixed acute & chronic inflammation.  Negative for intestinal metaplasia, no helicobacter organisms identified on diff-wuik stain, Dr. Rebeca Curran   • ENDOSCOPY N/A 03/18/2013    Large prepyloric polyp, partially removed: fragment of polypoid hyperplastic gastric mucosa w/ foci of erosion & patchy mixed acute & chronic inflammation. No helicobacter organisms identified on diff-quick stain. Negative for intestinal metaplasia, negative for dysplasia, Dr. Rebeca Curran   • ENDOSCOPY N/A 10/24/2012    large prepyloric mass (3cm) w/ adjacent nodules: hyperplastic polyp w/ mild chronic active gastritis, focal erosion & associated, Multiple fundic polyps: polypoid mucosal hyperplasia, Dr. Rebeca Curran   • ENDOSCOPY N/A 03/11/2003    Gastric Antral Biopsies: Fragments of Gastric Mucosa & Necrotic Tissue (Compatible w/ Ulcer) w/ chronic active inflammation.  Negative stain for Helicobacter organisms. Dr. Nathan Macias   • ENDOSCOPY N/A  10/23/2017    Procedure: ESOPHAGOGASTRODUODENOSCOPY;  Surgeon: Rebeca Curran MD;  Location: [facility] ENDOSCOPY;  Service:    • ENDOSCOPY AND COLONOSCOPY N/A 08/08/2011    4cm hiatal hernia, Schatzki's ring, Diverticulosis, Large antral polyps, Dr. Rebeca Curran   • INCISIONAL HERNIA REPAIR N/A 06/11/2015    Defect approximately 2.5 cm w/ a mass of incarcerated tissue approximately the size of a nectarine, Dr. Rebeca Curran   • KNEE ARTHROPLASTY     • KNEE MINI REVISION Right 9/28/2016    Procedure: RT KNEE POLY INSERT CHANGE ;  Surgeon: Tommy Avila MD;  Location: [facility] MAIN OR;  Service:    • LAPAROSCOPIC CHOLECYSTECTOMY N/A    • NEPHRECTOMY RADICAL Left 06/08/2004    Incidentially found left renal mass 3-4 cm renal cell carcinoma, left lower pole, Dr. Salome Bowen   • OTHER SURGICAL HISTORY      NEUROMUSCULAR BLOCKERS BOTULINUM TOXIN ONABOTULINUMTOXIN A   • PACEMAKER IMPLANTATION N/A     Dr. Casillas   • PACEMAKER REPLACEMENT N/A 09/24/2010    Implanted Generator is a Accolade ALTRUA 60 model S603DR, serial # [id number], Dr. Chavez Jimenez   • TOTAL KNEE ARTHROPLASTY Left 05/21/2013    Dr. Miguel Pacheco   • TOTAL THYROIDECTOMY N/A 11/16/2015    Dr. Gideon Ashley, [facility]   • TRANSVAGINAL TAPING SUSPENSION N/A 03/16/2009    Mini Sling, Closed suprapubic cystostomy, Dr. Salome Bowen   • TUBAL ABDOMINAL LIGATION Bilateral          HEMATOLOGICONCOLOGIC HISTORY:    The patient was seen during a hospitalization at [facility] in September 2011.  She was admitted with left lower lobe pneumonia following a cardiac ablation procedure.  She has anemia dating back to 2006, at that time with a hematocrit of 36.7 and MCV of 77.  Her platelets and white count have remained normal.  Hemoglobin in September 2010 was 10.3, MCV 74.  In December 2010 hemoglobin was 10.5 with MCV of 72.  On 08/09/11 hemoglobin was down to 9.8 with MCV of 73.  During the hospitalization hemoglobin had dropped to 8.5 with MCV of 72 and decreased  further to 8.0 at which point she was transfused two units of PRBCs.  She was on Coumadin but had no obvious clinical evidence of GI blood loss.  She was evaluated by Dr. Curran on 08/08/11 with EGD and colonoscopy showing evidence of gastric polyps.  One polyp was biopsied showing evidence of a hyperplastic polyp with gastritis.  There was no definitive evidence of bleeding.  She was having some dysphagia and had a Schatzki's ring which was dilated.  She developed rapid ventricular response and therefore the procedure was stopped.  She then underwent cardiac ablation and subsequently developed pneumonia.      During her hospitalization, lab studies were sent including iron studies showing an iron of 11, TIBC 438, transferrin 294, iron percent sat of 3 with a ferritin of 10.8.  B12 was low-normal at 291, folate normal.  Creatinine was in the 1.2 range with estimated GFR in the 40 range (stage III chronic kidney disease).  Erythropoietin level was 66.  A hemoglobin electrophoresis was sent showing hemoglobin A1 of 97.5%, and hemoglobin A2 of 2.2%.  The patient had stool checked for occult blood in the hospital which again was negative, therefore, with no definitive evidence of GI blood loss.  I suspect she may have a malabsorption issue.  She was intolerant of oral iron and treated with Venofer 100 mg on 09/21/11 and subsequently was discharged from the hospital and treated as an outpatient with Feraheme 510 mg on 09/28/11 and again on 10/05/11.      The patient returned for followup studies on 10/19/11 with hemoglobin that improved to the 13 range and MCV up to 79.  TIBC was 284, iron percent sat 32, ferritin 335.  Reticulated hemoglobin had improved to 32.9.  B12 was 476.  We will follow her counts over time.  She will followup with Dr. Curran regarding her gastric polyps.      Laboratory studies on 02/15/12 showed a ferritin stable at 112.  We will recheck at a 6 month interval.     Lab studies from 9/10/12  shows a hemoglobin 11.9, reticulated hemoglobin 29.9, TIBC 325, iron percent SAT of 11.  Ferritin down to 18.      Feraheme administered on 9/24/12 and 10/5/12.  Repeat iron studies on 3/15/13 showed a ferritin of 101, iron percent saturation of 31.       Labs 10/10/2013 with a ferritin of 85, iron percent sat down to 15, hemoglobin of 12.9. Recheck in six months.    Labs 04/20/2015 showed a ferritin stable in the normal range at 76.     The patient returned after a prolonged absence from our office on 9/21/17 with hemoglobin of 12.0, MCV 79.2, iron 37, ferritin 20, iron saturation 8%, TIBC 441 CONSISTENT with recurrent iron deficiency.  Folate was normal at 10.52, B12 normal at 398, erythropoietin 32.6.  Additional Feraheme initiated on 10/5/17 for 2 doses.  Repeat EGD with Dr. Curran on 10/23/17 with no active bleeding, small less than 6 mm gastric polyps identified.       ONCOLOGIC HISTORY:   Status post left nephrectomy in 2004 for renal cell carcinoma with Dr. Bowen.  This was a Darnell grade 2 lesion measuring 3.0 cm, confined to the kidney, with no extension to perinephric adipose tissue, and no evidence of lymphovascular invasion.  Margins were negative.  Adrenal gland was negative.      CT scan of the abdomen and pelvis o 02/26/12 showed no evidence of recurrent disease.      Stage I (T3xA0kT1) left papillary thyroid cancer (3mm): Status post total thyroidectomy 11/16/15, 2 lymph nodes removed.      Stage IIa (T3N0M0) ascending colon cancer: Preoperative CEA 1.65 on 4/25/18.  Right hemicolectomy 4/27/18, invasive moderately differentiated adenocarcinoma, negative margins, positive lymphovascular invasion, negative perineural invasion, 10 lymph nodes negative, ulcerated small adjacent pericolonic abscess (microscopic perforation).  Microsatellite stable.    Genetics evaluation 7/31/18 with INVITAE panel test showing VUS MLH3 and MSH6      Current Outpatient Medications on File Prior to Visit   Medication  Sig Dispense Refill   • B Complex-C (SUPER B COMPLEX PO) Take 1 tablet by mouth Daily.     • CALCIUM PO Take 600 Units by mouth Daily. With D3, listed separetly     • cholecalciferol (VITAMIN D3) 1000 UNITS tablet Take 1,000 Units by mouth daily. In addition to the calcium -D3 pill     • ELIQUIS 5 MG tablet tablet TAKE ONE TABLET BY MOUTH EVERY 12 HOURS 60 tablet 1   • furosemide (LASIX) 20 MG tablet TAKE TWO TABLETS BY MOUTH DAILY 60 tablet 3   • GLUCOSAMINE-CHONDROITIN PO Take 1 tablet by mouth Daily.     • lisinopril (PRINIVIL,ZESTRIL) 10 MG tablet TAKE ONE TABLET BY MOUTH DAILY 90 tablet 0   • loperamide (IMODIUM) 2 MG capsule Take 2 mg by mouth 4 (four) times a day as needed for diarrhea.     • metoprolol tartrate (LOPRESSOR) 25 MG tablet TAKE ONE TABLET BY MOUTH TWICE A  tablet 0   • Multiple Vitamins-Minerals (PRESERVISION AREDS PO) Take 1 tablet by mouth Daily.     • spironolactone (ALDACTONE) 25 MG tablet TAKE ONE TABLET BY MOUTH DAILY 90 tablet 2   • SYNTHROID 125 MCG tablet Take 125 mcg by mouth Daily.     • XIIDRA 5 % solution Apply 1 drop to eye 2 (Two) Times a Day.     • furosemide (LASIX) 40 MG tablet Take 40 mg by mouth Daily.       No current facility-administered medications on file prior to visit.        ALLERGIES:     Allergies   Allergen Reactions   • Sulfa Antibiotics Swelling     FACE AND TONGUE   • Adhesive Tape Rash   • Levaquin [Levofloxacin] Rash     Social History     Socioeconomic History   • Marital status:      Spouse name: Not on file   • Number of children: 0   • Years of education: Not on file   • Highest education level: Not on file   Occupational History   • Occupation: Homemaker     Employer: RETIRED   Tobacco Use   • Smoking status: Never Smoker   • Smokeless tobacco: Never Used   • Tobacco comment: caffeine use   Substance and Sexual Activity   • Alcohol use: No   • Drug use: No   • Sexual activity: Defer     Family History   Problem Relation Age of Onset   •  Heart disease Other    • Deep vein thrombosis Other    • Hypertension Other    • Breast cancer Sister         In her 60s.   • Breast cancer Maternal Aunt    • Breast cancer Sister         In her 60s.   • Heart disease Mother    • Hypertension Mother    • Heart disease Father    • Hypertension Father    • Heart disease Daughter    • Hypertension Daughter    • Heart disease Son    • Hypertension Son    • Malig Hyperthermia Neg Hx        Review of Systems   Constitutional: Positive for fatigue. Negative for activity change, appetite change, fever and unexpected weight change.   HENT: Negative for congestion, mouth sores, nosebleeds, sore throat and voice change.    Respiratory: Negative for cough, shortness of breath and wheezing.    Cardiovascular: Negative for chest pain, palpitations and leg swelling.   Gastrointestinal: Positive for blood in stool and diarrhea. Negative for abdominal distention, abdominal pain, constipation, nausea and vomiting.   Endocrine: Negative for cold intolerance and heat intolerance.   Genitourinary: Negative for difficulty urinating, dysuria, frequency and hematuria.   Musculoskeletal: Negative for arthralgias, back pain, joint swelling and myalgias.   Skin: Negative for rash.   Neurological: Negative for dizziness, syncope, weakness, light-headedness, numbness and headaches.   Hematological: Negative for adenopathy. Does not bruise/bleed easily.   Psychiatric/Behavioral: Negative for confusion and sleep disturbance. The patient is not nervous/anxious.          Objective      Vitals:    05/01/19 1532   BP: 135/64   Pulse: 75   Resp: 16   Temp: 98.1 °F (36.7 °C)   SpO2: 97%      Current Status 5/1/2019   ECOG score 0   pain 0/10    Physical Exam   Constitutional: She is oriented to person, place, and time. She appears well-developed and well-nourished.   HENT:   Mouth/Throat: Oropharynx is clear and moist.   Eyes: Conjunctivae are normal.   Neck: No thyromegaly present.   Cardiovascular:  Normal rate and regular rhythm. Exam reveals no gallop and no friction rub.   No murmur heard.  Pulmonary/Chest: Breath sounds normal. No respiratory distress.   Abdominal: Soft. Bowel sounds are normal. She exhibits no distension. There is no tenderness.   Musculoskeletal: She exhibits no edema.   Lymphadenopathy:        Head (right side): No submandibular adenopathy present.     She has no cervical adenopathy.     She has no axillary adenopathy.        Right: No inguinal and no supraclavicular adenopathy present.        Left: No inguinal and no supraclavicular adenopathy present.   Neurological: She is alert and oriented to person, place, and time. She displays normal reflexes. No cranial nerve deficit. She exhibits normal muscle tone.   Skin: Skin is warm and dry. No rash noted.   Psychiatric: She has a normal mood and affect. Her behavior is normal.       RECENT LABS:  Hematology WBC   Date Value Ref Range Status   04/24/2019 6.44 3.40 - 10.80 10*3/mm3 Final     RBC   Date Value Ref Range Status   04/24/2019 4.32 3.77 - 5.28 10*6/mm3 Final     Hemoglobin   Date Value Ref Range Status   04/24/2019 12.5 12.0 - 15.9 g/dL Final     Hematocrit   Date Value Ref Range Status   04/24/2019 38.8 34.0 - 46.6 % Final     Platelets   Date Value Ref Range Status   04/24/2019 289 140 - 450 10*3/mm3 Final        Lab Results   Component Value Date    GLUCOSE 100 05/01/2019    BUN 41 (H) 05/01/2019    CREATININE 2.18 (C) 05/01/2019    EGFRIFNONA 22 (L) 05/01/2019    EGFRIFAFRI 50 (L) 12/06/2017    BCR 18.8 05/01/2019    K 5.6 (C) 05/01/2019    CO2 24.9 05/01/2019    CALCIUM 9.5 05/01/2019    PROTENTOTREF 7.4 12/06/2017    ALBUMIN 4.10 04/24/2019    LABIL2 1.3 12/06/2017    AST 23 04/24/2019    ALT 21 04/24/2019     Additional labs 4/24/2019: Iron 54, ferritin 62.2, iron saturation 13%, TIBC 407, CEA 1.63    CT chest abdomen pelvis 4/24/2019: I did personally review CT images today.  IMPRESSION:  1. Status post right colectomy,  cholecystectomy and left nephrectomy.   2. A 2.1 cm hyperdense right renal mass as described. This appears  stable since the previous study of 04/26/2018. Continued follow-up is  recommended.  3. No new evidence of metastatic disease.           Assessment/Plan      1. Recurrent iron deficiency anemia: The patient is intolerant of oral iron. Prior GI evaluation showed no definitive evidence of GI blood loss. She has underlying stage III chronic kidney disease, which is a contributing factor to her mild borderline anemia. She has been treated in the past with IV iron in 2011 and 2012.  She had a lengthy absence from our office from 2015 until she was referred back on 9/21/17 with evidence of recurrent iron deficiency, likely related to malabsorption.  Her ferritin on 7/7/17 was 21 and hemoglobin on 8/7/17 was 11.1.  Her degree of anemia was mild with a hemoglobin of 11.4, microcytic indices with an MCV of 78.  Labs on 9/21/17 showed ferritin 20.5, iron saturation 8%, TIBC 441.  We did also check B12 and folate which were normal.  Erythropoietin was 32 consistent with a component of anemia secondary to chronic kidney disease. She did return stool cards for occult blood which were negative ×3 10/5/17.  She received additional Feraheme on 10/5 and 10/12/17.  She underwent EGD with Dr. Curran 10/23/17 with no evidence of active bleeding, small gastric polyps identified.  Labs 12/20/17 with hemoglobin up to 13.3, ferritin 97.9, iron saturation of 16%.  Repeat labs performed postoperatively from colon resection on 5/4/18 showed iron 36, ferritin 99, iron saturation 10%, TIBC 349.  Current labs show hemoglobin of 12.5, ferritin slightly increased at 62.2, iron saturation borderline low at 13%, TIBC borderline elevated at 407.  Patient does not require any additional IV iron at this point.  We will plan to repeat studies prior to visit in 4 months.  2. Stage IIa (T3N0M0) ascending colon cancer: She develop significant  "diarrhea with blood per rectum.  She underwent colonoscopy 4/25/18 with identification of polyps in the rectum, cecal polyp, mass in the right colon.  The cecal polyp was a sessile serrated adenoma, ascending colon \"mass\" showed hyperplastic polyp, rectal polyp was a tubulovillous adenoma with low-grade dysplasia.  CEA on 4/25/18 was normal at 1.65.  Chest x-ray 4/25/18 was unrevealing.  CT of the abdomen and pelvis 4/26/18 showed a 4 cm annular mass in the ascending colon with adjacent haziness in the mesentery but no lymphadenopathy, no evidence of metastatic disease.  The patient underwent a right hemicolectomy on 4/27/18 with pathology showing an invasive moderately differentiated adenocarcinoma arising in a sessile serrated adenoma with negative margins measuring 2.5 cm extending through the muscularis propria into brad-colorectal tissue, positive lymphovascular invasion, negative perineural invasion, 10 lymph nodes negative.  There was a comment regarding an ulcerated small adjacent pericolonic abscess, raising the question of possible microscopic perforation.  Patient did have a few high risk features with less than 12 lymph nodes removed, positive lymphovascular invasion, and some possibility of microscopic perforation.  The tumor was microsatellite stable.  Given her age and comorbidities, the patient was a poor candidate for adjuvant chemotherapy and was not recommended.  Independent of this recommendation, the patient stated she was not interested in taking any adjuvant chemotherapy.  Therefore we will plan to monitor her clinically for evidence of recurrent disease.  Standard monitoring is with routine CEA levels however this does not appear to be informative for her given her normal preoperative value.  We will consider annual interval follow-up CT.  The patient returns today in follow-up doing fairly well.  Her diarrhea is improved with reduction in her Synthroid dose.  She is still having intermittent " episodes of bright blood per rectum.  Although this does seem like evidence of hemorrhoidal bleeding, I did encourage her to follow-up with Dr. Curran to pursue colonoscopy that was originally scheduled in December.  The patient has still not followed up with Dr. Curran for colonoscopy however is scheduled there in May.  I encouraged her to proceed.  She returns today with CT scan from 4/24/2019 at a 1 year interval showing no evidence of recurrent disease.  We will plan to continue with annual CT monitoring for surveillance.  CEA is normal at 1.63.  We will continue to monitor CEA at each visit.  Repeat in 4 months.  3. 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  We did review her family history which includes a sister with breast cancer around age 60, another sister with breast cancer around age 60, maternal aunt with breast cancer over the age of 50, maternal uncle with lung cancer.  Given the patient's multiple malignancies and family history, she was referred to the genetics clinic to discuss potential genetic testing, mainly for benefit of the patient's children and other family members.  Her colon cancer was microsatellite stable.  She did follow-up in the genetics clinic and underwent INVITAE panel test 7/31/18 showing VUS in MLH3 and MSH6 with no known clinical significance at this time.   4. History of gastric polyps: EGD 10/23/17 with small less than 6 mm gastric polyps, not resected.  5. History of renal cell carcinoma: The patient underwent a left nephrectomy in 2004 with no clinical evidence of recurrent disease.  CT chest from 11/14/16 showed no evidence of recurrence.  CT abdomen and pelvis however from 4/26/18 showed a 2.2 cm right renal lesion which was indeterminate, possibly a  proteinaceous cyst but could not rule out mass lesion.  The study was performed without contrast due to the patient's underlying chronic kidney disease.  She is unable to undergo renal mass protocol CT with contrast.  She is unable to undergo MRI due to her pacemaker.  Follow-up CT scan 4/24/2019 with no change in the right renal lesion at 2.1 cm.  6. AYDIN/CKD3 with hyperkalemia: The patient has a baseline creatinine in the 1.2-1.6 range.  Creatinine on 4/24/2019 increased at 1.85 with BUN 36 and repeat today with BUN 41 creatinine 2.18.  She also had potassium last week 4/24/2019 at 5.2 and up to 5.6 today.  She is not on any potassium supplementation.  She is on an ACE inhibitor and Spironolactone.  She also continues on Lasix.  With her rising creatinine and potassium I have asked her to hold spironolactone for now.  We are arranging follow-up with cardiology who manages her diuretics to reassess her potassium next week and make further recommendations on her medical management.    PLAN:     1. Patient will follow-up with Dr. Curran for colonoscopy that was originally to have been scheduled in December (patient canceled).  2. Patient will discontinue spironolactone for now.  We are arranging follow-up within the next week with cardiology for repeat BMP and further directions on diuretic management.  3. M.D. visit in 4 months.  One week prior we will draw labs with CBC, CMP, CEA, iron panel, ferritin.

## 2019-05-06 ENCOUNTER — OFFICE VISIT (OUTPATIENT)
Dept: CARDIOLOGY | Facility: CLINIC | Age: 84
End: 2019-05-06

## 2019-05-06 ENCOUNTER — TELEPHONE (OUTPATIENT)
Dept: CARDIOLOGY | Facility: CLINIC | Age: 84
End: 2019-05-06

## 2019-05-06 ENCOUNTER — LAB (OUTPATIENT)
Dept: LAB | Facility: HOSPITAL | Age: 84
End: 2019-05-06

## 2019-05-06 VITALS
WEIGHT: 211 LBS | HEIGHT: 62 IN | DIASTOLIC BLOOD PRESSURE: 76 MMHG | HEART RATE: 69 BPM | BODY MASS INDEX: 38.83 KG/M2 | SYSTOLIC BLOOD PRESSURE: 122 MMHG

## 2019-05-06 DIAGNOSIS — I48.21 PERMANENT ATRIAL FIBRILLATION (HCC): ICD-10-CM

## 2019-05-06 DIAGNOSIS — I10 ESSENTIAL HYPERTENSION: ICD-10-CM

## 2019-05-06 DIAGNOSIS — I48.21 PERMANENT ATRIAL FIBRILLATION (HCC): Primary | ICD-10-CM

## 2019-05-06 DIAGNOSIS — D63.1 ANEMIA IN STAGE 3 CHRONIC KIDNEY DISEASE (HCC): ICD-10-CM

## 2019-05-06 DIAGNOSIS — Z98.890 S/P AV NODAL ABLATION: ICD-10-CM

## 2019-05-06 DIAGNOSIS — N18.30 ANEMIA IN STAGE 3 CHRONIC KIDNEY DISEASE (HCC): ICD-10-CM

## 2019-05-06 DIAGNOSIS — Z95.0 S/P PLACEMENT OF CARDIAC PACEMAKER: ICD-10-CM

## 2019-05-06 DIAGNOSIS — C18.2 MALIGNANT NEOPLASM OF ASCENDING COLON (HCC): ICD-10-CM

## 2019-05-06 DIAGNOSIS — I49.5 SICK SINUS SYNDROME (HCC): ICD-10-CM

## 2019-05-06 DIAGNOSIS — I44.2 THIRD DEGREE AV BLOCK (HCC): ICD-10-CM

## 2019-05-06 LAB
ANION GAP SERPL CALCULATED.3IONS-SCNC: 10.7 MMOL/L
BUN BLD-MCNC: 37 MG/DL (ref 8–23)
BUN/CREAT SERPL: 20.8 (ref 7–25)
CALCIUM SPEC-SCNC: 9.4 MG/DL (ref 8.6–10.5)
CHLORIDE SERPL-SCNC: 101 MMOL/L (ref 98–107)
CO2 SERPL-SCNC: 24.3 MMOL/L (ref 22–29)
CREAT BLD-MCNC: 1.78 MG/DL (ref 0.57–1)
GFR SERPL CREATININE-BSD FRML MDRD: 27 ML/MIN/1.73
GLUCOSE BLD-MCNC: 97 MG/DL (ref 65–99)
POTASSIUM BLD-SCNC: 4.8 MMOL/L (ref 3.5–5.2)
SODIUM BLD-SCNC: 136 MMOL/L (ref 136–145)

## 2019-05-06 PROCEDURE — 80048 BASIC METABOLIC PNL TOTAL CA: CPT

## 2019-05-06 PROCEDURE — 93000 ELECTROCARDIOGRAM COMPLETE: CPT | Performed by: INTERNAL MEDICINE

## 2019-05-06 PROCEDURE — 36415 COLL VENOUS BLD VENIPUNCTURE: CPT

## 2019-05-06 PROCEDURE — 99214 OFFICE O/P EST MOD 30 MIN: CPT | Performed by: INTERNAL MEDICINE

## 2019-05-06 NOTE — PROGRESS NOTES
Jeannette, can you let this patient know that her labs are much improved since Dr. durham stopped her spironolactone.

## 2019-05-06 NOTE — TELEPHONE ENCOUNTER
----- Message from Maldonado Melendez MD sent at 5/6/2019  1:20 PM EDT -----  Jeannette, can you let this patient know that her labs are much improved since Dr. durham stopped her spironolactone.

## 2019-05-06 NOTE — PROGRESS NOTES
"Date of Office Visit: 2019  Encounter Provider: Maldonado Melendez MD  Place of Service: [facility]  Patient Name: Anastacia Sarah  : 1935    Subjective:     Encounter Date:2019      Patient ID: Anastacia Sarah is a 83 y.o. female who has a cc of perm AF and AV node ablation and multiple cancers recently sent by Dr Brower for elev K and he stopped the MRA spironolactone. Since stopping for the past 5 days, she notices no diff/ . She thinks she is more edematous.\"I feel it through my body.\" \"It may be in her mind\"     She had colon surgery for colon CA and still has bouts of diarrhea.     She has a lot of low back pain.     No anginal chest pain,   Moderate winters w exertion.   No soa,   No fainting,  No orthostasis.     Exercise tolerance:reduced.  -- she is sedentary.     There have been no hospital admission since the last visit.     There have been no bleeding events.       Past Medical History:   Diagnosis Date   • Acute bronchitis    • Acute sinusitis    • Anemia     Iron deficiency   • Anesthesia complication    • Anticoagulated on warfarin    • Arthritis    • Bladder dysfunction    • Cancer of left kidney (CMS/HCC) 2004    S/P Left Radical Neprectomy   • Cardiac disease    • Chronic dysfunction of left eustachian tube    • CKD (chronic kidney disease)     stage 3   • Colon cancer (CMS/HCC)    • Depression    • Diarrhea    • Diverticulosis 2011    Descending Colon & Sigmoid Colon multiple diverticula   • Epigastric pain    • Fatigue    • Frequent urination    • GERD (gastroesophageal reflux disease)    • Goiter     thyroid removed in 2015   • Health care maintenance    • History of colitis    • History of shingles    • History of transfusion    • Hypertension    • Insomnia    • Left atrial enlargement    • Left ventricular hypertrophy    • Mitral regurgitation     mild to moderate per echocardiogram    • Nerve pain     LUE D/T " SHINGLES   • Neuropathy     ARMS   • Non morbid obesity due to excess calories    • NANCY (obstructive sleep apnea)     uses CPAP   • Paroxysmal atrial fibrillation (CMS/HCC)    • Permanent atrial fibrillation (CMS/HCC)    • Pulmonary hypertension (CMS/HCC)     per echo 2016   • Right atrial enlargement    • Sleep apnea     on CPAP   • SSS (sick sinus syndrome) (CMS/HCC)    • Third degree AV block (CMS/HCC)    • Thyroid cancer (CMS/HCC)    • URI (upper respiratory infection)    • Urinary urgency        Social History     Socioeconomic History   • Marital status:      Spouse name: Not on file   • Number of children: 0   • Years of education: Not on file   • Highest education level: Not on file   Occupational History   • Occupation: Homemaker     Employer: RETIRED   Tobacco Use   • Smoking status: Never Smoker   • Smokeless tobacco: Never Used   • Tobacco comment: caffeine use   Substance and Sexual Activity   • Alcohol use: No   • Drug use: No   • Sexual activity: Defer       Review of Systems   Constitution: Negative for fever and night sweats.   HENT: Negative for ear pain and stridor.    Eyes: Negative for discharge and visual halos.   Cardiovascular: Negative for cyanosis.   Respiratory: Negative for hemoptysis and sputum production.    Hematologic/Lymphatic: Negative for adenopathy.   Skin: Negative for nail changes and unusual hair distribution.   Musculoskeletal: Positive for arthritis and back pain. Negative for gout and joint swelling.   Gastrointestinal: Positive for bloating and diarrhea. Negative for bowel incontinence and flatus.   Genitourinary: Negative for dysuria and flank pain.   Neurological: Negative for seizures and tremors.   Psychiatric/Behavioral: Negative for altered mental status. The patient is not nervous/anxious.             Objective:     Vitals:    05/06/19 1038   BP: 122/76   BP Location: Right arm   Patient Position: Sitting   Cuff Size: Large Adult   Pulse: 69   Weight: 95.7 kg  "(211 lb)   Height: 157.5 cm (62\")         Physical Exam   Constitutional: She is oriented to person, place, and time.   HENT:   Head: Normocephalic and atraumatic.   Eyes: Right eye exhibits no discharge. Left eye exhibits no discharge.   Neck: No JVD present. No thyromegaly present.   Cardiovascular: Normal rate and regular rhythm. Exam reveals no gallop and no friction rub.   No murmur heard.  Pulmonary/Chest: Effort normal and breath sounds normal. She has no rales.   Abdominal: Soft. Bowel sounds are normal. There is no tenderness.   Musculoskeletal: Normal range of motion. She exhibits no edema or deformity.   Neurological: She is alert and oriented to person, place, and time. She exhibits normal muscle tone.   Skin: Skin is warm and dry. No erythema.   Psychiatric: She has a normal mood and affect. Her behavior is normal. Thought content normal.         ECG 12 Lead  Date/Time: 5/6/2019 11:06 AM  Performed by: Maldonado Melendez MD  Authorized by: Maldonado Melendez MD   Comparison: compared with previous ECG   Similar to previous ECG  Rhythm: atrial fibrillation and paced    Clinical impression: abnormal EKG            Lab Review:       Assessment:          Diagnosis Plan   1. Permanent atrial fibrillation (CMS/HCC)     2. Essential hypertension     3. Sick sinus syndrome (CMS/HCC)     4. Third degree AV block (CMS/HCC)     5. Malignant neoplasm of ascending colon (CMS/HCC)     6. Anemia in stage 3 chronic kidney disease (CMS/HCC)     7. S/P placement of cardiac pacemaker     8. S/P AV deshawn ablation            Plan:         Atrial Fibrillation and Atrial Flutter  Assessment  • The patient's CHADS2-VASc score is 4  • A [id number] score of 2 or more is considered a high risk for a thromboembolic event    Plan  • Continue in atrial fibrillation with rate control  • Continue apixaban for antithrombotic therapy, bleeding issues discussed    I reviewed her pacer strips and her parameters are ok --     Exam shows no " edema or HF.     I agree with Dr Brower -- we will leave her off MRA. I think the furosemide dose is ok now.         I sent another BMP

## 2019-05-15 ENCOUNTER — OFFICE VISIT (OUTPATIENT)
Dept: INTERNAL MEDICINE | Facility: CLINIC | Age: 84
End: 2019-05-15

## 2019-05-15 ENCOUNTER — HOSPITAL ENCOUNTER (OUTPATIENT)
Dept: GENERAL RADIOLOGY | Facility: HOSPITAL | Age: 84
Discharge: HOME | End: 2019-05-15

## 2019-05-15 ENCOUNTER — HOSPITAL ENCOUNTER (OUTPATIENT)
Dept: GENERAL RADIOLOGY | Facility: HOSPITAL | Age: 84
Discharge: HOME | End: 2019-05-15
Admitting: FAMILY MEDICINE

## 2019-05-15 VITALS
HEIGHT: 62 IN | SYSTOLIC BLOOD PRESSURE: 124 MMHG | WEIGHT: 218 LBS | TEMPERATURE: 98 F | HEART RATE: 85 BPM | DIASTOLIC BLOOD PRESSURE: 80 MMHG | BODY MASS INDEX: 40.12 KG/M2 | OXYGEN SATURATION: 97 %

## 2019-05-15 DIAGNOSIS — R06.02 SOB (SHORTNESS OF BREATH): ICD-10-CM

## 2019-05-15 DIAGNOSIS — M54.50 ACUTE MIDLINE LOW BACK PAIN WITHOUT SCIATICA: Primary | ICD-10-CM

## 2019-05-15 DIAGNOSIS — M54.50 ACUTE MIDLINE LOW BACK PAIN WITHOUT SCIATICA: ICD-10-CM

## 2019-05-15 PROCEDURE — 71046 X-RAY EXAM CHEST 2 VIEWS: CPT

## 2019-05-15 PROCEDURE — 99214 OFFICE O/P EST MOD 30 MIN: CPT | Performed by: FAMILY MEDICINE

## 2019-05-15 PROCEDURE — 72100 X-RAY EXAM L-S SPINE 2/3 VWS: CPT

## 2019-05-15 RX ORDER — CYCLOBENZAPRINE HCL 10 MG
10 TABLET ORAL 3 TIMES DAILY PRN
Qty: 30 TABLET | Refills: 0 | Status: SHIPPED | OUTPATIENT
Start: 2019-05-15 | End: 2019-09-03

## 2019-05-15 NOTE — PROGRESS NOTES
Subjective   Anastacia Sarah is a 83 y.o. female.   Chief Complaint   Patient presents with   • Shortness of Breath   • Back Pain       History of Present Illness     #1  Lower back pain-started about 2 months ago.  There was no known injury.  It is localized and mid abdominal pain.  It is on and off, achy, not radiating.  Intensity 8-10 out of 10.  It is worse with movements.  Patient tried massage and 2 tablets of extra strength Tylenol and they helped short-term.  She was tested today for urinary tract infection and culture is pending.  History of colon cancer and renal cancer.    2.  Shortness of breath-exertional-started for more than 2 months ago.  Her Spironolactone was discontinued due to abnormal potassium levels.  She was evaluated by Dr. Miramontes last week.  Patient was recommended to continue current dose of furosemide.  She has no cough, no wheezing.  She has chest wall pain.  She has chronic anemia.  CBC was checked in April and the HB was normal.  Sleep apnea and she uses CPAP every night.  She is due for follow-up.    The following portions of the patient's history were reviewed and updated as appropriate: allergies, current medications, past family history, past medical history, past social history, past surgical history and problem list.    Review of Systems   Constitutional: Positive for fatigue. Negative for fever.   HENT: Negative.    Respiratory: Positive for shortness of breath. Negative for cough and wheezing.    Cardiovascular: Negative for leg swelling.   Gastrointestinal: Negative for abdominal pain.   Genitourinary: Positive for dysuria and pelvic pain.   Musculoskeletal: Positive for back pain.   Neurological: Positive for weakness. Negative for numbness and headaches.         Objective   Wt Readings from Last 3 Encounters:   05/15/19 98.9 kg (218 lb)   05/06/19 95.7 kg (211 lb)   05/01/19 95.7 kg (210 lb 14.4 oz)      Vitals:    05/15/19 1509   BP: 124/80   Pulse: 85   Temp: 98 °F  (36.7 °C)   SpO2: 97%     Temp Readings from Last 3 Encounters:   05/15/19 98 °F (36.7 °C)   05/01/19 98.1 °F (36.7 °C) (Oral)   02/06/19 98.2 °F (36.8 °C) (Oral)     BP Readings from Last 3 Encounters:   05/15/19 124/80   05/06/19 122/76   05/01/19 135/64     Pulse Readings from Last 3 Encounters:   05/15/19 85   05/06/19 69   05/01/19 75       Physical Exam   Constitutional: She appears well-developed and well-nourished.   Neck: Neck supple.   Cardiovascular: Normal rate and normal heart sounds.   Irregularly irregular.  No lower extremity edema.   Pulmonary/Chest: Effort normal and breath sounds normal.   Musculoskeletal: Normal range of motion.        Lumbar back: Normal. She exhibits no tenderness and no deformity.   SLR negative BL.   Neurological: She has normal strength and normal reflexes.   Skin: Skin is warm, dry and intact. No lesion noted. No erythema.   Psychiatric: She has a normal mood and affect. Her behavior is normal.       Assessment/Plan   Anastacia was seen today for shortness of breath and back pain.    Diagnoses and all orders for this visit:    Acute midline low back pain without sciatica  -     XR Spine Lumbar 2 or 3 View; Future    SOB (shortness of breath)  -     XR Chest PA & Lateral; Future    Other orders  -     cyclobenzaprine (FLEXERIL) 10 MG tablet; Take 1 tablet by mouth 3 (Three) Times a Day As Needed for Muscle Spasms.        #1Lower back pain- new problems to me, we will check an x-ray.  Patient will increase Tylenol frequency.  She can use extra strength Tylenol 2 tablets every 8 hours.  Will use Flexeril 10 mg.  Warnings of drowsiness.  Patient will try it first time at bedtime, if it does make her drowsy, she will only use it at bedtime, otherwise she can use it 3 times a day.  I wanted to refer patient to physical therapy, but she is leaving to [place] next week and prefers to hold with it.  I advised her to take frequent breaks on her drive to [place].  I am warning her that  her back pain will be probably worse after her trip.    #2  Shortness of breath-patient was evaluated by cardiologist Dr. Melendez last week.  She has no signs of fluid overload.  No lower extremity edema.  Lungs are clear to auscultation.  Will check chest x-ray.  Hemoglobin is stable.  I think that it can be secondary to deconditioning.  Her low back pain started months ago and she moved less because of that.  I am encouraging her to increase physical activity as tolerated.

## 2019-05-28 DIAGNOSIS — I48.19 PERSISTENT ATRIAL FIBRILLATION (HCC): ICD-10-CM

## 2019-05-28 DIAGNOSIS — R06.02 SHORTNESS OF BREATH: ICD-10-CM

## 2019-05-28 RX ORDER — FUROSEMIDE 20 MG/1
TABLET ORAL
Qty: 60 TABLET | Refills: 2 | Status: SHIPPED | OUTPATIENT
Start: 2019-05-28 | End: 2019-08-28 | Stop reason: SDUPTHER

## 2019-06-14 ENCOUNTER — TRANSCRIBE ORDERS (OUTPATIENT)
Dept: ADMINISTRATIVE | Facility: HOSPITAL | Age: 84
End: 2019-06-14

## 2019-06-14 DIAGNOSIS — Z12.31 VISIT FOR SCREENING MAMMOGRAM: Primary | ICD-10-CM

## 2019-07-01 ENCOUNTER — OFFICE VISIT (OUTPATIENT)
Dept: INTERNAL MEDICINE | Facility: CLINIC | Age: 84
End: 2019-07-01

## 2019-07-01 VITALS
OXYGEN SATURATION: 98 % | HEART RATE: 78 BPM | DIASTOLIC BLOOD PRESSURE: 60 MMHG | SYSTOLIC BLOOD PRESSURE: 120 MMHG | HEIGHT: 62 IN | WEIGHT: 218 LBS | BODY MASS INDEX: 40.12 KG/M2 | TEMPERATURE: 98.4 F

## 2019-07-01 DIAGNOSIS — M79.662 PAIN AND SWELLING OF LOWER LEG, LEFT: Primary | ICD-10-CM

## 2019-07-01 DIAGNOSIS — M79.89 PAIN AND SWELLING OF LOWER LEG, LEFT: Primary | ICD-10-CM

## 2019-07-01 PROCEDURE — 99213 OFFICE O/P EST LOW 20 MIN: CPT | Performed by: FAMILY MEDICINE

## 2019-07-01 RX ORDER — APIXABAN 5 MG/1
TABLET, FILM COATED ORAL
Qty: 60 TABLET | Refills: 0 | Status: SHIPPED | OUTPATIENT
Start: 2019-07-01 | End: 2019-07-29 | Stop reason: SDUPTHER

## 2019-07-01 NOTE — PROGRESS NOTES
Subjective   Anastacia Sarah is a 83 y.o. female.   Chief Complaint   Patient presents with   • Cellulitis       History of Present Illness     #1  Cellulitis- patient was evaluated in urgent care on 6/16/2019.  She was evaluated for redness and swelling of left leg that started 2 days prior to evaluation.  She was diagnosed with cellulitis and started on Keflex at 500 mg taken 4 times a day.  She took it as prescribed.  She used ice and elevation.  She said that she notices less pain and less redness.  Yesterday her daughter noticed swelling of her left ankle and foot.  No other symptoms associated.  She is on Eliquis for A. fib.  She did not miss any doses.  She has compression stockings at home but does not wear them.    The following portions of the patient's history were reviewed and updated as appropriate: allergies, current medications, past medical history, past social history and problem list.    Review of Systems   Respiratory: Negative for shortness of breath.    Cardiovascular: Positive for leg swelling. Negative for chest pain.         Objective   Wt Readings from Last 3 Encounters:   07/01/19 98.9 kg (218 lb)   05/15/19 98.9 kg (218 lb)   05/06/19 95.7 kg (211 lb)      Vitals:    07/01/19 1404   BP: 120/60   Pulse: 78   Temp: 98.4 °F (36.9 °C)   SpO2: 98%     Temp Readings from Last 3 Encounters:   07/01/19 98.4 °F (36.9 °C)   05/15/19 98 °F (36.7 °C)   05/01/19 98.1 °F (36.7 °C) (Oral)     BP Readings from Last 3 Encounters:   07/01/19 120/60   05/15/19 124/80   05/06/19 122/76     Pulse Readings from Last 3 Encounters:   07/01/19 78   05/15/19 85   05/06/19 69       Physical Exam   Constitutional: She is oriented to person, place, and time. She appears well-developed and well-nourished.   HENT:   Head: Normocephalic and atraumatic.   Neck: Neck supple. Carotid bruit is not present.   Cardiovascular: Normal rate, regular rhythm and normal heart sounds.   BL LE edema left>rt. Discoloration of the  anterior left lower leg.     Pulmonary/Chest: Effort normal and breath sounds normal.   Neurological: She is alert and oriented to person, place, and time.   Skin: Skin is warm, dry and intact.   Psychiatric: She has a normal mood and affect. Her behavior is normal.       Assessment/Plan   Anastacia was seen today for cellulitis.    Diagnoses and all orders for this visit:    Pain and swelling of lower leg, left  -     Duplex Venous Lower Extremity - Left CAR; Future        #1 Left leg swelling-likely secondary to venous insufficiency, we are going to check Doppler. I advised patient to elevate her leg and wear compression stockings.  Follow-up depending on test results.

## 2019-07-03 ENCOUNTER — HOSPITAL ENCOUNTER (OUTPATIENT)
Dept: CARDIOLOGY | Facility: HOSPITAL | Age: 84
Discharge: HOME | End: 2019-07-03
Admitting: FAMILY MEDICINE

## 2019-07-03 DIAGNOSIS — M79.89 PAIN AND SWELLING OF LOWER LEG, LEFT: ICD-10-CM

## 2019-07-03 DIAGNOSIS — M79.662 PAIN AND SWELLING OF LOWER LEG, LEFT: ICD-10-CM

## 2019-07-03 LAB
BH CV LOWER VASCULAR LEFT COMMON FEMORAL AUGMENT: NORMAL
BH CV LOWER VASCULAR LEFT COMMON FEMORAL COMPETENT: NORMAL
BH CV LOWER VASCULAR LEFT COMMON FEMORAL COMPRESS: NORMAL
BH CV LOWER VASCULAR LEFT COMMON FEMORAL PHASIC: NORMAL
BH CV LOWER VASCULAR LEFT COMMON FEMORAL SPONT: NORMAL
BH CV LOWER VASCULAR LEFT DISTAL FEMORAL COMPRESS: NORMAL
BH CV LOWER VASCULAR LEFT GASTRONEMIUS COMPRESS: NORMAL
BH CV LOWER VASCULAR LEFT GREATER SAPH AK COMPRESS: NORMAL
BH CV LOWER VASCULAR LEFT GREATER SAPH BK COMPRESS: NORMAL
BH CV LOWER VASCULAR LEFT MID FEMORAL AUGMENT: NORMAL
BH CV LOWER VASCULAR LEFT MID FEMORAL COMPETENT: NORMAL
BH CV LOWER VASCULAR LEFT MID FEMORAL COMPRESS: NORMAL
BH CV LOWER VASCULAR LEFT MID FEMORAL PHASIC: NORMAL
BH CV LOWER VASCULAR LEFT MID FEMORAL SPONT: NORMAL
BH CV LOWER VASCULAR LEFT PERONEAL COMPRESS: NORMAL
BH CV LOWER VASCULAR LEFT POPLITEAL AUGMENT: NORMAL
BH CV LOWER VASCULAR LEFT POPLITEAL COMPETENT: NORMAL
BH CV LOWER VASCULAR LEFT POPLITEAL COMPRESS: NORMAL
BH CV LOWER VASCULAR LEFT POPLITEAL PHASIC: NORMAL
BH CV LOWER VASCULAR LEFT POPLITEAL SPONT: NORMAL
BH CV LOWER VASCULAR LEFT POSTERIOR TIBIAL COMPRESS: NORMAL
BH CV LOWER VASCULAR LEFT PROXIMAL FEMORAL COMPRESS: NORMAL
BH CV LOWER VASCULAR LEFT SAPHENOFEMORAL JUNCTION COMPRESS: NORMAL
BH CV LOWER VASCULAR RIGHT COMMON FEMORAL AUGMENT: NORMAL
BH CV LOWER VASCULAR RIGHT COMMON FEMORAL COMPETENT: NORMAL
BH CV LOWER VASCULAR RIGHT COMMON FEMORAL COMPRESS: NORMAL
BH CV LOWER VASCULAR RIGHT COMMON FEMORAL PHASIC: NORMAL
BH CV LOWER VASCULAR RIGHT COMMON FEMORAL SPONT: NORMAL

## 2019-07-03 PROCEDURE — 93971 EXTREMITY STUDY: CPT

## 2019-07-05 ENCOUNTER — HOSPITAL ENCOUNTER (OUTPATIENT)
Dept: MAMMOGRAPHY | Facility: HOSPITAL | Age: 84
Discharge: HOME | End: 2019-07-05
Admitting: FAMILY MEDICINE

## 2019-07-05 DIAGNOSIS — Z12.31 VISIT FOR SCREENING MAMMOGRAM: ICD-10-CM

## 2019-07-05 PROCEDURE — 77067 SCR MAMMO BI INCL CAD: CPT

## 2019-07-05 PROCEDURE — 77063 BREAST TOMOSYNTHESIS BI: CPT

## 2019-07-29 RX ORDER — APIXABAN 5 MG/1
TABLET, FILM COATED ORAL
Qty: 60 TABLET | Refills: 0 | Status: SHIPPED | OUTPATIENT
Start: 2019-07-29 | End: 2019-08-28 | Stop reason: SDUPTHER

## 2019-07-31 ENCOUNTER — CLINICAL SUPPORT NO REQUIREMENTS (OUTPATIENT)
Dept: CARDIOLOGY | Facility: CLINIC | Age: 84
End: 2019-07-31

## 2019-07-31 DIAGNOSIS — I48.0 PAROXYSMAL ATRIAL FIBRILLATION (HCC): Primary | ICD-10-CM

## 2019-07-31 PROCEDURE — 93296 REM INTERROG EVL PM/IDS: CPT | Performed by: INTERNAL MEDICINE

## 2019-07-31 PROCEDURE — 93294 REM INTERROG EVL PM/LDLS PM: CPT | Performed by: INTERNAL MEDICINE

## 2019-08-02 RX ORDER — LISINOPRIL 10 MG/1
TABLET ORAL
Qty: 90 TABLET | Refills: 0 | Status: SHIPPED | OUTPATIENT
Start: 2019-08-02 | End: 2019-10-31 | Stop reason: SDUPTHER

## 2019-08-05 RX ORDER — LISINOPRIL 10 MG/1
TABLET ORAL
Qty: 90 TABLET | Refills: 0 | Status: SHIPPED | OUTPATIENT
Start: 2019-08-05 | End: 2019-09-03 | Stop reason: SDUPTHER

## 2019-08-14 ENCOUNTER — APPOINTMENT (OUTPATIENT)
Dept: LAB | Facility: HOSPITAL | Age: 84
End: 2019-08-14
Attending: INTERNAL MEDICINE

## 2019-08-28 ENCOUNTER — LAB (OUTPATIENT)
Dept: LAB | Facility: HOSPITAL | Age: 84
End: 2019-08-28

## 2019-08-28 DIAGNOSIS — R06.02 SHORTNESS OF BREATH: ICD-10-CM

## 2019-08-28 DIAGNOSIS — I48.19 PERSISTENT ATRIAL FIBRILLATION (HCC): ICD-10-CM

## 2019-08-28 DIAGNOSIS — C18.2 MALIGNANT NEOPLASM OF ASCENDING COLON (HCC): ICD-10-CM

## 2019-08-28 LAB
ALBUMIN SERPL-MCNC: 4.2 G/DL (ref 3.5–5.2)
ALBUMIN/GLOB SERPL: 1.2 G/DL (ref 1.1–2.4)
ALP SERPL-CCNC: 108 U/L (ref 38–116)
ALT SERPL W P-5'-P-CCNC: 18 U/L (ref 0–33)
ANION GAP SERPL CALCULATED.3IONS-SCNC: 13.1 MMOL/L (ref 5–15)
AST SERPL-CCNC: 24 U/L (ref 0–32)
BASOPHILS # BLD AUTO: 0.04 10*3/MM3 (ref 0–0.2)
BASOPHILS NFR BLD AUTO: 0.7 % (ref 0–1.5)
BILIRUB SERPL-MCNC: 0.5 MG/DL (ref 0.2–1.2)
BUN BLD-MCNC: 21 MG/DL (ref 6–20)
BUN/CREAT SERPL: 12.8 (ref 7.3–30)
CALCIUM SPEC-SCNC: 9.5 MG/DL (ref 8.5–10.2)
CEA SERPL-MCNC: 1.54 NG/ML
CHLORIDE SERPL-SCNC: 103 MMOL/L (ref 98–107)
CO2 SERPL-SCNC: 26.9 MMOL/L (ref 22–29)
CREAT BLD-MCNC: 1.64 MG/DL (ref 0.6–1.1)
DEPRECATED RDW RBC AUTO: 45.1 FL (ref 37–54)
EOSINOPHIL # BLD AUTO: 0.19 10*3/MM3 (ref 0–0.4)
EOSINOPHIL NFR BLD AUTO: 3.1 % (ref 0.3–6.2)
ERYTHROCYTE [DISTWIDTH] IN BLOOD BY AUTOMATED COUNT: 13.9 % (ref 12.3–15.4)
FERRITIN SERPL-MCNC: 71.5 NG/ML (ref 13–150)
GFR SERPL CREATININE-BSD FRML MDRD: 30 ML/MIN/1.73
GLOBULIN UR ELPH-MCNC: 3.5 GM/DL (ref 1.8–3.5)
GLUCOSE BLD-MCNC: 103 MG/DL (ref 74–124)
HCT VFR BLD AUTO: 39 % (ref 34–46.6)
HGB BLD-MCNC: 12.4 G/DL (ref 12–15.9)
IMM GRANULOCYTES # BLD AUTO: 0.02 10*3/MM3 (ref 0–0.05)
IMM GRANULOCYTES NFR BLD AUTO: 0.3 % (ref 0–0.5)
IRON 24H UR-MRATE: 63 MCG/DL (ref 37–145)
IRON SATN MFR SERPL: 16 % (ref 14–48)
LYMPHOCYTES # BLD AUTO: 1.62 10*3/MM3 (ref 0.7–3.1)
LYMPHOCYTES NFR BLD AUTO: 26.7 % (ref 19.6–45.3)
MCH RBC QN AUTO: 28.5 PG (ref 26.6–33)
MCHC RBC AUTO-ENTMCNC: 31.8 G/DL (ref 31.5–35.7)
MCV RBC AUTO: 89.7 FL (ref 79–97)
MONOCYTES # BLD AUTO: 0.55 10*3/MM3 (ref 0.1–0.9)
MONOCYTES NFR BLD AUTO: 9.1 % (ref 5–12)
NEUTROPHILS # BLD AUTO: 3.64 10*3/MM3 (ref 1.7–7)
NEUTROPHILS NFR BLD AUTO: 60.1 % (ref 42.7–76)
NRBC BLD AUTO-RTO: 0 /100 WBC (ref 0–0.2)
PLATELET # BLD AUTO: 242 10*3/MM3 (ref 140–450)
PMV BLD AUTO: 9.4 FL (ref 6–12)
POTASSIUM BLD-SCNC: 4.9 MMOL/L (ref 3.5–4.7)
PROT SERPL-MCNC: 7.7 G/DL (ref 6.3–8)
RBC # BLD AUTO: 4.35 10*6/MM3 (ref 3.77–5.28)
SODIUM BLD-SCNC: 143 MMOL/L (ref 134–145)
TIBC SERPL-MCNC: 402 MCG/DL (ref 249–505)
TRANSFERRIN SERPL-MCNC: 287 MG/DL (ref 200–360)
WBC NRBC COR # BLD: 6.06 10*3/MM3 (ref 3.4–10.8)

## 2019-08-28 PROCEDURE — 82378 CARCINOEMBRYONIC ANTIGEN: CPT | Performed by: INTERNAL MEDICINE

## 2019-08-28 PROCEDURE — 80053 COMPREHEN METABOLIC PANEL: CPT | Performed by: INTERNAL MEDICINE

## 2019-08-28 PROCEDURE — 84466 ASSAY OF TRANSFERRIN: CPT | Performed by: INTERNAL MEDICINE

## 2019-08-28 PROCEDURE — 85025 COMPLETE CBC W/AUTO DIFF WBC: CPT | Performed by: INTERNAL MEDICINE

## 2019-08-28 PROCEDURE — 36415 COLL VENOUS BLD VENIPUNCTURE: CPT | Performed by: INTERNAL MEDICINE

## 2019-08-28 PROCEDURE — 82728 ASSAY OF FERRITIN: CPT | Performed by: INTERNAL MEDICINE

## 2019-08-28 PROCEDURE — 83540 ASSAY OF IRON: CPT | Performed by: INTERNAL MEDICINE

## 2019-08-29 RX ORDER — APIXABAN 5 MG/1
TABLET, FILM COATED ORAL
Qty: 60 TABLET | Refills: 0 | Status: SHIPPED | OUTPATIENT
Start: 2019-08-29 | End: 2019-09-24 | Stop reason: SDUPTHER

## 2019-08-29 RX ORDER — FUROSEMIDE 20 MG/1
TABLET ORAL
Qty: 60 TABLET | Refills: 1 | Status: SHIPPED | OUTPATIENT
Start: 2019-08-29 | End: 2019-11-04 | Stop reason: SDUPTHER

## 2019-09-03 ENCOUNTER — OFFICE VISIT (OUTPATIENT)
Dept: ONCOLOGY | Facility: CLINIC | Age: 84
End: 2019-09-03

## 2019-09-03 ENCOUNTER — APPOINTMENT (OUTPATIENT)
Dept: LAB | Facility: HOSPITAL | Age: 84
End: 2019-09-03

## 2019-09-03 VITALS
DIASTOLIC BLOOD PRESSURE: 84 MMHG | HEIGHT: 62 IN | SYSTOLIC BLOOD PRESSURE: 145 MMHG | WEIGHT: 221.5 LBS | OXYGEN SATURATION: 97 % | TEMPERATURE: 97.9 F | HEART RATE: 70 BPM | BODY MASS INDEX: 40.76 KG/M2 | RESPIRATION RATE: 16 BRPM

## 2019-09-03 DIAGNOSIS — C18.2 MALIGNANT NEOPLASM OF ASCENDING COLON (HCC): Primary | ICD-10-CM

## 2019-09-03 DIAGNOSIS — D50.8 OTHER IRON DEFICIENCY ANEMIA: ICD-10-CM

## 2019-09-03 PROCEDURE — G0463 HOSPITAL OUTPT CLINIC VISIT: HCPCS | Performed by: INTERNAL MEDICINE

## 2019-09-03 PROCEDURE — 99214 OFFICE O/P EST MOD 30 MIN: CPT | Performed by: INTERNAL MEDICINE

## 2019-09-03 NOTE — PROGRESS NOTES
Subjective .     REASONS FOR FOLLOWUP:    1. Anemia secondary to iron deficiency, intolerant of oral iron, status post Venofer 100 mg on 09/21/11, followed by Feraheme x two doses, 09/28 and 10/05/11.  Recurrent iron deficiency requiring Feraheme again on 09/24/12 and 10/05/12.  Further recurrence of iron deficiency requiring Feraheme on 10/5 in 10/12/17.  2. No definitive evidence of GI blood loss, stool cards negative for occult blood, status post GI evaluation with Dr. Curran with no bleeding source identified.   3. History of gastric polyps, status post staged excision via EGD.    4. Status post left nephrectomy in 2004 for renal cell carcinoma.   5. Stage III chronic kidney disease.   6. Stage I ([id number]) left papillary thyroid cancer (3mm): Status post total thyroidectomy 11/16/15, 2 lymph nodes removed.  7. Stage IIa (T3N0M0) ascending colon cancer: Preoperative CEA 1.65 on 4/25/18.  Right hemicolectomy 4/27/18, invasive moderately differentiated adenocarcinoma, negative margins, positive lymphovascular invasion, negative perineural invasion, 10 lymph nodes negative, ulcerated small adjacent pericolonic abscess (microscopic perforation).  Microsatellite stable.  8. Genetics evaluation 7/31/18 with INVITAE panel test showing VUS MLH3 and MSH6  9. 2.2 cm right renal lesion, indeterminate on CT 4/26/18, felt to represent proteinaceous cyst by urology.  Stable on follow-up CT 4/24/2019.  10. Atrial fibrillation continuing on anticoagulation with Eliquis.    HISTORY OF PRESENT ILLNESS:  The patient is a 83 y.o. year old female who is here for follow-up with the above-mentioned history.    History of Present Illness the patient returns today in follow-up with laboratory studies to review.  In the interval, she did not follow-up as recommended with Dr. Curran to undergo follow-up colonoscopy that was due in December 2018.  She does report some chronic intermittent diarrhea ever since the time of her bowel  surgery and this is unchanged.  She does have some chronic mild fatigue which again is unchanged.  She notes some difficulty with her left shoulder and will be seeing orthopedics soon, having some difficulty with abduction.  She does remain off spironolactone since her last visit when she had evidence of hyperkalemia and acute on chronic renal insufficiency.  She did undergo her mammogram on 7/5/2019 which was negative, BI-RADS 1.      PAST MEDICAL HISTORY:    1.  Atrial fibrillation.  The patient underwent ablation procedure in 2004, repeat ablation procedure on 09/13/11 by Dr. Melendez.  Subsequent AV deshawn ablation and March 2017 and subsequent cardioversion in May 2017.  Continuation of anticoagulation with Eliquis  2.  Gastroesophageal reflux disease.  3.  Hypertension.   4.  Status post left knee surgery.   5.  Status post pacemaker placement.   6.  Status post laparoscopic cholecystectomy.   7.  Status post bladder tacking procedure.   8.  Status post EGD and colonoscopy with Dr. Curran 08/08/11.  Finding of gastric polyps, one of which was resected showing a hyperplastic polyp with gastritis.  There was no source of bleeding identified.  Schatzki's ring was identified as well as diverticulosis.  Repeat EGD on 03/18/13 with resection of a 3 cm prepyloric hyperplastic polyp of acute and chronic inflammatory change. Repeat EGD, October 2013 with further resection of polyp, benign findings.  EGD 10/23/17 with small gastric polyps identified, no evidence of active bleeding.  9.  Bladder suspension surgery in 5/12.  10.  Status post left knee replacement in May 2013.    Past Medical History:   Diagnosis Date   • Acute bronchitis    • Acute sinusitis    • Anemia     Iron deficiency   • Anesthesia complication    • Anticoagulated on warfarin    • Arthritis    • Bladder dysfunction    • Cancer of left kidney (CMS/HCC) 06/08/2004    S/P Left Radical Neprectomy   • Cardiac disease    • Chronic dysfunction of left  eustachian tube    • CKD (chronic kidney disease)     stage 3   • Colon cancer (CMS/HCC)    • Depression    • Diarrhea    • Diverticulosis 08/08/2011    Descending Colon & Sigmoid Colon multiple diverticula   • Epigastric pain    • Fatigue    • Frequent urination    • GERD (gastroesophageal reflux disease)    • Goiter     thyroid removed in november 2015   • Health care maintenance    • History of colitis    • History of shingles    • History of transfusion    • Hypertension    • Insomnia    • Left atrial enlargement    • Left ventricular hypertrophy    • Mitral regurgitation     mild to moderate per echocardiogram 2016   • Nerve pain     LUE D/T SHINGLES   • Neuropathy     ARMS   • Non morbid obesity due to excess calories    • NANCY (obstructive sleep apnea)     uses CPAP   • Paroxysmal atrial fibrillation (CMS/HCC)    • Permanent atrial fibrillation (CMS/HCC)    • Pulmonary hypertension (CMS/HCC)     per echo 2016   • Right atrial enlargement    • Sleep apnea     on CPAP   • SSS (sick sinus syndrome) (CMS/HCC)    • Third degree AV block (CMS/HCC)    • Thyroid cancer (CMS/HCC)    • URI (upper respiratory infection)    • Urinary urgency      Past Surgical History:   Procedure Laterality Date   • ATRIAL ABLATION SURGERY N/A 09/13/2011    Comprehensive electrophysiology study, Left atrial pace & sense, 3-dimensional cardia mapping, Radiofrequency catheter ablation, Transseptal hear catheterization, Dr. Maldonado Melendez   • ATRIAL ABLATION SURGERY N/A 02/11/2004    Dr. Maldonado Melendez   • BLADDER SURGERY      Bladder tacking procedure   • BLADDER SUSPENSION N/A 05/2012   • CARDIAC ELECTROPHYSIOLOGY PROCEDURE N/A 5/17/2016    Procedure: PPM generator change - dual BOSTON;  Surgeon: Maldonado Melendez MD;  Location: [facility] CATH INVASIVE LOCATION;  Service:    • CARDIAC ELECTROPHYSIOLOGY PROCEDURE N/A 3/23/2017    Procedure: AV node ablation pt has BOSTON PPM;  Surgeon: Maldonado Melendez MD;  Location: [facility] CATH INVASIVE LOCATION;   Service:    • CARDIOVERSION  01/26/2017    Dr. Melendez   • CARDIOVERSION  05/25/2017    Dr. Melendez   • CATARACT EXTRACTION Bilateral     Dr. Gramajo   • COLON RESECTION Right 4/27/2018    Procedure: COLON RESECTION RIGHT LAPAROSCOPIC, possible open;  Surgeon: Rebeca Curran MD;  Location: [facility] OR;  Service: General   • COLONOSCOPY N/A 04/01/2003    Normal colonoscopy except for an occasional diverticulosis, Dr. Nathan Macias   • COLONOSCOPY N/A 4/25/2018    Procedure: COLONOSCOPY INTO CECUM AND TI WITH SALINE LIFT, HOT SNARE POLYPECTOMIES, BX'S, SPOT INJECTION, RESOLUTION CLIPS X2;  Surgeon: Rebeca Curran MD;  Location: [facility] ENDOSCOPY;  Service: Gastroenterology   • CYSTOSCOPY BOTOX INJECTION OF BLADDER N/A 6/27/2016    Procedure: CYSTOSCOPY WITH BOTOX DETRUSOR INJECTION;  Surgeon: Salome Bowen MD;  Location: [facility] OR;  Service:    • CYSTOSCOPY BOTOX INJECTION OF BLADDER N/A 05/29/2015    Dr. Salome Bowen   • ENDOSCOPY N/A 06/10/2015    Antral Polyp: Gastric Mucosa w/ marked cautery artifact,  Hiatal Hernia, Dr. Rebeca Curran   • ENDOSCOPY N/A 10/28/2013    Gastric polyp: polypoid hyperplastic gastric mucosa w/ focal ulceration, mixed acute & chronic inflammation.  Negative for intestinal metaplasia, no helicobacter organisms identified on diff-wuik stain, Dr. Rebeca Curran   • ENDOSCOPY N/A 03/18/2013    Large prepyloric polyp, partially removed: fragment of polypoid hyperplastic gastric mucosa w/ foci of erosion & patchy mixed acute & chronic inflammation. No helicobacter organisms identified on diff-quick stain. Negative for intestinal metaplasia, negative for dysplasia, Dr. Rebeca Curran   • ENDOSCOPY N/A 10/24/2012    large prepyloric mass (3cm) w/ adjacent nodules: hyperplastic polyp w/ mild chronic active gastritis, focal erosion & associated, Multiple fundic polyps: polypoid mucosal hyperplasia, Dr. Rebeca Currna   • ENDOSCOPY N/A 03/11/2003    Gastric Antral Biopsies: Fragments of  Gastric Mucosa & Necrotic Tissue (Compatible w/ Ulcer) w/ chronic active inflammation.  Negative stain for Helicobacter organisms. Dr. Nathan Macias   • ENDOSCOPY N/A 10/23/2017    Procedure: ESOPHAGOGASTRODUODENOSCOPY;  Surgeon: Rebeca Curran MD;  Location: [facility] ENDOSCOPY;  Service:    • ENDOSCOPY AND COLONOSCOPY N/A 08/08/2011    4cm hiatal hernia, Schatzki's ring, Diverticulosis, Large antral polyps, Dr. Rebeca Curran   • INCISIONAL HERNIA REPAIR N/A 06/11/2015    Defect approximately 2.5 cm w/ a mass of incarcerated tissue approximately the size of a nectarine, Dr. Rebeca Curran   • KNEE ARTHROPLASTY     • KNEE MINI REVISION Right 9/28/2016    Procedure: RT KNEE POLY INSERT CHANGE ;  Surgeon: Tommy Avila MD;  Location: [facility] OR;  Service:    • LAPAROSCOPIC CHOLECYSTECTOMY N/A    • NEPHRECTOMY RADICAL Left 06/08/2004    Incidentially found left renal mass 3-4 cm renal cell carcinoma, left lower pole, Dr. Salome Bowen   • OTHER SURGICAL HISTORY      NEUROMUSCULAR BLOCKERS BOTULINUM TOXIN ONABOTULINUMTOXIN A   • PACEMAKER IMPLANTATION N/A     Dr. Casillas   • PACEMAKER REPLACEMENT N/A 09/24/2010    Implanted Generator is a [employer] ALTRUA 60 model S603DR, serial # [id number], Dr. Chavez Jimenez   • TOTAL KNEE ARTHROPLASTY Left 05/21/2013    Dr. Miguel Pacheco   • TOTAL THYROIDECTOMY N/A 11/16/2015    Dr. Gideon Ashley, [facility]   • TRANSVAGINAL TAPING SUSPENSION N/A 03/16/2009    Mini Sling, Closed suprapubic cystostomy, Dr. Salome Bowen   • TUBAL ABDOMINAL LIGATION Bilateral          HEMATOLOGICONCOLOGIC HISTORY:    The patient was seen during a hospitalization at [facility] in September 2011.  She was admitted with left lower lobe pneumonia following a cardiac ablation procedure.  She has anemia dating back to 2006, at that time with a hematocrit of 36.7 and MCV of 77.  Her platelets and white count have remained normal.  Hemoglobin in September 2010 was 10.3, MCV 74.  In December 2010 hemoglobin was  10.5 with MCV of 72.  On 08/09/11 hemoglobin was down to 9.8 with MCV of 73.  During the hospitalization hemoglobin had dropped to 8.5 with MCV of 72 and decreased further to 8.0 at which point she was transfused two units of PRBCs.  She was on Coumadin but had no obvious clinical evidence of GI blood loss.  She was evaluated by Dr. Curran on 08/08/11 with EGD and colonoscopy showing evidence of gastric polyps.  One polyp was biopsied showing evidence of a hyperplastic polyp with gastritis.  There was no definitive evidence of bleeding.  She was having some dysphagia and had a Schatzki's ring which was dilated.  She developed rapid ventricular response and therefore the procedure was stopped.  She then underwent cardiac ablation and subsequently developed pneumonia.      During her hospitalization, lab studies were sent including iron studies showing an iron of 11, TIBC 438, transferrin 294, iron percent sat of 3 with a ferritin of 10.8.  B12 was low-normal at 291, folate normal.  Creatinine was in the 1.2 range with estimated GFR in the 40 range (stage III chronic kidney disease).  Erythropoietin level was 66.  A hemoglobin electrophoresis was sent showing hemoglobin A1 of 97.5%, and hemoglobin A2 of 2.2%.  The patient had stool checked for occult blood in the hospital which again was negative, therefore, with no definitive evidence of GI blood loss.  I suspect she may have a malabsorption issue.  She was intolerant of oral iron and treated with Venofer 100 mg on 09/21/11 and subsequently was discharged from the hospital and treated as an outpatient with Feraheme 510 mg on 09/28/11 and again on 10/05/11.      The patient returned for followup studies on 10/19/11 with hemoglobin that improved to the 13 range and MCV up to 79.  TIBC was 284, iron percent sat 32, ferritin 335.  Reticulated hemoglobin had improved to 32.9.  B12 was 476.  We will follow her counts over time.  She will followup with Dr. Curran  regarding her gastric polyps.      Laboratory studies on 02/15/12 showed a ferritin stable at 112.  We will recheck at a 6 month interval.     Lab studies from 9/10/12 shows a hemoglobin 11.9, reticulated hemoglobin 29.9, TIBC 325, iron percent SAT of 11.  Ferritin down to 18.      Feraheme administered on 9/24/12 and 10/5/12.  Repeat iron studies on 3/15/13 showed a ferritin of 101, iron percent saturation of 31.       Labs 10/10/2013 with a ferritin of 85, iron percent sat down to 15, hemoglobin of 12.9. Recheck in six months.    Labs 04/20/2015 showed a ferritin stable in the normal range at 76.     The patient returned after a prolonged absence from our office on 9/21/17 with hemoglobin of 12.0, MCV 79.2, iron 37, ferritin 20, iron saturation 8%, TIBC 441 CONSISTENT with recurrent iron deficiency.  Folate was normal at 10.52, B12 normal at 398, erythropoietin 32.6.  Additional Feraheme initiated on 10/5/17 for 2 doses.  Repeat EGD with Dr. Curran on 10/23/17 with no active bleeding, small less than 6 mm gastric polyps identified.       ONCOLOGIC HISTORY:   Status post left nephrectomy in 2004 for renal cell carcinoma with Dr. Bowen.  This was a Darnell grade 2 lesion measuring 3.0 cm, confined to the kidney, with no extension to perinephric adipose tissue, and no evidence of lymphovascular invasion.  Margins were negative.  Adrenal gland was negative.      CT scan of the abdomen and pelvis o 02/26/12 showed no evidence of recurrent disease.      Stage I ([id number]) left papillary thyroid cancer (3mm): Status post total thyroidectomy 11/16/15, 2 lymph nodes removed.      Stage IIa (T3N0M0) ascending colon cancer: Preoperative CEA 1.65 on 4/25/18.  Right hemicolectomy 4/27/18, invasive moderately differentiated adenocarcinoma, negative margins, positive lymphovascular invasion, negative perineural invasion, 10 lymph nodes negative, ulcerated small adjacent pericolonic abscess (microscopic perforation).   Microsatellite stable.    Genetics evaluation 7/31/18 with INVITAE panel test showing VUS MLH3 and MSH6      Current Outpatient Medications on File Prior to Visit   Medication Sig Dispense Refill   • B Complex-C (SUPER B COMPLEX PO) Take 1 tablet by mouth Daily.     • CALCIUM PO Take 600 Units by mouth Daily. With D3, listed separetly     • cholecalciferol (VITAMIN D3) 1000 UNITS tablet Take 1,000 Units by mouth daily. In addition to the calcium -D3 pill     • ELIQUIS 5 MG tablet tablet TAKE ONE TABLET BY MOUTH EVERY 12 HOURS 60 tablet 0   • furosemide (LASIX) 20 MG tablet TAKE TWO TABLETS BY MOUTH DAILY 60 tablet 1   • GLUCOSAMINE-CHONDROITIN PO Take 1 tablet by mouth Daily.     • lisinopril (PRINIVIL,ZESTRIL) 10 MG tablet TAKE ONE TABLET BY MOUTH DAILY 90 tablet 0   • loperamide (IMODIUM) 2 MG capsule Take 2 mg by mouth 4 (four) times a day as needed for diarrhea.     • metoprolol tartrate (LOPRESSOR) 25 MG tablet TAKE ONE TABLET BY MOUTH TWICE A  tablet 0   • Multiple Vitamins-Minerals (PRESERVISION AREDS PO) Take 1 tablet by mouth Daily.     • SYNTHROID 125 MCG tablet Take 125 mcg by mouth Daily.     • XIIDRA 5 % solution Apply 1 drop to eye 2 (Two) Times a Day.     • furosemide (LASIX) 40 MG tablet Take 40 mg by mouth Daily.     • [DISCONTINUED] cyclobenzaprine (FLEXERIL) 10 MG tablet Take 1 tablet by mouth 3 (Three) Times a Day As Needed for Muscle Spasms. 30 tablet 0   • [DISCONTINUED] lisinopril (PRINIVIL,ZESTRIL) 10 MG tablet TAKE ONE TABLET BY MOUTH DAILY 90 tablet 0   • [DISCONTINUED] spironolactone (ALDACTONE) 25 MG tablet TAKE ONE TABLET BY MOUTH DAILY 90 tablet 2     No current facility-administered medications on file prior to visit.        ALLERGIES:     Allergies   Allergen Reactions   • Sulfa Antibiotics Swelling     FACE AND TONGUE   • Adhesive Tape Rash   • Levaquin [Levofloxacin] Rash     Social History     Socioeconomic History   • Marital status:      Spouse name: Not on file   •  Number of children: 0   • Years of education: Not on file   • Highest education level: Not on file   Occupational History   • Occupation: Homemaker     Employer: RETIRED   Tobacco Use   • Smoking status: Never Smoker   • Smokeless tobacco: Never Used   • Tobacco comment: caffeine use   Substance and Sexual Activity   • Alcohol use: No   • Drug use: No   • Sexual activity: Defer     Family History   Problem Relation Age of Onset   • Heart disease Other    • Deep vein thrombosis Other    • Hypertension Other    • Breast cancer Sister         In her 60s.   • Breast cancer Maternal Aunt    • Breast cancer Sister         In her 60s.   • Heart disease Mother    • Hypertension Mother    • Heart disease Father    • Hypertension Father    • Heart disease Daughter    • Hypertension Daughter    • Heart disease Son    • Hypertension Son    • Malig Hyperthermia Neg Hx        Review of Systems   Constitutional: Positive for fatigue. Negative for activity change, appetite change, fever and unexpected weight change.   HENT: Negative for congestion, mouth sores, nosebleeds, sore throat and voice change.    Respiratory: Negative for cough, shortness of breath and wheezing.    Cardiovascular: Negative for chest pain, palpitations and leg swelling.   Gastrointestinal: Positive for diarrhea. Negative for abdominal distention, abdominal pain, blood in stool, constipation, nausea and vomiting.   Endocrine: Negative for cold intolerance and heat intolerance.   Genitourinary: Negative for difficulty urinating, dysuria, frequency and hematuria.   Musculoskeletal: Positive for arthralgias. Negative for back pain, joint swelling and myalgias.   Skin: Negative for rash.   Neurological: Negative for dizziness, syncope, weakness, light-headedness, numbness and headaches.   Hematological: Negative for adenopathy. Does not bruise/bleed easily.   Psychiatric/Behavioral: Negative for confusion and sleep disturbance. The patient is not  nervous/anxious.          Objective      Vitals:    09/03/19 1139   BP: 145/84   Pulse: 70   Resp: 16   Temp: 97.9 °F (36.6 °C)   SpO2: 97%      Current Status 9/3/2019   ECOG score 0   pain 0/10    Physical Exam   Constitutional: She is oriented to person, place, and time. She appears well-developed and well-nourished.   HENT:   Mouth/Throat: Oropharynx is clear and moist.   Eyes: Conjunctivae are normal.   Neck: No thyromegaly present.   Cardiovascular: Normal rate and regular rhythm. Exam reveals no gallop and no friction rub.   No murmur heard.  Pulmonary/Chest: Breath sounds normal. No respiratory distress.   Abdominal: Soft. Bowel sounds are normal. She exhibits no distension. There is no tenderness.   Musculoskeletal: She exhibits edema.   Trace bilateral lower extremity edema   Lymphadenopathy:        Head (right side): No submandibular adenopathy present.     She has no cervical adenopathy.     She has no axillary adenopathy.        Right: No inguinal and no supraclavicular adenopathy present.        Left: No inguinal and no supraclavicular adenopathy present.   Neurological: She is alert and oriented to person, place, and time. She displays normal reflexes. No cranial nerve deficit. She exhibits normal muscle tone.   Skin: Skin is warm and dry. No rash noted.   Psychiatric: She has a normal mood and affect. Her behavior is normal.       RECENT LABS:  Hematology WBC   Date Value Ref Range Status   08/28/2019 6.06 3.40 - 10.80 10*3/mm3 Final     RBC   Date Value Ref Range Status   08/28/2019 4.35 3.77 - 5.28 10*6/mm3 Final     Hemoglobin   Date Value Ref Range Status   08/28/2019 12.4 12.0 - 15.9 g/dL Final     Hematocrit   Date Value Ref Range Status   08/28/2019 39.0 34.0 - 46.6 % Final     Platelets   Date Value Ref Range Status   08/28/2019 242 140 - 450 10*3/mm3 Final        Lab Results   Component Value Date    GLUCOSE 103 08/28/2019    BUN 21 (H) 08/28/2019    CREATININE 1.64 (H) 08/28/2019     EGFRIFNONA 30 (L) 08/28/2019    EGFRIFAFRI 50 (L) 12/06/2017    BCR 12.8 08/28/2019    K 4.9 (H) 08/28/2019    CO2 26.9 08/28/2019    CALCIUM 9.5 08/28/2019    PROTENTOTREF 7.4 12/06/2017    ALBUMIN 4.20 08/28/2019    LABIL2 1.3 12/06/2017    AST 24 08/28/2019    ALT 18 08/28/2019     Additional labs 8/28/2019: Iron 63, ferritin 71.5, iron saturation 16%, TIBC 402, CEA 1.54     Mammogram 7/5/2019 negative, BI-RADS 1      Assessment/Plan      1. Recurrent iron deficiency anemia: The patient is intolerant of oral iron. Prior GI evaluation showed no definitive evidence of GI blood loss. She has underlying stage III chronic kidney disease, which is a contributing factor to her mild borderline anemia. She has been treated in the past with IV iron in 2011 and 2012.  She had a lengthy absence from our office from 2015 until she was referred back on 9/21/17 with evidence of recurrent iron deficiency, likely related to malabsorption.  Her ferritin on 7/7/17 was 21 and hemoglobin on 8/7/17 was 11.1.  Her degree of anemia was mild with a hemoglobin of 11.4, microcytic indices with an MCV of 78.  Labs on 9/21/17 showed ferritin 20.5, iron saturation 8%, TIBC 441.  We did also check B12 and folate which were normal.  Erythropoietin was 32 consistent with a component of anemia secondary to chronic kidney disease. She did return stool cards for occult blood which were negative ×3 10/5/17.  She received additional Feraheme on 10/5 and 10/12/17.  She underwent EGD with Dr. Curran 10/23/17 with no evidence of active bleeding, small gastric polyps identified.  Labs 12/20/17 with hemoglobin up to 13.3, ferritin 97.9, iron saturation of 16%.  Repeat labs performed postoperatively from colon resection on 5/4/18 showed iron 36, ferritin 99, iron saturation 10%, TIBC 349.  Current labs from 8/28/2019 showed hemoglobin of 12.4 with iron 63, ferritin 71.5, iron saturation 16%, TIBC 402 representing some improvement from labs 4 months ago.   "She does not require further IV iron at this time.  We will recheck studies in 4 months again.  2. Stage IIa (T3N0M0) ascending colon cancer: She develop significant diarrhea with blood per rectum.  She underwent colonoscopy 4/25/18 with identification of polyps in the rectum, cecal polyp, mass in the right colon.  The cecal polyp was a sessile serrated adenoma, ascending colon \"mass\" showed hyperplastic polyp, rectal polyp was a tubulovillous adenoma with low-grade dysplasia.  CEA on 4/25/18 was normal at 1.65.  Chest x-ray 4/25/18 was unrevealing.  CT of the abdomen and pelvis 4/26/18 showed a 4 cm annular mass in the ascending colon with adjacent haziness in the mesentery but no lymphadenopathy, no evidence of metastatic disease.  The patient underwent a right hemicolectomy on 4/27/18 with pathology showing an invasive moderately differentiated adenocarcinoma arising in a sessile serrated adenoma with negative margins measuring 2.5 cm extending through the muscularis propria into brad-colorectal tissue, positive lymphovascular invasion, negative perineural invasion, 10 lymph nodes negative.  There was a comment regarding an ulcerated small adjacent pericolonic abscess, raising the question of possible microscopic perforation.  Patient did have a few high risk features with less than 12 lymph nodes removed, positive lymphovascular invasion, and some possibility of microscopic perforation.  The tumor was microsatellite stable.  Given her age and comorbidities, the patient was a poor candidate for adjuvant chemotherapy and was not recommended.  Independent of this recommendation, the patient stated she was not interested in taking any adjuvant chemotherapy.  Therefore we will plan to monitor her clinically for evidence of recurrent disease.  Standard monitoring is with routine CEA levels however this does not appear to be informative for her given her normal preoperative value.  We will pursue annual interval " follow-up CT.  CT scan from 4/24/2019 at a 1 year interval showed no evidence of recurrent disease.  Patient returns today in follow-up with CEA from 8/28/2019 normal at 1.54.  She has chronic intermittent diarrhea which is stable.  She was to followed up with Dr. Curran regarding repeat colonoscopy that was due in December 2018 however she has not done so.  She reports that she will pursue follow-up in the near future.  I will see her in 4 months with repeat CEA we will plan repeat CT in 1 year interval in April 2020.  3. 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  We did review her family history which includes a sister with breast cancer around age 60, another sister with breast cancer around age 60, maternal aunt with breast cancer over the age of 50, maternal uncle with lung cancer.  Given the patient's multiple malignancies and family history, she was referred to the genetics clinic to discuss potential genetic testing, mainly for benefit of the patient's children and other family members.  Her colon cancer was microsatellite stable.  She did follow-up in the genetics clinic and underwent INVITAE panel test 7/31/18 showing VUS in MLH3 and MSH6 with no known clinical significance at this time.   4. History of gastric polyps: EGD 10/23/17 with small less than 6 mm gastric polyps, not resected.  5. History of renal cell carcinoma: The patient underwent a left nephrectomy in 2004 with no clinical evidence of recurrent disease.  CT chest from 11/14/16 showed no evidence of recurrence.  CT abdomen and pelvis however from 4/26/18 showed a 2.2 cm right renal lesion which was indeterminate, possibly a proteinaceous cyst but could not rule out mass lesion.  The study was performed without contrast due to the  patient's underlying chronic kidney disease.  She is unable to undergo renal mass protocol CT with contrast.  She is unable to undergo MRI due to her pacemaker.  Follow-up CT scan 4/24/2019 with no change in the right renal lesion at 2.1 cm, repeat at 1 year interval.  6. CKD3: At the last visit, patient developed acute on chronic renal insufficiency with creatinine up to 2.18 and hyperkalemia with potassium up to 5.6.  Spironolactone was discontinued with subsequent improvement in potassium and creatinine.  Current labs with creatinine 1.64, potassium 4.9.    PLAN:     1. Patient will follow-up with Dr. Curran for colonoscopy that was originally to have been scheduled in December (patient canceled).  2. M.D. visit in 4 months.  One week prior we will draw labs with CBC, CMP, CEA, iron panel, ferritin.

## 2019-09-25 RX ORDER — APIXABAN 5 MG/1
TABLET, FILM COATED ORAL
Qty: 60 TABLET | Refills: 3 | Status: SHIPPED | OUTPATIENT
Start: 2019-09-25 | End: 2020-01-28

## 2019-10-31 RX ORDER — LISINOPRIL 10 MG/1
TABLET ORAL
Qty: 90 TABLET | Refills: 2 | Status: SHIPPED | OUTPATIENT
Start: 2019-10-31 | End: 2020-07-01 | Stop reason: SDUPTHER

## 2019-11-04 DIAGNOSIS — R06.02 SHORTNESS OF BREATH: ICD-10-CM

## 2019-11-04 DIAGNOSIS — I48.19 PERSISTENT ATRIAL FIBRILLATION (HCC): ICD-10-CM

## 2019-11-04 RX ORDER — FUROSEMIDE 20 MG/1
TABLET ORAL
Qty: 60 TABLET | Refills: 0 | Status: SHIPPED | OUTPATIENT
Start: 2019-11-04 | End: 2020-02-04

## 2019-11-20 ENCOUNTER — CLINICAL SUPPORT NO REQUIREMENTS (OUTPATIENT)
Dept: CARDIOLOGY | Facility: CLINIC | Age: 84
End: 2019-11-20

## 2019-11-20 ENCOUNTER — OFFICE VISIT (OUTPATIENT)
Dept: CARDIOLOGY | Facility: CLINIC | Age: 84
End: 2019-11-20

## 2019-11-20 VITALS
SYSTOLIC BLOOD PRESSURE: 120 MMHG | WEIGHT: 218 LBS | HEIGHT: 64 IN | HEART RATE: 70 BPM | BODY MASS INDEX: 37.22 KG/M2 | DIASTOLIC BLOOD PRESSURE: 78 MMHG

## 2019-11-20 DIAGNOSIS — I49.5 SICK SINUS SYNDROME (HCC): Primary | ICD-10-CM

## 2019-11-20 DIAGNOSIS — Z99.89 OSA ON CPAP: ICD-10-CM

## 2019-11-20 DIAGNOSIS — Z98.890 S/P AV NODAL ABLATION: ICD-10-CM

## 2019-11-20 DIAGNOSIS — G47.33 OSA ON CPAP: ICD-10-CM

## 2019-11-20 DIAGNOSIS — I48.21 PERMANENT ATRIAL FIBRILLATION (HCC): Primary | ICD-10-CM

## 2019-11-20 DIAGNOSIS — E66.01 SEVERE OBESITY (BMI 35.0-39.9) WITH COMORBIDITY (HCC): ICD-10-CM

## 2019-11-20 DIAGNOSIS — I44.2 THIRD DEGREE AV BLOCK (HCC): ICD-10-CM

## 2019-11-20 DIAGNOSIS — Z95.0 S/P PLACEMENT OF CARDIAC PACEMAKER: ICD-10-CM

## 2019-11-20 PROCEDURE — 99214 OFFICE O/P EST MOD 30 MIN: CPT | Performed by: NURSE PRACTITIONER

## 2019-11-20 PROCEDURE — 93279 PRGRMG DEV EVAL PM/LDLS PM: CPT | Performed by: INTERNAL MEDICINE

## 2019-11-20 PROCEDURE — 93000 ELECTROCARDIOGRAM COMPLETE: CPT | Performed by: NURSE PRACTITIONER

## 2019-11-20 RX ORDER — SPIRONOLACTONE 25 MG/1
25 TABLET ORAL DAILY
COMMUNITY
End: 2019-11-20

## 2019-11-20 NOTE — PROGRESS NOTES
Date of Office Visit: 2019  Encounter Provider: ALLISON Isaac  Place of Service: [facility] CARDIOLOGY  Patient Name: Anastacia Sarah  :1935    Chief Complaint   Patient presents with   • Pacemaker Check   • Atrial Fibrillation   :     HPI: Anastacia Sarah is a 84 y.o. female who is a patient followed by Dr. Melendez.  She had a history of PAF which then became permanent, sick sinus syndrome, status post permanent pacemaker, then her A. fib was difficult to control so she underwent an AV node ablation in 3/2018.  He also has been treated by hematology/oncology for multiple cancers and continues to follow with them.  He presents today for routine follow-up.    She is doing okay but says that she is still always tired and has dyspnea with exertion.  She does not have any chest pain, PND, orthopnea, dizziness or edema.    She does not do any type of regular activity, she is accompanied by her daughter today who says that she always has some sort of an excuse.    She has continued to gain some weight, because she says she likes to eat sweets.     Device interrogation today shows normal testing and function with no arrhythmia episodes.     Past Medical History:   Diagnosis Date   • Abnormal ECG    • Acute bronchitis    • Acute sinusitis    • Anemia     Iron deficiency   • Anesthesia complication    • Anticoagulated on warfarin    • Arthritis    • Asthma     Shortness of breath upon exertion   • Bladder dysfunction    • Cancer of left kidney (CMS/HCC) 2004    S/P Left Radical Neprectomy   • Cardiac disease    • Chronic dysfunction of left eustachian tube    • CKD (chronic kidney disease)     stage 3   • Colon cancer (CMS/[facility])    • Depression    • Diarrhea    • Diverticulosis 2011    Descending Colon & Sigmoid Colon multiple diverticula   • Epigastric pain    • Fatigue    • Frequent urination    • GERD (gastroesophageal reflux disease)    • Goiter      thyroid removed in november 2015   • Health care maintenance    • History of colitis    • History of shingles    • History of transfusion    • Hypertension    • Insomnia    • Left atrial enlargement    • Left ventricular hypertrophy    • Mitral regurgitation     mild to moderate per echocardiogram 2016   • Nerve pain     LUE D/T SHINGLES   • Neuropathy     ARMS   • Non morbid obesity due to excess calories    • NANCY (obstructive sleep apnea)     uses CPAP   • Paroxysmal atrial fibrillation (CMS/HCC)    • Permanent atrial fibrillation    • Pulmonary hypertension (CMS/HCC)     per echo 2016   • Right atrial enlargement    • S/P AV deshawn ablation    • S/P placement of cardiac pacemaker    • Sleep apnea     on CPAP   • SSS (sick sinus syndrome) (CMS/HCC)    • Third degree AV block (CMS/HCC)    • Thyroid cancer (CMS/HCC)    • URI (upper respiratory infection)    • Urinary urgency        Past Surgical History:   Procedure Laterality Date   • ATRIAL ABLATION SURGERY N/A 09/13/2011    Comprehensive electrophysiology study, Left atrial pace & sense, 3-dimensional cardia mapping, Radiofrequency catheter ablation, Transseptal hear catheterization, Dr. Maldonado Melendez   • ATRIAL ABLATION SURGERY N/A 02/11/2004    Dr. Maldonado Melendez   • BLADDER SURGERY      Bladder tacking procedure   • BLADDER SUSPENSION N/A 05/2012   • CARDIAC ELECTROPHYSIOLOGY PROCEDURE N/A 5/17/2016    Procedure: PPM generator change - dual BOSTON;  Surgeon: Maldonado Melendez MD;  Location:  [facility] C[facility] INVASIVE [facility]ATION;  Service:    • CARDIAC ELECTROPHYSIOLOGY PROCEDURE N/A 3/23/2017    Procedure: AV node ablation pt has BOSTON PPM;  Surgeon: Maldonado Melendez MD;  Location:  [facility] CATH INVASIVE [facility]ATION;  Service:    • CARDIOVERSION  01/26/2017    Dr. Melendez   • CARDIOVERSION  05/25/2017    Dr. Melendez   • CATARACT EXTRACTION Bilateral     Dr. Gramajo   • COLON RESECTION Right 4/27/2018    Procedure: COLON RESECTION RIGHT LAPAROSCOPIC, possible open;  Surgeon:  Rebeca Curran MD;  Location: [facility] OR;  Service: General   • COLONOSCOPY N/A 04/01/2003    Normal colonoscopy except for an occasional diverticulosis, Dr. Nathan Macias   • COLONOSCOPY N/A 4/25/2018    Procedure: COLONOSCOPY INTO CECUM AND TI WITH SALINE LIFT, HOT SNARE POLYPECTOMIES, BX'S, SPOT INJECTION, RESOLUTION CLIPS X2;  Surgeon: Rebeca Curran MD;  Location: [facility] ENDOSCOPY;  Service: Gastroenterology   • CYSTOSCOPY BOTOX INJECTION OF BLADDER N/A 6/27/2016    Procedure: CYSTOSCOPY WITH BOTOX DETRUSOR INJECTION;  Surgeon: Salome Bowen MD;  Location: [facility] OR;  Service:    • CYSTOSCOPY BOTOX INJECTION OF BLADDER N/A 05/29/2015    Dr. Salome Bowen   • ENDOSCOPY N/A 06/10/2015    Antral Polyp: Gastric Mucosa w/ marked cautery artifact,  Hiatal Hernia, Dr. Rebeca Curran   • ENDOSCOPY N/A 10/28/2013    Gastric polyp: polypoid hyperplastic gastric mucosa w/ focal ulceration, mixed acute & chronic inflammation.  Negative for intestinal metaplasia, no helicobacter organisms identified on diff-wuik stain, Dr. Rebeca Curran   • ENDOSCOPY N/A 03/18/2013    Large prepyloric polyp, partially removed: fragment of polypoid hyperplastic gastric mucosa w/ foci of erosion & patchy mixed acute & chronic inflammation. No helicobacter organisms identified on diff-quick stain. Negative for intestinal metaplasia, negative for dysplasia, Dr. Rebeca Curran   • ENDOSCOPY N/A 10/24/2012    large prepyloric mass (3cm) w/ adjacent nodules: hyperplastic polyp w/ mild chronic active gastritis, focal erosion & associated, Multiple fundic polyps: polypoid mucosal hyperplasia, Dr. Rebeca Curran   • ENDOSCOPY N/A 03/11/2003    Gastric Antral Biopsies: Fragments of Gastric Mucosa & Necrotic Tissue (Compatible w/ Ulcer) w/ chronic active inflammation.  Negative stain for Helicobacter organisms. Dr. Nathan Macias   • ENDOSCOPY N/A 10/23/2017    Procedure: ESOPHAGOGASTRODUODENOSCOPY;  Surgeon: Rebeca Curran MD;  Location: [facility]  ENDOSCOPY;  Service:    • ENDOSCOPY AND COLONOSCOPY N/A 08/08/2011    4cm hiatal hernia, Schatzki's ring, Diverticulosis, Large antral polyps, Dr. Rebeca Curran   • INCISIONAL HERNIA REPAIR N/A 06/11/2015    Defect approximately 2.5 cm w/ a mass of incarcerated tissue approximately the size of a nectarine, Dr. Rebeca Curran   • INSERT / REPLACE / REMOVE PACEMAKER     • KNEE ARTHROPLASTY     • KNEE MINI REVISION Right 9/28/2016    Procedure: RT KNEE POLY INSERT CHANGE ;  Surgeon: Tommy Avila MD;  Location: [facility] OR;  Service:    • LAPAROSCOPIC CHOLECYSTECTOMY N/A    • NEPHRECTOMY RADICAL Left 06/08/2004    Incidentially found left renal mass 3-4 cm renal cell carcinoma, left lower pole, Dr. Salome Bowen   • OTHER SURGICAL HISTORY      NEUROMUSCULAR BLOCKERS BOTULINUM TOXIN ONABOTULINUMTOXIN A   • PACEMAKER IMPLANTATION N/A     Dr. Casillas   • PACEMAKER REPLACEMENT N/A 09/24/2010    Implanted Generator is a Glocal ALTRUA 60 model [id number], serial # [id number], Dr. Chavez Jimenez   • TOTAL KNEE ARTHROPLASTY Left 05/21/2013    Dr. Miguel Pacheco   • TOTAL THYROIDECTOMY N/A 11/16/2015    Dr. Gideon Ashley, [facility]   • TRANSVAGINAL TAPING SUSPENSION N/A 03/16/2009    Mini Sling, Closed suprapubic cystostomy, Dr. Salome Bowen   • TUBAL ABDOMINAL LIGATION Bilateral        Social History     Socioeconomic History   • Marital status:      Spouse name: Not on file   • Number of children: 0   • Years of education: Not on file   • Highest education level: Not on file   Occupational History   • Occupation: Homemaker     Employer: RETIRED   Tobacco Use   • Smoking status: Never Smoker   • Smokeless tobacco: Never Used   • Tobacco comment: caffeine use   Substance and Sexual Activity   • Alcohol use: No   • Drug use: No   • Sexual activity: No       Family History   Problem Relation Age of Onset   • Heart disease Other    • Deep vein thrombosis Other    • Hypertension Other    • Breast cancer Sister         In her 60s.   •  Breast cancer Maternal Aunt    • Breast cancer Sister         In her 60s.   • Heart disease Mother    • Hypertension Mother    • Heart disease Father    • Hypertension Father    • Heart attack Father    • Heart disease Daughter    • Hypertension Daughter    • Heart disease Son    • Hypertension Son    • Malig Hyperthermia Neg Hx        Review of Systems   Constitution: Positive for malaise/fatigue. Negative for chills and fever.   Cardiovascular: Positive for dyspnea on exertion. Negative for chest pain, leg swelling, near-syncope, orthopnea, palpitations, paroxysmal nocturnal dyspnea and syncope.   Respiratory: Negative for cough and shortness of breath.    Musculoskeletal: Negative for joint pain, joint swelling and myalgias.   Gastrointestinal: Negative for abdominal pain, diarrhea, melena, nausea and vomiting.   Genitourinary: Negative for frequency and hematuria.   Neurological: Negative for light-headedness, numbness, paresthesias and seizures.   Allergic/Immunologic: Negative.    All other systems reviewed and are negative.      Allergies   Allergen Reactions   • Sulfa Antibiotics Swelling     FACE AND TONGUE   • Adhesive Tape Rash   • Levaquin [Levofloxacin] Rash         Current Outpatient Medications:   •  B Complex-C (SUPER B COMPLEX PO), Take 1 tablet by mouth Daily., Disp: , Rfl:   •  CALCIUM PO, Take 600 Units by mouth Daily. With D3, listed separetly, Disp: , Rfl:   •  cholecalciferol (VITAMIN D3) 1000 UNITS tablet, Take 1,000 Units by mouth daily. In addition to the calcium -D3 pill, Disp: , Rfl:   •  ELIQUIS 5 MG tablet tablet, TAKE ONE TABLET BY MOUTH EVERY 12 HOURS, Disp: 60 tablet, Rfl: 3  •  furosemide (LASIX) 20 MG tablet, TAKE TWO TABLETS BY MOUTH DAILY, Disp: 60 tablet, Rfl: 0  •  GLUCOSAMINE-CHONDROITIN PO, Take 1 tablet by mouth Daily., Disp: , Rfl:   •  lisinopril (PRINIVIL,ZESTRIL) 10 MG tablet, TAKE ONE TABLET BY MOUTH DAILY, Disp: 90 tablet, Rfl: 2  •  loperamide (IMODIUM) 2 MG capsule,  "Take 2 mg by mouth 4 (four) times a day as needed for diarrhea., Disp: , Rfl:   •  metoprolol tartrate (LOPRESSOR) 25 MG tablet, TAKE ONE TABLET BY MOUTH TWICE A DAY, Disp: 180 tablet, Rfl: 0  •  Multiple Vitamins-Minerals (PRESERVISION AREDS PO), Take 1 tablet by mouth Daily., Disp: , Rfl:   •  SYNTHROID 125 MCG tablet, Take 125 mcg by mouth Daily., Disp: , Rfl:   •  XIIDRA 5 % solution, Apply 1 drop to eye 2 (Two) Times a Day., Disp: , Rfl:       Objective:     Vitals:    11/20/19 1123   BP: 120/78   Pulse: 70   Weight: 98.9 kg (218 lb)   Height: 162.6 cm (64.02\")     Body mass index is 37.4 kg/m².    PHYSICAL EXAM:    Vitals Reviewed.   General Appearance: No acute distress, obese female.   Eyes: Conjunctiva and lids: No erythema, swelling, or discharge. Sclera non-icteric.   HENT: Atraumatic, normocephalic. External eyes, ears, and nose normal.   Respiratory: No signs of respiratory distress. Respiration rhythm and depth normal.   Clear to auscultation. No rales, crackles, rhonchi, or wheezing auscultated.   Cardiovascular:  Heart Rate and Rhythm: Normal, Heart Sounds: Normal S1 and S2.  Murmurs: No murmurs noted. No rubs, thrills, or gallops.   Arterial Pulses:  Posterior tibialis and dorsalis pedis pulses normal.   Lower Extremities: No edema noted.  Gastrointestinal:  Abdomen soft, non-distended, non-tender.   Musculoskeletal: Normal movement of extremities  Skin and Nails: General appearance normal. No pallor, cyanosis, diaphoresis. Skin temperature normal. No clubbing of fingernails.   Psychiatric: Patient alert and oriented to person, place, and time. Speech and behavior appropriate. Normal mood and affect.       ECG 12 Lead  Date/Time: 11/20/2019 12:28 PM  Performed by: Sayra Julian APRN  Authorized by: Sayra Julian APRN   Comparison: compared with previous ECG   Similar to previous ECG  Rhythm: atrial fibrillation  BPM: 70  Pacing: ventricular paced rhythm            Assessment:       Diagnosis " Plan   1. Permanent atrial fibrillation     2. Third degree AV block (CMS/HCC)     3. S/P AV deshawn ablation     4. S/P placement of cardiac pacemaker     5. NANCY on CPAP     6. Severe obesity (BMI 35.0-39.9) with comorbidity (CMS/HCC)            Plan:       1.-4.  Permanent A. fib, third-degree AV block, status post AV node ablation, status post permanent pacemaker.  Normal testing and function of pacemaker.  She apixaban for anticoagulation with no bleeding issues.    5.  NANCY, treated with CPAP.    6.  Obesity, she has gained about 7 pounds since her last office visit.  We had a long discussion about the importance of her losing a little weight and getting some regular exercise as I do think that these things would help her feel better and decrease her fatigue.  She is significantly deconditioned because she just really does nothing.  She says she will try.    Follow-up with Dr. Melendez in 6 months with pacer check at that time.    As always, it has been a pleasure to participate in your patient's care.      Sincerely,         ALLISON Winn

## 2019-11-25 ENCOUNTER — PREP FOR SURGERY (OUTPATIENT)
Dept: OTHER | Facility: HOSPITAL | Age: 84
End: 2019-11-25

## 2019-11-25 DIAGNOSIS — Z85.850 HISTORY OF THYROID CANCER: ICD-10-CM

## 2019-11-25 DIAGNOSIS — E07.9 THYROID DISEASE: ICD-10-CM

## 2019-11-25 DIAGNOSIS — I51.9 HEART DISEASE: ICD-10-CM

## 2019-11-25 DIAGNOSIS — I48.91 ATRIAL FIBRILLATION, UNSPECIFIED TYPE (HCC): ICD-10-CM

## 2019-11-25 DIAGNOSIS — Z85.528 HISTORY OF KIDNEY CANCER: ICD-10-CM

## 2019-11-25 DIAGNOSIS — Z85.038 HISTORY OF COLON CANCER: ICD-10-CM

## 2019-11-25 DIAGNOSIS — I10 HYPERTENSION, UNSPECIFIED TYPE: Primary | ICD-10-CM

## 2019-11-25 DIAGNOSIS — D64.9 ANEMIA, UNSPECIFIED TYPE: ICD-10-CM

## 2019-11-25 DIAGNOSIS — K21.9 GASTROESOPHAGEAL REFLUX DISEASE, ESOPHAGITIS PRESENCE NOT SPECIFIED: ICD-10-CM

## 2019-11-25 DIAGNOSIS — G47.30 SLEEP APNEA, UNSPECIFIED TYPE: ICD-10-CM

## 2019-12-10 ENCOUNTER — LAB (OUTPATIENT)
Dept: LAB | Facility: HOSPITAL | Age: 84
End: 2019-12-10

## 2019-12-10 DIAGNOSIS — D50.8 OTHER IRON DEFICIENCY ANEMIA: ICD-10-CM

## 2019-12-10 DIAGNOSIS — C73 MALIGNANT NEOPLASM OF THYROID GLAND (HCC): Primary | ICD-10-CM

## 2019-12-10 DIAGNOSIS — C18.2 MALIGNANT NEOPLASM OF ASCENDING COLON (HCC): ICD-10-CM

## 2019-12-10 LAB
ALBUMIN SERPL-MCNC: 3.9 G/DL (ref 3.5–5.2)
ALBUMIN/GLOB SERPL: 1.1 G/DL (ref 1.1–2.4)
ALP SERPL-CCNC: 116 U/L (ref 38–116)
ALT SERPL W P-5'-P-CCNC: 18 U/L (ref 0–33)
ANION GAP SERPL CALCULATED.3IONS-SCNC: 13.7 MMOL/L (ref 5–15)
AST SERPL-CCNC: 21 U/L (ref 0–32)
BASOPHILS # BLD AUTO: 0.06 10*3/MM3 (ref 0–0.2)
BASOPHILS NFR BLD AUTO: 0.8 % (ref 0–1.5)
BILIRUB SERPL-MCNC: 0.5 MG/DL (ref 0.2–1.2)
BUN BLD-MCNC: 24 MG/DL (ref 6–20)
BUN/CREAT SERPL: 17.4 (ref 7.3–30)
CALCIUM SPEC-SCNC: 9.4 MG/DL (ref 8.5–10.2)
CEA SERPL-MCNC: 2.21 NG/ML
CHLORIDE SERPL-SCNC: 102 MMOL/L (ref 98–107)
CO2 SERPL-SCNC: 26.3 MMOL/L (ref 22–29)
CREAT BLD-MCNC: 1.38 MG/DL (ref 0.6–1.1)
DEPRECATED RDW RBC AUTO: 50.4 FL (ref 37–54)
EOSINOPHIL # BLD AUTO: 0.23 10*3/MM3 (ref 0–0.4)
EOSINOPHIL NFR BLD AUTO: 2.9 % (ref 0.3–6.2)
ERYTHROCYTE [DISTWIDTH] IN BLOOD BY AUTOMATED COUNT: 14.6 % (ref 12.3–15.4)
FERRITIN SERPL-MCNC: 83.4 NG/ML (ref 13–150)
GFR SERPL CREATININE-BSD FRML MDRD: 37 ML/MIN/1.73
GLOBULIN UR ELPH-MCNC: 3.4 GM/DL (ref 1.8–3.5)
GLUCOSE BLD-MCNC: 116 MG/DL (ref 74–124)
HCT VFR BLD AUTO: 40.3 % (ref 34–46.6)
HGB BLD-MCNC: 12.7 G/DL (ref 12–15.9)
IMM GRANULOCYTES # BLD AUTO: 0.03 10*3/MM3 (ref 0–0.05)
IMM GRANULOCYTES NFR BLD AUTO: 0.4 % (ref 0–0.5)
IRON 24H UR-MRATE: 63 MCG/DL (ref 37–145)
IRON SATN MFR SERPL: 16 % (ref 14–48)
LYMPHOCYTES # BLD AUTO: 1.53 10*3/MM3 (ref 0.7–3.1)
LYMPHOCYTES NFR BLD AUTO: 19.4 % (ref 19.6–45.3)
MCH RBC QN AUTO: 29.3 PG (ref 26.6–33)
MCHC RBC AUTO-ENTMCNC: 31.5 G/DL (ref 31.5–35.7)
MCV RBC AUTO: 93.1 FL (ref 79–97)
MONOCYTES # BLD AUTO: 0.75 10*3/MM3 (ref 0.1–0.9)
MONOCYTES NFR BLD AUTO: 9.5 % (ref 5–12)
NEUTROPHILS # BLD AUTO: 5.28 10*3/MM3 (ref 1.7–7)
NEUTROPHILS NFR BLD AUTO: 67 % (ref 42.7–76)
NRBC BLD AUTO-RTO: 0 /100 WBC (ref 0–0.2)
PLATELET # BLD AUTO: 252 10*3/MM3 (ref 140–450)
PMV BLD AUTO: 8.9 FL (ref 6–12)
POTASSIUM BLD-SCNC: 4.2 MMOL/L (ref 3.5–4.7)
PROT SERPL-MCNC: 7.3 G/DL (ref 6.3–8)
RBC # BLD AUTO: 4.33 10*6/MM3 (ref 3.77–5.28)
SODIUM BLD-SCNC: 142 MMOL/L (ref 134–145)
T3FREE SERPL-MCNC: 2.62 PG/ML (ref 2–4.4)
T4 FREE SERPL-MCNC: 1.31 NG/DL (ref 0.93–1.7)
TIBC SERPL-MCNC: 389 MCG/DL (ref 249–505)
TRANSFERRIN SERPL-MCNC: 278 MG/DL (ref 200–360)
TSH SERPL DL<=0.05 MIU/L-ACNC: 3.54 UIU/ML (ref 0.27–4.2)
WBC NRBC COR # BLD: 7.88 10*3/MM3 (ref 3.4–10.8)

## 2019-12-10 PROCEDURE — 82728 ASSAY OF FERRITIN: CPT

## 2019-12-10 PROCEDURE — 82378 CARCINOEMBRYONIC ANTIGEN: CPT | Performed by: INTERNAL MEDICINE

## 2019-12-10 PROCEDURE — 84466 ASSAY OF TRANSFERRIN: CPT

## 2019-12-10 PROCEDURE — 80053 COMPREHEN METABOLIC PANEL: CPT

## 2019-12-10 PROCEDURE — 84481 FREE ASSAY (FT-3): CPT | Performed by: OTOLARYNGOLOGY

## 2019-12-10 PROCEDURE — 85025 COMPLETE CBC W/AUTO DIFF WBC: CPT

## 2019-12-10 PROCEDURE — 84439 ASSAY OF FREE THYROXINE: CPT | Performed by: OTOLARYNGOLOGY

## 2019-12-10 PROCEDURE — 83540 ASSAY OF IRON: CPT

## 2019-12-10 PROCEDURE — 84443 ASSAY THYROID STIM HORMONE: CPT | Performed by: OTOLARYNGOLOGY

## 2019-12-10 PROCEDURE — 36415 COLL VENOUS BLD VENIPUNCTURE: CPT

## 2019-12-17 ENCOUNTER — APPOINTMENT (OUTPATIENT)
Dept: ONCOLOGY | Facility: CLINIC | Age: 84
End: 2019-12-17

## 2019-12-17 ENCOUNTER — APPOINTMENT (OUTPATIENT)
Dept: LAB | Facility: HOSPITAL | Age: 84
End: 2019-12-17

## 2020-01-13 ENCOUNTER — TELEPHONE (OUTPATIENT)
Dept: SURGERY | Facility: CLINIC | Age: 85
End: 2020-01-13

## 2020-01-13 NOTE — TELEPHONE ENCOUNTER
aDDEND  She needs to see bubba prior to her next visit, so that he can summarize her history and determine if new CEA levels are needed, etc, prior to c scope.  Rebeca Curran MD  01/13/20  Patient calling to schedule Colonoscopy. Please place new orders and send to scheduling.

## 2020-01-14 ENCOUNTER — OFFICE VISIT (OUTPATIENT)
Dept: SURGERY | Facility: CLINIC | Age: 85
End: 2020-01-14

## 2020-01-14 VITALS — HEART RATE: 70 BPM | BODY MASS INDEX: 38.45 KG/M2 | WEIGHT: 217 LBS | OXYGEN SATURATION: 98 % | HEIGHT: 63 IN

## 2020-01-14 DIAGNOSIS — Z99.89 OSA ON CPAP: ICD-10-CM

## 2020-01-14 DIAGNOSIS — I44.2 THIRD DEGREE AV BLOCK (HCC): ICD-10-CM

## 2020-01-14 DIAGNOSIS — I49.5 SICK SINUS SYNDROME (HCC): ICD-10-CM

## 2020-01-14 DIAGNOSIS — I48.21 PERMANENT ATRIAL FIBRILLATION (HCC): ICD-10-CM

## 2020-01-14 DIAGNOSIS — C18.2 MALIGNANT NEOPLASM OF ASCENDING COLON (HCC): Primary | ICD-10-CM

## 2020-01-14 DIAGNOSIS — E66.01 SEVERE OBESITY (BMI 35.0-39.9) WITH COMORBIDITY (HCC): ICD-10-CM

## 2020-01-14 DIAGNOSIS — K21.9 GASTROESOPHAGEAL REFLUX DISEASE, ESOPHAGITIS PRESENCE NOT SPECIFIED: ICD-10-CM

## 2020-01-14 DIAGNOSIS — G47.33 OSA ON CPAP: ICD-10-CM

## 2020-01-14 DIAGNOSIS — K63.5 POLYP OF TRANSVERSE COLON, UNSPECIFIED TYPE: ICD-10-CM

## 2020-01-14 PROCEDURE — 99214 OFFICE O/P EST MOD 30 MIN: CPT | Performed by: PHYSICIAN ASSISTANT

## 2020-01-14 RX ORDER — ESOMEPRAZOLE MAGNESIUM 40 MG/1
40 CAPSULE, DELAYED RELEASE ORAL
COMMUNITY

## 2020-01-14 NOTE — PROGRESS NOTES
Chief Complaint   Patient presents with   • Colonoscopy        Subjective   Anastacia Sarah is a 84 y.o. lady who returns to the office after having last been seen by Dr. Rebeca Curran in 2018 following her laparoscopic right hemicolectomy for colon cancer.  In short, she was found on colonoscopy on 4/25/2018 to have an ascending mass that measured 3.5 cm as well as a serrated adenoma in the ascending colon as well.  She subsequently underwent a laparoscopic right hemicolectomy with pathology returning as invasive adenocarcinoma associated with this serrated adenoma with 10 lymph nodes negative for metastatic disease.    Of recent, she has been having serial CEA levels which have all been within normal limits, with her last CEA having been 2.21 on December 10, 2019.  She states that her only symptom is occasional bouts of diarrhea however these are infrequent.  She denies having any abdominal distention, abdominal pain, nausea, vomiting, bright red blood in or around her bowel movements or dark tarry stools.  She has not had a repeat colonoscopy since her colon surgery.    Past Medical History:   Diagnosis Date   • Abnormal ECG    • Acute bronchitis    • Acute sinusitis    • Anemia     Iron deficiency   • Anesthesia complication    • Anticoagulated on warfarin    • Arthritis    • Asthma     Shortness of breath upon exertion   • Bladder dysfunction    • Cancer of left kidney (CMS/HCC) 06/08/2004    S/P Left Radical Neprectomy   • Cardiac disease    • Chronic dysfunction of left eustachian tube    • CKD (chronic kidney disease)     stage 3   • Colon cancer (CMS/HCC)    • Colon polyps    • Depression    • Diarrhea    • Diverticulosis 08/08/2011    Descending Colon & Sigmoid Colon multiple diverticula   • Epigastric pain    • Fatigue    • Frequent urination    • GERD (gastroesophageal reflux disease)    • Goiter     thyroid removed in november 2015   • Health care maintenance    • History of colitis    • History of  shingles    • History of transfusion    • Hypertension    • Insomnia    • Left atrial enlargement    • Left ventricular hypertrophy    • Mitral regurgitation     mild to moderate per echocardiogram 2016   • Nerve pain     LUE D/T SHINGLES   • Neuropathy     ARMS   • Non morbid obesity due to excess calories    • NANCY (obstructive sleep apnea)     uses CPAP   • Paroxysmal atrial fibrillation (CMS/HCC)    • Permanent atrial fibrillation    • Pulmonary hypertension (CMS/HCC)     per echo 2016   • Right atrial enlargement    • S/P AV deshawn ablation    • S/P placement of cardiac pacemaker    • Sleep apnea     on CPAP   • SSS (sick sinus syndrome) (CMS/HCC)    • Third degree AV block (CMS/HCC)    • Thyroid cancer (CMS/HCC)    • URI (upper respiratory infection)    • Urinary urgency      Past Surgical History:   Procedure Laterality Date   • ATRIAL ABLATION SURGERY N/A 09/13/2011    Comprehensive electrophysiology study, Left atrial pace & sense, 3-dimensional cardia mapping, Radiofrequency catheter ablation, Transseptal hear catheterization, Dr. Maldonado Melendez   • ATRIAL ABLATION SURGERY N/A 02/11/2004    Dr. Maldonado Melendez   • BLADDER SURGERY N/A     Bladder tacking procedure   • BLADDER SUSPENSION N/A 05/2012   • CARDIAC ELECTROPHYSIOLOGY PROCEDURE N/A 5/17/2016    Procedure: PPM generator change - dual BOSTON;  Surgeon: Maldonado Melendez MD;  Location: [facility] CATH INVASIVE LOCATION;  Service:    • CARDIAC ELECTROPHYSIOLOGY PROCEDURE N/A 3/23/2017    Procedure: AV node ablation pt has BOSTON PPM;  Surgeon: Maldonado Melendez MD;  Location: [facility] CATH INVASIVE LOCATION;  Service:    • CARDIOVERSION N/A 01/26/2017    Dr. Melendez   • CARDIOVERSION N/A 05/25/2017    Dr. Melendez   • CATARACT EXTRACTION Bilateral     Dr. Garmajo   • COLON RESECTION Right 4/27/2018    Procedure: COLON RESECTION RIGHT LAPAROSCOPIC, possible open;  Surgeon: Rebeca Curran MD;  Location: [facility] MAIN OR;  Service: General   • COLONOSCOPY N/A 04/01/2003     Normal colonoscopy except for an occasional diverticulosis, Dr. Nathan Macias   • COLONOSCOPY N/A 4/25/2018    Procedure: COLONOSCOPY INTO CECUM AND TI WITH SALINE LIFT, HOT SNARE POLYPECTOMIES, BX'S, SPOT INJECTION, RESOLUTION CLIPS X2;  Surgeon: Rebeca Curran MD;  Location: [facility] ENDOSCOPY;  Service: Gastroenterology   • CYSTOSCOPY BOTOX INJECTION OF BLADDER N/A 6/27/2016    Procedure: CYSTOSCOPY WITH BOTOX DETRUSOR INJECTION;  Surgeon: Salome Bowen MD;  Location: [facility] MAIN OR;  Service:    • CYSTOSCOPY BOTOX INJECTION OF BLADDER N/A 05/29/2015    Dr. Salome Bowen   • ENDOSCOPY N/A 06/10/2015    Antral Polyp: Gastric Mucosa w/ marked cautery artifact,  Hiatal Hernia, Dr. Rebeca Curran   • ENDOSCOPY N/A 10/28/2013    Gastric polyp: polypoid hyperplastic gastric mucosa w/ focal ulceration, mixed acute & chronic inflammation.  Negative for intestinal metaplasia, no helicobacter organisms identified on diff-wuik stain, Dr. Rebeca Curran   • ENDOSCOPY N/A 03/18/2013    Large prepyloric polyp, partially removed: fragment of polypoid hyperplastic gastric mucosa w/ foci of erosion & patchy mixed acute & chronic inflammation. No helicobacter organisms identified on diff-quick stain. Negative for intestinal metaplasia, negative for dysplasia, Dr. Rebeca Curran   • ENDOSCOPY N/A 10/24/2012    large prepyloric mass (3cm) w/ adjacent nodules: hyperplastic polyp w/ mild chronic active gastritis, focal erosion & associated, Multiple fundic polyps: polypoid mucosal hyperplasia, Dr. Rebeca Curran   • ENDOSCOPY N/A 03/11/2003    Gastric Antral Biopsies: Fragments of Gastric Mucosa & Necrotic Tissue (Compatible w/ Ulcer) w/ chronic active inflammation.  Negative stain for Helicobacter organisms. Dr. Nathan Macias   • ENDOSCOPY N/A 10/23/2017    Procedure: ESOPHAGOGASTRODUODENOSCOPY;  Surgeon: Rebeca Curran MD;  Location: [facility] ENDOSCOPY;  Service:    • ENDOSCOPY AND COLONOSCOPY N/A 08/08/2011    4cm hiatal hernia,  Schatzki's ring, Diverticulosis, Large antral polyps, Dr. Rebeca Curran   • INCISIONAL HERNIA REPAIR N/A 06/11/2015    Defect approximately 2.5 cm w/ a mass of incarcerated tissue approximately the size of a nectarine, Dr. Rebeca Curran   • INSERT / REPLACE / REMOVE PACEMAKER     • KNEE ARTHROPLASTY     • KNEE MINI REVISION Right 9/28/2016    Procedure: RT KNEE POLY INSERT CHANGE ;  Surgeon: Tommy Avila MD;  Location: [facility] OR;  Service:    • LAPAROSCOPIC CHOLECYSTECTOMY N/A    • NEPHRECTOMY RADICAL Left 06/08/2004    Incidentially found left renal mass 3-4 cm renal cell carcinoma, left lower pole, Dr. Salome Bowen   • OTHER SURGICAL HISTORY      NEUROMUSCULAR BLOCKERS BOTULINUM TOXIN ONABOTULINUMTOXIN A   • PACEMAKER IMPLANTATION N/A     Dr. Casillas   • PACEMAKER REPLACEMENT N/A 09/24/2010    Implanted Generator is a [employer] Natural ALTRUA 60 model [id number], serial # [id number], Dr. Chavez Jimenez   • TOTAL KNEE ARTHROPLASTY Left 05/21/2013    Dr. Miguel Pacheco   • TOTAL THYROIDECTOMY N/A 11/16/2015    Dr. Gideon Ashley, [facility]   • TRANSVAGINAL TAPING SUSPENSION N/A 03/16/2009    Mini Sling, Closed suprapubic cystostomy, Dr. Salome Bowen   • TUBAL ABDOMINAL LIGATION Bilateral        Current Outpatient Medications:   •  B Complex-C (SUPER B COMPLEX PO), Take 1 tablet by mouth Daily., Disp: , Rfl:   •  CALCIUM PO, Take 600 Units by mouth Daily. With D3, listed separetly, Disp: , Rfl:   •  cholecalciferol (VITAMIN D3) 1000 UNITS tablet, Take 1,000 Units by mouth daily. In addition to the calcium -D3 pill, Disp: , Rfl:   •  ELIQUIS 5 MG tablet tablet, TAKE ONE TABLET BY MOUTH EVERY 12 HOURS, Disp: 60 tablet, Rfl: 3  •  esomeprazole (nexIUM) 40 MG capsule, Take 40 mg by mouth Every Morning Before Breakfast., Disp: , Rfl:   •  furosemide (LASIX) 20 MG tablet, TAKE TWO TABLETS BY MOUTH DAILY, Disp: 60 tablet, Rfl: 0  •  lisinopril (PRINIVIL,ZESTRIL) 10 MG tablet, TAKE ONE TABLET BY MOUTH DAILY, Disp: 90 tablet, Rfl: 2  •   loperamide (IMODIUM) 2 MG capsule, Take 2 mg by mouth 4 (four) times a day as needed for diarrhea., Disp: , Rfl:   •  metoprolol tartrate (LOPRESSOR) 25 MG tablet, TAKE ONE TABLET BY MOUTH TWICE A DAY, Disp: 180 tablet, Rfl: 1  •  Multiple Vitamins-Minerals (MULTIVITAMIN PO), Take 1 tablet by mouth Daily., Disp: , Rfl:   •  Multiple Vitamins-Minerals (PRESERVISION AREDS PO), Take 2 tablets by mouth Daily., Disp: , Rfl:   •  GLUCOSAMINE-CHONDROITIN PO, Take 1 tablet by mouth Daily., Disp: , Rfl:   •  SYNTHROID 125 MCG tablet, Take 125 mcg by mouth Daily., Disp: , Rfl:   •  XIIDRA 5 % solution, Apply 1 drop to eye 2 (Two) Times a Day., Disp: , Rfl:   Allergies   Allergen Reactions   • Sulfa Antibiotics Swelling     FACE AND TONGUE   • Adhesive Tape Rash   • Levaquin [Levofloxacin] Rash     Family History   Problem Relation Age of Onset   • Heart disease Other    • Deep vein thrombosis Other    • Hypertension Other    • Breast cancer Sister         In her 60s.   • Breast cancer Maternal Aunt    • Breast cancer Sister         In her 60s.   • Heart disease Mother    • Hypertension Mother    • Heart disease Father    • Hypertension Father    • Heart attack Father    • Heart disease Daughter    • Hypertension Daughter    • Heart disease Son    • Hypertension Son    • Malig Hyperthermia Neg Hx      Social History     Socioeconomic History   • Marital status:      Spouse name: Not on file   • Number of children: 0   • Years of education: Not on file   • Highest education level: Not on file   Occupational History   • Occupation: Homemaker     Employer: RETIRED   Tobacco Use   • Smoking status: Never Smoker   • Smokeless tobacco: Never Used   • Tobacco comment: caffeine use   Substance and Sexual Activity   • Alcohol use: No   • Drug use: No   • Sexual activity: Never     Colonoscopy: 2018    Review of Systems   Constitutional: Negative.  Negative for activity change, appetite change, chills, diaphoresis, fatigue, fever  and unexpected weight change.   HENT: Negative.    Eyes: Negative.    Respiratory: Negative.  Negative for apnea, cough, choking, chest tightness, shortness of breath, wheezing and stridor.    Cardiovascular: Negative.  Negative for chest pain, palpitations and leg swelling.   Gastrointestinal: Positive for diarrhea. Negative for abdominal distention, abdominal pain, anal bleeding, blood in stool, constipation, nausea, rectal pain and vomiting.   Endocrine: Negative.    Genitourinary: Negative.    Musculoskeletal: Negative.    Skin: Negative.    Allergic/Immunologic: Negative.    Neurological: Negative.    Hematological: Negative.    Psychiatric/Behavioral: Negative.    All other systems reviewed and are negative.         Objective   Vitals:    01/14/20 1441   Pulse: 70   SpO2: 98%     Physical Exam   Constitutional: She is oriented to person, place, and time. She appears well-developed and well-nourished. No distress.   HENT:   Head: Normocephalic and atraumatic.   Eyes: Pupils are equal, round, and reactive to light. Conjunctivae are normal. No scleral icterus.   Neck: Normal range of motion. Neck supple.   Cardiovascular: Normal rate, regular rhythm and normal heart sounds. Exam reveals no gallop and no friction rub.   No murmur heard.  Pulmonary/Chest: Effort normal and breath sounds normal. No stridor. No respiratory distress. She has no wheezes. She has no rales. She exhibits no tenderness.   Abdominal: Soft. Bowel sounds are normal. She exhibits no distension and no mass. There is no tenderness. There is no rebound and no guarding. No hernia.   Musculoskeletal: She exhibits no tenderness.   Neurological: She is alert and oriented to person, place, and time.   Skin: Skin is warm and dry. No rash noted.   Psychiatric: She has a normal mood and affect. Her behavior is normal.   Vitals reviewed.           Assessment/Plan    Diagnosis Plan   1. Malignant neoplasm of ascending colon (CMS/HCC)  Case Request    Case  Request   2. Polyp of transverse colon, unspecified type     3. Severe obesity (BMI 35.0-39.9) with comorbidity (CMS/HCC)     4. Gastroesophageal reflux disease, esophagitis presence not specified     5. Permanent atrial fibrillation     6. Sick sinus syndrome (CMS/HCC)     7. Third degree AV block (CMS/HCC)     8. NANCY on CPAP         Clinical Summary:  84 y.o. lady who presents to the office today with a history significant for having had adenocarcinoma of the ascending colon status post laparoscopic right hemicolectomy in 2018.  Serial CEAs every 4 months have been within normal limits with the most recent having been 2.21 in December 2019.  She has not undergone a colonoscopy in follow-up since her surgery.    1.  History of malignant neoplasm of the ascending colon: I have recommended that we proceed with a colonoscopy to further investigate her history.  The risks and rationale were discussed with her with risks including but not limited to bleeding, infection, bowel perforation and the need for additional procedures.  She was understanding willing to proceed with all recommendations at this time.    2.  History of colon polyps: See #1    3.  Obesity: Discussed with patient increased perioperative risks associated with obesity including increased risks of DVT, infection, seromas, poor wound healing and hernias (with abdominal surgery).    4.  GERD: Currently managed and asymptomatic    5.  Significant cardiac history: I recommended that we obtain cardiac clearance by Dr. Melendez who last saw her in November 2019.  She will need to come off of her Eliquis for 2 days prior to the procedure.    6.  Obstructive sleep apnea on CPAP: I have recommended that she still bring her CPAP machine with her.    All other questions were answered and she is willing to proceed with all recommendations at this time.      Martín Jimenez PA-C

## 2020-01-15 ENCOUNTER — TELEPHONE (OUTPATIENT)
Dept: CARDIOLOGY | Facility: CLINIC | Age: 85
End: 2020-01-15

## 2020-01-15 NOTE — TELEPHONE ENCOUNTER
Pt is scheduling a colonoscopy and is asking to hold apixaban 2 days prior to procedure. Pt has NO history of stroke or valve replacement...Jeannette

## 2020-01-15 NOTE — TELEPHONE ENCOUNTER
----- Message from Mirza Palomares sent at 1/15/2020  8:44 AM EST -----  Regarding: Surgery Clearance  Jacquie [phone]    Colonoscopy Not yet scheduled    Need to be off of Eliquis 2 days prior.  Does this patient need to be seen or can JM just do a letter for clearance.    Let me know

## 2020-01-24 DIAGNOSIS — N18.30 ANEMIA IN STAGE 3 CHRONIC KIDNEY DISEASE (HCC): ICD-10-CM

## 2020-01-24 DIAGNOSIS — D50.8 OTHER IRON DEFICIENCY ANEMIA: ICD-10-CM

## 2020-01-24 DIAGNOSIS — D63.1 ANEMIA IN STAGE 3 CHRONIC KIDNEY DISEASE (HCC): ICD-10-CM

## 2020-01-24 DIAGNOSIS — C18.2 MALIGNANT NEOPLASM OF ASCENDING COLON (HCC): Primary | ICD-10-CM

## 2020-01-24 DIAGNOSIS — C73 MALIGNANT NEOPLASM OF THYROID GLAND (HCC): ICD-10-CM

## 2020-01-28 RX ORDER — APIXABAN 5 MG/1
TABLET, FILM COATED ORAL
Qty: 60 TABLET | Refills: 2 | Status: SHIPPED | OUTPATIENT
Start: 2020-01-28 | End: 2020-05-01

## 2020-01-29 ENCOUNTER — OFFICE VISIT (OUTPATIENT)
Dept: ONCOLOGY | Facility: CLINIC | Age: 85
End: 2020-01-29

## 2020-01-29 ENCOUNTER — LAB (OUTPATIENT)
Dept: LAB | Facility: HOSPITAL | Age: 85
End: 2020-01-29

## 2020-01-29 VITALS
OXYGEN SATURATION: 97 % | HEIGHT: 63 IN | TEMPERATURE: 98.4 F | RESPIRATION RATE: 16 BRPM | SYSTOLIC BLOOD PRESSURE: 133 MMHG | DIASTOLIC BLOOD PRESSURE: 73 MMHG | BODY MASS INDEX: 39.44 KG/M2 | WEIGHT: 222.6 LBS | HEART RATE: 74 BPM

## 2020-01-29 DIAGNOSIS — D50.8 OTHER IRON DEFICIENCY ANEMIA: ICD-10-CM

## 2020-01-29 DIAGNOSIS — N18.30 ANEMIA IN STAGE 3 CHRONIC KIDNEY DISEASE (HCC): ICD-10-CM

## 2020-01-29 DIAGNOSIS — C18.2 MALIGNANT NEOPLASM OF ASCENDING COLON (HCC): Primary | ICD-10-CM

## 2020-01-29 DIAGNOSIS — D63.1 ANEMIA IN STAGE 3 CHRONIC KIDNEY DISEASE (HCC): ICD-10-CM

## 2020-01-29 DIAGNOSIS — C73 MALIGNANT NEOPLASM OF THYROID GLAND (HCC): ICD-10-CM

## 2020-01-29 DIAGNOSIS — C18.2 MALIGNANT NEOPLASM OF ASCENDING COLON (HCC): ICD-10-CM

## 2020-01-29 LAB
ALBUMIN SERPL-MCNC: 4 G/DL (ref 3.5–5.2)
ALBUMIN/GLOB SERPL: 1.3 G/DL (ref 1.1–2.4)
ALP SERPL-CCNC: 100 U/L (ref 38–116)
ALT SERPL W P-5'-P-CCNC: 22 U/L (ref 0–33)
ANION GAP SERPL CALCULATED.3IONS-SCNC: 10.4 MMOL/L (ref 5–15)
AST SERPL-CCNC: 27 U/L (ref 0–32)
BASOPHILS # BLD AUTO: 0.04 10*3/MM3 (ref 0–0.2)
BASOPHILS NFR BLD AUTO: 0.6 % (ref 0–1.5)
BILIRUB SERPL-MCNC: 0.5 MG/DL (ref 0.2–1.2)
BUN BLD-MCNC: 21 MG/DL (ref 6–20)
BUN/CREAT SERPL: 15.3 (ref 7.3–30)
CALCIUM SPEC-SCNC: 9.7 MG/DL (ref 8.5–10.2)
CEA SERPL-MCNC: 1.62 NG/ML
CHLORIDE SERPL-SCNC: 104 MMOL/L (ref 98–107)
CO2 SERPL-SCNC: 28.6 MMOL/L (ref 22–29)
CREAT BLD-MCNC: 1.37 MG/DL (ref 0.6–1.1)
DEPRECATED RDW RBC AUTO: 48.9 FL (ref 37–54)
EOSINOPHIL # BLD AUTO: 0.18 10*3/MM3 (ref 0–0.4)
EOSINOPHIL NFR BLD AUTO: 2.7 % (ref 0.3–6.2)
ERYTHROCYTE [DISTWIDTH] IN BLOOD BY AUTOMATED COUNT: 14.1 % (ref 12.3–15.4)
FERRITIN SERPL-MCNC: 68.3 NG/ML (ref 13–150)
GFR SERPL CREATININE-BSD FRML MDRD: 37 ML/MIN/1.73
GLOBULIN UR ELPH-MCNC: 3.1 GM/DL (ref 1.8–3.5)
GLUCOSE BLD-MCNC: 98 MG/DL (ref 74–124)
HCT VFR BLD AUTO: 40.4 % (ref 34–46.6)
HGB BLD-MCNC: 12.6 G/DL (ref 12–15.9)
IMM GRANULOCYTES # BLD AUTO: 0.03 10*3/MM3 (ref 0–0.05)
IMM GRANULOCYTES NFR BLD AUTO: 0.5 % (ref 0–0.5)
IRON 24H UR-MRATE: 78 MCG/DL (ref 37–145)
IRON SATN MFR SERPL: 20 % (ref 14–48)
LYMPHOCYTES # BLD AUTO: 1.6 10*3/MM3 (ref 0.7–3.1)
LYMPHOCYTES NFR BLD AUTO: 24.3 % (ref 19.6–45.3)
MCH RBC QN AUTO: 29.7 PG (ref 26.6–33)
MCHC RBC AUTO-ENTMCNC: 31.2 G/DL (ref 31.5–35.7)
MCV RBC AUTO: 95.3 FL (ref 79–97)
MONOCYTES # BLD AUTO: 0.73 10*3/MM3 (ref 0.1–0.9)
MONOCYTES NFR BLD AUTO: 11.1 % (ref 5–12)
NEUTROPHILS # BLD AUTO: 4 10*3/MM3 (ref 1.7–7)
NEUTROPHILS NFR BLD AUTO: 60.8 % (ref 42.7–76)
NRBC BLD AUTO-RTO: 0 /100 WBC (ref 0–0.2)
PLATELET # BLD AUTO: 227 10*3/MM3 (ref 140–450)
PMV BLD AUTO: 9 FL (ref 6–12)
POTASSIUM BLD-SCNC: 5.2 MMOL/L (ref 3.5–4.7)
PROT SERPL-MCNC: 7.1 G/DL (ref 6.3–8)
RBC # BLD AUTO: 4.24 10*6/MM3 (ref 3.77–5.28)
SODIUM BLD-SCNC: 143 MMOL/L (ref 134–145)
T3FREE SERPL-MCNC: 2.99 PG/ML (ref 2–4.4)
T4 FREE SERPL-MCNC: 1.46 NG/DL (ref 0.93–1.7)
TIBC SERPL-MCNC: 389 MCG/DL (ref 249–505)
TRANSFERRIN SERPL-MCNC: 278 MG/DL (ref 200–360)
TSH SERPL DL<=0.05 MIU/L-ACNC: 0.51 UIU/ML (ref 0.27–4.2)
WBC NRBC COR # BLD: 6.58 10*3/MM3 (ref 3.4–10.8)

## 2020-01-29 PROCEDURE — 83540 ASSAY OF IRON: CPT

## 2020-01-29 PROCEDURE — 80053 COMPREHEN METABOLIC PANEL: CPT

## 2020-01-29 PROCEDURE — 84466 ASSAY OF TRANSFERRIN: CPT

## 2020-01-29 PROCEDURE — 85025 COMPLETE CBC W/AUTO DIFF WBC: CPT

## 2020-01-29 PROCEDURE — 99214 OFFICE O/P EST MOD 30 MIN: CPT | Performed by: INTERNAL MEDICINE

## 2020-01-29 PROCEDURE — 84443 ASSAY THYROID STIM HORMONE: CPT | Performed by: INTERNAL MEDICINE

## 2020-01-29 PROCEDURE — 84481 FREE ASSAY (FT-3): CPT | Performed by: INTERNAL MEDICINE

## 2020-01-29 PROCEDURE — 36415 COLL VENOUS BLD VENIPUNCTURE: CPT

## 2020-01-29 PROCEDURE — 82728 ASSAY OF FERRITIN: CPT

## 2020-01-29 PROCEDURE — 84439 ASSAY OF FREE THYROXINE: CPT | Performed by: INTERNAL MEDICINE

## 2020-01-29 PROCEDURE — 82378 CARCINOEMBRYONIC ANTIGEN: CPT | Performed by: INTERNAL MEDICINE

## 2020-01-29 RX ORDER — CHOLECALCIFEROL (VITAMIN D3) 125 MCG
10 CAPSULE ORAL
COMMUNITY
End: 2022-03-09

## 2020-01-29 NOTE — PROGRESS NOTES
Subjective .     REASONS FOR FOLLOWUP:    1. Anemia secondary to iron deficiency, intolerant of oral iron, status post Venofer 100 mg on 09/21/11, followed by Feraheme x two doses, 09/28 and 10/05/11.  Recurrent iron deficiency requiring Feraheme again on 09/24/12 and 10/05/12.  Further recurrence of iron deficiency requiring Feraheme on 10/5 in 10/12/17.  2. No definitive evidence of GI blood loss, stool cards negative for occult blood, status post GI evaluation with Dr. Curran with no bleeding source identified.   3. History of gastric polyps, status post staged excision via EGD.    4. Status post left nephrectomy in 2004 for renal cell carcinoma.   5. Stage III chronic kidney disease.   6. Stage I ([id number]) left papillary thyroid cancer (3mm): Status post total thyroidectomy 11/16/15, 2 lymph nodes removed.  7. Stage IIa (T3N0M0) ascending colon cancer: Preoperative CEA 1.65 on 4/25/18.  Right hemicolectomy 4/27/18, invasive moderately differentiated adenocarcinoma, negative margins, positive lymphovascular invasion, negative perineural invasion, 10 lymph nodes negative, ulcerated small adjacent pericolonic abscess (microscopic perforation).  Microsatellite stable.  8. Genetics evaluation 7/31/18 with INVITAE panel test showing VUS MLH3 and MSH6  9. 2.2 cm right renal lesion, indeterminate on CT 4/26/18, felt to represent proteinaceous cyst by urology.  Stable on follow-up CT 4/24/2019.  10. Atrial fibrillation continuing on anticoagulation with Eliquis.    HISTORY OF PRESENT ILLNESS:  The patient is a 84 y.o. year old female who is here for follow-up with the above-mentioned history.    History of Present Illness the patient returns today in follow-up with laboratory studies to review.  In the interval, she did follow-up with Dr. Curran and is scheduled for colonoscopy on 3/23/2020.  She notes chronic difficulty with alternating constipation and diarrhea although does report that her bowel function has  gradually improved over time.  She does have some mild ongoing fatigue.  She notes some difficulty with insomnia however has been taking melatonin with some relief.  She denies any clinical evidence of GI blood loss.  She does continue on anticoagulation with Eliquis for atrial fibrillation.      PAST MEDICAL HISTORY:    1.  Atrial fibrillation.  The patient underwent ablation procedure in 2004, repeat ablation procedure on 09/13/11 by Dr. Melendez.  Subsequent AV deshawn ablation and March 2017 and subsequent cardioversion in May 2017.  Continuation of anticoagulation with Eliquis  2.  Gastroesophageal reflux disease.  3.  Hypertension.   4.  Status post left knee surgery.   5.  Status post pacemaker placement.   6.  Status post laparoscopic cholecystectomy.   7.  Status post bladder tacking procedure.   8.  Status post EGD and colonoscopy with Dr. Curran 08/08/11.  Finding of gastric polyps, one of which was resected showing a hyperplastic polyp with gastritis.  There was no source of bleeding identified.  Schatzki's ring was identified as well as diverticulosis.  Repeat EGD on 03/18/13 with resection of a 3 cm prepyloric hyperplastic polyp of acute and chronic inflammatory change. Repeat EGD, October 2013 with further resection of polyp, benign findings.  EGD 10/23/17 with small gastric polyps identified, no evidence of active bleeding.  9.  Bladder suspension surgery in 5/12.  10.  Status post left knee replacement in May 2013.    Past Medical History:   Diagnosis Date   • Abnormal ECG    • Acute bronchitis    • Acute sinusitis    • Anemia     Iron deficiency   • Anesthesia complication    • Anticoagulated on warfarin    • Arthritis    • Asthma     Shortness of breath upon exertion   • Bladder dysfunction    • Cancer of left kidney (CMS/HCC) 06/08/2004    S/P Left Radical Neprectomy   • Cardiac disease    • Chronic dysfunction of left eustachian tube    • CKD (chronic kidney disease)     stage 3   • Colon cancer  (CMS/HCC)    • Colon polyps    • Depression    • Diarrhea    • Diverticulosis 08/08/2011    Descending Colon & Sigmoid Colon multiple diverticula   • Epigastric pain    • Fatigue    • Frequent urination    • GERD (gastroesophageal reflux disease)    • Goiter     thyroid removed in november 2015   • Health care maintenance    • History of colitis    • History of shingles    • History of transfusion    • Hypertension    • Insomnia    • Left atrial enlargement    • Left ventricular hypertrophy    • Mitral regurgitation     mild to moderate per echocardiogram 2016   • Nerve pain     LUE D/T SHINGLES   • Neuropathy     ARMS   • Non morbid obesity due to excess calories    • NANCY (obstructive sleep apnea)     uses CPAP   • Paroxysmal atrial fibrillation (CMS/HCC)    • Permanent atrial fibrillation    • Pulmonary hypertension (CMS/HCC)     per echo 2016   • Right atrial enlargement    • S/P AV deshawn ablation    • S/P placement of cardiac pacemaker    • Sleep apnea     on CPAP   • SSS (sick sinus syndrome) (CMS/HCC)    • Third degree AV block (CMS/HCC)    • Thyroid cancer (CMS/HCC)    • URI (upper respiratory infection)    • Urinary urgency      Past Surgical History:   Procedure Laterality Date   • ATRIAL ABLATION SURGERY N/A 09/13/2011    Comprehensive electrophysiology study, Left atrial pace & sense, 3-dimensional cardia mapping, Radiofrequency catheter ablation, Transseptal hear catheterization, Dr. Maldonado Melendez   • ATRIAL ABLATION SURGERY N/A 02/11/2004    Dr. Maldonado Melendez   • BLADDER SURGERY N/A     Bladder tacking procedure   • BLADDER SUSPENSION N/A 05/2012   • CARDIAC ELECTROPHYSIOLOGY PROCEDURE N/A 5/17/2016    Procedure: PPM generator change - dual BOSTON;  Surgeon: Maldonado Melendez MD;  Location: [facility] CATH INVASIVE LOCATION;  Service:    • CARDIAC ELECTROPHYSIOLOGY PROCEDURE N/A 3/23/2017    Procedure: AV node ablation pt has BOSTON PPM;  Surgeon: Maldonado Melendez MD;  Location: [facility] CATH INVASIVE LOCATION;   Service:    • CARDIOVERSION N/A 01/26/2017    Dr. Melendez   • CARDIOVERSION N/A 05/25/2017    Dr. Melendez   • CATARACT EXTRACTION Bilateral     Dr. Gramajo   • COLON RESECTION Right 4/27/2018    Procedure: COLON RESECTION RIGHT LAPAROSCOPIC, possible open;  Surgeon: Rebeca Curran MD;  Location: [facility] OR;  Service: General   • COLONOSCOPY N/A 04/01/2003    Normal colonoscopy except for an occasional diverticulosis, Dr. Nathan Macias   • COLONOSCOPY N/A 4/25/2018    Procedure: COLONOSCOPY INTO CECUM AND TI WITH SALINE LIFT, HOT SNARE POLYPECTOMIES, BX'S, SPOT INJECTION, RESOLUTION CLIPS X2;  Surgeon: Rebeca Currna MD;  Location: [facility] ENDOSCOPY;  Service: Gastroenterology   • CYSTOSCOPY BOTOX INJECTION OF BLADDER N/A 6/27/2016    Procedure: CYSTOSCOPY WITH BOTOX DETRUSOR INJECTION;  Surgeon: Salome Bowen MD;  Location: [facility] OR;  Service:    • CYSTOSCOPY BOTOX INJECTION OF BLADDER N/A 05/29/2015    Dr. Salome Bowen   • ENDOSCOPY N/A 06/10/2015    Antral Polyp: Gastric Mucosa w/ marked cautery artifact,  Hiatal Hernia, Dr. Rebeca Curran   • ENDOSCOPY N/A 10/28/2013    Gastric polyp: polypoid hyperplastic gastric mucosa w/ focal ulceration, mixed acute & chronic inflammation.  Negative for intestinal metaplasia, no helicobacter organisms identified on diff-wuik stain, Dr. Rebeca Curran   • ENDOSCOPY N/A 03/18/2013    Large prepyloric polyp, partially removed: fragment of polypoid hyperplastic gastric mucosa w/ foci of erosion & patchy mixed acute & chronic inflammation. No helicobacter organisms identified on diff-quick stain. Negative for intestinal metaplasia, negative for dysplasia, Dr. Rebeca Curran   • ENDOSCOPY N/A 10/24/2012    large prepyloric mass (3cm) w/ adjacent nodules: hyperplastic polyp w/ mild chronic active gastritis, focal erosion & associated, Multiple fundic polyps: polypoid mucosal hyperplasia, Dr. Rebeca Curran   • ENDOSCOPY N/A 03/11/2003    Gastric Antral Biopsies:  Fragments of Gastric Mucosa & Necrotic Tissue (Compatible w/ Ulcer) w/ chronic active inflammation.  Negative stain for Helicobacter organisms. Dr. Nathan Macias   • ENDOSCOPY N/A 10/23/2017    Procedure: ESOPHAGOGASTRODUODENOSCOPY;  Surgeon: Rebeca Curran MD;  Location: [facility] ENDOSCOPY;  Service:    • ENDOSCOPY AND COLONOSCOPY N/A 08/08/2011    4cm hiatal hernia, Schatzki's ring, Diverticulosis, Large antral polyps, Dr. Rebeca Curran   • INCISIONAL HERNIA REPAIR N/A 06/11/2015    Defect approximately 2.5 cm w/ a mass of incarcerated tissue approximately the size of a nectarine, Dr. Rebeca Curran   • INSERT / REPLACE / REMOVE PACEMAKER     • KNEE ARTHROPLASTY     • KNEE MINI REVISION Right 9/28/2016    Procedure: RT KNEE POLY INSERT CHANGE ;  Surgeon: Tommy Avila MD;  Location: [facility] OR;  Service:    • LAPAROSCOPIC CHOLECYSTECTOMY N/A    • NEPHRECTOMY RADICAL Left 06/08/2004    Incidentially found left renal mass 3-4 cm renal cell carcinoma, left lower pole, Dr. Salome Bowen   • OTHER SURGICAL HISTORY      NEUROMUSCULAR BLOCKERS BOTULINUM TOXIN ONABOTULINUMTOXIN A   • PACEMAKER IMPLANTATION N/A     Dr. Casillas   • PACEMAKER REPLACEMENT N/A 09/24/2010    Implanted Generator is a [employer] ALTRUA 60 model S603DR, serial # [id number], Dr. Chavez Jimenez   • TOTAL KNEE ARTHROPLASTY Left 05/21/2013    Dr. Miguel Pacheco   • TOTAL THYROIDECTOMY N/A 11/16/2015    Dr. Gideon Ashley, [facility]   • TRANSVAGINAL TAPING SUSPENSION N/A 03/16/2009    Mini Sling, Closed suprapubic cystostomy, Dr. Salome Bowen   • TUBAL ABDOMINAL LIGATION Bilateral          HEMATOLOGICONCOLOGIC HISTORY:    The patient was seen during a hospitalization at [facility] in September 2011.  She was admitted with left lower lobe pneumonia following a cardiac ablation procedure.  She has anemia dating back to 2006, at that time with a hematocrit of 36.7 and MCV of 77.  Her platelets and white count have remained normal.  Hemoglobin in September  2010 was 10.3, MCV 74.  In December 2010 hemoglobin was 10.5 with MCV of 72.  On 08/09/11 hemoglobin was down to 9.8 with MCV of 73.  During the hospitalization hemoglobin had dropped to 8.5 with MCV of 72 and decreased further to 8.0 at which point she was transfused two units of PRBCs.  She was on Coumadin but had no obvious clinical evidence of GI blood loss.  She was evaluated by Dr. Curran on 08/08/11 with EGD and colonoscopy showing evidence of gastric polyps.  One polyp was biopsied showing evidence of a hyperplastic polyp with gastritis.  There was no definitive evidence of bleeding.  She was having some dysphagia and had a Schatzki's ring which was dilated.  She developed rapid ventricular response and therefore the procedure was stopped.  She then underwent cardiac ablation and subsequently developed pneumonia.      During her hospitalization, lab studies were sent including iron studies showing an iron of 11, TIBC 438, transferrin 294, iron percent sat of 3 with a ferritin of 10.8.  B12 was low-normal at 291, folate normal.  Creatinine was in the 1.2 range with estimated GFR in the 40 range (stage III chronic kidney disease).  Erythropoietin level was 66.  A hemoglobin electrophoresis was sent showing hemoglobin A1 of 97.5%, and hemoglobin A2 of 2.2%.  The patient had stool checked for occult blood in the hospital which again was negative, therefore, with no definitive evidence of GI blood loss.  I suspect she may have a malabsorption issue.  She was intolerant of oral iron and treated with Venofer 100 mg on 09/21/11 and subsequently was discharged from the hospital and treated as an outpatient with Feraheme 510 mg on 09/28/11 and again on 10/05/11.      The patient returned for followup studies on 10/19/11 with hemoglobin that improved to the 13 range and MCV up to 79.  TIBC was 284, iron percent sat 32, ferritin 335.  Reticulated hemoglobin had improved to 32.9.  B12 was 476.  We will follow her counts  over time.  She will followup with Dr. Curran regarding her gastric polyps.      Laboratory studies on 02/15/12 showed a ferritin stable at 112.  We will recheck at a 6 month interval.     Lab studies from 9/10/12 shows a hemoglobin 11.9, reticulated hemoglobin 29.9, TIBC 325, iron percent SAT of 11.  Ferritin down to 18.      Feraheme administered on 9/24/12 and 10/5/12.  Repeat iron studies on 3/15/13 showed a ferritin of 101, iron percent saturation of 31.       Labs 10/10/2013 with a ferritin of 85, iron percent sat down to 15, hemoglobin of 12.9. Recheck in six months.    Labs 04/20/2015 showed a ferritin stable in the normal range at 76.     The patient returned after a prolonged absence from our office on 9/21/17 with hemoglobin of 12.0, MCV 79.2, iron 37, ferritin 20, iron saturation 8%, TIBC 441 CONSISTENT with recurrent iron deficiency.  Folate was normal at 10.52, B12 normal at 398, erythropoietin 32.6.  Additional Feraheme initiated on 10/5/17 for 2 doses.  Repeat EGD with Dr. Curran on 10/23/17 with no active bleeding, small less than 6 mm gastric polyps identified.       ONCOLOGIC HISTORY:   Status post left nephrectomy in 2004 for renal cell carcinoma with Dr. Bowen.  This was a Darnell grade 2 lesion measuring 3.0 cm, confined to the kidney, with no extension to perinephric adipose tissue, and no evidence of lymphovascular invasion.  Margins were negative.  Adrenal gland was negative.      CT scan of the abdomen and pelvis o 02/26/12 showed no evidence of recurrent disease.      Stage I ([id number]) left papillary thyroid cancer (3mm): Status post total thyroidectomy 11/16/15, 2 lymph nodes removed.      Stage IIa (T3N0M0) ascending colon cancer: Preoperative CEA 1.65 on 4/25/18.  Right hemicolectomy 4/27/18, invasive moderately differentiated adenocarcinoma, negative margins, positive lymphovascular invasion, negative perineural invasion, 10 lymph nodes negative, ulcerated small adjacent pericolonic  abscess (microscopic perforation).  Microsatellite stable.    Genetics evaluation 7/31/18 with INVITAE panel test showing VUS MLH3 and MSH6      Current Outpatient Medications on File Prior to Visit   Medication Sig Dispense Refill   • B Complex-C (SUPER B COMPLEX PO) Take 1 tablet by mouth Daily.     • CALCIUM PO Take 600 Units by mouth Daily. With D3, listed separetly     • cholecalciferol (VITAMIN D3) 1000 UNITS tablet Take 1,000 Units by mouth daily. In addition to the calcium -D3 pill     • ELIQUIS 5 MG tablet tablet TAKE ONE TABLET BY MOUTH EVERY 12 HOURS 60 tablet 2   • esomeprazole (nexIUM) 40 MG capsule Take 40 mg by mouth Every Morning Before Breakfast.     • furosemide (LASIX) 20 MG tablet TAKE TWO TABLETS BY MOUTH DAILY 60 tablet 0   • GLUCOSAMINE-CHONDROITIN PO Take 1 tablet by mouth Daily.     • lisinopril (PRINIVIL,ZESTRIL) 10 MG tablet TAKE ONE TABLET BY MOUTH DAILY 90 tablet 2   • loperamide (IMODIUM) 2 MG capsule Take 2 mg by mouth 4 (four) times a day as needed for diarrhea.     • melatonin 5 MG tablet tablet Take 5 mg by mouth.     • metoprolol tartrate (LOPRESSOR) 25 MG tablet TAKE ONE TABLET BY MOUTH TWICE A  tablet 1   • Multiple Vitamins-Minerals (MULTIVITAMIN PO) Take 1 tablet by mouth Daily.     • Multiple Vitamins-Minerals (PRESERVISION AREDS PO) Take 2 tablets by mouth Daily.     • SYNTHROID 125 MCG tablet Take 125 mcg by mouth Daily.     • XIIDRA 5 % solution Apply 1 drop to eye 2 (Two) Times a Day.       No current facility-administered medications on file prior to visit.        ALLERGIES:     Allergies   Allergen Reactions   • Sulfa Antibiotics Swelling     FACE AND TONGUE   • Adhesive Tape Rash   • Levaquin [Levofloxacin] Rash     Social History     Socioeconomic History   • Marital status:      Spouse name: Not on file   • Number of children: 0   • Years of education: Not on file   • Highest education level: Not on file   Occupational History   • Occupation: Homemaker      Employer: RETIRED   Tobacco Use   • Smoking status: Never Smoker   • Smokeless tobacco: Never Used   • Tobacco comment: caffeine use   Substance and Sexual Activity   • Alcohol use: No   • Drug use: No   • Sexual activity: Never     Family History   Problem Relation Age of Onset   • Heart disease Other    • Deep vein thrombosis Other    • Hypertension Other    • Breast cancer Sister         In her 60s.   • Breast cancer Maternal Aunt    • Breast cancer Sister         In her 60s.   • Heart disease Mother    • Hypertension Mother    • Heart disease Father    • Hypertension Father    • Heart attack Father    • Heart disease Daughter    • Hypertension Daughter    • Heart disease Son    • Hypertension Son    • Malig Hyperthermia Neg Hx        Review of Systems   Constitutional: Positive for fatigue. Negative for activity change, appetite change, fever and unexpected weight change.   HENT: Negative for congestion, mouth sores, nosebleeds, sore throat and voice change.    Respiratory: Negative for cough, shortness of breath and wheezing.    Cardiovascular: Negative for chest pain, palpitations and leg swelling.   Gastrointestinal: Positive for constipation and diarrhea. Negative for abdominal distention, abdominal pain, blood in stool, nausea and vomiting.   Endocrine: Negative for cold intolerance and heat intolerance.   Genitourinary: Negative for difficulty urinating, dysuria, frequency and hematuria.   Musculoskeletal: Positive for arthralgias. Negative for back pain, joint swelling and myalgias.   Skin: Negative for rash.   Neurological: Negative for dizziness, syncope, weakness, light-headedness, numbness and headaches.   Hematological: Negative for adenopathy. Does not bruise/bleed easily.   Psychiatric/Behavioral: Positive for sleep disturbance. Negative for confusion. The patient is not nervous/anxious.          Objective      Vitals:    01/29/20 1256   BP: 133/73   Pulse: 74   Resp: 16   Temp: 98.4 °F (36.9  °C)   SpO2: 97%      Current Status 1/29/2020   ECOG score 0   pain 0/10    Physical Exam   Constitutional: She is oriented to person, place, and time. She appears well-developed and well-nourished.   HENT:   Mouth/Throat: Oropharynx is clear and moist.   Eyes: Conjunctivae are normal.   Neck: No thyromegaly present.   Cardiovascular: Normal rate and regular rhythm. Exam reveals no gallop and no friction rub.   No murmur heard.  Pulmonary/Chest: Breath sounds normal. No respiratory distress.   Abdominal: Soft. Bowel sounds are normal. She exhibits no distension. There is no tenderness.   Musculoskeletal: She exhibits edema.   Trace bilateral lower extremity edema with venous stasis changes   Lymphadenopathy:        Head (right side): No submandibular adenopathy present.     She has no cervical adenopathy.     She has no axillary adenopathy.        Right: No inguinal and no supraclavicular adenopathy present.        Left: No inguinal and no supraclavicular adenopathy present.   Neurological: She is alert and oriented to person, place, and time. She displays normal reflexes. No cranial nerve deficit. She exhibits normal muscle tone.   Skin: Skin is warm and dry. No rash noted.   Psychiatric: She has a normal mood and affect. Her behavior is normal.       RECENT LABS:  Hematology WBC   Date Value Ref Range Status   01/29/2020 6.58 3.40 - 10.80 10*3/mm3 Final     RBC   Date Value Ref Range Status   01/29/2020 4.24 3.77 - 5.28 10*6/mm3 Final     Hemoglobin   Date Value Ref Range Status   01/29/2020 12.6 12.0 - 15.9 g/dL Final     Hematocrit   Date Value Ref Range Status   01/29/2020 40.4 34.0 - 46.6 % Final     Platelets   Date Value Ref Range Status   01/29/2020 227 140 - 450 10*3/mm3 Final        Lab Results   Component Value Date    GLUCOSE 116 12/10/2019    BUN 24 (H) 12/10/2019    CREATININE 1.38 (H) 12/10/2019    EGFRIFNONA 37 (L) 12/10/2019    EGFRIFAFRI 50 (L) 12/06/2017    BCR 17.4 12/10/2019    K 4.2 12/10/2019     CO2 26.3 12/10/2019    CALCIUM 9.4 12/10/2019    PROTENTOTREF 7.4 12/06/2017    ALBUMIN 3.90 12/10/2019    LABIL2 1.3 12/06/2017    AST 21 12/10/2019    ALT 18 12/10/2019     Additional labs reviewed as outlined below      Assessment/Plan      1. Recurrent iron deficiency anemia:   · The patient is intolerant of oral iron.   · Prior GI evaluation showed no definitive evidence of GI blood loss.   · She has underlying stage III chronic kidney disease, which is a contributing factor to her mild borderline anemia.   · She has been treated in the past with IV iron in 2011 and 2012.    · She had a lengthy absence from our office from 2015 until she was referred back on 9/21/17 with evidence of recurrent iron deficiency, likely related to malabsorption.  Her ferritin on 7/7/17 was 21 and hemoglobin on 8/7/17 was 11.1.  Her degree of anemia was mild with a hemoglobin of 11.4, microcytic indices with an MCV of 78.  Labs on 9/21/17 showed ferritin 20.5, iron saturation 8%, TIBC 441.  We did also check B12 and folate which were normal.  Erythropoietin was 32 consistent with a component of anemia secondary to chronic kidney disease. She did return stool cards for occult blood which were negative ×3 10/5/17.  She received additional Feraheme on 10/5 and 10/12/17.    · She underwent EGD with Dr. Curran 10/23/17 with no evidence of active bleeding, small gastric polyps identified.   · Labs 12/20/17 with hemoglobin up to 13.3, ferritin 97.9, iron saturation of 16%.    · Repeat labs performed postoperatively from colon resection on 5/4/18 showed iron 36, ferritin 99, iron saturation 10%, TIBC 349.    · The patient returns today in delayed follow-up.  She was to have been seen in December but canceled the appointment.  She did have her labs drawn however prior to the appointment on 12/10/2019 with hemoglobin 12.7, MCV 93.1, iron 63, ferritin 83.4, iron saturation 16%, TIBC 389.  Repeat iron studies were drawn today and are  "pending although her hemoglobin remains stable at 12.6.  More than likely her iron studies will be stable.  If they are not I will notify her.  There is no indication at this time for IV iron.  We will recheck iron studies when she returns in 3 months.  2. Stage IIa (T3N0M0) ascending colon cancer:   · Developed significant diarrhea with blood per rectum.    · Colonoscopy 4/25/18 with identification of polyps in the rectum, cecal polyp, mass in the right colon.  The cecal polyp was a sessile serrated adenoma, ascending colon \"mass\" showed hyperplastic polyp, rectal polyp was a tubulovillous adenoma with low-grade dysplasia.  CEA on 4/25/18 was normal at 1.65.  Chest x-ray 4/25/18 was unrevealing.  CT of the abdomen and pelvis 4/26/18 showed a 4 cm annular mass in the ascending colon with adjacent haziness in the mesentery but no lymphadenopathy, no evidence of metastatic disease.    · Right hemicolectomy on 4/27/18 with pathology showing an invasive moderately differentiated adenocarcinoma arising in a sessile serrated adenoma with negative margins measuring 2.5 cm extending through the muscularis propria into brad-colorectal tissue, positive lymphovascular invasion, negative perineural invasion, 10 lymph nodes negative.  There was a comment regarding an ulcerated small adjacent pericolonic abscess, raising the question of possible microscopic perforation.  Patient did have a few high risk features with less than 12 lymph nodes removed, positive lymphovascular invasion, and some possibility of microscopic perforation.  The tumor was microsatellite stable.    · Given her age and comorbidities, the patient was a poor candidate for adjuvant chemotherapy and was not recommended.  Independent of this recommendation, the patient stated she was not interested in taking any adjuvant chemotherapy.  Therefore we will plan to monitor her clinically for evidence of recurrent disease.  Standard monitoring is with routine CEA " levels however this does not appear to be informative for her given her normal preoperative value.  We will pursue annual interval follow-up CT.    · CT scan from 4/24/2019 at a 1 year interval showed no evidence of recurrent disease.    · The patient returns today in follow-up with no clinical evidence of recurrent disease.  She has finally scheduled her follow-up colonoscopy with Dr. Curran for 3/23/2020.  Her bowel function remains stable with chronic alternating constipation and diarrhea although she does feel that her symptoms have improved over time.  We did check a CEA on 12/10/2019 which was normal at 2.21.  We discussed proceeding with annual CT scan chest abdomen pelvis in April, 3 months from now and we will repeat CEA at the same time.  Assuming she is doing well at that point we will transition to six-month follow-up.  3. 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    · Family history includes a sister with breast cancer around age 60, another sister with breast cancer around age 60, maternal aunt with breast cancer over the age of 50, maternal uncle with lung cancer.    · Given the patient's multiple malignancies and family history, she was referred to the genetics clinic to discuss potential genetic testing, mainly for benefit of the patient's children and other family members.    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   · She did follow-up in the genetics clinic and underwent INVITAE panel test 7/31/18 showing VUS in MLH3 and MSH6 with no known clinical significance at this time.   4. History of gastric polyps:   · EGD 10/23/17 with small less than 6 mm gastric polyps, not resected.  5. History of renal cell carcinoma:   · The patient underwent a left nephrectomy in  2004 with no clinical evidence of recurrent disease.    · CT chest from 11/14/16 showed no evidence of recurrence.    · CT abdomen and pelvis however from 4/26/18 showed a 2.2 cm right renal lesion which was indeterminate, possibly a proteinaceous cyst but could not rule out mass lesion.  The study was performed without contrast due to the patient's underlying chronic kidney disease.  She is unable to undergo renal mass protocol CT with contrast.  She is unable to undergo MRI due to her pacemaker.    · Follow-up CT scan 4/24/2019 with no change in the right renal lesion at 2.1 cm, repeat at 1 year interval  · As above, we will plan a one-year follow-up CT scan in 3 months.  6. CKD3:   · Baseline creatinine in 1.2-1.8 range  · Contributing factor to anemia    PLAN:   1. Labs pending today with iron panel, ferritin, CEA  2. Patient will follow-up for colonoscopy as planned with Dr. Curran on 3/23/2020  1. M.D. visit in 3 months.  One week prior we will draw labs with CBC, CMP, CEA, iron panel, ferritin.  The patient will also undergo CT chest abdomen pelvis.

## 2020-01-30 ENCOUNTER — TELEPHONE (OUTPATIENT)
Dept: ONCOLOGY | Facility: CLINIC | Age: 85
End: 2020-01-30

## 2020-01-30 ENCOUNTER — TELEPHONE (OUTPATIENT)
Dept: ONCOLOGY | Facility: HOSPITAL | Age: 85
End: 2020-01-30

## 2020-01-30 NOTE — TELEPHONE ENCOUNTER
Patient's daughter Hien Rowan is returning Keysha's call about patient taking potassium. Hien said no patient is not taking potassium.  You can reach Hien at [phone].

## 2020-01-30 NOTE — TELEPHONE ENCOUNTER
S/w patient's daughter Hien who manages her medications. She states patient is not taking a potassium supplement but takes a multivitamin which contains some potassium. Advised her to have the patient stop taking her multivitamin for now and educated her about high potassium foods for the patient to avoid. Message sent to Dr. Brower detailing our conversation in case he has any further instruction. Pt's daughter v/u of all.

## 2020-01-30 NOTE — TELEPHONE ENCOUNTER
Message received from  to find out if she's taking a potassium supplement d/t elevated K+ level. Pt states she is unsure what she is taking and that her daughter Hien handles all of her medications. Call placed to kriit Stanford, no answer, no voicemail set up. Note left in triage to try contacting daughter tomorrow.

## 2020-02-04 DIAGNOSIS — I48.19 PERSISTENT ATRIAL FIBRILLATION (HCC): ICD-10-CM

## 2020-02-04 DIAGNOSIS — R06.02 SHORTNESS OF BREATH: ICD-10-CM

## 2020-02-04 RX ORDER — FUROSEMIDE 20 MG/1
TABLET ORAL
Qty: 60 TABLET | Refills: 0 | Status: SHIPPED | OUTPATIENT
Start: 2020-02-04 | End: 2020-02-18 | Stop reason: SDUPTHER

## 2020-02-18 DIAGNOSIS — I48.19 PERSISTENT ATRIAL FIBRILLATION (HCC): ICD-10-CM

## 2020-02-18 DIAGNOSIS — R06.02 SHORTNESS OF BREATH: ICD-10-CM

## 2020-02-18 RX ORDER — FUROSEMIDE 20 MG/1
40 TABLET ORAL DAILY
Qty: 60 TABLET | Refills: 4 | Status: SHIPPED | OUTPATIENT
Start: 2020-02-18 | End: 2020-08-21

## 2020-02-24 ENCOUNTER — CLINICAL SUPPORT NO REQUIREMENTS (OUTPATIENT)
Dept: CARDIOLOGY | Facility: CLINIC | Age: 85
End: 2020-02-24

## 2020-02-24 DIAGNOSIS — I49.5 SICK SINUS SYNDROME (HCC): Primary | ICD-10-CM

## 2020-02-24 PROCEDURE — 93294 REM INTERROG EVL PM/LDLS PM: CPT | Performed by: INTERNAL MEDICINE

## 2020-02-24 PROCEDURE — 93296 REM INTERROG EVL PM/IDS: CPT | Performed by: INTERNAL MEDICINE

## 2020-02-26 ENCOUNTER — TELEPHONE (OUTPATIENT)
Dept: CARDIOLOGY | Facility: CLINIC | Age: 85
End: 2020-02-26

## 2020-04-15 ENCOUNTER — HOSPITAL ENCOUNTER (OUTPATIENT)
Dept: PET IMAGING | Facility: HOSPITAL | Age: 85
End: 2020-04-15

## 2020-04-15 ENCOUNTER — TELEPHONE (OUTPATIENT)
Dept: ONCOLOGY | Facility: CLINIC | Age: 85
End: 2020-04-15

## 2020-04-15 NOTE — TELEPHONE ENCOUNTER
PT daughter Hien called in to see if we could move todays 4/15/20 appt to one tomorrow or Friday because the pts CT was missed this morning. Please call the daughter as soon as possible.     Best call back number for Hien: [phone]

## 2020-04-22 ENCOUNTER — APPOINTMENT (OUTPATIENT)
Dept: LAB | Facility: HOSPITAL | Age: 85
End: 2020-04-22

## 2020-05-01 RX ORDER — APIXABAN 5 MG/1
TABLET, FILM COATED ORAL
Qty: 60 TABLET | Refills: 1 | Status: SHIPPED | OUTPATIENT
Start: 2020-05-01 | End: 2020-05-04

## 2020-05-04 RX ORDER — APIXABAN 5 MG/1
TABLET, FILM COATED ORAL
Qty: 60 TABLET | Refills: 1 | Status: SHIPPED | OUTPATIENT
Start: 2020-05-04 | End: 2020-07-01 | Stop reason: SDUPTHER

## 2020-05-21 ENCOUNTER — TELEPHONE (OUTPATIENT)
Dept: ONCOLOGY | Facility: CLINIC | Age: 85
End: 2020-05-21

## 2020-05-21 NOTE — TELEPHONE ENCOUNTER
CALLED DAILY BACK. L/M X 2. IN LAST MESSAGE STATED THAT PT'S CT WAS IN OUR BUILDING 1ST FLOOR AND THAT PT CAN TAKE HER NORMAL MEDS W/ WATER.

## 2020-05-21 NOTE — TELEPHONE ENCOUNTER
DAILY LANGLEY PT'S DAUGHTER CALLED TO ASK IF PT CAN TAKE HER MEDICATION BEFORE HER CT TOMORROW.    ALSO NEEDS TO KNOW WHERE TO GO TO HAVE THE CT DONE.     DAILY   [phone]

## 2020-05-22 ENCOUNTER — LAB (OUTPATIENT)
Dept: LAB | Facility: HOSPITAL | Age: 85
End: 2020-05-22

## 2020-05-22 ENCOUNTER — HOSPITAL ENCOUNTER (OUTPATIENT)
Dept: PET IMAGING | Facility: HOSPITAL | Age: 85
Discharge: HOME | End: 2020-05-22
Admitting: INTERNAL MEDICINE

## 2020-05-22 DIAGNOSIS — N18.30 ANEMIA IN STAGE 3 CHRONIC KIDNEY DISEASE (HCC): ICD-10-CM

## 2020-05-22 DIAGNOSIS — D63.1 ANEMIA IN STAGE 3 CHRONIC KIDNEY DISEASE (HCC): ICD-10-CM

## 2020-05-22 DIAGNOSIS — C18.2 MALIGNANT NEOPLASM OF ASCENDING COLON (HCC): ICD-10-CM

## 2020-05-22 DIAGNOSIS — D50.8 OTHER IRON DEFICIENCY ANEMIA: ICD-10-CM

## 2020-05-22 LAB
ALBUMIN SERPL-MCNC: 3.9 G/DL (ref 3.5–5.2)
ALBUMIN/GLOB SERPL: 1.1 G/DL (ref 1.1–2.4)
ALP SERPL-CCNC: 98 U/L (ref 38–116)
ALT SERPL W P-5'-P-CCNC: 19 U/L (ref 0–33)
ANION GAP SERPL CALCULATED.3IONS-SCNC: 13.9 MMOL/L (ref 5–15)
AST SERPL-CCNC: 25 U/L (ref 0–32)
BASOPHILS # BLD AUTO: 0.05 10*3/MM3 (ref 0–0.2)
BASOPHILS NFR BLD AUTO: 0.7 % (ref 0–1.5)
BILIRUB SERPL-MCNC: 0.7 MG/DL (ref 0.2–1.2)
BUN BLD-MCNC: 23 MG/DL (ref 6–20)
BUN/CREAT SERPL: 16.3 (ref 7.3–30)
CALCIUM SPEC-SCNC: 9.3 MG/DL (ref 8.5–10.2)
CEA SERPL-MCNC: 1.69 NG/ML
CHLORIDE SERPL-SCNC: 103 MMOL/L (ref 98–107)
CO2 SERPL-SCNC: 24.1 MMOL/L (ref 22–29)
CREAT BLD-MCNC: 1.41 MG/DL (ref 0.6–1.1)
DEPRECATED RDW RBC AUTO: 48.2 FL (ref 37–54)
EOSINOPHIL # BLD AUTO: 0.2 10*3/MM3 (ref 0–0.4)
EOSINOPHIL NFR BLD AUTO: 2.9 % (ref 0.3–6.2)
ERYTHROCYTE [DISTWIDTH] IN BLOOD BY AUTOMATED COUNT: 14.6 % (ref 12.3–15.4)
FERRITIN SERPL-MCNC: 70 NG/ML (ref 13–150)
GFR SERPL CREATININE-BSD FRML MDRD: 36 ML/MIN/1.73
GLOBULIN UR ELPH-MCNC: 3.4 GM/DL (ref 1.8–3.5)
GLUCOSE BLD-MCNC: 121 MG/DL (ref 74–124)
HCT VFR BLD AUTO: 40.1 % (ref 34–46.6)
HGB BLD-MCNC: 12.5 G/DL (ref 12–15.9)
IMM GRANULOCYTES # BLD AUTO: 0.02 10*3/MM3 (ref 0–0.05)
IMM GRANULOCYTES NFR BLD AUTO: 0.3 % (ref 0–0.5)
IRON 24H UR-MRATE: 71 MCG/DL (ref 37–145)
IRON SATN MFR SERPL: 18 % (ref 14–48)
LYMPHOCYTES # BLD AUTO: 1.59 10*3/MM3 (ref 0.7–3.1)
LYMPHOCYTES NFR BLD AUTO: 22.8 % (ref 19.6–45.3)
MCH RBC QN AUTO: 28.3 PG (ref 26.6–33)
MCHC RBC AUTO-ENTMCNC: 31.2 G/DL (ref 31.5–35.7)
MCV RBC AUTO: 90.7 FL (ref 79–97)
MONOCYTES # BLD AUTO: 0.63 10*3/MM3 (ref 0.1–0.9)
MONOCYTES NFR BLD AUTO: 9.1 % (ref 5–12)
NEUTROPHILS # BLD AUTO: 4.47 10*3/MM3 (ref 1.7–7)
NEUTROPHILS NFR BLD AUTO: 64.2 % (ref 42.7–76)
NRBC BLD AUTO-RTO: 0 /100 WBC (ref 0–0.2)
PLATELET # BLD AUTO: 265 10*3/MM3 (ref 140–450)
PMV BLD AUTO: 10.3 FL (ref 6–12)
POTASSIUM BLD-SCNC: 4.4 MMOL/L (ref 3.5–4.7)
PROT SERPL-MCNC: 7.3 G/DL (ref 6.3–8)
RBC # BLD AUTO: 4.42 10*6/MM3 (ref 3.77–5.28)
SODIUM BLD-SCNC: 141 MMOL/L (ref 134–145)
TIBC SERPL-MCNC: 393 MCG/DL (ref 249–505)
TRANSFERRIN SERPL-MCNC: 281 MG/DL (ref 200–360)
WBC NRBC COR # BLD: 6.96 10*3/MM3 (ref 3.4–10.8)

## 2020-05-22 PROCEDURE — 82378 CARCINOEMBRYONIC ANTIGEN: CPT | Performed by: INTERNAL MEDICINE

## 2020-05-22 PROCEDURE — 80053 COMPREHEN METABOLIC PANEL: CPT

## 2020-05-22 PROCEDURE — 36415 COLL VENOUS BLD VENIPUNCTURE: CPT

## 2020-05-22 PROCEDURE — 74176 CT ABD & PELVIS W/O CONTRAST: CPT

## 2020-05-22 PROCEDURE — 0 DIATRIZOATE MEGLUMINE & SODIUM PER 1 ML: Performed by: INTERNAL MEDICINE

## 2020-05-22 PROCEDURE — 71250 CT THORAX DX C-: CPT

## 2020-05-22 PROCEDURE — 83540 ASSAY OF IRON: CPT

## 2020-05-22 PROCEDURE — 82728 ASSAY OF FERRITIN: CPT

## 2020-05-22 PROCEDURE — 84466 ASSAY OF TRANSFERRIN: CPT

## 2020-05-22 PROCEDURE — 85025 COMPLETE CBC W/AUTO DIFF WBC: CPT

## 2020-05-22 RX ADMIN — DIATRIZOATE MEGLUMINE AND DIATRIZOATE SODIUM 30 ML: 660; 100 LIQUID ORAL; RECTAL at 08:24

## 2020-05-27 ENCOUNTER — OFFICE VISIT (OUTPATIENT)
Dept: ONCOLOGY | Facility: CLINIC | Age: 85
End: 2020-05-27

## 2020-05-27 ENCOUNTER — APPOINTMENT (OUTPATIENT)
Dept: LAB | Facility: HOSPITAL | Age: 85
End: 2020-05-27

## 2020-05-27 VITALS
DIASTOLIC BLOOD PRESSURE: 70 MMHG | BODY MASS INDEX: 39.83 KG/M2 | TEMPERATURE: 98.4 F | RESPIRATION RATE: 12 BRPM | HEIGHT: 63 IN | WEIGHT: 224.8 LBS | SYSTOLIC BLOOD PRESSURE: 166 MMHG | HEART RATE: 79 BPM | OXYGEN SATURATION: 97 %

## 2020-05-27 DIAGNOSIS — C18.2 MALIGNANT NEOPLASM OF ASCENDING COLON (HCC): Primary | ICD-10-CM

## 2020-05-27 DIAGNOSIS — N28.89 RIGHT RENAL MASS: ICD-10-CM

## 2020-05-27 PROCEDURE — 99214 OFFICE O/P EST MOD 30 MIN: CPT | Performed by: INTERNAL MEDICINE

## 2020-05-27 NOTE — PROGRESS NOTES
Subjective .     REASONS FOR FOLLOWUP:    1. Anemia secondary to iron deficiency, intolerant of oral iron, status post Venofer 100 mg on 09/21/11, followed by Feraheme x two doses, 09/28 and 10/05/11.  Recurrent iron deficiency requiring Feraheme again on 09/24/12 and 10/05/12.  Further recurrence of iron deficiency requiring Feraheme on 10/5 in 10/12/17.  2. No definitive evidence of GI blood loss, stool cards negative for occult blood, status post GI evaluation with Dr. Curran with no bleeding source identified.   3. History of gastric polyps, status post staged excision via EGD.    4. Status post left nephrectomy in 2004 for renal cell carcinoma.   5. Stage III chronic kidney disease.   6. Stage I ([id number]) left papillary thyroid cancer (3mm): Status post total thyroidectomy 11/16/15, 2 lymph nodes removed.  7. Stage IIa (T3N0M0) ascending colon cancer: Preoperative CEA 1.65 on 4/25/18.  Right hemicolectomy 4/27/18, invasive moderately differentiated adenocarcinoma, negative margins, positive lymphovascular invasion, negative perineural invasion, 10 lymph nodes negative, ulcerated small adjacent pericolonic abscess (microscopic perforation).  Microsatellite stable.  8. Genetics evaluation 7/31/18 with INVITAE panel test showing VUS MLH3 and MSH6  9. 2.2 cm right renal lesion, indeterminate on CT 4/26/18, felt to represent proteinaceous cyst by urology.  Stable on follow-up CT 4/24/2019.  10. Atrial fibrillation continuing on anticoagulation with Eliquis.    HISTORY OF PRESENT ILLNESS:  The patient is a 84 y.o. year old female who is here for follow-up with the above-mentioned history.    History of Present Illness the patient returns today in follow-up with laboratory studies and CT scans to review.  She was due for colonoscopy in March however this is been delayed due to the viral pandemic and has been scheduled with Dr. Curran on 6/22/2020.  Since her last visit, the patient reports that she has continued  with chronic alternating constipation and diarrhea.  This is been present ever since time of her colonic surgery.  She does use occasional Imodium which helps.  Her appetite has been normal.  She has no other specific complaints except for some redness and pruritus around her toes on the right side as well as her heel.  She has been using some hydrocortisone cream topically with some improvement in the pruritus.        PAST MEDICAL HISTORY:    1.  Atrial fibrillation.  The patient underwent ablation procedure in 2004, repeat ablation procedure on 09/13/11 by Dr. Melendez.  Subsequent AV deshawn ablation and March 2017 and subsequent cardioversion in May 2017.  Continuation of anticoagulation with Eliquis  2.  Gastroesophageal reflux disease.  3.  Hypertension.   4.  Status post left knee surgery.   5.  Status post pacemaker placement.   6.  Status post laparoscopic cholecystectomy.   7.  Status post bladder tacking procedure.   8.  Status post EGD and colonoscopy with Dr. Curran 08/08/11.  Finding of gastric polyps, one of which was resected showing a hyperplastic polyp with gastritis.  There was no source of bleeding identified.  Schatzki's ring was identified as well as diverticulosis.  Repeat EGD on 03/18/13 with resection of a 3 cm prepyloric hyperplastic polyp of acute and chronic inflammatory change. Repeat EGD, October 2013 with further resection of polyp, benign findings.  EGD 10/23/17 with small gastric polyps identified, no evidence of active bleeding.  9.  Bladder suspension surgery in 5/12.  10.  Status post left knee replacement in May 2013.    Past Medical History:   Diagnosis Date   • Abnormal ECG    • Acute bronchitis    • Acute sinusitis    • Anemia     Iron deficiency   • Anesthesia complication    • Anticoagulated on warfarin    • Arthritis    • Asthma     Shortness of breath upon exertion   • Bladder dysfunction    • Cancer of left kidney (CMS/HCC) 06/08/2004    S/P Left Radical Neprectomy   •  Cardiac disease    • Chronic dysfunction of left eustachian tube    • CKD (chronic kidney disease)     stage 3   • Colon cancer (CMS/HCC)    • Colon polyps    • Depression    • Diarrhea    • Diverticulosis 08/08/2011    Descending Colon & Sigmoid Colon multiple diverticula   • Epigastric pain    • Fatigue    • Frequent urination    • GERD (gastroesophageal reflux disease)    • Goiter     thyroid removed in november 2015   • Health care maintenance    • History of colitis    • History of shingles    • History of transfusion    • Hypertension    • Insomnia    • Left atrial enlargement    • Left ventricular hypertrophy    • Mitral regurgitation     mild to moderate per echocardiogram 2016   • Nerve pain     LUE D/T SHINGLES   • Neuropathy     ARMS   • Non morbid obesity due to excess calories    • NANCY (obstructive sleep apnea)     uses CPAP   • Paroxysmal atrial fibrillation (CMS/HCC)    • Permanent atrial fibrillation    • Pulmonary hypertension (CMS/HCC)     per echo 2016   • Right atrial enlargement    • S/P AV deshawn ablation    • S/P placement of cardiac pacemaker    • Sleep apnea     on CPAP   • SSS (sick sinus syndrome) (CMS/HCC)    • Third degree AV block (CMS/HCC)    • Thyroid cancer (CMS/HCC)    • URI (upper respiratory infection)    • Urinary urgency      Past Surgical History:   Procedure Laterality Date   • ATRIAL ABLATION SURGERY N/A 09/13/2011    Comprehensive electrophysiology study, Left atrial pace & sense, 3-dimensional cardia mapping, Radiofrequency catheter ablation, Transseptal hear catheterization, Dr. Maldonado Melendez   • ATRIAL ABLATION SURGERY N/A 02/11/2004    Dr. Maldonado Melendez   • BLADDER SURGERY N/A     Bladder tacking procedure   • BLADDER SUSPENSION N/A 05/2012   • CARDIAC ELECTROPHYSIOLOGY PROCEDURE N/A 5/17/2016    Procedure: PPM generator change - dual BOSTON;  Surgeon: Maldonado Melendez MD;  Location:  INVASIVE LOCATION;  Service:    • CARDIAC ELECTROPHYSIOLOGY PROCEDURE N/A  3/23/2017    Procedure: AV node ablation pt has BOSTON PPM;  Surgeon: Maldonado Melendez MD;  Location: [facility] CATH INVASIVE LOCATION;  Service:    • CARDIOVERSION N/A 01/26/2017    Dr. Melendez   • CARDIOVERSION N/A 05/25/2017    Dr. Melendez   • CATARACT EXTRACTION Bilateral     Dr. Gramajo   • COLON RESECTION Right 4/27/2018    Procedure: COLON RESECTION RIGHT LAPAROSCOPIC, possible open;  Surgeon: Rebeca Curran MD;  Location: [facility] OR;  Service: General   • COLONOSCOPY N/A 04/01/2003    Normal colonoscopy except for an occasional diverticulosis, Dr. Nathan Macias   • COLONOSCOPY N/A 4/25/2018    Procedure: COLONOSCOPY INTO CECUM AND TI WITH SALINE LIFT, HOT SNARE POLYPECTOMIES, BX'S, SPOT INJECTION, RESOLUTION CLIPS X2;  Surgeon: Rebeca Curran MD;  Location: [facility] ENDOSCOPY;  Service: Gastroenterology   • CYSTOSCOPY BOTOX INJECTION OF BLADDER N/A 6/27/2016    Procedure: CYSTOSCOPY WITH BOTOX DETRUSOR INJECTION;  Surgeon: Salome Bowen MD;  Location: [facility] MAIN OR;  Service:    • CYSTOSCOPY BOTOX INJECTION OF BLADDER N/A 05/29/2015    Dr. Salome Bowen   • ENDOSCOPY N/A 06/10/2015    Antral Polyp: Gastric Mucosa w/ marked cautery artifact,  Hiatal Hernia, Dr. Rebeca Curran   • ENDOSCOPY N/A 10/28/2013    Gastric polyp: polypoid hyperplastic gastric mucosa w/ focal ulceration, mixed acute & chronic inflammation.  Negative for intestinal metaplasia, no helicobacter organisms identified on diff-wuik stain, Dr. Rebeca Curran   • ENDOSCOPY N/A 03/18/2013    Large prepyloric polyp, partially removed: fragment of polypoid hyperplastic gastric mucosa w/ foci of erosion & patchy mixed acute & chronic inflammation. No helicobacter organisms identified on diff-quick stain. Negative for intestinal metaplasia, negative for dysplasia, Dr. Rebeca Curran   • ENDOSCOPY N/A 10/24/2012    large prepyloric mass (3cm) w/ adjacent nodules: hyperplastic polyp w/ mild chronic active gastritis, focal erosion & associated,  Multiple fundic polyps: polypoid mucosal hyperplasia, Dr. Rebeca Curran   • ENDOSCOPY N/A 03/11/2003    Gastric Antral Biopsies: Fragments of Gastric Mucosa & Necrotic Tissue (Compatible w/ Ulcer) w/ chronic active inflammation.  Negative stain for Helicobacter organisms. Dr. Nathan Macias   • ENDOSCOPY N/A 10/23/2017    Procedure: ESOPHAGOGASTRODUODENOSCOPY;  Surgeon: Rebeca Curran MD;  Location: [facility] ENDOSCOPY;  Service:    • ENDOSCOPY AND COLONOSCOPY N/A 08/08/2011    4cm hiatal hernia, Schatzki's ring, Diverticulosis, Large antral polyps, Dr. Rebeca Curran   • INCISIONAL HERNIA REPAIR N/A 06/11/2015    Defect approximately 2.5 cm w/ a mass of incarcerated tissue approximately the size of a nectarine, Dr. Rebeca Curran   • INSERT / REPLACE / REMOVE PACEMAKER     • KNEE ARTHROPLASTY     • KNEE MINI REVISION Right 9/28/2016    Procedure: RT KNEE POLY INSERT CHANGE ;  Surgeon: Tommy Avila MD;  Location: [facility] OR;  Service:    • LAPAROSCOPIC CHOLECYSTECTOMY N/A    • NEPHRECTOMY RADICAL Left 06/08/2004    Incidentially found left renal mass 3-4 cm renal cell carcinoma, left lower pole, Dr. Salome Bowen   • OTHER SURGICAL HISTORY      NEUROMUSCULAR BLOCKERS BOTULINUM TOXIN ONABOTULINUMTOXIN A   • PACEMAKER IMPLANTATION N/A     Dr. Casillas   • PACEMAKER REPLACEMENT N/A 09/24/2010    Implanted Generator is a [employer] ALTRUA 60 model S603DR, serial # [id number], Dr. Chavez Jimenez   • TOTAL KNEE ARTHROPLASTY Left 05/21/2013    Dr. Miguel Pacheco   • TOTAL THYROIDECTOMY N/A 11/16/2015    Dr. Gideon Ashley, [facility]   • TRANSVAGINAL TAPING SUSPENSION N/A 03/16/2009    Mini Sling, Closed suprapubic cystostomy, Dr. Salome Bowen   • TUBAL ABDOMINAL LIGATION Bilateral          HEMATOLOGICONCOLOGIC HISTORY:    The patient was seen during a hospitalization at [facility] in September 2011.  She was admitted with left lower lobe pneumonia following a cardiac ablation procedure.  She has anemia dating back to 2006, at  that time with a hematocrit of 36.7 and MCV of 77.  Her platelets and white count have remained normal.  Hemoglobin in September 2010 was 10.3, MCV 74.  In December 2010 hemoglobin was 10.5 with MCV of 72.  On 08/09/11 hemoglobin was down to 9.8 with MCV of 73.  During the hospitalization hemoglobin had dropped to 8.5 with MCV of 72 and decreased further to 8.0 at which point she was transfused two units of PRBCs.  She was on Coumadin but had no obvious clinical evidence of GI blood loss.  She was evaluated by Dr. Curran on 08/08/11 with EGD and colonoscopy showing evidence of gastric polyps.  One polyp was biopsied showing evidence of a hyperplastic polyp with gastritis.  There was no definitive evidence of bleeding.  She was having some dysphagia and had a Schatzki's ring which was dilated.  She developed rapid ventricular response and therefore the procedure was stopped.  She then underwent cardiac ablation and subsequently developed pneumonia.      During her hospitalization, lab studies were sent including iron studies showing an iron of 11, TIBC 438, transferrin 294, iron percent sat of 3 with a ferritin of 10.8.  B12 was low-normal at 291, folate normal.  Creatinine was in the 1.2 range with estimated GFR in the 40 range (stage III chronic kidney disease).  Erythropoietin level was 66.  A hemoglobin electrophoresis was sent showing hemoglobin A1 of 97.5%, and hemoglobin A2 of 2.2%.  The patient had stool checked for occult blood in the hospital which again was negative, therefore, with no definitive evidence of GI blood loss.  I suspect she may have a malabsorption issue.  She was intolerant of oral iron and treated with Venofer 100 mg on 09/21/11 and subsequently was discharged from the hospital and treated as an outpatient with Feraheme 510 mg on 09/28/11 and again on 10/05/11.      The patient returned for followup studies on 10/19/11 with hemoglobin that improved to the 13 range and MCV up to 79.  TIBC  was 284, iron percent sat 32, ferritin 335.  Reticulated hemoglobin had improved to 32.9.  B12 was 476.  We will follow her counts over time.  She will followup with Dr. Curran regarding her gastric polyps.      Laboratory studies on 02/15/12 showed a ferritin stable at 112.  We will recheck at a 6 month interval.     Lab studies from 9/10/12 shows a hemoglobin 11.9, reticulated hemoglobin 29.9, TIBC 325, iron percent SAT of 11.  Ferritin down to 18.      Feraheme administered on 9/24/12 and 10/5/12.  Repeat iron studies on 3/15/13 showed a ferritin of 101, iron percent saturation of 31.       Labs 10/10/2013 with a ferritin of 85, iron percent sat down to 15, hemoglobin of 12.9. Recheck in six months.    Labs 04/20/2015 showed a ferritin stable in the normal range at 76.     The patient returned after a prolonged absence from our office on 9/21/17 with hemoglobin of 12.0, MCV 79.2, iron 37, ferritin 20, iron saturation 8%, TIBC 441 CONSISTENT with recurrent iron deficiency.  Folate was normal at 10.52, B12 normal at 398, erythropoietin 32.6.  Additional Feraheme initiated on 10/5/17 for 2 doses.  Repeat EGD with Dr. Curran on 10/23/17 with no active bleeding, small less than 6 mm gastric polyps identified.       ONCOLOGIC HISTORY:   Status post left nephrectomy in 2004 for renal cell carcinoma with Dr. Bowen.  This was a Darnell grade 2 lesion measuring 3.0 cm, confined to the kidney, with no extension to perinephric adipose tissue, and no evidence of lymphovascular invasion.  Margins were negative.  Adrenal gland was negative.      CT scan of the abdomen and pelvis o 02/26/12 showed no evidence of recurrent disease.      Stage I ([id number]) left papillary thyroid cancer (3mm): Status post total thyroidectomy 11/16/15, 2 lymph nodes removed.      Stage IIa (T3N0M0) ascending colon cancer: Preoperative CEA 1.65 on 4/25/18.  Right hemicolectomy 4/27/18, invasive moderately differentiated adenocarcinoma, negative  margins, positive lymphovascular invasion, negative perineural invasion, 10 lymph nodes negative, ulcerated small adjacent pericolonic abscess (microscopic perforation).  Microsatellite stable.    Genetics evaluation 7/31/18 with INVITAE panel test showing VUS MLH3 and MSH6      Current Outpatient Medications on File Prior to Visit   Medication Sig Dispense Refill   • B Complex-C (SUPER B COMPLEX PO) Take 1 tablet by mouth Daily.     • CALCIUM PO Take 600 Units by mouth Daily. With D3, listed separetly     • cholecalciferol (VITAMIN D3) 1000 UNITS tablet Take 1,000 Units by mouth daily. In addition to the calcium -D3 pill     • ELIQUIS 5 MG tablet tablet TAKE ONE TABLET BY MOUTH EVERY 12 HOURS 60 tablet 1   • esomeprazole (nexIUM) 40 MG capsule Take 40 mg by mouth Every Morning Before Breakfast.     • furosemide (LASIX) 20 MG tablet Take 2 tablets by mouth Daily. 60 tablet 4   • GLUCOSAMINE-CHONDROITIN PO Take 1 tablet by mouth Daily.     • lisinopril (PRINIVIL,ZESTRIL) 10 MG tablet TAKE ONE TABLET BY MOUTH DAILY 90 tablet 2   • loperamide (IMODIUM) 2 MG capsule Take 2 mg by mouth 4 (four) times a day as needed for diarrhea.     • melatonin 5 MG tablet tablet Take 5 mg by mouth.     • metoprolol tartrate (LOPRESSOR) 25 MG tablet TAKE ONE TABLET BY MOUTH TWICE A  tablet 1   • Multiple Vitamins-Minerals (MULTIVITAMIN PO) Take 1 tablet by mouth Daily.     • Multiple Vitamins-Minerals (PRESERVISION AREDS PO) Take 2 tablets by mouth Daily.     • SYNTHROID 112 MCG tablet Take 125 mcg by mouth Daily.     • XIIDRA 5 % solution Apply 1 drop to eye 2 (Two) Times a Day.       No current facility-administered medications on file prior to visit.        ALLERGIES:     Allergies   Allergen Reactions   • Sulfa Antibiotics Swelling     FACE AND TONGUE   • Adhesive Tape Rash   • Levaquin [Levofloxacin] Rash     Social History     Socioeconomic History   • Marital status:      Spouse name: Not on file   • Number of  children: 0   • Years of education: Not on file   • Highest education level: Not on file   Occupational History   • Occupation: Homemaker     Employer: RETIRED   Tobacco Use   • Smoking status: Never Smoker   • Smokeless tobacco: Never Used   • Tobacco comment: caffeine use   Substance and Sexual Activity   • Alcohol use: No   • Drug use: No   • Sexual activity: Never     Family History   Problem Relation Age of Onset   • Heart disease Other    • Deep vein thrombosis Other    • Hypertension Other    • Breast cancer Sister         In her 60s.   • Breast cancer Maternal Aunt    • Breast cancer Sister         In her 60s.   • Heart disease Mother    • Hypertension Mother    • Heart disease Father    • Hypertension Father    • Heart attack Father    • Heart disease Daughter    • Hypertension Daughter    • Heart disease Son    • Hypertension Son    • Malig Hyperthermia Neg Hx        Review of Systems   Constitutional: Positive for fatigue. Negative for activity change, appetite change, fever and unexpected weight change.   HENT: Negative for congestion, mouth sores, nosebleeds, sore throat and voice change.    Respiratory: Negative for cough, shortness of breath and wheezing.    Cardiovascular: Negative for chest pain, palpitations and leg swelling.   Gastrointestinal: Positive for constipation and diarrhea. Negative for abdominal distention, abdominal pain, blood in stool, nausea and vomiting.   Endocrine: Negative for cold intolerance and heat intolerance.   Genitourinary: Negative for difficulty urinating, dysuria, frequency and hematuria.   Musculoskeletal: Positive for arthralgias. Negative for back pain, joint swelling and myalgias.   Skin: Negative for rash.   Neurological: Negative for dizziness, syncope, weakness, light-headedness, numbness and headaches.   Hematological: Negative for adenopathy. Does not bruise/bleed easily.   Psychiatric/Behavioral: Negative for confusion and sleep disturbance. The patient is  not nervous/anxious.          Objective      Vitals:    05/27/20 1533   BP: 166/70   Pulse: 79   Resp: 12   Temp: 98.4 °F (36.9 °C)   SpO2: 97%      Current Status 5/27/2020   ECOG score 0   pain 0/10    Physical Exam   Constitutional: She is oriented to person, place, and time. She appears well-developed and well-nourished.   HENT:   Mouth/Throat: Oropharynx is clear and moist.   Eyes: Conjunctivae are normal.   Neck: No thyromegaly present.   Cardiovascular: Normal rate. Exam reveals no gallop and no friction rub.   No murmur heard.  Irregularly irregular   Pulmonary/Chest: Breath sounds normal. No respiratory distress.   Abdominal: Soft. Bowel sounds are normal. She exhibits no distension. There is no tenderness.   Musculoskeletal: She exhibits edema.   Trace bilateral lower extremity edema with venous stasis changes   Lymphadenopathy:        Head (right side): No submandibular adenopathy present.     She has no cervical adenopathy.     She has no axillary adenopathy.        Right: No inguinal and no supraclavicular adenopathy present.        Left: No inguinal and no supraclavicular adenopathy present.   Neurological: She is alert and oriented to person, place, and time. She displays normal reflexes. No cranial nerve deficit. She exhibits normal muscle tone.   Skin: Skin is warm and dry. No rash noted.   Slight erythema around the toes on the right side and the right heel   Psychiatric: She has a normal mood and affect. Her behavior is normal.       RECENT LABS:  Hematology WBC   Date Value Ref Range Status   05/22/2020 6.96 3.40 - 10.80 10*3/mm3 Final     RBC   Date Value Ref Range Status   05/22/2020 4.42 3.77 - 5.28 10*6/mm3 Final     Hemoglobin   Date Value Ref Range Status   05/22/2020 12.5 12.0 - 15.9 g/dL Final     Hematocrit   Date Value Ref Range Status   05/22/2020 40.1 34.0 - 46.6 % Final     Platelets   Date Value Ref Range Status   05/22/2020 265 140 - 450 10*3/mm3 Final        Lab Results    Component Value Date    GLUCOSE 121 05/22/2020    BUN 23 (H) 05/22/2020    CREATININE 1.41 (H) 05/22/2020    EGFRIFNONA 36 (L) 05/22/2020    EGFRIFAFRI 50 (L) 12/06/2017    BCR 16.3 05/22/2020    K 4.4 05/22/2020    CO2 24.1 05/22/2020    CALCIUM 9.3 05/22/2020    PROTENTOTREF 7.4 12/06/2017    ALBUMIN 3.90 05/22/2020    LABIL2 1.3 12/06/2017    AST 25 05/22/2020    ALT 19 05/22/2020     Reviewed additional lab studies from 5/22/2020 in addition to CT scan results from the same date as outlined above and below.  I did personally review CT images.      Assessment/Plan      1. Recurrent iron deficiency anemia:   · The patient is intolerant of oral iron.   · Prior GI evaluation showed no definitive evidence of GI blood loss.   · She has underlying stage III chronic kidney disease, which is a contributing factor to her mild borderline anemia.   · She has been treated in the past with IV iron in 2011 and 2012.    · She had a lengthy absence from our office from 2015 until she was referred back on 9/21/17 with evidence of recurrent iron deficiency, likely related to malabsorption.  Her ferritin on 7/7/17 was 21 and hemoglobin on 8/7/17 was 11.1.  Her degree of anemia was mild with a hemoglobin of 11.4, microcytic indices with an MCV of 78.  Labs on 9/21/17 showed ferritin 20.5, iron saturation 8%, TIBC 441.  We did also check B12 and folate which were normal.  Erythropoietin was 32 consistent with a component of anemia secondary to chronic kidney disease. She did return stool cards for occult blood which were negative ×3 10/5/17.  She received additional Feraheme on 10/5 and 10/12/17.    · She underwent EGD with Dr. Curran 10/23/17 with no evidence of active bleeding, small gastric polyps identified.   · Labs 12/20/17 with hemoglobin up to 13.3, ferritin 97.9, iron saturation of 16%.    · Repeat labs performed postoperatively from colon resection on 5/4/18 showed iron 36, ferritin 99, iron saturation 10%, TIBC 349.   "  · The patient returns today in follow-up with labs from 5/22/2028.  These show a normal hemoglobin at 12.5 and stable iron studies with iron 71, ferritin 70, iron saturation 18%, TIBC 393.  We will consider checking labs again at a 6-month interval given stability of the values.  She has had no clinical evidence of GI blood loss.  2. Stage IIa (T3N0M0) ascending colon cancer:   · Developed significant diarrhea with blood per rectum.    · Colonoscopy 4/25/18 with identification of polyps in the rectum, cecal polyp, mass in the right colon.  The cecal polyp was a sessile serrated adenoma, ascending colon \"mass\" showed hyperplastic polyp, rectal polyp was a tubulovillous adenoma with low-grade dysplasia.  CEA on 4/25/18 was normal at 1.65.  Chest x-ray 4/25/18 was unrevealing.  CT of the abdomen and pelvis 4/26/18 showed a 4 cm annular mass in the ascending colon with adjacent haziness in the mesentery but no lymphadenopathy, no evidence of metastatic disease.    · Right hemicolectomy on 4/27/18 with pathology showing an invasive moderately differentiated adenocarcinoma arising in a sessile serrated adenoma with negative margins measuring 2.5 cm extending through the muscularis propria into brad-colorectal tissue, positive lymphovascular invasion, negative perineural invasion, 10 lymph nodes negative.  There was a comment regarding an ulcerated small adjacent pericolonic abscess, raising the question of possible microscopic perforation.  Patient did have a few high risk features with less than 12 lymph nodes removed, positive lymphovascular invasion, and some possibility of microscopic perforation.  The tumor was microsatellite stable.    · Given her age and comorbidities, the patient was a poor candidate for adjuvant chemotherapy and was not recommended.  Independent of this recommendation, the patient stated she was not interested in taking any adjuvant chemotherapy.  Therefore we will plan to monitor her " clinically for evidence of recurrent disease.  Standard monitoring is with routine CEA levels however this does not appear to be informative for her given her normal preoperative value.  We will pursue annual interval follow-up CT.    · CT scan from 4/24/2019 at a 1 year interval showed no evidence of recurrent disease.    · The patient returns today and slightly delayed follow-up with labs and 1 year interval follow-up CT to review.  Clinically she has been doing well with chronic mild alternating constipation and diarrhea ever since the time of her colonic surgery that has remained stable.  She uses occasional Imodium which helps.  We reviewed labs 5/22/2020 with CEA normal at 1.69 and CT scan chest abdomen and pelvis 5/22/2020 showing no evidence of recurrent/metastatic disease.  The patient is scheduled to undergo upcoming colonoscopy with Dr. Curran on 6/22/2020.  I will see her back shortly after the procedure.  We will plan continued 6-month interval monitoring of CEA level and annual CT at this point until she is 5 years out from surgery.  3. 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    · Family history includes a sister with breast cancer around age 60, another sister with breast cancer around age 60, maternal aunt with breast cancer over the age of 50, maternal uncle with lung cancer.    · Given the patient's multiple malignancies and family history, she was referred to the genetics clinic to discuss potential genetic testing, mainly for benefit of the patient's children and other family members.    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   4. History of gastric polyps:   · EGD 10/23/17 with small less than 6 mm gastric polyps, not resected.  5. History of renal cell carcinoma:   · The patient underwent a left nephrectomy in 2004 with no clinical evidence of recurrent disease.    · CT chest from 11/14/16 showed no evidence of recurrence.    · CT abdomen and pelvis however from 4/26/18 showed a 2.2 cm right renal lesion which was indeterminate, possibly a proteinaceous cyst but could not rule out mass lesion.  The study was performed without contrast due to the patient's underlying chronic kidney disease.  She is unable to undergo renal mass protocol CT with contrast.  She is unable to undergo MRI due to her pacemaker.    · Follow-up CT scan 4/24/2019 with no change in the right renal lesion at 2.1 cm, repeat at 1 year interval  · 1 year interval follow-up CT (delayed due to viral pandemic) 5/22/2020 with increase in right renal mass from 2.1 up to 2.6 cm.  This is felt to possibly represent a proteinaceous cyst versus solid mass.  Again she is unable to undergo a renal protocol CT due to her renal dysfunction and solitary kidney and cannot undergo MRI due to her pacemaker.  I did suggest that we obtain renal ultrasound to evaluate further.  She has seen Dr. Trujillo in urology regarding this issue in 2018 and I will refer her back for him to review the current CT as well.  6. CKD3:   · Baseline creatinine in 1.2-1.8 range  · Contributing factor to anemia    PLAN:   1. We will obtain renal ultrasound within the next 1 to 2 weeks  2. Refer the patient back to Dr. Trujillo in urology regarding the right renal lesion that has increased in size on the most recent CT  3. The patient is scheduled to undergo colonoscopy with Dr. Curran on 6/22/2020.  4. Follow-up visit in 1 month with CBC, CMP.

## 2020-06-04 ENCOUNTER — HOSPITAL ENCOUNTER (OUTPATIENT)
Dept: ULTRASOUND IMAGING | Facility: HOSPITAL | Age: 85
Discharge: HOME | End: 2020-06-04
Admitting: INTERNAL MEDICINE

## 2020-06-04 DIAGNOSIS — C18.2 MALIGNANT NEOPLASM OF ASCENDING COLON (HCC): ICD-10-CM

## 2020-06-04 PROCEDURE — 76775 US EXAM ABDO BACK WALL LIM: CPT

## 2020-06-16 ENCOUNTER — TRANSCRIBE ORDERS (OUTPATIENT)
Dept: SLEEP MEDICINE | Facility: HOSPITAL | Age: 85
End: 2020-06-16

## 2020-06-16 ENCOUNTER — TELEPHONE (OUTPATIENT)
Dept: CARDIOLOGY | Facility: CLINIC | Age: 85
End: 2020-06-16

## 2020-06-16 DIAGNOSIS — Z01.818 OTHER SPECIFIED PRE-OPERATIVE EXAMINATION: Primary | ICD-10-CM

## 2020-06-16 NOTE — TELEPHONE ENCOUNTER
Pt is scheduled for a colonoscopy 6/22 and is asking to hold her apixaban 2 days prior to procedure. Pt has NO history of stroke or valve replacement...Jeannette

## 2020-06-19 ENCOUNTER — APPOINTMENT (OUTPATIENT)
Dept: LAB | Facility: HOSPITAL | Age: 85
End: 2020-06-19

## 2020-06-29 ENCOUNTER — HOSPITAL ENCOUNTER (OUTPATIENT)
Dept: GENERAL RADIOLOGY | Facility: HOSPITAL | Age: 85
Discharge: HOME | End: 2020-06-29
Admitting: NURSE PRACTITIONER

## 2020-06-29 ENCOUNTER — OFFICE VISIT (OUTPATIENT)
Dept: INTERNAL MEDICINE | Facility: CLINIC | Age: 85
End: 2020-06-29

## 2020-06-29 VITALS
SYSTOLIC BLOOD PRESSURE: 128 MMHG | TEMPERATURE: 98.5 F | BODY MASS INDEX: 39.28 KG/M2 | WEIGHT: 221.7 LBS | HEART RATE: 75 BPM | OXYGEN SATURATION: 96 % | DIASTOLIC BLOOD PRESSURE: 64 MMHG | HEIGHT: 63 IN

## 2020-06-29 DIAGNOSIS — J18.9 PNEUMONIA OF BOTH LOWER LOBES DUE TO INFECTIOUS ORGANISM: Primary | ICD-10-CM

## 2020-06-29 PROCEDURE — 71046 X-RAY EXAM CHEST 2 VIEWS: CPT

## 2020-06-29 PROCEDURE — 99213 OFFICE O/P EST LOW 20 MIN: CPT | Performed by: NURSE PRACTITIONER

## 2020-06-29 NOTE — PROGRESS NOTES
"Subjective   Anastacia Sarah is a 84 y.o. female.     History of Present Illness    The patient is here today with c/o pneumonia. Went to [facility], received Doxy with out relief. She finished her antibiotic on Wednesday. No temp but feels cold. Exertion causes SOB. She is trying cough but nothing comes up.     Dtr here to assist patient.     Was also on macrobid for UTI recently.     The following portions of the patient's history were reviewed and updated as appropriate: allergies, current medications, past family history, past medical history, past social history, past surgical history and problem list.    Review of Systems   Constitutional: Positive for chills and fatigue. Negative for fever.   HENT: Positive for congestion and rhinorrhea (\"always\"). Negative for sore throat.    Respiratory: Positive for cough, shortness of breath and wheezing (occ).    Cardiovascular: Positive for leg swelling (improved some). Negative for chest pain and palpitations.       Objective   Physical Exam   Constitutional: She appears well-developed and well-nourished.   Neck: Normal range of motion. Neck supple. No thyromegaly present.   Cardiovascular: Normal rate, regular rhythm, normal heart sounds and intact distal pulses.   Pulmonary/Chest: Effort normal and breath sounds normal.   Lymphadenopathy:     She has no cervical adenopathy.   Skin: Skin is warm and dry.   Psychiatric: She has a normal mood and affect. Her behavior is normal. Judgment and thought content normal.       Vitals:    06/29/20 1433   BP: 128/64   Pulse: 75   Temp: 98.5 °F (36.9 °C)   SpO2: 96%     Body mass index is 39.88 kg/m².      Assessment/Plan   Anastacia was seen today for pneumonia.    Diagnoses and all orders for this visit:    Pneumonia of both lower lobes due to infectious organism                 1. Pneumonia- exam looks good, lungs clear, check CXR today, hold on further antibiotic for now. Cough and deep breath, slowly increase activity as " tolerated. OK to add on mucinex with hydration.

## 2020-06-30 ENCOUNTER — LAB (OUTPATIENT)
Dept: LAB | Facility: HOSPITAL | Age: 85
End: 2020-06-30

## 2020-06-30 ENCOUNTER — OFFICE VISIT (OUTPATIENT)
Dept: ONCOLOGY | Facility: CLINIC | Age: 85
End: 2020-06-30

## 2020-06-30 VITALS
TEMPERATURE: 98.1 F | RESPIRATION RATE: 18 BRPM | WEIGHT: 222.7 LBS | HEIGHT: 63 IN | BODY MASS INDEX: 39.46 KG/M2 | SYSTOLIC BLOOD PRESSURE: 141 MMHG | HEART RATE: 80 BPM | OXYGEN SATURATION: 91 % | DIASTOLIC BLOOD PRESSURE: 88 MMHG

## 2020-06-30 DIAGNOSIS — C18.2 MALIGNANT NEOPLASM OF ASCENDING COLON (HCC): ICD-10-CM

## 2020-06-30 DIAGNOSIS — C18.2 MALIGNANT NEOPLASM OF ASCENDING COLON (HCC): Primary | ICD-10-CM

## 2020-06-30 DIAGNOSIS — D50.8 OTHER IRON DEFICIENCY ANEMIA: ICD-10-CM

## 2020-06-30 DIAGNOSIS — N28.89 RENAL MASS, RIGHT: ICD-10-CM

## 2020-06-30 LAB
ALBUMIN SERPL-MCNC: 3.8 G/DL (ref 3.5–5.2)
ALBUMIN/GLOB SERPL: 1.1 G/DL (ref 1.1–2.4)
ALP SERPL-CCNC: 105 U/L (ref 38–116)
ALT SERPL W P-5'-P-CCNC: 27 U/L (ref 0–33)
ANION GAP SERPL CALCULATED.3IONS-SCNC: 13.8 MMOL/L (ref 5–15)
AST SERPL-CCNC: 28 U/L (ref 0–32)
BASOPHILS # BLD AUTO: 0.06 10*3/MM3 (ref 0–0.2)
BASOPHILS NFR BLD AUTO: 0.8 % (ref 0–1.5)
BILIRUB SERPL-MCNC: 0.5 MG/DL (ref 0.2–1.2)
BUN SERPL-MCNC: 31 MG/DL (ref 6–20)
BUN/CREAT SERPL: 20.1 (ref 7.3–30)
CALCIUM SPEC-SCNC: 9.6 MG/DL (ref 8.5–10.2)
CHLORIDE SERPL-SCNC: 103 MMOL/L (ref 98–107)
CO2 SERPL-SCNC: 23.2 MMOL/L (ref 22–29)
CREAT SERPL-MCNC: 1.54 MG/DL (ref 0.6–1.1)
DEPRECATED RDW RBC AUTO: 49.6 FL (ref 37–54)
EOSINOPHIL # BLD AUTO: 0.25 10*3/MM3 (ref 0–0.4)
EOSINOPHIL NFR BLD AUTO: 3.5 % (ref 0.3–6.2)
ERYTHROCYTE [DISTWIDTH] IN BLOOD BY AUTOMATED COUNT: 15.2 % (ref 12.3–15.4)
GFR SERPL CREATININE-BSD FRML MDRD: 32 ML/MIN/1.73
GLOBULIN UR ELPH-MCNC: 3.4 GM/DL (ref 1.8–3.5)
GLUCOSE SERPL-MCNC: 142 MG/DL (ref 74–124)
HCT VFR BLD AUTO: 38.4 % (ref 34–46.6)
HGB BLD-MCNC: 12.2 G/DL (ref 12–15.9)
IMM GRANULOCYTES # BLD AUTO: 0.03 10*3/MM3 (ref 0–0.05)
IMM GRANULOCYTES NFR BLD AUTO: 0.4 % (ref 0–0.5)
LYMPHOCYTES # BLD AUTO: 1.7 10*3/MM3 (ref 0.7–3.1)
LYMPHOCYTES NFR BLD AUTO: 23.7 % (ref 19.6–45.3)
MCH RBC QN AUTO: 28.4 PG (ref 26.6–33)
MCHC RBC AUTO-ENTMCNC: 31.8 G/DL (ref 31.5–35.7)
MCV RBC AUTO: 89.3 FL (ref 79–97)
MONOCYTES # BLD AUTO: 0.69 10*3/MM3 (ref 0.1–0.9)
MONOCYTES NFR BLD AUTO: 9.6 % (ref 5–12)
NEUTROPHILS NFR BLD AUTO: 4.43 10*3/MM3 (ref 1.7–7)
NEUTROPHILS NFR BLD AUTO: 62 % (ref 42.7–76)
NRBC BLD AUTO-RTO: 0 /100 WBC (ref 0–0.2)
PLATELET # BLD AUTO: 289 10*3/MM3 (ref 140–450)
PMV BLD AUTO: 9.6 FL (ref 6–12)
POTASSIUM SERPL-SCNC: 4.6 MMOL/L (ref 3.5–4.7)
PROT SERPL-MCNC: 7.2 G/DL (ref 6.3–8)
RBC # BLD AUTO: 4.3 10*6/MM3 (ref 3.77–5.28)
SODIUM SERPL-SCNC: 140 MMOL/L (ref 134–145)
WBC # BLD AUTO: 7.16 10*3/MM3 (ref 3.4–10.8)

## 2020-06-30 PROCEDURE — 85025 COMPLETE CBC W/AUTO DIFF WBC: CPT

## 2020-06-30 PROCEDURE — 99214 OFFICE O/P EST MOD 30 MIN: CPT | Performed by: INTERNAL MEDICINE

## 2020-06-30 PROCEDURE — 36415 COLL VENOUS BLD VENIPUNCTURE: CPT

## 2020-06-30 PROCEDURE — 80053 COMPREHEN METABOLIC PANEL: CPT

## 2020-06-30 NOTE — PROGRESS NOTES
Subjective .     REASONS FOR FOLLOWUP:    1. Anemia secondary to iron deficiency, intolerant of oral iron, status post Venofer 100 mg on 09/21/11, followed by Feraheme x two doses, 09/28 and 10/05/11.  Recurrent iron deficiency requiring Feraheme again on 09/24/12 and 10/05/12.  Further recurrence of iron deficiency requiring Feraheme on 10/5 in 10/12/17.  2. No definitive evidence of GI blood loss, stool cards negative for occult blood, status post GI evaluation with Dr. Curran with no bleeding source identified.   3. History of gastric polyps, status post staged excision via EGD.    4. Status post left nephrectomy in 2004 for renal cell carcinoma.   5. Stage III chronic kidney disease.   6. Stage I ([id number]) left papillary thyroid cancer (3mm): Status post total thyroidectomy 11/16/15, 2 lymph nodes removed.  7. Stage IIa (T3N0M0) ascending colon cancer: Preoperative CEA 1.65 on 4/25/18.  Right hemicolectomy 4/27/18, invasive moderately differentiated adenocarcinoma, negative margins, positive lymphovascular invasion, negative perineural invasion, 10 lymph nodes negative, ulcerated small adjacent pericolonic abscess (microscopic perforation).  Microsatellite stable.  8. Genetics evaluation 7/31/18 with INVITAE panel test showing VUS MLH3 and MSH6  9. 2.2 cm right renal lesion, indeterminate on CT 4/26/18, felt to represent proteinaceous cyst by urology.  Stable on follow-up CT 4/24/2019.  Subsequent increase in size on CT 5/22/2020 up to 2.6 cm.  Ultrasound 6/4/2020 showed 3.6 cm solid and cystic lesion suspicious for renal cell carcinoma, recommended consideration of surgical excision.  Patient referred back to urology, recommended further monitoring radiographically.  10. Atrial fibrillation continuing on anticoagulation with Eliquis.    HISTORY OF PRESENT ILLNESS:  The patient is a 84 y.o. year old female who is here for follow-up with the above-mentioned history.    History of Present Illness patient  returns today in follow-up with renal ultrasound to review as well as recommendations from recent visit with urology in regards to the enlarging right renal lesion.  She was also scheduled to undergo colonoscopy with Dr. Curran on 6/22/2020.  Patient however developed pneumonia with bilateral infiltrates seen on chest x-ray on 6/18/2020 and was treated with a course of doxycycline.  Her symptoms improved and she had repeat chest x-ray performed 6/29/2020 which was read as negative.  She still has some generalized weakness and shortness of breath which are improving.  She was negative for COVID-19 on 6/18/2020.  Her colonoscopy has been delayed and has not yet been rescheduled.  She was seen by Dr. Trujillo in urology and they discussed continued monitoring of the right renal lesion with follow-up imaging in 6 months.  She has no other complaints today.      PAST MEDICAL HISTORY:    1.  Atrial fibrillation.  The patient underwent ablation procedure in 2004, repeat ablation procedure on 09/13/11 by Dr. Melendez.  Subsequent AV deshawn ablation and March 2017 and subsequent cardioversion in May 2017.  Continuation of anticoagulation with Eliquis  2.  Gastroesophageal reflux disease.  3.  Hypertension.   4.  Status post left knee surgery.   5.  Status post pacemaker placement.   6.  Status post laparoscopic cholecystectomy.   7.  Status post bladder tacking procedure.   8.  Status post EGD and colonoscopy with Dr. Curran 08/08/11.  Finding of gastric polyps, one of which was resected showing a hyperplastic polyp with gastritis.  There was no source of bleeding identified.  Schatzki's ring was identified as well as diverticulosis.  Repeat EGD on 03/18/13 with resection of a 3 cm prepyloric hyperplastic polyp of acute and chronic inflammatory change. Repeat EGD, October 2013 with further resection of polyp, benign findings.  EGD 10/23/17 with small gastric polyps identified, no evidence of active bleeding.  9.  Bladder  suspension surgery in 5/12.  10.  Status post left knee replacement in May 2013.    Past Medical History:   Diagnosis Date   • Abnormal ECG    • Acute bronchitis    • Acute sinusitis    • Anemia     Iron deficiency   • Anesthesia complication    • Anticoagulated on warfarin    • Arthritis    • Asthma     Shortness of breath upon exertion   • Bladder dysfunction    • Cancer of left kidney (CMS/HCC) 06/08/2004    S/P Left Radical Neprectomy   • Cardiac disease    • Chronic dysfunction of left eustachian tube    • CKD (chronic kidney disease)     stage 3   • Colon cancer (CMS/HCC)    • Colon polyps    • Depression    • Diarrhea    • Diverticulosis 08/08/2011    Descending Colon & Sigmoid Colon multiple diverticula   • Epigastric pain    • Fatigue    • Frequent urination    • GERD (gastroesophageal reflux disease)    • Goiter     thyroid removed in november 2015   • Health care maintenance    • History of colitis    • History of shingles    • History of transfusion    • Hypertension    • Insomnia    • Left atrial enlargement    • Left ventricular hypertrophy    • Mitral regurgitation     mild to moderate per echocardiogram 2016   • Nerve pain     LUE D/T SHINGLES   • Neuropathy     ARMS   • Non morbid obesity due to excess calories    • NANCY (obstructive sleep apnea)     uses CPAP   • Paroxysmal atrial fibrillation (CMS/HCC)    • Permanent atrial fibrillation (CMS/HCC)    • Pulmonary hypertension (CMS/HCC)     per echo 2016   • Right atrial enlargement    • S/P AV deshawn ablation    • S/P placement of cardiac pacemaker    • Sleep apnea     on CPAP   • SSS (sick sinus syndrome) (CMS/HCC)    • Third degree AV block (CMS/HCC)    • Thyroid cancer (CMS/HCC)    • URI (upper respiratory infection)    • Urinary urgency      Past Surgical History:   Procedure Laterality Date   • ATRIAL ABLATION SURGERY N/A 09/13/2011    Comprehensive electrophysiology study, Left atrial pace & sense, 3-dimensional cardia mapping, Radiofrequency  catheter ablation, Transseptal hear catheterization, Dr. Maldonado Melendez   • ATRIAL ABLATION SURGERY N/A 02/11/2004    Dr. Maldonado Melendez   • BLADDER SURGERY N/A     Bladder tacking procedure   • BLADDER SUSPENSION N/A 05/2012   • CARDIAC ELECTROPHYSIOLOGY PROCEDURE N/A 5/17/2016    Procedure: PPM generator change - dual BOSTON;  Surgeon: Maldonado Melendez MD;  Location: [facility] CATH INVASIVE LOCATION;  Service:    • CARDIAC ELECTROPHYSIOLOGY PROCEDURE N/A 3/23/2017    Procedure: AV node ablation pt has BOSTON PPM;  Surgeon: Maldonado Melendez MD;  Location: [facility] CATH INVASIVE LOCATION;  Service:    • CARDIOVERSION N/A 01/26/2017    Dr. Melendez   • CARDIOVERSION N/A 05/25/2017    Dr. Melendez   • CATARACT EXTRACTION Bilateral     Dr. Gramajo   • COLON RESECTION Right 4/27/2018    Procedure: COLON RESECTION RIGHT LAPAROSCOPIC, possible open;  Surgeon: Rebeca Curran MD;  Location: [facility];  Service: General   • COLONOSCOPY N/A 04/01/2003    Normal colonoscopy except for an occasional diverticulosis, Dr. Nathan Macias   • COLONOSCOPY N/A 4/25/2018    Procedure: COLONOSCOPY INTO CECUM AND TI WITH SALINE LIFT, HOT SNARE POLYPECTOMIES, BX'S, SPOT INJECTION, RESOLUTION CLIPS X2;  Surgeon: Rebeca Curran MD;  Location: [facility] ENDOSCOPY;  Service: Gastroenterology   • CYSTOSCOPY BOTOX INJECTION OF BLADDER N/A 6/27/2016    Procedure: CYSTOSCOPY WITH BOTOX DETRUSOR INJECTION;  Surgeon: Salome Bowen MD;  Location: [facility] OR;  Service:    • CYSTOSCOPY BOTOX INJECTION OF BLADDER N/A 05/29/2015    Dr. Salome Bowen   • ENDOSCOPY N/A 06/10/2015    Antral Polyp: Gastric Mucosa w/ marked cautery artifact,  Hiatal Hernia, Dr. Rebeca Curran   • ENDOSCOPY N/A 10/28/2013    Gastric polyp: polypoid hyperplastic gastric mucosa w/ focal ulceration, mixed acute & chronic inflammation.  Negative for intestinal metaplasia, no helicobacter organisms identified on diff-wuik stain, Dr. Rebeca Curran   • ENDOSCOPY N/A 03/18/2013    Large  prepyloric polyp, partially removed: fragment of polypoid hyperplastic gastric mucosa w/ foci of erosion & patchy mixed acute & chronic inflammation. No helicobacter organisms identified on diff-quick stain. Negative for intestinal metaplasia, negative for dysplasia, Dr. Rebeca Curran   • ENDOSCOPY N/A 10/24/2012    large prepyloric mass (3cm) w/ adjacent nodules: hyperplastic polyp w/ mild chronic active gastritis, focal erosion & associated, Multiple fundic polyps: polypoid mucosal hyperplasia, Dr. Rebeca Curran   • ENDOSCOPY N/A 03/11/2003    Gastric Antral Biopsies: Fragments of Gastric Mucosa & Necrotic Tissue (Compatible w/ Ulcer) w/ chronic active inflammation.  Negative stain for Helicobacter organisms. Dr. Nathan Macias   • ENDOSCOPY N/A 10/23/2017    Procedure: ESOPHAGOGASTRODUODENOSCOPY;  Surgeon: Rebeca Curran MD;  Location: [facility] ENDOSCOPY;  Service:    • ENDOSCOPY AND COLONOSCOPY N/A 08/08/2011    4cm hiatal hernia, Schatzki's ring, Diverticulosis, Large antral polyps, Dr. Rebeca Curran   • INCISIONAL HERNIA REPAIR N/A 06/11/2015    Defect approximately 2.5 cm w/ a mass of incarcerated tissue approximately the size of a nectarine, Dr. Rebeca Curran   • INSERT / REPLACE / REMOVE PACEMAKER     • KNEE ARTHROPLASTY     • KNEE MINI REVISION Right 9/28/2016    Procedure: RT KNEE POLY INSERT CHANGE ;  Surgeon: Tommy Avila MD;  Location: [facility] OR;  Service:    • LAPAROSCOPIC CHOLECYSTECTOMY N/A    • NEPHRECTOMY RADICAL Left 06/08/2004    Incidentially found left renal mass 3-4 cm renal cell carcinoma, left lower pole, Dr. Salome Bowen   • OTHER SURGICAL HISTORY      NEUROMUSCULAR BLOCKERS BOTULINUM TOXIN ONABOTULINUMTOXIN A   • PACEMAKER IMPLANTATION N/A     Dr. Casillas   • PACEMAKER REPLACEMENT N/A 09/24/2010    Implanted Generator is a USDS ALTRUA 60 model [id number], serial # [id number], Dr. Chavez Jimenez   • TOTAL KNEE ARTHROPLASTY Left 05/21/2013    Dr. Miguel Pacheco   • TOTAL THYROIDECTOMY N/A  11/16/2015    Dr. Gideon Ashley, [facility]   • TRANSVAGINAL TAPING SUSPENSION N/A 03/16/2009    Mini Sling, Closed suprapubic cystostomy, Dr. Salome Bowen   • TUBAL ABDOMINAL LIGATION Bilateral          HEMATOLOGICONCOLOGIC HISTORY:    The patient was seen during a hospitalization at [facility] in September 2011.  She was admitted with left lower lobe pneumonia following a cardiac ablation procedure.  She has anemia dating back to 2006, at that time with a hematocrit of 36.7 and MCV of 77.  Her platelets and white count have remained normal.  Hemoglobin in September 2010 was 10.3, MCV 74.  In December 2010 hemoglobin was 10.5 with MCV of 72.  On 08/09/11 hemoglobin was down to 9.8 with MCV of 73.  During the hospitalization hemoglobin had dropped to 8.5 with MCV of 72 and decreased further to 8.0 at which point she was transfused two units of PRBCs.  She was on Coumadin but had no obvious clinical evidence of GI blood loss.  She was evaluated by Dr. Curran on 08/08/11 with EGD and colonoscopy showing evidence of gastric polyps.  One polyp was biopsied showing evidence of a hyperplastic polyp with gastritis.  There was no definitive evidence of bleeding.  She was having some dysphagia and had a Schatzki's ring which was dilated.  She developed rapid ventricular response and therefore the procedure was stopped.  She then underwent cardiac ablation and subsequently developed pneumonia.      During her hospitalization, lab studies were sent including iron studies showing an iron of 11, TIBC 438, transferrin 294, iron percent sat of 3 with a ferritin of 10.8.  B12 was low-normal at 291, folate normal.  Creatinine was in the 1.2 range with estimated GFR in the 40 range (stage III chronic kidney disease).  Erythropoietin level was 66.  A hemoglobin electrophoresis was sent showing hemoglobin A1 of 97.5%, and hemoglobin A2 of 2.2%.  The patient had stool checked for occult blood in the hospital which again was  negative, therefore, with no definitive evidence of GI blood loss.  I suspect she may have a malabsorption issue.  She was intolerant of oral iron and treated with Venofer 100 mg on 09/21/11 and subsequently was discharged from the hospital and treated as an outpatient with Feraheme 510 mg on 09/28/11 and again on 10/05/11.      The patient returned for followup studies on 10/19/11 with hemoglobin that improved to the 13 range and MCV up to 79.  TIBC was 284, iron percent sat 32, ferritin 335.  Reticulated hemoglobin had improved to 32.9.  B12 was 476.  We will follow her counts over time.  She will followup with Dr. Curran regarding her gastric polyps.      Laboratory studies on 02/15/12 showed a ferritin stable at 112.  We will recheck at a 6 month interval.     Lab studies from 9/10/12 shows a hemoglobin 11.9, reticulated hemoglobin 29.9, TIBC 325, iron percent SAT of 11.  Ferritin down to 18.      Feraheme administered on 9/24/12 and 10/5/12.  Repeat iron studies on 3/15/13 showed a ferritin of 101, iron percent saturation of 31.       Labs 10/10/2013 with a ferritin of 85, iron percent sat down to 15, hemoglobin of 12.9. Recheck in six months.    Labs 04/20/2015 showed a ferritin stable in the normal range at 76.     The patient returned after a prolonged absence from our office on 9/21/17 with hemoglobin of 12.0, MCV 79.2, iron 37, ferritin 20, iron saturation 8%, TIBC 441 CONSISTENT with recurrent iron deficiency.  Folate was normal at 10.52, B12 normal at 398, erythropoietin 32.6.  Additional Feraheme initiated on 10/5/17 for 2 doses.  Repeat EGD with Dr. Curran on 10/23/17 with no active bleeding, small less than 6 mm gastric polyps identified.       ONCOLOGIC HISTORY:   Status post left nephrectomy in 2004 for renal cell carcinoma with Dr. Bowen.  This was a Darnell grade 2 lesion measuring 3.0 cm, confined to the kidney, with no extension to perinephric adipose tissue, and no evidence of lymphovascular  invasion.  Margins were negative.  Adrenal gland was negative.      CT scan of the abdomen and pelvis o 02/26/12 showed no evidence of recurrent disease.      Stage I ([id number]) left papillary thyroid cancer (3mm): Status post total thyroidectomy 11/16/15, 2 lymph nodes removed.      Stage IIa (T3N0M0) ascending colon cancer: Preoperative CEA 1.65 on 4/25/18.  Right hemicolectomy 4/27/18, invasive moderately differentiated adenocarcinoma, negative margins, positive lymphovascular invasion, negative perineural invasion, 10 lymph nodes negative, ulcerated small adjacent pericolonic abscess (microscopic perforation).  Microsatellite stable.    Genetics evaluation 7/31/18 with INVITAE panel test showing VUS MLH3 and MSH6      Current Outpatient Medications on File Prior to Visit   Medication Sig Dispense Refill   • B Complex-C (SUPER B COMPLEX PO) Take 1 tablet by mouth Daily.     • CALCIUM PO Take 600 Units by mouth Daily. With D3, listed separetly     • cholecalciferol (VITAMIN D3) 1000 UNITS tablet Take 1,000 Units by mouth daily. In addition to the calcium -D3 pill     • ELIQUIS 5 MG tablet tablet TAKE ONE TABLET BY MOUTH EVERY 12 HOURS 60 tablet 1   • esomeprazole (nexIUM) 40 MG capsule Take 40 mg by mouth Every Morning Before Breakfast.     • furosemide (LASIX) 20 MG tablet Take 2 tablets by mouth Daily. (Patient taking differently: Take 40 mg by mouth Daily. Alt 1 with every other day) 60 tablet 4   • GLUCOSAMINE-CHONDROITIN PO Take 1 tablet by mouth Daily.     • lisinopril (PRINIVIL,ZESTRIL) 10 MG tablet TAKE ONE TABLET BY MOUTH DAILY 90 tablet 2   • loperamide (IMODIUM) 2 MG capsule Take 2 mg by mouth 4 (four) times a day as needed for diarrhea.     • melatonin 5 MG tablet tablet Take 10 mg by mouth.     • metoprolol tartrate (LOPRESSOR) 25 MG tablet TAKE ONE TABLET BY MOUTH TWICE A  tablet 1   • Multiple Vitamins-Minerals (MULTIVITAMIN PO) Take 1 tablet by mouth Daily.     • Multiple Vitamins-Minerals  (PRESERVISION AREDS PO) Take 2 tablets by mouth Daily.     • SYNTHROID 112 MCG tablet Take 125 mcg by mouth Daily. Pt taking 110 mg a day       No current facility-administered medications on file prior to visit.        ALLERGIES:     Allergies   Allergen Reactions   • Sulfa Antibiotics Swelling     FACE AND TONGUE   • Adhesive Tape Rash   • Levaquin [Levofloxacin] Rash     Social History     Socioeconomic History   • Marital status:      Spouse name: Not on file   • Number of children: 0   • Years of education: Not on file   • Highest education level: Not on file   Occupational History   • Occupation: Homemaker     Employer: RETIRED   Tobacco Use   • Smoking status: Never Smoker   • Smokeless tobacco: Never Used   • Tobacco comment: caffeine use   Substance and Sexual Activity   • Alcohol use: No   • Drug use: No   • Sexual activity: Never     Family History   Problem Relation Age of Onset   • Heart disease Other    • Deep vein thrombosis Other    • Hypertension Other    • Breast cancer Sister         In her 60s.   • Breast cancer Maternal Aunt    • Breast cancer Sister         In her 60s.   • Heart disease Mother    • Hypertension Mother    • Heart disease Father    • Hypertension Father    • Heart attack Father    • Heart disease Daughter    • Hypertension Daughter    • Heart disease Son    • Hypertension Son    • Malig Hyperthermia Neg Hx        Review of Systems   Constitutional: Positive for fatigue. Negative for activity change, appetite change, fever and unexpected weight change.   HENT: Negative for congestion, mouth sores, nosebleeds, sore throat and voice change.    Respiratory: Positive for shortness of breath. Negative for cough and wheezing.    Cardiovascular: Negative for chest pain, palpitations and leg swelling.   Gastrointestinal: Positive for constipation and diarrhea. Negative for abdominal distention, abdominal pain, blood in stool, nausea and vomiting.   Endocrine: Negative for cold  intolerance and heat intolerance.   Genitourinary: Negative for difficulty urinating, dysuria, frequency and hematuria.   Musculoskeletal: Positive for arthralgias. Negative for back pain, joint swelling and myalgias.   Skin: Negative for rash.   Neurological: Negative for dizziness, syncope, weakness, light-headedness, numbness and headaches.   Hematological: Negative for adenopathy. Does not bruise/bleed easily.   Psychiatric/Behavioral: Negative for confusion and sleep disturbance. The patient is not nervous/anxious.          Objective      Vitals:    06/30/20 0959   BP: 141/88   Pulse: 80   Resp: 18   Temp: 98.1 °F (36.7 °C)   SpO2: 91%      Current Status 6/30/2020   ECOG score 0   pain 0/10    Physical Exam   Constitutional: She is oriented to person, place, and time. She appears well-developed and well-nourished.   HENT:   Mouth/Throat: Oropharynx is clear and moist.   Eyes: Conjunctivae are normal.   Neck: No thyromegaly present.   Cardiovascular: Normal rate. Exam reveals no gallop and no friction rub.   No murmur heard.  Irregularly irregular   Pulmonary/Chest: Breath sounds normal. No respiratory distress.   Abdominal: Soft. Bowel sounds are normal. She exhibits no distension. There is no tenderness.   Musculoskeletal: She exhibits edema.   Trace bilateral lower extremity edema with venous stasis changes   Lymphadenopathy:        Head (right side): No submandibular adenopathy present.     She has no cervical adenopathy.     She has no axillary adenopathy.        Right: No inguinal and no supraclavicular adenopathy present.        Left: No inguinal and no supraclavicular adenopathy present.   Neurological: She is alert and oriented to person, place, and time. She displays normal reflexes. No cranial nerve deficit. She exhibits normal muscle tone.   Skin: Skin is warm and dry. No rash noted.   Psychiatric: She has a normal mood and affect. Her behavior is normal.       RECENT LABS:  Hematology WBC   Date  Value Ref Range Status   06/30/2020 7.16 3.40 - 10.80 10*3/mm3 Final     RBC   Date Value Ref Range Status   06/30/2020 4.30 3.77 - 5.28 10*6/mm3 Final     Hemoglobin   Date Value Ref Range Status   06/30/2020 12.2 12.0 - 15.9 g/dL Final     Hematocrit   Date Value Ref Range Status   06/30/2020 38.4 34.0 - 46.6 % Final     Platelets   Date Value Ref Range Status   06/30/2020 289 140 - 450 10*3/mm3 Final        Lab Results   Component Value Date    GLUCOSE 121 05/22/2020    BUN 23 (H) 05/22/2020    CREATININE 1.41 (H) 05/22/2020    EGFRIFNONA 36 (L) 05/22/2020    EGFRIFAFRI 50 (L) 12/06/2017    BCR 16.3 05/22/2020    K 4.4 05/22/2020    CO2 24.1 05/22/2020    CALCIUM 9.3 05/22/2020    PROTENTOTREF 7.4 12/06/2017    ALBUMIN 3.90 05/22/2020    LABIL2 1.3 12/06/2017    AST 25 05/22/2020    ALT 19 05/22/2020     Reviewed result from renal ultrasound as well as records from urology and recent chest x-rays as outlined above and below.    Assessment/Plan      1. Recurrent iron deficiency anemia:   · The patient is intolerant of oral iron.   · Prior GI evaluation showed no definitive evidence of GI blood loss.   · She has underlying stage III chronic kidney disease, which is a contributing factor to her mild borderline anemia.   · She has been treated in the past with IV iron in 2011 and 2012.    · She had a lengthy absence from our office from 2015 until she was referred back on 9/21/17 with evidence of recurrent iron deficiency, likely related to malabsorption.  Her ferritin on 7/7/17 was 21 and hemoglobin on 8/7/17 was 11.1.  Her degree of anemia was mild with a hemoglobin of 11.4, microcytic indices with an MCV of 78.  Labs on 9/21/17 showed ferritin 20.5, iron saturation 8%, TIBC 441.  We did also check B12 and folate which were normal.  Erythropoietin was 32 consistent with a component of anemia secondary to chronic kidney disease. She did return stool cards for occult blood which were negative ×3 10/5/17.  She  "received additional Feraheme on 10/5 and 10/12/17.    · She underwent EGD with Dr. Curran 10/23/17 with no evidence of active bleeding, small gastric polyps identified.   · Labs 12/20/17 with hemoglobin up to 13.3, ferritin 97.9, iron saturation of 16%.    · Repeat labs performed postoperatively from colon resection on 5/4/18 showed iron 36, ferritin 99, iron saturation 10%, TIBC 349.    · Labs 5/22/2028 showed normal hemoglobin at 12.5 and stable iron studies with iron 71, ferritin 70, iron saturation 18%, TIBC 393.    · Today, hemoglobin is 12.2.  We will plan to repeat iron studies when patient returns in 3 months.  2. Stage IIa (T3N0M0) ascending colon cancer:   · Developed significant diarrhea with blood per rectum.    · Colonoscopy 4/25/18 with identification of polyps in the rectum, cecal polyp, mass in the right colon.  The cecal polyp was a sessile serrated adenoma, ascending colon \"mass\" showed hyperplastic polyp, rectal polyp was a tubulovillous adenoma with low-grade dysplasia.  CEA on 4/25/18 was normal at 1.65.  Chest x-ray 4/25/18 was unrevealing.  CT of the abdomen and pelvis 4/26/18 showed a 4 cm annular mass in the ascending colon with adjacent haziness in the mesentery but no lymphadenopathy, no evidence of metastatic disease.    · Right hemicolectomy on 4/27/18 with pathology showing an invasive moderately differentiated adenocarcinoma arising in a sessile serrated adenoma with negative margins measuring 2.5 cm extending through the muscularis propria into brad-colorectal tissue, positive lymphovascular invasion, negative perineural invasion, 10 lymph nodes negative.  There was a comment regarding an ulcerated small adjacent pericolonic abscess, raising the question of possible microscopic perforation.  Patient did have a few high risk features with less than 12 lymph nodes removed, positive lymphovascular invasion, and some possibility of microscopic perforation.  The tumor was microsatellite " stable.    · Given her age and comorbidities, the patient was a poor candidate for adjuvant chemotherapy and was not recommended.  Independent of this recommendation, the patient stated she was not interested in taking any adjuvant chemotherapy.  Therefore we will plan to monitor her clinically for evidence of recurrent disease.  Standard monitoring is with routine CEA levels however this does not appear to be informative for her given her normal preoperative value.  We will pursue annual interval follow-up CT.    · CT scan from 4/24/2019 at a 1 year interval showed no evidence of recurrent disease.    · 5/22/2020 CEA normal at 1.69 and CT scan chest abdomen and pelvis 5/22/2020 showed no evidence of recurrent/metastatic disease.    · The patient was scheduled for follow-up colonoscopy with Dr. Willett on 6/22/2020 however endoscopy was canceled due to development of pneumonia from which the patient is recovering.  She reports that the endoscopy will be rescheduled in the near future.  She continues with chronic alternating constipation and diarrhea ever since the time of her surgery, does note that intermittent Imodium helps.  Symptoms are unchanged.  3. 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    · Family history includes a sister with breast cancer around age 60, another sister with breast cancer around age 60, maternal aunt with breast cancer over the age of 50, maternal uncle with lung cancer.    · Given the patient's multiple malignancies and family history, she was referred to the genetics clinic to discuss potential genetic testing, mainly for benefit of the patient's children and other family members.    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   · She did follow-up in the  genetics clinic and underwent INVITAE panel test 7/31/18 showing VUS in MLH3 and MSH6 with no known clinical significance at this time.   4. History of gastric polyps:   · EGD 10/23/17 with small less than 6 mm gastric polyps, not resected.  5. History of left renal cell carcinoma with enlarging right renal lesion:   · The patient underwent a left nephrectomy in 2004 with no clinical evidence of recurrent disease.    · CT chest from 11/14/16 showed no evidence of recurrence.    · CT abdomen and pelvis however from 4/26/18 showed a 2.2 cm right renal lesion which was indeterminate, possibly a proteinaceous cyst but could not rule out mass lesion.  The study was performed without contrast due to the patient's underlying chronic kidney disease.  She is unable to undergo renal mass protocol CT with contrast.  She is unable to undergo MRI due to her pacemaker.    · Follow-up CT scan 4/24/2019 with no change in the right renal lesion at 2.1 cm, repeat at 1 year interval  · 1 year interval follow-up CT (delayed due to viral pandemic) 5/22/2020 with increase in right renal mass from 2.1 up to 2.6 cm.  This is felt to possibly represent a proteinaceous cyst versus solid mass.  She is unable to undergo a renal protocol CT due to her renal dysfunction and solitary kidney and cannot undergo MRI due to her pacemaker.   · Renal ultrasound 6/4/2020 showed a 3.6 cm mixed solid and cystic mass at the lower pole of the right kidney suspicious for renal cell carcinoma and recommended surgical excision.  Patient did follow-up with Dr. Trujillo in urology who recommended continued radiographic monitoring in 6 months.  I discussed the situation with the patient and we will plan an additional shorter interval follow-up ultrasound in 3 months as well and I will see her at that time.  6. CKD3:   · Baseline creatinine in 1.2-1.8 range  · Contributing factor to anemia  7. Recent pneumonia:  · Patient had bilateral infiltrates on chest x-ray  6/18/2020 with associated cough, shortness of breath, generalized weakness.  She was negative for COVID-19.  She was treated with doxycycline.  · Patient with resolving symptoms, chest x-ray 6/29/2020 with resolution of infiltrates.  She reports mild residual weakness and shortness of breath which have been improving.    PLAN:   1. The patient will reschedule colonoscopy with Dr. Curran  2. MD visit in 3 months.  1 week prior she will undergo renal ultrasound as well as CBC, CMP, CEA, iron panel, ferritin.

## 2020-07-01 ENCOUNTER — CLINICAL SUPPORT NO REQUIREMENTS (OUTPATIENT)
Dept: CARDIOLOGY | Facility: CLINIC | Age: 85
End: 2020-07-01

## 2020-07-01 ENCOUNTER — TELEPHONE (OUTPATIENT)
Dept: ONCOLOGY | Facility: CLINIC | Age: 85
End: 2020-07-01

## 2020-07-01 ENCOUNTER — OFFICE VISIT (OUTPATIENT)
Dept: CARDIOLOGY | Facility: CLINIC | Age: 85
End: 2020-07-01

## 2020-07-01 VITALS
WEIGHT: 220 LBS | HEIGHT: 63 IN | DIASTOLIC BLOOD PRESSURE: 66 MMHG | HEART RATE: 70 BPM | BODY MASS INDEX: 38.98 KG/M2 | SYSTOLIC BLOOD PRESSURE: 120 MMHG

## 2020-07-01 DIAGNOSIS — I48.21 PERMANENT ATRIAL FIBRILLATION (HCC): Primary | ICD-10-CM

## 2020-07-01 DIAGNOSIS — Z95.0 S/P PLACEMENT OF CARDIAC PACEMAKER: ICD-10-CM

## 2020-07-01 DIAGNOSIS — I10 ESSENTIAL HYPERTENSION: ICD-10-CM

## 2020-07-01 DIAGNOSIS — I44.2 COMPLETE HEART BLOCK (HCC): Primary | ICD-10-CM

## 2020-07-01 DIAGNOSIS — Z98.890 S/P AV NODAL ABLATION: ICD-10-CM

## 2020-07-01 DIAGNOSIS — I44.2 THIRD DEGREE AV BLOCK (HCC): ICD-10-CM

## 2020-07-01 PROCEDURE — 93279 PRGRMG DEV EVAL PM/LDLS PM: CPT | Performed by: INTERNAL MEDICINE

## 2020-07-01 PROCEDURE — 93000 ELECTROCARDIOGRAM COMPLETE: CPT | Performed by: INTERNAL MEDICINE

## 2020-07-01 PROCEDURE — 99214 OFFICE O/P EST MOD 30 MIN: CPT | Performed by: INTERNAL MEDICINE

## 2020-07-01 RX ORDER — LISINOPRIL 10 MG/1
10 TABLET ORAL DAILY
Qty: 90 TABLET | Refills: 2 | Status: SHIPPED | OUTPATIENT
Start: 2020-07-01 | End: 2021-07-26

## 2020-07-01 NOTE — PROGRESS NOTES
Date of Office Visit: 2020  Encounter Provider: Maldonado Melendez MD  Place of Service: [facility] CARDIOLOGY  Patient Name: Anastacia Sarah  : 1935    Subjective:     Encounter Date:2020      Patient ID: Anastacia Sarah is a 84 y.o. female who has a cc of    Here for normal fu     She is just getting over a pneumonia, fever and chills and cough.    Monday her xray looked better    Then she had a bladder infection -- that too is better. Dr. Trujillo sees her.         She walks on her own.     Lives by herself but does not drive bc of vision.     She sees oncology for history of colon cancer. Ct scans ok.     No edema.         There have been no hospital admission since the last visit.     There have been no bleeding events.       Past Medical History:   Diagnosis Date   • Abnormal ECG    • Acute bronchitis    • Acute sinusitis    • Anemia     Iron deficiency   • Anesthesia complication    • Anticoagulated on warfarin    • Arthritis    • Asthma     Shortness of breath upon exertion   • Bladder dysfunction    • Cancer of left kidney (CMS/HCC) 2004    S/P Left Radical Neprectomy   • Cardiac disease    • Chronic dysfunction of left eustachian tube    • CKD (chronic kidney disease)     stage 3   • Colon cancer (CMS/HCC)    • Colon polyps    • Depression    • Diarrhea    • Diverticulosis 2011    Descending Colon & Sigmoid Colon multiple diverticula   • Epigastric pain    • Fatigue    • Frequent urination    • GERD (gastroesophageal reflux disease)    • Goiter     thyroid removed in 2015   • Health care maintenance    • History of colitis    • History of shingles    • History of transfusion    • Hypertension    • Insomnia    • Left atrial enlargement    • Left ventricular hypertrophy    • Mitral regurgitation     mild to moderate per echocardiogram    • Nerve pain     LUE D/T SHINGLES   • Neuropathy     ARMS   • Non morbid obesity due to excess  calories    • NANCY (obstructive sleep apnea)     uses CPAP   • Paroxysmal atrial fibrillation (CMS/HCC)    • Permanent atrial fibrillation (CMS/HCC)    • Pulmonary hypertension (CMS/HCC)     per echo 2016   • Right atrial enlargement    • S/P AV deshawn ablation    • S/P placement of cardiac pacemaker    • Sleep apnea     on CPAP   • SSS (sick sinus syndrome) (CMS/HCC)    • Third degree AV block (CMS/HCC)    • Thyroid cancer (CMS/HCC)    • URI (upper respiratory infection)    • Urinary urgency        Social History     Socioeconomic History   • Marital status:      Spouse name: Not on file   • Number of children: 0   • Years of education: Not on file   • Highest education level: Not on file   Occupational History   • Occupation: Homemaker     Employer: RETIRED   Tobacco Use   • Smoking status: Never Smoker   • Smokeless tobacco: Never Used   • Tobacco comment: caffeine use   Substance and Sexual Activity   • Alcohol use: No   • Drug use: No   • Sexual activity: Never       Review of Systems   Constitution: Negative for fever and night sweats.   HENT: Negative for ear pain and stridor.    Eyes: Negative for discharge and visual halos.   Cardiovascular: Negative for cyanosis.   Respiratory: Negative for hemoptysis and sputum production.    Hematologic/Lymphatic: Negative for adenopathy.   Skin: Negative for nail changes and unusual hair distribution.   Musculoskeletal: Positive for arthritis, back pain and joint pain. Negative for gout and joint swelling.   Gastrointestinal: Negative for bowel incontinence and flatus.   Genitourinary: Negative for dysuria and flank pain.   Neurological: Negative for seizures and tremors.   Psychiatric/Behavioral: Negative for altered mental status. The patient is not nervous/anxious.             Objective:     Vitals:    07/01/20 1027   BP: 120/66   BP Location: Right arm   Patient Position: Sitting   Cuff Size: Large Adult   Pulse: 70   Weight: 99.8 kg (220 lb)   Height: 158.8 cm  "(62.5\")         Physical Exam   Constitutional: She is oriented to person, place, and time.   HENT:   Head: Normocephalic and atraumatic.   Eyes: Right eye exhibits no discharge. Left eye exhibits no discharge.   Neck: No JVD present. No thyromegaly present.   Cardiovascular: Normal rate and regular rhythm. Exam reveals no gallop and no friction rub.   No murmur heard.  Pulmonary/Chest: Effort normal and breath sounds normal. She has no rales.   Abdominal: Soft. Bowel sounds are normal. There is no tenderness.   Musculoskeletal: Normal range of motion. She exhibits no edema or deformity.   Neurological: She is alert and oriented to person, place, and time. She exhibits normal muscle tone.   Skin: Skin is warm and dry. No erythema.   Psychiatric: She has a normal mood and affect. Her behavior is normal. Thought content normal.         ECG 12 Lead  Date/Time: 7/1/2020 10:47 AM  Performed by: Maldonado Melendez MD  Authorized by: Maldonado Melendez MD   Comparison: compared with previous ECG   Similar to previous ECG  Rhythm: atrial fibrillation and paced    Clinical impression: abnormal EKG            Lab Review:       Assessment:          Diagnosis Plan   1. Permanent atrial fibrillation (CMS/HCC)     2. Essential hypertension     3. Third degree AV block (CMS/HCC)     4. S/P AV deshawn ablation     5. S/P placement of cardiac pacemaker            Plan:     I reviewed the pacemaker/ICD tracings and the pacing and sensing parameters are normal.  AF burden is 100%     Exam today shows perfect BP, clear lungs, no edema and no murmur     She has sig winters but is very overweight, deconditioned and just had pneumonia.    Will have her call in a few weeks and if still struggling I can recheck and echo. Based on exam my suspciion is low for any new cardiac issues.       "

## 2020-07-01 NOTE — TELEPHONE ENCOUNTER
Pt daughter called stating she was returning a call. Unsure of who called her or why. There are no messages in her chart. Her labs are stable and appts are made. Whoever called her will have to call her back and leave a better message or put in a note as to why they called her.

## 2020-07-06 RX ORDER — APIXABAN 5 MG/1
TABLET, FILM COATED ORAL
Qty: 60 TABLET | Refills: 0 | Status: ON HOLD | OUTPATIENT
Start: 2020-07-06 | End: 2020-08-10 | Stop reason: SDUPTHER

## 2020-08-03 ENCOUNTER — TRANSCRIBE ORDERS (OUTPATIENT)
Dept: SLEEP MEDICINE | Facility: HOSPITAL | Age: 85
End: 2020-08-03

## 2020-08-03 DIAGNOSIS — Z01.818 OTHER SPECIFIED PRE-OPERATIVE EXAMINATION: Primary | ICD-10-CM

## 2020-08-07 ENCOUNTER — LAB (OUTPATIENT)
Dept: LAB | Facility: HOSPITAL | Age: 85
End: 2020-08-07

## 2020-08-07 DIAGNOSIS — Z01.818 OTHER SPECIFIED PRE-OPERATIVE EXAMINATION: ICD-10-CM

## 2020-08-07 PROCEDURE — U0004 COV-19 TEST NON-CDC HGH THRU: HCPCS

## 2020-08-07 PROCEDURE — C9803 HOPD COVID-19 SPEC COLLECT: HCPCS

## 2020-08-08 LAB
REF LAB TEST METHOD: NORMAL
SARS-COV-2 RNA RESP QL NAA+PROBE: NOT DETECTED

## 2020-08-10 ENCOUNTER — ANESTHESIA (OUTPATIENT)
Dept: GASTROENTEROLOGY | Facility: HOSPITAL | Age: 85
End: 2020-08-10

## 2020-08-10 ENCOUNTER — ANESTHESIA EVENT (OUTPATIENT)
Dept: GASTROENTEROLOGY | Facility: HOSPITAL | Age: 85
End: 2020-08-10

## 2020-08-10 ENCOUNTER — HOSPITAL ENCOUNTER (OUTPATIENT)
Facility: HOSPITAL | Age: 85
Setting detail: HOSPITAL OUTPATIENT SURGERY
Discharge: HOME | End: 2020-08-10
Attending: SURGERY | Admitting: SURGERY

## 2020-08-10 VITALS
WEIGHT: 216 LBS | HEIGHT: 62 IN | HEART RATE: 72 BPM | TEMPERATURE: 98.2 F | OXYGEN SATURATION: 98 % | BODY MASS INDEX: 39.75 KG/M2 | SYSTOLIC BLOOD PRESSURE: 131 MMHG | DIASTOLIC BLOOD PRESSURE: 77 MMHG | RESPIRATION RATE: 16 BRPM

## 2020-08-10 DIAGNOSIS — C18.2 MALIGNANT NEOPLASM OF ASCENDING COLON (HCC): ICD-10-CM

## 2020-08-10 PROCEDURE — 25010000002 PHENYLEPHRINE PER 1 ML: Performed by: ANESTHESIOLOGY

## 2020-08-10 PROCEDURE — 45380 COLONOSCOPY AND BIOPSY: CPT | Performed by: SURGERY

## 2020-08-10 PROCEDURE — 43235 EGD DIAGNOSTIC BRUSH WASH: CPT | Performed by: SURGERY

## 2020-08-10 PROCEDURE — 25010000002 PROPOFOL 10 MG/ML EMULSION: Performed by: ANESTHESIOLOGY

## 2020-08-10 PROCEDURE — 88305 TISSUE EXAM BY PATHOLOGIST: CPT | Performed by: SURGERY

## 2020-08-10 PROCEDURE — 25010000002 GLUCAGON (RDNA) PER 1 MG: Performed by: SURGERY

## 2020-08-10 RX ORDER — PROPOFOL 10 MG/ML
VIAL (ML) INTRAVENOUS CONTINUOUS PRN
Status: DISCONTINUED | OUTPATIENT
Start: 2020-08-10 | End: 2020-08-10 | Stop reason: SURG

## 2020-08-10 RX ORDER — SODIUM CHLORIDE, SODIUM LACTATE, POTASSIUM CHLORIDE, CALCIUM CHLORIDE 600; 310; 30; 20 MG/100ML; MG/100ML; MG/100ML; MG/100ML
30 INJECTION, SOLUTION INTRAVENOUS CONTINUOUS PRN
Status: DISCONTINUED | OUTPATIENT
Start: 2020-08-10 | End: 2020-08-10 | Stop reason: HOSPADM

## 2020-08-10 RX ORDER — LIDOCAINE HYDROCHLORIDE 20 MG/ML
INJECTION, SOLUTION INFILTRATION; PERINEURAL AS NEEDED
Status: DISCONTINUED | OUTPATIENT
Start: 2020-08-10 | End: 2020-08-10 | Stop reason: SURG

## 2020-08-10 RX ORDER — GLYCOPYRROLATE 0.2 MG/ML
INJECTION INTRAMUSCULAR; INTRAVENOUS AS NEEDED
Status: DISCONTINUED | OUTPATIENT
Start: 2020-08-10 | End: 2020-08-10 | Stop reason: SURG

## 2020-08-10 RX ORDER — PROPOFOL 10 MG/ML
VIAL (ML) INTRAVENOUS AS NEEDED
Status: DISCONTINUED | OUTPATIENT
Start: 2020-08-10 | End: 2020-08-10 | Stop reason: SURG

## 2020-08-10 RX ADMIN — PHENYLEPHRINE HYDROCHLORIDE 100 MCG: 10 INJECTION INTRAVENOUS at 08:32

## 2020-08-10 RX ADMIN — LIDOCAINE HYDROCHLORIDE 60 MG: 20 INJECTION, SOLUTION INFILTRATION; PERINEURAL at 08:05

## 2020-08-10 RX ADMIN — GLYCOPYRROLATE 0.2 MCG: 0.2 INJECTION INTRAMUSCULAR; INTRAVENOUS at 08:05

## 2020-08-10 RX ADMIN — SODIUM CHLORIDE, POTASSIUM CHLORIDE, SODIUM LACTATE AND CALCIUM CHLORIDE 30 ML/HR: 600; 310; 30; 20 INJECTION, SOLUTION INTRAVENOUS at 06:33

## 2020-08-10 RX ADMIN — PROPOFOL 100 MCG/KG/MIN: 10 INJECTION, EMULSION INTRAVENOUS at 08:03

## 2020-08-10 RX ADMIN — PROPOFOL 100 MG: 10 INJECTION, EMULSION INTRAVENOUS at 08:03

## 2020-08-10 NOTE — OP NOTE
Colonoscopy Procedure Note  Anastacia Sarah  1935  Date of Procedure: 08/10/20    Pre-operative Diagnosis:    · History of colon cancer, right colon, node negative.  · History of Colon mass, hepatic flexure/transverse, 3.5 cm. Biopsied, tattoo  · Sigmoid diverticula  · History of Rectal polyp, small, 8 mm; removed via snare  · History of Rectal polyp, large, 3 cm, flat, vague, papillated;  Approximately 4 cm from anal canal, removed piecemeal via snare and then fulgurated.  · Large external hemorrhoids, without internal  · History of gastric polyps.    Post-operative Diagnosis:  · Sigmoid diverticulosis  · 5 mm rectal polyp, not at prior site of rectal polyp that was large.  Removed via cold biopsy forceps.  · 4 mm rectal polyp at site of prior rectal polyp.  Removed via cold biopsy forceps  · Small hiatal hernia  · Small gastric polyps  · No recurrent polyp at prior large polyp site prepyloric    Procedure: Colonoscopy to the anastomosis with cold polypectomy, EGD          Recommendations:   · Colonoscopy in 2 years.  Flex sig in 6 months, based on rectal path.  · EGD based on symptoms only.  · Keep a copy of the photographs of the procedure given to you today for possible need for reference in the future.    Surgeon: Bina    Anesthetic: MAC per Brennan Torres MD    Indications:  As above     Scope Withdrawal Time:  Not applicable.    Procedure Details     MAC anesthesia was induced.  The 180 Colonoscopy was inserted blindly into the rectum and advanced to the anastomosis, with relative ease,  without need for pressure, lift, or turning.  The anastomosis was identified by the typical double barreled shot gun appearance and photographed for documentation.  There was a clean margin of the small bowel to the colon, without ulceration, with a widely patent opening.  The colonic stump was  fairly long.      Prep quality was excellent.  A careful inspection was made as the scope was withdrawn, including a  retroflexed view of the rectum; there was no suggestion of presence of angiodysplasias, or colitis, but there were the two polyps, one proximal to the area of ink, second at the ink.  There were also numerous diverticula.    Retroflexion in the rectum revealed irritated hemrrhoids.    EGD was then carried out, thru the bite block and the oxygen mask.  The scope was advanced to the second portion of the duodenum and retroflexed.  The only notable things were the small polyps in the proximal stomach and ink at the prepyloric area.     Rebeca Curran MD  08/10/20  8:31 AM

## 2020-08-10 NOTE — DISCHARGE INSTRUCTIONS
For the next 24 hours patient needs to be with a responsible adult.    For 24 hours DO NOT drive, operate machinery, appliances, drink alcohol, make important decisions or sign legal documents.    Start with a light or bland diet if you are feeling sick to your stomach otherwise advance to regular diet as tolerated.    Follow recommendations on procedure report if provided by your doctor.    Call Dr Curran for problems .  Problems may include but not limited to: large amounts of bleeding, trouble breathing, repeated vomiting, severe unrelieved pain, fever or chills.      WHAT ARE DIVERTICULOSIS AND DIVERTICULITIS?  Many people have small pouches in their colons that bulge outward through weak spots, like an inner tube that pokes through weak places in a tire.  Each pouch is called a diverticulum.  The condition of having diverticula is DIVERTICULOSIS.  The condition becomes more common as people age.  About half of all people over the age of 60 have diverticulosis    When pouches become infected or inflamed, the condition is called DIVERTICULITIS.  This happens in 10% to 25% of people with diverticulosis.  Diverticulosis and diverticulitis are also called DIVERTICULAR DISEASE.     WHAT ARE THE SYMPTOMS?  Diverticulosis - Most people do not have any discomfort or symptoms.  However, symptoms may include mild cramps, bloating, and constipation.  Other diseases such as irritable bowel syndrome (IBS) and stomach ulcers cause similar problems, so these symptoms do not always mean a person has diverticulosis.  You should visit your doctor if you have these troubling symptoms.    Diverticulitis - The most common symptom is abdominal pain.  The most common sign is tenderness around the left side of the lower abdomen.  If infection is the cause, fever, nausea, vomiting, chills, cramping, and constipation may occur as well.  The severity depends on the extent of the infection and complications.    WHAT ARE THE  COMPLICATIONS?  Diverticulitis can lead to bleeding, infections,perforations or tears, or blockages.  These complications always require treatment to prevent them from proggressing and causing serous illness.    Bleeding from a diverticula is a rare complication.  When this occurs, blood may appear in the toilet or in your stool.  Bleeding can be severe, but it may stop by itself and not require treatment.  Doctors believe bleeding diverticula are caused by a small blood vessel in a diverticulum that weakens and finally bursts.  If you have bleeding from the rectum, you should see your doctor.  If the bleeding does not stop you may need surgery.    Abscess, Perforation, and Peritonitis - The infection causing diverticulitis often clears up after a few days of treatment with antibiotics.  If the condition gets worse, an abscess may form in the colon.  An abscess is an infected area with pus that may cause swelling and destroy tissue.  Sometimes the infected diverticula may develop small holes, called perforations.  These perforations allow pus to leak out of the colon into the abdominal area.  If the abscess is small and remains in the colon, it may clear up after treatment with antibiotics.  If not, the doctor may need to drain it.  A large abscess can become a serious problem if the infection leaks out and contaminates areas outside the colon.  Infection that spreads into the abdominal cavity is called peritonitis.  Peritonitis requires immediate surgery toclean the abdominal cavity and remove the damaged part of the colon.  Without surgery, peritonitis can be fatal.    FISTULA  A fistula is an abnormal connection of tissue between two organs or between an organ and the skin.  When damaged tissues come into contact with each other during infection, they sometimes stick together.  If they heal that way, a fistula forms.  When diverticulitis-related infection spreads through out the colon, the colon's tissue may  stick to nearby tissues.  The organs usually involved are the bladder, small intestine, and skin.  The problem can be corrected with surgery to remove the fistula and affected part of the colon.    INTESTINAL OBSTRUCTION  The scarring caused by infection may cause partial or total blockage of the large intestine.  When this happens, the colon is unable to move bowel contents normally.  When the obstruction totally blocks the intestine, emergency surgery is necessary.  Partial blockage is not an emergency, so the surgery to correct it can be planned.    WHAT CAUSES DIVERTICULAR DISEASE  Although not proven, the dominant theory is that a low-fiber diet is the main cause of diverticular disease.  The disease was first noticed in the [place] in the early 1900s.  At about the same time, processed foods were introduced into the American diet.  Many processed foods contain refined, low-fiber flour.  Unlike whole-wheat flour, refined flour has no wheat bran.    Diverticular disease is common in developed or industrialized countries-particularly the [place], [place], and [place]-where low-fiber diets are common.  The disease is rare in countries of [place] and [place], where people eat high-fiber vegetable diets.    Fiber is the part of fruits, vegetables, and whole grains that the body cannot digest.  Some fiber dissolves easily in water (soluble fiber).  It takes on a soft, jelly-like texture in the intestines.  Some fiber passes almost unchanged through the intestines (insoluble fiber).  Both kinds of fiber help make stools soft and easy to pass.  Fiber also prevents constipation.    Constipation makes the muscles strain to move stool that is too hard.  It is the main cause of increased pressure in the colon.  This excess pressure might cause the weak spots in the colon to bulge out and become diverticula.  Diverticulitis occurs when diverticula become infected or inflamed.  Doctors are not certain what causes  the infection.  It may begin when stool or bacteria are caught in the diverticula.  An attack of diverticulitis can develop suddenly and without warning.    HOW DOES THE DOCTOR DIAGNOSE DIVERTICULAR DISEASE  The doctor asks about medical history, does a physical exam, and may perform one or more diagnostic tests.  Because most people do not have symptoms, diverticulosis is often found through tests ordered for another ailment.    When taking a medical history, the doctor may ask about bowel habits, symptoms, pain, diet, and medications.  The physical exam usually involves a digital rectal exam.  To preform this test. The doctor inserts a gloved, lubricated finger into the rectum to detect tenderness, blockage, or blood.  The doctor may check stool for signs of bleeding and test blood for signs of infection.  The doctor may also order x-rays or other tests.    WHAT IS THE TREATMENT FOR DIVERTICULAR DISEASE  Increasing the amount of fiber in the diet may reduce symptoms of diverticulosis and prevent complications such as diverticulitis.  Fiber keeps stool soft and lowers pressure inside the colon so that bowel contents can move through easily.  The American Dietetic Association. Recommends 20 to 35 grams of fiber each day.  The doctor may also recommend taking a fiber product such as Citrucel or Metamucil once a dya.  These products are mixed water and provide about 2 to 3.5 grams of fiber per  Tablespoon, mixed with 8 ounces of water.    Avoidance of nuts, popcorn, and sunflower, pumpkin, jarred, and sesame seeds has been recommended by physicians out of fear that food particles could enter, block, or irritate the diverticula.  However, no scientific data support this treatment measure.  Eating a high-fiber diet is the only requirement highly emphasized across the medical literature.  Eliminating specific foods is not necessary.  The seeds in tomatoes, zucchini, cucumbers, strawberries, and raspberries, as well as  poppy seeds, are generally considered harmless.  People differ in amounts and types of foods the can eat.  Decisions about diet should be made based on what works best for each person.  Keeping a food diary may help identify what foods may cause symptoms.    If cramps, bloating, and constipation are problems, the doctor may prescribe  Short course of pain medication.  However, many medications affect emptying of the colon, an undesirable side effect for people with diverticulosis.    DIVERTICULITIS  Treatment focuses on clearing up the infection and inflammation, resting the colon, and preventing or minimizing complications.  An attack of diverticulitis without complications may respond to antibiotics within a few days if treated early.  To help the colon rest, the doctor may recommend bed rest and a liquid diet, along with a pain reliever.    An acute attack with severe pain or sever infection may require a hospital stay.  Most acute cases of diverticulitis are treated with antibiotics and a liquid diet.  The antibiotics are given by injection into a vein.  In some cases, however, surgery may be necessary.    WHEN IS SURGERY NECESSARY  If attacks are severe or frequent, the doctor may advise surgery.  The surgeon removes the affected part of the colon and joins the remaining sections.  This typed of surgery, called colon resection, aims to keep attacks from coming back and to prevent complications.  The doctor may also recommend surgery for complications of a fistula or intestinal obstruction.    If antibiotics do not correct an attack, emergency surgery may be required.  Other reasons for emergency surgery include a large abscess, perforation, peritonitis, or continued bleeding.    Emergency surgery usually involves 2 operations.  The first will clear the infected abdominal cavity and remove part of the colon.  Because infection and sometimes obstruction, it is not safe to rejoin the colon during the first  operation.  Instead, the surgeon creates a temporary hole, or stoma, in the abdomen.  The end of the colon is connected to the hole, a procedure called a colostomy, to allow normal eating and bowel movements.  The stool goes into a bag attached to the opening in the abdomen.  In the second operation, the surgeon rejoins the ends of the colon.

## 2020-08-10 NOTE — ANESTHESIA POSTPROCEDURE EVALUATION
"Patient: Anastacia Sarah    Procedure Summary     Date:  08/10/20 Room / Location:   [facility] /  [facility]    Anesthesia Start:  0802 Anesthesia Stop:  0837    Procedures:       COLONOSCOPYTO ILEOCOLIC ANASTOMOSIS WITH COLD BX POLYPECTOMY (N/A )      ESOPHAGOGASTRODUODENOSCOPY (Esophagus) Diagnosis:       Malignant neoplasm of ascending colon (CMS/HCC)      (Malignant neoplasm of ascending colon (CMS/HCC) [C18.2])    Surgeon:  Rebeca Curran MD Provider:  Brennan Torres MD    Anesthesia Type:  MAC ASA Status:  3          Anesthesia Type: MAC    Vitals  Vitals Value Taken Time   /77 8/10/2020  8:54 AM   Temp 36.8 °C (98.2 °F) 8/10/2020  8:54 AM   Pulse 72 8/10/2020  8:54 AM   Resp 16 8/10/2020  8:54 AM   SpO2 98 % 8/10/2020  8:54 AM           Post Anesthesia Care and Evaluation    Patient location during evaluation: PACU  Patient participation: complete - patient participated  Level of consciousness: awake  Pain score: 0  Pain management: adequate  Airway patency: patent  Anesthetic complications: No anesthetic complications  PONV Status: none  Cardiovascular status: acceptable  Respiratory status: acceptable  Hydration status: acceptable    Comments: /77 (BP Location: Left arm, Patient Position: Lying)   Pulse 72   Temp 36.8 °C (98.2 °F) (Oral)   Resp 16   Ht 157.5 cm (62\")   Wt 98 kg (216 lb)   SpO2 98%   BMI 39.51 kg/m²       "

## 2020-08-10 NOTE — H&P
Cc: Endoscopy Visit    HPI: 84 y.o. female here for screening c scope with prior history of right colon cancer.  She is also having an EGD due to history of gastric polyps and anemia.    Past Medical History:   Diagnosis Date   • Abnormal ECG    • Acute bronchitis    • Acute sinusitis    • Anemia     Iron deficiency   • Anesthesia complication    • Anticoagulated on warfarin    • Arthritis    • Bladder dysfunction    • Cancer of left kidney (CMS/HCC) 06/08/2004    S/P Left Radical Neprectomy   • Cardiac disease    • Chronic dysfunction of left eustachian tube    • CKD (chronic kidney disease)     stage 3   • Colon cancer (CMS/HCC)    • Colon polyps    • Depression    • Diarrhea    • Diverticulosis 08/08/2011    Descending Colon & Sigmoid Colon multiple diverticula   • Epigastric pain    • Fatigue    • Frequent urination    • GERD (gastroesophageal reflux disease)    • Goiter     thyroid removed in november 2015   • Health care maintenance    • History of colitis    • History of shingles    • History of transfusion    • Hypertension    • Insomnia    • Left atrial enlargement    • Left ventricular hypertrophy    • Mitral regurgitation     mild to moderate per echocardiogram 2016   • Nerve pain     LUE D/T SHINGLES   • Neuropathy     ARMS   • Non morbid obesity due to excess calories    • NANCY (obstructive sleep apnea)     uses CPAP   • Paroxysmal atrial fibrillation (CMS/HCC)    • Permanent atrial fibrillation (CMS/HCC)    • Pulmonary hypertension (CMS/HCC)     per echo 2016   • Right atrial enlargement    • S/P AV deshawn ablation    • S/P placement of cardiac pacemaker    • Sleep apnea     on CPAP   • SSS (sick sinus syndrome) (CMS/HCC)    • Third degree AV block (CMS/HCC)    • Thyroid cancer (CMS/HCC)    • URI (upper respiratory infection)    • Urinary urgency        Past Surgical History:   Procedure Laterality Date   • ATRIAL ABLATION SURGERY N/A 09/13/2011    Comprehensive electrophysiology study, Left atrial pace  & sense, 3-dimensional cardia mapping, Radiofrequency catheter ablation, Transseptal hear catheterization, Dr. Maldonado Melendez   • ATRIAL ABLATION SURGERY N/A 02/11/2004    Dr. Maldonado Melendez   • BLADDER SURGERY N/A     Bladder tacking procedure   • BLADDER SUSPENSION N/A 05/2012   • CARDIAC ELECTROPHYSIOLOGY PROCEDURE N/A 5/17/2016    Procedure: PPM generator change - dual BOSTON;  Surgeon: Maldonado Melendez MD;  Location: [facility] CATH INVASIVE LOCATION;  Service:    • CARDIAC ELECTROPHYSIOLOGY PROCEDURE N/A 3/23/2017    Procedure: AV node ablation pt has BOSTON PPM;  Surgeon: Maldonado Melendez MD;  Location: [facility] CATH INVASIVE LOCATION;  Service:    • CARDIOVERSION N/A 01/26/2017    Dr. Melendez   • CARDIOVERSION N/A 05/25/2017    Dr. Melendez   • CATARACT EXTRACTION Bilateral     Dr. Gramajo   • COLON RESECTION Right 4/27/2018    Procedure: COLON RESECTION RIGHT LAPAROSCOPIC, possible open;  Surgeon: Rebeca Curran MD;  Location: [facility] OR;  Service: General   • COLONOSCOPY N/A 04/01/2003    Normal colonoscopy except for an occasional diverticulosis, Dr. Nathan Macias   • COLONOSCOPY N/A 4/25/2018    Procedure: COLONOSCOPY INTO CECUM AND TI WITH SALINE LIFT, HOT SNARE POLYPECTOMIES, BX'S, SPOT INJECTION, RESOLUTION CLIPS X2;  Surgeon: Rebeca Curran MD;  Location: [facility] ENDOSCOPY;  Service: Gastroenterology   • CYSTOSCOPY BOTOX INJECTION OF BLADDER N/A 6/27/2016    Procedure: CYSTOSCOPY WITH BOTOX DETRUSOR INJECTION;  Surgeon: Salome Bowen MD;  Location: [facility] OR;  Service:    • CYSTOSCOPY BOTOX INJECTION OF BLADDER N/A 05/29/2015    Dr. Salome Bowen   • ENDOSCOPY N/A 06/10/2015    Antral Polyp: Gastric Mucosa w/ marked cautery artifact,  Hiatal Hernia, Dr. Rebeca Curran   • ENDOSCOPY N/A 10/28/2013    Gastric polyp: polypoid hyperplastic gastric mucosa w/ focal ulceration, mixed acute & chronic inflammation.  Negative for intestinal metaplasia, no helicobacter organisms identified on diff-wuik stain,  Dr. Rebeca Curran   • ENDOSCOPY N/A 03/18/2013    Large prepyloric polyp, partially removed: fragment of polypoid hyperplastic gastric mucosa w/ foci of erosion & patchy mixed acute & chronic inflammation. No helicobacter organisms identified on diff-quick stain. Negative for intestinal metaplasia, negative for dysplasia, Dr. Rebeca Curran   • ENDOSCOPY N/A 10/24/2012    large prepyloric mass (3cm) w/ adjacent nodules: hyperplastic polyp w/ mild chronic active gastritis, focal erosion & associated, Multiple fundic polyps: polypoid mucosal hyperplasia, Dr. Rebeca Curran   • ENDOSCOPY N/A 03/11/2003    Gastric Antral Biopsies: Fragments of Gastric Mucosa & Necrotic Tissue (Compatible w/ Ulcer) w/ chronic active inflammation.  Negative stain for Helicobacter organisms. Dr. Nathan Macias   • ENDOSCOPY N/A 10/23/2017    Procedure: ESOPHAGOGASTRODUODENOSCOPY;  Surgeon: Rebeca Curran MD;  Location: [facility] ENDOSCOPY;  Service:    • ENDOSCOPY AND COLONOSCOPY N/A 08/08/2011    4cm hiatal hernia, Schatzki's ring, Diverticulosis, Large antral polyps, Dr. Rebeca Curran   • INCISIONAL HERNIA REPAIR N/A 06/11/2015    Defect approximately 2.5 cm w/ a mass of incarcerated tissue approximately the size of a nectarine, Dr. Rebeca Curran   • INSERT / REPLACE / REMOVE PACEMAKER     • KNEE ARTHROPLASTY     • KNEE MINI REVISION Right 9/28/2016    Procedure: RT KNEE POLY INSERT CHANGE ;  Surgeon: Tommy Avila MD;  Location: [facility] OR;  Service:    • LAPAROSCOPIC CHOLECYSTECTOMY N/A    • NEPHRECTOMY RADICAL Left 06/08/2004    Incidentially found left renal mass 3-4 cm renal cell carcinoma, left lower pole, Dr. Salome Bowen   • OTHER SURGICAL HISTORY      NEUROMUSCULAR BLOCKERS BOTULINUM TOXIN ONABOTULINUMTOXIN A   • PACEMAKER IMPLANTATION N/A     Dr. Casillas   • PACEMAKER REPLACEMENT N/A 09/24/2010    Implanted Generator is a [employer] ALTRUA 60 model [id number], serial # [id number], Dr. Chavez Jimenez   • TOTAL KNEE ARTHROPLASTY Left  05/21/2013    Dr. Miguel Pacheco   • TOTAL THYROIDECTOMY N/A 11/16/2015    Dr. Gideon Ashley, [facility]   • TRANSVAGINAL TAPING SUSPENSION N/A 03/16/2009    Mini Sling, Closed suprapubic cystostomy, Dr. Salome Bowen   • TUBAL ABDOMINAL LIGATION Bilateral        is allergic to sulfa antibiotics; adhesive tape; and levaquin [levofloxacin].       Medication List      ASK your doctor about these medications    CALCIUM PO     cholecalciferol 25 MCG (1000 UT) tablet  Commonly known as:  VITAMIN D3     Eliquis 5 MG tablet tablet  Generic drug:  apixaban  TAKE ONE TABLET BY MOUTH EVERY 12 HOURS     esomeprazole 40 MG capsule  Commonly known as:  nexIUM     furosemide 20 MG tablet  Commonly known as:  LASIX  Take 2 tablets by mouth Daily.     GLUCOSAMINE-CHONDROITIN PO     lisinopril 10 MG tablet  Commonly known as:  PRINIVIL,ZESTRIL  Take 1 tablet by mouth Daily.     loperamide 2 MG capsule  Commonly known as:  IMODIUM     melatonin 5 MG tablet tablet     metoprolol tartrate 25 MG tablet  Commonly known as:  LOPRESSOR  TAKE ONE TABLET BY MOUTH TWICE A DAY     * MULTIVITAMIN PO     * PRESERVISION AREDS 2 PO     * PRESERVISION AREDS PO     REFRESH EYE ITCH RELIEF OP     SUPER B COMPLEX PO     Synthroid 112 MCG tablet  Generic drug:  levothyroxine         * This list has 3 medication(s) that are the same as other medications   prescribed for you. Read the directions carefully, and ask your doctor or   other care provider to review them with you.              Family History   Problem Relation Age of Onset   • Heart disease Other    • Deep vein thrombosis Other    • Hypertension Other    • Breast cancer Sister         In her 60s.   • Breast cancer Maternal Aunt    • Breast cancer Sister         In her 60s.   • Heart disease Mother    • Hypertension Mother    • Heart disease Father    • Hypertension Father    • Heart attack Father    • Heart disease Daughter    • Hypertension Daughter    • Heart disease Son    • Hypertension Son    •  Malig Hyperthermia Neg Hx        Social History     Socioeconomic History   • Marital status:      Spouse name: Not on file   • Number of children: 0   • Years of education: Not on file   • Highest education level: Not on file   Occupational History   • Occupation: Homemaker     Employer: RETIRED   Tobacco Use   • Smoking status: Never Smoker   • Smokeless tobacco: Never Used   • Tobacco comment: caffeine use   Substance and Sexual Activity   • Alcohol use: No   • Drug use: No   • Sexual activity: Never       Vitals:    08/10/20 0628   BP: 139/58   Pulse: 71   Resp: 12   Temp: 98.4 °F (36.9 °C)   SpO2: 96%       Body mass index is 39.51 kg/m².    Physical Exam    General: No acute distress  Lungs: No labored breathing, Pulse oximetry on room air is 96%.  Heart: RRR  Abdo: Soft  Mental:  Awake, alert, and oriented    Imp:     · History of colon cancer, right colon, node negative.  · History of Colon mass, hepatic flexure/transverse, 3.5 cm. Biopsied, tattoo  · Sigmoid diverticula  · History of Rectal polyp, small, 8 mm; removed via snare  · History of Rectal polyp, large, 3 cm, flat, vague, papillated;  Approximately 4 cm from anal canal, removed piecemeal via snare and then fulgurated.  · Large external hemorrhoids, without internal  · History of gastric polyps.     Plan:  · C scope and EGD    Rebeca Curran MD  7:48 AM

## 2020-08-10 NOTE — ANESTHESIA PREPROCEDURE EVALUATION
Anesthesia Evaluation     Patient summary reviewed and Nursing notes reviewed                Airway   Mallampati: I  TM distance: >3 FB  Neck ROM: full  No difficulty expected  Dental - normal exam     Pulmonary - normal exam   (+) asthma,recent URI, sleep apnea,   Cardiovascular - normal exam    (+) pacemaker pacemaker, hypertension, valvular problems/murmurs MR, dysrhythmias Atrial Fib,       Neuro/Psych  (+) dizziness/light headedness, psychiatric history,     GI/Hepatic/Renal/Endo    (+) obesity,  GERD, GI bleeding , renal disease,   (-) morbid obesity    Musculoskeletal     Abdominal  - normal exam    Bowel sounds: normal.   Substance History - negative use     OB/GYN negative ob/gyn ROS         Other   arthritis,    history of cancer                    Anesthesia Plan    ASA 3     MAC       Anesthetic plan, all risks, benefits, and alternatives have been provided, discussed and informed consent has been obtained with: patient.

## 2020-08-11 LAB
CYTO UR: NORMAL
LAB AP CASE REPORT: NORMAL
PATH REPORT.FINAL DX SPEC: NORMAL
PATH REPORT.GROSS SPEC: NORMAL

## 2020-08-12 NOTE — PROGRESS NOTES
Daly (endoscopy liaison),    Please call patient to inform them of these pathology findings (CAPITALIZED) and recommendations (CAPITALIZED) that have been added to my operative note.  Please ensure that any pamphlets that were to be given to the patient at the hospital were received.     Please make sure a letter is sent to the patient, recall method entered into the computer and the HIM tab updated as far as need for endoscopy follow up.    Thanks  Dr Curran    Colonoscopy Procedure Note  Anastacia CHAMP Sarah  1935  Date of Procedure: 08/10/20     Pre-operative Diagnosis:    · History of colon cancer, right colon, node negative.  · History of Colon mass, hepatic flexure/transverse, 3.5 cm. Biopsied, tattoo  · Sigmoid diverticula  · History of Rectal polyp, small, 8 mm; removed via snare  · History of Rectal polyp, large, 3 cm, flat, vague, papillated;  Approximately 4 cm from anal canal, removed piecemeal via snare and then fulgurated.  · Large external hemorrhoids, without internal  · History of gastric polyps.     Post-operative Diagnosis:  · Sigmoid diverticulosis  · 5 mm rectal polyp, not at prior site of rectal polyp that was large.  Removed via cold biopsy forceps.;  HYPERPLASTIC POLYP  · 4 mm rectal polyp at site of prior rectal polyp.  Removed via cold biopsy forceps;  TUBULAR ADENOMA  · Small hiatal hernia  · Small gastric polyps  · No recurrent polyp at prior large polyp site prepyloric     Procedure: Colonoscopy to the anastomosis with cold polypectomy, EGD        Recommendations:   · Colonoscopy in 2 years.  Flex sig in 6 months, based on rectal path.;  FLEX SIG IN 6 MONTHS, C SCOPE 2 YEARS.  · EGD based on symptoms only.  · Keep a copy of the photographs of the procedure given to you today for possible need for reference in the future.

## 2020-08-13 ENCOUNTER — TELEPHONE (OUTPATIENT)
Dept: SURGERY | Facility: CLINIC | Age: 85
End: 2020-08-13

## 2020-08-13 NOTE — TELEPHONE ENCOUNTER
----- Message from Rebeca Curran MD sent at 8/12/2020 10:19 AM EDT -----  Daly (endoscopy liaison),    Please call patient to inform them of these pathology findings (CAPITALIZED) and recommendations (CAPITALIZED) that have been added to my operative note.  Please ensure that any pamphlets that were to be given to the patient at the hospital were received.     Please make sure a letter is sent to the patient, recall method entered into the computer and the HIM tab updated as far as need for endoscopy follow up.    Thanks  Dr Curran    Colonoscopy Procedure Note  Anastacia OAKES Keara  1935  Date of Procedure: 08/10/20     Pre-operative Diagnosis:    · History of colon cancer, right colon, node negative.  · History of Colon mass, hepatic flexure/transverse, 3.5 cm. Biopsied, tattoo  · Sigmoid diverticula  · History of Rectal polyp, small, 8 mm; removed via snare  · History of Rectal polyp, large, 3 cm, flat, vague, papillated;  Approximately 4 cm from anal canal, removed piecemeal via snare and then fulgurated.  · Large external hemorrhoids, without internal  · History of gastric polyps.     Post-operative Diagnosis:  · Sigmoid diverticulosis  · 5 mm rectal polyp, not at prior site of rectal polyp that was large.  Removed via cold biopsy forceps.;  HYPERPLASTIC POLYP  · 4 mm rectal polyp at site of prior rectal polyp.  Removed via cold biopsy forceps;  TUBULAR ADENOMA  · Small hiatal hernia  · Small gastric polyps  · No recurrent polyp at prior large polyp site prepyloric     Procedure: Colonoscopy to the anastomosis with cold polypectomy, EGD                                            Recommendations:   · Colonoscopy in 2 years.  Flex sig in 6 months, based on rectal path.;  FLEX SIG IN 6 MONTHS, C SCOPE 2 YEARS.  · EGD based on symptoms only.  · Keep a copy of the photographs of the procedure given to you today for possible need for reference in the future.

## 2020-08-20 DIAGNOSIS — R06.02 SHORTNESS OF BREATH: ICD-10-CM

## 2020-08-20 DIAGNOSIS — I48.19 PERSISTENT ATRIAL FIBRILLATION (HCC): ICD-10-CM

## 2020-08-21 RX ORDER — FUROSEMIDE 20 MG/1
TABLET ORAL
Qty: 60 TABLET | Refills: 3 | Status: SHIPPED | OUTPATIENT
Start: 2020-08-21 | End: 2020-09-28

## 2020-09-11 ENCOUNTER — TELEPHONE (OUTPATIENT)
Dept: ONCOLOGY | Facility: CLINIC | Age: 85
End: 2020-09-11

## 2020-09-15 ENCOUNTER — LAB (OUTPATIENT)
Dept: LAB | Facility: HOSPITAL | Age: 85
End: 2020-09-15

## 2020-09-15 ENCOUNTER — HOSPITAL ENCOUNTER (OUTPATIENT)
Dept: ULTRASOUND IMAGING | Facility: HOSPITAL | Age: 85
Discharge: HOME | End: 2020-09-15
Admitting: INTERNAL MEDICINE

## 2020-09-15 ENCOUNTER — APPOINTMENT (OUTPATIENT)
Dept: LAB | Facility: HOSPITAL | Age: 85
End: 2020-09-15

## 2020-09-15 DIAGNOSIS — D50.8 OTHER IRON DEFICIENCY ANEMIA: ICD-10-CM

## 2020-09-15 DIAGNOSIS — N28.89 RENAL MASS, RIGHT: ICD-10-CM

## 2020-09-15 DIAGNOSIS — C18.2 MALIGNANT NEOPLASM OF ASCENDING COLON (HCC): ICD-10-CM

## 2020-09-15 LAB
ALBUMIN SERPL-MCNC: 3.9 G/DL (ref 3.5–5.2)
ALBUMIN/GLOB SERPL: 1.2 G/DL (ref 1.1–2.4)
ALP SERPL-CCNC: 102 U/L (ref 38–116)
ALT SERPL W P-5'-P-CCNC: 16 U/L (ref 0–33)
ANION GAP SERPL CALCULATED.3IONS-SCNC: 11.8 MMOL/L (ref 5–15)
AST SERPL-CCNC: 30 U/L (ref 0–32)
BASOPHILS # BLD AUTO: 0.06 10*3/MM3 (ref 0–0.2)
BASOPHILS NFR BLD AUTO: 0.9 % (ref 0–1.5)
BILIRUB SERPL-MCNC: 0.6 MG/DL (ref 0.2–1.2)
BUN SERPL-MCNC: 22 MG/DL (ref 6–20)
BUN/CREAT SERPL: 15.4 (ref 7.3–30)
CALCIUM SPEC-SCNC: 9.4 MG/DL (ref 8.5–10.2)
CEA SERPL-MCNC: 2.14 NG/ML
CHLORIDE SERPL-SCNC: 103 MMOL/L (ref 98–107)
CO2 SERPL-SCNC: 25.2 MMOL/L (ref 22–29)
CREAT SERPL-MCNC: 1.43 MG/DL (ref 0.6–1.1)
DEPRECATED RDW RBC AUTO: 45.7 FL (ref 37–54)
EOSINOPHIL # BLD AUTO: 0.33 10*3/MM3 (ref 0–0.4)
EOSINOPHIL NFR BLD AUTO: 4.9 % (ref 0.3–6.2)
ERYTHROCYTE [DISTWIDTH] IN BLOOD BY AUTOMATED COUNT: 14.1 % (ref 12.3–15.4)
FERRITIN SERPL-MCNC: 93.8 NG/ML (ref 13–150)
GFR SERPL CREATININE-BSD FRML MDRD: 35 ML/MIN/1.73
GLOBULIN UR ELPH-MCNC: 3.2 GM/DL (ref 1.8–3.5)
GLUCOSE SERPL-MCNC: 127 MG/DL (ref 74–124)
HCT VFR BLD AUTO: 38 % (ref 34–46.6)
HGB BLD-MCNC: 12.1 G/DL (ref 12–15.9)
IMM GRANULOCYTES # BLD AUTO: 0.02 10*3/MM3 (ref 0–0.05)
IMM GRANULOCYTES NFR BLD AUTO: 0.3 % (ref 0–0.5)
IRON 24H UR-MRATE: 58 MCG/DL (ref 37–145)
IRON SATN MFR SERPL: 15 % (ref 14–48)
LYMPHOCYTES # BLD AUTO: 1.44 10*3/MM3 (ref 0.7–3.1)
LYMPHOCYTES NFR BLD AUTO: 21.5 % (ref 19.6–45.3)
MCH RBC QN AUTO: 28.6 PG (ref 26.6–33)
MCHC RBC AUTO-ENTMCNC: 31.8 G/DL (ref 31.5–35.7)
MCV RBC AUTO: 89.8 FL (ref 79–97)
MONOCYTES # BLD AUTO: 0.59 10*3/MM3 (ref 0.1–0.9)
MONOCYTES NFR BLD AUTO: 8.8 % (ref 5–12)
NEUTROPHILS NFR BLD AUTO: 4.25 10*3/MM3 (ref 1.7–7)
NEUTROPHILS NFR BLD AUTO: 63.6 % (ref 42.7–76)
NRBC BLD AUTO-RTO: 0 /100 WBC (ref 0–0.2)
PLATELET # BLD AUTO: 216 10*3/MM3 (ref 140–450)
PMV BLD AUTO: 9.3 FL (ref 6–12)
POTASSIUM SERPL-SCNC: 4.5 MMOL/L (ref 3.5–4.7)
PROT SERPL-MCNC: 7.1 G/DL (ref 6.3–8)
RBC # BLD AUTO: 4.23 10*6/MM3 (ref 3.77–5.28)
SODIUM SERPL-SCNC: 140 MMOL/L (ref 134–145)
TIBC SERPL-MCNC: 388 MCG/DL (ref 249–505)
TRANSFERRIN SERPL-MCNC: 277 MG/DL (ref 200–360)
WBC # BLD AUTO: 6.69 10*3/MM3 (ref 3.4–10.8)

## 2020-09-15 PROCEDURE — 83540 ASSAY OF IRON: CPT

## 2020-09-15 PROCEDURE — 84466 ASSAY OF TRANSFERRIN: CPT

## 2020-09-15 PROCEDURE — 82728 ASSAY OF FERRITIN: CPT

## 2020-09-15 PROCEDURE — 76775 US EXAM ABDO BACK WALL LIM: CPT

## 2020-09-15 PROCEDURE — 36415 COLL VENOUS BLD VENIPUNCTURE: CPT

## 2020-09-15 PROCEDURE — 80053 COMPREHEN METABOLIC PANEL: CPT

## 2020-09-15 PROCEDURE — 82378 CARCINOEMBRYONIC ANTIGEN: CPT | Performed by: INTERNAL MEDICINE

## 2020-09-15 PROCEDURE — 85025 COMPLETE CBC W/AUTO DIFF WBC: CPT

## 2020-09-22 ENCOUNTER — TELEPHONE (OUTPATIENT)
Dept: ONCOLOGY | Facility: CLINIC | Age: 85
End: 2020-09-22

## 2020-09-22 ENCOUNTER — APPOINTMENT (OUTPATIENT)
Dept: LAB | Facility: HOSPITAL | Age: 85
End: 2020-09-22

## 2020-09-22 NOTE — TELEPHONE ENCOUNTER
PATIENT HAS AN APPT THIS AFTERNOON AND CANNOT MAKE IT, HER STOMACH IS UPSET AND SHE HAS DIARRHEA, SHE WOULD LIKE TO RESCHEDULE FOR THE WEEK AFTER OCT 9TH, PLEASE CALL BACK TO RESCHED.    PT CALL BACK #: [phone]

## 2020-09-27 DIAGNOSIS — R06.02 SHORTNESS OF BREATH: ICD-10-CM

## 2020-09-27 DIAGNOSIS — I48.19 PERSISTENT ATRIAL FIBRILLATION (HCC): ICD-10-CM

## 2020-09-28 RX ORDER — FUROSEMIDE 20 MG/1
TABLET ORAL
Qty: 60 TABLET | Refills: 0 | Status: SHIPPED | OUTPATIENT
Start: 2020-09-28 | End: 2021-03-05 | Stop reason: SDUPTHER

## 2020-10-12 NOTE — PROGRESS NOTES
Subjective .     REASONS FOR FOLLOWUP:    1. Anemia secondary to iron deficiency, intolerant of oral iron, status post Venofer 100 mg on 09/21/11, followed by Feraheme x two doses, 09/28 and 10/05/11.  Recurrent iron deficiency requiring Feraheme again on 09/24/12 and 10/05/12.  Further recurrence of iron deficiency requiring Feraheme on 10/5 in 10/12/17.  2. No definitive evidence of GI blood loss, stool cards negative for occult blood, status post GI evaluation with Dr. Curran with no bleeding source identified.   3. History of gastric polyps, status post staged excision via EGD.    4. Status post left nephrectomy in 2004 for renal cell carcinoma.   5. Stage III chronic kidney disease.   6. 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  7. Stage IIa (T3N0M0) ascending colon cancer: Preoperative CEA 1.65 on 4/25/18.  Right hemicolectomy 4/27/18, invasive moderately differentiated adenocarcinoma, negative margins, positive lymphovascular invasion, negative perineural invasion, 10 lymph nodes negative, ulcerated small adjacent pericolonic abscess (microscopic perforation).  Microsatellite stable.  8. Genetics evaluation 7/31/18 with INVITAE panel test showing VUS MLH3 and MSH6  9. 2.2 cm right renal lesion, indeterminate on CT 4/26/18, felt to represent proteinaceous cyst by urology.  Stable on follow-up CT 4/24/2019.  Subsequent increase in size on CT 5/22/2020 up to 2.6 cm.  Ultrasound 6/4/2020 showed 3.6 cm solid and cystic lesion suspicious for renal cell carcinoma, recommended consideration of surgical excision.  Patient referred back to urology, recommended further monitoring radiographically.  10. Atrial fibrillation continuing on anticoagulation with Eliquis.    HISTORY OF PRESENT ILLNESS:  The patient is a 84 y.o. year old female who is here for follow-up with the above-mentioned history.    History of Present Illness patient  returns today in follow-up with renal ultrasound and laboratory studies to review.  The patient in the interval did undergo EGD and colonoscopy on 8/10/2020.  She had removal of a tubular adenoma and hyperplastic polyp from the colon.  She does report some chronic intermittent diarrhea which has improved somewhat.  She has no other complaints today.      PAST MEDICAL HISTORY:    1.  Atrial fibrillation.  The patient underwent ablation procedure in 2004, repeat ablation procedure on 09/13/11 by Dr. Melendez.  Subsequent AV deshawn ablation and March 2017 and subsequent cardioversion in May 2017.  Continuation of anticoagulation with Eliquis  2.  Gastroesophageal reflux disease.  3.  Hypertension.   4.  Status post left knee surgery.   5.  Status post pacemaker placement.   6.  Status post laparoscopic cholecystectomy.   7.  Status post bladder tacking procedure.   8.  Status post EGD and colonoscopy with Dr. Curran 08/08/11.  Finding of gastric polyps, one of which was resected showing a hyperplastic polyp with gastritis.  There was no source of bleeding identified.  Schatzki's ring was identified as well as diverticulosis.  Repeat EGD on 03/18/13 with resection of a 3 cm prepyloric hyperplastic polyp of acute and chronic inflammatory change. Repeat EGD, October 2013 with further resection of polyp, benign findings.  EGD 10/23/17 with small gastric polyps identified, no evidence of active bleeding.  9.  Bladder suspension surgery in 5/12.  10.  Status post left knee replacement in May 2013.    Past Medical History:   Diagnosis Date   • Abnormal ECG    • Acute bronchitis    • Acute sinusitis    • Anemia     Iron deficiency   • Anesthesia complication    • Anticoagulated on warfarin    • Arthritis    • Bladder dysfunction    • Cancer of left kidney (CMS/HCC) 06/08/2004    S/P Left Radical Neprectomy   • Cardiac disease    • Chronic dysfunction of left eustachian tube    • CKD (chronic kidney disease)     stage 3   •  Colon cancer (CMS/HCC)    • Colon polyps    • Depression    • Diarrhea    • Diverticulosis 08/08/2011    Descending Colon & Sigmoid Colon multiple diverticula   • Epigastric pain    • Fatigue    • Frequent urination    • GERD (gastroesophageal reflux disease)    • Goiter     thyroid removed in november 2015   • Health care maintenance    • History of colitis    • History of shingles    • History of transfusion    • Hypertension    • Insomnia    • Left atrial enlargement    • Left ventricular hypertrophy    • Mitral regurgitation     mild to moderate per echocardiogram 2016   • Nerve pain     LUE D/T SHINGLES   • Neuropathy     ARMS   • Non morbid obesity due to excess calories    • NANCY (obstructive sleep apnea)     uses CPAP   • Paroxysmal atrial fibrillation (CMS/HCC)    • Permanent atrial fibrillation (CMS/HCC)    • Pulmonary hypertension (CMS/HCC)     per echo 2016   • Right atrial enlargement    • S/P AV deshawn ablation    • S/P placement of cardiac pacemaker    • Sleep apnea     on CPAP   • SSS (sick sinus syndrome) (CMS/HCC)    • Third degree AV block (CMS/HCC)    • Thyroid cancer (CMS/HCC)    • URI (upper respiratory infection)    • Urinary urgency      Past Surgical History:   Procedure Laterality Date   • ATRIAL ABLATION SURGERY N/A 09/13/2011    Comprehensive electrophysiology study, Left atrial pace & sense, 3-dimensional cardia mapping, Radiofrequency catheter ablation, Transseptal hear catheterization, Dr. Maldonado Melendez   • ATRIAL ABLATION SURGERY N/A 02/11/2004    Dr. Maldonado Melendez   • BLADDER SURGERY N/A     Bladder tacking procedure   • BLADDER SUSPENSION N/A 05/2012   • CARDIAC ELECTROPHYSIOLOGY PROCEDURE N/A 5/17/2016    Procedure: PPM generator change - dual BOSTON;  Surgeon: Maldonado Melendez MD;  Location: [facility] INVASIVE LOCATION;  Service:    • CARDIAC ELECTROPHYSIOLOGY PROCEDURE N/A 3/23/2017    Procedure: AV node ablation pt has BOSTON PPM;  Surgeon: Maldonado Melendez MD;  Location: [facility]  CATH INVASIVE LOCATION;  Service:    • CARDIOVERSION N/A 01/26/2017    Dr. Melendez   • CARDIOVERSION N/A 05/25/2017    Dr. Melendez   • CATARACT EXTRACTION Bilateral     Dr. Gramajo   • COLON RESECTION Right 4/27/2018    Procedure: COLON RESECTION RIGHT LAPAROSCOPIC, possible open;  Surgeon: Rebeca Curran MD;  Location: [facility] OR;  Service: General   • COLONOSCOPY N/A 04/01/2003    Normal colonoscopy except for an occasional diverticulosis, Dr. Nathan Macias   • COLONOSCOPY N/A 4/25/2018    Procedure: COLONOSCOPY INTO CECUM AND TI WITH SALINE LIFT, HOT SNARE POLYPECTOMIES, BX'S, SPOT INJECTION, RESOLUTION CLIPS X2;  Surgeon: Rebeca Curran MD;  Location: [facility] ENDOSCOPY;  Service: Gastroenterology   • COLONOSCOPY N/A 8/10/2020    Procedure: COLONOSCOPYTO ILEOCOLIC ANASTOMOSIS WITH COLD BX POLYPECTOMY;  Surgeon: Rebeca Curran MD;  Location: [facility] ENDOSCOPY;  Service: General;  Laterality: N/A;  HX COLON CANCER  --DIVERTICULOSIS, POLYPS, HEMORRHOIDS    • CYSTOSCOPY BOTOX INJECTION OF BLADDER N/A 6/27/2016    Procedure: CYSTOSCOPY WITH BOTOX DETRUSOR INJECTION;  Surgeon: Salome Bowen MD;  Location: [facility] MAIN OR;  Service:    • CYSTOSCOPY BOTOX INJECTION OF BLADDER N/A 05/29/2015    Dr. Salome Bowen   • ENDOSCOPY N/A 06/10/2015    Antral Polyp: Gastric Mucosa w/ marked cautery artifact,  Hiatal Hernia, Dr. Rebeca Curran   • ENDOSCOPY N/A 10/28/2013    Gastric polyp: polypoid hyperplastic gastric mucosa w/ focal ulceration, mixed acute & chronic inflammation.  Negative for intestinal metaplasia, no helicobacter organisms identified on diff-wuik stain, Dr. Rebeca Curran   • ENDOSCOPY N/A 03/18/2013    Large prepyloric polyp, partially removed: fragment of polypoid hyperplastic gastric mucosa w/ foci of erosion & patchy mixed acute & chronic inflammation. No helicobacter organisms identified on diff-quick stain. Negative for intestinal metaplasia, negative for dysplasia, Dr. Rebeca Curran   • ENDOSCOPY  N/A 10/24/2012    large prepyloric mass (3cm) w/ adjacent nodules: hyperplastic polyp w/ mild chronic active gastritis, focal erosion & associated, Multiple fundic polyps: polypoid mucosal hyperplasia, Dr. Rebeca Curran   • ENDOSCOPY N/A 03/11/2003    Gastric Antral Biopsies: Fragments of Gastric Mucosa & Necrotic Tissue (Compatible w/ Ulcer) w/ chronic active inflammation.  Negative stain for Helicobacter organisms. Dr. Nathan Macias   • ENDOSCOPY N/A 10/23/2017    Procedure: ESOPHAGOGASTRODUODENOSCOPY;  Surgeon: Rebeca Curran MD;  Location: [facility] ENDOSCOPY;  Service:    • ENDOSCOPY  8/10/2020    Procedure: ESOPHAGOGASTRODUODENOSCOPY;  Surgeon: Rebeca Curran MD;  Location: [facility] ENDOSCOPY;  Service: General;;  GERD, HX GASTRIC POLYPS  --SMALL HIATAL HERNIA, SMALL GASTRIC POLYPS    • ENDOSCOPY AND COLONOSCOPY N/A 08/08/2011    4cm hiatal hernia, Schatzki's ring, Diverticulosis, Large antral polyps, Dr. Rebeca Curran   • INCISIONAL HERNIA REPAIR N/A 06/11/2015    Defect approximately 2.5 cm w/ a mass of incarcerated tissue approximately the size of a nectarine, Dr. Rebeca Curran   • INSERT / REPLACE / REMOVE PACEMAKER     • KNEE ARTHROPLASTY     • KNEE MINI REVISION Right 9/28/2016    Procedure: RT KNEE POLY INSERT CHANGE ;  Surgeon: Tommy Avila MD;  Location: [facility] OR;  Service:    • LAPAROSCOPIC CHOLECYSTECTOMY N/A    • NEPHRECTOMY RADICAL Left 06/08/2004    Incidentially found left renal mass 3-4 cm renal cell carcinoma, left lower pole, Dr. Salome Bowen   • OTHER SURGICAL HISTORY      NEUROMUSCULAR BLOCKERS BOTULINUM TOXIN ONABOTULINUMTOXIN A   • PACEMAKER IMPLANTATION N/A     Dr. Casillas   • PACEMAKER REPLACEMENT N/A 09/24/2010    Implanted Generator is a [employer] ALTRUA 60 model [id number], serial # [id number], Dr. Chavez Jimenez   • TOTAL KNEE ARTHROPLASTY Left 05/21/2013    Dr. Miguel Pacheco   • TOTAL THYROIDECTOMY N/A 11/16/2015    Dr. Gideon Ashley, [facility]   • TRANSVAGINAL TAPING SUSPENSION N/A 03/16/2009     Mini Sling, Closed suprapubic cystostomy, Dr. Salome Bowen   • TUBAL ABDOMINAL LIGATION Bilateral          HEMATOLOGICONCOLOGIC HISTORY:    The patient was seen during a hospitalization at [facility] in September 2011.  She was admitted with left lower lobe pneumonia following a cardiac ablation procedure.  She has anemia dating back to 2006, at that time with a hematocrit of 36.7 and MCV of 77.  Her platelets and white count have remained normal.  Hemoglobin in September 2010 was 10.3, MCV 74.  In December 2010 hemoglobin was 10.5 with MCV of 72.  On 08/09/11 hemoglobin was down to 9.8 with MCV of 73.  During the hospitalization hemoglobin had dropped to 8.5 with MCV of 72 and decreased further to 8.0 at which point she was transfused two units of PRBCs.  She was on Coumadin but had no obvious clinical evidence of GI blood loss.  She was evaluated by Dr. Curran on 08/08/11 with EGD and colonoscopy showing evidence of gastric polyps.  One polyp was biopsied showing evidence of a hyperplastic polyp with gastritis.  There was no definitive evidence of bleeding.  She was having some dysphagia and had a Schatzki's ring which was dilated.  She developed rapid ventricular response and therefore the procedure was stopped.  She then underwent cardiac ablation and subsequently developed pneumonia.      During her hospitalization, lab studies were sent including iron studies showing an iron of 11, TIBC 438, transferrin 294, iron percent sat of 3 with a ferritin of 10.8.  B12 was low-normal at 291, folate normal.  Creatinine was in the 1.2 range with estimated GFR in the 40 range (stage III chronic kidney disease).  Erythropoietin level was 66.  A hemoglobin electrophoresis was sent showing hemoglobin A1 of 97.5%, and hemoglobin A2 of 2.2%.  The patient had stool checked for occult blood in the hospital which again was negative, therefore, with no definitive evidence of GI blood loss.  I suspect she may have a  malabsorption issue.  She was intolerant of oral iron and treated with Venofer 100 mg on 09/21/11 and subsequently was discharged from the hospital and treated as an outpatient with Feraheme 510 mg on 09/28/11 and again on 10/05/11.      The patient returned for followup studies on 10/19/11 with hemoglobin that improved to the 13 range and MCV up to 79.  TIBC was 284, iron percent sat 32, ferritin 335.  Reticulated hemoglobin had improved to 32.9.  B12 was 476.  We will follow her counts over time.  She will followup with Dr. Curran regarding her gastric polyps.      Laboratory studies on 02/15/12 showed a ferritin stable at 112.  We will recheck at a 6 month interval.     Lab studies from 9/10/12 shows a hemoglobin 11.9, reticulated hemoglobin 29.9, TIBC 325, iron percent SAT of 11.  Ferritin down to 18.      Feraheme administered on 9/24/12 and 10/5/12.  Repeat iron studies on 3/15/13 showed a ferritin of 101, iron percent saturation of 31.       Labs 10/10/2013 with a ferritin of 85, iron percent sat down to 15, hemoglobin of 12.9. Recheck in six months.    Labs 04/20/2015 showed a ferritin stable in the normal range at 76.     The patient returned after a prolonged absence from our office on 9/21/17 with hemoglobin of 12.0, MCV 79.2, iron 37, ferritin 20, iron saturation 8%, TIBC 441 CONSISTENT with recurrent iron deficiency.  Folate was normal at 10.52, B12 normal at 398, erythropoietin 32.6.  Additional Feraheme initiated on 10/5/17 for 2 doses.  Repeat EGD with Dr. Curran on 10/23/17 with no active bleeding, small less than 6 mm gastric polyps identified.       ONCOLOGIC HISTORY:   Status post left nephrectomy in 2004 for renal cell carcinoma with Dr. Bowen.  This was a Darnell grade 2 lesion measuring 3.0 cm, confined to the kidney, with no extension to perinephric adipose tissue, and no evidence of lymphovascular invasion.  Margins were negative.  Adrenal gland was negative.      CT scan of the abdomen  and pelvis o 02/26/12 showed no evidence of recurrent disease.      Stage I ([id number]) left papillary thyroid cancer (3mm): Status post total thyroidectomy 11/16/15, 2 lymph nodes removed.      Stage IIa (T3N0M0) ascending colon cancer: Preoperative CEA 1.65 on 4/25/18.  Right hemicolectomy 4/27/18, invasive moderately differentiated adenocarcinoma, negative margins, positive lymphovascular invasion, negative perineural invasion, 10 lymph nodes negative, ulcerated small adjacent pericolonic abscess (microscopic perforation).  Microsatellite stable.    Genetics evaluation 7/31/18 with INVITAE panel test showing VUS MLH3 and MSH6      Current Outpatient Medications on File Prior to Visit   Medication Sig Dispense Refill   • apixaban (Eliquis) 5 MG tablet tablet Take 1 tablet by mouth Every 12 (Twelve) Hours. Hold eliquis for tomorrow as well. 60 tablet 0   • B Complex-C (SUPER B COMPLEX PO) Take 1 tablet by mouth Daily.     • CALCIUM PO Take 600 Units by mouth Daily. With D3, listed separetly     • cholecalciferol (VITAMIN D3) 1000 UNITS tablet Take 1,000 Units by mouth daily. In addition to the calcium -D3 pill     • esomeprazole (nexIUM) 40 MG capsule Take 40 mg by mouth Every Morning Before Breakfast.     • furosemide (LASIX) 20 MG tablet TAKE TWO TABLETS BY MOUTH DAILY 60 tablet 0   • GLUCOSAMINE-CHONDROITIN PO Take 1 tablet by mouth Daily.     • Ketotifen Fumarate (REFRESH EYE ITCH RELIEF OP) Apply  to eye(s) as directed by provider.     • lisinopril (PRINIVIL,ZESTRIL) 10 MG tablet Take 1 tablet by mouth Daily. 90 tablet 2   • loperamide (IMODIUM) 2 MG capsule Take 2 mg by mouth 4 (four) times a day as needed for diarrhea.     • melatonin 5 MG tablet tablet Take 10 mg by mouth.     • metoprolol tartrate (LOPRESSOR) 25 MG tablet TAKE ONE TABLET BY MOUTH TWICE A  tablet 0   • Multiple Vitamins-Minerals (MULTIVITAMIN PO) Take 1 tablet by mouth Daily.     • Multiple Vitamins-Minerals (PRESERVISION AREDS 2 PO)  Take  by mouth.     • Multiple Vitamins-Minerals (PRESERVISION AREDS PO) Take 2 tablets by mouth Daily.     • SYNTHROID 112 MCG tablet Take 125 mcg by mouth Daily. Pt taking 110 mg a day       No current facility-administered medications on file prior to visit.        ALLERGIES:     Allergies   Allergen Reactions   • Sulfa Antibiotics Swelling     FACE AND TONGUE   • Adhesive Tape Rash   • Levaquin [Levofloxacin] Rash     Social History     Socioeconomic History   • Marital status:      Spouse name: Not on file   • Number of children: 0   • Years of education: Not on file   • Highest education level: Not on file   Occupational History   • Occupation: Homemaker     Employer: RETIRED   Tobacco Use   • Smoking status: Never Smoker   • Smokeless tobacco: Never Used   • Tobacco comment: caffeine use   Substance and Sexual Activity   • Alcohol use: No   • Drug use: No   • Sexual activity: Never     Family History   Problem Relation Age of Onset   • Heart disease Other    • Deep vein thrombosis Other    • Hypertension Other    • Breast cancer Sister         In her 60s.   • Breast cancer Maternal Aunt    • Breast cancer Sister         In her 60s.   • Heart disease Mother    • Hypertension Mother    • Heart disease Father    • Hypertension Father    • Heart attack Father    • Heart disease Daughter    • Hypertension Daughter    • Heart disease Son    • Hypertension Son    • Malig Hyperthermia Neg Hx        Review of Systems   Constitutional: Positive for fatigue. Negative for activity change, appetite change, fever and unexpected weight change.   HENT: Negative for congestion, mouth sores, nosebleeds, sore throat and voice change.    Respiratory: Negative for cough, chest tightness, shortness of breath and wheezing.    Cardiovascular: Negative for chest pain, palpitations and leg swelling.   Gastrointestinal: Negative for abdominal distention, abdominal pain, blood in stool, constipation, diarrhea, nausea and  vomiting.   Endocrine: Negative for cold intolerance and heat intolerance.   Genitourinary: Negative for difficulty urinating, dysuria, frequency and hematuria.   Musculoskeletal: Positive for arthralgias. Negative for back pain, joint swelling and myalgias.   Skin: Negative for rash.   Neurological: Negative for dizziness, syncope, weakness, light-headedness, numbness and headaches.   Hematological: Negative for adenopathy. Does not bruise/bleed easily.   Psychiatric/Behavioral: Negative for confusion and sleep disturbance. The patient is not nervous/anxious.    All other systems reviewed and are negative.        Objective      Vitals:    10/13/20 1402   BP: 145/79   Pulse: 62   Resp: 18   Temp: 98 °F (36.7 °C)   SpO2: 96%      Current Status 10/13/2020   ECOG score 1   pain 0/10    Physical Exam   Constitutional: She is oriented to person, place, and time. She appears well-developed.   Eyes: Conjunctivae are normal.   Neck: No thyromegaly present.   Cardiovascular: Normal rate. Exam reveals no gallop and no friction rub.   No murmur heard.  Irregularly irregular   Pulmonary/Chest: Breath sounds normal. No respiratory distress.   Abdominal: Soft. Bowel sounds are normal. She exhibits no distension. There is no abdominal tenderness.   Musculoskeletal:      Comments: Trace bilateral lower extremity edema with venous stasis changes   Lymphadenopathy:        Head (right side): No submandibular adenopathy present.     She has no cervical adenopathy. No inguinal adenopathy noted on the right or left side.        Right: No supraclavicular adenopathy present.        Left: No supraclavicular adenopathy present.   Neurological: She is alert and oriented to person, place, and time. She displays normal reflexes. No cranial nerve deficit. She exhibits normal muscle tone.   Skin: Skin is warm and dry. No rash noted.   Psychiatric: Her behavior is normal.   The patient was examined today, unchanged from above.    RECENT  LABS:  Hematology WBC   Date Value Ref Range Status   09/15/2020 6.69 3.40 - 10.80 10*3/mm3 Final     RBC   Date Value Ref Range Status   09/15/2020 4.23 3.77 - 5.28 10*6/mm3 Final     Hemoglobin   Date Value Ref Range Status   09/15/2020 12.1 12.0 - 15.9 g/dL Final     Hematocrit   Date Value Ref Range Status   09/15/2020 38.0 34.0 - 46.6 % Final     Platelets   Date Value Ref Range Status   09/15/2020 216 140 - 450 10*3/mm3 Final        Lab Results   Component Value Date    GLUCOSE 127 (H) 09/15/2020    BUN 22 (H) 09/15/2020    CREATININE 1.43 (H) 09/15/2020    EGFRIFNONA 35 (L) 09/15/2020    EGFRIFAFRI 50 (L) 12/06/2017    BCR 15.4 09/15/2020    K 4.5 09/15/2020    CO2 25.2 09/15/2020    CALCIUM 9.4 09/15/2020    PROTENTOTREF 7.4 12/06/2017    ALBUMIN 3.90 09/15/2020    LABIL2 1.3 12/06/2017    AST 30 09/15/2020    ALT 16 09/15/2020     Reviewed result from renal ultrasound as outlined below.  Reviewed results from colonoscopy and EGD as outlined above and below.    Assessment/Plan      1. Recurrent iron deficiency anemia:   · The patient is intolerant of oral iron.   · Prior GI evaluation showed no definitive evidence of GI blood loss.   · She has underlying stage III chronic kidney disease, which is a contributing factor to her mild borderline anemia.   · She has been treated in the past with IV iron in 2011 and 2012.    · She had a lengthy absence from our office from 2015 until she was referred back on 9/21/17 with evidence of recurrent iron deficiency, likely related to malabsorption.  Her ferritin on 7/7/17 was 21 and hemoglobin on 8/7/17 was 11.1.  Her degree of anemia was mild with a hemoglobin of 11.4, microcytic indices with an MCV of 78.  Labs on 9/21/17 showed ferritin 20.5, iron saturation 8%, TIBC 441.  We did also check B12 and folate which were normal.  Erythropoietin was 32 consistent with a component of anemia secondary to chronic kidney disease. She did return stool cards for occult blood which  "were negative ×3 10/5/17.  She received additional Feraheme on 10/5 and 10/12/17.    · She underwent EGD with Dr. Curran 10/23/17 with no evidence of active bleeding, small gastric polyps identified.   · Labs 12/20/17 with hemoglobin up to 13.3, ferritin 97.9, iron saturation of 16%.    · Repeat labs performed postoperatively from colon resection on 5/4/18 showed iron 36, ferritin 99, iron saturation 10%, TIBC 349.    · Labs 5/22/2028 showed normal hemoglobin at 12.5 and stable iron studies with iron 71, ferritin 70, iron saturation 18%, TIBC 393.    · On labs from 9/15/2020, hemoglobin stable at 12.1.  Labs show iron 58, ferritin slightly improved at 93.8, iron saturation 15%, TIBC 388.  Recheck labs in 3 months and return visit.  2. Stage IIa (T3N0M0) ascending colon cancer:   · Developed significant diarrhea with blood per rectum.    · Colonoscopy 4/25/18 with identification of polyps in the rectum, cecal polyp, mass in the right colon.  The cecal polyp was a sessile serrated adenoma, ascending colon \"mass\" showed hyperplastic polyp, rectal polyp was a tubulovillous adenoma with low-grade dysplasia.  CEA on 4/25/18 was normal at 1.65.  Chest x-ray 4/25/18 was unrevealing.  CT of the abdomen and pelvis 4/26/18 showed a 4 cm annular mass in the ascending colon with adjacent haziness in the mesentery but no lymphadenopathy, no evidence of metastatic disease.    · Right hemicolectomy on 4/27/18 with pathology showing an invasive moderately differentiated adenocarcinoma arising in a sessile serrated adenoma with negative margins measuring 2.5 cm extending through the muscularis propria into brad-colorectal tissue, positive lymphovascular invasion, negative perineural invasion, 10 lymph nodes negative.  There was a comment regarding an ulcerated small adjacent pericolonic abscess, raising the question of possible microscopic perforation.  Patient did have a few high risk features with less than 12 lymph nodes removed, " positive lymphovascular invasion, and some possibility of microscopic perforation.  The tumor was microsatellite stable.    · Given her age and comorbidities, the patient was a poor candidate for adjuvant chemotherapy and was not recommended.  Independent of this recommendation, the patient stated she was not interested in taking any adjuvant chemotherapy.  Therefore we will plan to monitor her clinically for evidence of recurrent disease.  Standard monitoring is with routine CEA levels however this does not appear to be informative for her given her normal preoperative value.  We will pursue annual interval follow-up CT.    · CT scan from 4/24/2019 at a 1 year interval showed no evidence of recurrent disease.    · 5/22/2020 CEA normal at 1.69 and CT scan chest abdomen and pelvis 5/22/2020 showed no evidence of recurrent/metastatic disease.    · The patient underwent delayed endoscopic evaluation with EGD and colonoscopy on 8/10/2020 with Dr. Willett, colonoscopy with tubular adenoma and hyperplastic polyp removed.  · Patient today returns with CEA normal at 2.14 from 9/15/2020.  Recheck again in 3 months.  3. 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    · Family history includes a sister with breast cancer around age 60, another sister with breast cancer around age 60, maternal aunt with breast cancer over the age of 50, maternal uncle with lung cancer.    · Given the patient's multiple malignancies and family history, she was referred to the genetics clinic to discuss potential genetic testing, mainly for benefit of the patient's children and other family members.    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   · She did follow-up in the genetics clinic and underwent INVITAE panel  test 7/31/18 showing VUS in MLH3 and MSH6 with no known clinical significance at this time.   4. History of gastric polyps:   · EGD 10/23/17 with small less than 6 mm gastric polyps, not resected.  · Repeat EGD 8/10/2020 with small gastric polyps, not resected.  5. History of left renal cell carcinoma with enlarging right renal lesion:   · The patient underwent a left nephrectomy in 2004 with no clinical evidence of recurrent disease.    · CT chest from 11/14/16 showed no evidence of recurrence.    · CT abdomen and pelvis however from 4/26/18 showed a 2.2 cm right renal lesion which was indeterminate, possibly a proteinaceous cyst but could not rule out mass lesion.  The study was performed without contrast due to the patient's underlying chronic kidney disease.  She is unable to undergo renal mass protocol CT with contrast.  She is unable to undergo MRI due to her pacemaker.    · Follow-up CT scan 4/24/2019 with no change in the right renal lesion at 2.1 cm, repeat at 1 year interval  · 1 year interval follow-up CT (delayed due to viral pandemic) 5/22/2020 with increase in right renal mass from 2.1 up to 2.6 cm.  This is felt to possibly represent a proteinaceous cyst versus solid mass.  She is unable to undergo a renal protocol CT due to her renal dysfunction and solitary kidney and cannot undergo MRI due to her pacemaker.   · Renal ultrasound 6/4/2020 showed a 3.6 cm mixed solid and cystic mass at the lower pole of the right kidney suspicious for renal cell carcinoma and recommended surgical excision.  Patient did follow-up with Dr. Trujillo in urology who recommended continued radiographic monitoring.  · Patient returns with subsequent short interval follow-up ultrasound on 9/15/2020 with stable 3.6 cm right renal lesion.  Repeat prior to return visit again in 3 months.  6. CKD3:   · Baseline creatinine in 1.2-1.8 range  · Contributing factor to anemia    PLAN:   1. MD visit in 3 months.  1 week prior she will  undergo renal ultrasound as well as CBC, CMP, CEA, iron panel, ferritin.

## 2020-10-13 ENCOUNTER — OFFICE VISIT (OUTPATIENT)
Dept: ONCOLOGY | Facility: CLINIC | Age: 85
End: 2020-10-13

## 2020-10-13 ENCOUNTER — APPOINTMENT (OUTPATIENT)
Dept: LAB | Facility: HOSPITAL | Age: 85
End: 2020-10-13

## 2020-10-13 VITALS
WEIGHT: 219.2 LBS | RESPIRATION RATE: 18 BRPM | TEMPERATURE: 98 F | DIASTOLIC BLOOD PRESSURE: 79 MMHG | SYSTOLIC BLOOD PRESSURE: 145 MMHG | HEART RATE: 62 BPM | BODY MASS INDEX: 38.84 KG/M2 | HEIGHT: 63 IN | OXYGEN SATURATION: 96 %

## 2020-10-13 DIAGNOSIS — N28.89 RIGHT RENAL MASS: ICD-10-CM

## 2020-10-13 DIAGNOSIS — D63.1 ANEMIA IN STAGE 3 CHRONIC KIDNEY DISEASE, UNSPECIFIED WHETHER STAGE 3A OR 3B CKD (HCC): ICD-10-CM

## 2020-10-13 DIAGNOSIS — C18.2 MALIGNANT NEOPLASM OF ASCENDING COLON (HCC): Primary | ICD-10-CM

## 2020-10-13 DIAGNOSIS — N18.30 ANEMIA IN STAGE 3 CHRONIC KIDNEY DISEASE, UNSPECIFIED WHETHER STAGE 3A OR 3B CKD (HCC): ICD-10-CM

## 2020-10-13 DIAGNOSIS — D50.8 OTHER IRON DEFICIENCY ANEMIA: ICD-10-CM

## 2020-10-13 PROCEDURE — 99214 OFFICE O/P EST MOD 30 MIN: CPT | Performed by: INTERNAL MEDICINE

## 2020-10-14 ENCOUNTER — FLU SHOT (OUTPATIENT)
Dept: INTERNAL MEDICINE | Facility: CLINIC | Age: 85
End: 2020-10-14

## 2020-10-14 DIAGNOSIS — Z23 NEED FOR INFLUENZA VACCINATION: ICD-10-CM

## 2020-10-14 PROCEDURE — G0008 ADMIN INFLUENZA VIRUS VAC: HCPCS | Performed by: FAMILY MEDICINE

## 2020-10-14 PROCEDURE — 90694 VACC AIIV4 NO PRSRV 0.5ML IM: CPT | Performed by: FAMILY MEDICINE

## 2020-10-21 ENCOUNTER — TELEPHONE (OUTPATIENT)
Dept: ONCOLOGY | Facility: CLINIC | Age: 85
End: 2020-10-21

## 2020-10-21 NOTE — TELEPHONE ENCOUNTER
DAILY, PATIENT'S DAUGHTER, CALLING.    PT RECEIVED LETTER SAYING SHE HAD US SCHEDULED ON 12/29.    DR. HENDERSON HAD ALREADY SCHEDULED ONE FOR 1/7.    NEED TO CANCEL 12/2/9 US AND R/S DR LYNCH FOLLOW UP TO AFTER 1/14.    PLEASE CALL DAILY TO R/S DR LYNCH APPT  [phone]

## 2020-11-09 RX ORDER — APIXABAN 5 MG/1
TABLET, FILM COATED ORAL
Qty: 60 TABLET | Refills: 0 | Status: SHIPPED | OUTPATIENT
Start: 2020-11-09 | End: 2021-07-06

## 2020-12-22 ENCOUNTER — TELEPHONE (OUTPATIENT)
Dept: SURGERY | Facility: CLINIC | Age: 85
End: 2020-12-22

## 2020-12-22 ENCOUNTER — PREP FOR SURGERY (OUTPATIENT)
Dept: OTHER | Facility: HOSPITAL | Age: 85
End: 2020-12-22

## 2020-12-22 DIAGNOSIS — D12.8 ADENOMATOUS RECTAL POLYP: Primary | ICD-10-CM

## 2021-01-08 ENCOUNTER — LAB (OUTPATIENT)
Dept: LAB | Facility: HOSPITAL | Age: 86
End: 2021-01-08

## 2021-01-08 DIAGNOSIS — D63.1 ANEMIA IN STAGE 3 CHRONIC KIDNEY DISEASE, UNSPECIFIED WHETHER STAGE 3A OR 3B CKD (HCC): ICD-10-CM

## 2021-01-08 DIAGNOSIS — C18.2 MALIGNANT NEOPLASM OF ASCENDING COLON (HCC): ICD-10-CM

## 2021-01-08 DIAGNOSIS — N28.89 RIGHT RENAL MASS: ICD-10-CM

## 2021-01-08 DIAGNOSIS — N18.30 ANEMIA IN STAGE 3 CHRONIC KIDNEY DISEASE, UNSPECIFIED WHETHER STAGE 3A OR 3B CKD (HCC): ICD-10-CM

## 2021-01-08 DIAGNOSIS — D50.8 OTHER IRON DEFICIENCY ANEMIA: ICD-10-CM

## 2021-01-08 LAB
ALBUMIN SERPL-MCNC: 4.1 G/DL (ref 3.5–5.2)
ALBUMIN/GLOB SERPL: 1.3 G/DL (ref 1.1–2.4)
ALP SERPL-CCNC: 121 U/L (ref 38–116)
ALT SERPL W P-5'-P-CCNC: 17 U/L (ref 0–33)
ANION GAP SERPL CALCULATED.3IONS-SCNC: 12.9 MMOL/L (ref 5–15)
AST SERPL-CCNC: 21 U/L (ref 0–32)
BASOPHILS # BLD AUTO: 0.06 10*3/MM3 (ref 0–0.2)
BASOPHILS NFR BLD AUTO: 0.8 % (ref 0–1.5)
BILIRUB SERPL-MCNC: 0.4 MG/DL (ref 0.2–1.2)
BUN SERPL-MCNC: 18 MG/DL (ref 6–20)
BUN/CREAT SERPL: 12.8 (ref 7.3–30)
CALCIUM SPEC-SCNC: 9.4 MG/DL (ref 8.5–10.2)
CEA SERPL-MCNC: 1.84 NG/ML
CHLORIDE SERPL-SCNC: 104 MMOL/L (ref 98–107)
CO2 SERPL-SCNC: 24.1 MMOL/L (ref 22–29)
CREAT SERPL-MCNC: 1.41 MG/DL (ref 0.6–1.1)
DEPRECATED RDW RBC AUTO: 45.7 FL (ref 37–54)
EOSINOPHIL # BLD AUTO: 0.2 10*3/MM3 (ref 0–0.4)
EOSINOPHIL NFR BLD AUTO: 2.8 % (ref 0.3–6.2)
ERYTHROCYTE [DISTWIDTH] IN BLOOD BY AUTOMATED COUNT: 14.1 % (ref 12.3–15.4)
FERRITIN SERPL-MCNC: 55.9 NG/ML (ref 13–150)
GFR SERPL CREATININE-BSD FRML MDRD: 36 ML/MIN/1.73
GLOBULIN UR ELPH-MCNC: 3.1 GM/DL (ref 1.8–3.5)
GLUCOSE SERPL-MCNC: 175 MG/DL (ref 74–124)
HCT VFR BLD AUTO: 39.9 % (ref 34–46.6)
HGB BLD-MCNC: 12.8 G/DL (ref 12–15.9)
IMM GRANULOCYTES # BLD AUTO: 0.02 10*3/MM3 (ref 0–0.05)
IMM GRANULOCYTES NFR BLD AUTO: 0.3 % (ref 0–0.5)
IRON 24H UR-MRATE: 52 MCG/DL (ref 37–145)
IRON SATN MFR SERPL: 12 % (ref 14–48)
LYMPHOCYTES # BLD AUTO: 1.16 10*3/MM3 (ref 0.7–3.1)
LYMPHOCYTES NFR BLD AUTO: 16.2 % (ref 19.6–45.3)
MCH RBC QN AUTO: 28.4 PG (ref 26.6–33)
MCHC RBC AUTO-ENTMCNC: 32.1 G/DL (ref 31.5–35.7)
MCV RBC AUTO: 88.7 FL (ref 79–97)
MONOCYTES # BLD AUTO: 0.36 10*3/MM3 (ref 0.1–0.9)
MONOCYTES NFR BLD AUTO: 5 % (ref 5–12)
NEUTROPHILS NFR BLD AUTO: 5.34 10*3/MM3 (ref 1.7–7)
NEUTROPHILS NFR BLD AUTO: 74.9 % (ref 42.7–76)
NRBC BLD AUTO-RTO: 0 /100 WBC (ref 0–0.2)
PLATELET # BLD AUTO: 235 10*3/MM3 (ref 140–450)
PMV BLD AUTO: 9.7 FL (ref 6–12)
POTASSIUM SERPL-SCNC: 4.8 MMOL/L (ref 3.5–4.7)
PROT SERPL-MCNC: 7.2 G/DL (ref 6.3–8)
RBC # BLD AUTO: 4.5 10*6/MM3 (ref 3.77–5.28)
SODIUM SERPL-SCNC: 141 MMOL/L (ref 134–145)
TIBC SERPL-MCNC: 427 MCG/DL (ref 249–505)
TRANSFERRIN SERPL-MCNC: 305 MG/DL (ref 200–360)
WBC # BLD AUTO: 7.14 10*3/MM3 (ref 3.4–10.8)

## 2021-01-08 PROCEDURE — 82728 ASSAY OF FERRITIN: CPT

## 2021-01-08 PROCEDURE — 85025 COMPLETE CBC W/AUTO DIFF WBC: CPT

## 2021-01-08 PROCEDURE — 83540 ASSAY OF IRON: CPT

## 2021-01-08 PROCEDURE — 84466 ASSAY OF TRANSFERRIN: CPT

## 2021-01-08 PROCEDURE — 82378 CARCINOEMBRYONIC ANTIGEN: CPT | Performed by: INTERNAL MEDICINE

## 2021-01-08 PROCEDURE — 36415 COLL VENOUS BLD VENIPUNCTURE: CPT

## 2021-01-08 PROCEDURE — 80053 COMPREHEN METABOLIC PANEL: CPT

## 2021-01-13 ENCOUNTER — CLINICAL SUPPORT NO REQUIREMENTS (OUTPATIENT)
Dept: CARDIOLOGY | Facility: CLINIC | Age: 86
End: 2021-01-13

## 2021-01-13 ENCOUNTER — OFFICE VISIT (OUTPATIENT)
Dept: CARDIOLOGY | Facility: CLINIC | Age: 86
End: 2021-01-13

## 2021-01-13 VITALS
DIASTOLIC BLOOD PRESSURE: 84 MMHG | RESPIRATION RATE: 16 BRPM | HEIGHT: 63 IN | HEART RATE: 70 BPM | OXYGEN SATURATION: 98 % | SYSTOLIC BLOOD PRESSURE: 128 MMHG | WEIGHT: 219 LBS | BODY MASS INDEX: 38.8 KG/M2

## 2021-01-13 DIAGNOSIS — E66.01 SEVERE OBESITY (BMI 35.0-39.9) WITH COMORBIDITY (HCC): ICD-10-CM

## 2021-01-13 DIAGNOSIS — I49.5 SICK SINUS SYNDROME (HCC): ICD-10-CM

## 2021-01-13 DIAGNOSIS — Z99.89 OSA ON CPAP: ICD-10-CM

## 2021-01-13 DIAGNOSIS — I44.2 THIRD DEGREE AV BLOCK (HCC): ICD-10-CM

## 2021-01-13 DIAGNOSIS — Z98.890 S/P AV NODAL ABLATION: ICD-10-CM

## 2021-01-13 DIAGNOSIS — I48.21 PERMANENT ATRIAL FIBRILLATION (HCC): ICD-10-CM

## 2021-01-13 DIAGNOSIS — G47.33 OSA ON CPAP: ICD-10-CM

## 2021-01-13 DIAGNOSIS — I48.21 PERMANENT ATRIAL FIBRILLATION (HCC): Primary | ICD-10-CM

## 2021-01-13 DIAGNOSIS — Z95.0 S/P PLACEMENT OF CARDIAC PACEMAKER: ICD-10-CM

## 2021-01-13 PROCEDURE — 99213 OFFICE O/P EST LOW 20 MIN: CPT | Performed by: NURSE PRACTITIONER

## 2021-01-13 PROCEDURE — 93000 ELECTROCARDIOGRAM COMPLETE: CPT | Performed by: NURSE PRACTITIONER

## 2021-01-13 PROCEDURE — 93280 PM DEVICE PROGR EVAL DUAL: CPT | Performed by: INTERNAL MEDICINE

## 2021-01-13 RX ORDER — LEVOTHYROXINE SODIUM 125 MCG
125 TABLET ORAL EVERY OTHER DAY
COMMUNITY
Start: 2021-01-08

## 2021-01-13 RX ORDER — MAGNESIUM 30 MG
30 TABLET ORAL 2 TIMES DAILY
COMMUNITY
End: 2022-03-09

## 2021-01-13 RX ORDER — LORATADINE 10 MG/1
10 CAPSULE, LIQUID FILLED ORAL DAILY PRN
COMMUNITY

## 2021-01-13 RX ORDER — LANOLIN ALCOHOL/MO/W.PET/CERES
1000 CREAM (GRAM) TOPICAL DAILY
COMMUNITY

## 2021-01-13 NOTE — PROGRESS NOTES
Date of Office Visit: 2021  Encounter Provider: ALLISON Isaac  Place of Service: [facility] CARDIOLOGY  Patient Name: Anastacia Sarah  :1935    Chief Complaint   Patient presents with   • Pacemaker Check   • Atrial Fibrillation   :     HPI: Anastacia Sarah is a 85 y.o. female who is a patient followed by Dr. Melendez.  She has a history of PAF which then became permanent as well as sick sinus syndrome and underwent implantation of a permanent pacemaker.  Her A. fib was difficult to control so she underwent AV node ablation in 3/2018.  She also has a history of multiple cancers and follows with hematology/oncology but things have been stable.    She saw Dr. Melendez in July was doing okay from a cardiac standpoint but was just getting over pneumonia and had some significant dyspnea.    She presents today for routine follow-up.    She is accompanied by her daughter.    She is doing okay. She finally recovered from pneumonia and is back to baseline. She denies chest pain, has chronic dyspnea with exertion that is unchanged. No PND, orthopnea or edema.    She does not exercise and really the most activity she does is walking to the mailbox some days ---she says if she does this she has to stop on the way back---this is not knew.    Device interrogation shows normal testing and function. Her RV threshold is chronically elevated around 2V --stable.     She has a couple of episodes of NSVT since last remote check. One in October the other in Dec---she was asymptomatic in October but says she did feel the one in December---she said she just overall felt bad that day and went to bed and felt better the next day. One was 13 sec in duration, the other was 17 sec. She has had these in the past.     Remains on apixaban for AC, no bleeding issues.     Her primary complaint today is she is having trouble sleeping, she is currently on melatonin but it is not really helping.          Past Medical History:   Diagnosis Date   • Abnormal ECG    • Acute bronchitis    • Acute sinusitis    • Anemia     Iron deficiency   • Anesthesia complication    • Anticoagulated on warfarin    • Arthritis    • Bladder dysfunction    • Cancer of left kidney (CMS/HCC) 06/08/2004    S/P Left Radical Neprectomy   • Cardiac disease    • Chronic dysfunction of left eustachian tube    • CKD (chronic kidney disease)     stage 3   • Colon cancer (CMS/HCC)    • Colon polyps    • Depression    • Diarrhea    • Diverticulosis 08/08/2011    Descending Colon & Sigmoid Colon multiple diverticula   • Epigastric pain    • Fatigue    • Frequent urination    • GERD (gastroesophageal reflux disease)    • Goiter     thyroid removed in november 2015   • Health care maintenance    • History of colitis    • History of shingles    • History of transfusion    • Hypertension    • Insomnia    • Left atrial enlargement    • Left ventricular hypertrophy    • Mitral regurgitation     mild to moderate per echocardiogram 2016   • Nerve pain     LUE D/T SHINGLES   • Neuropathy     ARMS   • Non morbid obesity due to excess calories    • NANCY (obstructive sleep apnea)     uses CPAP   • Paroxysmal atrial fibrillation (CMS/HCC)    • Permanent atrial fibrillation (CMS/HCC)    • Pulmonary hypertension (CMS/HCC)     per echo 2016   • Right atrial enlargement    • S/P AV deshawn ablation    • S/P placement of cardiac pacemaker    • Severe obesity (BMI 35.0-35.9 with comorbidity) (CMS/HCC)    • Sleep apnea     on CPAP   • SSS (sick sinus syndrome) (CMS/HCC)    • Third degree AV block (CMS/HCC)    • Thyroid cancer (CMS/HCC)    • URI (upper respiratory infection)    • Urinary urgency        Past Surgical History:   Procedure Laterality Date   • ATRIAL ABLATION SURGERY N/A 09/13/2011    Comprehensive electrophysiology study, Left atrial pace & sense, 3-dimensional cardia mapping, Radiofrequency catheter ablation, Transseptal hear catheterization, Dr. Okeefe  Nora   • ATRIAL ABLATION SURGERY N/A 02/11/2004    Dr. Maldonado Melendez   • BLADDER SURGERY N/A     Bladder tacking procedure   • BLADDER SUSPENSION N/A 05/2012   • CARDIAC ELECTROPHYSIOLOGY PROCEDURE N/A 5/17/2016    Procedure: PPM generator change - dual BOSTON;  Surgeon: Maldonado Melendez MD;  Location: [facility] CATH INVASIVE LOCATION;  Service:    • CARDIAC ELECTROPHYSIOLOGY PROCEDURE N/A 3/23/2017    Procedure: AV node ablation pt has BOSTON PPM;  Surgeon: Maldonado Melendez MD;  [facility];  Service:    • CARDIOVERSION N/A 01/26/2017    Dr. Melendez   • CARDIOVERSION N/A 05/25/2017    Dr. Melendez   • CATARACT EXTRACTION Bilateral     Dr. Gramajo   • COLON RESECTION Right 4/27/2018    Procedure: COLON RESECTION RIGHT LAPAROSCOPIC, possible open;  Surgeon: Rebeca Curran MD;  Location: [facility] OR;  Service: General   • COLONOSCOPY N/A 04/01/2003    Normal colonoscopy except for an occasional diverticulosis, Dr. Nathan Macias   • COLONOSCOPY N/A 4/25/2018    Procedure: COLONOSCOPY INTO CECUM AND TI WITH SALINE LIFT, HOT SNARE POLYPECTOMIES, BX'S, SPOT INJECTION, RESOLUTION CLIPS X2;  Surgeon: Rebeca Curran MD;  Location: [facility] ENDOSCOPY;  Service: Gastroenterology   • COLONOSCOPY N/A 8/10/2020    Procedure: COLONOSCOPYTO ILEOCOLIC ANASTOMOSIS WITH COLD BX POLYPECTOMY;  Surgeon: Rebeca Curran MD;  Location: [facility] ENDOSCOPY;  Service: General;  Laterality: N/A;  HX COLON CANCER  --DIVERTICULOSIS, POLYPS, HEMORRHOIDS    • CYSTOSCOPY BOTOX INJECTION OF BLADDER N/A 6/27/2016    Procedure: CYSTOSCOPY WITH BOTOX DETRUSOR INJECTION;  Surgeon: Salome Bowen MD;  Location: [facility] MAIN OR;  Service:    • CYSTOSCOPY BOTOX INJECTION OF BLADDER N/A 05/29/2015    Dr. Salome Bowen   • ENDOSCOPY N/A 06/10/2015    Antral Polyp: Gastric Mucosa w/ marked cautery artifact,  Hiatal Hernia, Dr. Rebeca Curran   • ENDOSCOPY N/A 10/28/2013    Gastric polyp: polypoid hyperplastic gastric mucosa w/ focal  ulceration, mixed acute & chronic inflammation.  Negative for intestinal metaplasia, no helicobacter organisms identified on diff-wuik stain, Dr. Rebeca Curran   • ENDOSCOPY N/A 03/18/2013    Large prepyloric polyp, partially removed: fragment of polypoid hyperplastic gastric mucosa w/ foci of erosion & patchy mixed acute & chronic inflammation. No helicobacter organisms identified on diff-quick stain. Negative for intestinal metaplasia, negative for dysplasia, Dr. Rebeca Curran   • ENDOSCOPY N/A 10/24/2012    large prepyloric mass (3cm) w/ adjacent nodules: hyperplastic polyp w/ mild chronic active gastritis, focal erosion & associated, Multiple fundic polyps: polypoid mucosal hyperplasia, Dr. Rebeca Curran   • ENDOSCOPY N/A 03/11/2003    Gastric Antral Biopsies: Fragments of Gastric Mucosa & Necrotic Tissue (Compatible w/ Ulcer) w/ chronic active inflammation.  Negative stain for Helicobacter organisms. Dr. Nathan Macias   • ENDOSCOPY N/A 10/23/2017    Procedure: ESOPHAGOGASTRODUODENOSCOPY;  Surgeon: Rebeca Curran MD;  Location: [facility] ENDOSCOPY;  Service:    • ENDOSCOPY  8/10/2020    Procedure: ESOPHAGOGASTRODUODENOSCOPY;  Surgeon: Rebeca Curran MD;  Location: [facility] ENDOSCOPY;  Service: General;;  GERD, HX GASTRIC POLYPS  --SMALL HIATAL HERNIA, SMALL GASTRIC POLYPS    • ENDOSCOPY AND COLONOSCOPY N/A 08/08/2011    4cm hiatal hernia, Schatzki's ring, Diverticulosis, Large antral polyps, Dr. Rebeca Curran   • INCISIONAL HERNIA REPAIR N/A 06/11/2015    Defect approximately 2.5 cm w/ a mass of incarcerated tissue approximately the size of a nectarine, Dr. Rebeca Curran   • INSERT / REPLACE / REMOVE PACEMAKER     • KNEE ARTHROPLASTY     • KNEE MINI REVISION Right 9/28/2016    Procedure: RT KNEE POLY INSERT CHANGE ;  Surgeon: Tommy Avila MD;  Location: [facility] MAIN OR;  Service:    • LAPAROSCOPIC CHOLECYSTECTOMY N/A    • NEPHRECTOMY RADICAL Left 06/08/2004    Incidentially found left renal mass 3-4 cm renal cell  carcinoma, left lower pole, Dr. Salome Bowen   • OTHER SURGICAL HISTORY      NEUROMUSCULAR BLOCKERS BOTULINUM TOXIN ONABOTULINUMTOXIN A   • PACEMAKER IMPLANTATION N/A     Dr. Casillas   • PACEMAKER REPLACEMENT N/A 09/24/2010    Implanted Generator is a [employer] ALTRUA 60 model [id number], serial # [id number], Dr. Chavez Jimenez   • TOTAL KNEE ARTHROPLASTY Left 05/21/2013    Dr. Miguel Pacheco   • TOTAL THYROIDECTOMY N/A 11/16/2015    Dr. Gideon Ashley, [facility]   • TRANSVAGINAL TAPING SUSPENSION N/A 03/16/2009    Mini Sling, Closed suprapubic cystostomy, Dr. Salome Bowen   • TUBAL ABDOMINAL LIGATION Bilateral        Social History     Socioeconomic History   • Marital status:      Spouse name: Not on file   • Number of children: 0   • Years of education: Not on file   • Highest education level: Not on file   Occupational History   • Occupation: Homemaker     Employer: RETIRED   Tobacco Use   • Smoking status: Never Smoker   • Smokeless tobacco: Never Used   • Tobacco comment: caffeine use   Substance and Sexual Activity   • Alcohol use: No   • Drug use: No   • Sexual activity: Never       Family History   Problem Relation Age of Onset   • Heart disease Other    • Deep vein thrombosis Other    • Hypertension Other    • Breast cancer Sister         In her 60s.   • Breast cancer Maternal Aunt    • Breast cancer Sister         In her 60s.   • Heart disease Mother    • Hypertension Mother    • Heart disease Father    • Hypertension Father    • Heart attack Father    • Heart disease Daughter    • Hypertension Daughter    • Heart disease Son    • Hypertension Son    • Malig Hyperthermia Neg Hx        Review of Systems   Constitution: Negative for chills, fever and malaise/fatigue.   Cardiovascular: Negative for chest pain, dyspnea on exertion, leg swelling, near-syncope, orthopnea, palpitations, paroxysmal nocturnal dyspnea and syncope.   Respiratory: Negative for cough and shortness of breath.    Hematologic/Lymphatic: Negative.     Musculoskeletal: Negative for joint pain, joint swelling and myalgias.   Gastrointestinal: Negative for abdominal pain, diarrhea, melena, nausea and vomiting.   Genitourinary: Negative for frequency and hematuria.   Neurological: Negative for light-headedness, numbness, paresthesias and seizures.   Allergic/Immunologic: Negative.    All other systems reviewed and are negative.      Allergies   Allergen Reactions   • Sulfa Antibiotics Swelling     FACE AND TONGUE   • Adhesive Tape Rash   • Levaquin [Levofloxacin] Rash         Current Outpatient Medications:   •  CALCIUM PO, Take 600 Units by mouth Daily. With D3, listed separetly, Disp: , Rfl:   •  cholecalciferol (VITAMIN D3) 1000 UNITS tablet, Take 1,000 Units by mouth daily. In addition to the calcium -D3 pill, Disp: , Rfl:   •  Eliquis 5 MG tablet tablet, TAKE ONE TABLET BY MOUTH EVERY 12 HOURS, Disp: 60 tablet, Rfl: 0  •  esomeprazole (nexIUM) 40 MG capsule, Take 40 mg by mouth Every Morning Before Breakfast., Disp: , Rfl:   •  furosemide (LASIX) 20 MG tablet, TAKE TWO TABLETS BY MOUTH DAILY, Disp: 60 tablet, Rfl: 0  •  GLUCOSAMINE-CHONDROITIN PO, Take 1 tablet by mouth Daily., Disp: , Rfl:   •  Ketotifen Fumarate (REFRESH EYE ITCH RELIEF OP), Apply  to eye(s) as directed by provider., Disp: , Rfl:   •  lisinopril (PRINIVIL,ZESTRIL) 10 MG tablet, Take 1 tablet by mouth Daily., Disp: 90 tablet, Rfl: 2  •  loperamide (IMODIUM) 2 MG capsule, Take 2 mg by mouth 4 (four) times a day as needed for diarrhea., Disp: , Rfl:   •  Loratadine (Claritin) 10 MG capsule, Take 10 mg by mouth Daily As Needed., Disp: , Rfl:   •  magnesium 30 MG tablet, Take 30 mg by mouth 2 (Two) Times a Day., Disp: , Rfl:   •  melatonin 5 MG tablet tablet, Take 10 mg by mouth., Disp: , Rfl:   •  metoprolol tartrate (LOPRESSOR) 25 MG tablet, TAKE ONE TABLET BY MOUTH TWICE A DAY, Disp: 180 tablet, Rfl: 0  •  Multiple Vitamins-Minerals (MULTIVITAMIN PO), Take 1 tablet by mouth Daily., Disp: ,  "Rfl:   •  Multiple Vitamins-Minerals (PRESERVISION AREDS 2 PO), Take  by mouth., Disp: , Rfl:   •  SYNTHROID 112 MCG tablet, Take 125 mcg by mouth Daily. Pt taking 110 mg a day, Disp: , Rfl:   •  Synthroid 125 MCG tablet, , Disp: , Rfl:   •  vitamin B-12 (CYANOCOBALAMIN) 1000 MCG tablet, Take 1,000 mcg by mouth Daily., Disp: , Rfl:   •  Zinc Sulfate (ZINC 15 PO), Take  by mouth Daily., Disp: , Rfl:   •  B Complex-C (SUPER B COMPLEX PO), Take 1 tablet by mouth Daily., Disp: , Rfl:   •  Multiple Vitamins-Minerals (PRESERVISION AREDS PO), Take 2 tablets by mouth Daily., Disp: , Rfl:       Objective:     Vitals:    01/13/21 1151   BP: 128/84   Pulse: 70   Resp: 16   SpO2: 98%   Weight: 99.3 kg (219 lb)   Height: 158.8 cm (62.52\")     Body mass index is 39.39 kg/m².    PHYSICAL EXAM:    Vitals Reviewed.   General Appearance: No acute distress, well developed and well nourished.   Eyes: Conjunctiva and lids: No erythema, swelling, or discharge. Sclera non-icteric.   HENT: Atraumatic, normocephalic. External eyes, ears, and nose normal.   Respiratory: No signs of respiratory distress. Respiration rhythm and depth normal.   Clear to auscultation. No rales, crackles, rhonchi, or wheezing auscultated.   Cardiovascular:  Heart Rate and Rhythm: Normal, Heart Sounds: S1 and S2.  Murmurs: No murmurs noted. No rubs, thrills, or gallops.   Arterial Pulses:  Posterior tibialis and dorsalis pedis pulses normal.   Lower Extremities: No edema noted.  Gastrointestinal:  Abdomen soft, non-distended, non-tender.   Musculoskeletal: Normal movement of extremities  Skin and Nails: General appearance normal. No pallor, cyanosis, diaphoresis. Skin temperature normal. No clubbing of fingernails.   Psychiatric: Patient alert and oriented to person, place, and time. Speech and behavior appropriate. Normal mood and affect.       ECG 12 Lead    Date/Time: 1/13/2021 11:35 AM  Performed by: Sayra Julian APRN  Authorized by: Sayra Julian APRN "   Comparison: compared with previous ECG   Similar to previous ECG  Rhythm: atrial fibrillation and paced  Ectopy: unifocal PVCs  BPM: 70  Pacing: ventricular paced rhythm            Assessment:       Diagnosis Plan   1. Permanent atrial fibrillation (CMS/HCC)  ECG 12 Lead   2. Sick sinus syndrome (CMS/HCC)  ECG 12 Lead   3. S/P placement of cardiac pacemaker  ECG 12 Lead   4. S/P AV deshawn ablation     5. Third degree AV block (CMS/HCC)     6. NANCY on CPAP     7. Severe obesity (BMI 35.0-39.9) with comorbidity (CMS/HCC)            Plan:       1.-5.  Permanent A. fib, sick sinus syndrome, status post permanent pacemaker, status post AV node ablation inducing third-degree AV block.    6.  NANYC, treated with CPAP.    7. For the problem of overweight/obesity, we discussed the importance of lifestyle measures and strategies for weight loss, such as improved nutrition, regular exercise and sleep hygiene.      Follow up with Dr. Melendez in 6 months with in clinic device check.     As always, it has been a pleasure to participate in your patient's care.      Sincerely,         ALLISON Winn

## 2021-01-14 NOTE — PROGRESS NOTES
"Chief Complaint  Recurrent iron deficiency anemia, stage IIA (T3N0M0) ascending colon cancer, history of left renal cell carcinoma status post left nephrectomy in 2004, CKD3, enlarging right renal lesion, history of gastric polyps    Subjective        History of Present Illness  Patient returns today in follow-up with laboratory studies as well as renal ultrasound to review.  Since her last visit, the patient reports that she has done fairly well.  She does note some intermittent lightheadedness.  She does have some mild insomnia.  She reports fairly normal bowel function.  Appetite is normal.      Objective   Vital Signs:   /90   Pulse 78   Temp 98.7 °F (37.1 °C) (Temporal)   Resp 16   Ht 158.8 cm (62.52\")   Wt 98.9 kg (218 lb 1.6 oz)   SpO2 95%   BMI 39.23 kg/m²     Physical Exam  Constitutional:       Appearance: She is well-developed.      Comments: Elderly female no distress   Eyes:      Conjunctiva/sclera: Conjunctivae normal.   Neck:      Thyroid: No thyromegaly.   Cardiovascular:      Rate and Rhythm: Normal rate. Rhythm irregular.      Heart sounds: No murmur. No friction rub. No gallop.    Pulmonary:      Effort: No respiratory distress.      Breath sounds: Normal breath sounds.   Abdominal:      General: Bowel sounds are normal. There is no distension.      Palpations: Abdomen is soft.      Tenderness: There is no abdominal tenderness.   Lymphadenopathy:      Head:      Right side of head: No submandibular adenopathy.      Cervical: No cervical adenopathy.      Upper Body:      Right upper body: No supraclavicular adenopathy.      Left upper body: No supraclavicular adenopathy.   Skin:     General: Skin is warm and dry.      Findings: No rash.   Neurological:      Mental Status: She is alert and oriented to person, place, and time.      Cranial Nerves: No cranial nerve deficit.      Motor: No abnormal muscle tone.      Deep Tendon Reflexes: Reflexes normal.   Psychiatric:         Behavior: " Behavior normal.        Result Review : Reviewed CBC, CMP, CEA, iron panel, ferritin from 1/8/2021.  Reviewed renal ultrasound from 1/7/2021.       Assessment and Plan     1. Recurrent iron deficiency anemia:   · The patient is intolerant of oral iron.   · Prior GI evaluation showed no definitive evidence of GI blood loss.   · She has underlying stage III chronic kidney disease, which is a contributing factor to her mild borderline anemia.   · She has been treated in the past with IV iron in 2011 and 2012.    · She had a lengthy absence from our office from 2015 until she was referred back on 9/21/17 with evidence of recurrent iron deficiency, likely related to malabsorption.  Her ferritin on 7/7/17 was 21 and hemoglobin on 8/7/17 was 11.1.  Her degree of anemia was mild with a hemoglobin of 11.4, microcytic indices with an MCV of 78.  Labs on 9/21/17 showed ferritin 20.5, iron saturation 8%, TIBC 441.  We did also check B12 and folate which were normal.  Erythropoietin was 32 consistent with a component of anemia secondary to chronic kidney disease. She did return stool cards for occult blood which were negative ×3 10/5/17.  She received additional Feraheme on 10/5 and 10/12/17.    · She underwent EGD with Dr. Curran 10/23/17 with no evidence of active bleeding, small gastric polyps identified.   · The patient returns today with labs to review from 1/8/2021 showing a hemoglobin up to 12.8.  Iron is 52, ferritin slightly decreased from last check at 55.9 with iron saturation 12%, TIBC 427.  Her ferritin level has fluctuated over time but remains in the normal range.  Iron saturation is slightly low currently.  We will recheck this at her next visit in 4 months, would not pursue IV iron at this time.  If ferritin and iron saturation declined any further we will consider the use of Injectafer.  2. Stage IIA (T3N0M0) ascending colon cancer:   · Developed significant diarrhea with blood per rectum.    · Colonoscopy  "4/25/18 with identification of polyps in the rectum, cecal polyp, mass in the right colon.  The cecal polyp was a sessile serrated adenoma, ascending colon \"mass\" showed hyperplastic polyp, rectal polyp was a tubulovillous adenoma with low-grade dysplasia.  CEA on 4/25/18 was normal at 1.65.  Chest x-ray 4/25/18 was unrevealing.  CT of the abdomen and pelvis 4/26/18 showed a 4 cm annular mass in the ascending colon with adjacent haziness in the mesentery but no lymphadenopathy, no evidence of metastatic disease.    · Right hemicolectomy on 4/27/18 with pathology showing an invasive moderately differentiated adenocarcinoma arising in a sessile serrated adenoma with negative margins measuring 2.5 cm extending through the muscularis propria into brad-colorectal tissue, positive lymphovascular invasion, negative perineural invasion, 10 lymph nodes negative.  There was a comment regarding an ulcerated small adjacent pericolonic abscess, raising the question of possible microscopic perforation.  Patient did have a few high risk features with less than 12 lymph nodes removed, positive lymphovascular invasion, and some possibility of microscopic perforation.  The tumor was microsatellite stable.    · Given her age and comorbidities, the patient was a poor candidate for adjuvant chemotherapy and was not recommended.  Independent of this recommendation, the patient stated she was not interested in taking any adjuvant chemotherapy.  Therefore we will plan to monitor her clinically for evidence of recurrent disease.  Standard monitoring is with routine CEA levels however this does not appear to be informative for her given her normal preoperative value.  We will pursue annual interval follow-up CT.    · CT scan from 4/24/2019 at a 1 year interval showed no evidence of recurrent disease.    · 5/22/2020 CEA normal at 1.69 and CT scan chest abdomen and pelvis 5/22/2020 showed no evidence of recurrent/metastatic disease.    · The " patient underwent delayed endoscopic evaluation with EGD and colonoscopy on 8/10/2020 with Dr. Willett, colonoscopy with tubular adenoma and hyperplastic polyp removed.  · Patient today returns with CEA normal at 1.84 from 1/8/2021.  There is no evidence of recurrent disease clinically.  We did discuss obtaining a 1 year follow-up CT chest abdomen pelvis in May 2021 and we will repeat CEA at that time as well.  3. 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    · Family history includes a sister with breast cancer around age 60, another sister with breast cancer around age 60, maternal aunt with breast cancer over the age of 50, maternal uncle with lung cancer.    · Given the patient's multiple malignancies and family history, she was referred to the genetics clinic to discuss potential genetic testing, mainly for benefit of the patient's children and other family members.    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   · She did follow-up in the genetics clinic and underwent INVITAE panel test 7/31/18 showing VUS in MLH3 and MSH6 with no known clinical significance at this time.   4. History of gastric polyps:   · EGD 10/23/17 with small less than 6 mm gastric polyps, not resected.  · Repeat EGD 8/10/2020 with small gastric polyps, not resected.  5. History of left renal cell carcinoma with enlarging right renal lesion:   · The patient underwent a left nephrectomy in 2004 with no clinical evidence of recurrent disease.    · CT chest from 11/14/16 showed no evidence of recurrence.    · CT abdomen and pelvis however from 4/26/18 showed a 2.2 cm right renal lesion which was indeterminate, possibly a proteinaceous cyst but could not rule out mass lesion.  The study was performed without  contrast due to the patient's underlying chronic kidney disease.  She is unable to undergo renal mass protocol CT with contrast.  She is unable to undergo MRI due to her pacemaker.    · Follow-up CT scan 4/24/2019 with no change in the right renal lesion at 2.1 cm, repeat at 1 year interval  · 1 year interval follow-up CT (delayed due to viral pandemic) 5/22/2020 with increase in right renal mass from 2.1 up to 2.6 cm.  This is felt to possibly represent a proteinaceous cyst versus solid mass.  She is unable to undergo a renal protocol CT due to her renal dysfunction and solitary kidney and cannot undergo MRI due to her pacemaker.   · Renal ultrasound 6/4/2020 showed a 3.6 cm mixed solid and cystic mass at the lower pole of the right kidney suspicious for renal cell carcinoma and recommended surgical excision.  Patient did follow-up with Dr. Trujillo in urology who recommended continued radiographic monitoring.  · Short interval follow-up ultrasound on 9/15/2020 with stable 3.6 cm right renal lesion.   · The patient returns today with repeat ultrasound this performed in the urology office on 1/7/2021.  This measured the lower pole right renal mass at 3.8 cm, was not directly compared to the prior study performed at [facility].  Patient does have follow-up with urology scheduled for 1/25/2020.  We will also be able to evaluate the lesion on upcoming CT in May 2021.  6. CKD3:   · Baseline creatinine in 1.2-1.8 range  · Contributing factor to anemia     PLAN:   1. The patient will follow up as scheduled with urology on 1/25/2020 in regards to the recent renal ultrasound findings from 1/7/2021.  2. MD visit in 4 months.  1 week prior she will undergo 1 year interval CT chest abdomen pelvis as well as CBC, CMP, CEA, iron panel, ferritin.

## 2021-01-15 ENCOUNTER — APPOINTMENT (OUTPATIENT)
Dept: LAB | Facility: HOSPITAL | Age: 86
End: 2021-01-15

## 2021-01-15 ENCOUNTER — OFFICE VISIT (OUTPATIENT)
Dept: ONCOLOGY | Facility: CLINIC | Age: 86
End: 2021-01-15

## 2021-01-15 VITALS
HEART RATE: 78 BPM | WEIGHT: 218.1 LBS | HEIGHT: 63 IN | RESPIRATION RATE: 16 BRPM | SYSTOLIC BLOOD PRESSURE: 158 MMHG | DIASTOLIC BLOOD PRESSURE: 90 MMHG | OXYGEN SATURATION: 95 % | BODY MASS INDEX: 38.64 KG/M2 | TEMPERATURE: 98.7 F

## 2021-01-15 DIAGNOSIS — C18.2 MALIGNANT NEOPLASM OF ASCENDING COLON (HCC): Primary | ICD-10-CM

## 2021-01-15 PROCEDURE — 99214 OFFICE O/P EST MOD 30 MIN: CPT | Performed by: INTERNAL MEDICINE

## 2021-02-16 ENCOUNTER — TELEPHONE (OUTPATIENT)
Dept: SURGERY | Facility: CLINIC | Age: 86
End: 2021-02-16

## 2021-02-17 DIAGNOSIS — D12.8 ADENOMATOUS RECTAL POLYP: Primary | ICD-10-CM

## 2021-03-01 ENCOUNTER — TRANSCRIBE ORDERS (OUTPATIENT)
Dept: SLEEP MEDICINE | Facility: HOSPITAL | Age: 86
End: 2021-03-01

## 2021-03-01 DIAGNOSIS — Z01.818 OTHER SPECIFIED PRE-OPERATIVE EXAMINATION: Primary | ICD-10-CM

## 2021-03-02 DIAGNOSIS — Z23 IMMUNIZATION DUE: ICD-10-CM

## 2021-03-05 DIAGNOSIS — I48.19 PERSISTENT ATRIAL FIBRILLATION (HCC): ICD-10-CM

## 2021-03-05 DIAGNOSIS — R06.02 SHORTNESS OF BREATH: ICD-10-CM

## 2021-03-05 RX ORDER — FUROSEMIDE 20 MG/1
TABLET ORAL
Qty: 60 TABLET | Refills: 0 | Status: SHIPPED | OUTPATIENT
Start: 2021-03-05 | End: 2021-04-02

## 2021-03-05 NOTE — TELEPHONE ENCOUNTER
Lasix 20 mg   Last office visit 1/13/2021  Next office visit 8/9/2021 with Pacemaker check  Last EKG 1/13/2021  Labs 1/15/2021

## 2021-03-12 ENCOUNTER — TELEPHONE (OUTPATIENT)
Dept: SURGERY | Facility: CLINIC | Age: 86
End: 2021-03-12

## 2021-03-12 ENCOUNTER — LAB (OUTPATIENT)
Dept: LAB | Facility: HOSPITAL | Age: 86
End: 2021-03-12

## 2021-03-12 DIAGNOSIS — Z01.818 OTHER SPECIFIED PRE-OPERATIVE EXAMINATION: ICD-10-CM

## 2021-03-12 PROCEDURE — U0004 COV-19 TEST NON-CDC HGH THRU: HCPCS

## 2021-03-12 PROCEDURE — U0005 INFEC AGEN DETEC AMPLI PROBE: HCPCS

## 2021-03-12 PROCEDURE — C9803 HOPD COVID-19 SPEC COLLECT: HCPCS

## 2021-03-13 LAB — SARS-COV-2 ORF1AB RESP QL NAA+PROBE: NOT DETECTED

## 2021-03-15 ENCOUNTER — HOSPITAL ENCOUNTER (OUTPATIENT)
Facility: HOSPITAL | Age: 86
Setting detail: HOSPITAL OUTPATIENT SURGERY
Discharge: HOME | End: 2021-03-15
Attending: SURGERY | Admitting: SURGERY

## 2021-03-15 ENCOUNTER — ANESTHESIA EVENT (OUTPATIENT)
Dept: GASTROENTEROLOGY | Facility: HOSPITAL | Age: 86
End: 2021-03-15

## 2021-03-15 ENCOUNTER — ANESTHESIA (OUTPATIENT)
Dept: GASTROENTEROLOGY | Facility: HOSPITAL | Age: 86
End: 2021-03-15

## 2021-03-15 VITALS
HEART RATE: 70 BPM | WEIGHT: 223.56 LBS | HEIGHT: 62 IN | BODY MASS INDEX: 41.14 KG/M2 | SYSTOLIC BLOOD PRESSURE: 164 MMHG | RESPIRATION RATE: 16 BRPM | DIASTOLIC BLOOD PRESSURE: 70 MMHG | OXYGEN SATURATION: 99 %

## 2021-03-15 DIAGNOSIS — D12.8 ADENOMATOUS RECTAL POLYP: ICD-10-CM

## 2021-03-15 PROCEDURE — 25010000002 PROPOFOL 10 MG/ML EMULSION: Performed by: ANESTHESIOLOGY

## 2021-03-15 PROCEDURE — G0105 COLORECTAL SCRN; HI RISK IND: HCPCS | Performed by: SURGERY

## 2021-03-15 RX ORDER — PROPOFOL 10 MG/ML
VIAL (ML) INTRAVENOUS CONTINUOUS PRN
Status: DISCONTINUED | OUTPATIENT
Start: 2021-03-15 | End: 2021-03-15 | Stop reason: SURG

## 2021-03-15 RX ORDER — SODIUM CHLORIDE, SODIUM LACTATE, POTASSIUM CHLORIDE, CALCIUM CHLORIDE 600; 310; 30; 20 MG/100ML; MG/100ML; MG/100ML; MG/100ML
30 INJECTION, SOLUTION INTRAVENOUS CONTINUOUS PRN
Status: DISCONTINUED | OUTPATIENT
Start: 2021-03-15 | End: 2021-03-15 | Stop reason: HOSPADM

## 2021-03-15 RX ORDER — CEPHALEXIN 250 MG/1
250 CAPSULE ORAL 4 TIMES DAILY
Qty: 28 CAPSULE | Refills: 0 | Status: SHIPPED | OUTPATIENT
Start: 2021-03-15 | End: 2021-03-22

## 2021-03-15 RX ORDER — LIDOCAINE HYDROCHLORIDE 20 MG/ML
INJECTION, SOLUTION INFILTRATION; PERINEURAL AS NEEDED
Status: DISCONTINUED | OUTPATIENT
Start: 2021-03-15 | End: 2021-03-15 | Stop reason: SURG

## 2021-03-15 RX ADMIN — LIDOCAINE HYDROCHLORIDE 60 MG: 20 INJECTION, SOLUTION INFILTRATION; PERINEURAL at 10:44

## 2021-03-15 RX ADMIN — SODIUM CHLORIDE, POTASSIUM CHLORIDE, SODIUM LACTATE AND CALCIUM CHLORIDE 30 ML/HR: 600; 310; 30; 20 INJECTION, SOLUTION INTRAVENOUS at 10:36

## 2021-03-15 RX ADMIN — PROPOFOL 100 MCG/KG/MIN: 10 INJECTION, EMULSION INTRAVENOUS at 10:44

## 2021-03-15 NOTE — ANESTHESIA POSTPROCEDURE EVALUATION
"Patient: Anastacia Sarah    Procedure Summary     Date: 03/15/21 Room / Location:  [facility]    Anesthesia Start: 1039 Anesthesia Stop: 1057    Procedure: FLEXIBLE SIGMOIDOSCOPY WITH BIOPSY (N/A ) Diagnosis:       Adenomatous rectal polyp      (Adenomatous rectal polyp [D12.8])    Surgeons: Rebeca Curran MD Provider: Brennan Torres MD    Anesthesia Type: MAC ASA Status: 3          Anesthesia Type: MAC    Vitals  Vitals Value Taken Time   /70 03/15/21 1119   Temp     Pulse 70 03/15/21 1119   Resp 16 03/15/21 1119   SpO2 99 % 03/15/21 1119           Post Anesthesia Care and Evaluation    Patient location during evaluation: PACU  Patient participation: complete - patient participated  Level of consciousness: awake  Pain score: 0  Pain management: adequate  Airway patency: patent  Anesthetic complications: No anesthetic complications  PONV Status: none  Cardiovascular status: acceptable  Respiratory status: acceptable  Hydration status: acceptable    Comments: /70 (BP Location: Right leg, Patient Position: Lying)   Pulse 70   Resp 16   Ht 157.5 cm (62\")   Wt 101 kg (223 lb 9 oz)   SpO2 99%   BMI 40.89 kg/m²       "

## 2021-03-15 NOTE — H&P
Cc: Endoscopy Visit    HPI: 85 y.o. female here for flex sig to follow up on large rectal polyp with small adjacent polyp found on more recent c scope.  She fell last week and didn't see a physician and there is some redness, so i will give her some cephalexin.     Past Medical History:   Diagnosis Date   • Abnormal ECG    • Acute bronchitis    • Acute sinusitis    • Anemia     Iron deficiency   • Anesthesia complication    • Anticoagulated on warfarin    • Arthritis    • Bladder dysfunction    • Cancer of left kidney (CMS/HCC) 06/08/2004    S/P Left Radical Neprectomy   • Cardiac disease    • Chronic dysfunction of left eustachian tube    • CKD (chronic kidney disease)     stage 3   • Colon cancer (CMS/HCC)    • Colon polyps    • Depression    • Diarrhea    • Diverticulosis 08/08/2011    Descending Colon & Sigmoid Colon multiple diverticula   • Epigastric pain    • Fatigue    • Frequent urination    • GERD (gastroesophageal reflux disease)    • Goiter     thyroid removed in november 2015   • Health care maintenance    • History of colitis    • History of shingles    • History of transfusion    • Hypertension    • Insomnia    • Left atrial enlargement    • Left ventricular hypertrophy    • Mitral regurgitation     mild to moderate per echocardiogram 2016   • Nerve pain     LUE D/T SHINGLES   • Neuropathy     ARMS   • Non morbid obesity due to excess calories    • NANCY (obstructive sleep apnea)     uses CPAP   • Paroxysmal atrial fibrillation (CMS/HCC)    • Permanent atrial fibrillation (CMS/HCC)    • Pulmonary hypertension (CMS/HCC)     per echo 2016   • Right atrial enlargement    • S/P AV deshawn ablation    • S/P placement of cardiac pacemaker    • Severe obesity (BMI 35.0-35.9 with comorbidity) (CMS/HCC)    • Sleep apnea     on CPAP   • SSS (sick sinus syndrome) (CMS/HCC)    • Third degree AV block (CMS/HCC)    • Thyroid cancer (CMS/HCC)    • URI (upper respiratory infection)    • Urinary urgency        Past  Surgical History:   Procedure Laterality Date   • ABDOMINAL SURGERY     • ATRIAL ABLATION SURGERY N/A 09/13/2011    Comprehensive electrophysiology study, Left atrial pace & sense, 3-dimensional cardia mapping, Radiofrequency catheter ablation, Transseptal hear catheterization, Dr. Maldonado Melendez   • ATRIAL ABLATION SURGERY N/A 02/11/2004    Dr. Maldonado Melendez   • BLADDER SURGERY N/A     Bladder tacking procedure   • BLADDER SUSPENSION N/A 05/2012   • CARDIAC ELECTROPHYSIOLOGY PROCEDURE N/A 5/17/2016    Procedure: PPM generator change - dual BOSTON;  Surgeon: Maldonado Melendez MD;  Location: [facility] CATH INVASIVE LOCATION;  Service:    • CARDIAC ELECTROPHYSIOLOGY PROCEDURE N/A 3/23/2017    Procedure: AV node ablation pt has BOSTON PPM;  Surgeon: Maldonado Melendez MD;  Location: [facility] CATH INVASIVE LOCATION;  Service:    • CARDIOVERSION N/A 01/26/2017    Dr. Melendez   • CARDIOVERSION N/A 05/25/2017    Dr. Melendez   • CATARACT EXTRACTION Bilateral     Dr. Gramajo   • COLON RESECTION Right 4/27/2018    Procedure: COLON RESECTION RIGHT LAPAROSCOPIC, possible open;  Surgeon: Rebeca Curran MD;  Location: [facility] OR;  Service: General   • COLONOSCOPY N/A 04/01/2003    Normal colonoscopy except for an occasional diverticulosis, Dr. Nathan Macias   • COLONOSCOPY N/A 4/25/2018    Procedure: COLONOSCOPY INTO CECUM AND TI WITH SALINE LIFT, HOT SNARE POLYPECTOMIES, BX'S, SPOT INJECTION, RESOLUTION CLIPS X2;  Surgeon: Rebeca Curran MD;  Location: [facility] ENDOSCOPY;  Service: Gastroenterology   • COLONOSCOPY N/A 8/10/2020    Procedure: COLONOSCOPYTO ILEOCOLIC ANASTOMOSIS WITH COLD BX POLYPECTOMY;  Surgeon: Rebeca Curran MD;  Location: [facility] ENDOSCOPY;  Service: General;  Laterality: N/A;  HX COLON CANCER  --DIVERTICULOSIS, POLYPS, HEMORRHOIDS    • CYSTOSCOPY BOTOX INJECTION OF BLADDER N/A 6/27/2016    Procedure: CYSTOSCOPY WITH BOTOX DETRUSOR INJECTION;  Surgeon: Salome Bowen MD;  Location: [facility] MAIN OR;  Service:    •  CYSTOSCOPY BOTOX INJECTION OF BLADDER N/A 05/29/2015    Dr. Salome Bowen   • ENDOSCOPY N/A 06/10/2015    Antral Polyp: Gastric Mucosa w/ marked cautery artifact,  Hiatal Hernia, Dr. Rebeca Curran   • ENDOSCOPY N/A 10/28/2013    Gastric polyp: polypoid hyperplastic gastric mucosa w/ focal ulceration, mixed acute & chronic inflammation.  Negative for intestinal metaplasia, no helicobacter organisms identified on diff-wuik stain, Dr. Rebeca Curran   • ENDOSCOPY N/A 03/18/2013    Large prepyloric polyp, partially removed: fragment of polypoid hyperplastic gastric mucosa w/ foci of erosion & patchy mixed acute & chronic inflammation. No helicobacter organisms identified on diff-quick stain. Negative for intestinal metaplasia, negative for dysplasia, Dr. Rebeca Curran   • ENDOSCOPY N/A 10/24/2012    large prepyloric mass (3cm) w/ adjacent nodules: hyperplastic polyp w/ mild chronic active gastritis, focal erosion & associated, Multiple fundic polyps: polypoid mucosal hyperplasia, Dr. Rebeca Curran   • ENDOSCOPY N/A 03/11/2003    Gastric Antral Biopsies: Fragments of Gastric Mucosa & Necrotic Tissue (Compatible w/ Ulcer) w/ chronic active inflammation.  Negative stain for Helicobacter organisms. Dr. Nathan Macias   • ENDOSCOPY N/A 10/23/2017    Procedure: ESOPHAGOGASTRODUODENOSCOPY;  Surgeon: Rebeca Curran MD;  Location: [facility] ENDOSCOPY;  Service:    • ENDOSCOPY  8/10/2020    Procedure: ESOPHAGOGASTRODUODENOSCOPY;  Surgeon: Rebeca Curran MD;  Location: [facility] ENDOSCOPY;  Service: General;;  GERD, HX GASTRIC POLYPS  --SMALL HIATAL HERNIA, SMALL GASTRIC POLYPS    • ENDOSCOPY AND COLONOSCOPY N/A 08/08/2011    4cm hiatal hernia, Schatzki's ring, Diverticulosis, Large antral polyps, Dr. Rebeca Curran   • INCISIONAL HERNIA REPAIR N/A 06/11/2015    Defect approximately 2.5 cm w/ a mass of incarcerated tissue approximately the size of a nectarine, Dr. Rebeca Curran   • INSERT / REPLACE / REMOVE PACEMAKER     • JOINT  REPLACEMENT Bilateral     knee   • KNEE ARTHROPLASTY     • KNEE MINI REVISION Right 9/28/2016    Procedure: RT KNEE POLY INSERT CHANGE ;  Surgeon: Tommy Avila MD;  Location: [facility] OR;  Service:    • LAPAROSCOPIC CHOLECYSTECTOMY N/A    • NEPHRECTOMY RADICAL Left 06/08/2004    Incidentially found left renal mass 3-4 cm renal cell carcinoma, left lower pole, Dr. Salome Bowen   • OTHER SURGICAL HISTORY      NEUROMUSCULAR BLOCKERS BOTULINUM TOXIN ONABOTULINUMTOXIN A   • PACEMAKER IMPLANTATION N/A     Dr. Casillas   • PACEMAKER REPLACEMENT N/A 09/24/2010    Implanted Generator is a [employer] ALTRUA 60 model [id number], serial # [id number], Dr. Chavez Jimenez   • TOTAL KNEE ARTHROPLASTY Left 05/21/2013    Dr. Miguel Pacheco   • TOTAL THYROIDECTOMY N/A 11/16/2015    Dr. Gideon Ashley, [facility]   • TRANSVAGINAL TAPING SUSPENSION N/A 03/16/2009    Mini Sling, Closed suprapubic cystostomy, Dr. Salome Bowen   • TUBAL ABDOMINAL LIGATION Bilateral        is allergic to sulfa antibiotics, adhesive tape, and levaquin [levofloxacin].       Medication List      ASK your doctor about these medications    CALCIUM PO     cholecalciferol 25 MCG (1000 UT) tablet  Commonly known as: VITAMIN D3     Claritin 10 MG capsule  Generic drug: Loratadine     Eliquis 5 MG tablet tablet  Generic drug: apixaban  TAKE ONE TABLET BY MOUTH EVERY 12 HOURS     esomeprazole 40 MG capsule  Commonly known as: nexIUM     furosemide 20 MG tablet  Commonly known as: LASIX  TAKE TWO TABLETS BY MOUTH DAILY     GLUCOSAMINE-CHONDROITIN PO     lisinopril 10 MG tablet  Commonly known as: PRINIVIL,ZESTRIL  Take 1 tablet by mouth Daily.     loperamide 2 MG capsule  Commonly known as: IMODIUM     magnesium 30 MG tablet     melatonin 5 MG tablet tablet     metoprolol tartrate 25 MG tablet  Commonly known as: LOPRESSOR  TAKE ONE TABLET BY MOUTH TWICE A DAY     * multivitamin tablet tablet  Commonly known as: THERAGRAN     * PRESERVISION AREDS 2 PO     REFRESH EYE ITCH RELIEF OP      * Synthroid 112 MCG tablet  Generic drug: levothyroxine     * Synthroid 125 MCG tablet  Generic drug: levothyroxine     vitamin B-12 1000 MCG tablet  Commonly known as: CYANOCOBALAMIN     ZINC 15 PO         * This list has 4 medication(s) that are the same as other medications prescribed for you. Read the directions carefully, and ask your doctor or other care provider to review them with you.                Family History   Problem Relation Age of Onset   • Heart disease Other    • Deep vein thrombosis Other    • Hypertension Other    • Breast cancer Sister         In her 60s.   • Breast cancer Maternal Aunt    • Breast cancer Sister         In her 60s.   • Heart disease Mother    • Hypertension Mother    • Heart disease Father    • Hypertension Father    • Heart attack Father    • Heart disease Daughter    • Hypertension Daughter    • Heart disease Son    • Hypertension Son    • Malig Hyperthermia Neg Hx        Social History     Socioeconomic History   • Marital status:      Spouse name: Not on file   • Number of children: 0   • Years of education: Not on file   • Highest education level: Not on file   Tobacco Use   • Smoking status: Never Smoker   • Smokeless tobacco: Never Used   • Tobacco comment: caffeine use   Vaping Use   • Vaping Use: Never used   Substance and Sexual Activity   • Alcohol use: No   • Drug use: No   • Sexual activity: Defer       Vitals:    03/15/21 0930   BP: 141/59   Pulse: 70   Resp: 16   SpO2: 97%       Body mass index is 40.89 kg/m².    Physical Exam    General: No acute distress  Lungs: No labored breathing, Pulse oximetry on room air is 97%.  Heart: RRR  Abdomen: Soft  Extremity right lower with diffuse hematoma and mild erythema, consistent with fall she describes on eliquis.  Mental:  Awake, alert, and oriented    Imp:     · History of colon cancer, right colon, node negative.  · Sigmoid diverticula  · History of Rectal polyp, small, 8 mm; removed via snare  · History of  Rectal polyp, large, 3 cm, flat, vague, papillated;  Approximately 4 cm from anal canal, removed piecemeal via snare and then fulgurated.  4 mm area 8/2020 tubular adenoma  · Large external hemorrhoids, without internal  · History of gastric polyps.  · Eliquis, last dose 3 days ago.     Plan:  · Flex sig.  · Cephalexin.    Rebeca Curran MD  10:39 EDT

## 2021-03-15 NOTE — ANESTHESIA PREPROCEDURE EVALUATION
Anesthesia Evaluation     Patient summary reviewed and Nursing notes reviewed   NPO Solid Status: > 8 hours  NPO Liquid Status: > 2 hours           Airway   Mallampati: I  TM distance: >3 FB  Neck ROM: full  No difficulty expected  Dental - normal exam     Pulmonary - normal exam   (+) recent URI, sleep apnea,   Cardiovascular - normal exam    (+) pacemaker pacemaker, hypertension, valvular problems/murmurs MR, dysrhythmias Atrial Fib,       Neuro/Psych  (+) dizziness/light headedness, psychiatric history Depression,     GI/Hepatic/Renal/Endo    (+) obesity,  GERD, GI bleeding , renal disease CRI, thyroid problem hypothyroidism  (-) morbid obesity    Musculoskeletal     Abdominal  - normal exam    Bowel sounds: normal.   Substance History - negative use     OB/GYN negative ob/gyn ROS         Other   arthritis,    history of cancer                  Anesthesia Plan    ASA 3     MAC       Anesthetic plan, all risks, benefits, and alternatives have been provided, discussed and informed consent has been obtained with: patient.

## 2021-03-15 NOTE — OP NOTE
Sigmoidoscopy Procedure Note  Anastacia Sarah  1935  Date of Procedure: 03/15/21    Pre-operative Diagnosis:    History of colon cancer, right colon, node negative.  Sigmoid diverticula  History of Rectal polyp, small, 8 mm; removed via snare  History of Rectal polyp, large, 3 cm, flat, vague, papillated;  Approximately 4 cm from anal canal, removed piecemeal via snare and then fulgurated.  4 mm area 8/2020 tubular adenoma  Large external hemorrhoids, without internal  History of gastric polyps.  Eliquis, last dose 3 days ago.    Post-operative Diagnosis:  Normal rectum with no evidence of polyps at inking    Procedure: Flexible Sigmoidoscopy         Recommendations:   C scope in 4 years.  Keep a copy of the photographs of the procedure given to you today for possible need for reference in the future.    Surgeon: Bina    Anesthetic: MAC per Brennan Torres MD    Indications:  As above     Procedure Details     MAC anesthesia was induced.  The 180 Colonoscopy was inserted blindly into the rectum and advanced to the sigmoid, with relative ease.  There was stool in the sigmoid so i tried to avoid that.  Area in question was identified and photographed for documentation.  There was blue dye and no evidence of other polyps in the rectum.      Rebeca Curran MD  03/15/21  10:56 EDT

## 2021-03-22 ENCOUNTER — OFFICE VISIT (OUTPATIENT)
Dept: INTERNAL MEDICINE | Facility: CLINIC | Age: 86
End: 2021-03-22

## 2021-03-22 ENCOUNTER — HOSPITAL ENCOUNTER (OUTPATIENT)
Dept: GENERAL RADIOLOGY | Facility: HOSPITAL | Age: 86
Discharge: HOME | End: 2021-03-22

## 2021-03-22 VITALS
WEIGHT: 223 LBS | HEART RATE: 54 BPM | SYSTOLIC BLOOD PRESSURE: 160 MMHG | HEIGHT: 62 IN | TEMPERATURE: 97 F | OXYGEN SATURATION: 98 % | BODY MASS INDEX: 41.04 KG/M2 | DIASTOLIC BLOOD PRESSURE: 84 MMHG

## 2021-03-22 DIAGNOSIS — M25.562 ACUTE PAIN OF LEFT KNEE: Primary | ICD-10-CM
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
## 2021-03-22 DIAGNOSIS — L03.116 CELLULITIS OF LEFT LOWER EXTREMITY: ICD-10-CM

## 2021-03-22 DIAGNOSIS — M79.672 LEFT FOOT PAIN: ICD-10-CM

## 2021-03-22 PROCEDURE — 73562 X-RAY EXAM OF KNEE 3: CPT

## 2021-03-22 PROCEDURE — 99214 OFFICE O/P EST MOD 30 MIN: CPT | Performed by: FAMILY MEDICINE

## 2021-03-22 PROCEDURE — 73630 X-RAY EXAM OF FOOT: CPT

## 2021-03-22 RX ORDER — DOXYCYCLINE 100 MG/1
100 CAPSULE ORAL 2 TIMES DAILY
Qty: 20 CAPSULE | Refills: 0 | Status: SHIPPED | OUTPATIENT
Start: 2021-03-22 | End: 2021-05-07

## 2021-03-22 NOTE — PROGRESS NOTES
Subjective   Anastacia Sarah is a 85 y.o. female.   Chief Complaint   Patient presents with   • Fall       History of Present Illness     #1 fall/#2 foot pain/#3 knee pain/#4 cellulitis-patient fell 2 weeks ago.  She was in the  garage and her toe caught in the rug. She fell forward on the  floor.  She did not hurt her head, but her glasses were broken.  She complained of the pain in both knees.  And the whole left side was hurting.  She did not have any face bruising.  She has no headache.  She continues to have pain in left knee and left foot.  Pain is on and off.  Intensity from 8-9 out of 10.  She had redness and swelling of left leg and was started on Keflex by Dr. Curran a week ago.  Swelling is better, but she still has pain and redness.  She props her leg, but does not use compression stockings or wrapping it.  She is on Eliquis.    The following portions of the patient's history were reviewed and updated as appropriate: allergies, current medications, past medical history, past social history and problem list.    Review of Systems   Constitutional: Negative for chills and fever.   Musculoskeletal: Positive for arthralgias.   Neurological: Negative for headaches.         Objective   Wt Readings from Last 3 Encounters:   03/22/21 101 kg (223 lb)   03/15/21 101 kg (223 lb 9 oz)   01/15/21 98.9 kg (218 lb 1.6 oz)      Vitals:    03/22/21 1159   BP: 160/84   Pulse: 54   Temp: 97 °F (36.1 °C)   SpO2: 98%     Temp Readings from Last 3 Encounters:   03/22/21 97 °F (36.1 °C)   01/15/21 98.7 °F (37.1 °C) (Temporal)   10/13/20 98 °F (36.7 °C) (Temporal)     BP Readings from Last 3 Encounters:   03/22/21 160/84   03/15/21 164/70   01/15/21 158/90     Pulse Readings from Last 3 Encounters:   03/22/21 54   03/15/21 70   01/15/21 78     Body mass index is 40.78 kg/m².    Physical Exam  Constitutional:       Appearance: She is obese.      Comments: Sitting in a wheelchair.   Skin:     Comments: Bilateral lower extremity  edema, left more than right.  Tenderness to palpation over left knee and foot.  Left lower leg is warm to touch.  There is red discoloration/bruising.   Neurological:      Mental Status: She is alert.   Psychiatric:         Mood and Affect: Mood normal.         Behavior: Behavior normal.         Assessment/Plan   Diagnoses and all orders for this visit:    1. Acute pain of left knee (Primary)  -     XR Knee 3 View Left    2. Fall, initial encounter  -     XR Knee 3 View Left    3. Cellulitis of left lower extremity    4. Left foot pain  -     XR Foot 3+ View Left    Other orders  -     doxycycline (MONODOX) 100 MG capsule; Take 1 capsule by mouth 2 (Two) Times a Day.  Dispense: 20 capsule; Refill: 0        #1 fall/#2 knee pain/#3 foot pain/#4 cellulitis-no improvement on Keflex.  We are going to stop it and start doxycycline.  Patient is allergic to sulfa drugs and Levaquin.  Checking x-ray of knee and foot.  Patient is advised to wrap lower extremities.  If no improvement in a few days, she will see her orthopedist.  She is up-to-date with tetanus vaccine.  She had it in 2015.  Fall prevention discussed.

## 2021-04-02 DIAGNOSIS — R06.02 SHORTNESS OF BREATH: ICD-10-CM

## 2021-04-02 DIAGNOSIS — I48.19 PERSISTENT ATRIAL FIBRILLATION (HCC): ICD-10-CM

## 2021-04-02 RX ORDER — FUROSEMIDE 20 MG/1
TABLET ORAL
Qty: 180 TABLET | Refills: 1 | Status: SHIPPED | OUTPATIENT
Start: 2021-04-02 | End: 2021-10-01

## 2021-04-05 PROCEDURE — 93296 REM INTERROG EVL PM/IDS: CPT | Performed by: INTERNAL MEDICINE

## 2021-04-05 PROCEDURE — 93294 REM INTERROG EVL PM/LDLS PM: CPT | Performed by: INTERNAL MEDICINE

## 2021-04-19 ENCOUNTER — TELEPHONE (OUTPATIENT)
Dept: ONCOLOGY | Facility: CLINIC | Age: 86
End: 2021-04-19

## 2021-04-19 NOTE — TELEPHONE ENCOUNTER
Called pt. She is wanting to cancel her CT appointment because it was done a month ago with Dr. Trujillo. Informed her I would get copy of CT scan and review with Dr. Brower before I cancel her appointment just incase Dr. Brower wants to re-do the scan and that I would call her back if he wanted to do anything further. She V/U.

## 2021-04-19 NOTE — TELEPHONE ENCOUNTER
Yesy called Dr. Angulo office and the last imaging they have done on pt was an US in January. Called pt. No answer. L/M asking her if the CT could've been done somewhere else? Asked her to call office back when she could.

## 2021-04-19 NOTE — TELEPHONE ENCOUNTER
Caller: ALFONSO    Relationship to patient:     Best call back number: [phone]    Chief complaint: PT MD, DR. HENDERSON, ALREADY COMPLETED CT SCAN, THAT PT HAS SCHEDULED FOR 5/6/21, LAST WEEK. PT WANTING TO CANCEL    Type of visit: CT    Requested date:     If rescheduling, when is the original appointment: 5/6/21    Additional notes:

## 2021-04-20 ENCOUNTER — TELEPHONE (OUTPATIENT)
Dept: ONCOLOGY | Facility: CLINIC | Age: 86
End: 2021-04-20

## 2021-04-20 NOTE — TELEPHONE ENCOUNTER
Called pt. No answer. L/M asking if imaging could have been anywhere else other than Dr. Trujillo office because the last imaging they have is an Ultrasound in January. Asked pt to call office back.

## 2021-04-21 ENCOUNTER — TELEPHONE (OUTPATIENT)
Dept: ONCOLOGY | Facility: CLINIC | Age: 86
End: 2021-04-21

## 2021-04-21 NOTE — TELEPHONE ENCOUNTER
Called pt again. Spoke to pt daughter and she states it was probably an U/S that pt had. Informed her we would keep pts 5/6/21 CT scan and let her know what time it was at. She V/U.

## 2021-05-03 ENCOUNTER — LAB (OUTPATIENT)
Dept: LAB | Facility: HOSPITAL | Age: 86
End: 2021-05-03

## 2021-05-03 DIAGNOSIS — C18.2 MALIGNANT NEOPLASM OF ASCENDING COLON (HCC): ICD-10-CM

## 2021-05-03 LAB
ALBUMIN SERPL-MCNC: 3.8 G/DL (ref 3.5–5.2)
ALBUMIN/GLOB SERPL: 1.2 G/DL (ref 1.1–2.4)
ALP SERPL-CCNC: 113 U/L (ref 38–116)
ALT SERPL W P-5'-P-CCNC: 18 U/L (ref 0–33)
ANION GAP SERPL CALCULATED.3IONS-SCNC: 10.8 MMOL/L (ref 5–15)
AST SERPL-CCNC: 21 U/L (ref 0–32)
BASOPHILS # BLD AUTO: 0.06 10*3/MM3 (ref 0–0.2)
BASOPHILS NFR BLD AUTO: 1 % (ref 0–1.5)
BILIRUB SERPL-MCNC: 0.5 MG/DL (ref 0.2–1.2)
BUN SERPL-MCNC: 20 MG/DL (ref 6–20)
BUN/CREAT SERPL: 15.6 (ref 7.3–30)
CALCIUM SPEC-SCNC: 9.4 MG/DL (ref 8.5–10.2)
CHLORIDE SERPL-SCNC: 106 MMOL/L (ref 98–107)
CO2 SERPL-SCNC: 25.2 MMOL/L (ref 22–29)
CREAT SERPL-MCNC: 1.28 MG/DL (ref 0.6–1.1)
DEPRECATED RDW RBC AUTO: 45.6 FL (ref 37–54)
EOSINOPHIL # BLD AUTO: 0.38 10*3/MM3 (ref 0–0.4)
EOSINOPHIL NFR BLD AUTO: 6.4 % (ref 0.3–6.2)
ERYTHROCYTE [DISTWIDTH] IN BLOOD BY AUTOMATED COUNT: 14.1 % (ref 12.3–15.4)
FERRITIN SERPL-MCNC: 92.7 NG/ML (ref 13–150)
GFR SERPL CREATININE-BSD FRML MDRD: 40 ML/MIN/1.73
GLOBULIN UR ELPH-MCNC: 3.1 GM/DL (ref 1.8–3.5)
GLUCOSE SERPL-MCNC: 133 MG/DL (ref 74–124)
HCT VFR BLD AUTO: 36.7 % (ref 34–46.6)
HGB BLD-MCNC: 11.6 G/DL (ref 12–15.9)
IMM GRANULOCYTES # BLD AUTO: 0.02 10*3/MM3 (ref 0–0.05)
IMM GRANULOCYTES NFR BLD AUTO: 0.3 % (ref 0–0.5)
IRON 24H UR-MRATE: 68 MCG/DL (ref 37–145)
IRON SATN MFR SERPL: 16 % (ref 14–48)
LYMPHOCYTES # BLD AUTO: 1.38 10*3/MM3 (ref 0.7–3.1)
LYMPHOCYTES NFR BLD AUTO: 23.2 % (ref 19.6–45.3)
MCH RBC QN AUTO: 28.2 PG (ref 26.6–33)
MCHC RBC AUTO-ENTMCNC: 31.6 G/DL (ref 31.5–35.7)
MCV RBC AUTO: 89.3 FL (ref 79–97)
MONOCYTES # BLD AUTO: 0.66 10*3/MM3 (ref 0.1–0.9)
MONOCYTES NFR BLD AUTO: 11.1 % (ref 5–12)
NEUTROPHILS NFR BLD AUTO: 3.44 10*3/MM3 (ref 1.7–7)
NEUTROPHILS NFR BLD AUTO: 58 % (ref 42.7–76)
NRBC BLD AUTO-RTO: 0 /100 WBC (ref 0–0.2)
PLATELET # BLD AUTO: 224 10*3/MM3 (ref 140–450)
PMV BLD AUTO: 9.3 FL (ref 6–12)
POTASSIUM SERPL-SCNC: 4.4 MMOL/L (ref 3.5–4.7)
PROT SERPL-MCNC: 6.9 G/DL (ref 6.3–8)
RBC # BLD AUTO: 4.11 10*6/MM3 (ref 3.77–5.28)
SODIUM SERPL-SCNC: 142 MMOL/L (ref 134–145)
TIBC SERPL-MCNC: 426 MCG/DL (ref 249–505)
TRANSFERRIN SERPL-MCNC: 304 MG/DL (ref 200–360)
WBC # BLD AUTO: 5.94 10*3/MM3 (ref 3.4–10.8)

## 2021-05-03 PROCEDURE — 80053 COMPREHEN METABOLIC PANEL: CPT

## 2021-05-03 PROCEDURE — 36415 COLL VENOUS BLD VENIPUNCTURE: CPT

## 2021-05-03 PROCEDURE — 83540 ASSAY OF IRON: CPT

## 2021-05-03 PROCEDURE — 85025 COMPLETE CBC W/AUTO DIFF WBC: CPT

## 2021-05-03 PROCEDURE — 84466 ASSAY OF TRANSFERRIN: CPT

## 2021-05-03 PROCEDURE — 82728 ASSAY OF FERRITIN: CPT

## 2021-05-06 ENCOUNTER — HOSPITAL ENCOUNTER (OUTPATIENT)
Dept: PET IMAGING | Facility: HOSPITAL | Age: 86
Discharge: HOME | End: 2021-05-06
Admitting: INTERNAL MEDICINE

## 2021-05-06 DIAGNOSIS — C18.2 MALIGNANT NEOPLASM OF ASCENDING COLON (HCC): ICD-10-CM

## 2021-05-06 PROCEDURE — 71250 CT THORAX DX C-: CPT

## 2021-05-06 PROCEDURE — 74176 CT ABD & PELVIS W/O CONTRAST: CPT

## 2021-05-06 PROCEDURE — 0 DIATRIZOATE MEGLUMINE & SODIUM PER 1 ML: Performed by: INTERNAL MEDICINE

## 2021-05-06 RX ADMIN — DIATRIZOATE MEGLUMINE AND DIATRIZOATE SODIUM 30 ML: 660; 100 LIQUID ORAL; RECTAL at 08:22

## 2021-05-07 ENCOUNTER — OFFICE VISIT (OUTPATIENT)
Dept: ONCOLOGY | Facility: CLINIC | Age: 86
End: 2021-05-07

## 2021-05-07 ENCOUNTER — APPOINTMENT (OUTPATIENT)
Dept: LAB | Facility: HOSPITAL | Age: 86
End: 2021-05-07

## 2021-05-07 VITALS
DIASTOLIC BLOOD PRESSURE: 81 MMHG | HEART RATE: 68 BPM | SYSTOLIC BLOOD PRESSURE: 144 MMHG | OXYGEN SATURATION: 98 % | HEIGHT: 63 IN | WEIGHT: 223.5 LBS | TEMPERATURE: 97.1 F | RESPIRATION RATE: 18 BRPM | BODY MASS INDEX: 39.6 KG/M2

## 2021-05-07 DIAGNOSIS — D50.8 OTHER IRON DEFICIENCY ANEMIA: Primary | ICD-10-CM

## 2021-05-07 DIAGNOSIS — C18.2 MALIGNANT NEOPLASM OF ASCENDING COLON (HCC): ICD-10-CM

## 2021-05-07 DIAGNOSIS — N28.89 RIGHT RENAL MASS: ICD-10-CM

## 2021-05-07 PROCEDURE — 99214 OFFICE O/P EST MOD 30 MIN: CPT | Performed by: INTERNAL MEDICINE

## 2021-05-07 NOTE — PROGRESS NOTES
"Chief Complaint  Recurrent iron deficiency anemia, stage IIA (T3N0M0) ascending colon cancer, history of left renal cell carcinoma status post left nephrectomy in 2004, CKD3, enlarging right renal lesion, history of gastric polyps    Subjective        History of Present Illness  Patient returns today in follow-up with laboratory studies and CT scans to review.  In the interval, she did follow-up with urology in late January regarding her right renal lesion and they anticipate a follow-up visit with ultrasound at a 1 year interval.  She underwent a flexible sigmoidoscopy with Dr. Curran on 3/15/2021 with no abnormal findings.  She experienced a fall, tripping over a carpet in March.  She underwent x-rays of her left knee and left ankle/foot on 3/22/2021 with no fracture identified.  Patient reports that she had severe difficulty regaining her mobility with stiffness in the left leg that is only now beginning to improve.  She is now getting back to some normal activities.  She does have ongoing mild chronic fatigue which is unchanged.  She has chronic diarrhea that is relieved with Imodium taken as needed.  Her appetite is stable, weight is stable at 223 pounds.  She did undergo COVID-19 vaccination yesterday with [email or web address], no side effects currently.      Objective   Vital Signs:   /81   Pulse 68   Temp 97.1 °F (36.2 °C) (Temporal)   Resp 18   Ht 158.8 cm (62.52\")   Wt 101 kg (223 lb 8 oz)   SpO2 98%   BMI 40.20 kg/m²     Physical Exam  Constitutional:       Appearance: She is well-developed.      Comments: Elderly female no distress   Eyes:      Conjunctiva/sclera: Conjunctivae normal.   Neck:      Thyroid: No thyromegaly.   Cardiovascular:      Rate and Rhythm: Normal rate. Rhythm irregular.      Heart sounds: No murmur heard.   No friction rub. No gallop.    Pulmonary:      Effort: No respiratory distress.      Breath sounds: Normal breath sounds.   Abdominal:      General: Bowel sounds are " normal. There is no distension.      Palpations: Abdomen is soft.      Tenderness: There is no abdominal tenderness.   Musculoskeletal:      Comments: There is some residual slight swelling in the distal lower left leg and ankle   Lymphadenopathy:      Head:      Right side of head: No submandibular adenopathy.      Cervical: No cervical adenopathy.      Upper Body:      Right upper body: No supraclavicular adenopathy.      Left upper body: No supraclavicular adenopathy.   Skin:     General: Skin is warm and dry.      Findings: No rash.   Neurological:      Mental Status: She is alert and oriented to person, place, and time.      Cranial Nerves: No cranial nerve deficit.      Motor: No abnormal muscle tone.      Deep Tendon Reflexes: Reflexes normal.   Psychiatric:         Behavior: Behavior normal.        Result Review : Reviewed CBC, CMP, iron panel, ferritin from 5/3/2021.  Reviewed CT chest abdomen pelvis from 5/6/2021.  I did personally review CT images with interpretation as outlined below.  Reviewed records from flexible sigmoidoscopy report 3/15/2021 and PCP progress note as well as plain film left foot and knee 3/22/2021.       Assessment and Plan     1. Recurrent iron deficiency anemia and anemia secondary to CKD3:   · The patient is intolerant of oral iron.   · Prior GI evaluation showed no definitive evidence of GI blood loss.   · She has underlying stage III chronic kidney disease, which is a contributing factor to her mild borderline anemia.   · She has been treated in the past with IV iron in 2011 and 2012.    · She had a lengthy absence from our office from 2015 until she was referred back on 9/21/17 with evidence of recurrent iron deficiency, likely related to malabsorption.  Her ferritin on 7/7/17 was 21 and hemoglobin on 8/7/17 was 11.1.  Her degree of anemia was mild with a hemoglobin of 11.4, microcytic indices with an MCV of 78.  Labs on 9/21/17 showed ferritin 20.5, iron saturation 8%, TIBC  "441.  We did also check B12 and folate which were normal.  Erythropoietin was 32 consistent with a component of anemia secondary to chronic kidney disease. She did return stool cards for occult blood which were negative ×3 10/5/17.  She received additional Feraheme on 10/5 and 10/12/17.    · She underwent EGD with Dr. Curran 10/23/17 with no evidence of active bleeding, small gastric polyps identified.   · Labs 1/8/2021 showed a hemoglobin up to 12.8 with iron 52, ferritin slightly decreased at 55.9 with iron saturation 12%, TIBC 427.    · The patient returns today in follow-up with hemoglobin that remains fairly stable at 11.6 on 5/3/2021.  She has generally maintained hemoglobin in the 11-12 range, felt related to CKD3.  We reviewed her iron studies today which show improvement from 5/3/2021 with iron 68, ferritin 92.7, iron saturation 16%, TIBC 426.  She does not require IV iron at this point and we will recheck studies prior to her return visit in 6 months.  2. Stage IIA (T3N0M0) ascending colon cancer:   · Developed significant diarrhea with blood per rectum.    · Colonoscopy 4/25/18 with identification of polyps in the rectum, cecal polyp, mass in the right colon.  The cecal polyp was a sessile serrated adenoma, ascending colon \"mass\" showed hyperplastic polyp, rectal polyp was a tubulovillous adenoma with low-grade dysplasia.  CEA on 4/25/18 was normal at 1.65.  Chest x-ray 4/25/18 was unrevealing.  CT of the abdomen and pelvis 4/26/18 showed a 4 cm annular mass in the ascending colon with adjacent haziness in the mesentery but no lymphadenopathy, no evidence of metastatic disease.    · Right hemicolectomy on 4/27/18 with pathology showing an invasive moderately differentiated adenocarcinoma arising in a sessile serrated adenoma with negative margins measuring 2.5 cm extending through the muscularis propria into brad-colorectal tissue, positive lymphovascular invasion, negative perineural invasion, 10 lymph " nodes negative.  There was a comment regarding an ulcerated small adjacent pericolonic abscess, raising the question of possible microscopic perforation.  Patient did have a few high risk features with less than 12 lymph nodes removed, positive lymphovascular invasion, and some possibility of microscopic perforation.  The tumor was microsatellite stable.    · Given her age and comorbidities, the patient was a poor candidate for adjuvant chemotherapy and was not recommended.  Independent of this recommendation, the patient stated she was not interested in taking any adjuvant chemotherapy.  Therefore we will plan to monitor her clinically for evidence of recurrent disease.  Standard monitoring is with routine CEA levels however this does not appear to be informative for her given her normal preoperative value.  We will pursue annual interval follow-up CT.    · CT scan from 4/24/2019 at a 1 year interval showed no evidence of recurrent disease.    · 5/22/2020 CEA normal at 1.69 and CT scan chest abdomen and pelvis 5/22/2020 showed no evidence of recurrent/metastatic disease.    · The patient underwent delayed endoscopic evaluation with EGD and colonoscopy on 8/10/2020 with Dr. Willett, colonoscopy with tubular adenoma and hyperplastic polyp removed.  · Flexible sigmoidoscopy with Dr. Curran on 3/15/2021 was negative  · Patient returns today in follow-up with 1 year interval CT to review from 5/6/2021.  Unfortunately CEA was not included with her labs from 5/3/2021.  We did discuss drawing additional blood today however she declined and would prefer to recheck this in 6 months at her return visit.  We reviewed the CT scan which shows no evidence of recurrent malignancy.  There was however a comment regarding a subpleural opacification left lower lobe as well as a slight increase in normal sized mediastinal lymph nodes.  I feel that likely the left lower lobe changes inflammatory given its appearance on the CT.  I did  not feel that the lymph node changes are significant and the lymph nodes remain normal size.  Nevertheless we did discuss obtaining a 6-month interval follow-up CT to review further.  I will see her at that time.  3. 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    · Family history includes a sister with breast cancer around age 60, another sister with breast cancer around age 60, maternal aunt with breast cancer over the age of 50, maternal uncle with lung cancer.    · Given the patient's multiple malignancies and family history, she was referred to the genetics clinic to discuss potential genetic testing, mainly for benefit of the patient's children and other family members.    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   · She did follow-up in the genetics clinic and underwent INVITAE panel test 7/31/18 showing VUS in MLH3 and MSH6 with no known clinical significance at this time.   4. History of gastric polyps:   · EGD 10/23/17 with small less than 6 mm gastric polyps, not resected.  · Repeat EGD 8/10/2020 with small gastric polyps, not resected.  5. History of left renal cell carcinoma with enlarging right renal lesion:   · The patient underwent a left nephrectomy in 2004 with no clinical evidence of recurrent disease.    · CT chest from 11/14/16 showed no evidence of recurrence.    · CT abdomen and pelvis however from 4/26/18 showed a 2.2 cm right renal lesion which was indeterminate, possibly a proteinaceous cyst but could not rule out mass lesion.  The study was performed without contrast due to the patient's underlying chronic kidney disease.  She is unable to undergo renal mass protocol CT with contrast.  She is unable to undergo MRI due to her pacemaker.    · Follow-up CT scan  4/24/2019 with no change in the right renal lesion at 2.1 cm, repeat at 1 year interval  · 1 year interval follow-up CT (delayed due to viral pandemic) 5/22/2020 with increase in right renal mass from 2.1 up to 2.6 cm.  This is felt to possibly represent a proteinaceous cyst versus solid mass.  She is unable to undergo a renal protocol CT due to her renal dysfunction and solitary kidney and cannot undergo MRI due to her pacemaker.   · Renal ultrasound 6/4/2020 showed a 3.6 cm mixed solid and cystic mass at the lower pole of the right kidney suspicious for renal cell carcinoma and recommended surgical excision.  Patient did follow-up with Dr. Trujillo in urology who recommended continued radiographic monitoring.  · Short interval follow-up ultrasound on 9/15/2020 with stable 3.6 cm right renal lesion.   · Renal ultrasound by urology on 1/7/2021 measured the lower pole right renal mass at 3.8 cm, was not directly compared to the prior study performed at [facility].    · Patient returns today with repeat CT from 5/6/2021.  This showed that the right renal lesion had increased in size from 2.6 up to 3.1 cm.  It is felt that this likely represents a hyperdense renal cyst however could not rule out solid lesion or complex lesion.  Overall, the size is not dramatically different than it has been in the past.  We discussed obtaining a 6-month interval follow-up CT when she is to undergo her chest CT to review this lesion as well.  6. CKD3:   · Baseline creatinine in 1.2-1.8 range  · Contributing factor to anemia  7. COVID-19 vaccine  · Patient reports receiving [employer] Kim[employer] vaccine on 5/6/2021.     PLAN:   1. Return in 6 months for MD visit with labs 1 week prior including CBC, CMP, CEA, iron panel, ferritin.  Patient will also undergo CT chest abdomen pelvis (without IV contrast).

## 2021-07-05 PROCEDURE — 93296 REM INTERROG EVL PM/IDS: CPT | Performed by: INTERNAL MEDICINE

## 2021-07-05 PROCEDURE — 93294 REM INTERROG EVL PM/LDLS PM: CPT | Performed by: INTERNAL MEDICINE

## 2021-07-06 RX ORDER — APIXABAN 5 MG/1
TABLET, FILM COATED ORAL
Qty: 180 TABLET | Refills: 3 | Status: SHIPPED | OUTPATIENT
Start: 2021-07-06 | End: 2022-07-19

## 2021-07-26 RX ORDER — LISINOPRIL 10 MG/1
TABLET ORAL
Qty: 90 TABLET | Refills: 1 | Status: SHIPPED | OUTPATIENT
Start: 2021-07-26 | End: 2022-01-28 | Stop reason: SDUPTHER

## 2021-07-30 RX ORDER — LISINOPRIL 10 MG/1
TABLET ORAL
Qty: 90 TABLET | Refills: 1 | OUTPATIENT
Start: 2021-07-30

## 2021-08-09 ENCOUNTER — CLINICAL SUPPORT NO REQUIREMENTS (OUTPATIENT)
Dept: CARDIOLOGY | Facility: CLINIC | Age: 86
End: 2021-08-09

## 2021-08-09 ENCOUNTER — OFFICE VISIT (OUTPATIENT)
Dept: CARDIOLOGY | Facility: CLINIC | Age: 86
End: 2021-08-09

## 2021-08-09 VITALS
WEIGHT: 224 LBS | HEART RATE: 75 BPM | BODY MASS INDEX: 39.69 KG/M2 | SYSTOLIC BLOOD PRESSURE: 136 MMHG | DIASTOLIC BLOOD PRESSURE: 84 MMHG | HEIGHT: 63 IN

## 2021-08-09 DIAGNOSIS — I44.2 THIRD DEGREE AV BLOCK (HCC): Primary | ICD-10-CM

## 2021-08-09 DIAGNOSIS — Z98.890 S/P AV NODAL ABLATION: ICD-10-CM

## 2021-08-09 DIAGNOSIS — I49.5 SICK SINUS SYNDROME (HCC): ICD-10-CM

## 2021-08-09 DIAGNOSIS — E66.09 NON MORBID OBESITY DUE TO EXCESS CALORIES: ICD-10-CM

## 2021-08-09 DIAGNOSIS — I48.21 PERMANENT ATRIAL FIBRILLATION (HCC): ICD-10-CM

## 2021-08-09 PROCEDURE — 99214 OFFICE O/P EST MOD 30 MIN: CPT | Performed by: INTERNAL MEDICINE

## 2021-08-09 PROCEDURE — 93000 ELECTROCARDIOGRAM COMPLETE: CPT | Performed by: INTERNAL MEDICINE

## 2021-08-09 PROCEDURE — 93279 PRGRMG DEV EVAL PM/LDLS PM: CPT | Performed by: INTERNAL MEDICINE

## 2021-08-09 NOTE — PROGRESS NOTES
Date of Office Visit: 2021  Encounter Provider: Maldonado Melendez MD  Place of Service: [facility] CARDIOLOGY  Patient Name: Anastacia Sarah  : 1935    Subjective:     Encounter Date:2021      Patient ID: Anastacia Sarah is a 85 y.o. female who has a cc of  Perm af and av node ablation and pacer. And one kidney and HTN. Last creat = 1.28     She has no admissions this year.       No anginal chest pain,   Mild to moderate winters,   No soa,   No fainting,  No orthostasis.   No edema.   Exercise tolerance: limited.     There have been no hospital admission since the last visit.     There have been no bleeding events.       Past Medical History:   Diagnosis Date   • Abnormal ECG    • Acute bronchitis    • Acute sinusitis    • Anemia     Iron deficiency   • Anesthesia complication    • Anticoagulated on warfarin    • Arthritis    • Bladder dysfunction    • Cancer of left kidney (CMS/HCC) 2004    S/P Left Radical Neprectomy   • Cardiac disease    • Chronic dysfunction of left eustachian tube    • CKD (chronic kidney disease)     stage 3   • Colon cancer (CMS/HCC)    • Colon polyps    • Depression    • Diarrhea    • Diverticulosis 2011    Descending Colon & Sigmoid Colon multiple diverticula   • Epigastric pain    • Fatigue    • Frequent urination    • GERD (gastroesophageal reflux disease)    • Goiter     thyroid removed in 2015   • Health care maintenance    • History of colitis    • History of shingles    • History of transfusion    • Hypertension    • Insomnia    • Left atrial enlargement    • Left ventricular hypertrophy    • Mitral regurgitation     mild to moderate per echocardiogram 2016   • Nerve pain     LUE D/T SHINGLES   • Neuropathy     ARMS   • Non morbid obesity due to excess calories    • NANCY (obstructive sleep apnea)     uses CPAP   • Paroxysmal atrial fibrillation (CMS/HCC)    • Permanent atrial fibrillation (CMS/HCC)    • Pulmonary hypertension  (CMS/[facility])     per echo 2016   • Right atrial enlargement    • S/P AV deshawn ablation    • S/P placement of cardiac pacemaker    • Severe obesity (BMI 35.0-35.9 with comorbidity) (CMS/[facility])    • Sleep apnea     on CPAP   • SSS (sick sinus syndrome) (CMS/[facility])    • Third degree AV block (CMS/[facility])    • Thyroid cancer (CMS/[facility])    • URI (upper respiratory infection)    • Urinary urgency        Social History     Socioeconomic History   • Marital status:      Spouse name: Not on file   • Number of children: 0   • Years of education: Not on file   • Highest education level: Not on file   Tobacco Use   • Smoking status: Never Smoker   • Smokeless tobacco: Never Used   • Tobacco comment: caffeine use   Vaping Use   • Vaping Use: Never used   Substance and Sexual Activity   • Alcohol use: No   • Drug use: No   • Sexual activity: Defer       Family History   Problem Relation Age of Onset   • Heart disease Other    • Deep vein thrombosis Other    • Hypertension Other    • Breast cancer Sister         In her 60s.   • Breast cancer Maternal Aunt    • Breast cancer Sister         In her 60s.   • Heart disease Mother    • Hypertension Mother    • Heart disease Father    • Hypertension Father    • Heart attack Father    • Heart disease Daughter    • Hypertension Daughter    • Heart disease Son    • Hypertension Son    • Malig Hyperthermia Neg Hx        Review of Systems   Constitutional: Negative for fever and night sweats.   HENT: Negative for ear pain and stridor.    Eyes: Negative for discharge and visual halos.   Cardiovascular: Negative for cyanosis.   Respiratory: Negative for hemoptysis and sputum production.    Hematologic/Lymphatic: Negative for adenopathy.   Skin: Negative for nail changes and unusual hair distribution.   Musculoskeletal: Positive for arthritis and joint pain. Negative for gout and joint swelling.   Gastrointestinal: Negative for bowel incontinence and flatus.   Genitourinary: Negative for dysuria and  "flank pain.   Neurological: Negative for seizures and tremors.   Psychiatric/Behavioral: Negative for altered mental status. The patient is not nervous/anxious.             Objective:     Vitals:    08/09/21 1000   BP: 136/84   Pulse: 75   Weight: 102 kg (224 lb)   Height: 158.8 cm (62.52\")         Eyes:      General:         Right eye: No discharge.         Left eye: No discharge.   HENT:      Head: Normocephalic and atraumatic.   Neck:      Thyroid: No thyromegaly.      Vascular: No JVD.   Pulmonary:      Effort: Pulmonary effort is normal.      Breath sounds: Normal breath sounds. No rales.   Cardiovascular:      Normal rate. Regular rhythm.      No gallop.   Edema:     Peripheral edema absent.   Abdominal:      General: Bowel sounds are normal.      Palpations: Abdomen is soft.      Tenderness: There is no abdominal tenderness.   Musculoskeletal: Normal range of motion.         General: No deformity. Skin:     General: Skin is warm and dry.      Findings: No erythema.   Neurological:      Mental Status: Alert and oriented to person, place, and time.      Motor: Normal muscle tone.   Psychiatric:         Behavior: Behavior normal.         Thought Content: Thought content normal.           ECG 12 Lead    Date/Time: 8/9/2021 10:30 AM  Performed by: Maldonado Melendez MD  Authorized by: Maldonado Melendez MD   Comparison: compared with previous ECG   Similar to previous ECG  Rhythm: atrial fibrillation and paced            Lab Review:       Assessment:          Diagnosis Plan   1. Third degree AV block (CMS/HCC)     2. S/P AV deshawn ablation     3. Sick sinus syndrome (CMS/HCC)     4. Permanent atrial fibrillation (CMS/HCC)     5. Non morbid obesity due to excess calories            Plan:     I reviewed the pacemaker/ICD tracings and the pacing and sensing parameters are normal.  AF burden is 100%     KIM -- stable. No HF.     HTN -- controlled on meds    Body mass index is 40.29 kg/m². -- this is a big problem and made " worse by her sedentary lifestyle.     All and all tho she is hanging in there. She walked back here today and has no CHF.     Creat 1.28. She has one kidney.

## 2021-10-01 ENCOUNTER — TRANSCRIBE ORDERS (OUTPATIENT)
Dept: INTERNAL MEDICINE | Facility: CLINIC | Age: 86
End: 2021-10-01

## 2021-10-01 DIAGNOSIS — I48.19 PERSISTENT ATRIAL FIBRILLATION (HCC): ICD-10-CM

## 2021-10-01 DIAGNOSIS — Z12.31 SCREENING MAMMOGRAM FOR HIGH-RISK PATIENT: Primary | ICD-10-CM

## 2021-10-01 DIAGNOSIS — R06.02 SHORTNESS OF BREATH: ICD-10-CM

## 2021-10-01 PROBLEM — T45.4X5S ADVERSE EFFECT OF IRON AND ITS COMPOUNDS, SEQUELA: Status: ACTIVE | Noted: 2017-10-04

## 2021-10-01 RX ORDER — FUROSEMIDE 20 MG/1
TABLET ORAL
Qty: 180 TABLET | Refills: 1 | Status: SHIPPED | OUTPATIENT
Start: 2021-10-01 | End: 2022-07-06

## 2021-11-15 ENCOUNTER — HOSPITAL ENCOUNTER (OUTPATIENT)
Dept: PET IMAGING | Facility: HOSPITAL | Age: 86
Discharge: HOME | End: 2021-11-15
Admitting: INTERNAL MEDICINE

## 2021-11-15 DIAGNOSIS — C18.2 MALIGNANT NEOPLASM OF ASCENDING COLON (HCC): ICD-10-CM

## 2021-11-15 DIAGNOSIS — D50.8 OTHER IRON DEFICIENCY ANEMIA: ICD-10-CM

## 2021-11-15 DIAGNOSIS — N28.89 RIGHT RENAL MASS: ICD-10-CM

## 2021-11-15 PROCEDURE — 0 DIATRIZOATE MEGLUMINE & SODIUM PER 1 ML: Performed by: INTERNAL MEDICINE

## 2021-11-15 PROCEDURE — 71250 CT THORAX DX C-: CPT

## 2021-11-15 PROCEDURE — 74176 CT ABD & PELVIS W/O CONTRAST: CPT

## 2021-11-15 RX ADMIN — DIATRIZOATE MEGLUMINE AND DIATRIZOATE SODIUM 30 ML: 660; 100 LIQUID ORAL; RECTAL at 10:20

## 2021-11-17 NOTE — PROGRESS NOTES
"Chief Complaint  Recurrent iron deficiency anemia, stage IIA (T3N0M0) ascending colon cancer, history of left renal cell carcinoma status post left nephrectomy in 2004, CKD3, enlarging right renal lesion, history of gastric polyps    Subjective        History of Present Illness  Patient returns today in follow-up with laboratory studies and CT scans to review.  In the interval since her last visit, the patient reports that she has done fairly well.  She does have some mild chronic diarrhea for which she takes intermittent Imodium.  This usually controls her symptoms.  She does have ongoing mild fatigue which is unchanged.  She notes that she will be due for pacemaker replacement in December 2021.  She does have some mild intermittent left-sided abdominal pain.  She notes a normal appetite, weight has decreased by 7 pounds.      Objective   Vital Signs:   /80   Pulse 69   Temp 97.3 °F (36.3 °C) (Temporal)   Resp 18   Ht 158.8 cm (62.52\")   Wt 98.6 kg (217 lb 6.4 oz)   SpO2 97%   BMI 39.10 kg/m²     Physical Exam  Constitutional:       Appearance: She is well-developed.      Comments: Elderly female no distress   Eyes:      Conjunctiva/sclera: Conjunctivae normal.   Neck:      Thyroid: No thyromegaly.   Cardiovascular:      Rate and Rhythm: Normal rate. Rhythm irregular.      Heart sounds: No murmur heard.  No friction rub. No gallop.    Pulmonary:      Effort: No respiratory distress.      Breath sounds: Normal breath sounds.   Chest:   Breasts:      Right: No supraclavicular adenopathy.      Left: No supraclavicular adenopathy.       Abdominal:      General: Bowel sounds are normal. There is no distension.      Palpations: Abdomen is soft.      Tenderness: There is no abdominal tenderness.   Musculoskeletal:      Comments: There is some residual slight swelling in the distal lower left leg and ankle   Lymphadenopathy:      Head:      Right side of head: No submandibular adenopathy.      Cervical: No " cervical adenopathy.      Upper Body:      Right upper body: No supraclavicular adenopathy.      Left upper body: No supraclavicular adenopathy.   Skin:     General: Skin is warm and dry.      Findings: No rash.   Neurological:      Mental Status: She is alert and oriented to person, place, and time.      Cranial Nerves: No cranial nerve deficit.      Motor: No abnormal muscle tone.      Deep Tendon Reflexes: Reflexes normal.   Psychiatric:         Behavior: Behavior normal.       Patient was examined today, unchanged from above     Result Review : Reviewed CBC, CMP, iron panel, ferritin, CEA from today.  Reviewed CT chest abdomen pelvis from 11/15/2021.       Assessment and Plan     1. Recurrent iron deficiency anemia and anemia secondary to CKD3:   · The patient is intolerant of oral iron.   · Prior GI evaluation showed no definitive evidence of GI blood loss.   · She has underlying stage III chronic kidney disease, which is a contributing factor to her mild borderline anemia.   · She has been treated in the past with IV iron in 2011 and 2012.    · She had a lengthy absence from our office from 2015 until she was referred back on 9/21/17 with evidence of recurrent iron deficiency, likely related to malabsorption.  Her ferritin on 7/7/17 was 21 and hemoglobin on 8/7/17 was 11.1.  Her degree of anemia was mild with a hemoglobin of 11.4, microcytic indices with an MCV of 78.  Labs on 9/21/17 showed ferritin 20.5, iron saturation 8%, TIBC 441.  We did also check B12 and folate which were normal.  Erythropoietin was 32 consistent with a component of anemia secondary to chronic kidney disease. She did return stool cards for occult blood which were negative ×3 10/5/17.  She received additional Feraheme on 10/5 and 10/12/17.    · She underwent EGD with Dr. Curran 10/23/17 with no evidence of active bleeding, small gastric polyps identified.   · Labs 1/8/2021 showed a hemoglobin up to 12.8 with iron 52, ferritin slightly  "decreased at 55.9 with iron saturation 12%, TIBC 427.    · The patient returns today in follow-up with labs showing hemoglobin that is improved slightly up to 12.0.  She has maintained a hemoglobin generally in the 11-12 range in the past.  Iron studies are stable with iron 62, ferritin 73.8, iron saturation 16%, TIBC 384.  There is no indication for further IV iron at this time.  We will recheck CBC, iron panel, ferritin again in 6 months when she returns.  2. Stage IIA (T3N0M0) ascending colon cancer:   · Developed significant diarrhea with blood per rectum.    · Colonoscopy 4/25/18 with identification of polyps in the rectum, cecal polyp, mass in the right colon.  The cecal polyp was a sessile serrated adenoma, ascending colon \"mass\" showed hyperplastic polyp, rectal polyp was a tubulovillous adenoma with low-grade dysplasia.  CEA on 4/25/18 was normal at 1.65.  Chest x-ray 4/25/18 was unrevealing.  CT of the abdomen and pelvis 4/26/18 showed a 4 cm annular mass in the ascending colon with adjacent haziness in the mesentery but no lymphadenopathy, no evidence of metastatic disease.    · Right hemicolectomy on 4/27/18 with pathology showing an invasive moderately differentiated adenocarcinoma arising in a sessile serrated adenoma with negative margins measuring 2.5 cm extending through the muscularis propria into brad-colorectal tissue, positive lymphovascular invasion, negative perineural invasion, 10 lymph nodes negative.  There was a comment regarding an ulcerated small adjacent pericolonic abscess, raising the question of possible microscopic perforation.  Patient did have a few high risk features with less than 12 lymph nodes removed, positive lymphovascular invasion, and some possibility of microscopic perforation.  The tumor was microsatellite stable.    · Given her age and comorbidities, the patient was a poor candidate for adjuvant chemotherapy and was not recommended.  Independent of this recommendation, " the patient stated she was not interested in taking any adjuvant chemotherapy.  Therefore we will plan to monitor her clinically for evidence of recurrent disease.  Standard monitoring is with routine CEA levels however this does not appear to be informative for her given her normal preoperative value.  We will pursue annual interval follow-up CT.    · CT scan from 4/24/2019 at a 1 year interval showed no evidence of recurrent disease.    · 5/22/2020 CEA normal at 1.69 and CT scan chest abdomen and pelvis 5/22/2020 showed no evidence of recurrent/metastatic disease.    · The patient underwent delayed endoscopic evaluation with EGD and colonoscopy on 8/10/2020 with Dr. Willett, colonoscopy with tubular adenoma and hyperplastic polyp removed.  · Flexible sigmoidoscopy with Dr. Curran on 3/15/2021 was negative  · 1 year interval follow-up CT performed 5/6/2021 which showed no evidence of recurrent malignancy.  There was however a comment regarding a subpleural opacification left lower lobe as well as a slight increase in normal sized mediastinal lymph nodes.  The lymph nodes however remained normal size and the change in size was felt to be insignificant, left lower lobe subpleural opacification was felt to be inflammatory.  Radiology however recommend 6-month interval follow-up study.  · Patient returns today with 6-month interval follow-up CT scan to review from 11/15/2021.  There was stability of a few sub-6 mm nodule opacities in the lungs, no change in mediastinal lymph nodes, no change in subpleural density left lower lobe (felt to be atelectasis or scarring).  CEA today remains normal at 1.47.  We will repeat scans again in 6 months with CT chest abdomen pelvis prior to patient's return visit to monitor mediastinal lymph nodes, left lower lobe subpleural density as well as renal lesion (see below).  3. Potential familial risk of cancer:   · The patient has had multiple malignancies now having undergone a left  nephrectomy in 2004 for renal cell carcinoma, total thyroidectomy 11/16/15 for a papillary thyroid cancer on the left, and subsequently as above with colon cancer.    · Family history includes a sister with breast cancer around age 60, another sister with breast cancer around age 60, maternal aunt with breast cancer over the age of 50, maternal uncle with lung cancer.    · Given the patient's multiple malignancies and family history, she was referred to the genetics clinic to discuss potential genetic testing, mainly for benefit of the patient's children and other family members.    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   · She did follow-up in the genetics clinic and underwent INVITAE panel test 7/31/18 showing VUS in MLH3 and MSH6 with no known clinical significance at this time.   4. History of gastric polyps:   · EGD 10/23/17 with small less than 6 mm gastric polyps, not resected.  · Repeat EGD 8/10/2020 with small gastric polyps, not resected.  5. History of left renal cell carcinoma with enlarging right renal lesion:   · The patient underwent a left nephrectomy in 2004 with no clinical evidence of recurrent disease.    · CT chest from 11/14/16 showed no evidence of recurrence.    · CT abdomen and pelvis however from 4/26/18 showed a 2.2 cm right renal lesion which was indeterminate, possibly a proteinaceous cyst but could not rule out mass lesion.  The study was performed without contrast due to the patient's underlying chronic kidney disease.  She is unable to undergo renal mass protocol CT with contrast.  She is unable to undergo MRI due to her pacemaker.    · Follow-up CT scan 4/24/2019 with no change in the right renal lesion at 2.1 cm, repeat at 1 year interval  · 1 year interval follow-up CT (delayed due to viral pandemic) 5/22/2020 with increase in right renal mass from 2.1 up to 2.6 cm.  This is felt to possibly represent a proteinaceous cyst versus solid mass.  She is unable to undergo a renal  protocol CT due to her renal dysfunction and solitary kidney and cannot undergo MRI due to her pacemaker.   · Renal ultrasound 6/4/2020 showed a 3.6 cm mixed solid and cystic mass at the lower pole of the right kidney suspicious for renal cell carcinoma and recommended surgical excision.  Patient did follow-up with Dr. Trujillo in urology who recommended continued radiographic monitoring.  · Short interval follow-up ultrasound on 9/15/2020 with stable 3.6 cm right renal lesion.   · Renal ultrasound by urology on 1/7/2021 measured the lower pole right renal mass at 3.8 cm, was not directly compared to the prior study performed at [facility].    · CT 5/6/2021 showed right renal lesion had increased in size from 2.6 up to 3.1 cm.  It was felt that this likely represents a hyperdense renal cyst however could not rule out solid lesion or complex lesion.    · Patient returns today with 6-month interval CT scan to review from 11/15/2021.  This shows the lower pole right renal lesion to have continued increasing gradually in size from 3.1 now up to 3.3 cm.  Change has been minimal however radiology did review multiple prior studies and the lesion has increased gradually over time, suspicious for possible malignancy.  Patient does continue follow-up with Dr. Trujillo in urology, was last seen a number of months ago by the patient's report.  I will contact Dr. Trujillo to notify him of the current scan findings and to ensure that he does not have any additional recommendations other than observation.  6. CKD3:   · Baseline creatinine in 1.2-1.8 range  · Contributing factor to anemia  7. COVID-19 vaccine  · Patient reports receiving [employer] vaccine on 5/6/2021.     PLAN:   1. I will contact Dr. Trujillo in regards to CT findings with slight interval continued increase in size of the right renal lesion.  2. Return in 6 months for MD visit with labs 1 week prior including CBC, CMP, CEA, iron panel, ferritin.  Patient will also  undergo CT chest abdomen pelvis (without IV contrast).            Addendum 11/19/2021: I did have a chance to speak with Dr. Trujillo in urology.  He is well aware of the situation in regards to the patient's right renal lesion and in fact is going to be seeing her in January with a repeat ultrasound.  Given her age, comorbidities, she does not appear to be a good candidate for intervention and I agree.  He reports that there is some significant risk for affecting her renal parenchyma even with minimally invasive procedures that would place patient at risk for deterioration in her renal function and possible dialysis.  We will continue monitoring the lesion for now.

## 2021-11-18 ENCOUNTER — OFFICE VISIT (OUTPATIENT)
Dept: ONCOLOGY | Facility: CLINIC | Age: 86
End: 2021-11-18

## 2021-11-18 ENCOUNTER — LAB (OUTPATIENT)
Dept: LAB | Facility: HOSPITAL | Age: 86
End: 2021-11-18

## 2021-11-18 ENCOUNTER — APPOINTMENT (OUTPATIENT)
Dept: LAB | Facility: HOSPITAL | Age: 86
End: 2021-11-18

## 2021-11-18 VITALS
DIASTOLIC BLOOD PRESSURE: 80 MMHG | RESPIRATION RATE: 18 BRPM | SYSTOLIC BLOOD PRESSURE: 138 MMHG | WEIGHT: 217.4 LBS | OXYGEN SATURATION: 97 % | BODY MASS INDEX: 38.52 KG/M2 | TEMPERATURE: 97.3 F | HEIGHT: 63 IN | HEART RATE: 69 BPM

## 2021-11-18 DIAGNOSIS — N28.89 RIGHT RENAL MASS: ICD-10-CM

## 2021-11-18 DIAGNOSIS — C18.2 MALIGNANT NEOPLASM OF ASCENDING COLON (HCC): Primary | ICD-10-CM

## 2021-11-18 DIAGNOSIS — D50.8 OTHER IRON DEFICIENCY ANEMIA: ICD-10-CM

## 2021-11-18 DIAGNOSIS — C18.2 MALIGNANT NEOPLASM OF ASCENDING COLON (HCC): ICD-10-CM

## 2021-11-18 LAB
ALBUMIN SERPL-MCNC: 3.7 G/DL (ref 3.5–5.2)
ALBUMIN/GLOB SERPL: 1.3 G/DL (ref 1.1–2.4)
ALP SERPL-CCNC: 108 U/L (ref 38–116)
ALT SERPL W P-5'-P-CCNC: 16 U/L (ref 0–33)
ANION GAP SERPL CALCULATED.3IONS-SCNC: 9.1 MMOL/L (ref 5–15)
AST SERPL-CCNC: 22 U/L (ref 0–32)
BASOPHILS # BLD AUTO: 0.06 10*3/MM3 (ref 0–0.2)
BASOPHILS NFR BLD AUTO: 1.3 % (ref 0–1.5)
BILIRUB SERPL-MCNC: 0.5 MG/DL (ref 0.2–1.2)
BUN SERPL-MCNC: 16 MG/DL (ref 6–20)
BUN/CREAT SERPL: 13.4 (ref 7.3–30)
CALCIUM SPEC-SCNC: 9.2 MG/DL (ref 8.5–10.2)
CEA SERPL-MCNC: 1.47 NG/ML
CHLORIDE SERPL-SCNC: 107 MMOL/L (ref 98–107)
CO2 SERPL-SCNC: 26.9 MMOL/L (ref 22–29)
CREAT SERPL-MCNC: 1.19 MG/DL (ref 0.6–1.1)
DEPRECATED RDW RBC AUTO: 48.7 FL (ref 37–54)
EOSINOPHIL # BLD AUTO: 0.22 10*3/MM3 (ref 0–0.4)
EOSINOPHIL NFR BLD AUTO: 4.6 % (ref 0.3–6.2)
ERYTHROCYTE [DISTWIDTH] IN BLOOD BY AUTOMATED COUNT: 14.6 % (ref 12.3–15.4)
FERRITIN SERPL-MCNC: 73.8 NG/ML (ref 13–150)
GFR SERPL CREATININE-BSD FRML MDRD: 43 ML/MIN/1.73
GLOBULIN UR ELPH-MCNC: 2.9 GM/DL (ref 1.8–3.5)
GLUCOSE SERPL-MCNC: 131 MG/DL (ref 74–124)
HCT VFR BLD AUTO: 38.9 % (ref 34–46.6)
HGB BLD-MCNC: 12 G/DL (ref 12–15.9)
IMM GRANULOCYTES # BLD AUTO: 0.01 10*3/MM3 (ref 0–0.05)
IMM GRANULOCYTES NFR BLD AUTO: 0.2 % (ref 0–0.5)
IRON 24H UR-MRATE: 62 MCG/DL (ref 37–145)
IRON SATN MFR SERPL: 16 % (ref 14–48)
LYMPHOCYTES # BLD AUTO: 1.05 10*3/MM3 (ref 0.7–3.1)
LYMPHOCYTES NFR BLD AUTO: 22 % (ref 19.6–45.3)
MCH RBC QN AUTO: 27.8 PG (ref 26.6–33)
MCHC RBC AUTO-ENTMCNC: 30.8 G/DL (ref 31.5–35.7)
MCV RBC AUTO: 90.3 FL (ref 79–97)
MONOCYTES # BLD AUTO: 0.48 10*3/MM3 (ref 0.1–0.9)
MONOCYTES NFR BLD AUTO: 10.1 % (ref 5–12)
NEUTROPHILS NFR BLD AUTO: 2.95 10*3/MM3 (ref 1.7–7)
NEUTROPHILS NFR BLD AUTO: 61.8 % (ref 42.7–76)
NRBC BLD AUTO-RTO: 0 /100 WBC (ref 0–0.2)
PLATELET # BLD AUTO: 191 10*3/MM3 (ref 140–450)
PMV BLD AUTO: 9.5 FL (ref 6–12)
POTASSIUM SERPL-SCNC: 4.5 MMOL/L (ref 3.5–4.7)
PROT SERPL-MCNC: 6.6 G/DL (ref 6.3–8)
RBC # BLD AUTO: 4.31 10*6/MM3 (ref 3.77–5.28)
SODIUM SERPL-SCNC: 143 MMOL/L (ref 134–145)
TIBC SERPL-MCNC: 384 MCG/DL (ref 249–505)
TRANSFERRIN SERPL-MCNC: 274 MG/DL (ref 200–360)
WBC NRBC COR # BLD: 4.77 10*3/MM3 (ref 3.4–10.8)

## 2021-11-18 PROCEDURE — 36415 COLL VENOUS BLD VENIPUNCTURE: CPT

## 2021-11-18 PROCEDURE — 99214 OFFICE O/P EST MOD 30 MIN: CPT | Performed by: INTERNAL MEDICINE

## 2021-11-18 PROCEDURE — 83540 ASSAY OF IRON: CPT

## 2021-11-18 PROCEDURE — 80053 COMPREHEN METABOLIC PANEL: CPT

## 2021-11-18 PROCEDURE — 82728 ASSAY OF FERRITIN: CPT

## 2021-11-18 PROCEDURE — 82378 CARCINOEMBRYONIC ANTIGEN: CPT | Performed by: INTERNAL MEDICINE

## 2021-11-18 PROCEDURE — 84466 ASSAY OF TRANSFERRIN: CPT

## 2021-11-18 PROCEDURE — 85025 COMPLETE CBC W/AUTO DIFF WBC: CPT

## 2021-11-19 ENCOUNTER — HOSPITAL ENCOUNTER (OUTPATIENT)
Dept: MAMMOGRAPHY | Facility: HOSPITAL | Age: 86
Discharge: HOME | End: 2021-11-19
Admitting: FAMILY MEDICINE

## 2021-11-19 DIAGNOSIS — Z12.31 SCREENING MAMMOGRAM FOR HIGH-RISK PATIENT: ICD-10-CM

## 2021-11-19 PROCEDURE — 77067 SCR MAMMO BI INCL CAD: CPT

## 2021-11-19 PROCEDURE — 77063 BREAST TOMOSYNTHESIS BI: CPT

## 2021-12-27 ENCOUNTER — TELEPHONE (OUTPATIENT)
Dept: CARDIOLOGY | Facility: CLINIC | Age: 86
End: 2021-12-27

## 2021-12-27 DIAGNOSIS — Z45.010 PACEMAKER AT END OF BATTERY LIFE: Primary | ICD-10-CM

## 2021-12-27 NOTE — TELEPHONE ENCOUNTER
Pt remote on BSX VVIR pacemaker states pt reached LIUDMILA on 12/23/21, pt has hx of dependency. The pt's device information is given below. Thank you.     Device model: current device implanted 05/17/2016 BSX L101    RA: current lead implanted 9/24/2010 [employer]) [id number]    RV: current lead implanted 12/01/2003 [employer]  [id number]

## 2021-12-28 ENCOUNTER — TRANSCRIBE ORDERS (OUTPATIENT)
Dept: CARDIOLOGY | Facility: CLINIC | Age: 86
End: 2021-12-28

## 2021-12-28 DIAGNOSIS — Z01.810 PRE-OPERATIVE CARDIOVASCULAR EXAMINATION: ICD-10-CM

## 2021-12-28 DIAGNOSIS — Z01.818 OTHER SPECIFIED PRE-OPERATIVE EXAMINATION: Primary | ICD-10-CM

## 2021-12-28 DIAGNOSIS — Z13.6 SCREENING FOR ISCHEMIC HEART DISEASE: ICD-10-CM

## 2021-12-28 NOTE — TELEPHONE ENCOUNTER
Pt called saying somebody called her to schedule for the gen change. Please call her back at [phone]. Thanks!

## 2021-12-29 PROBLEM — Z45.010 PACEMAKER AT END OF BATTERY LIFE: Status: ACTIVE | Noted: 2021-12-29

## 2022-01-03 PROCEDURE — 93296 REM INTERROG EVL PM/IDS: CPT | Performed by: INTERNAL MEDICINE

## 2022-01-03 PROCEDURE — 93294 REM INTERROG EVL PM/LDLS PM: CPT | Performed by: INTERNAL MEDICINE

## 2022-01-04 ENCOUNTER — LAB (OUTPATIENT)
Dept: LAB | Facility: HOSPITAL | Age: 87
End: 2022-01-04

## 2022-01-04 DIAGNOSIS — Z01.818 OTHER SPECIFIED PRE-OPERATIVE EXAMINATION: ICD-10-CM

## 2022-01-04 DIAGNOSIS — Z01.810 PRE-OPERATIVE CARDIOVASCULAR EXAMINATION: ICD-10-CM

## 2022-01-04 DIAGNOSIS — Z13.6 SCREENING FOR ISCHEMIC HEART DISEASE: ICD-10-CM

## 2022-01-04 LAB
ANION GAP SERPL CALCULATED.3IONS-SCNC: 6.8 MMOL/L (ref 5–15)
BASOPHILS # BLD AUTO: 0.04 10*3/MM3 (ref 0–0.2)
BASOPHILS NFR BLD AUTO: 0.7 % (ref 0–1.5)
BUN SERPL-MCNC: 24 MG/DL (ref 8–23)
BUN/CREAT SERPL: 18 (ref 7–25)
CALCIUM SPEC-SCNC: 9.3 MG/DL (ref 8.6–10.5)
CHLORIDE SERPL-SCNC: 106 MMOL/L (ref 98–107)
CO2 SERPL-SCNC: 28.2 MMOL/L (ref 22–29)
CREAT SERPL-MCNC: 1.33 MG/DL (ref 0.57–1)
DEPRECATED RDW RBC AUTO: 47.1 FL (ref 37–54)
EOSINOPHIL # BLD AUTO: 0.13 10*3/MM3 (ref 0–0.4)
EOSINOPHIL NFR BLD AUTO: 2.3 % (ref 0.3–6.2)
ERYTHROCYTE [DISTWIDTH] IN BLOOD BY AUTOMATED COUNT: 14.7 % (ref 12.3–15.4)
GFR SERPL CREATININE-BSD FRML MDRD: 38 ML/MIN/1.73
GLUCOSE SERPL-MCNC: 190 MG/DL (ref 65–99)
HCT VFR BLD AUTO: 40.3 % (ref 34–46.6)
HGB BLD-MCNC: 13.2 G/DL (ref 12–15.9)
IMM GRANULOCYTES # BLD AUTO: 0.02 10*3/MM3 (ref 0–0.05)
IMM GRANULOCYTES NFR BLD AUTO: 0.4 % (ref 0–0.5)
LYMPHOCYTES # BLD AUTO: 1.47 10*3/MM3 (ref 0.7–3.1)
LYMPHOCYTES NFR BLD AUTO: 26.2 % (ref 19.6–45.3)
MCH RBC QN AUTO: 28.8 PG (ref 26.6–33)
MCHC RBC AUTO-ENTMCNC: 32.8 G/DL (ref 31.5–35.7)
MCV RBC AUTO: 88 FL (ref 79–97)
MONOCYTES # BLD AUTO: 0.34 10*3/MM3 (ref 0.1–0.9)
MONOCYTES NFR BLD AUTO: 6.1 % (ref 5–12)
NEUTROPHILS NFR BLD AUTO: 3.61 10*3/MM3 (ref 1.7–7)
NEUTROPHILS NFR BLD AUTO: 64.3 % (ref 42.7–76)
NRBC BLD AUTO-RTO: 0 /100 WBC (ref 0–0.2)
PLATELET # BLD AUTO: 224 10*3/MM3 (ref 140–450)
PMV BLD AUTO: 9.3 FL (ref 6–12)
POTASSIUM SERPL-SCNC: 4.8 MMOL/L (ref 3.5–5.2)
RBC # BLD AUTO: 4.58 10*6/MM3 (ref 3.77–5.28)
SARS-COV-2 ORF1AB RESP QL NAA+PROBE: NOT DETECTED
SODIUM SERPL-SCNC: 141 MMOL/L (ref 136–145)
WBC NRBC COR # BLD: 5.61 10*3/MM3 (ref 3.4–10.8)

## 2022-01-04 PROCEDURE — 85025 COMPLETE CBC W/AUTO DIFF WBC: CPT

## 2022-01-04 PROCEDURE — C9803 HOPD COVID-19 SPEC COLLECT: HCPCS

## 2022-01-04 PROCEDURE — U0004 COV-19 TEST NON-CDC HGH THRU: HCPCS

## 2022-01-04 PROCEDURE — U0005 INFEC AGEN DETEC AMPLI PROBE: HCPCS

## 2022-01-04 PROCEDURE — 80048 BASIC METABOLIC PNL TOTAL CA: CPT

## 2022-01-04 PROCEDURE — 36415 COLL VENOUS BLD VENIPUNCTURE: CPT

## 2022-01-06 ENCOUNTER — HOSPITAL ENCOUNTER (OUTPATIENT)
Facility: HOSPITAL | Age: 87
Setting detail: HOSPITAL OUTPATIENT SURGERY
Discharge: HOME | End: 2022-01-06
Attending: INTERNAL MEDICINE | Admitting: NURSE PRACTITIONER

## 2022-01-06 VITALS
DIASTOLIC BLOOD PRESSURE: 87 MMHG | TEMPERATURE: 97.6 F | SYSTOLIC BLOOD PRESSURE: 156 MMHG | RESPIRATION RATE: 16 BRPM | HEIGHT: 63 IN | WEIGHT: 217 LBS | OXYGEN SATURATION: 96 % | HEART RATE: 60 BPM | BODY MASS INDEX: 38.45 KG/M2

## 2022-01-06 DIAGNOSIS — Z45.010 PACEMAKER AT END OF BATTERY LIFE: ICD-10-CM

## 2022-01-06 LAB — QT INTERVAL: 461 MS

## 2022-01-06 PROCEDURE — 99153 MOD SED SAME PHYS/QHP EA: CPT | Performed by: INTERNAL MEDICINE

## 2022-01-06 PROCEDURE — 25010000002 VANCOMYCIN 10 G RECONSTITUTED SOLUTION: Performed by: INTERNAL MEDICINE

## 2022-01-06 PROCEDURE — 25010000002 MIDAZOLAM PER 1 MG: Performed by: INTERNAL MEDICINE

## 2022-01-06 PROCEDURE — C1785 PMKR, DUAL, RATE-RESP: HCPCS | Performed by: INTERNAL MEDICINE

## 2022-01-06 PROCEDURE — 93005 ELECTROCARDIOGRAM TRACING: CPT | Performed by: INTERNAL MEDICINE

## 2022-01-06 PROCEDURE — 33228 REMV&REPLC PM GEN DUAL LEAD: CPT | Performed by: INTERNAL MEDICINE

## 2022-01-06 PROCEDURE — 99152 MOD SED SAME PHYS/QHP 5/>YRS: CPT | Performed by: INTERNAL MEDICINE

## 2022-01-06 PROCEDURE — 93010 ELECTROCARDIOGRAM REPORT: CPT | Performed by: INTERNAL MEDICINE

## 2022-01-06 PROCEDURE — 25010000002 FENTANYL CITRATE (PF) 50 MCG/ML SOLUTION: Performed by: INTERNAL MEDICINE

## 2022-01-06 PROCEDURE — S0260 H&P FOR SURGERY: HCPCS | Performed by: INTERNAL MEDICINE

## 2022-01-06 DEVICE — PACEMAKER
Type: IMPLANTABLE DEVICE | Status: FUNCTIONAL
Brand: ACCOLADE™ DR

## 2022-01-06 RX ORDER — SODIUM CHLORIDE 9 MG/ML
75 INJECTION, SOLUTION INTRAVENOUS CONTINUOUS
Status: DISCONTINUED | OUTPATIENT
Start: 2022-01-06 | End: 2022-01-07 | Stop reason: HOSPADM

## 2022-01-06 RX ORDER — SODIUM CHLORIDE 9 MG/ML
INJECTION, SOLUTION INTRAVENOUS CONTINUOUS PRN
Status: COMPLETED | OUTPATIENT
Start: 2022-01-06 | End: 2022-01-06

## 2022-01-06 RX ORDER — LIDOCAINE HYDROCHLORIDE 10 MG/ML
0.1 INJECTION, SOLUTION EPIDURAL; INFILTRATION; INTRACAUDAL; PERINEURAL ONCE AS NEEDED
Status: DISCONTINUED | OUTPATIENT
Start: 2022-01-06 | End: 2022-01-07 | Stop reason: HOSPADM

## 2022-01-06 RX ORDER — SODIUM CHLORIDE 0.9 % (FLUSH) 0.9 %
10 SYRINGE (ML) INJECTION EVERY 12 HOURS SCHEDULED
Status: DISCONTINUED | OUTPATIENT
Start: 2022-01-06 | End: 2022-01-07 | Stop reason: HOSPADM

## 2022-01-06 RX ORDER — SODIUM CHLORIDE 0.9 % (FLUSH) 0.9 %
3 SYRINGE (ML) INJECTION EVERY 12 HOURS SCHEDULED
Status: DISCONTINUED | OUTPATIENT
Start: 2022-01-06 | End: 2022-01-07 | Stop reason: HOSPADM

## 2022-01-06 RX ORDER — LIDOCAINE HYDROCHLORIDE AND EPINEPHRINE 10; 10 MG/ML; UG/ML
INJECTION, SOLUTION INFILTRATION; PERINEURAL AS NEEDED
Status: DISCONTINUED | OUTPATIENT
Start: 2022-01-06 | End: 2022-01-06 | Stop reason: HOSPADM

## 2022-01-06 RX ORDER — LOPERAMIDE HYDROCHLORIDE 2 MG/1
2 CAPSULE ORAL 4 TIMES DAILY PRN
Start: 2022-01-08

## 2022-01-06 RX ORDER — SODIUM CHLORIDE 0.9 % (FLUSH) 0.9 %
10 SYRINGE (ML) INJECTION AS NEEDED
Status: DISCONTINUED | OUTPATIENT
Start: 2022-01-06 | End: 2022-01-07 | Stop reason: HOSPADM

## 2022-01-06 RX ORDER — MIDAZOLAM HYDROCHLORIDE 1 MG/ML
INJECTION INTRAMUSCULAR; INTRAVENOUS AS NEEDED
Status: DISCONTINUED | OUTPATIENT
Start: 2022-01-06 | End: 2022-01-06 | Stop reason: HOSPADM

## 2022-01-06 RX ORDER — HYDROCODONE BITARTRATE AND ACETAMINOPHEN 5; 325 MG/1; MG/1
1 TABLET ORAL EVERY 4 HOURS PRN
Status: DISCONTINUED | OUTPATIENT
Start: 2022-01-06 | End: 2022-01-07 | Stop reason: HOSPADM

## 2022-01-06 RX ORDER — FENTANYL CITRATE 50 UG/ML
INJECTION, SOLUTION INTRAMUSCULAR; INTRAVENOUS AS NEEDED
Status: DISCONTINUED | OUTPATIENT
Start: 2022-01-06 | End: 2022-01-06 | Stop reason: HOSPADM

## 2022-01-06 RX ORDER — ACETAMINOPHEN 650 MG/1
650 SUPPOSITORY RECTAL EVERY 4 HOURS PRN
Status: DISCONTINUED | OUTPATIENT
Start: 2022-01-06 | End: 2022-01-07 | Stop reason: HOSPADM

## 2022-01-06 RX ORDER — ACETAMINOPHEN 325 MG/1
650 TABLET ORAL EVERY 4 HOURS PRN
Status: DISCONTINUED | OUTPATIENT
Start: 2022-01-06 | End: 2022-01-07 | Stop reason: HOSPADM

## 2022-01-06 RX ADMIN — SODIUM CHLORIDE 75 ML/HR: 9 INJECTION, SOLUTION INTRAVENOUS at 11:09

## 2022-01-06 RX ADMIN — VANCOMYCIN HYDROCHLORIDE 1500 MG: 10 INJECTION, POWDER, LYOPHILIZED, FOR SOLUTION INTRAVENOUS at 11:14

## 2022-01-06 NOTE — H&P
85 y.o. female who has a cc of  Perm af and av node ablation and pacer. And one kidney and HTN. Last creat = 1.28      She has no admissions this year.         No anginal chest pain,   Mild to moderate winters,   No soa,   No fainting,  No orthostasis.   No edema.   Exercise tolerance: limited.     There have been no hospital admission since the last visit.      There have been no bleeding events.         Medical History        Past Medical History:   Diagnosis Date   • Abnormal ECG     • Acute bronchitis     • Acute sinusitis     • Anemia       Iron deficiency   • Anesthesia complication     • Anticoagulated on warfarin     • Arthritis     • Bladder dysfunction     • Cancer of left kidney (CMS/HCC) 06/08/2004     S/P Left Radical Neprectomy   • Cardiac disease     • Chronic dysfunction of left eustachian tube     • CKD (chronic kidney disease)       stage 3   • Colon cancer (CMS/[facility])     • Colon polyps     • Depression     • Diarrhea     • Diverticulosis 08/08/2011     Descending Colon & Sigmoid Colon multiple diverticula   • Epigastric pain     • Fatigue     • Frequent urination     • GERD (gastroesophageal reflux disease)     • Goiter       thyroid removed in november 2015   • Health care maintenance     • History of colitis     • History of shingles     • History of transfusion     • Hypertension     • Insomnia     • Left atrial enlargement     • Left ventricular hypertrophy     • Mitral regurgitation       mild to moderate per echocardiogram 2016   • Nerve pain       LUE D/T SHINGLES   • Neuropathy       ARMS   • Non morbid obesity due to excess calories     • NANCY (obstructive sleep apnea)       uses CPAP   • Paroxysmal atrial fibrillation (CMS/[facility])     • Permanent atrial fibrillation (CMS/HCC)     • Pulmonary hypertension (CMS/HCC)       per echo 2016   • Right atrial enlargement     • S/P AV deshawn ablation     • S/P placement of cardiac pacemaker     • Severe obesity (BMI 35.0-35.9 with comorbidity) (CMS/[facility])      • Sleep apnea       on CPAP   • SSS (sick sinus syndrome) (CMS/HCC)     • Third degree AV block (CMS/HCC)     • Thyroid cancer (CMS/HCC)     • URI (upper respiratory infection)     • Urinary urgency              Social History   Social History            Socioeconomic History   • Marital status:        Spouse name: Not on file   • Number of children: 0   • Years of education: Not on file   • Highest education level: Not on file   Tobacco Use   • Smoking status: Never Smoker   • Smokeless tobacco: Never Used   • Tobacco comment: caffeine use   Vaping Use   • Vaping Use: Never used   Substance and Sexual Activity   • Alcohol use: No   • Drug use: No   • Sexual activity: Defer                  Family History   Problem Relation Age of Onset   • Heart disease Other     • Deep vein thrombosis Other     • Hypertension Other     • Breast cancer Sister           In her 60s.   • Breast cancer Maternal Aunt     • Breast cancer Sister           In her 60s.   • Heart disease Mother     • Hypertension Mother     • Heart disease Father     • Hypertension Father     • Heart attack Father     • Heart disease Daughter     • Hypertension Daughter     • Heart disease Son     • Hypertension Son     • Malig Hyperthermia Neg Hx           Review of Systems   Constitutional: Negative for fever and night sweats.   HENT: Negative for ear pain and stridor.    Eyes: Negative for discharge and visual halos.   Cardiovascular: Negative for cyanosis.   Respiratory: Negative for hemoptysis and sputum production.    Hematologic/Lymphatic: Negative for adenopathy.   Skin: Negative for nail changes and unusual hair distribution.   Musculoskeletal: Positive for arthritis and joint pain. Negative for gout and joint swelling.   Gastrointestinal: Negative for bowel incontinence and flatus.   Genitourinary: Negative for dysuria and flank pain.   Neurological: Negative for seizures and tremors.   Psychiatric/Behavioral: Negative for altered mental  status. The patient is not nervous/anxious.                   Objective:      Vitals        Objective     Eyes:      General:         Right eye: No discharge.         Left eye: No discharge.   HENT:      Head: Normocephalic and atraumatic.   Neck:      Thyroid: No thyromegaly.      Vascular: No JVD.   Pulmonary:      Effort: Pulmonary effort is normal.      Breath sounds: Normal breath sounds. No rales.   Cardiovascular:      Normal rate. Regular rhythm.      No gallop.   Edema:     Peripheral edema absent.   Abdominal:      General: Bowel sounds are normal.      Palpations: Abdomen is soft.      Tenderness: There is no abdominal tenderness.   Musculoskeletal: Normal range of motion.         General: No deformity. Skin:     General: Skin is warm and dry.      Findings: No erythema.   Neurological:      Mental Status: Alert and oriented to person, place, and time.      Motor: Normal muscle tone.   Psychiatric:         Behavior: Behavior normal.         Thought Content: Thought content normal.                    Lab Review:         Assessment:      Assessment            Diagnosis Plan   1. Third degree AV block (CMS/HCC)      2. S/P AV deshawn ablation      3. Sick sinus syndrome (CMS/HCC)      4. Permanent atrial fibrillation (CMS/HCC)      5. Non morbid obesity due to excess calories                  Plan:      She is here for generator change.  She is at [facility] and is pacemaker dependent.  I went over the risk benefits alternatives and expectations with her.  She has been off apixaban for couple of days.

## 2022-01-07 NOTE — DISCHARGE INSTRUCTIONS
RESTART ELIQUIS ON Saturday       [facility]   [phone]    Post Pacemaker / Defibrillator Implant Instructions                1  You may shower after the dressing is removed. Do not allow shower water to hit directly on incision.    2  No lotion/powder/ointment/cream on incision until it is healed.    3. Gently wash incision daily with soap and water and pat dry.    4 You may reapply a dressing if there is drainage, otherwise leave your incision open to air. If you reapply a dressing, please notify the pacemaker clinic.    5. No heavy lifting, pulling, or pushing.        6 The pacemaker clinic will contact you (usually within 1 business day) to schedule a pacemaker/incision check. The check is usually done 7-10 days post-implant. If you have not heard from the pacemaker clinic within 3 days, please call the office.    7  Please call the office if you experience any of the following:   bleeding or drainage from your incision   swelling, redness, or opening of your incision   fever or chills   pain not relieved with medication   chest pain or difficulty breathing   lightheadness

## 2022-01-13 ENCOUNTER — CLINICAL SUPPORT NO REQUIREMENTS (OUTPATIENT)
Dept: CARDIOLOGY | Facility: CLINIC | Age: 87
End: 2022-01-13

## 2022-01-13 DIAGNOSIS — I44.2 THIRD DEGREE AV BLOCK: Primary | ICD-10-CM

## 2022-01-13 PROCEDURE — 93280 PM DEVICE PROGR EVAL DUAL: CPT | Performed by: INTERNAL MEDICINE

## 2022-01-28 RX ORDER — LISINOPRIL 10 MG/1
10 TABLET ORAL DAILY
Qty: 90 TABLET | Refills: 1 | Status: SHIPPED | OUTPATIENT
Start: 2022-01-28 | End: 2022-07-08 | Stop reason: SDUPTHER

## 2022-03-09 ENCOUNTER — OFFICE VISIT (OUTPATIENT)
Dept: CARDIOLOGY | Facility: CLINIC | Age: 87
End: 2022-03-09

## 2022-03-09 ENCOUNTER — CLINICAL SUPPORT NO REQUIREMENTS (OUTPATIENT)
Dept: CARDIOLOGY | Facility: CLINIC | Age: 87
End: 2022-03-09

## 2022-03-09 VITALS
BODY MASS INDEX: 38.27 KG/M2 | HEART RATE: 71 BPM | SYSTOLIC BLOOD PRESSURE: 122 MMHG | DIASTOLIC BLOOD PRESSURE: 80 MMHG | HEIGHT: 63 IN | WEIGHT: 216 LBS

## 2022-03-09 DIAGNOSIS — Z99.89 OSA ON CPAP: ICD-10-CM

## 2022-03-09 DIAGNOSIS — I49.5 SICK SINUS SYNDROME: Primary | ICD-10-CM

## 2022-03-09 DIAGNOSIS — I48.21 PERMANENT ATRIAL FIBRILLATION: Primary | ICD-10-CM

## 2022-03-09 DIAGNOSIS — I44.2 THIRD DEGREE AV BLOCK: ICD-10-CM

## 2022-03-09 DIAGNOSIS — I10 ESSENTIAL HYPERTENSION: ICD-10-CM

## 2022-03-09 DIAGNOSIS — Z95.0 S/P PLACEMENT OF CARDIAC PACEMAKER: ICD-10-CM

## 2022-03-09 DIAGNOSIS — Z98.890 S/P AV NODAL ABLATION: ICD-10-CM

## 2022-03-09 DIAGNOSIS — I48.21 PERMANENT ATRIAL FIBRILLATION: ICD-10-CM

## 2022-03-09 DIAGNOSIS — I49.5 SICK SINUS SYNDROME: ICD-10-CM

## 2022-03-09 DIAGNOSIS — G47.33 OSA ON CPAP: ICD-10-CM

## 2022-03-09 PROCEDURE — 93000 ELECTROCARDIOGRAM COMPLETE: CPT | Performed by: NURSE PRACTITIONER

## 2022-03-09 PROCEDURE — 93279 PRGRMG DEV EVAL PM/LDLS PM: CPT | Performed by: INTERNAL MEDICINE

## 2022-03-09 PROCEDURE — 99024 POSTOP FOLLOW-UP VISIT: CPT | Performed by: NURSE PRACTITIONER

## 2022-03-09 RX ORDER — CEPHALEXIN 500 MG/1
500 CAPSULE ORAL 3 TIMES DAILY
Qty: 15 CAPSULE | Refills: 0 | Status: SHIPPED | OUTPATIENT
Start: 2022-03-09 | End: 2022-03-14

## 2022-03-09 NOTE — PROGRESS NOTES
"Date of Office Visit: 2022  Encounter Provider: ALLISON Isaac  Place of Service: [facility] CARDIOLOGY  Patient Name: Anastacia Sarah  :1935    Chief Complaint   Patient presents with   • third degree AV block     6 MONTH F/U   • s/p AV deshawn ablation   • SSS   • permanent AFIB   • Pacemaker Check   :     HPI: Anastacia Sarah is a 86 y.o. female who follows with Dr. Melendez paroxysmal atrial for which then became permanent, she also has sick sinus syndrome and has a permanent pacemaker.  Her A. fib became difficult to rate control so she had an AV node ablation and 3/2018. Gen change 2022.     She also has a history of multiple cancers and follows with hematology/oncology but these have been stable.    She presents today for routine follow-up and device check.    Device interrogation shows normal testing and function--RV threshold runs 1.9V-2.2V @ .40ms---chronic and stable. V paced 95%. She did have an escape rhythm today VVI @ 40, no arrhythmia episodes.     She is doing okay---no chest pain, no significant dyspnea at rest, PND, orthopnea, dizziness or edema.     She still has some dyspnea with exertion---this is not new--she is not very active---she says when she walks out to the mailbox she has to stop and take a break on her way back.     Since her generator change she has felt some occasional \"zinger/stinging\" around the pocket---mostly positional. No redness or swelling.      Warfarin for AC---she has some nosebleeds recently---nothing too bad---we talked about using saline spray and lubrication.      Past Medical History:   Diagnosis Date   • Abnormal ECG    • Acute bronchitis    • Acute sinusitis    • Anemia     Iron deficiency   • Anesthesia complication    • Anticoagulated on warfarin    • Arthritis    • Bladder dysfunction    • Cancer of left kidney (HCC) 2004    S/P Left Radical Neprectomy   • Cardiac disease    • Chronic dysfunction of left " eustachian tube    • CKD (chronic kidney disease)     stage 3   • Colon cancer (HCC)    • Colon polyps    • Depression    • Diarrhea    • Diverticulosis 08/08/2011    Descending Colon & Sigmoid Colon multiple diverticula   • Epigastric pain    • Fatigue    • Frequent urination    • GERD (gastroesophageal reflux disease)    • Goiter     thyroid removed in november 2015   • Health care maintenance    • History of colitis    • History of shingles    • History of transfusion    • Hypertension    • Insomnia    • Left atrial enlargement    • Left ventricular hypertrophy    • Mitral regurgitation     mild to moderate per echocardiogram 2016   • Nerve pain     LUE D/T SHINGLES   • Neuropathy     ARMS   • Non morbid obesity due to excess calories    • NANCY (obstructive sleep apnea)     uses CPAP   • Paroxysmal atrial fibrillation (HCC)    • Permanent atrial fibrillation (HCC)    • Pulmonary hypertension (HCC)     per echo 2016   • Right atrial enlargement    • S/P AV deshawn ablation    • S/P placement of cardiac pacemaker    • Severe obesity (BMI 35.0-35.9 with comorbidity) (HCC)    • Sleep apnea     on CPAP   • SSS (sick sinus syndrome) (HCC)    • Third degree AV block (HCC)    • Thyroid cancer (HCC)    • URI (upper respiratory infection)    • Urinary urgency        Past Surgical History:   Procedure Laterality Date   • ABDOMINAL SURGERY     • ATRIAL ABLATION SURGERY N/A 09/13/2011    Comprehensive electrophysiology study, Left atrial pace & sense, 3-dimensional cardia mapping, Radiofrequency catheter ablation, Transseptal hear catheterization, Dr. Maldonado Melendez   • ATRIAL ABLATION SURGERY N/A 02/11/2004    Dr. Maldonado Melendez   • BLADDER SURGERY N/A     Bladder tacking procedure   • BLADDER SUSPENSION N/A 05/2012   • CARDIAC ELECTROPHYSIOLOGY PROCEDURE N/A 5/17/2016    Procedure: PPM generator change - dual BOSTON;  Surgeon: Maldonado Melendez MD;  Location: [facility] INVASIVE LOCATION;  Service:    • CARDIAC ELECTROPHYSIOLOGY  PROCEDURE N/A 3/23/2017    Procedure: AV node ablation pt has BOSTON PPM;  Surgeon: Maldonado Melendez MD;  [facility] CATH [facility]VASIVE [facility];  Service:    • CARDIAC ELECTROPHYSIOLOGY PROCEDURE N/A 1/6/2022    Procedure: PPM battery change--Vichy;  Surgeon: Maldonado Melendez MD;  Location:  [facility] CATH INVASIVE LOCATION;  Service: Cardiology;  Laterality: N/A;   • CARDIOVERSION N/A 01/26/2017    Dr. Melendez   • CARDIOVERSION N/A 05/25/2017    Dr. Melendez   • CATARACT EXTRACTION Bilateral     Dr. Gramajo   • COLON RESECTION Right 4/27/2018    Procedure: COLON RESECTION RIGHT LAPAROSCOPIC, possible open;  Surgeon: Rebeca Curran MD;  Location: [facility] MAIN OR;  Service: General   • COLONOSCOPY N/A 04/01/2003    Normal colonoscopy except for an occasional diverticulosis, Dr. Nathan Macias   • COLONOSCOPY N/A 4/25/2018    Procedure: COLONOSCOPY INTO CECUM AND TI WITH SALINE LIFT, HOT SNARE POLYPECTOMIES, BX'S, SPOT INJECTION, RESOLUTION CLIPS X2;  Surgeon: Rebeca Curran MD;  Location: [facility] ENDOSCOPY;  Service: Gastroenterology   • COLONOSCOPY N/A 8/10/2020    Procedure: COLONOSCOPYTO ILEOCOLIC ANASTOMOSIS WITH COLD BX POLYPECTOMY;  Surgeon: Rebeca Curran MD;  Location: [facility] ENDOSCOPY;  Service: General;  Laterality: N/A;  HX COLON CANCER  --DIVERTICULOSIS, POLYPS, HEMORRHOIDS    • CYSTOSCOPY BOTOX INJECTION OF BLADDER N/A 6/27/2016    Procedure: CYSTOSCOPY WITH BOTOX DETRUSOR INJECTION;  Surgeon: Salome Bowen MD;  Location: [facility] MAIN OR;  Service:    • CYSTOSCOPY BOTOX INJECTION OF BLADDER N/A 05/29/2015    Dr. Salome Bowen   • ENDOSCOPY N/A 06/10/2015    Antral Polyp: Gastric Mucosa w/ marked cautery artifact,  Hiatal Hernia, Dr. Rebeca Curran   • ENDOSCOPY N/A 10/28/2013    Gastric polyp: polypoid hyperplastic gastric mucosa w/ focal ulceration, mixed acute & chronic inflammation.  Negative for intestinal metaplasia, no helicobacter organisms identified on diff-wuik stain, Dr. Rebeca Curran   •  ENDOSCOPY N/A 03/18/2013    Large prepyloric polyp, partially removed: fragment of polypoid hyperplastic gastric mucosa w/ foci of erosion & patchy mixed acute & chronic inflammation. No helicobacter organisms identified on diff-quick stain. Negative for intestinal metaplasia, negative for dysplasia, Dr. Rebeca Curran   • ENDOSCOPY N/A 10/24/2012    large prepyloric mass (3cm) w/ adjacent nodules: hyperplastic polyp w/ mild chronic active gastritis, focal erosion & associated, Multiple fundic polyps: polypoid mucosal hyperplasia, Dr. Rebeca Curran   • ENDOSCOPY N/A 03/11/2003    Gastric Antral Biopsies: Fragments of Gastric Mucosa & Necrotic Tissue (Compatible w/ Ulcer) w/ chronic active inflammation.  Negative stain for Helicobacter organisms. Dr. Nathan Macias   • ENDOSCOPY N/A 10/23/2017    Procedure: ESOPHAGOGASTRODUODENOSCOPY;  Surgeon: Rebeca Curran MD;  Location: [facility] ENDOSCOPY;  Service:    • ENDOSCOPY  8/10/2020    Procedure: ESOPHAGOGASTRODUODENOSCOPY;  Surgeon: Rebeca Curran MD;  Location: [facility] ENDOSCOPY;  Service: General;;  GERD, HX GASTRIC POLYPS  --SMALL HIATAL HERNIA, SMALL GASTRIC POLYPS    • ENDOSCOPY AND COLONOSCOPY N/A 08/08/2011    4cm hiatal hernia, Schatzki's ring, Diverticulosis, Large antral polyps, Dr. Rebeca Curran   • INCISIONAL HERNIA REPAIR N/A 06/11/2015    Defect approximately 2.5 cm w/ a mass of incarcerated tissue approximately the size of a nectarine, Dr. Rebeca Curran   • INSERT / REPLACE / REMOVE PACEMAKER     • JOINT REPLACEMENT Bilateral     knee   • KNEE ARTHROPLASTY     • KNEE MINI REVISION Right 9/28/2016    Procedure: RT KNEE POLY INSERT CHANGE ;  Surgeon: Tommy Avila MD;  Location: [facility] OR;  Service:    • LAPAROSCOPIC CHOLECYSTECTOMY N/A    • NEPHRECTOMY RADICAL Left 06/08/2004    Incidentially found left renal mass 3-4 cm renal cell carcinoma, left lower pole, Dr. Salome Bowen   • OTHER SURGICAL HISTORY      NEUROMUSCULAR BLOCKERS BOTULINUM TOXIN  ONABOTULINUMTOXIN A   • PACEMAKER IMPLANTATION N/A     Dr. Casillas   • PACEMAKER REPLACEMENT N/A 09/24/2010    Implanted Generator is a [employer] ALTRUA 60 model [id number], serial # [id number], Dr. Chavez Jimenez   • SIGMOIDOSCOPY N/A 3/15/2021    Procedure: FLEXIBLE SIGMOIDOSCOPY WITH BIOPSY;  Surgeon: Rebeca Curran MD;  Location: [facility] ENDOSCOPY;  Service: General;  Laterality: N/A;  PERSONAL HX COLON CA, S/P RIGHT COLECTOMY, FOLLOW-UP LARGE RECTAL POLYP    --NORMAL RECTUM    • TOTAL KNEE ARTHROPLASTY Left 05/21/2013    Dr. Miguel aPcheco   • TOTAL THYROIDECTOMY N/A 11/16/2015    Dr. Gideon Ashley, [facility]   • TRANSVAGINAL TAPING SUSPENSION N/A 03/16/2009    Mini Sling, Closed suprapubic cystostomy, Dr. Salome Bowen   • TUBAL ABDOMINAL LIGATION Bilateral        Social History     Socioeconomic History   • Marital status:    • Number of children: 0   Tobacco Use   • Smoking status: Never Smoker   • Smokeless tobacco: Never Used   • Tobacco comment: caffeine use   Vaping Use   • Vaping Use: Never used   Substance and Sexual Activity   • Alcohol use: No   • Drug use: No   • Sexual activity: Defer       Family History   Problem Relation Age of Onset   • Heart disease Other    • Deep vein thrombosis Other    • Hypertension Other    • Breast cancer Sister         In her 60s.   • Breast cancer Maternal Aunt    • Breast cancer Sister         In her 60s.   • Heart disease Mother    • Hypertension Mother    • Heart disease Father    • Hypertension Father    • Heart attack Father    • Heart disease Daughter    • Hypertension Daughter    • Heart disease Son    • Hypertension Son    • Malig Hyperthermia Neg Hx        Review of Systems   Constitutional: Negative for chills, fever and malaise/fatigue.   HENT: Positive for nosebleeds.    Cardiovascular: Positive for dyspnea on exertion. Negative for chest pain, leg swelling, near-syncope, orthopnea, palpitations, paroxysmal nocturnal dyspnea and syncope.   Respiratory: Negative  for cough and shortness of breath.    Hematologic/Lymphatic: Negative.    Musculoskeletal: Negative for joint pain, joint swelling and myalgias.   Gastrointestinal: Negative for abdominal pain, diarrhea, melena, nausea and vomiting.   Genitourinary: Negative for frequency and hematuria.   Neurological: Negative for light-headedness, numbness, paresthesias and seizures.   Allergic/Immunologic: Negative.    All other systems reviewed and are negative.      Allergies   Allergen Reactions   • Sulfa Antibiotics Swelling     FACE AND TONGUE   • Adhesive Tape Rash   • Levaquin [Levofloxacin] Rash         Current Outpatient Medications:   •  CALCIUM PO, Take 600 Units by mouth Daily. With D3, listed separetly, Disp: , Rfl:   •  cholecalciferol (VITAMIN D3) 1000 UNITS tablet, Take 1,000 Units by mouth daily. In addition to the calcium -D3 pill, Disp: , Rfl:   •  Eliquis 5 MG tablet tablet, TAKE ONE TABLET BY MOUTH EVERY 12 HOURS, Disp: 180 tablet, Rfl: 3  •  esomeprazole (nexIUM) 40 MG capsule, Take 40 mg by mouth Every Morning Before Breakfast., Disp: , Rfl:   •  furosemide (LASIX) 20 MG tablet, TAKE TWO TABLETS BY MOUTH DAILY, Disp: 180 tablet, Rfl: 1  •  GLUCOSAMINE-CHONDROITIN PO, Take 1 tablet by mouth Daily., Disp: , Rfl:   •  Ketotifen Fumarate (REFRESH EYE ITCH RELIEF OP), Apply  to eye(s) as directed by provider., Disp: , Rfl:   •  lisinopril (PRINIVIL,ZESTRIL) 10 MG tablet, Take 1 tablet by mouth Daily., Disp: 90 tablet, Rfl: 1  •  loperamide (IMODIUM) 2 MG capsule, Take 1 capsule by mouth 4 (Four) Times a Day As Needed for Diarrhea., Disp: , Rfl:   •  Loratadine 10 MG capsule, Take 10 mg by mouth Daily As Needed., Disp: , Rfl:   •  metoprolol tartrate (LOPRESSOR) 25 MG tablet, TAKE ONE TABLET BY MOUTH TWICE A DAY, Disp: 180 tablet, Rfl: 1  •  Multiple Vitamins-Minerals (MULTIVITAMIN PO), Take 1 tablet by mouth Daily., Disp: , Rfl:   •  Multiple Vitamins-Minerals (PRESERVISION AREDS 2 PO), Take 1 capsule by mouth 2  "(two) times a day., Disp: , Rfl:   •  SYNTHROID 112 MCG tablet, Take 125 mcg by mouth Every Other Day. Pt taking 110 mg a day, Disp: , Rfl:   •  Synthroid 125 MCG tablet, Take 125 mcg by mouth Every Other Day., Disp: , Rfl:   •  vitamin B-12 (CYANOCOBALAMIN) 1000 MCG tablet, Take 1,000 mcg by mouth Daily., Disp: , Rfl:   •  Zinc Sulfate (ZINC 15 PO), Take  by mouth Daily., Disp: , Rfl:       Objective:     Vitals:    22 1104   Pulse: 71   Weight: 98 kg (216 lb)   Height: 158.8 cm (62.52\")     Body mass index is 38.85 kg/m².    PHYSICAL EXAM:    Vitals Reviewed.   General Appearance: No acute distress, well developed and well nourished.   Eyes: Conjunctiva and lids: No erythema, swelling, or discharge. Sclera non-icteric.   HENT: Atraumatic, normocephalic. External eyes, ears, and nose normal.   Respiratory: No signs of respiratory distress. Respiration rhythm and depth normal.   Clear to auscultation. No rales, crackles, rhonchi, or wheezing auscultated.   Cardiovascular:  Heart Rate and Rhythm: Normal, Heart Sounds: S1 and S2.   Murmurs: No murmurs noted. No rubs, thrills, or gallops.   Lower Extremities: No edema noted.  Gastrointestinal:  Abdomen soft, non-distended, non-tender.   Musculoskeletal: Normal movement of extremities  Skin: Warm and dry. Left lateral edge of pacemaker incision with slight redness and what looks like a retained suture. No swelling or drainage.  Psychiatric: Patient alert and oriented to person, place, and time. Speech and behavior appropriate. Normal mood and affect.       ECG 12 Lead    Date/Time: 3/9/2022 10:59 AM  Performed by: Sayra Julian APRN  Authorized by: Sayra Julian APRN   Comparison: compared with previous ECG   Similar to previous ECG  Rhythm: paced  Ectopy: unifocal PVCs  BPM: 71  Pacin% capture and ventricular paced rhythm            Assessment:       Diagnosis Plan   1. Permanent atrial fibrillation (HCC)  ECG 12 Lead   2. Sick sinus syndrome (HCC)  ECG " 12 Lead   3. S/P placement of cardiac pacemaker  ECG 12 Lead   4. Third degree AV block (HCC)  ECG 12 Lead   5. S/P AV deshawn ablation  ECG 12 Lead   6. Essential hypertension     7. NANCY on CPAP            Plan:       1.-5. Perm afib, SSS, s/p PPM, CHB, s/p AV node ablation---pacemaker check okay---RV thresholds runs around 1.9-2.2V @ .40ms---stable. She did have R wave today--VVI at 40. Remains on apixaban for AC.     6. HTN, controlled.    7. NANCY treated with CPAP.     Follow up with Dr. Melendez in 6 months with device check.    Left lateral edge of pacemaker incision with what I suspect is a retained suture---it is not opened, slight redness, no swelling or drainage. Will give a course of cephalexin 500 mg TID for 5 days and have her come back next Friday for us to take a look at it.     As always, it has been a pleasure to participate in your patient's care.      Sincerely,         ALLIOSN Winn

## 2022-03-18 ENCOUNTER — CLINICAL SUPPORT NO REQUIREMENTS (OUTPATIENT)
Dept: CARDIOLOGY | Facility: CLINIC | Age: 87
End: 2022-03-18

## 2022-03-18 DIAGNOSIS — I49.5 SICK SINUS SYNDROME: Primary | ICD-10-CM

## 2022-05-02 ENCOUNTER — APPOINTMENT (OUTPATIENT)
Dept: CARDIOLOGY | Facility: HOSPITAL | Age: 87
End: 2022-05-02

## 2022-05-02 ENCOUNTER — HOSPITAL ENCOUNTER (EMERGENCY)
Facility: HOSPITAL | Age: 87
Discharge: HOME | End: 2022-05-02
Attending: EMERGENCY MEDICINE | Admitting: EMERGENCY MEDICINE

## 2022-05-02 ENCOUNTER — APPOINTMENT (OUTPATIENT)
Dept: GENERAL RADIOLOGY | Facility: HOSPITAL | Age: 87
End: 2022-05-02

## 2022-05-02 VITALS
HEIGHT: 63 IN | HEART RATE: 74 BPM | BODY MASS INDEX: 38.88 KG/M2 | SYSTOLIC BLOOD PRESSURE: 168 MMHG | RESPIRATION RATE: 16 BRPM | OXYGEN SATURATION: 93 % | DIASTOLIC BLOOD PRESSURE: 82 MMHG | TEMPERATURE: 97.1 F

## 2022-05-02 DIAGNOSIS — I50.9 ACUTE ON CHRONIC CONGESTIVE HEART FAILURE, UNSPECIFIED HEART FAILURE TYPE: ICD-10-CM

## 2022-05-02 DIAGNOSIS — M79.605 BILATERAL LEG PAIN: Primary | ICD-10-CM

## 2022-05-02 DIAGNOSIS — M79.604 BILATERAL LEG PAIN: Primary | ICD-10-CM

## 2022-05-02 LAB
ALBUMIN SERPL-MCNC: 3.9 G/DL (ref 3.5–5.2)
ALBUMIN/GLOB SERPL: 1.3 G/DL
ALP SERPL-CCNC: 122 U/L (ref 39–117)
ALT SERPL W P-5'-P-CCNC: 20 U/L (ref 1–33)
ANION GAP SERPL CALCULATED.3IONS-SCNC: 14 MMOL/L (ref 5–15)
AST SERPL-CCNC: 25 U/L (ref 1–32)
BASOPHILS # BLD AUTO: 0.05 10*3/MM3 (ref 0–0.2)
BASOPHILS NFR BLD AUTO: 0.8 % (ref 0–1.5)
BH CV LOWER VASCULAR LEFT COMMON FEMORAL AUGMENT: NORMAL
BH CV LOWER VASCULAR LEFT COMMON FEMORAL COMPETENT: NORMAL
BH CV LOWER VASCULAR LEFT COMMON FEMORAL COMPRESS: NORMAL
BH CV LOWER VASCULAR LEFT COMMON FEMORAL PHASIC: NORMAL
BH CV LOWER VASCULAR LEFT COMMON FEMORAL SPONT: NORMAL
BH CV LOWER VASCULAR LEFT DISTAL FEMORAL COMPRESS: NORMAL
BH CV LOWER VASCULAR LEFT GASTRONEMIUS COMPRESS: NORMAL
BH CV LOWER VASCULAR LEFT GREATER SAPH AK COMPRESS: NORMAL
BH CV LOWER VASCULAR LEFT GREATER SAPH BK COMPRESS: NORMAL
BH CV LOWER VASCULAR LEFT LESSER SAPH COMPRESS: NORMAL
BH CV LOWER VASCULAR LEFT MID FEMORAL AUGMENT: NORMAL
BH CV LOWER VASCULAR LEFT MID FEMORAL COMPETENT: NORMAL
BH CV LOWER VASCULAR LEFT MID FEMORAL COMPRESS: NORMAL
BH CV LOWER VASCULAR LEFT MID FEMORAL PHASIC: NORMAL
BH CV LOWER VASCULAR LEFT MID FEMORAL SPONT: NORMAL
BH CV LOWER VASCULAR LEFT PERONEAL COMPRESS: NORMAL
BH CV LOWER VASCULAR LEFT POPLITEAL AUGMENT: NORMAL
BH CV LOWER VASCULAR LEFT POPLITEAL COMPETENT: NORMAL
BH CV LOWER VASCULAR LEFT POPLITEAL COMPRESS: NORMAL
BH CV LOWER VASCULAR LEFT POPLITEAL PHASIC: NORMAL
BH CV LOWER VASCULAR LEFT POPLITEAL SPONT: NORMAL
BH CV LOWER VASCULAR LEFT POSTERIOR TIBIAL COMPRESS: NORMAL
BH CV LOWER VASCULAR LEFT PROFUNDA FEMORAL COMPRESS: NORMAL
BH CV LOWER VASCULAR LEFT PROXIMAL FEMORAL COMPRESS: NORMAL
BH CV LOWER VASCULAR LEFT SAPHENOFEMORAL JUNCTION COMPRESS: NORMAL
BH CV LOWER VASCULAR RIGHT COMMON FEMORAL AUGMENT: NORMAL
BH CV LOWER VASCULAR RIGHT COMMON FEMORAL COMPETENT: NORMAL
BH CV LOWER VASCULAR RIGHT COMMON FEMORAL COMPRESS: NORMAL
BH CV LOWER VASCULAR RIGHT COMMON FEMORAL PHASIC: NORMAL
BH CV LOWER VASCULAR RIGHT COMMON FEMORAL SPONT: NORMAL
BH CV LOWER VASCULAR RIGHT GASTRONEMIUS COMPRESS: NORMAL
BH CV LOWER VASCULAR RIGHT GREATER SAPH AK COMPRESS: NORMAL
BH CV LOWER VASCULAR RIGHT GREATER SAPH BK COMPRESS: NORMAL
BH CV LOWER VASCULAR RIGHT LESSER SAPH COMPRESS: NORMAL
BH CV LOWER VASCULAR RIGHT MID FEMORAL AUGMENT: NORMAL
BH CV LOWER VASCULAR RIGHT MID FEMORAL COMPETENT: NORMAL
BH CV LOWER VASCULAR RIGHT MID FEMORAL COMPRESS: NORMAL
BH CV LOWER VASCULAR RIGHT MID FEMORAL PHASIC: NORMAL
BH CV LOWER VASCULAR RIGHT MID FEMORAL SPONT: NORMAL
BH CV LOWER VASCULAR RIGHT PERONEAL COMPRESS: NORMAL
BH CV LOWER VASCULAR RIGHT POPLITEAL AUGMENT: NORMAL
BH CV LOWER VASCULAR RIGHT POPLITEAL COMPETENT: NORMAL
BH CV LOWER VASCULAR RIGHT POPLITEAL COMPRESS: NORMAL
BH CV LOWER VASCULAR RIGHT POPLITEAL PHASIC: NORMAL
BH CV LOWER VASCULAR RIGHT POPLITEAL SPONT: NORMAL
BH CV LOWER VASCULAR RIGHT POSTERIOR TIBIAL COMPRESS: NORMAL
BH CV LOWER VASCULAR RIGHT PROFUNDA FEMORAL COMPRESS: NORMAL
BH CV LOWER VASCULAR RIGHT PROXIMAL FEMORAL COMPRESS: NORMAL
BH CV LOWER VASCULAR RIGHT SAPHENOFEMORAL JUNCTION COMPRESS: NORMAL
BILIRUB SERPL-MCNC: 0.5 MG/DL (ref 0–1.2)
BUN SERPL-MCNC: 13 MG/DL (ref 8–23)
BUN/CREAT SERPL: 11.8 (ref 7–25)
CALCIUM SPEC-SCNC: 9 MG/DL (ref 8.6–10.5)
CHLORIDE SERPL-SCNC: 104 MMOL/L (ref 98–107)
CO2 SERPL-SCNC: 22 MMOL/L (ref 22–29)
CREAT SERPL-MCNC: 1.1 MG/DL (ref 0.57–1)
DEPRECATED RDW RBC AUTO: 42.9 FL (ref 37–54)
EGFRCR SERPLBLD CKD-EPI 2021: 49.3 ML/MIN/1.73
EOSINOPHIL # BLD AUTO: 0.27 10*3/MM3 (ref 0–0.4)
EOSINOPHIL NFR BLD AUTO: 4.3 % (ref 0.3–6.2)
ERYTHROCYTE [DISTWIDTH] IN BLOOD BY AUTOMATED COUNT: 12.6 % (ref 12.3–15.4)
GLOBULIN UR ELPH-MCNC: 3.1 GM/DL
GLUCOSE SERPL-MCNC: 146 MG/DL (ref 65–99)
HCT VFR BLD AUTO: 40.3 % (ref 34–46.6)
HGB BLD-MCNC: 12.7 G/DL (ref 12–15.9)
HOLD SPECIMEN: NORMAL
IMM GRANULOCYTES # BLD AUTO: 0.01 10*3/MM3 (ref 0–0.05)
IMM GRANULOCYTES NFR BLD AUTO: 0.2 % (ref 0–0.5)
LYMPHOCYTES # BLD AUTO: 1.44 10*3/MM3 (ref 0.7–3.1)
LYMPHOCYTES NFR BLD AUTO: 23.1 % (ref 19.6–45.3)
MAXIMAL PREDICTED HEART RATE: 135 BPM
MCH RBC QN AUTO: 29.3 PG (ref 26.6–33)
MCHC RBC AUTO-ENTMCNC: 31.5 G/DL (ref 31.5–35.7)
MCV RBC AUTO: 92.9 FL (ref 79–97)
MONOCYTES # BLD AUTO: 0.61 10*3/MM3 (ref 0.1–0.9)
MONOCYTES NFR BLD AUTO: 9.8 % (ref 5–12)
NEUTROPHILS NFR BLD AUTO: 3.86 10*3/MM3 (ref 1.7–7)
NEUTROPHILS NFR BLD AUTO: 61.8 % (ref 42.7–76)
NRBC BLD AUTO-RTO: 0 /100 WBC (ref 0–0.2)
NT-PROBNP SERPL-MCNC: 1462 PG/ML (ref 0–1800)
PLATELET # BLD AUTO: 198 10*3/MM3 (ref 140–450)
PMV BLD AUTO: 10 FL (ref 6–12)
POTASSIUM SERPL-SCNC: 4 MMOL/L (ref 3.5–5.2)
PROT SERPL-MCNC: 7 G/DL (ref 6–8.5)
RBC # BLD AUTO: 4.34 10*6/MM3 (ref 3.77–5.28)
SODIUM SERPL-SCNC: 140 MMOL/L (ref 136–145)
STRESS TARGET HR: 115 BPM
WBC NRBC COR # BLD: 6.24 10*3/MM3 (ref 3.4–10.8)
WHOLE BLOOD HOLD SPECIMEN: NORMAL
WHOLE BLOOD HOLD SPECIMEN: NORMAL

## 2022-05-02 PROCEDURE — 83880 ASSAY OF NATRIURETIC PEPTIDE: CPT | Performed by: NURSE PRACTITIONER

## 2022-05-02 PROCEDURE — 85025 COMPLETE CBC W/AUTO DIFF WBC: CPT

## 2022-05-02 PROCEDURE — 71045 X-RAY EXAM CHEST 1 VIEW: CPT

## 2022-05-02 PROCEDURE — 93970 EXTREMITY STUDY: CPT

## 2022-05-02 PROCEDURE — 99282 EMERGENCY DEPT VISIT SF MDM: CPT

## 2022-05-02 PROCEDURE — 80053 COMPREHEN METABOLIC PANEL: CPT

## 2022-05-02 PROCEDURE — 36415 COLL VENOUS BLD VENIPUNCTURE: CPT

## 2022-05-02 NOTE — ED PROVIDER NOTES
EMERGENCY DEPARTMENT ENCOUNTER    Room Number:  B06/06  Date seen:  5/2/2022  Time seen: 16:08 EDT  PCP: Gabriella Paige MD  Historian: Patient, family member    HPI:  Chief complaint: BLE pain  A complete HPI/ROS/PMH/PSH/SH/FH are unobtainable due to: n/a  Context:Anastacia Sarah is a 86 y.o. female with past medical history significant for A. fib, GERD, hypertension, sick sinus syndrome, pacemaker who presents to the ED with c/o 4 days of bilateral lower extremity pain that is worse on the right anterior lower leg.  The pain is worse with walking and is described as a burning sometimes and it is been keeping her up at night.  She denies any recent injuries or falls.  She has not had this problem before. She has been taking her Lasix as prescribed.  Denies chest pain but does state she had a bit more SOA than normal when she went to get her mail from the mailbox. She denies recent travel, long car rides or h/o DVT.  She does take Eliquis    Patient was placed in face mask in first look. Patient was wearing facemask when I entered the room and throughout our encounter. I wore full protective equipment throughout this patient encounter including a N95 face mask, eye shield and gloves. Hand hygiene/washing of hands was performed before donning protective equipment and after removal when leaving the room.    MEDICAL RECORD REVIEW    ALLERGIES  Sulfa antibiotics, Adhesive tape, and Levaquin [levofloxacin]    PAST MEDICAL HISTORY  Active Ambulatory Problems     Diagnosis Date Noted   • Atrial fibrillation (HCC) 06/27/2016   • Bladder dysfunction 06/27/2016   • Gastroesophageal reflux disease 06/27/2016   • Essential hypertension 06/27/2016   • Insomnia 06/27/2016   • Non morbid obesity due to excess calories 09/02/2016   • Sick sinus syndrome (HCC) 09/02/2016   • Knee pain, chronic 09/28/2016   • Dizziness 02/14/2017   • Severe obesity (BMI 35.0-39.9) with comorbidity ([facility]) 02/14/2017   • S/P placement of cardiac  pacemaker 06/21/2017   • Iron deficiency anemia 08/14/2017   • Anemia in stage 3 chronic kidney disease (HCC) 09/21/2017   • Adverse effect of iron and its compounds, sequela 10/04/2017   • NANCY on CPAP 11/02/2017   • Postoperative hypothyroidism 12/06/2017   • Third degree AV block (HCC) 12/15/2017   • Colon polyp 04/25/2018   • Malignant neoplasm of ascending colon (HCC) 05/04/2018   • Vitamin D deficiency 10/22/2018   • Rectal bleeding 11/12/2018   • S/P AV deshawn ablation 04/26/2019   • Right renal mass 10/13/2020   • Adenomatous rectal polyp, large 12/22/2020   • Pacemaker at end of battery life 12/29/2021     Resolved Ambulatory Problems     Diagnosis Date Noted   • Influenza 04/18/2016   • Acute upper respiratory infection 06/27/2016   • Fatigue 02/14/2017   • On warfarin therapy 02/14/2017   • Shortness of breath 06/21/2017   • Acute cystitis without hematuria 08/07/2017   • Epigastric pain 08/14/2017   • UTI symptoms 12/06/2017   • Need for vaccination 12/06/2017   • Diarrhea 04/17/2018     Past Medical History:   Diagnosis Date   • Abnormal ECG    • Acute bronchitis    • Acute sinusitis    • Anemia    • Anesthesia complication    • Anticoagulated on warfarin    • Arthritis    • Cancer of left kidney (HCC) 06/08/2004   • Cardiac disease    • Chronic dysfunction of left eustachian tube    • CKD (chronic kidney disease)    • Colon cancer (HCC)    • Colon polyps    • Depression    • Diverticulosis 08/08/2011   • Frequent urination    • GERD (gastroesophageal reflux disease)    • Goiter    • Health care maintenance    • History of colitis    • History of shingles    • History of transfusion    • Hypertension    • Left atrial enlargement    • Left ventricular hypertrophy    • Mitral regurgitation    • Nerve pain    • Neuropathy    • NANCY (obstructive sleep apnea)    • Paroxysmal atrial fibrillation (HCC)    • Permanent atrial fibrillation (HCC)    • Pulmonary hypertension (HCC)    • Right atrial enlargement    •  Severe obesity (BMI 35.0-35.9 with comorbidity) (HCC)    • Sleep apnea    • SSS (sick sinus syndrome) (HCC)    • Thyroid cancer (HCC)    • URI (upper respiratory infection)    • Urinary urgency        PAST SURGICAL HISTORY  Past Surgical History:   Procedure Laterality Date   • ABDOMINAL SURGERY     • ATRIAL ABLATION SURGERY N/A 09/13/2011    Comprehensive electrophysiology study, Left atrial pace & sense, 3-dimensional cardia mapping, Radiofrequency catheter ablation, Transseptal hear catheterization, Dr. Maldonado Melendez   • ATRIAL ABLATION SURGERY N/A 02/11/2004    Dr. Maldonado Melendez   • BLADDER SURGERY N/A     Bladder tacking procedure   • BLADDER SUSPENSION N/A 05/2012   • CARDIAC ELECTROPHYSIOLOGY PROCEDURE N/A 5/17/2016    Procedure: PPM generator change - dual BOSTON;  Surgeon: Maldonado Melendez MD;  Location: [facility] CATH INVASIVE LOCATION;  Service:    • CARDIAC ELECTROPHYSIOLOGY PROCEDURE N/A 3/23/2017    Procedure: AV node ablation pt has BOSTON PPM;  Surgeon: Maldonado Melendez MD;  Location: [facility] CATH INVASIVE LOCATION;  Service:    • CARDIAC ELECTROPHYSIOLOGY PROCEDURE N/A 1/6/2022    Procedure: PPM battery change--[place];  Surgeon: Maldonado Melendez MD;  Location: [facility] CATH INVASIVE LOCATION;  Service: Cardiology;  Laterality: N/A;   • CARDIOVERSION N/A 01/26/2017    Dr. Melendez   • CARDIOVERSION N/A 05/25/2017    Dr. Melendez   • CATARACT EXTRACTION Bilateral     Dr. Gramajo   • COLON RESECTION Right 4/27/2018    Procedure: COLON RESECTION RIGHT LAPAROSCOPIC, possible open;  Surgeon: Rebeca Curran MD;  Location: [facility] OR;  Service: General   • COLONOSCOPY N/A 04/01/2003    Normal colonoscopy except for an occasional diverticulosis, Dr. Nathan Macias   • COLONOSCOPY N/A 4/25/2018    Procedure: COLONOSCOPY INTO CECUM AND TI WITH SALINE LIFT, HOT SNARE POLYPECTOMIES, BX'S, SPOT INJECTION, RESOLUTION CLIPS X2;  Surgeon: Rebeca Curran MD;  Location: [facility] ENDOSCOPY;  Service: Gastroenterology   •  COLONOSCOPY N/A 8/10/2020    Procedure: COLONOSCOPYTO ILEOCOLIC ANASTOMOSIS WITH COLD BX POLYPECTOMY;  Surgeon: Rebeca Curran MD;  Location: [facility] ENDOSCOPY;  Service: General;  Laterality: N/A;  HX COLON CANCER  --DIVERTICULOSIS, POLYPS, HEMORRHOIDS    • CYSTOSCOPY BOTOX INJECTION OF BLADDER N/A 6/27/2016    Procedure: CYSTOSCOPY WITH BOTOX DETRUSOR INJECTION;  Surgeon: Salome Bowen MD;  Location: [facility] MAIN OR;  Service:    • CYSTOSCOPY BOTOX INJECTION OF BLADDER N/A 05/29/2015    Dr. Salome Bowen   • ENDOSCOPY N/A 06/10/2015    Antral Polyp: Gastric Mucosa w/ marked cautery artifact,  Hiatal Hernia, Dr. Rebeca Curran   • ENDOSCOPY N/A 10/28/2013    Gastric polyp: polypoid hyperplastic gastric mucosa w/ focal ulceration, mixed acute & chronic inflammation.  Negative for intestinal metaplasia, no helicobacter organisms identified on diff-wuik stain, Dr. Rebeca Curran   • ENDOSCOPY N/A 03/18/2013    Large prepyloric polyp, partially removed: fragment of polypoid hyperplastic gastric mucosa w/ foci of erosion & patchy mixed acute & chronic inflammation. No helicobacter organisms identified on diff-quick stain. Negative for intestinal metaplasia, negative for dysplasia, Dr. Rebeca Curran   • ENDOSCOPY N/A 10/24/2012    large prepyloric mass (3cm) w/ adjacent nodules: hyperplastic polyp w/ mild chronic active gastritis, focal erosion & associated, Multiple fundic polyps: polypoid mucosal hyperplasia, Dr. Rebeca Curran   • ENDOSCOPY N/A 03/11/2003    Gastric Antral Biopsies: Fragments of Gastric Mucosa & Necrotic Tissue (Compatible w/ Ulcer) w/ chronic active inflammation.  Negative stain for Helicobacter organisms. Dr. Nathan Macias   • ENDOSCOPY N/A 10/23/2017    Procedure: ESOPHAGOGASTRODUODENOSCOPY;  Surgeon: Rebeca Curran MD;  Location: [facility] ENDOSCOPY;  Service:    • ENDOSCOPY  8/10/2020    Procedure: ESOPHAGOGASTRODUODENOSCOPY;  Surgeon: Rebeca Curran MD;  Location: [facility] ENDOSCOPY;  Service:  General;;  GERD, HX GASTRIC POLYPS  --SMALL HIATAL HERNIA, SMALL GASTRIC POLYPS    • ENDOSCOPY AND COLONOSCOPY N/A 08/08/2011    4cm hiatal hernia, Schatzki's ring, Diverticulosis, Large antral polyps, Dr. Rebeca Curran   • INCISIONAL HERNIA REPAIR N/A 06/11/2015    Defect approximately 2.5 cm w/ a mass of incarcerated tissue approximately the size of a nectarine, Dr. Rebeca Curran   • INSERT / REPLACE / REMOVE PACEMAKER     • JOINT REPLACEMENT Bilateral     knee   • KNEE ARTHROPLASTY     • KNEE MINI REVISION Right 9/28/2016    Procedure: RT KNEE POLY INSERT CHANGE ;  Surgeon: Tommy Avila MD;  Location: [facility] MAIN OR;  Service:    • LAPAROSCOPIC CHOLECYSTECTOMY N/A    • NEPHRECTOMY RADICAL Left 06/08/2004    Incidentially found left renal mass 3-4 cm renal cell carcinoma, left lower pole, Dr. Salome Bowen   • OTHER SURGICAL HISTORY      NEUROMUSCULAR BLOCKERS BOTULINUM TOXIN ONABOTULINUMTOXIN A   • PACEMAKER IMPLANTATION N/A     Dr. Casillas   • PACEMAKER REPLACEMENT N/A 09/24/2010    Implanted Generator is a [employer] ALTRUA 60 model [id number], serial # [id number], Dr. Chavez Jimenez   • SIGMOIDOSCOPY N/A 3/15/2021    Procedure: FLEXIBLE SIGMOIDOSCOPY WITH BIOPSY;  Surgeon: Rebeca Curran MD;  Location: [facility] ENDOSCOPY;  Service: General;  Laterality: N/A;  PERSONAL HX COLON CA, S/P RIGHT COLECTOMY, FOLLOW-UP LARGE RECTAL POLYP    --NORMAL RECTUM    • TOTAL KNEE ARTHROPLASTY Left 05/21/2013    Dr. Miguel Pacheco   • TOTAL THYROIDECTOMY N/A 11/16/2015    Dr. Gideon Ashley, [facility]   • TRANSVAGINAL TAPING SUSPENSION N/A 03/16/2009    Mini Sling, Closed suprapubic cystostomy, Dr. Salome Bowen   • TUBAL ABDOMINAL LIGATION Bilateral        FAMILY HISTORY  Family History   Problem Relation Age of Onset   • Heart disease Other    • Deep vein thrombosis Other    • Hypertension Other    • Breast cancer Sister         In her 60s.   • Breast cancer Maternal Aunt    • Breast cancer Sister         In her 60s.   • Heart disease Mother     • Hypertension Mother    • Heart disease Father    • Hypertension Father    • Heart attack Father    • Heart disease Daughter    • Hypertension Daughter    • Heart disease Son    • Hypertension Son    • Malig Hyperthermia Neg Hx        SOCIAL HISTORY  Social History     Socioeconomic History   • Marital status:    • Number of children: 0   Tobacco Use   • Smoking status: Never Smoker   • Smokeless tobacco: Never Used   • Tobacco comment: caffeine use   Vaping Use   • Vaping Use: Never used   Substance and Sexual Activity   • Alcohol use: No   • Drug use: No   • Sexual activity: Defer       REVIEW OF SYSTEMS  Review of Systems    All systems reviewed and negative except for those discussed in HPI.     PHYSICAL EXAM    ED Triage Vitals   Temp Heart Rate Resp BP SpO2   05/02/22 1431 05/02/22 1431 05/02/22 1431 05/02/22 1432 05/02/22 1431   97.1 °F (36.2 °C) 72 18 (!) 214/109 93 %      Temp src Heart Rate Source Patient Position BP Location FiO2 (%)   05/02/22 1431 05/02/22 1431 05/02/22 1432 05/02/22 1432 --   Tympanic Monitor Standing Right arm      Physical Exam    I have reviewed the triage vital signs and nursing notes.      GENERAL: not distressed  HENT: nares patent  EYES: no scleral icterus  NECK: no ROM limitations  CV: regular rhythm, regular rate, no murmur, no rubs no gallops  RESPIRATORY: normal effort, to auscultate bilaterally  ABDOMEN: soft  : deferred  MUSCULOSKELETAL: no deformity, bilateral lower extremity edema slightly worse on the right.  Pedal pulses are palpable.  The leg compartments are soft.  There is no significant erythema  NEURO: alert, moves all extremities, follows commands  SKIN: warm, dry    LAB RESULTS  Recent Results (from the past 24 hour(s))   Comprehensive Metabolic Panel    Collection Time: 05/02/22  3:47 PM    Specimen: Blood   Result Value Ref Range    Glucose 146 (H) 65 - 99 mg/dL    BUN 13 8 - 23 mg/dL    Creatinine 1.10 (H) 0.57 - 1.00 mg/dL    Sodium 140 136 - 145  mmol/L    Potassium 4.0 3.5 - 5.2 mmol/L    Chloride 104 98 - 107 mmol/L    CO2 22.0 22.0 - 29.0 mmol/L    Calcium 9.0 8.6 - 10.5 mg/dL    Total Protein 7.0 6.0 - 8.5 g/dL    Albumin 3.90 3.50 - 5.20 g/dL    ALT (SGPT) 20 1 - 33 U/L    AST (SGOT) 25 1 - 32 U/L    Alkaline Phosphatase 122 (H) 39 - 117 U/L    Total Bilirubin 0.5 0.0 - 1.2 mg/dL    Globulin 3.1 gm/dL    A/G Ratio 1.3 g/dL    BUN/Creatinine Ratio 11.8 7.0 - 25.0    Anion Gap 14.0 5.0 - 15.0 mmol/L    eGFR 49.3 (L) >60.0 mL/min/1.73   CBC Auto Differential    Collection Time: 05/02/22  3:47 PM    Specimen: Blood   Result Value Ref Range    WBC 6.24 3.40 - 10.80 10*3/mm3    RBC 4.34 3.77 - 5.28 10*6/mm3    Hemoglobin 12.7 12.0 - 15.9 g/dL    Hematocrit 40.3 34.0 - 46.6 %    MCV 92.9 79.0 - 97.0 fL    MCH 29.3 26.6 - 33.0 pg    MCHC 31.5 31.5 - 35.7 g/dL    RDW 12.6 12.3 - 15.4 %    RDW-SD 42.9 37.0 - 54.0 fl    MPV 10.0 6.0 - 12.0 fL    Platelets 198 140 - 450 10*3/mm3    Neutrophil % 61.8 42.7 - 76.0 %    Lymphocyte % 23.1 19.6 - 45.3 %    Monocyte % 9.8 5.0 - 12.0 %    Eosinophil % 4.3 0.3 - 6.2 %    Basophil % 0.8 0.0 - 1.5 %    Immature Grans % 0.2 0.0 - 0.5 %    Neutrophils, Absolute 3.86 1.70 - 7.00 10*3/mm3    Lymphocytes, Absolute 1.44 0.70 - 3.10 10*3/mm3    Monocytes, Absolute 0.61 0.10 - 0.90 10*3/mm3    Eosinophils, Absolute 0.27 0.00 - 0.40 10*3/mm3    Basophils, Absolute 0.05 0.00 - 0.20 10*3/mm3    Immature Grans, Absolute 0.01 0.00 - 0.05 10*3/mm3    nRBC 0.0 0.0 - 0.2 /100 WBC   Green Top (Gel)    Collection Time: 05/02/22  3:47 PM   Result Value Ref Range    Extra Tube Hold for add-ons.    Lavender Top    Collection Time: 05/02/22  3:47 PM   Result Value Ref Range    Extra Tube hold for add-on    Light Blue Top    Collection Time: 05/02/22  3:47 PM   Result Value Ref Range    Extra Tube hold for add-on    BNP    Collection Time: 05/02/22  3:47 PM    Specimen: Blood   Result Value Ref Range    proBNP 1,462.0 0.0 - 1,800.0 pg/mL   Duplex  Venous Lower Extremity - Bilateral    Collection Time: 05/02/22  6:22 PM   Result Value Ref Range    Target HR (85%) 115 bpm    Max. Pred. HR (100%) 135 bpm    Right Common Femoral Spont Y     Right Common Femoral Phasic Y     Right Common Femoral Augment Y     Right Common Femoral Competent Y     Right Common Femoral Compress C     Right Saphenofemoral Junction Compress C     Right Profunda Femoral Compress C     Right Proximal Femoral Compress C     Right Mid Femoral Spont Y     Right Mid Femoral Phasic Y     Right Mid Femoral Augment Y     Right Mid Femoral Competent Y     Right Mid Femoral Compress C     Right Popliteal Spont Y     Right Popliteal Phasic Y     Right Popliteal Augment Y     Right Popliteal Competent Y     Right Popliteal Compress C     Right Posterior Tibial Compress C     Right Peroneal Compress C     Right Gastronemius Compress C     Right Greater Saph AK Compress C     Right Greater Saph BK Compress C     Right Lesser Saph Compress C     Left Common Femoral Spont Y     Left Common Femoral Phasic Y     Left Common Femoral Augment Y     Left Common Femoral Competent Y     Left Common Femoral Compress C     Left Saphenofemoral Junction Compress C     Left Profunda Femoral Compress C     Left Proximal Femoral Compress C     Left Mid Femoral Spont Y     Left Mid Femoral Phasic Y     Left Mid Femoral Augment Y     Left Mid Femoral Competent Y     Left Mid Femoral Compress C     Left Distal Femoral Compress C     Left Popliteal Spont Y     Left Popliteal Phasic Y     Left Popliteal Augment Y     Left Popliteal Competent Y     Left Popliteal Compress C     Left Posterior Tibial Compress C     Left Peroneal Compress C     Left Gastronemius Compress C     Left Greater Saph AK Compress C     Left Greater Saph BK Compress C     Left Lesser Saph Compress C          RADIOLOGY RESULTS  XR Chest 1 View    Result Date: 5/2/2022  XR CHEST 1 VW-  05/02/2022  HISTORY: Shortness of breath.  Heart size is mild to  moderately enlarged. There is mild vascular congestion.  Cardiac pacemaker seen in good position.  No pneumothorax or focal infiltrates are seen.      1. Cardiomegaly. 2. Mild vascular congestion.  This report was finalized on 5/2/2022 4:57 PM by Dr. Lamont Gomes M.D.      Duplex Venous Lower Extremity - Bilateral    Result Date: 5/2/2022  · Normal bilateral lower extremity venous duplex scan.           PROGRESS, DATA ANALYSIS, CONSULTS AND MEDICAL DECISION MAKING  All labs have been independently reviewed by me.  All radiology studies have been reviewed by me and discussed with radiologist dictating the report.  EKG's independently viewed and interpreted by me unless stated otherwise. Discussion below represents my analysis of pertinent findings related to patient's condition, differential diagnosis, treatment plan and final disposition.     ED Course as of 05/02/22 2002   Mon May 02, 2022   1704 I viewed chest x-ray in PACS.  My interpretation are no focal infiltrates but some degree of pulmonary vascular congestion. She ambulates without problems.  [EW]   1848 Discussed ultrasounds with Vascular tech, negative for DVT.    MDM/dispo:  I do not feel this is cellulitis, DVT has been ruled out. She has no acute SOA but I will have her increase her Lasix dose for next 3 days to see if it helps with swelling.  [EW]      ED Course User Index  [EW] Sabrina Florian, APRN     DDX: lower leg pain, cellulitis, DVT    Reviewed pt's history and workup with Dr. Soler.  After a bedside evaluation, Dr. Soler agrees with the plan of care.    The patient's history, physical exam, and lab findings were discussed with the physician, who also performed a face to face history and physical exam.  I discussed all results and noted any abnormalities with patient.  Discussed absoute need to recheck abnormalities with their family physician.  I answered any of the patient's questions.  Discussed plan for discharge, as there is no  "emergent indication for admission.  Pt is agreeable and understands need for follow up and repeat testing.  Pt is aware that discharge does not mean that nothing is wrong but it indicates no emergency is present and they must continue care with their family physician.  Pt is discharged with instructions to follow up with primary care doctor to have their blood pressure rechecked.         Disposition vitals:  /82   Pulse 74   Temp 97.1 °F (36.2 °C) (Tympanic)   Resp 16   Ht 158.8 cm (62.5\")   SpO2 93%   BMI 38.88 kg/m²       DIAGNOSIS  Final diagnoses:   Bilateral leg pain   Acute on chronic congestive heart failure, unspecified heart failure type (HCC)       FOLLOW UP   Gabriella Paige MD  [address]  [address] [place] [id number] [phone]    Schedule an appointment as soon as possible for a visit in 3 days  As needed         Sabrina Florian, APRN  05/02/22 2002    "

## 2022-05-02 NOTE — ED NOTES
Pt arrives from [facility] escorted by daughter with c/o bilateral leg redness, swelling, and pain, R worse than L that began 4 days ago. Takes eliquis for hx of afib. Pt a&ox4, abc's intact, NAD noted, ambulatory with cane to triage.     Patient wearing mask during triage. RN wearing appropriate PPE during triage. Hand hygiene performed.

## 2022-05-02 NOTE — DISCHARGE INSTRUCTIONS
For the next 3 days, take 60 mg of Lasix (20 mg x 3) in am    Follow up with Primary Care Provider    Although you are being discharged from the ED today, I encourage you to return for worsening symptoms. Things can, and do, change such that treatment at home with medication may not be adequate. Specifically I recommend returning for chest pain or discomfort, difficulty breathing, persistent vomiting or difficulty holding down liquids or medications, fever > 102.0 F,  or any other worsening or alarming symptoms.     Rest. Drink plenty of fluids.  Follow up with PCP or provider listed for further evaluation and management.  Follow up with primary care provider for further management and to have blood pressure rechecked.  Take all medications as prescribed.

## 2022-05-02 NOTE — ED NOTES
"Notes bilateral lower extremity swelling that started 4 days ago. Denies any known injury.  +1 edema noted to lle and +2 to rle.  Sensation is equal bilaterally, but patient notes when she bears weight to rle that she will have intermittent numbness to that extremity below the knee.  Notes mild pain at rest, states \"almost like a burning.\"  Pulses are strong and equal bilaterally.  Notes she takes eliquis for history of afib.  VSS.  ABC's intact.  Family at chairside.  NAD noted. Patient wearing mask, nurse wearing mask and protective eyewear during care and assessment.  Hand hygiene performed prior to and post care.   "

## 2022-05-10 ENCOUNTER — HOSPITAL ENCOUNTER (OUTPATIENT)
Dept: PET IMAGING | Facility: HOSPITAL | Age: 87
Discharge: HOME | End: 2022-05-10
Admitting: INTERNAL MEDICINE

## 2022-05-10 DIAGNOSIS — D50.8 OTHER IRON DEFICIENCY ANEMIA: ICD-10-CM

## 2022-05-10 DIAGNOSIS — N28.89 RIGHT RENAL MASS: ICD-10-CM

## 2022-05-10 DIAGNOSIS — C18.2 MALIGNANT NEOPLASM OF ASCENDING COLON: ICD-10-CM

## 2022-05-10 PROCEDURE — 71250 CT THORAX DX C-: CPT

## 2022-05-10 PROCEDURE — 74176 CT ABD & PELVIS W/O CONTRAST: CPT

## 2022-05-10 PROCEDURE — 0 DIATRIZOATE MEGLUMINE & SODIUM PER 1 ML: Performed by: INTERNAL MEDICINE

## 2022-05-10 RX ADMIN — DIATRIZOATE MEGLUMINE AND DIATRIZOATE SODIUM 30 ML: 660; 100 LIQUID ORAL; RECTAL at 09:55

## 2022-05-16 NOTE — PROGRESS NOTES
"Chief Complaint  Recurrent iron deficiency anemia, stage IIA (T3N0M0) ascending colon cancer, history of left renal cell carcinoma status post left nephrectomy in 2004, CKD3, enlarging right renal lesion, history of gastric polyps    Subjective        History of Present Illness  Patient returns today in follow-up with laboratory studies and CT scans to review.  In the interval, the patient did return to see Dr. Minor in January and underwent ultrasound regarding her right renal lesion.  He did not recommend any attempt at ablation due to the risk of possible hemorrhage and worsening of her renal function with a solitary kidney.  He plans to reassess this with an ultrasound at 1 year interval.  The patient has ongoing chronic difficulty with intermittent episodes of diarrhea for which she uses Imodium.  She does have some intermittent hemorrhoidal irritation with small amount of bright red blood on the toilet tissue and this is not unusual for her.  She developed worsening lower extremity edema and was seen in the emergency department on 5/2/2022 underwent Dopplers which showed no evidence of DVT.  She was found to have venous stasis with worsening bilateral lower extremity edema and temporarily increased her Lasix dose from 40 mg daily up to 60 mg daily for 3 days with some improvement.  She also was found yesterday to have a UTI and was placed on a course of Keflex.  She remains reasonably active for her age, ambulates with the use of a cane.        Objective   Vital Signs:   /72   Pulse 70   Temp 97.1 °F (36.2 °C) (Temporal)   Resp 18   Ht 158.8 cm (62.52\")   Wt 98.2 kg (216 lb 6.4 oz)   SpO2 93%   BMI 38.92 kg/m²     Physical Exam  Constitutional:       Appearance: She is well-developed.      Comments: Elderly female no distress   Eyes:      Conjunctiva/sclera: Conjunctivae normal.   Neck:      Thyroid: No thyromegaly.   Cardiovascular:      Rate and Rhythm: Normal rate. Rhythm irregular.      Heart " sounds: No murmur heard.    No friction rub. No gallop.   Pulmonary:      Effort: No respiratory distress.      Breath sounds: Normal breath sounds.   Chest:   Breasts:      Right: No supraclavicular adenopathy.      Left: No supraclavicular adenopathy.       Abdominal:      General: Bowel sounds are normal. There is no distension.      Palpations: Abdomen is soft.      Tenderness: There is no abdominal tenderness.   Musculoskeletal:      Comments: Bilateral lower extremity 1+ edema with venous stasis changes and mild blistering   Lymphadenopathy:      Head:      Right side of head: No submandibular adenopathy.      Cervical: No cervical adenopathy.      Upper Body:      Right upper body: No supraclavicular adenopathy.      Left upper body: No supraclavicular adenopathy.   Skin:     General: Skin is warm and dry.      Findings: No rash.   Neurological:      Mental Status: She is alert and oriented to person, place, and time.      Cranial Nerves: No cranial nerve deficit.      Motor: No abnormal muscle tone.      Deep Tendon Reflexes: Reflexes normal.   Psychiatric:         Behavior: Behavior normal.            Result Review : Reviewed CBC, CMP, iron panel, ferritin, CEA from today.  Reviewed CT chest abdomen pelvis from 5/10/2022.  Reviewed Doppler from 5/2/2022.  Reviewed emergency department records.       Assessment and Plan     1. Recurrent iron deficiency anemia and anemia secondary to CKD3:   · The patient is intolerant of oral iron.   · Prior GI evaluation showed no definitive evidence of GI blood loss.   · She has underlying stage III chronic kidney disease, which is a contributing factor to her mild borderline anemia.   · She has been treated in the past with IV iron in 2011 and 2012.    · She had a lengthy absence from our office from 2015 until she was referred back on 9/21/17 with evidence of recurrent iron deficiency, likely related to malabsorption.  Her ferritin on 7/7/17 was 21 and hemoglobin on  "8/7/17 was 11.1.  Her degree of anemia was mild with a hemoglobin of 11.4, microcytic indices with an MCV of 78.  Labs on 9/21/17 showed ferritin 20.5, iron saturation 8%, TIBC 441.  We did also check B12 and folate which were normal.  Erythropoietin was 32 consistent with a component of anemia secondary to chronic kidney disease. She did return stool cards for occult blood which were negative ×3 10/5/17.  She received additional Feraheme on 10/5 and 10/12/17.    · She underwent EGD with Dr. Curran 10/23/17 with no evidence of active bleeding, small gastric polyps identified.   · Labs 1/8/2021 showed a hemoglobin up to 12.8 with iron 52, ferritin slightly decreased at 55.9 with iron saturation 12%, TIBC 427.    · Patient returns today for follow-up with labs that show hemoglobin normal at 12.5 and iron studies indicating iron replete status with iron 80, ferritin 67.5, iron saturation 21%, TIBC 386.  There is no need for IV iron at this time.  We will monitor labs again in 8 months when patient returns.  2. Stage IIA (T3N0M0) ascending colon cancer:   · Developed significant diarrhea with blood per rectum.    · Colonoscopy 4/25/18 with identification of polyps in the rectum, cecal polyp, mass in the right colon.  The cecal polyp was a sessile serrated adenoma, ascending colon \"mass\" showed hyperplastic polyp, rectal polyp was a tubulovillous adenoma with low-grade dysplasia.  CEA on 4/25/18 was normal at 1.65.  Chest x-ray 4/25/18 was unrevealing.  CT of the abdomen and pelvis 4/26/18 showed a 4 cm annular mass in the ascending colon with adjacent haziness in the mesentery but no lymphadenopathy, no evidence of metastatic disease.    · Right hemicolectomy on 4/27/18 with pathology showing an invasive moderately differentiated adenocarcinoma arising in a sessile serrated adenoma with negative margins measuring 2.5 cm extending through the muscularis propria into brad-colorectal tissue, positive lymphovascular " invasion, negative perineural invasion, 10 lymph nodes negative.  There was a comment regarding an ulcerated small adjacent pericolonic abscess, raising the question of possible microscopic perforation.  Patient did have a few high risk features with less than 12 lymph nodes removed, positive lymphovascular invasion, and some possibility of microscopic perforation.  The tumor was microsatellite stable.    · Given her age and comorbidities, the patient was a poor candidate for adjuvant chemotherapy and was not recommended.  Independent of this recommendation, the patient stated she was not interested in taking any adjuvant chemotherapy.  Therefore we will plan to monitor her clinically for evidence of recurrent disease.  Standard monitoring is with routine CEA levels however this does not appear to be informative for her given her normal preoperative value.  We will pursue annual interval follow-up CT.    · CT scan from 4/24/2019 at a 1 year interval showed no evidence of recurrent disease.    · 5/22/2020 CEA normal at 1.69 and CT scan chest abdomen and pelvis 5/22/2020 showed no evidence of recurrent/metastatic disease.    · The patient underwent delayed endoscopic evaluation with EGD and colonoscopy on 8/10/2020 with Dr. Willett, colonoscopy with tubular adenoma and hyperplastic polyp removed.  · Flexible sigmoidoscopy with Dr. Curran on 3/15/2021 was negative  · 1 year interval follow-up CT performed 5/6/2021 which showed no evidence of recurrent malignancy.  There was however a comment regarding a subpleural opacification left lower lobe as well as a slight increase in normal sized mediastinal lymph nodes.  The lymph nodes however remained normal size and the change in size was felt to be insignificant, left lower lobe subpleural opacification was felt to be inflammatory.  Radiology however recommend 6-month interval follow-up study.  · Patient returns today with 6-month interval follow-up CT scan to review from  5/10/2022.  There is no evidence of recurrent disease in terms of her colon cancer.  CEA remains normal at 1.73 from today.  We will recheck CEA when patient returns in 8 months.  We will decrease frequency of CT scans to annual study at this point.  We have been performing scans on a 6-month basis more in relation to the right renal lesion (see below).  3. 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    · Family history includes a sister with breast cancer around age 60, another sister with breast cancer around age 60, maternal aunt with breast cancer over the age of 50, maternal uncle with lung cancer.    · Given the patient's multiple malignancies and family history, she was referred to the genetics clinic to discuss potential genetic testing, mainly for benefit of the patient's children and other family members.    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   · She did follow-up in the genetics clinic and underwent INVITAE panel test 7/31/18 showing VUS in MLH3 and MSH6 with no known clinical significance at this time.   4. History of gastric polyps:   · EGD 10/23/17 with small less than 6 mm gastric polyps, not resected.  · Repeat EGD 8/10/2020 with small gastric polyps, not resected.  5. History of left renal cell carcinoma with enlarging right renal lesion:   · The patient underwent a left nephrectomy in 2004 with no clinical evidence of recurrent disease.    · CT chest from 11/14/16 showed no evidence of recurrence.    · CT abdomen and pelvis however from 4/26/18 showed a 2.2 cm right renal lesion which was indeterminate, possibly a proteinaceous cyst but could not rule out mass lesion.  The study was performed without contrast due to the patient's underlying chronic kidney  disease.  She is unable to undergo renal mass protocol CT with contrast.  She is unable to undergo MRI due to her pacemaker.    · Follow-up CT scan 4/24/2019 with no change in the right renal lesion at 2.1 cm, repeat at 1 year interval  · 1 year interval follow-up CT (delayed due to viral pandemic) 5/22/2020 with increase in right renal mass from 2.1 up to 2.6 cm.  This is felt to possibly represent a proteinaceous cyst versus solid mass.  She is unable to undergo a renal protocol CT due to her renal dysfunction and solitary kidney and cannot undergo MRI due to her pacemaker.   · Renal ultrasound 6/4/2020 showed a 3.6 cm mixed solid and cystic mass at the lower pole of the right kidney suspicious for renal cell carcinoma and recommended surgical excision.  Patient did follow-up with Dr. Trujillo in urology who recommended continued radiographic monitoring.  · Short interval follow-up ultrasound on 9/15/2020 with stable 3.6 cm right renal lesion.   · Renal ultrasound by urology on 1/7/2021 measured the lower pole right renal mass at 3.8 cm, was not directly compared to the prior study performed at [facility].    · CT 5/6/2021 showed right renal lesion had increased in size from 2.6 up to 3.1 cm.  It was felt that this likely represents a hyperdense renal cyst however could not rule out solid lesion or complex lesion.    · CT 11/15/2021 showed that the lower pole right renal lesion continued to increase gradually in size from 3.1 now up to 3.3 cm.  Change has been minimal however radiology did review multiple prior studies and the lesion has increased gradually over time, suspicious for possible malignancy.   · Patient did follow-up with Dr. Trujillo in urology in January 2022.  He does not recommend pursuit of ablation of the renal lesion due to risk of hemorrhage and potential compromise of the patient's renal function in her solitary kidney.  · Patient returns today in follow-up with CT from 5/10/2022 that shows further  slight increase in the right renal mass from 3.1 x 3.2 up to 3.6 x 3.7 cm.  Given the continued gradual enlargement in size of the lesion there is high suspicion for slowly growing new primary renal cell carcinoma.  As above, Dr. Minor is aware of the continued increase in size of the lesion and does not plan to perform any intervention given her age, indolent nature of the disease, and risk to her renal function.  Therefore with no intervention planned I cannot see the utility in further aggressive CT monitoring.  Dr. Trujillo plans to see the patient back in January 2023 with repeat ultrasound and I will see the patient back just after that visit.  6. CKD3:   · Baseline creatinine in 1.2-1.8 range  · Contributing factor to anemia  7. Abnormal appearance of cervix on CT scan  · CT 5/10/2022 with bulky appearance of the cervix with demonstration of air in the lumen.  Recommended follow-up with direct visualization  · Patient notes that she did have a urogynecologic procedure performed in the past which did involve the cervix.  She would like to return to see urogynecology to follow-up the CT results and we will make a referral.     PLAN:   1. Refer to urogynecology for evaluation in regards to incidental bulky appearance of cervix seen on recent CT (patient reports that she had a previous urogynecologic procedure in this area)  2. Patient is scheduled to follow-up in early January with Dr. Trujillo in urology with repeat renal ultrasound  3. Return in 8 months for MD visit with CBC, CMP, stat iron panel, ferritin, CEA.  We will review recommendations from urology and discuss whether to pursue a 1 year interval follow-up CT.            I did spend 40 minutes in total time caring for the patient today, time spent in review of records, face-to-face consultation, coordination of care, placement of orders, completion of documentation.

## 2022-05-17 ENCOUNTER — OFFICE VISIT (OUTPATIENT)
Dept: ONCOLOGY | Facility: CLINIC | Age: 87
End: 2022-05-17

## 2022-05-17 ENCOUNTER — LAB (OUTPATIENT)
Dept: LAB | Facility: HOSPITAL | Age: 87
End: 2022-05-17

## 2022-05-17 VITALS
HEIGHT: 63 IN | RESPIRATION RATE: 18 BRPM | HEART RATE: 70 BPM | SYSTOLIC BLOOD PRESSURE: 157 MMHG | OXYGEN SATURATION: 93 % | BODY MASS INDEX: 38.34 KG/M2 | TEMPERATURE: 97.1 F | DIASTOLIC BLOOD PRESSURE: 72 MMHG | WEIGHT: 216.4 LBS

## 2022-05-17 DIAGNOSIS — D50.8 OTHER IRON DEFICIENCY ANEMIA: ICD-10-CM

## 2022-05-17 DIAGNOSIS — C18.2 MALIGNANT NEOPLASM OF ASCENDING COLON: ICD-10-CM

## 2022-05-17 DIAGNOSIS — N28.89 RIGHT RENAL MASS: ICD-10-CM

## 2022-05-17 DIAGNOSIS — C18.2 MALIGNANT NEOPLASM OF ASCENDING COLON: Primary | ICD-10-CM

## 2022-05-17 LAB
ALBUMIN SERPL-MCNC: 4.1 G/DL (ref 3.5–5.2)
ALBUMIN/GLOB SERPL: 1.3 G/DL (ref 1.1–2.4)
ALP SERPL-CCNC: 116 U/L (ref 38–116)
ALT SERPL W P-5'-P-CCNC: 17 U/L (ref 0–33)
ANION GAP SERPL CALCULATED.3IONS-SCNC: 10.2 MMOL/L (ref 5–15)
AST SERPL-CCNC: 25 U/L (ref 0–32)
BASOPHILS # BLD AUTO: 0.04 10*3/MM3 (ref 0–0.2)
BASOPHILS NFR BLD AUTO: 0.8 % (ref 0–1.5)
BILIRUB SERPL-MCNC: 0.5 MG/DL (ref 0.2–1.2)
BUN SERPL-MCNC: 19 MG/DL (ref 6–20)
BUN/CREAT SERPL: 15 (ref 7.3–30)
CALCIUM SPEC-SCNC: 9.7 MG/DL (ref 8.5–10.2)
CEA SERPL-MCNC: 1.73 NG/ML
CHLORIDE SERPL-SCNC: 103 MMOL/L (ref 98–107)
CO2 SERPL-SCNC: 28.8 MMOL/L (ref 22–29)
CREAT SERPL-MCNC: 1.27 MG/DL (ref 0.6–1.1)
DEPRECATED RDW RBC AUTO: 43.8 FL (ref 37–54)
EGFRCR SERPLBLD CKD-EPI 2021: 41.5 ML/MIN/1.73
EOSINOPHIL # BLD AUTO: 0.24 10*3/MM3 (ref 0–0.4)
EOSINOPHIL NFR BLD AUTO: 4.5 % (ref 0.3–6.2)
ERYTHROCYTE [DISTWIDTH] IN BLOOD BY AUTOMATED COUNT: 13.2 % (ref 12.3–15.4)
FERRITIN SERPL-MCNC: 67.5 NG/ML (ref 13–150)
GLOBULIN UR ELPH-MCNC: 3.1 GM/DL (ref 1.8–3.5)
GLUCOSE SERPL-MCNC: 115 MG/DL (ref 74–124)
HCT VFR BLD AUTO: 38.8 % (ref 34–46.6)
HGB BLD-MCNC: 12.5 G/DL (ref 12–15.9)
IMM GRANULOCYTES # BLD AUTO: 0.01 10*3/MM3 (ref 0–0.05)
IMM GRANULOCYTES NFR BLD AUTO: 0.2 % (ref 0–0.5)
IRON 24H UR-MRATE: 80 MCG/DL (ref 37–145)
IRON SATN MFR SERPL: 21 % (ref 14–48)
LYMPHOCYTES # BLD AUTO: 1.22 10*3/MM3 (ref 0.7–3.1)
LYMPHOCYTES NFR BLD AUTO: 23.1 % (ref 19.6–45.3)
MCH RBC QN AUTO: 29.4 PG (ref 26.6–33)
MCHC RBC AUTO-ENTMCNC: 32.2 G/DL (ref 31.5–35.7)
MCV RBC AUTO: 91.3 FL (ref 79–97)
MONOCYTES # BLD AUTO: 0.58 10*3/MM3 (ref 0.1–0.9)
MONOCYTES NFR BLD AUTO: 11 % (ref 5–12)
NEUTROPHILS NFR BLD AUTO: 3.2 10*3/MM3 (ref 1.7–7)
NEUTROPHILS NFR BLD AUTO: 60.4 % (ref 42.7–76)
NRBC BLD AUTO-RTO: 0 /100 WBC (ref 0–0.2)
PLATELET # BLD AUTO: 220 10*3/MM3 (ref 140–450)
PMV BLD AUTO: 9.2 FL (ref 6–12)
POTASSIUM SERPL-SCNC: 4.2 MMOL/L (ref 3.5–4.7)
PROT SERPL-MCNC: 7.2 G/DL (ref 6.3–8)
RBC # BLD AUTO: 4.25 10*6/MM3 (ref 3.77–5.28)
SODIUM SERPL-SCNC: 142 MMOL/L (ref 134–145)
TIBC SERPL-MCNC: 386 MCG/DL (ref 249–505)
TRANSFERRIN SERPL-MCNC: 276 MG/DL (ref 200–360)
WBC NRBC COR # BLD: 5.29 10*3/MM3 (ref 3.4–10.8)

## 2022-05-17 PROCEDURE — 36415 COLL VENOUS BLD VENIPUNCTURE: CPT

## 2022-05-17 PROCEDURE — 83540 ASSAY OF IRON: CPT

## 2022-05-17 PROCEDURE — 99215 OFFICE O/P EST HI 40 MIN: CPT | Performed by: INTERNAL MEDICINE

## 2022-05-17 PROCEDURE — 82728 ASSAY OF FERRITIN: CPT

## 2022-05-17 PROCEDURE — 84466 ASSAY OF TRANSFERRIN: CPT

## 2022-05-17 PROCEDURE — 85025 COMPLETE CBC W/AUTO DIFF WBC: CPT

## 2022-05-17 PROCEDURE — 82378 CARCINOEMBRYONIC ANTIGEN: CPT | Performed by: INTERNAL MEDICINE

## 2022-05-17 PROCEDURE — 80053 COMPREHEN METABOLIC PANEL: CPT

## 2022-05-17 RX ORDER — CEPHALEXIN 500 MG/1
CAPSULE ORAL
COMMUNITY
Start: 2022-05-16 | End: 2022-06-21

## 2022-06-21 ENCOUNTER — OFFICE VISIT (OUTPATIENT)
Dept: INTERNAL MEDICINE | Facility: CLINIC | Age: 87
End: 2022-06-21

## 2022-06-21 VITALS
WEIGHT: 215 LBS | DIASTOLIC BLOOD PRESSURE: 82 MMHG | OXYGEN SATURATION: 97 % | HEART RATE: 71 BPM | HEIGHT: 63 IN | BODY MASS INDEX: 38.09 KG/M2 | SYSTOLIC BLOOD PRESSURE: 164 MMHG

## 2022-06-21 DIAGNOSIS — L03.115 CELLULITIS OF RIGHT LOWER EXTREMITY: Primary | ICD-10-CM

## 2022-06-21 DIAGNOSIS — I10 ESSENTIAL HYPERTENSION: ICD-10-CM

## 2022-06-21 PROCEDURE — 99214 OFFICE O/P EST MOD 30 MIN: CPT | Performed by: NURSE PRACTITIONER

## 2022-06-21 RX ORDER — CLINDAMYCIN HYDROCHLORIDE 300 MG/1
300 CAPSULE ORAL 3 TIMES DAILY
Qty: 30 CAPSULE | Refills: 0 | Status: SHIPPED | OUTPATIENT
Start: 2022-06-21 | End: 2022-07-08

## 2022-06-21 NOTE — PROGRESS NOTES
Subjective   Anastacia Sarah is a 86 y.o. female. Patient is here today for   Chief Complaint   Patient presents with   • Leg Pain     Pt continues with right lower leg swelling & redness. In the last 3-4 weeks, the pt has been to immediate care & ER three times since her swelling has started. Pt had completed keflex.    .    History of Present Illness   New problem to this provider: c/o right lower leg swelling and redness for about a month. She was seen in the ER and prescribed keflex. She finished keflex a couple of weeks ago.   She feels tired, but not short of breath. Feels like her leg is on fire. She has been using ice packs, elevating her legs, wedge packs  She did increase to 3 tablets of lasix daily.     The following portions of the patient's history were reviewed and updated as appropriate: allergies, current medications, past family history, past medical history, past social history, past surgical history and problem list.    Review of Systems    Objective   Vitals:    22 1455   BP: 164/82   Pulse: 71   SpO2: 97%     Body mass index is 38.67 kg/m².  Physical Exam  Vitals and nursing note reviewed.   Constitutional:       Appearance: Normal appearance. She is well-developed.   Cardiovascular:      Rate and Rhythm: Normal rate and regular rhythm.      Heart sounds: Normal heart sounds.   Pulmonary:      Effort: Pulmonary effort is normal.      Breath sounds: Normal breath sounds.   Musculoskeletal:      Right lower le+ Pitting Edema present.      Left lower le+ Pitting Edema present.   Skin:     General: Skin is warm and dry.      Findings: Erythema (right lower leg/shin; warm to touch) present.   Neurological:      Mental Status: She is alert and oriented to person, place, and time.   Psychiatric:         Speech: Speech normal.         Behavior: Behavior normal.         Thought Content: Thought content normal.         Assessment & Plan   Diagnoses and all orders for this visit:    1.  Cellulitis of right lower extremity (Primary)  -     clindamycin (Cleocin) 300 MG capsule; Take 1 capsule by mouth 3 (Three) Times a Day.  Dispense: 30 capsule; Refill: 0    2. Essential hypertension      HTN - increase lisinopril to 20 mg daily. F/u with Dr. Paige in 2 weeks to reassess BP and cellulitis.

## 2022-07-05 ENCOUNTER — TELEPHONE (OUTPATIENT)
Dept: CARDIOLOGY | Facility: CLINIC | Age: 87
End: 2022-07-05

## 2022-07-05 NOTE — TELEPHONE ENCOUNTER
Patient with VVIR pacer implanted 1/6/22, patient with history of permanent AF and s/p AVN ablation. Remote transmission reviewed today documented 1 NSVT event on 5/11/22. Event 14 beats with rate 170 BPM lasting 14 seconds, event occurred at 1:56 am. This is the first documented NSVT since device was implanted.    Patient scheduled for device check and appointment with you on 9/12/22.    NSVT event on 5/11/22:

## 2022-07-06 DIAGNOSIS — R06.02 SHORTNESS OF BREATH: ICD-10-CM

## 2022-07-06 DIAGNOSIS — I48.19 PERSISTENT ATRIAL FIBRILLATION: ICD-10-CM

## 2022-07-06 RX ORDER — FUROSEMIDE 20 MG/1
TABLET ORAL
Qty: 180 TABLET | Refills: 1 | Status: SHIPPED | OUTPATIENT
Start: 2022-07-06 | End: 2023-01-04

## 2022-07-08 ENCOUNTER — OFFICE VISIT (OUTPATIENT)
Dept: INTERNAL MEDICINE | Facility: CLINIC | Age: 87
End: 2022-07-08

## 2022-07-08 VITALS
SYSTOLIC BLOOD PRESSURE: 122 MMHG | HEART RATE: 70 BPM | DIASTOLIC BLOOD PRESSURE: 62 MMHG | HEIGHT: 63 IN | OXYGEN SATURATION: 96 % | TEMPERATURE: 97.3 F | BODY MASS INDEX: 38.67 KG/M2

## 2022-07-08 DIAGNOSIS — I10 ESSENTIAL HYPERTENSION: Primary | ICD-10-CM

## 2022-07-08 DIAGNOSIS — L03.115 CELLULITIS OF RIGHT LOWER EXTREMITY: ICD-10-CM

## 2022-07-08 PROCEDURE — 99213 OFFICE O/P EST LOW 20 MIN: CPT | Performed by: FAMILY MEDICINE

## 2022-07-08 RX ORDER — LISINOPRIL 20 MG/1
20 TABLET ORAL DAILY
Qty: 90 TABLET | Refills: 1 | Status: SHIPPED | OUTPATIENT
Start: 2022-07-08 | End: 2022-08-08 | Stop reason: SDUPTHER

## 2022-07-08 NOTE — PROGRESS NOTES
Subjective   Anastacia Sarah is a 86 y.o. female.   Chief Complaint   Patient presents with   • cellulitis    • Hypertension     2 week fu        History of Present Illness     1.  Cellulitis- patient complains of bilateral swelling of legs.  Right more than left.  It started about 2 months ago.  She was evaluated in urgent care and ER.  She was treated with Keflex for cellulitis.  She had a Doppler done which was negative for DVT.  She saw Bernarda 2 weeks ago and was started on clindamycin for cellulitis.  She completed treatment.  Redness resolved, she still have swelling.     2.  Hypertension- blood pressure was elevated at last office visit and lisinopril was increased to 20 mg a day.  She takes it every day.  She has no side effects.  No chest pain, no palpitations, no lightheadedness.  Creatinine 1.27, GFR 41.5.     The following portions of the patient's history were reviewed and updated as appropriate: allergies, current medications, past family history, past medical history, past social history, past surgical history and problem list.    Review of Systems   Constitutional: Negative.    Respiratory: Negative.    Cardiovascular: Positive for leg swelling. Negative for chest pain and palpitations.   Neurological: Negative for light-headedness.         Objective   Wt Readings from Last 3 Encounters:   06/21/22 97.5 kg (215 lb)   05/17/22 98.2 kg (216 lb 6.4 oz)   03/09/22 98 kg (216 lb)      Vitals:    07/08/22 1259   BP: 122/62   Pulse: 70   Temp: 97.3 °F (36.3 °C)   SpO2: 96%     Temp Readings from Last 3 Encounters:   07/08/22 97.3 °F (36.3 °C)   05/17/22 97.1 °F (36.2 °C) (Temporal)   05/02/22 97.1 °F (36.2 °C) (Tympanic)     BP Readings from Last 3 Encounters:   07/08/22 122/62   06/21/22 164/82   05/17/22 157/72     Pulse Readings from Last 3 Encounters:   07/08/22 70   06/21/22 71   05/17/22 70     Body mass index is 38.67 kg/m².    Physical Exam  Constitutional:       Appearance: She is well-developed.    HENT:      Head: Normocephalic and atraumatic.   Neck:      Vascular: No carotid bruit.   Cardiovascular:      Rate and Rhythm: Normal rate and regular rhythm.      Heart sounds: Normal heart sounds.   Pulmonary:      Effort: Pulmonary effort is normal.      Breath sounds: Normal breath sounds.   Skin:     General: Skin is warm and dry.   Neurological:      Mental Status: She is alert and oriented to person, place, and time.   Psychiatric:         Behavior: Behavior normal.         Assessment & Plan   Diagnoses and all orders for this visit:    1. Essential hypertension (Primary)    2. Cellulitis of right lower extremity    Other orders  -     lisinopril (PRINIVIL,ZESTRIL) 20 MG tablet; Take 1 tablet by mouth Daily.  Dispense: 90 tablet; Refill: 1        1.  Hypertension-continue current treatment.  Follow-up in 6 months.  2.  Cellulitis-resolved.  I recommend compression stockings.  Follow-up as needed.

## 2022-07-19 RX ORDER — APIXABAN 5 MG/1
TABLET, FILM COATED ORAL
Qty: 180 TABLET | Refills: 1 | Status: SHIPPED | OUTPATIENT
Start: 2022-07-19 | End: 2023-01-05 | Stop reason: SDUPTHER

## 2022-08-08 RX ORDER — LISINOPRIL 20 MG/1
20 TABLET ORAL DAILY
Qty: 90 TABLET | Refills: 1 | Status: SHIPPED | OUTPATIENT
Start: 2022-08-08 | End: 2022-09-02 | Stop reason: SDUPTHER

## 2022-08-31 ENCOUNTER — TELEPHONE (OUTPATIENT)
Dept: CARDIOLOGY | Facility: CLINIC | Age: 87
End: 2022-08-31

## 2022-08-31 NOTE — TELEPHONE ENCOUNTER
SWP she is actually taking synthroid 125 mcg three tablets daily. Her furosemide is still 40 mg QD...Jeannette

## 2022-08-31 NOTE — TELEPHONE ENCOUNTER
GÉNESIS-- Pt called to let you know Dr. Paige has increased her furosemide from 40 mg QD to 60 mg QD for increased swelling and suspected blood clot. She asked Pt to call and let JM know and make sure this was ok...Jeannette

## 2022-09-02 RX ORDER — LISINOPRIL 20 MG/1
40 TABLET ORAL DAILY
Qty: 180 TABLET | Refills: 1 | Status: SHIPPED | OUTPATIENT
Start: 2022-09-02 | End: 2023-02-27 | Stop reason: SDUPTHER

## 2022-09-12 ENCOUNTER — TELEPHONE (OUTPATIENT)
Dept: CARDIOLOGY | Facility: CLINIC | Age: 87
End: 2022-09-12

## 2022-09-12 NOTE — TELEPHONE ENCOUNTER
Patient called and left a message that she was not feeling well, she had a runny nose, sore throat, and just not feeling well. She wanted to cancel her appointment and reschedule for a different day.  I have called her and cancelled her appointment and let her know that someone from our office would call her back to reschedule her appointment with IBRAHIMA.    CB: [phone]    Thanks,  Adelina

## 2022-10-03 PROCEDURE — 93296 REM INTERROG EVL PM/IDS: CPT | Performed by: INTERNAL MEDICINE

## 2022-10-03 PROCEDURE — 93294 REM INTERROG EVL PM/LDLS PM: CPT | Performed by: INTERNAL MEDICINE

## 2022-10-11 ENCOUNTER — TRANSCRIBE ORDERS (OUTPATIENT)
Dept: ADMINISTRATIVE | Facility: HOSPITAL | Age: 87
End: 2022-10-11

## 2022-10-11 DIAGNOSIS — Z12.31 SCREENING MAMMOGRAM FOR BREAST CANCER: Primary | ICD-10-CM

## 2022-11-11 ENCOUNTER — CLINICAL SUPPORT NO REQUIREMENTS (OUTPATIENT)
Dept: CARDIOLOGY | Facility: CLINIC | Age: 87
End: 2022-11-11

## 2022-11-11 ENCOUNTER — OFFICE VISIT (OUTPATIENT)
Dept: CARDIOLOGY | Facility: CLINIC | Age: 87
End: 2022-11-11

## 2022-11-11 VITALS
SYSTOLIC BLOOD PRESSURE: 128 MMHG | HEIGHT: 63 IN | HEART RATE: 74 BPM | WEIGHT: 212 LBS | BODY MASS INDEX: 37.56 KG/M2 | DIASTOLIC BLOOD PRESSURE: 86 MMHG

## 2022-11-11 DIAGNOSIS — E66.01 SEVERE OBESITY (BMI 35.0-39.9) WITH COMORBIDITY: ICD-10-CM

## 2022-11-11 DIAGNOSIS — I48.21 PERMANENT ATRIAL FIBRILLATION: ICD-10-CM

## 2022-11-11 DIAGNOSIS — I44.2 THIRD DEGREE AV BLOCK: ICD-10-CM

## 2022-11-11 DIAGNOSIS — Z98.890 S/P AV NODAL ABLATION: ICD-10-CM

## 2022-11-11 DIAGNOSIS — I10 ESSENTIAL HYPERTENSION: ICD-10-CM

## 2022-11-11 DIAGNOSIS — Z95.0 S/P PLACEMENT OF CARDIAC PACEMAKER: ICD-10-CM

## 2022-11-11 DIAGNOSIS — I44.2 THIRD DEGREE AV BLOCK: Primary | ICD-10-CM

## 2022-11-11 PROBLEM — Z45.010 PACEMAKER AT END OF BATTERY LIFE: Status: RESOLVED | Noted: 2021-12-29 | Resolved: 2022-11-11

## 2022-11-11 PROCEDURE — 99213 OFFICE O/P EST LOW 20 MIN: CPT | Performed by: NURSE PRACTITIONER

## 2022-11-11 PROCEDURE — 93000 ELECTROCARDIOGRAM COMPLETE: CPT | Performed by: NURSE PRACTITIONER

## 2022-11-11 PROCEDURE — 93279 PRGRMG DEV EVAL PM/LDLS PM: CPT | Performed by: INTERNAL MEDICINE

## 2022-11-11 NOTE — PROGRESS NOTES
Date of Office Visit: 2022  Encounter Provider: ALLISON Isaac  Place of Service: [facility] CARDIOLOGY  Patient Name: Anastacia Sarah  :1935    Chief Complaint   Patient presents with   • permanent AFIB     6 month f/u    • third degree AV block   • s/p ablation   • Pacemaker Check   :     HPI: Anastacia Sarah is a 87 y.o. female who follows with Dr. Melendez for permanent A. fib, sick sinus syndrome, heart rate became difficult to control underwent AV node ablation in 2018.    Presents today for routine follow-up and device check.    She is doing well.  She denies chest pain, some chronic dyspnea with exertion this is unchanged. No PND, orthopnea, dizziness or edema.    Device interrogation shows normal testing and function, her RV threshold has remained a little elevated and actually looks better today but we will check her again in 6 months and adjust if appropriate.  She did have one 9 beat run of NSVT on [birth year] which [birth year] she was asymptomatic.    Remains on apixaban for anticoagulation with no bleeding issues.       Past Medical History:   Diagnosis Date   • Abnormal ECG    • Acute bronchitis    • Acute sinusitis    • Anemia     Iron deficiency   • Anesthesia complication    • Anticoagulated on warfarin    • Arthritis    • Bladder dysfunction    • Cancer of left kidney (HCC) 2004    S/P Left Radical Neprectomy   • Cardiac disease    • Chronic dysfunction of left eustachian tube    • CKD (chronic kidney disease)     stage 3   • Colon cancer (HCC)    • Colon polyps    • Depression    • Diarrhea    • Diverticulosis 2011    Descending Colon & Sigmoid Colon multiple diverticula   • Epigastric pain    • Fatigue    • Frequent urination    • GERD (gastroesophageal reflux disease)    • Goiter     thyroid removed in 2015   • Health care maintenance    • History of colitis    • History of shingles    • History of transfusion    •  Hypertension    • Insomnia    • Left atrial enlargement    • Left ventricular hypertrophy    • Mitral regurgitation     mild to moderate per echocardiogram 2016   • Nerve pain     LUE D/T SHINGLES   • Neuropathy     ARMS   • Non morbid obesity due to excess calories    • NANCY (obstructive sleep apnea)     uses CPAP   • Paroxysmal atrial fibrillation (HCC)    • Permanent atrial fibrillation (HCC)    • Pulmonary hypertension (HCC)     per echo 2016   • Right atrial enlargement    • S/P AV deshawn ablation    • S/P placement of cardiac pacemaker    • Severe obesity (BMI 35.0-35.9 with comorbidity) (HCC)    • Sleep apnea     on CPAP   • SSS (sick sinus syndrome) (HCC)    • Third degree AV block (HCC)    • Thyroid cancer (HCC)    • URI (upper respiratory infection)    • Urinary urgency        Past Surgical History:   Procedure Laterality Date   • ABDOMINAL SURGERY     • ATRIAL ABLATION SURGERY N/A 09/13/2011    Comprehensive electrophysiology study, Left atrial pace & sense, 3-dimensional cardia mapping, Radiofrequency catheter ablation, Transseptal hear catheterization, Dr. Maldonado Melendez   • ATRIAL ABLATION SURGERY N/A 02/11/2004    Dr. Maldonado Melendez   • BLADDER SURGERY N/A     Bladder tacking procedure   • BLADDER SUSPENSION N/A 05/2012   • CARDIAC ELECTROPHYSIOLOGY PROCEDURE N/A 5/17/2016    Procedure: PPM generator change - dual BOSTON;  Surgeon: Maldonado Melendez MD;  Location:  [facility] C[facility]VAS[facility]ATION;  Service:    • CARDIAC ELECTROPHYSIOLOGY PROCEDURE N/A 3/23/2017    Procedure: AV node ablation pt has BOSTON PPM;  Surgeon: Maldonado Melendez MD;  Location:  [facility]VAS[facility];  Service:    • CARDIAC ELECTROPHYSIOLOGY PROCEDURE N/A 1/6/2022    Procedure: PPM battery change--[place];  Surgeon: Maldonado Melendez MD;  Location:  [facility] INVAS[facility]ATION;  Service: Cardiology;  Laterality: N/A;   • CARDIOVERSION N/A 01/26/2017    Dr. Melendez   • CARDIOVERSION N/A 05/25/2017    Dr. Melendez   • CATARACT  EXTRACTION Bilateral     Dr. Gramajo   • COLON RESECTION Right 4/27/2018    Procedure: COLON RESECTION RIGHT LAPAROSCOPIC, possible open;  Surgeon: Rebeca Curran MD;  Location: [facility] OR;  Service: General   • COLONOSCOPY N/A 04/01/2003    Normal colonoscopy except for an occasional diverticulosis, Dr. Nathan Macias   • COLONOSCOPY N/A 4/25/2018    Procedure: COLONOSCOPY INTO CECUM AND TI WITH SALINE LIFT, HOT SNARE POLYPECTOMIES, BX'S, SPOT INJECTION, RESOLUTION CLIPS X2;  Surgeon: Rebeca Curran MD;  Location: [facility] ENDOSCOPY;  Service: Gastroenterology   • COLONOSCOPY N/A 8/10/2020    Procedure: COLONOSCOPYTO ILEOCOLIC ANASTOMOSIS WITH COLD BX POLYPECTOMY;  Surgeon: Rebeca Curran MD;  Location: [facility] ENDOSCOPY;  Service: General;  Laterality: N/A;  HX COLON CANCER  --DIVERTICULOSIS, POLYPS, HEMORRHOIDS    • CYSTOSCOPY BOTOX INJECTION OF BLADDER N/A 6/27/2016    Procedure: CYSTOSCOPY WITH BOTOX DETRUSOR INJECTION;  Surgeon: Salome Bowen MD;  Location: [facility] MAIN OR;  Service:    • CYSTOSCOPY BOTOX INJECTION OF BLADDER N/A 05/29/2015    Dr. Salome Bowen   • ENDOSCOPY N/A 06/10/2015    Antral Polyp: Gastric Mucosa w/ marked cautery artifact,  Hiatal Hernia, Dr. Rebeca Curran   • ENDOSCOPY N/A 10/28/2013    Gastric polyp: polypoid hyperplastic gastric mucosa w/ focal ulceration, mixed acute & chronic inflammation.  Negative for intestinal metaplasia, no helicobacter organisms identified on diff-wuik stain, Dr. Rebeca Curran   • ENDOSCOPY N/A 03/18/2013    Large prepyloric polyp, partially removed: fragment of polypoid hyperplastic gastric mucosa w/ foci of erosion & patchy mixed acute & chronic inflammation. No helicobacter organisms identified on diff-quick stain. Negative for intestinal metaplasia, negative for dysplasia, Dr. Rebeca Curran   • ENDOSCOPY N/A 10/24/2012    large prepyloric mass (3cm) w/ adjacent nodules: hyperplastic polyp w/ mild chronic active gastritis, focal erosion & associated,  Multiple fundic polyps: polypoid mucosal hyperplasia, Dr. Rebeca Curran   • ENDOSCOPY N/A 03/11/2003    Gastric Antral Biopsies: Fragments of Gastric Mucosa & Necrotic Tissue (Compatible w/ Ulcer) w/ chronic active inflammation.  Negative stain for Helicobacter organisms. Dr. Nathan Macias   • ENDOSCOPY N/A 10/23/2017    Procedure: ESOPHAGOGASTRODUODENOSCOPY;  Surgeon: Rebeca Curran MD;  Location: [facility] ENDOSCOPY;  Service:    • ENDOSCOPY  8/10/2020    Procedure: ESOPHAGOGASTRODUODENOSCOPY;  Surgeon: Rebeca Curran MD;  Location: [facility] ENDOSCOPY;  Service: General;;  GERD, HX GASTRIC POLYPS  --SMALL HIATAL HERNIA, SMALL GASTRIC POLYPS    • ENDOSCOPY AND COLONOSCOPY N/A 08/08/2011    4cm hiatal hernia, Schatzki's ring, Diverticulosis, Large antral polyps, Dr. Rebeca Curran   • INCISIONAL HERNIA REPAIR N/A 06/11/2015    Defect approximately 2.5 cm w/ a mass of incarcerated tissue approximately the size of a nectarine, Dr. Rebeca Curran   • INSERT / REPLACE / REMOVE PACEMAKER     • JOINT REPLACEMENT Bilateral     knee   • KNEE ARTHROPLASTY     • KNEE MINI REVISION Right 9/28/2016    Procedure: RT KNEE POLY INSERT CHANGE ;  Surgeon: Tommy Avila MD;  Location: [facility] MAIN OR;  Service:    • LAPAROSCOPIC CHOLECYSTECTOMY N/A    • NEPHRECTOMY RADICAL Left 06/08/2004    Incidentially found left renal mass 3-4 cm renal cell carcinoma, left lower pole, Dr. Salome Bowen   • OTHER SURGICAL HISTORY      NEUROMUSCULAR BLOCKERS BOTULINUM TOXIN ONABOTULINUMTOXIN A   • PACEMAKER IMPLANTATION N/A     Dr. Casillas   • PACEMAKER REPLACEMENT N/A 09/24/2010    Implanted Generator is a [employer] ALTRUA 60 model [id number], serial # [id number], Dr. Chavez Jimenez   • SIGMOIDOSCOPY N/A 3/15/2021    Procedure: FLEXIBLE SIGMOIDOSCOPY WITH BIOPSY;  Surgeon: Rebeca Curran MD;  Location: [facility] ENDOSCOPY;  Service: General;  Laterality: N/A;  PERSONAL HX COLON CA, S/P RIGHT COLECTOMY, FOLLOW-UP LARGE RECTAL POLYP    --NORMAL RECTUM    • TOTAL KNEE  ARTHROPLASTY Left 05/21/2013    Dr. Miguel Pacheco   • TOTAL THYROIDECTOMY N/A 11/16/2015    Dr. Gideon Ashley, [facility]   • TRANSVAGINAL TAPING SUSPENSION N/A 03/16/2009    Mini Sling, Closed suprapubic cystostomy, Dr. Salome Bowen   • TUBAL ABDOMINAL LIGATION Bilateral        Social History     Socioeconomic History   • Marital status:    • Number of children: 0   Tobacco Use   • Smoking status: Never   • Smokeless tobacco: Never   • Tobacco comments:     caffeine use   Vaping Use   • Vaping Use: Never used   Substance and Sexual Activity   • Alcohol use: No   • Drug use: No   • Sexual activity: Defer       Family History   Problem Relation Age of Onset   • Heart disease Other    • Deep vein thrombosis Other    • Hypertension Other    • Breast cancer Sister         In her 60s.   • Breast cancer Maternal Aunt    • Breast cancer Sister         In her 60s.   • Heart disease Mother    • Hypertension Mother    • Heart disease Father    • Hypertension Father    • Heart attack Father    • Heart disease Daughter    • Hypertension Daughter    • Heart disease Son    • Hypertension Son    • Malig Hyperthermia Neg Hx        Review of Systems   Constitutional: Negative for chills, fever and malaise/fatigue.   Cardiovascular: Negative for chest pain, dyspnea on exertion, leg swelling, near-syncope, orthopnea, palpitations, paroxysmal nocturnal dyspnea and syncope.   Respiratory: Negative for cough and shortness of breath.    Hematologic/Lymphatic: Negative.    Musculoskeletal: Negative for joint pain, joint swelling and myalgias.   Gastrointestinal: Negative for abdominal pain, diarrhea, melena, nausea and vomiting.   Genitourinary: Negative for frequency and hematuria.   Neurological: Negative for light-headedness, numbness, paresthesias and seizures.   Allergic/Immunologic: Negative.    All other systems reviewed and are negative.      Allergies   Allergen Reactions   • Sulfa Antibiotics Swelling     FACE AND TONGUE   •  Adhesive Tape Rash   • Levaquin [Levofloxacin] Rash         Current Outpatient Medications:   •  CALCIUM PO, Take 600 Units by mouth Daily. With D3, listed separetly, Disp: , Rfl:   •  cholecalciferol (VITAMIN D3) 1000 UNITS tablet, Take 1,000 Units by mouth daily. In addition to the calcium -D3 pill, Disp: , Rfl:   •  Eliquis 5 MG tablet tablet, TAKE ONE TABLET BY MOUTH EVERY 12 HOURS, Disp: 180 tablet, Rfl: 1  •  esomeprazole (nexIUM) 40 MG capsule, Take 40 mg by mouth Every Morning Before Breakfast., Disp: , Rfl:   •  furosemide (LASIX) 20 MG tablet, TAKE TWO TABLETS BY MOUTH DAILY, Disp: 180 tablet, Rfl: 1  •  GLUCOSAMINE-CHONDROITIN PO, Take 1 tablet by mouth Daily., Disp: , Rfl:   •  Ketotifen Fumarate (REFRESH EYE ITCH RELIEF OP), Apply  to eye(s) as directed by provider., Disp: , Rfl:   •  lisinopril (PRINIVIL,ZESTRIL) 20 MG tablet, Take 2 tablets by mouth Daily. (Patient taking differently: Take 1 tablet by mouth Daily.), Disp: 180 tablet, Rfl: 1  •  loperamide (IMODIUM) 2 MG capsule, Take 1 capsule by mouth 4 (Four) Times a Day As Needed for Diarrhea., Disp: , Rfl:   •  Loratadine 10 MG capsule, Take 10 mg by mouth Daily As Needed., Disp: , Rfl:   •  metoprolol tartrate (LOPRESSOR) 25 MG tablet, TAKE ONE TABLET BY MOUTH TWICE A DAY, Disp: 180 tablet, Rfl: 0  •  Multiple Vitamins-Minerals (MULTIVITAMIN PO), Take 1 tablet by mouth Daily., Disp: , Rfl:   •  Multiple Vitamins-Minerals (PRESERVISION AREDS 2 PO), Take 1 capsule by mouth 2 (two) times a day., Disp: , Rfl:   •  Probiotic Product (PROBIOTIC ADVANCED PO), Take  by mouth., Disp: , Rfl:   •  SYNTHROID 112 MCG tablet, Take 125 mcg by mouth Every Other Day. Pt taking 110 mg a day, Disp: , Rfl:   •  Synthroid 125 MCG tablet, Take 125 mcg by mouth Every Other Day., Disp: , Rfl:   •  vitamin B-12 (CYANOCOBALAMIN) 1000 MCG tablet, Take 1,000 mcg by mouth Daily., Disp: , Rfl:   •  Zinc Sulfate (ZINC 15 PO), Take  by mouth Daily., Disp: , Rfl:      "  Objective:     Vitals:    11/11/22 1203   BP: 128/86   Pulse: 74   Weight: 96.2 kg (212 lb)   Height: 158.8 cm (62.5\")     Body mass index is 38.16 kg/m².    PHYSICAL EXAM:    Vitals Reviewed.   General Appearance: No acute distress, obese.    Eyes: Conjunctiva and lids: No erythema, swelling, or discharge. Sclera non-icteric.   HENT: Atraumatic, normocephalic. External eyes, ears, and nose normal.   Respiratory: No signs of respiratory distress. Respiration rhythm and depth normal.   Clear to auscultation. No rales, crackles, rhonchi, or wheezing auscultated.   Cardiovascular:  Heart Rate and Rhythm: Normal, Heart Sounds: S1 and S2.   Murmurs: No murmurs noted. No rubs, thrills, or gallops.   Lower Extremities: No edema noted.  Gastrointestinal:  Abdomen obese.   Musculoskeletal: Normal movement of extremities  Skin: Warm and dry.   Psychiatric: Patient alert and oriented to person, place, and time. Speech and behavior appropriate. Normal mood and affect.       ECG 12 Lead    Date/Time: 11/11/2022 12:12 PM  Performed by: Sayra Julian APRN  Authorized by: Sayra Julian APRN   Comparison: compared with previous ECG   Similar to previous ECG  Rhythm: atrial fibrillation and paced  BPM: 74                Assessment:       Diagnosis Plan   1. Permanent atrial fibrillation (HCC)        2. S/P AV deshawn ablation  ECG 12 Lead      3. Third degree AV block (HCC)  ECG 12 Lead      4. S/P placement of cardiac pacemaker        5. Severe obesity (BMI 35.0-39.9) with comorbidity (HCC)        6. Essential hypertension               Plan:       1.-3. CHB, Perm Afib, s/p AV node ablation/PPM. Normal testing and function.     5. For the problem of overweight/obesity, we discussed the importance of lifestyle measures and strategies for weight loss, such as improved nutrition, regular exercise and sleep hygiene.      6. HTN, controlled.     Follow up with Dr. Melendez in 6 months w/device check.     As always, it has been a " pleasure to participate in your patient's care.      Sincerely,         ALLISON Winn

## 2022-11-22 ENCOUNTER — HOSPITAL ENCOUNTER (OUTPATIENT)
Dept: MAMMOGRAPHY | Facility: HOSPITAL | Age: 87
Discharge: HOME | End: 2022-11-22
Admitting: FAMILY MEDICINE

## 2022-11-22 DIAGNOSIS — Z12.31 SCREENING MAMMOGRAM FOR BREAST CANCER: ICD-10-CM

## 2022-11-22 PROCEDURE — 77067 SCR MAMMO BI INCL CAD: CPT

## 2022-11-22 PROCEDURE — 77063 BREAST TOMOSYNTHESIS BI: CPT

## 2023-01-02 PROCEDURE — 93296 REM INTERROG EVL PM/IDS: CPT | Performed by: INTERNAL MEDICINE

## 2023-01-02 PROCEDURE — 93294 REM INTERROG EVL PM/LDLS PM: CPT | Performed by: INTERNAL MEDICINE

## 2023-01-04 DIAGNOSIS — I48.19 PERSISTENT ATRIAL FIBRILLATION: ICD-10-CM

## 2023-01-04 DIAGNOSIS — R06.02 SHORTNESS OF BREATH: ICD-10-CM

## 2023-01-04 RX ORDER — FUROSEMIDE 20 MG/1
TABLET ORAL
Qty: 180 TABLET | Refills: 1 | Status: SHIPPED | OUTPATIENT
Start: 2023-01-04

## 2023-01-24 NOTE — PROGRESS NOTES
"Chief Complaint  Recurrent iron deficiency anemia, stage IIA (T3N0M0) ascending colon cancer, history of left renal cell carcinoma status post left nephrectomy in 2004, CKD3, enlarging right renal lesion, history of gastric polyps    Subjective        History of Present Illness  Patient returns today in follow-up with laboratory studies to review.  In the interval she did follow-up with Dr. Trujillo regarding her right renal lesion.  By ultrasound this apparently had increased from 3.8 up to 4.6 cm earlier this month.  Plan was to continue a course of observation with repeat ultrasound in 1 year interval.  The patient does continue follow-up as well with ENT regarding her history of thyroid cancer.  She did not follow-up with the urogynecologist after her last visit here, reports that she was having too many doctor visits.  She wishes to try to minimize her medical visits and only do testing and visits that are essential.  She has no specific complaints today.  She has some mild chronic dyspnea on exertion that is unchanged.  Appetite is normal.  Bowel function is normal.      Objective   Vital Signs:   /82   Pulse 71   Temp 97.1 °F (36.2 °C) (Temporal)   Resp 18   Ht 158.8 cm (62.52\")   Wt 95.7 kg (211 lb)   SpO2 97%   BMI 37.95 kg/m²     Physical Exam  Constitutional:       Appearance: She is well-developed.      Comments: Elderly female no distress   Eyes:      Conjunctiva/sclera: Conjunctivae normal.   Neck:      Thyroid: No thyromegaly.   Cardiovascular:      Rate and Rhythm: Normal rate. Rhythm irregular.      Heart sounds: No murmur heard.    No friction rub. No gallop.   Pulmonary:      Effort: No respiratory distress.      Breath sounds: Normal breath sounds.   Abdominal:      General: Bowel sounds are normal. There is no distension.      Palpations: Abdomen is soft.      Tenderness: There is no abdominal tenderness.   Musculoskeletal:      Comments: Bilateral lower extremity 1+ edema with venous " stasis changes and mild blistering   Lymphadenopathy:      Head:      Right side of head: No submandibular adenopathy.      Cervical: No cervical adenopathy.      Upper Body:      Right upper body: No supraclavicular adenopathy.      Left upper body: No supraclavicular adenopathy.   Skin:     General: Skin is warm and dry.      Findings: No rash.   Neurological:      Mental Status: She is alert and oriented to person, place, and time.      Cranial Nerves: No cranial nerve deficit.      Motor: No abnormal muscle tone.      Deep Tendon Reflexes: Reflexes normal.   Psychiatric:         Behavior: Behavior normal.     Patient was examined today, unchanged from above       Result Review : Reviewed CBC, CMP, iron panel, ferritin, CEA from today.  Reviewed urology records.  Reviewed ENT records.       Assessment and Plan     1. Recurrent iron deficiency anemia and anemia secondary to CKD3:   · The patient is intolerant of oral iron.   · Prior GI evaluation showed no definitive evidence of GI blood loss.   · She has underlying stage III chronic kidney disease, which is a contributing factor to her mild borderline anemia.   · She has been treated in the past with IV iron in 2011 and 2012.    · She had a lengthy absence from our office from 2015 until she was referred back on 9/21/17 with evidence of recurrent iron deficiency, likely related to malabsorption.  Her ferritin on 7/7/17 was 21 and hemoglobin on 8/7/17 was 11.1.  Her degree of anemia was mild with a hemoglobin of 11.4, microcytic indices with an MCV of 78.  Labs on 9/21/17 showed ferritin 20.5, iron saturation 8%, TIBC 441.  We did also check B12 and folate which were normal.  Erythropoietin was 32 consistent with a component of anemia secondary to chronic kidney disease. She did return stool cards for occult blood which were negative ×3 10/5/17.  She received additional Feraheme on 10/5 and 10/12/17.    · She underwent EGD with Dr. Curran 10/23/17 with no  "evidence of active bleeding, small gastric polyps identified.    · Patient returns today for follow-up with labs that show hemoglobin that has decreased to 11.2, more recently had been in the 12-13 range.  Iron studies are stable to slightly diminished with iron 56, ferritin 62, iron saturation 15%, TIBC 365.  Patient has had no visible evidence of GI blood loss.  Although she is not iron deficient, her iron stores are suboptimal in the setting of anemia secondary to CKD3.  She is intolerant of oral iron.  We did discuss the possibility of pursuing IV iron and she would like to defer this if possible.  I would agree.  We discussed that if her anemia worsens more significantly or if her iron studies declined much further we will need to reconsider this.  She will return in 6 months and we will repeat labs at that time.  2. Stage IIA (T3N0M0) ascending colon cancer:   · Developed significant diarrhea with blood per rectum.    · Colonoscopy 4/25/18 with identification of polyps in the rectum, cecal polyp, mass in the right colon.  The cecal polyp was a sessile serrated adenoma, ascending colon \"mass\" showed hyperplastic polyp, rectal polyp was a tubulovillous adenoma with low-grade dysplasia.  CEA on 4/25/18 was normal at 1.65.  Chest x-ray 4/25/18 was unrevealing.  CT of the abdomen and pelvis 4/26/18 showed a 4 cm annular mass in the ascending colon with adjacent haziness in the mesentery but no lymphadenopathy, no evidence of metastatic disease.    · Right hemicolectomy on 4/27/18 with pathology showing an invasive moderately differentiated adenocarcinoma arising in a sessile serrated adenoma with negative margins measuring 2.5 cm extending through the muscularis propria into brad-colorectal tissue, positive lymphovascular invasion, negative perineural invasion, 10 lymph nodes negative.  There was a comment regarding an ulcerated small adjacent pericolonic abscess, raising the question of possible microscopic " perforation.  Patient did have a few high risk features with less than 12 lymph nodes removed, positive lymphovascular invasion, and some possibility of microscopic perforation.  The tumor was microsatellite stable.    · Given her age and comorbidities, the patient was a poor candidate for adjuvant chemotherapy and was not recommended.  Independent of this recommendation, the patient stated she was not interested in taking any adjuvant chemotherapy.  Therefore we will plan to monitor her clinically for evidence of recurrent disease.  Standard monitoring is with routine CEA levels however this does not appear to be informative for her given her normal preoperative value.  We will pursue annual interval follow-up CT.    · CT scan from 4/24/2019 at a 1 year interval showed no evidence of recurrent disease.    · 5/22/2020 CEA normal at 1.69 and CT scan chest abdomen and pelvis 5/22/2020 showed no evidence of recurrent/metastatic disease.    · The patient underwent delayed endoscopic evaluation with EGD and colonoscopy on 8/10/2020 with Dr. Willett, colonoscopy with tubular adenoma and hyperplastic polyp removed.  · Flexible sigmoidoscopy with Dr. Curran on 3/15/2021 was negative  · 1 year interval follow-up CT performed 5/6/2021 which showed no evidence of recurrent malignancy.  There was however a comment regarding a subpleural opacification left lower lobe as well as a slight increase in normal sized mediastinal lymph nodes.  The lymph nodes however remained normal size and the change in size was felt to be insignificant, left lower lobe subpleural opacification was felt to be inflammatory.  Radiology however recommend 6-month interval follow-up study.  · Patient returns today in follow-up with no clinical evidence of recurrent disease.  CEA remains normal at 1.61.  We did discuss that we are approaching 5 years out from her surgery (4/27/2018).  We did discuss pursuit of 1 further annual surveillance CT chest  abdomen and pelvis and 1 further CEA test in 6 months.  At that point we will not plan any further routine CT surveillance for this indication nor any further CEA monitoring.  She does not wish to pursue any further endoscopic evaluation either at her age.  3. 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    · Family history includes a sister with breast cancer around age 60, another sister with breast cancer around age 60, maternal aunt with breast cancer over the age of 50, maternal uncle with lung cancer.    · Given the patient's multiple malignancies and family history, she was referred to the genetics clinic to discuss potential genetic testing, mainly for benefit of the patient's children and other family members.    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   · She did follow-up in the genetics clinic and underwent INVITAE panel test 7/31/18 showing VUS in MLH3 and MSH6 with no known clinical significance at this time.   4. History of gastric polyps:   · EGD 10/23/17 with small less than 6 mm gastric polyps, not resected.  · Repeat EGD 8/10/2020 with small gastric polyps, not resected.  5. History of left renal cell carcinoma with enlarging right renal lesion:   · The patient underwent a left nephrectomy in 2004 with no clinical evidence of recurrent disease.    · CT chest from 11/14/16 showed no evidence of recurrence.    · CT abdomen and pelvis however from 4/26/18 showed a 2.2 cm right renal lesion which was indeterminate, possibly a proteinaceous cyst but could not rule out mass lesion.  The study was performed without contrast due to the patient's underlying chronic kidney disease.  She is unable to undergo renal mass protocol CT with contrast.  She is unable to undergo MRI due  to her pacemaker.    · Follow-up CT scan 4/24/2019 with no change in the right renal lesion at 2.1 cm, repeat at 1 year interval  · 1 year interval follow-up CT (delayed due to viral pandemic) 5/22/2020 with increase in right renal mass from 2.1 up to 2.6 cm.  This is felt to possibly represent a proteinaceous cyst versus solid mass.  She is unable to undergo a renal protocol CT due to her renal dysfunction and solitary kidney and cannot undergo MRI due to her pacemaker.   · Renal ultrasound 6/4/2020 showed a 3.6 cm mixed solid and cystic mass at the lower pole of the right kidney suspicious for renal cell carcinoma and recommended surgical excision.  Patient did follow-up with Dr. Trujillo in urology who recommended continued radiographic monitoring.  · Short interval follow-up ultrasound on 9/15/2020 with stable 3.6 cm right renal lesion.   · Renal ultrasound by urology on 1/7/2021 measured the lower pole right renal mass at 3.8 cm, was not directly compared to the prior study performed at [facility].    · CT 5/6/2021 showed right renal lesion had increased in size from 2.6 up to 3.1 cm.  It was felt that this likely represents a hyperdense renal cyst however could not rule out solid lesion or complex lesion.    · CT 11/15/2021 showed that the lower pole right renal lesion continued to increase gradually in size from 3.1 now up to 3.3 cm.  Change has been minimal however radiology did review multiple prior studies and the lesion has increased gradually over time, suspicious for possible malignancy.   · Patient did follow-up with Dr. Trujillo in urology in January 2022.  He does not recommend pursuit of ablation of the renal lesion due to risk of hemorrhage and potential compromise of the patient's renal function in her solitary kidney.  · CT 5/10/2022 showed further slight increase in the right renal mass from 3.1 x 3.2 up to 3.6 x 3.7 cm.  Given the continued gradual enlargement in size of the lesion there is high  suspicion for slowly growing new primary renal cell carcinoma.    · Dr. Trujillo is aware of the continued increase in size of the lesion and does not plan to perform any intervention given her age, indolent nature of the disease, and risk to her renal function.  Therefore with no intervention planned   · There does not appear to be any utility in further aggressive CT monitoring for this issue.    · Dr. Trujillo did see the patient back in January 2023 with repeat ultrasound that did show slight further increase in the lesion from 3.8 up to 4.6 cm and some evidence of mild right hydronephrosis.  He plans a follow-up ultrasound in 1 year interval.  6. CKD3:   · Baseline creatinine in 1.2-1.8 range  · Contributing factor to anemia  7. Abnormal appearance of cervix on CT scan  · CT 5/10/2022 with bulky appearance of the cervix with demonstration of air in the lumen.  Recommended follow-up with direct visualization  · Patient notes that she did have a urogynecologic procedure performed in the past which did involve the cervix.    · Patient initially wanted to return to see urogynecology to review the issue however decided ultimately that she does not wish to pursue this.  We will review on upcoming CT scan.     PLAN:   1. Patient continues follow-up with urology, due for further follow-up and repeat renal ultrasound in January 2024.  2. We will hold off on administration of IV iron at this time with stable iron studies  3. Return in 6 months for follow-up visit with CBC, CMP, CEA, stat iron panel/ferritin.  CT chest abdomen pelvis (without IV contrast) 1 week prior.            I did spend 40 minutes in total time caring for the patient today, time spent in review of records, face-to-face consultation, coordination of care, placement of orders, completion of documentation.

## 2023-01-25 ENCOUNTER — LAB (OUTPATIENT)
Dept: LAB | Facility: HOSPITAL | Age: 88
End: 2023-01-25
Payer: MEDICARE

## 2023-01-25 ENCOUNTER — OFFICE VISIT (OUTPATIENT)
Dept: ONCOLOGY | Facility: CLINIC | Age: 88
End: 2023-01-25
Payer: MEDICARE

## 2023-01-25 VITALS
DIASTOLIC BLOOD PRESSURE: 82 MMHG | HEART RATE: 71 BPM | BODY MASS INDEX: 37.39 KG/M2 | SYSTOLIC BLOOD PRESSURE: 154 MMHG | WEIGHT: 211 LBS | RESPIRATION RATE: 18 BRPM | TEMPERATURE: 97.1 F | OXYGEN SATURATION: 97 % | HEIGHT: 63 IN

## 2023-01-25 DIAGNOSIS — D50.8 OTHER IRON DEFICIENCY ANEMIA: ICD-10-CM

## 2023-01-25 DIAGNOSIS — C18.2 MALIGNANT NEOPLASM OF ASCENDING COLON: Primary | ICD-10-CM

## 2023-01-25 DIAGNOSIS — C18.2 MALIGNANT NEOPLASM OF ASCENDING COLON: ICD-10-CM

## 2023-01-25 LAB
ALBUMIN SERPL-MCNC: 3.7 G/DL (ref 3.5–5.2)
ALBUMIN/GLOB SERPL: 1.3 G/DL (ref 1.1–2.4)
ALP SERPL-CCNC: 101 U/L (ref 38–116)
ALT SERPL W P-5'-P-CCNC: 14 U/L (ref 0–33)
ANION GAP SERPL CALCULATED.3IONS-SCNC: 10.2 MMOL/L (ref 5–15)
AST SERPL-CCNC: 25 U/L (ref 0–32)
BASOPHILS # BLD AUTO: 0.05 10*3/MM3 (ref 0–0.2)
BASOPHILS NFR BLD AUTO: 0.9 % (ref 0–1.5)
BILIRUB SERPL-MCNC: 0.5 MG/DL (ref 0.2–1.2)
BUN SERPL-MCNC: 21 MG/DL (ref 6–20)
BUN/CREAT SERPL: 14.4 (ref 7.3–30)
CALCIUM SPEC-SCNC: 9.2 MG/DL (ref 8.5–10.2)
CEA SERPL-MCNC: 1.61 NG/ML
CHLORIDE SERPL-SCNC: 104 MMOL/L (ref 98–107)
CO2 SERPL-SCNC: 25.8 MMOL/L (ref 22–29)
CREAT SERPL-MCNC: 1.46 MG/DL (ref 0.6–1.1)
DEPRECATED RDW RBC AUTO: 49 FL (ref 37–54)
EGFRCR SERPLBLD CKD-EPI 2021: 34.9 ML/MIN/1.73
EOSINOPHIL # BLD AUTO: 0.23 10*3/MM3 (ref 0–0.4)
EOSINOPHIL NFR BLD AUTO: 4.4 % (ref 0.3–6.2)
ERYTHROCYTE [DISTWIDTH] IN BLOOD BY AUTOMATED COUNT: 14.9 % (ref 12.3–15.4)
FERRITIN SERPL-MCNC: 62 NG/ML (ref 13–150)
GLOBULIN UR ELPH-MCNC: 2.9 GM/DL (ref 1.8–3.5)
GLUCOSE SERPL-MCNC: 120 MG/DL (ref 74–124)
HCT VFR BLD AUTO: 34.4 % (ref 34–46.6)
HGB BLD-MCNC: 11.2 G/DL (ref 12–15.9)
IMM GRANULOCYTES # BLD AUTO: 0.01 10*3/MM3 (ref 0–0.05)
IMM GRANULOCYTES NFR BLD AUTO: 0.2 % (ref 0–0.5)
IRON 24H UR-MRATE: 56 MCG/DL (ref 37–145)
IRON SATN MFR SERPL: 15 % (ref 14–48)
LYMPHOCYTES # BLD AUTO: 1.49 10*3/MM3 (ref 0.7–3.1)
LYMPHOCYTES NFR BLD AUTO: 28.2 % (ref 19.6–45.3)
MCH RBC QN AUTO: 29.2 PG (ref 26.6–33)
MCHC RBC AUTO-ENTMCNC: 32.6 G/DL (ref 31.5–35.7)
MCV RBC AUTO: 89.8 FL (ref 79–97)
MONOCYTES # BLD AUTO: 0.64 10*3/MM3 (ref 0.1–0.9)
MONOCYTES NFR BLD AUTO: 12.1 % (ref 5–12)
NEUTROPHILS NFR BLD AUTO: 2.86 10*3/MM3 (ref 1.7–7)
NEUTROPHILS NFR BLD AUTO: 54.2 % (ref 42.7–76)
NRBC BLD AUTO-RTO: 0 /100 WBC (ref 0–0.2)
PLATELET # BLD AUTO: 199 10*3/MM3 (ref 140–450)
PMV BLD AUTO: 9.5 FL (ref 6–12)
POTASSIUM SERPL-SCNC: 4.3 MMOL/L (ref 3.5–4.7)
PROT SERPL-MCNC: 6.6 G/DL (ref 6.3–8)
RBC # BLD AUTO: 3.83 10*6/MM3 (ref 3.77–5.28)
SODIUM SERPL-SCNC: 140 MMOL/L (ref 134–145)
TIBC SERPL-MCNC: 365 MCG/DL (ref 249–505)
TRANSFERRIN SERPL-MCNC: 261 MG/DL (ref 200–360)
WBC NRBC COR # BLD: 5.28 10*3/MM3 (ref 3.4–10.8)

## 2023-01-25 PROCEDURE — 82378 CARCINOEMBRYONIC ANTIGEN: CPT | Performed by: INTERNAL MEDICINE

## 2023-01-25 PROCEDURE — 85025 COMPLETE CBC W/AUTO DIFF WBC: CPT

## 2023-01-25 PROCEDURE — 84466 ASSAY OF TRANSFERRIN: CPT

## 2023-01-25 PROCEDURE — 80053 COMPREHEN METABOLIC PANEL: CPT

## 2023-01-25 PROCEDURE — 83540 ASSAY OF IRON: CPT

## 2023-01-25 PROCEDURE — 36415 COLL VENOUS BLD VENIPUNCTURE: CPT

## 2023-01-25 PROCEDURE — 82728 ASSAY OF FERRITIN: CPT

## 2023-01-25 PROCEDURE — 99215 OFFICE O/P EST HI 40 MIN: CPT | Performed by: INTERNAL MEDICINE

## 2023-01-25 RX ORDER — DOXYCYCLINE 100 MG/1
CAPSULE ORAL
COMMUNITY
Start: 2023-01-16

## 2023-02-27 RX ORDER — LISINOPRIL 20 MG/1
20 TABLET ORAL DAILY
Qty: 90 TABLET | Refills: 1 | Status: SHIPPED | OUTPATIENT
Start: 2023-02-27

## 2023-05-12 ENCOUNTER — TELEPHONE (OUTPATIENT)
Dept: CARDIOLOGY | Facility: CLINIC | Age: 88
End: 2023-05-12
Payer: MEDICARE

## 2023-05-15 ENCOUNTER — LAB (OUTPATIENT)
Dept: LAB | Facility: HOSPITAL | Age: 88
End: 2023-05-15
Payer: MEDICARE

## 2023-05-15 ENCOUNTER — CLINICAL SUPPORT NO REQUIREMENTS (OUTPATIENT)
Dept: CARDIOLOGY | Facility: CLINIC | Age: 88
End: 2023-05-15
Payer: MEDICARE

## 2023-05-15 ENCOUNTER — OFFICE VISIT (OUTPATIENT)
Dept: CARDIOLOGY | Facility: CLINIC | Age: 88
End: 2023-05-15
Payer: MEDICARE

## 2023-05-15 VITALS
HEART RATE: 73 BPM | HEIGHT: 63 IN | BODY MASS INDEX: 36.5 KG/M2 | WEIGHT: 206 LBS | DIASTOLIC BLOOD PRESSURE: 86 MMHG | SYSTOLIC BLOOD PRESSURE: 124 MMHG

## 2023-05-15 DIAGNOSIS — I44.2 THIRD DEGREE AV BLOCK: ICD-10-CM

## 2023-05-15 DIAGNOSIS — Z98.890 S/P AV NODAL ABLATION: ICD-10-CM

## 2023-05-15 DIAGNOSIS — I44.2 THIRD DEGREE AV BLOCK: Primary | ICD-10-CM

## 2023-05-15 DIAGNOSIS — Z95.0 S/P PLACEMENT OF CARDIAC PACEMAKER: ICD-10-CM

## 2023-05-15 DIAGNOSIS — I48.21 PERMANENT ATRIAL FIBRILLATION: ICD-10-CM

## 2023-05-15 DIAGNOSIS — I48.21 PERMANENT ATRIAL FIBRILLATION: Primary | ICD-10-CM

## 2023-05-15 LAB
ANION GAP SERPL CALCULATED.3IONS-SCNC: 11.6 MMOL/L (ref 5–15)
BUN SERPL-MCNC: 21 MG/DL (ref 8–23)
BUN/CREAT SERPL: 15.2 (ref 7–25)
CALCIUM SPEC-SCNC: 9.9 MG/DL (ref 8.6–10.5)
CHLORIDE SERPL-SCNC: 104 MMOL/L (ref 98–107)
CO2 SERPL-SCNC: 28.4 MMOL/L (ref 22–29)
CREAT SERPL-MCNC: 1.38 MG/DL (ref 0.57–1)
EGFRCR SERPLBLD CKD-EPI 2021: 37.4 ML/MIN/1.73
GLUCOSE SERPL-MCNC: 108 MG/DL (ref 65–99)
POTASSIUM SERPL-SCNC: 5.2 MMOL/L (ref 3.5–5.2)
SODIUM SERPL-SCNC: 144 MMOL/L (ref 136–145)

## 2023-05-15 PROCEDURE — 36415 COLL VENOUS BLD VENIPUNCTURE: CPT

## 2023-05-15 PROCEDURE — 93000 ELECTROCARDIOGRAM COMPLETE: CPT | Performed by: INTERNAL MEDICINE

## 2023-05-15 PROCEDURE — 99214 OFFICE O/P EST MOD 30 MIN: CPT | Performed by: INTERNAL MEDICINE

## 2023-05-15 PROCEDURE — 80048 BASIC METABOLIC PNL TOTAL CA: CPT

## 2023-05-15 NOTE — PROGRESS NOTES
Date of Office Visit: 05/15/2023  Encounter Provider: Maldonado Melendez MD  Place of Service: [facility] CARDIOLOGY  Patient Name: Anastacia Sarah  : 1935    Subjective:     Encounter Date:05/15/2023      Patient ID: Anastacia Sarah is a 87 y.o. female who has a cc of  Perm AF and av node ablation and pacer and one kidney     Gen change in .     The patient had a good year.   No anginal chest pain,   No sig winters,   No soa,   No fainting,  No orthostasis.   No edema.   Exercise tolerance: limited by balance and joints.     There have been no hospital admission since the last visit.     There have been no bleeding events.       Past Medical History:   Diagnosis Date   • Abnormal ECG    • Acute bronchitis    • Acute sinusitis    • Anemia     Iron deficiency   • Anesthesia complication    • Anticoagulated on warfarin    • Arthritis    • Bladder dysfunction    • Cancer of left kidney 2004    S/P Left Radical Neprectomy   • Cardiac disease    • Chronic dysfunction of left eustachian tube    • CKD (chronic kidney disease)     stage 3   • Colon cancer    • Colon polyps    • Depression    • Diarrhea    • Diverticulosis 2011    Descending Colon & Sigmoid Colon multiple diverticula   • Epigastric pain    • Fatigue    • Frequent urination    • GERD (gastroesophageal reflux disease)    • Goiter     thyroid removed in 2015   • Health care maintenance    • History of colitis    • History of shingles    • History of transfusion    • Hypertension    • Insomnia    • Left atrial enlargement    • Left ventricular hypertrophy    • Mitral regurgitation     mild to moderate per echocardiogram 2016   • Nerve pain     LUE D/T SHINGLES   • Neuropathy     ARMS   • Non morbid obesity due to excess calories    • NANCY (obstructive sleep apnea)     uses CPAP   • Paroxysmal atrial fibrillation    • Permanent atrial fibrillation    • Pulmonary hypertension     per echo 2016   • Right atrial  enlargement    • S/P AV deshawn ablation    • S/P placement of cardiac pacemaker    • Severe obesity (BMI 35.0-35.9 with comorbidity)    • Sleep apnea     on CPAP   • SSS (sick sinus syndrome)    • Third degree AV block    • Thyroid cancer    • URI (upper respiratory infection)    • Urinary urgency        Social History     Socioeconomic History   • Marital status:    • Number of children: 0   Tobacco Use   • Smoking status: Never   • Smokeless tobacco: Never   • Tobacco comments:     caffeine use   Vaping Use   • Vaping Use: Never used   Substance and Sexual Activity   • Alcohol use: No   • Drug use: No   • Sexual activity: Defer       Family History   Problem Relation Age of Onset   • Heart disease Other    • Deep vein thrombosis Other    • Hypertension Other    • Breast cancer Sister         In her 60s.   • Breast cancer Maternal Aunt    • Breast cancer Sister         In her 60s.   • Heart disease Mother    • Hypertension Mother    • Heart disease Father    • Hypertension Father    • Heart attack Father    • Heart disease Daughter    • Hypertension Daughter    • Heart disease Son    • Hypertension Son    • Malig Hyperthermia Neg Hx        Review of Systems   Constitutional: Negative for fever and night sweats.   HENT: Negative for ear pain and stridor.    Eyes: Negative for discharge and visual halos.   Cardiovascular: Negative for cyanosis.   Respiratory: Negative for hemoptysis and sputum production.    Hematologic/Lymphatic: Negative for adenopathy.   Skin: Negative for nail changes and unusual hair distribution.   Musculoskeletal: Positive for arthritis and joint pain. Negative for gout and joint swelling.   Gastrointestinal: Negative for bowel incontinence and flatus.   Genitourinary: Negative for dysuria and flank pain.   Neurological: Negative for seizures and tremors.   Psychiatric/Behavioral: Negative for altered mental status. The patient is not nervous/anxious.             Objective:     Vitals:  "   05/15/23 1147   BP: 124/86   Pulse: 73   Weight: 93.4 kg (206 lb)   Height: 158.8 cm (62.5\")         Eyes:      General:         Right eye: No discharge.         Left eye: No discharge.   HENT:      Head: Normocephalic and atraumatic.   Neck:      Thyroid: No thyromegaly.      Vascular: No JVD.   Pulmonary:      Effort: Pulmonary effort is normal.      Breath sounds: Normal breath sounds. No rales.   Cardiovascular:      Normal rate. Regular rhythm.      No gallop.   Edema:     Peripheral edema absent.   Abdominal:      General: Bowel sounds are normal.      Palpations: Abdomen is soft.      Tenderness: There is no abdominal tenderness.   Musculoskeletal: Normal range of motion.         General: No deformity. Skin:     General: Skin is warm and dry.      Findings: No erythema.   Neurological:      Mental Status: Alert and oriented to person, place, and time.      Motor: Normal muscle tone.   Psychiatric:         Behavior: Behavior normal.         Thought Content: Thought content normal.           ECG 12 Lead    Date/Time: 5/15/2023 12:20 PM  Performed by: Maldonado Melendez MD  Authorized by: Maldonado Melendez MD   Comparison: compared with previous ECG   Similar to previous ECG  Rhythm: atrial fibrillation and paced            Lab Review:       Assessment:          Diagnosis Plan   1. Permanent atrial fibrillation        2. S/P placement of cardiac pacemaker        3. Third degree AV block        4. S/P AV deshawn ablation               Plan:     AF -- I think we need to check creat and adjust the dose of a-ban if creat > 1.5     Pacer -- works well. RV threshold is up a bit. But stable     I reviewed the pacemaker/ICD tracings and the pacing and sensing parameters are (almost) normal.  AF burden is 100%     I think we could stop the metoprolol.       "

## 2023-05-16 ENCOUNTER — TELEPHONE (OUTPATIENT)
Dept: CARDIOLOGY | Facility: CLINIC | Age: 88
End: 2023-05-16
Payer: MEDICARE

## 2023-05-16 NOTE — TELEPHONE ENCOUNTER
----- Message from Maldonado Melendez MD sent at 5/15/2023  4:45 PM EDT -----  Tell her creat is 1.3 and we will stay on current dose of apixaban

## 2023-08-15 ENCOUNTER — TELEPHONE (OUTPATIENT)
Dept: CARDIOLOGY | Facility: CLINIC | Age: 88
End: 2023-08-15

## 2023-08-15 NOTE — TELEPHONE ENCOUNTER
Pt with VVIR pacer and remote transmission alerting for NST-VT event    Presenting rhythm  70s   97%    Hx AVN ablation    1 NST-VT event 8/12/23 lasting 18 beats avg V rate 186 BPM called and spoke with pt and she winters snot recall any symptoms. Pt has had short NST-VT in the past though not frequent.    Pt has next in office device check and appt with LINDA COOPER 12/13/23.

## 2023-09-08 RX ORDER — LISINOPRIL 20 MG/1
20 TABLET ORAL DAILY
Qty: 90 TABLET | Refills: 1 | Status: SHIPPED | OUTPATIENT
Start: 2023-09-08

## 2023-09-11 ENCOUNTER — HOSPITAL ENCOUNTER (OUTPATIENT)
Facility: HOSPITAL | Age: 88
Discharge: HOME | End: 2023-09-11
Attending: EMERGENCY MEDICINE | Admitting: EMERGENCY MEDICINE
Payer: MEDICARE

## 2023-09-11 ENCOUNTER — APPOINTMENT (OUTPATIENT)
Dept: GENERAL RADIOLOGY | Facility: HOSPITAL | Age: 88
End: 2023-09-11
Payer: MEDICARE

## 2023-09-11 VITALS
HEART RATE: 75 BPM | BODY MASS INDEX: 37.73 KG/M2 | WEIGHT: 205 LBS | OXYGEN SATURATION: 96 % | RESPIRATION RATE: 20 BRPM | HEIGHT: 62 IN | DIASTOLIC BLOOD PRESSURE: 70 MMHG | TEMPERATURE: 98.4 F | SYSTOLIC BLOOD PRESSURE: 165 MMHG

## 2023-09-11 DIAGNOSIS — J06.9 UPPER RESPIRATORY TRACT INFECTION, UNSPECIFIED TYPE: Primary | ICD-10-CM

## 2023-09-11 LAB
FLUAV SUBTYP SPEC NAA+PROBE: NOT DETECTED
FLUBV RNA ISLT QL NAA+PROBE: NOT DETECTED
SARS-COV-2 RNA RESP QL NAA+PROBE: NOT DETECTED
STREP A PCR: NOT DETECTED

## 2023-09-11 PROCEDURE — 71046 X-RAY EXAM CHEST 2 VIEWS: CPT

## 2023-09-11 PROCEDURE — 87651 STREP A DNA AMP PROBE: CPT | Performed by: NURSE PRACTITIONER

## 2023-09-11 PROCEDURE — 87636 SARSCOV2 & INF A&B AMP PRB: CPT | Performed by: NURSE PRACTITIONER

## 2023-09-11 PROCEDURE — G0463 HOSPITAL OUTPT CLINIC VISIT: HCPCS | Performed by: NURSE PRACTITIONER

## 2023-09-11 RX ORDER — ALBUTEROL SULFATE 90 UG/1
2 AEROSOL, METERED RESPIRATORY (INHALATION) EVERY 6 HOURS PRN
Qty: 1 G | Refills: 0 | Status: SHIPPED | OUTPATIENT
Start: 2023-09-11

## 2023-09-11 RX ORDER — FLUTICASONE PROPIONATE 50 MCG
2 SPRAY, SUSPENSION (ML) NASAL DAILY
Qty: 30 ML | Refills: 0 | Status: SHIPPED | OUTPATIENT
Start: 2023-09-11

## 2023-09-11 RX ORDER — BENZONATATE 100 MG/1
100 CAPSULE ORAL 3 TIMES DAILY PRN
Qty: 15 CAPSULE | Refills: 0 | Status: SHIPPED | OUTPATIENT
Start: 2023-09-11

## 2023-09-11 NOTE — FSED PROVIDER NOTE
Subjective   History of Present Illness  Patient is an 87-year-old female who presents with her daughter complaining of cough, congestion, sore throat, headache since Tuesday.  States congestion worsened last night and she has been sneezing more.  She denies any chest pain, shortness of breath.  Denies any nausea, vomiting, diarrhea.  She denies any fever or chills.    Review of Systems   Constitutional:  Positive for fatigue.   HENT:  Positive for congestion, sneezing and sore throat.    Respiratory:  Positive for cough.    All other systems reviewed and are negative.    Past Medical History:   Diagnosis Date    Abnormal ECG     Acute bronchitis     Acute sinusitis     Anemia     Iron deficiency    Anesthesia complication     Anticoagulated on warfarin     Arthritis     Bladder dysfunction     Cancer of left kidney 06/08/2004    S/P Left Radical Neprectomy    Cardiac disease     Chronic dysfunction of left eustachian tube     CKD (chronic kidney disease)     stage 3    Colon cancer     Colon polyps     Depression     Diarrhea     Diverticulosis 08/08/2011    Descending Colon & Sigmoid Colon multiple diverticula    Epigastric pain     Fatigue     Frequent urination     GERD (gastroesophageal reflux disease)     Goiter     thyroid removed in november 2015    Health care maintenance     History of colitis     History of shingles     History of transfusion     Hypertension     Insomnia     Left atrial enlargement     Left ventricular hypertrophy     Mitral regurgitation     mild to moderate per echocardiogram 2016    Nerve pain     LUE D/T SHINGLES    Neuropathy     ARMS    Non morbid obesity due to excess calories     NANCY (obstructive sleep apnea)     uses CPAP    Paroxysmal atrial fibrillation     Permanent atrial fibrillation     Pulmonary hypertension     per echo 2016    Right atrial enlargement     S/P AV deshawn ablation     S/P placement of cardiac pacemaker     Severe obesity (BMI 35.0-35.9 with comorbidity)      Sleep apnea     on CPAP    SSS (sick sinus syndrome)     Third degree AV block     Thyroid cancer     URI (upper respiratory infection)     Urinary urgency        Allergies   Allergen Reactions    Sulfa Antibiotics Swelling     FACE AND TONGUE    Adhesive Tape Rash    Levaquin [Levofloxacin] Rash       Past Surgical History:   Procedure Laterality Date    ABDOMINAL SURGERY      ATRIAL ABLATION SURGERY N/A 09/13/2011    Comprehensive electrophysiology study, Left atrial pace & sense, 3-dimensional cardia mapping, Radiofrequency catheter ablation, Transseptal hear catheterization, Dr. Maldonado Melendez    ATRIAL ABLATION SURGERY N/A 02/11/2004    Dr. Maldonado Melendez    BLADDER SURGERY N/A     Bladder tacking procedure    BLADDER SUSPENSION N/A 05/2012    CARDIAC ELECTROPHYSIOLOGY PROCEDURE N/A 5/17/2016    Procedure: PPM generator change - dual BOSTON;  Surgeon: Maldonado Melendez MD;  Location:  [facility] CATH INVASIVE LOCATION;  Service:     CARDIAC ELECTROPHYSIOLOGY PROCEDURE N/A 3/23/2017    Procedure: AV node ablation pt has BOSTON PPM;  Surgeon: Maldonado Melendez MD;  [facility] C[facility];  Service:     CARDIAC ELECTROPHYSIOLOGY PROCEDURE N/A 1/6/2022    Procedure: PPM battery change--[place];  Surgeon: Maldonado Melendez MD;  Location: [facility] CATH INVASIVE LOCATION;  Service: Cardiology;  Laterality: N/A;    CARDIOVERSION N/A 01/26/2017    Dr. Melendez    CARDIOVERSION N/A 05/25/2017    Dr. Melendez    CATARACT EXTRACTION Bilateral     Dr. Gramajo    COLON RESECTION Right 4/27/2018    Procedure: COLON RESECTION RIGHT LAPAROSCOPIC, possible open;  Surgeon: Rebeca Curran MD;  Location: [facility] MAIN OR;  Service: General    COLONOSCOPY N/A 04/01/2003    Normal colonoscopy except for an occasional diverticulosis, Dr. Nathan Macias    COLONOSCOPY N/A 4/25/2018    Procedure: COLONOSCOPY INTO CECUM AND TI WITH SALINE LIFT, HOT SNARE POLYPECTOMIES, BX'S, SPOT INJECTION, RESOLUTION CLIPS X2;  Surgeon: Rebeca Curran MD;   Location: [facility] ENDOSCOPY;  Service: Gastroenterology    COLONOSCOPY N/A 8/10/2020    Procedure: COLONOSCOPYTO ILEOCOLIC ANASTOMOSIS WITH COLD BX POLYPECTOMY;  Surgeon: Rebeca Curran MD;  Location: [facility] ENDOSCOPY;  Service: General;  Laterality: N/A;  HX COLON CANCER  --DIVERTICULOSIS, POLYPS, HEMORRHOIDS     CYSTOSCOPY BOTOX INJECTION OF BLADDER N/A 6/27/2016    Procedure: CYSTOSCOPY WITH BOTOX DETRUSOR INJECTION;  Surgeon: Salome Bowen MD;  Location: [facility] MAIN OR;  Service:     CYSTOSCOPY BOTOX INJECTION OF BLADDER N/A 05/29/2015    Dr. Salome Bowen    ENDOSCOPY N/A 06/10/2015    Antral Polyp: Gastric Mucosa w/ marked cautery artifact,  Hiatal Hernia, Dr. Rebeca Curran    ENDOSCOPY N/A 10/28/2013    Gastric polyp: polypoid hyperplastic gastric mucosa w/ focal ulceration, mixed acute & chronic inflammation.  Negative for intestinal metaplasia, no helicobacter organisms identified on diff-wuik stain, Dr. Rebeca Curran    ENDOSCOPY N/A 03/18/2013    Large prepyloric polyp, partially removed: fragment of polypoid hyperplastic gastric mucosa w/ foci of erosion & patchy mixed acute & chronic inflammation. No helicobacter organisms identified on diff-quick stain. Negative for intestinal metaplasia, negative for dysplasia, Dr. Rebeca Curran    ENDOSCOPY N/A 10/24/2012    large prepyloric mass (3cm) w/ adjacent nodules: hyperplastic polyp w/ mild chronic active gastritis, focal erosion & associated, Multiple fundic polyps: polypoid mucosal hyperplasia, Dr. Rebeca Curran    ENDOSCOPY N/A 03/11/2003    Gastric Antral Biopsies: Fragments of Gastric Mucosa & Necrotic Tissue (Compatible w/ Ulcer) w/ chronic active inflammation.  Negative stain for Helicobacter organisms. Dr. Nathan Macias    ENDOSCOPY N/A 10/23/2017    Procedure: ESOPHAGOGASTRODUODENOSCOPY;  Surgeon: Rebeca Curran MD;  Location: [facility] ENDOSCOPY;  Service:     ENDOSCOPY  8/10/2020    Procedure: ESOPHAGOGASTRODUODENOSCOPY;  Surgeon: Rebeca Curran,  MD;  Location: [facility] ENDOSCOPY;  Service: General;;  GERD, HX GASTRIC POLYPS  --SMALL HIATAL HERNIA, SMALL GASTRIC POLYPS     ENDOSCOPY AND COLONOSCOPY N/A 08/08/2011    4cm hiatal hernia, Schatzki's ring, Diverticulosis, Large antral polyps, Dr. Rebeca Curran    INCISIONAL HERNIA REPAIR N/A 06/11/2015    Defect approximately 2.5 cm w/ a mass of incarcerated tissue approximately the size of a nectarine, Dr. Rebeca Curran    INSERT / REPLACE / REMOVE PACEMAKER      JOINT REPLACEMENT Bilateral     knee    KNEE ARTHROPLASTY      KNEE MINI REVISION Right 9/28/2016    Procedure: RT KNEE POLY INSERT CHANGE ;  Surgeon: Tommy Avila MD;  Location: [facility] MAIN OR;  Service:     LAPAROSCOPIC CHOLECYSTECTOMY N/A     NEPHRECTOMY RADICAL Left 06/08/2004    Incidentially found left renal mass 3-4 cm renal cell carcinoma, left lower pole, Dr. Salome Bowen    OTHER SURGICAL HISTORY      NEUROMUSCULAR BLOCKERS BOTULINUM TOXIN ONABOTULINUMTOXIN A    PACEMAKER IMPLANTATION N/A     Dr. Casillas    PACEMAKER REPLACEMENT N/A 09/24/2010    Implanted Generator is a Smart Cube ALTRUA 60 model S603DR, serial # [id number], Dr. Chavez Jimenez    SIGMOIDOSCOPY N/A 3/15/2021    Procedure: FLEXIBLE SIGMOIDOSCOPY WITH BIOPSY;  Surgeon: Rebeca Curran MD;  Location: [facility] ENDOSCOPY;  Service: General;  Laterality: N/A;  PERSONAL HX COLON CA, S/P RIGHT COLECTOMY, FOLLOW-UP LARGE RECTAL POLYP    --NORMAL RECTUM     TOTAL KNEE ARTHROPLASTY Left 05/21/2013    Dr. Miguel Pacheco    TOTAL THYROIDECTOMY N/A 11/16/2015    Dr. Gideon Ashley, [facility]    TRANSVAGINAL TAPING SUSPENSION N/A 03/16/2009    Mini Sling, Closed suprapubic cystostomy, Dr. Salome Bowen    TUBAL ABDOMINAL LIGATION Bilateral        Family History   Problem Relation Age of Onset    Heart disease Other     Deep vein thrombosis Other     Hypertension Other     Breast cancer Sister         In her 60s.    Breast cancer Maternal Aunt     Breast cancer Sister         In her 60s.    Heart disease  Mother     Hypertension Mother     Heart disease Father     Hypertension Father     Heart attack Father     Heart disease Daughter     Hypertension Daughter     Heart disease Son     Hypertension Son     Malig Hyperthermia Neg Hx        Social History     Socioeconomic History    Marital status:     Number of children: 0   Tobacco Use    Smoking status: Never    Smokeless tobacco: Never    Tobacco comments:     caffeine use   Vaping Use    Vaping Use: Never used   Substance and Sexual Activity    Alcohol use: No    Drug use: No    Sexual activity: Defer           Objective   Physical Exam  Vitals and nursing note reviewed.   Constitutional:       Appearance: She is well-developed.   HENT:      Head: Normocephalic and atraumatic.      Nose: Congestion present.   Cardiovascular:      Rate and Rhythm: Normal rate and regular rhythm.      Heart sounds: Normal heart sounds.   Pulmonary:      Effort: Pulmonary effort is normal.      Breath sounds: Normal breath sounds.   Abdominal:      Palpations: Abdomen is soft.      Tenderness: There is no abdominal tenderness.   Musculoskeletal:      Cervical back: Normal range of motion and neck supple.   Skin:     General: Skin is warm and dry.      Capillary Refill: Capillary refill takes less than 2 seconds.   Neurological:      General: No focal deficit present.      Mental Status: She is alert and oriented to person, place, and time.       Procedures           ED Course                                           Medical Decision Making  Chest x-ray negative for acute findings.  Negative COVID, flu, strep.  Patient is nontoxic-appearing.  Likely viral upper respiratory infection.  Will discharge with prescriptions for inhaler, no spray, cough medication.  She is to follow-up with her primary care as needed, given strict return precautions.    Problems Addressed:  Upper respiratory tract infection, unspecified type: complicated acute illness or injury    Amount and/or  Complexity of Data Reviewed  Radiology: ordered.    Risk  Prescription drug management.        Final diagnoses:   Upper respiratory tract infection, unspecified type       ED Disposition  ED Disposition       ED Disposition   Discharge    Condition   Stable    Comment   --               Gabriella Paige MD  [address]  [address]  [facility] [address] [phone]    Call   If symptoms worsen         Medication List        New Prescriptions      albuterol sulfate  (90 Base) MCG/ACT inhaler  Commonly known as: PROVENTIL HFA;VENTOLIN HFA;PROAIR HFA  Inhale 2 puffs Every 6 (Six) Hours As Needed (Cough).     benzonatate 100 MG capsule  Commonly known as: TESSALON  Take 1 capsule by mouth 3 (Three) Times a Day As Needed for Cough.     fluticasone 50 MCG/ACT nasal spray  Commonly known as: FLONASE  2 sprays into the nostril(s) as directed by provider Daily.               Where to Get Your Medications        These medications were sent to [facility] [id number] - [place], [place] - [address] [facility] [phone] [facility] [phone]   [address], [place] [address]      Phone: [phone]   albuterol sulfate  (90 Base) MCG/ACT inhaler  benzonatate 100 MG capsule  fluticasone 50 MCG/ACT nasal spray

## 2023-09-21 ENCOUNTER — OFFICE VISIT (OUTPATIENT)
Dept: SLEEP MEDICINE | Facility: HOSPITAL | Age: 88
End: 2023-09-21
Payer: MEDICARE

## 2023-09-21 VITALS — OXYGEN SATURATION: 97 % | HEART RATE: 72 BPM | HEIGHT: 62 IN | BODY MASS INDEX: 38.09 KG/M2 | WEIGHT: 207 LBS

## 2023-09-21 DIAGNOSIS — E66.01 CLASS 2 SEVERE OBESITY DUE TO EXCESS CALORIES WITH SERIOUS COMORBIDITY AND BODY MASS INDEX (BMI) OF 37.0 TO 37.9 IN ADULT: ICD-10-CM

## 2023-09-21 DIAGNOSIS — G47.33 OSA (OBSTRUCTIVE SLEEP APNEA): Primary | ICD-10-CM

## 2023-09-21 DIAGNOSIS — G47.14 HYPERSOMNIA DUE TO MEDICAL CONDITION: ICD-10-CM

## 2023-09-21 PROCEDURE — G0463 HOSPITAL OUTPT CLINIC VISIT: HCPCS

## 2023-09-21 NOTE — PROGRESS NOTES
[facility] New Patient/Consultation       Reason for Consultation: NANCY    Patient Care Team:  Gabriella Paige MD as PCP - General (Family Medicine)  Maldonado Melendez MD as Referring Physician (Cardiology)  Maldonado Brower Jr., MD as Consulting Physician (Hematology and Oncology)  Maldonado Trujillo Jr., MD as Consulting Physician (Urology)  Rebeca Curran MD as Surgeon (General Surgery)  Gideon Ashley MD as Consulting Physician (Otolaryngology)  Emerson Burkett MD as Consulting Physician (Sleep Medicine)    Chief complaint: NANCY    History of present illness:    Thank you for asking me to see your patient.  The patient is a 87 y.o. female who was diagnosed with obstructive sleep apnea by overnight polysomnogram 4/21/2011, weight 187 pounds.  Mild NANCY with AHI 5.4 events per hour using 4% criteria and RDI moderately abnormal at 16.6 events per hour using 3% criteria.  No sleep-related hypoxia noted.    The patient received a new DreamStation auto CPAP from [employer] related to the recall spring 2023    The patient is in need for supplies.  Additionally, she states she is presently using her fourth CPAP.  The patient's DME is [employer] and she uses nasal pillows.  She goes to bed between midnight and 1 AM and gets out of bed between 10 and 10:30 AM.  She feels groggy upon arising.  She will take 2 or 4 naps daily.  She has complaints of hypersomnolence and her [place] Sleepiness Scale is abnormal at 15.  Without CPAP she snores loudly.  She does have sudden episodes of sleep during the daytime.  She reports frequent awakenings.    Review of Systems:    A complete review of systems was done and all were negative with the exception of the above    History:  Past Medical History:   Diagnosis Date    Abnormal ECG     Acute bronchitis     Acute sinusitis     Anemia     Iron deficiency    Anesthesia complication     Anticoagulated on warfarin     Arthritis     Bladder dysfunction     Cancer of left kidney  06/08/2004    S/P Left Radical Neprectomy    Cardiac disease     Chronic dysfunction of left eustachian tube     CKD (chronic kidney disease)     stage 3    Colon cancer     Colon polyps     Depression     Diarrhea     Diverticulosis 08/08/2011    Descending Colon & Sigmoid Colon multiple diverticula    Epigastric pain     Fatigue     Frequent urination     GERD (gastroesophageal reflux disease)     Goiter     thyroid removed in november 2015    Health care maintenance     History of colitis     History of shingles     History of transfusion     Hypertension     Insomnia     Left atrial enlargement     Left ventricular hypertrophy     Mitral regurgitation     mild to moderate per echocardiogram 2016    Nerve pain     LUE D/T SHINGLES    Neuropathy     ARMS    Non morbid obesity due to excess calories     NANCY (obstructive sleep apnea) treated with CPAP 04/21/2011    Overnight polysomnogram.  Weight 187 pounds.  AHI of mildly abnormal at 5.4 events per hour and RDI moderately abnormal at 16.6 events per hour.  No sleep-related hypoxia.    Paroxysmal atrial fibrillation     Permanent atrial fibrillation     Pulmonary hypertension     per echo 2016    Right atrial enlargement     S/P AV deshawn ablation     S/P placement of cardiac pacemaker     Severe obesity (BMI 35.0-35.9 with comorbidity)     SSS (sick sinus syndrome)     Third degree AV block     Thyroid cancer     URI (upper respiratory infection)     Urinary urgency    ,   Past Surgical History:   Procedure Laterality Date    ABDOMINAL SURGERY      ATRIAL ABLATION SURGERY N/A 09/13/2011    Comprehensive electrophysiology study, Left atrial pace & sense, 3-dimensional cardia mapping, Radiofrequency catheter ablation, Transseptal hear catheterization, Dr. Maldonado Melendez    ATRIAL ABLATION SURGERY N/A 02/11/2004    Dr. Maldonado Melendez    BLADDER SURGERY N/A     Bladder tacking procedure    BLADDER SUSPENSION N/A 05/2012    CARDIAC ELECTROPHYSIOLOGY PROCEDURE N/A  5/17/2016    Procedure: PPM generator change - dual BOSTON;  Surgeon: Maldonado Melendez MD;  Location:  [facility] CATH INVASIVE LOCATION;  Service:     CARDIAC ELECTROPHYSIOLOGY PROCEDURE N/A 3/23/2017    Procedure: AV node ablation pt has BOSTON PPM;  Surgeon: Maldonado Melendez MD;  [facility];  Service:     CARDIAC ELECTROPHYSIOLOGY PROCEDURE N/A 1/6/2022    Procedure: PPM battery change--[place];  Surgeon: Maldonado Melendez MD;  [facility];  Service: Cardiology;  Laterality: N/A;    CARDIOVERSION N/A 01/26/2017    Dr. Melendez    CARDIOVERSION N/A 05/25/2017    Dr. Melendez    CATARACT EXTRACTION Bilateral     Dr. Gramajo    COLON RESECTION Right 4/27/2018    Procedure: COLON RESECTION RIGHT LAPAROSCOPIC, possible open;  Surgeon: Rebeca Curran MD;  Location: [facility] MAIN OR;  Service: General    COLONOSCOPY N/A 04/01/2003    Normal colonoscopy except for an occasional diverticulosis, Dr. Nathan Macias    COLONOSCOPY N/A 4/25/2018    Procedure: COLONOSCOPY INTO CECUM AND TI WITH SALINE LIFT, HOT SNARE POLYPECTOMIES, BX'S, SPOT INJECTION, RESOLUTION CLIPS X2;  Surgeon: Rebeca Curran MD;  Location: [facility] ENDOSCOPY;  Service: Gastroenterology    COLONOSCOPY N/A 8/10/2020    Procedure: COLONOSCOPYTO ILEOCOLIC ANASTOMOSIS WITH COLD BX POLYPECTOMY;  Surgeon: Rebeca Curran MD;  Location: [facility] ENDOSCOPY;  Service: General;  Laterality: N/A;  HX COLON CANCER  --DIVERTICULOSIS, POLYPS, HEMORRHOIDS     CYSTOSCOPY BOTOX INJECTION OF BLADDER N/A 6/27/2016    Procedure: CYSTOSCOPY WITH BOTOX DETRUSOR INJECTION;  Surgeon: Salome Bowen MD;  Location: [facility] MAIN OR;  Service:     CYSTOSCOPY BOTOX INJECTION OF BLADDER N/A 05/29/2015    Dr. Salome Bowen    ENDOSCOPY N/A 06/10/2015    Antral Polyp: Gastric Mucosa w/ marked cautery artifact,  Hiatal Hernia, Dr. Rebeca Curran    ENDOSCOPY N/A 10/28/2013    Gastric polyp: polypoid hyperplastic gastric mucosa w/ focal ulceration,  mixed acute & chronic inflammation.  Negative for intestinal metaplasia, no helicobacter organisms identified on diff-wuik stain, Dr. Rebeca Curran    ENDOSCOPY N/A 03/18/2013    Large prepyloric polyp, partially removed: fragment of polypoid hyperplastic gastric mucosa w/ foci of erosion & patchy mixed acute & chronic inflammation. No helicobacter organisms identified on diff-quick stain. Negative for intestinal metaplasia, negative for dysplasia, Dr. Rebeca Curran    ENDOSCOPY N/A 10/24/2012    large prepyloric mass (3cm) w/ adjacent nodules: hyperplastic polyp w/ mild chronic active gastritis, focal erosion & associated, Multiple fundic polyps: polypoid mucosal hyperplasia, Dr. Rebeca Curran    ENDOSCOPY N/A 03/11/2003    Gastric Antral Biopsies: Fragments of Gastric Mucosa & Necrotic Tissue (Compatible w/ Ulcer) w/ chronic active inflammation.  Negative stain for Helicobacter organisms. Dr. Nathan Macias    ENDOSCOPY N/A 10/23/2017    Procedure: ESOPHAGOGASTRODUODENOSCOPY;  Surgeon: Rebeca Curran MD;  Location: [facility] ENDOSCOPY;  Service:     ENDOSCOPY  8/10/2020    Procedure: ESOPHAGOGASTRODUODENOSCOPY;  Surgeon: Rebeca Curran MD;  Location: [facility] ENDOSCOPY;  Service: General;;  GERD, HX GASTRIC POLYPS  --SMALL HIATAL HERNIA, SMALL GASTRIC POLYPS     ENDOSCOPY AND COLONOSCOPY N/A 08/08/2011    4cm hiatal hernia, Schatzki's ring, Diverticulosis, Large antral polyps, Dr. Rebeca Curran    INCISIONAL HERNIA REPAIR N/A 06/11/2015    Defect approximately 2.5 cm w/ a mass of incarcerated tissue approximately the size of a nectarine, Dr. Rebeca Curran    INSERT / REPLACE / REMOVE PACEMAKER      JOINT REPLACEMENT Bilateral     knee    KNEE ARTHROPLASTY      KNEE MINI REVISION Right 9/28/2016    Procedure: RT KNEE POLY INSERT CHANGE ;  Surgeon: Tommy Avila MD;  Location: [facility] OR;  Service:     LAPAROSCOPIC CHOLECYSTECTOMY N/A     NEPHRECTOMY RADICAL Left 06/08/2004    Incidentially found left renal mass 3-4 cm  renal cell carcinoma, left lower pole, Dr. Salome Bowen    OTHER SURGICAL HISTORY      NEUROMUSCULAR BLOCKERS BOTULINUM TOXIN ONABOTULINUMTOXIN A    PACEMAKER IMPLANTATION N/A     Dr. Casillas    PACEMAKER REPLACEMENT N/A 09/24/2010    Implanted Generator is a [employer] ALTRUA 60 model S603DR, serial # [id number], Dr. Chavez Jimenez    SIGMOIDOSCOPY N/A 3/15/2021    Procedure: FLEXIBLE SIGMOIDOSCOPY WITH BIOPSY;  Surgeon: Rebeca Curran MD;  Location: [facility] ENDOSCOPY;  Service: General;  Laterality: N/A;  PERSONAL HX COLON CA, S/P RIGHT COLECTOMY, FOLLOW-UP LARGE RECTAL POLYP    --NORMAL RECTUM     TOTAL KNEE ARTHROPLASTY Left 05/21/2013    Dr. Miguel Pacheco    TOTAL THYROIDECTOMY N/A 11/16/2015    Dr. Gideon Ashley, [facility]    TRANSVAGINAL TAPING SUSPENSION N/A 03/16/2009    Mini Sling, Closed suprapubic cystostomy, Dr. Salome Bowen    TUBAL ABDOMINAL LIGATION Bilateral    ,   Family History   Problem Relation Age of Onset    Heart disease Other     Deep vein thrombosis Other     Hypertension Other     Breast cancer Sister         In her 60s.    Breast cancer Maternal Aunt     Breast cancer Sister         In her 60s.    Heart disease Mother     Hypertension Mother     Heart disease Father     Hypertension Father     Heart attack Father     Heart disease Daughter     Hypertension Daughter     Heart disease Son     Hypertension Son     Malig Hyperthermia Neg Hx    , and   Social History     Socioeconomic History    Marital status:     Number of children: 0   Tobacco Use    Smoking status: Never    Smokeless tobacco: Never    Tobacco comments:     caffeine use   Vaping Use    Vaping Use: Never used   Substance and Sexual Activity    Alcohol use: No    Drug use: No    Sexual activity: Defer     E-cigarette/Vaping    E-cigarette/Vaping Use Never User     Passive Exposure No     Counseling Given No      E-cigarette/Vaping Substances    Nicotine No     THC No     CBD No     Flavoring No      E-cigarette/Vaping Devices     "Disposable No     Pre-filled or Refillable Cartridge No     Refillable Tank No     Pre-filled Pod No       Social History: 1 or 2 sodas per day    Allergies:  Sulfa antibiotics, Adhesive tape, and Levaquin [levofloxacin]     Medication Review: Her list was reviewed    Vital Signs:    Vitals:    09/21/23 0918   Pulse: 72   SpO2: 97%   Weight: 93.9 kg (207 lb)   Height: 157.5 cm (62\")      Body mass index is 37.86 kg/m².  Neck Circumference: 16 inches      Physical Exam:    Constitutional:  Well developed 87 y.o. female that appears in no apparent distress.  Awake & oriented times 3.  Normal mood with normal recent and remote memory and normal judgement.  Eyes:  Conjunctivae normal.  Oropharynx: Moist mucous membranes without exudate and a large tongue that is scalloped and class III Mallampati airway.    Neck: Trachea midline  Respiratory: Effort is not labored  Cardiovascular: Radial pulse regular  Musculoskeletal: Gait appears normal, no digital clubbing evident, 1+ pre-tibial edema    Results Review: I reviewed the results of her overnight polysomnogram from 4/21/2011    Downloads between 6/23 and 9/20/2023 compliance 100% and average usage 7 hours and 36 minutes.  Average AHI is normal with an average large leak of 1 hour and 43 minutes and usually her new DreamStation auto CPAP is set at CPAP +11.    Impression:   Mild obstructive sleep apnea by overnight polysomnogram 4/21/2011, weight 187 pounds, adequately treated with new DreamStation auto CPAP set at CPAP +11.  Downloads demonstrate the patient to be at goal with good compliance and usage.  However, the patient does have persistent complaints of hypersomnolence.  Circadian rhythm sleep disorder, delayed sleep phase type    Plan:  Good sleep hygiene measures should be maintained.  Weight loss would be beneficial in this patient who has class II severe obesity by Body mass index is 37.86 kg/m²..    After evaluating the patient and assessing results available, " the patient is benefiting from the treatment being provided.     Pathophysiology of NANCY briefly described to the patient.  Cardiovascular complications of untreated NANCY also briefly reviewed.      After reviewing all, based on the patient's present symptoms and signs, I would recommend adjusting her new DreamStation auto CPAP set at CPAP +11 to auto titrating CPAP between 10 and 15 cm water pressure.  I explained the rationale to the patient and her daughter.  Additionally, the patient's weight is higher than her initial testing.  A new prescription will be sent to her DME for all needed supplies.  I answered all of their questions.  I will see the patient back in 3 months.    Thank you for requesting me to assist in this patient's care.    Emerson Burkett MD  Sleep Medicine  09/21/23  09:22 EDT

## 2023-09-23 PROBLEM — E66.812 CLASS 2 SEVERE OBESITY DUE TO EXCESS CALORIES WITH SERIOUS COMORBIDITY AND BODY MASS INDEX (BMI) OF 37.0 TO 37.9 IN ADULT: Status: ACTIVE | Noted: 2017-02-14

## 2023-09-23 PROBLEM — G47.14 HYPERSOMNIA DUE TO MEDICAL CONDITION: Status: ACTIVE | Noted: 2023-09-23

## 2023-12-07 ENCOUNTER — OFFICE VISIT (OUTPATIENT)
Dept: SLEEP MEDICINE | Facility: HOSPITAL | Age: 88
End: 2023-12-07
Payer: MEDICARE

## 2023-12-07 VITALS — OXYGEN SATURATION: 94 % | HEART RATE: 71 BPM | BODY MASS INDEX: 36.99 KG/M2 | WEIGHT: 201 LBS | HEIGHT: 62 IN

## 2023-12-07 DIAGNOSIS — G47.14 HYPERSOMNIA DUE TO MEDICAL CONDITION: ICD-10-CM

## 2023-12-07 DIAGNOSIS — E66.01 CLASS 2 SEVERE OBESITY DUE TO EXCESS CALORIES WITH SERIOUS COMORBIDITY AND BODY MASS INDEX (BMI) OF 36.0 TO 36.9 IN ADULT: ICD-10-CM

## 2023-12-07 DIAGNOSIS — G47.33 OSA (OBSTRUCTIVE SLEEP APNEA): Primary | ICD-10-CM

## 2023-12-07 PROCEDURE — G0463 HOSPITAL OUTPT CLINIC VISIT: HCPCS

## 2023-12-07 NOTE — PROGRESS NOTES
"Follow Up [facility] Disorders [facility] Note     Chief Complaint:  NANCY     Primary Care Physician: Gabriella Paige MD    Interval History:   The patient is a 88 y.o. female  who I last saw 9/21/2023 and that note was reviewed.  At that time, CPAP +11 changed to automatic mode and the patient is here today for follow-up.  She states she is doing well with no new complaints.  She goes to bed at 1 AM and gets out of bed at 10 AM.  She does use the restroom during that time.    Review of Systems:    A complete review of systems was done and all were negative with the exception of some postnasal drip, shortness of breath with exertion, and some depression    Social History:    Social History     Socioeconomic History    Marital status:     Number of children: 0   Tobacco Use    Smoking status: Never    Smokeless tobacco: Never    Tobacco comments:     caffeine use   Vaping Use    Vaping Use: Never used   Substance and Sexual Activity    Alcohol use: No    Drug use: No    Sexual activity: Defer       Allergies:  Sulfa antibiotics, Adhesive tape, and Levaquin [levofloxacin]     Medication Review: Her list was reviewed.      Vital Signs:    Vitals:    12/07/23 0951   Pulse: 71   SpO2: 94%   Weight: 91.2 kg (201 lb)   Height: 157.5 cm (62\")     Body mass index is 36.76 kg/m².    Physical Exam:    Constitutional:  Well developed 88 y.o. female that appears in no apparent distress.  Awake & oriented times 3.  Normal mood with normal recent and remote memory and normal judgement.  Eyes:  Conjunctivae normal.  Oropharynx: Previously, moist mucous membranes without exudate and a large tongue that is scalloped and class III Mallampati airway.    Self-administered [place] Sleepiness Scale test results: 12  0-5 Lower normal daytime sleepiness  6-10 Higher normal daytime sleepiness  11-12 Mild, 13-15 Moderate, & 16-24 Severe excessive daytime sleepiness     Downloaded PAP Data Evaluated For Therapeutic Response and Compliance:  DME " is [place] and she uses nasal pillows.  Downloads between 9/8 and 12/6/2023 compliance 100%.  Average usage is 6 hours and 48 minutes.  Average AHI is normal without leak.  Average auto CPAP pressure is 11 and her new DreamStation auto CPAP is set at 10-15    I have reviewed the above results and compared them with the patient's last downloads and reviewed with the patient.    Impression:   Mild obstructive sleep apnea by overnight polysomnogram 4/21/2011, weight 187 pounds, adequately treated with new DreamStation auto CPAP.  Downloads demonstrate the patient to be at goal with good compliance and usage.  The patient has persistent complaints of hypersomnolence.  Circadian rhythm sleep disorder, delayed sleep phase type     Plan:  Good sleep hygiene measures should be maintained.  Weight loss would be beneficial in this patient who has class II severe obesity by Body mass index is 36.76 kg/m²..      After evaluating the patient and assessing results available, the patient is benefiting from the treatment being provided.     The patient will continue new DreamStation auto CPAP.  Potential side effects of CPAP therapy reviewed and addressed as needed.  After clinical evaluation and review of downloads, I recommend no changes to the patient's pressures.  A new prescription will be sent to the patient's DME.    I answered all of the patient's questions.  The patient will call the [facility] for any problems and will follow up in 1 year.      Emerson Burkett MD  Sleep Medicine  12/07/23  10:05 EST

## 2024-01-16 NOTE — PROGRESS NOTES
"Chief Complaint  Recurrent iron deficiency anemia, stage IIA (T3N0M0) ascending colon cancer, history of left renal cell carcinoma status post left nephrectomy in 2004, CKD3, enlarging right renal lesion, history of gastric polyps    Subjective        History of Present Illness  Patient returns today for 6-month interval follow-up visit with laboratory studies to review as well as renal ultrasound performed at [facility] on 1/12/2024.  She is now over 5 years out from her colon cancer surgery and we are no longer pursuing further surveillance with CEA levels and CT scans at this point.  She does not wish to undergo any further colonoscopies at her age.  We are continuing to monitor a mass in the right kidney.  She does continue annual follow-up with urology for this issue and just recently underwent ultrasound on 1/12/2024.  This showed slight increase in size of the lesion over a 1 year period from 4.6 x 3.8 x 3.6 up to 4.5 x 3.9 x 4.1 cm.  She has not yet seen urology, appointment has been rescheduled to February or follow-up after the ultrasound.  The patient has no new complaints today.  She does continue with some chronic diarrhea ever since her colon surgery that is unchanged.  She remains fairly active for her age.  Although her vision is not good, she does continue to paint on a regular basis.      Objective   Vital Signs:   BP (!) 181/76   Pulse 70   Temp 96.8 °F (36 °C) (Temporal)   Resp 18   Ht 157.5 cm (62.01\")   Wt 91.9 kg (202 lb 11.2 oz)   SpO2 97%   BMI 37.06 kg/m²     Physical Exam  Constitutional:       Appearance: She is well-developed.      Comments: Elderly female no distress   Eyes:      Conjunctiva/sclera: Conjunctivae normal.   Neck:      Thyroid: No thyromegaly.   Cardiovascular:      Rate and Rhythm: Normal rate. Rhythm irregular.      Heart sounds: No murmur heard.     No friction rub. No gallop.   Pulmonary:      Effort: No respiratory distress.      Breath sounds: Normal breath " sounds.   Abdominal:      General: Bowel sounds are normal. There is no distension.      Palpations: Abdomen is soft.      Tenderness: There is no abdominal tenderness.   Musculoskeletal:      Comments: Trace bilateral lower extremity edema with venous stasis changes   Lymphadenopathy:      Head:      Right side of head: No submandibular adenopathy.      Cervical: No cervical adenopathy.      Upper Body:      Right upper body: No supraclavicular adenopathy.      Left upper body: No supraclavicular adenopathy.   Skin:     General: Skin is warm and dry.      Findings: No rash.   Neurological:      Mental Status: She is alert and oriented to person, place, and time.      Cranial Nerves: No cranial nerve deficit.      Motor: No abnormal muscle tone.      Deep Tendon Reflexes: Reflexes normal.   Psychiatric:         Behavior: Behavior normal.              Result Review : Reviewed CBC, CMP, iron panel, ferritin from today.  Reviewed renal ultrasound from [facility] 1/12/2024.       Assessment and Plan     *Recurrent iron deficiency anemia and anemia secondary to CKD3:   The patient is intolerant of oral iron.   Prior GI evaluation showed no definitive evidence of GI blood loss.   She has underlying stage III chronic kidney disease, which is a contributing factor to her mild borderline anemia.   She has been treated in the past with IV iron in 2011 and 2012.    She had a lengthy absence from our office from 2015 until she was referred back on 9/21/17 with evidence of recurrent iron deficiency, likely related to malabsorption.  Her ferritin on 7/7/17 was 21 and hemoglobin on 8/7/17 was 11.1.  Her degree of anemia was mild with a hemoglobin of 11.4, microcytic indices with an MCV of 78.  Labs on 9/21/17 showed ferritin 20.5, iron saturation 8%, TIBC 441.  We did also check B12 and folate which were normal.  Erythropoietin was 32 consistent with a component of anemia secondary to chronic kidney disease. She did return  "stool cards for occult blood which were negative ×3 10/5/17.  She received additional Feraheme on 10/5 and 10/12/17.    She underwent EGD with Dr. Curran 10/23/17 with no evidence of active bleeding, small gastric polyps identified.    Labs on 1/25/2023 showed hemoglobin that decreased to 11.2 after previously in the 12-13 range.  Iron studies were stable to slightly diminished with iron 56, ferritin 62, iron saturation 15%, TIBC 365.  Iron stores suboptimal in the setting of anemia secondary to CKD3.  Patient intolerant of oral iron.  We did discuss the possibility of pursuing IV iron and she would like to defer this if possible.    Patient returns today in follow-up with significant improvement in her iron studies.  Hemoglobin today is normal at 12.5, iron 84, ferritin increased to 107, iron saturation has normalized at 24% and TIBC was 351.  She is currently iron replete.  I did suggest that we recheck labs in 1 year.    *Stage IIA (T3N0M0) ascending colon cancer:   Developed significant diarrhea with blood per rectum.    Colonoscopy 4/25/18 with identification of polyps in the rectum, cecal polyp, mass in the right colon.  The cecal polyp was a sessile serrated adenoma, ascending colon \"mass\" showed hyperplastic polyp, rectal polyp was a tubulovillous adenoma with low-grade dysplasia.  CEA on 4/25/18 was normal at 1.65.  Chest x-ray 4/25/18 was unrevealing.  CT of the abdomen and pelvis 4/26/18 showed a 4 cm annular mass in the ascending colon with adjacent haziness in the mesentery but no lymphadenopathy, no evidence of metastatic disease.    Right hemicolectomy on 4/27/18 with pathology showing an invasive moderately differentiated adenocarcinoma arising in a sessile serrated adenoma with negative margins measuring 2.5 cm extending through the muscularis propria into brad-colorectal tissue, positive lymphovascular invasion, negative perineural invasion, 10 lymph nodes negative.  There was a comment regarding " an ulcerated small adjacent pericolonic abscess, raising the question of possible microscopic perforation.  Patient did have a few high risk features with less than 12 lymph nodes removed, positive lymphovascular invasion, and some possibility of microscopic perforation.  The tumor was microsatellite stable.    Given her age and comorbidities, the patient was a poor candidate for adjuvant chemotherapy and was not recommended.  Independent of this recommendation, the patient stated she was not interested in taking any adjuvant chemotherapy.  Therefore we will plan to monitor her clinically for evidence of recurrent disease.  Standard monitoring is with routine CEA levels however this does not appear to be informative for her given her normal preoperative value.  We will pursue annual interval follow-up CT.    CT scan from 4/24/2019 at a 1 year interval showed no evidence of recurrent disease.    5/22/2020 CEA normal at 1.69 and CT scan chest abdomen and pelvis 5/22/2020 showed no evidence of recurrent/metastatic disease.    The patient underwent delayed endoscopic evaluation with EGD and colonoscopy on 8/10/2020 with Dr. Willett, colonoscopy with tubular adenoma and hyperplastic polyp removed.  Flexible sigmoidoscopy with Dr. Curran on 3/15/2021 was negative  1 year interval follow-up CT performed 5/6/2021 which showed no evidence of recurrent malignancy.  There was however a comment regarding a subpleural opacification left lower lobe as well as a slight increase in normal sized mediastinal lymph nodes.  The lymph nodes however remained normal size and the change in size was felt to be insignificant, left lower lobe subpleural opacification was felt to be inflammatory.  Radiology however recommend 6-month interval follow-up study.  Annual surveillance CT scans chest abdomen and pelvis 7/6/2023 showed no evidence of recurrent disease.    After 5 years out from surgery we do not plan any further routine CEA or CT  monitoring.  Given her age, patient does not wish to pursue any further endoscopic evaluation.    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    Family history includes a sister with breast cancer around age 60, another sister with breast cancer around age 60, maternal aunt with breast cancer over the age of 50, maternal uncle with lung cancer.    Given the patient's multiple malignancies and family history, she was referred to the genetics clinic to discuss potential genetic testing, mainly for benefit of the patient's children and other family members.    Her colon cancer was microsatellite stable.   She did follow-up in the genetics clinic and underwent INVITAE panel test 7/31/18 showing VUS in MLH3 and MSH6 with no known clinical significance at this time.     *History of gastric polyps:   EGD 10/23/17 with small less than 6 mm gastric polyps, not resected.  Repeat EGD 8/10/2020 with small gastric polyps, not resected.    *History of left renal cell carcinoma with enlarging right renal lesion:   The patient underwent a left nephrectomy in 2004 with no clinical evidence of recurrent disease.    CT chest from 11/14/16 showed no evidence of recurrence.    CT abdomen and pelvis however from 4/26/18 showed a 2.2 cm right renal lesion which was indeterminate, possibly a proteinaceous cyst but could not rule out mass lesion.  The study was performed without contrast due to the patient's underlying chronic kidney disease.  She is unable to undergo renal mass protocol CT with contrast.  She is unable to undergo MRI due to her pacemaker.    Follow-up CT scan 4/24/2019 with no change in the right renal lesion at 2.1 cm, repeat at 1 year interval  1 year interval follow-up CT (delayed due to viral  pandemic) 5/22/2020 with increase in right renal mass from 2.1 up to 2.6 cm.  This is felt to possibly represent a proteinaceous cyst versus solid mass.  She is unable to undergo a renal protocol CT due to her renal dysfunction and solitary kidney and cannot undergo MRI due to her pacemaker.   Renal ultrasound 6/4/2020 showed a 3.6 cm mixed solid and cystic mass at the lower pole of the right kidney suspicious for renal cell carcinoma and recommended surgical excision.  Patient did follow-up with Dr. Trujillo in urology who recommended continued radiographic monitoring.  Short interval follow-up ultrasound on 9/15/2020 with stable 3.6 cm right renal lesion.   Renal ultrasound by urology on 1/7/2021 measured the lower pole right renal mass at 3.8 cm, was not directly compared to the prior study performed at [facility].    CT 5/6/2021 showed right renal lesion had increased in size from 2.6 up to 3.1 cm.  It was felt that this likely represents a hyperdense renal cyst however could not rule out solid lesion or complex lesion.    CT 11/15/2021 showed that the lower pole right renal lesion continued to increase gradually in size from 3.1 now up to 3.3 cm.  Change has been minimal however radiology did review multiple prior studies and the lesion has increased gradually over time, suspicious for possible malignancy.   Patient did follow-up with Dr. Trujillo in urology in January 2022.  He does not recommend pursuit of ablation of the renal lesion due to risk of hemorrhage and potential compromise of the patient's renal function in her solitary kidney.  CT 5/10/2022 showed further slight increase in the right renal mass from 3.1 x 3.2 up to 3.6 x 3.7 cm.  Given the continued gradual enlargement in size of the lesion there is high suspicion for slowly growing new primary renal cell carcinoma.    Dr. Trujillo is aware of the continued increase in size of the lesion and does not plan to perform any intervention given her age,  indolent nature of the disease, and risk to her renal function.  Therefore with no intervention planned   There does not appear to be any utility in further aggressive CT monitoring for this issue.    Dr. Trujillo did see the patient back in January 2023 with repeat ultrasound that did show slight further increase in the lesion from 3.8 up to 4.6 cm and some evidence of mild right hydronephrosis.  He plans a follow-up ultrasound in 1 year interval.  CT 7/6/2023 showed further increase in size in the right lower pole renal lesion from 3.8 x 2.9 up to 3.8 x 4.8 cm and additional stable lesions in the right kidney.    1 year interval renal ultrasound on 1/12/2024 at [facility] showed slight increase in size of the lesion over a 1 year period from 4.6 x 3.8 x 3.6 up to 4.5 x 3.9 x 4.1 cm.  Patient returns today in follow-up with the above renal ultrasound to review from 1/12/2024.  She has not yet returned to see Dr. Trujillo, appointment was rescheduled to February.  There has been minimal change in the right kidney lesion and a 1 year timeframe.  Patient wishes to manage this in a conservative manner given the potential risks to her renal function with any intervention in her solitary kidney.  Anticipate that urology will recommend a 1 year interval follow-up ultrasound again and we will see her back next year after that is performed.    *CKD3:   Status post left nephrectomy  Baseline creatinine in 1.2-1.8 range  Contributing factor to anemia    *Abnormal appearance of cervix on CT scan  CT 5/10/2022 with bulky appearance of the cervix with demonstration of air in the lumen.  Recommended follow-up with direct visualization  Patient notes that she did have a urogynecologic procedure performed in the past which did involve the cervix.    Patient initially wanted to return to see urogynecology to review the issue however decided ultimately that she does not wish to pursue this.    CT abdomen and pelvis 7/6/2023 with  similar appearance to the cervix with mildly bulky appearance with air in the lumen and small focus of air in the endometrial canal.  Patient does not wish to pursue evaluation of this.     PLAN:   The patient is over 5 years out from colon resection.  We do not plan any further CEA monitoring nor any routine radiographic monitoring for this issue.  Patient continues annual follow-up with urology, anticipate repeat renal ultrasound in early 2025  The patient is currently iron replete  MD visit in 1 year with CBC, CMP, stat iron panel/ferritin.  Will make sure visit is after her renal ultrasound with urology.

## 2024-01-17 ENCOUNTER — LAB (OUTPATIENT)
Dept: LAB | Facility: HOSPITAL | Age: 89
End: 2024-01-17
Payer: MEDICARE

## 2024-01-17 ENCOUNTER — OFFICE VISIT (OUTPATIENT)
Dept: ONCOLOGY | Facility: CLINIC | Age: 89
End: 2024-01-17
Payer: MEDICARE

## 2024-01-17 VITALS
RESPIRATION RATE: 18 BRPM | HEART RATE: 70 BPM | HEIGHT: 62 IN | SYSTOLIC BLOOD PRESSURE: 181 MMHG | WEIGHT: 202.7 LBS | BODY MASS INDEX: 37.3 KG/M2 | OXYGEN SATURATION: 97 % | TEMPERATURE: 96.8 F | DIASTOLIC BLOOD PRESSURE: 76 MMHG

## 2024-01-17 DIAGNOSIS — D50.8 OTHER IRON DEFICIENCY ANEMIA: ICD-10-CM

## 2024-01-17 DIAGNOSIS — D50.8 OTHER IRON DEFICIENCY ANEMIA: Primary | ICD-10-CM

## 2024-01-17 LAB
ALBUMIN SERPL-MCNC: 3.8 G/DL (ref 3.5–5.2)
ALBUMIN/GLOB SERPL: 1.3 G/DL
ALP SERPL-CCNC: 93 U/L (ref 39–117)
ALT SERPL W P-5'-P-CCNC: 10 U/L (ref 1–33)
ANION GAP SERPL CALCULATED.3IONS-SCNC: 10.5 MMOL/L (ref 5–15)
AST SERPL-CCNC: 28 U/L (ref 1–32)
BASOPHILS # BLD AUTO: 0.06 10*3/MM3 (ref 0–0.2)
BASOPHILS NFR BLD AUTO: 1.1 % (ref 0–1.5)
BILIRUB SERPL-MCNC: 0.6 MG/DL (ref 0–1.2)
BUN SERPL-MCNC: 21 MG/DL (ref 8–23)
BUN/CREAT SERPL: 18.1 (ref 7–25)
CALCIUM SPEC-SCNC: 9.2 MG/DL (ref 8.6–10.5)
CHLORIDE SERPL-SCNC: 105 MMOL/L (ref 98–107)
CO2 SERPL-SCNC: 25.5 MMOL/L (ref 22–29)
CREAT SERPL-MCNC: 1.16 MG/DL (ref 0.57–1)
DEPRECATED RDW RBC AUTO: 52.4 FL (ref 37–54)
EGFRCR SERPLBLD CKD-EPI 2021: 45.7 ML/MIN/1.73
EOSINOPHIL # BLD AUTO: 0.25 10*3/MM3 (ref 0–0.4)
EOSINOPHIL NFR BLD AUTO: 4.5 % (ref 0.3–6.2)
ERYTHROCYTE [DISTWIDTH] IN BLOOD BY AUTOMATED COUNT: 15.3 % (ref 12.3–15.4)
FERRITIN SERPL-MCNC: 107 NG/ML (ref 13–150)
GLOBULIN UR ELPH-MCNC: 3 GM/DL
GLUCOSE SERPL-MCNC: 131 MG/DL (ref 65–99)
HCT VFR BLD AUTO: 39.1 % (ref 34–46.6)
HGB BLD-MCNC: 12.5 G/DL (ref 12–15.9)
IMM GRANULOCYTES # BLD AUTO: 0.01 10*3/MM3 (ref 0–0.05)
IMM GRANULOCYTES NFR BLD AUTO: 0.2 % (ref 0–0.5)
IRON 24H UR-MRATE: 84 MCG/DL (ref 37–145)
IRON SATN MFR SERPL: 24 % (ref 20–50)
LYMPHOCYTES # BLD AUTO: 1.35 10*3/MM3 (ref 0.7–3.1)
LYMPHOCYTES NFR BLD AUTO: 24.1 % (ref 19.6–45.3)
MCH RBC QN AUTO: 29.7 PG (ref 26.6–33)
MCHC RBC AUTO-ENTMCNC: 32 G/DL (ref 31.5–35.7)
MCV RBC AUTO: 92.9 FL (ref 79–97)
MONOCYTES # BLD AUTO: 0.55 10*3/MM3 (ref 0.1–0.9)
MONOCYTES NFR BLD AUTO: 9.8 % (ref 5–12)
NEUTROPHILS NFR BLD AUTO: 3.38 10*3/MM3 (ref 1.7–7)
NEUTROPHILS NFR BLD AUTO: 60.3 % (ref 42.7–76)
NRBC BLD AUTO-RTO: 0 /100 WBC (ref 0–0.2)
PLATELET # BLD AUTO: 223 10*3/MM3 (ref 140–450)
PMV BLD AUTO: 9.9 FL (ref 6–12)
POTASSIUM SERPL-SCNC: 4.2 MMOL/L (ref 3.5–5.2)
PROT SERPL-MCNC: 6.8 G/DL (ref 6–8.5)
RBC # BLD AUTO: 4.21 10*6/MM3 (ref 3.77–5.28)
SODIUM SERPL-SCNC: 141 MMOL/L (ref 136–145)
TIBC SERPL-MCNC: 351 MCG/DL (ref 298–536)
TRANSFERRIN SERPL-MCNC: 251 MG/DL (ref 200–360)
WBC NRBC COR # BLD AUTO: 5.6 10*3/MM3 (ref 3.4–10.8)

## 2024-01-17 PROCEDURE — 84466 ASSAY OF TRANSFERRIN: CPT

## 2024-01-17 PROCEDURE — 82728 ASSAY OF FERRITIN: CPT

## 2024-01-17 PROCEDURE — 1159F MED LIST DOCD IN RCRD: CPT | Performed by: INTERNAL MEDICINE

## 2024-01-17 PROCEDURE — 80053 COMPREHEN METABOLIC PANEL: CPT

## 2024-01-17 PROCEDURE — 1126F AMNT PAIN NOTED NONE PRSNT: CPT | Performed by: INTERNAL MEDICINE

## 2024-01-17 PROCEDURE — 99214 OFFICE O/P EST MOD 30 MIN: CPT | Performed by: INTERNAL MEDICINE

## 2024-01-17 PROCEDURE — 83540 ASSAY OF IRON: CPT

## 2024-01-17 PROCEDURE — 1160F RVW MEDS BY RX/DR IN RCRD: CPT | Performed by: INTERNAL MEDICINE

## 2024-01-17 PROCEDURE — 36415 COLL VENOUS BLD VENIPUNCTURE: CPT

## 2024-01-17 PROCEDURE — 85025 COMPLETE CBC W/AUTO DIFF WBC: CPT

## 2024-01-17 NOTE — LETTER
January 17, 2024     Gabriella Paige MD  [address]  [address]  [facility] [address]    Patient: Anastacia Sarah   YOB: 1935   Date of Visit: 1/17/2024     Dear Gabriella Paige MD:       Thank you for referring Anastacia Sarah to me for evaluation. Below are the relevant portions of my assessment and plan of care.    If you have questions, please do not hesitate to call me. I look forward to following Anastacia along with you.         Sincerely,        Maldonado Brower MD        CC: MD Inocente Franz Jr., John L Jr., MD  01/17/24 1340  Sign when Signing Visit  Chief Complaint  Recurrent iron deficiency anemia, stage IIA (T3N0M0) ascending colon cancer, history of left renal cell carcinoma status post left nephrectomy in 2004, CKD3, enlarging right renal lesion, history of gastric polyps    Subjective       History of Present Illness  Patient returns today for 6-month interval follow-up visit with laboratory studies to review as well as renal [facility] 1[facility]12[facility]2024.  She is now over 5 years out from her colon cancer surgery and we are no longer pursuing further surveillance with CEA levels and CT scans at this point.  She does not wish to undergo any further colonoscopies at her age.  We are continuing to monitor a mass in the right kidney.  She does continue annual follow-up with urology for this issue and just recently underwent ultrasound on 1/12/2024.  This showed slight increase in size of the lesion over a 1 year period from 4.6 x 3.8 x 3.6 up to 4.5 x 3.9 x 4.1 cm.  She has not yet seen urology, appointment has been rescheduled to February or follow-up after the ultrasound.  The patient has no new complaints today.  She does continue with some chronic diarrhea ever since her colon surgery that is unchanged.  She remains fairly active for her age.  Although her vision is not good, she does continue to paint on a regular basis.      Objective  Vital  "Signs:   BP (!) 181/76   Pulse 70   Temp 96.8 °F (36 °C) (Temporal)   Resp 18   Ht 157.5 cm (62.01\")   Wt 91.9 kg (202 lb 11.2 oz)   SpO2 97%   BMI 37.06 kg/m²     Physical Exam  Constitutional:       Appearance: She is well-developed.      Comments: Elderly female no distress   Eyes:      Conjunctiva/sclera: Conjunctivae normal.   Neck:      Thyroid: No thyromegaly.   Cardiovascular:      Rate and Rhythm: Normal rate. Rhythm irregular.      Heart sounds: No murmur heard.     No friction rub. No gallop.   Pulmonary:      Effort: No respiratory distress.      Breath sounds: Normal breath sounds.   Abdominal:      General: Bowel sounds are normal. There is no distension.      Palpations: Abdomen is soft.      Tenderness: There is no abdominal tenderness.   Musculoskeletal:      Comments: Trace bilateral lower extremity edema with venous stasis changes   Lymphadenopathy:      Head:      Right side of head: No submandibular adenopathy.      Cervical: No cervical adenopathy.      Upper Body:      Right upper body: No supraclavicular adenopathy.      Left upper body: No supraclavicular adenopathy.   Skin:     General: Skin is warm and dry.      Findings: No rash.   Neurological:      Mental Status: She is alert and oriented to person, place, and time.      Cranial Nerves: No cranial nerve deficit.      Motor: No abnormal muscle tone.      Deep Tendon Reflexes: Reflexes normal.   Psychiatric:         Behavior: Behavior normal.              Result Review: Reviewed CBC, CMP, iron panel, ferritin from today.  Reviewed renal ultrasound from [facility] 1/12/2024.       Assessment and Plan     *Recurrent iron deficiency anemia and anemia secondary to CKD3:   The patient is intolerant of oral iron.   Prior GI evaluation showed no definitive evidence of GI blood loss.   She has underlying stage III chronic kidney disease, which is a contributing factor to her mild borderline anemia.   She has been treated in the past " "with IV iron in 2011 and 2012.    She had a lengthy absence from our office from 2015 until she was referred back on 9/21/17 with evidence of recurrent iron deficiency, likely related to malabsorption.  Her ferritin on 7/7/17 was 21 and hemoglobin on 8/7/17 was 11.1.  Her degree of anemia was mild with a hemoglobin of 11.4, microcytic indices with an MCV of 78.  Labs on 9/21/17 showed ferritin 20.5, iron saturation 8%, TIBC 441.  We did also check B12 and folate which were normal.  Erythropoietin was 32 consistent with a component of anemia secondary to chronic kidney disease. She did return stool cards for occult blood which were negative ×3 10/5/17.  She received additional Feraheme on 10/5 and 10/12/17.    She underwent EGD with Dr. Curran 10/23/17 with no evidence of active bleeding, small gastric polyps identified.    Labs on 1/25/2023 showed hemoglobin that decreased to 11.2 after previously in the 12-13 range.  Iron studies were stable to slightly diminished with iron 56, ferritin 62, iron saturation 15%, TIBC 365.  Iron stores suboptimal in the setting of anemia secondary to CKD3.  Patient intolerant of oral iron.  We did discuss the possibility of pursuing IV iron and she would like to defer this if possible.    Patient returns today in follow-up with significant improvement in her iron studies.  Hemoglobin today is normal at 12.5, iron 84, ferritin increased to 107, iron saturation has normalized at 24% and TIBC was 351.  She is currently iron replete.  I did suggest that we recheck labs in 1 year.    *Stage IIA (T3N0M0) ascending colon cancer:   Developed significant diarrhea with blood per rectum.    Colonoscopy 4/25/18 with identification of polyps in the rectum, cecal polyp, mass in the right colon.  The cecal polyp was a sessile serrated adenoma, ascending colon \"mass\" showed hyperplastic polyp, rectal polyp was a tubulovillous adenoma with low-grade dysplasia.  CEA on 4/25/18 was normal at 1.65.  " Chest x-ray 4/25/18 was unrevealing.  CT of the abdomen and pelvis 4/26/18 showed a 4 cm annular mass in the ascending colon with adjacent haziness in the mesentery but no lymphadenopathy, no evidence of metastatic disease.    Right hemicolectomy on 4/27/18 with pathology showing an invasive moderately differentiated adenocarcinoma arising in a sessile serrated adenoma with negative margins measuring 2.5 cm extending through the muscularis propria into brad-colorectal tissue, positive lymphovascular invasion, negative perineural invasion, 10 lymph nodes negative.  There was a comment regarding an ulcerated small adjacent pericolonic abscess, raising the question of possible microscopic perforation.  Patient did have a few high risk features with less than 12 lymph nodes removed, positive lymphovascular invasion, and some possibility of microscopic perforation.  The tumor was microsatellite stable.    Given her age and comorbidities, the patient was a poor candidate for adjuvant chemotherapy and was not recommended.  Independent of this recommendation, the patient stated she was not interested in taking any adjuvant chemotherapy.  Therefore we will plan to monitor her clinically for evidence of recurrent disease.  Standard monitoring is with routine CEA levels however this does not appear to be informative for her given her normal preoperative value.  We will pursue annual interval follow-up CT.    CT scan from 4/24/2019 at a 1 year interval showed no evidence of recurrent disease.    5/22/2020 CEA normal at 1.69 and CT scan chest abdomen and pelvis 5/22/2020 showed no evidence of recurrent/metastatic disease.    The patient underwent delayed endoscopic evaluation with EGD and colonoscopy on 8/10/2020 with Dr. Willett, colonoscopy with tubular adenoma and hyperplastic polyp removed.  Flexible sigmoidoscopy with Dr. Curran on 3/15/2021 was negative  1 year interval follow-up CT performed 5/6/2021 which showed no  evidence of recurrent malignancy.  There was however a comment regarding a subpleural opacification left lower lobe as well as a slight increase in normal sized mediastinal lymph nodes.  The lymph nodes however remained normal size and the change in size was felt to be insignificant, left lower lobe subpleural opacification was felt to be inflammatory.  Radiology however recommend 6-month interval follow-up study.  Annual surveillance CT scans chest abdomen and pelvis 7/6/2023 showed no evidence of recurrent disease.    After 5 years out from surgery we do not plan any further routine CEA or CT monitoring.  Given her age, patient does not wish to pursue any further endoscopic evaluation.    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    Family history includes a sister with breast cancer around age 60, another sister with breast cancer around age 60, maternal aunt with breast cancer over the age of 50, maternal uncle with lung cancer.    Given the patient's multiple malignancies and family history, she was referred to the genetics clinic to discuss potential genetic testing, mainly for benefit of the patient's children and other family members.    Her colon cancer was microsatellite stable.   She did follow-up in the genetics clinic and underwent INVITAE panel test 7/31/18 showing VUS in MLH3 and MSH6 with no known clinical significance at this time.     *History of gastric polyps:   EGD 10/23/17 with small less than 6 mm gastric polyps, not resected.  Repeat EGD 8/10/2020 with small gastric polyps, not resected.    *History of left renal cell carcinoma with enlarging right renal lesion:   The patient underwent a left nephrectomy in 2004 with no clinical evidence of recurrent disease.    CT  chest from 11/14/16 showed no evidence of recurrence.    CT abdomen and pelvis however from 4/26/18 showed a 2.2 cm right renal lesion which was indeterminate, possibly a proteinaceous cyst but could not rule out mass lesion.  The study was performed without contrast due to the patient's underlying chronic kidney disease.  She is unable to undergo renal mass protocol CT with contrast.  She is unable to undergo MRI due to her pacemaker.    Follow-up CT scan 4/24/2019 with no change in the right renal lesion at 2.1 cm, repeat at 1 year interval  1 year interval follow-up CT (delayed due to viral pandemic) 5/22/2020 with increase in right renal mass from 2.1 up to 2.6 cm.  This is felt to possibly represent a proteinaceous cyst versus solid mass.  She is unable to undergo a renal protocol CT due to her renal dysfunction and solitary kidney and cannot undergo MRI due to her pacemaker.   Renal ultrasound 6/4/2020 showed a 3.6 cm mixed solid and cystic mass at the lower pole of the right kidney suspicious for renal cell carcinoma and recommended surgical excision.  Patient did follow-up with Dr. Trujillo in urology who recommended continued radiographic monitoring.  Short interval follow-up ultrasound on 9/15/2020 with stable 3.6 cm right renal lesion.   Renal ultrasound by urology on 1/7/2021 measured the lower pole right renal mass at 3.8 cm, was not directly compared to the prior study performed at [facility].    CT 5/6/2021 showed right renal lesion had increased in size from 2.6 up to 3.1 cm.  It was felt that this likely represents a hyperdense renal cyst however could not rule out solid lesion or complex lesion.    CT 11/15/2021 showed that the lower pole right renal lesion continued to increase gradually in size from 3.1 now up to 3.3 cm.  Change has been minimal however radiology did review multiple prior studies and the lesion has increased gradually over time, suspicious for possible malignancy.   Patient did  follow-up with Dr. Trujillo in urology in January 2022.  He does not recommend pursuit of ablation of the renal lesion due to risk of hemorrhage and potential compromise of the patient's renal function in her solitary kidney.  CT 5/10/2022 showed further slight increase in the right renal mass from 3.1 x 3.2 up to 3.6 x 3.7 cm.  Given the continued gradual enlargement in size of the lesion there is high suspicion for slowly growing new primary renal cell carcinoma.    Dr. Trujillo is aware of the continued increase in size of the lesion and does not plan to perform any intervention given her age, indolent nature of the disease, and risk to her renal function.  Therefore with no intervention planned   There does not appear to be any utility in further aggressive CT monitoring for this issue.    Dr. Trujillo did see the patient back in January 2023 with repeat ultrasound that did show slight further increase in the lesion from 3.8 up to 4.6 cm and some evidence of mild right hydronephrosis.  He plans a follow-up ultrasound in 1 year interval.  CT 7/6/2023 showed further increase in size in the right lower pole renal lesion from 3.8 x 2.9 up to 3.8 x 4.8 cm and additional stable lesions in the right kidney.    1 year interval renal ultrasound on 1/12/2024 at [facility] showed slight increase in size of the lesion over a 1 year period from 4.6 x 3.8 x 3.6 up to 4.5 x 3.9 x 4.1 cm.  Patient returns today in follow-up with the above renal ultrasound to review from 1/12/2024.  She has not yet returned to see Dr. Trujillo, appointment was rescheduled to February.  There has been minimal change in the right kidney lesion and a 1 year timeframe.  Patient wishes to manage this in a conservative manner given the potential risks to her renal function with any intervention in her solitary kidney.  Anticipate that urology will recommend a 1 year interval follow-up ultrasound again and we will see her back next year after that is  performed.    *CKD3:   Status post left nephrectomy  Baseline creatinine in 1.2-1.8 range  Contributing factor to anemia    *Abnormal appearance of cervix on CT scan  CT 5/10/2022 with bulky appearance of the cervix with demonstration of air in the lumen.  Recommended follow-up with direct visualization  Patient notes that she did have a urogynecologic procedure performed in the past which did involve the cervix.    Patient initially wanted to return to see urogynecology to review the issue however decided ultimately that she does not wish to pursue this.    CT abdomen and pelvis 7/6/2023 with similar appearance to the cervix with mildly bulky appearance with air in the lumen and small focus of air in the endometrial canal.  Patient does not wish to pursue evaluation of this.     PLAN:   The patient is over 5 years out from colon resection.  We do not plan any further CEA monitoring nor any routine radiographic monitoring for this issue.  Patient continues annual follow-up with urology, anticipate repeat renal ultrasound in early 2025  The patient is currently iron replete  MD visit in 1 year with CBC, CMP, stat iron panel/ferritin.  Will make sure visit is after her renal ultrasound with urology.

## 2024-05-15 ENCOUNTER — OFFICE VISIT (OUTPATIENT)
Age: 89
End: 2024-05-15
Payer: MEDICARE

## 2024-05-15 ENCOUNTER — CLINICAL SUPPORT NO REQUIREMENTS (OUTPATIENT)
Age: 89
End: 2024-05-15
Payer: MEDICARE

## 2024-05-15 VITALS
HEART RATE: 71 BPM | BODY MASS INDEX: 37.17 KG/M2 | DIASTOLIC BLOOD PRESSURE: 90 MMHG | SYSTOLIC BLOOD PRESSURE: 144 MMHG | HEIGHT: 62 IN | WEIGHT: 202 LBS

## 2024-05-15 DIAGNOSIS — I44.2 THIRD DEGREE AV BLOCK: ICD-10-CM

## 2024-05-15 DIAGNOSIS — I44.2 THIRD DEGREE AV BLOCK: Primary | ICD-10-CM

## 2024-05-15 DIAGNOSIS — Z95.0 S/P PLACEMENT OF CARDIAC PACEMAKER: ICD-10-CM

## 2024-05-15 DIAGNOSIS — Z98.890 S/P AV NODAL ABLATION: ICD-10-CM

## 2024-05-15 DIAGNOSIS — I10 ESSENTIAL HYPERTENSION: ICD-10-CM

## 2024-05-15 DIAGNOSIS — I48.19 PERSISTENT ATRIAL FIBRILLATION: Primary | ICD-10-CM

## 2024-05-15 PROCEDURE — 93000 ELECTROCARDIOGRAM COMPLETE: CPT | Performed by: NURSE PRACTITIONER

## 2024-05-15 PROCEDURE — 99213 OFFICE O/P EST LOW 20 MIN: CPT | Performed by: NURSE PRACTITIONER

## 2024-05-15 PROCEDURE — 1160F RVW MEDS BY RX/DR IN RCRD: CPT | Performed by: NURSE PRACTITIONER

## 2024-05-15 PROCEDURE — 1159F MED LIST DOCD IN RCRD: CPT | Performed by: NURSE PRACTITIONER

## 2024-05-15 NOTE — PROGRESS NOTES
Date of Office Visit: 05/15/2024  Encounter Provider: ALLISON Isaac  Place of Service: [facility] CARDIOLOGY  Patient Name: Anastacia Sarah  :1935    Chief Complaint   Patient presents with    third degree AV block    permanent AFIB    Pacemaker Check   :     HPI: Anastacia Sarah is a 88 y.o. female who with ---perm AF, SSS, HR difficult to control,s/p AV node 2018.     Presents for routine visit and device check.     Doing well. Accompanied by daughter.     No chest pain, dyspnea, PND, orthopnea or edema.     She has been more active, lost some weight and has starting painting again.     Device interrogation shows normal testing and function. V paced 97%.     Apixaban for OAC.     Past Medical History:   Diagnosis Date    Abnormal ECG     Acute bronchitis     Acute sinusitis     Anemia     Iron deficiency    Anesthesia complication     Anticoagulated on warfarin     Arthritis     Bladder dysfunction     Cancer of left kidney 2004    S/P Left Radical Neprectomy    Cardiac disease     Chronic dysfunction of left eustachian tube     CKD (chronic kidney disease)     stage 3    Colon cancer     Colon polyps     Depression     Diarrhea     Diverticulosis 2011    Descending Colon & Sigmoid Colon multiple diverticula    Epigastric pain     Fatigue     Frequent urination     GERD (gastroesophageal reflux disease)     Goiter     thyroid removed in 2015    Health care maintenance     History of colitis     History of shingles     History of transfusion     Hypertension     Insomnia     Left atrial enlargement     Left ventricular hypertrophy     Mitral regurgitation     mild to moderate per echocardiogram 2016    Nerve pain     LUE D/T SHINGLES    Neuropathy     ARMS    Non morbid obesity due to excess calories     NANCY (obstructive sleep apnea) treated with CPAP 2011    Overnight polysomnogram.  Weight 187 pounds.  AHI of mildly abnormal at  5.4 events per hour and RDI moderately abnormal at 16.6 events per hour.  No sleep-related hypoxia.    Paroxysmal atrial fibrillation     Permanent atrial fibrillation     Pulmonary hypertension     per echo 2016    Right atrial enlargement     S/P AV deshawn ablation     S/P placement of cardiac pacemaker     Severe obesity (BMI 35.0-35.9 with comorbidity)     SSS (sick sinus syndrome)     Third degree AV block     Thyroid cancer     URI (upper respiratory infection)     Urinary urgency        Past Surgical History:   Procedure Laterality Date    ABDOMINAL SURGERY      ATRIAL ABLATION SURGERY N/A 09/13/2011    Comprehensive electrophysiology study, Left atrial pace & sense, 3-dimensional cardia mapping, Radiofrequency catheter ablation, Transseptal hear catheterization, Dr. Maldonado Melendez    ATRIAL ABLATION SURGERY N/A 02/11/2004    Dr. Maldonado Melendez    BLADDER SURGERY N/A     Bladder tacking procedure    BLADDER SUSPENSION N/A 05/2012    CARDIAC ELECTROPHYSIOLOGY PROCEDURE N/A 5/17/2016    Procedure: PPM generator change - dual BOSTON;  Surgeon: Maldonado Melendez MD;  Location: [facility] CATH INVASIVE LOCATION;  Service:     CARDIAC ELECTROPHYSIOLOGY PROCEDURE N/A 3/23/2017    Procedure: AV node ablation pt has BOSTON PPM;  Surgeon: Maldonado Melendez MD;  Location: [facility] CATH INVASIVE LOCATION;  Service:     CARDIAC ELECTROPHYSIOLOGY PROCEDURE N/A 1/6/2022    Procedure: PPM battery change--[place];  Surgeon: Maldonado Melendez MD;  Location: [facility] CATH INVASIVE LOCATION;  Service: Cardiology;  Laterality: N/A;    CARDIOVERSION N/A 01/26/2017    Dr. Melendez    CARDIOVERSION N/A 05/25/2017    Dr. Melendez    CATARACT EXTRACTION Bilateral     Dr. Gramajo    COLON RESECTION Right 4/27/2018    Procedure: COLON RESECTION RIGHT LAPAROSCOPIC, possible open;  Surgeon: Rebeca Curran MD;  Location: [facility] OR;  Service: General    COLONOSCOPY N/A 04/01/2003    Normal colonoscopy except for an occasional diverticulosis, Dr. Evans  Gilberto    COLONOSCOPY N/A 4/25/2018    Procedure: COLONOSCOPY INTO CECUM AND TI WITH SALINE LIFT, HOT SNARE POLYPECTOMIES, BX'S, SPOT INJECTION, RESOLUTION CLIPS X2;  Surgeon: Rebeca Curran MD;  Location: [facility] ENDOSCOPY;  Service: Gastroenterology    COLONOSCOPY N/A 8/10/2020    Procedure: COLONOSCOPYTO ILEOCOLIC ANASTOMOSIS WITH COLD BX POLYPECTOMY;  Surgeon: Rebeca Curran MD;  Location: [facility] ENDOSCOPY;  Service: General;  Laterality: N/A;  HX COLON CANCER  --DIVERTICULOSIS, POLYPS, HEMORRHOIDS     CYSTOSCOPY BOTOX INJECTION OF BLADDER N/A 6/27/2016    Procedure: CYSTOSCOPY WITH BOTOX DETRUSOR INJECTION;  Surgeon: Salome Bowen MD;  Location: [facility] MAIN OR;  Service:     CYSTOSCOPY BOTOX INJECTION OF BLADDER N/A 05/29/2015    Dr. Salome Bowen    ENDOSCOPY N/A 06/10/2015    Antral Polyp: Gastric Mucosa w/ marked cautery artifact,  Hiatal Hernia, Dr. Rebeca Curran    ENDOSCOPY N/A 10/28/2013    Gastric polyp: polypoid hyperplastic gastric mucosa w/ focal ulceration, mixed acute & chronic inflammation.  Negative for intestinal metaplasia, no helicobacter organisms identified on diff-wuik stain, Dr. Rebeca Curran    ENDOSCOPY N/A 03/18/2013    Large prepyloric polyp, partially removed: fragment of polypoid hyperplastic gastric mucosa w/ foci of erosion & patchy mixed acute & chronic inflammation. No helicobacter organisms identified on diff-quick stain. Negative for intestinal metaplasia, negative for dysplasia, Dr. Rebeca Curran    ENDOSCOPY N/A 10/24/2012    large prepyloric mass (3cm) w/ adjacent nodules: hyperplastic polyp w/ mild chronic active gastritis, focal erosion & associated, Multiple fundic polyps: polypoid mucosal hyperplasia, Dr. Rebeca Curran    ENDOSCOPY N/A 03/11/2003    Gastric Antral Biopsies: Fragments of Gastric Mucosa & Necrotic Tissue (Compatible w/ Ulcer) w/ chronic active inflammation.  Negative stain for Helicobacter organisms. Dr. Nathan Macias    ENDOSCOPY N/A 10/23/2017     Procedure: ESOPHAGOGASTRODUODENOSCOPY;  Surgeon: Rebeca Curran MD;  Location: [facility] ENDOSCOPY;  Service:     ENDOSCOPY  8/10/2020    Procedure: ESOPHAGOGASTRODUODENOSCOPY;  Surgeon: Rebeca Curran MD;  Location[facility] END[facility]COPY;  Service: General;;  GERD, HX GASTRIC POLYPS  --SMALL HIATAL HERNIA, SMALL GASTRIC POLYPS     ENDOSCOPY AND COLONOSCOPY N/A 08/08/2011    4cm hiatal hernia, Schatzki's ring, Diverticulosis, Large antral polyps, Dr. Rebeca Curran    INCISIONAL HERNIA REPAIR N/A 06/11/2015    Defect approximately 2.5 cm w/ a mass of incarcerated tissue approximately the size of a nectarine, Dr. Rebeca Curran    INSERT / REPLACE / REMOVE PACEMAKER      JOINT REPLACEMENT Bilateral     knee    KNEE ARTHROPLASTY      KNEE MINI REVISION Right 9/28/2016    Procedure: RT KNEE POLY INSERT CHANGE ;  Surgeon: Tommy Avila MD;  Location: [facility] MAIN OR;  Service:     LAPAROSCOPIC CHOLECYSTECTOMY N/A     NEPHRECTOMY RADICAL Left 06/08/2004    Incidentially found left renal mass 3-4 cm renal cell carcinoma, left lower pole, Dr. Salome Bowen    OTHER SURGICAL HISTORY      NEUROMUSCULAR BLOCKERS BOTULINUM TOXIN ONABOTULINUMTOXIN A    PACEMAKER IMPLANTATION N/A     Dr. Casillas    PACEMAKER REPLACEMENT N/A 09/24/2010    Implanted Generator is a [employer] ALTRUA 60 model S603DR, serial # [id number], Dr. Chavez Jimenez    SIGMOIDOSCOPY N/A 3/15/2021    Procedure: FLEXIBLE SIGMOIDOSCOPY WITH BIOPSY;  Surgeon: Rebeca Curran MD;  Location: [facility] ENDOSCOPY;  Service: General;  Laterality: N/A;  PERSONAL HX COLON CA, S/P RIGHT COLECTOMY, FOLLOW-UP LARGE RECTAL POLYP    --NORMAL RECTUM     TOTAL KNEE ARTHROPLASTY Left 05/21/2013    Dr. Miguel Pacheco    TOTAL THYROIDECTOMY N/A 11/16/2015    Dr. Gideon Ashley, [facility]    TRANSVAGINAL TAPING SUSPENSION N/A 03/16/2009    Mini Sling, Closed suprapubic cystostomy, Dr. Salome Bowen    TUBAL ABDOMINAL LIGATION Bilateral        Social History     Socioeconomic History    Marital status:      Number of children: 0   Tobacco Use    Smoking status: Never    Smokeless tobacco: Never    Tobacco comments:     caffeine use   Vaping Use    Vaping status: Never Used   Substance and Sexual Activity    Alcohol use: No    Drug use: No    Sexual activity: Defer       Family History   Problem Relation Age of Onset    Heart disease Other     Deep vein thrombosis Other     Hypertension Other     Breast cancer Sister         In her 60s.    Breast cancer Maternal Aunt     Breast cancer Sister         In her 60s.    Heart disease Mother     Hypertension Mother     Heart disease Father     Hypertension Father     Heart attack Father     Heart disease Daughter     Hypertension Daughter     Heart disease Son     Hypertension Son     Malig Hyperthermia Neg Hx        Review of Systems   Constitutional: Negative for chills, fever and malaise/fatigue.   Cardiovascular:  Negative for chest pain, dyspnea on exertion, leg swelling, near-syncope, orthopnea, palpitations, paroxysmal nocturnal dyspnea and syncope.   Respiratory:  Negative for cough and shortness of breath.    Hematologic/Lymphatic: Negative.    Musculoskeletal:  Negative for joint pain, joint swelling and myalgias.   Gastrointestinal:  Negative for abdominal pain, diarrhea, melena, nausea and vomiting.   Genitourinary:  Negative for frequency and hematuria.   Neurological:  Negative for light-headedness, numbness, paresthesias and seizures.   Allergic/Immunologic: Negative.    All other systems reviewed and are negative.      Allergies   Allergen Reactions    Sulfa Antibiotics Swelling     FACE AND TONGUE    Adhesive Tape Rash    Levaquin [Levofloxacin] Rash         Current Outpatient Medications:     apixaban (Eliquis) 5 MG tablet tablet, Take 1 tablet by mouth Every 12 (Twelve) Hours., Disp: 180 tablet, Rfl: 3    benzonatate (TESSALON) 100 MG capsule, Take 1 capsule by mouth 3 (Three) Times a Day As Needed for Cough., Disp: 15 capsule, Rfl: 0    CALCIUM  "PO, Take 600 Units by mouth Daily. With D3, listed separetly, Disp: , Rfl:     cholecalciferol (VITAMIN D3) 1000 UNITS tablet, Take 1 tablet by mouth Daily. In addition to the calcium -D3 pill, Disp: , Rfl:     esomeprazole (nexIUM) 40 MG capsule, Take 1 capsule by mouth Every Morning Before Breakfast., Disp: , Rfl:     fluticasone (FLONASE) 50 MCG/ACT nasal spray, 2 sprays into the nostril(s) as directed by provider Daily., Disp: 30 mL, Rfl: 0    furosemide (LASIX) 20 MG tablet, TAKE TWO TABLETS BY MOUTH DAILY, Disp: 180 tablet, Rfl: 3    GLUCOSAMINE-CHONDROITIN PO, Take 1 tablet by mouth Daily., Disp: , Rfl:     Ketotifen Fumarate (REFRESH EYE ITCH RELIEF OP), Apply  to eye(s) as directed by provider., Disp: , Rfl:     lisinopril (PRINIVIL,ZESTRIL) 20 MG tablet, Take 1 tablet by mouth Daily., Disp: 90 tablet, Rfl: 1    loperamide (IMODIUM) 2 MG capsule, Take 1 capsule by mouth 4 (Four) Times a Day As Needed for Diarrhea., Disp: , Rfl:     Loratadine 10 MG capsule, Take 1 capsule by mouth Daily As Needed., Disp: , Rfl:     Multiple Vitamins-Minerals (MULTIVITAMIN PO), Take 1 tablet by mouth Daily., Disp: , Rfl:     Multiple Vitamins-Minerals (PRESERVISION AREDS 2 PO), Take 1 capsule by mouth 2 (two) times a day., Disp: , Rfl:     Probiotic Product (PROBIOTIC ADVANCED PO), Take  by mouth., Disp: , Rfl:     SYNTHROID 112 MCG tablet, Take 125 mcg by mouth Every Other Day. Pt taking 110 mg a day, Disp: , Rfl:     Synthroid 125 MCG tablet, Take 1 tablet by mouth Every Other Day., Disp: , Rfl:     vitamin B-12 (CYANOCOBALAMIN) 1000 MCG tablet, Take 1 tablet by mouth Daily., Disp: , Rfl:       Objective:     Vitals:    05/15/24 1302   BP: 144/90   Pulse: 71   Weight: 91.6 kg (202 lb)   Height: 157.5 cm (62.01\")     Body mass index is 36.93 kg/m².    PHYSICAL EXAM:    Vitals Reviewed.   General Appearance: No acute distress.   HENT: Atraumatic, normocephalic. External eyes, ears, and nose normal.   Respiratory: No signs of " respiratory distress. Respiration rhythm and depth normal.   Clear to auscultation. No rales, crackles, rhonchi, or wheezing auscultated.   Cardiovascular:  Heart Rate and Rhythm: Normal, Heart Sounds: S1 and S2.   Murmurs: No murmurs noted. No rubs, thrills, or gallops.   Lower Extremities: No edema noted.  Musculoskeletal: Normal movement of extremities  Skin: Warm and dry.   Psychiatric: Patient alert and oriented to person, place, and time. Speech and behavior appropriate. Normal mood and affect.       ECG 12 Lead    Date/Time: 5/15/2024 1:38 PM  Performed by: Sayra Jluian APRN    Authorized by: Sayra Julian APRN  Comparison: compared with previous ECG   Similar to previous ECG  Rhythm: atrial fibrillation and paced  BPM: 71  Pacing: ventricular paced rhythm and 100% capture          Assessment:       Diagnosis Plan   1. Persistent atrial fibrillation  ECG 12 Lead      2. S/P AV deshawn ablation  ECG 12 Lead      3. S/P placement of cardiac pacemaker  ECG 12 Lead      4. Third degree AV block  ECG 12 Lead      5. Essential hypertension               Plan:       1-4. Persistent AF>>SSS, s/p PPM the AF w/RVR, s/p AV node ablation---doing well. Normal pacemaker function. Apixaban for OAC.     4. HTN, controlled.     Follow up with Dr. Melendez in 1 year w/device check.     As always, it has been a pleasure to participate in your patient's care.      Sincerely,         ALLISON Winn

## 2024-07-06 DIAGNOSIS — R06.02 SHORTNESS OF BREATH: ICD-10-CM

## 2024-07-06 DIAGNOSIS — I48.19 PERSISTENT ATRIAL FIBRILLATION: ICD-10-CM

## 2024-07-08 RX ORDER — FUROSEMIDE 20 MG/1
40 TABLET ORAL DAILY
Qty: 180 TABLET | Refills: 3 | Status: SHIPPED | OUTPATIENT
Start: 2024-07-08

## 2024-07-17 RX ORDER — APIXABAN 5 MG/1
5 TABLET, FILM COATED ORAL EVERY 12 HOURS
Qty: 180 TABLET | Refills: 3 | Status: SHIPPED | OUTPATIENT
Start: 2024-07-17

## 2024-08-25 ENCOUNTER — HOSPITAL ENCOUNTER (EMERGENCY)
Facility: HOSPITAL | Age: 89
Discharge: HOME | End: 2024-08-25
Attending: EMERGENCY MEDICINE | Admitting: EMERGENCY MEDICINE
Payer: MEDICARE

## 2024-08-25 ENCOUNTER — APPOINTMENT (OUTPATIENT)
Dept: GENERAL RADIOLOGY | Facility: HOSPITAL | Age: 89
End: 2024-08-25
Payer: MEDICARE

## 2024-08-25 VITALS
OXYGEN SATURATION: 97 % | SYSTOLIC BLOOD PRESSURE: 154 MMHG | BODY MASS INDEX: 37.17 KG/M2 | RESPIRATION RATE: 19 BRPM | TEMPERATURE: 98.2 F | HEIGHT: 62 IN | WEIGHT: 202 LBS | HEART RATE: 70 BPM | DIASTOLIC BLOOD PRESSURE: 89 MMHG

## 2024-08-25 DIAGNOSIS — R53.1 WEAKNESS: Primary | ICD-10-CM

## 2024-08-25 LAB
ALBUMIN SERPL-MCNC: 3.8 G/DL (ref 3.5–5.2)
ALBUMIN/GLOB SERPL: 1.5 G/DL
ALP SERPL-CCNC: 95 U/L (ref 39–117)
ALT SERPL W P-5'-P-CCNC: 17 U/L (ref 1–33)
ANION GAP SERPL CALCULATED.3IONS-SCNC: 9 MMOL/L (ref 5–15)
AST SERPL-CCNC: 20 U/L (ref 1–32)
BACTERIA UR QL AUTO: ABNORMAL /HPF
BASOPHILS # BLD MANUAL: 0.12 10*3/MM3 (ref 0–0.2)
BASOPHILS NFR BLD MANUAL: 2 % (ref 0–1.5)
BILIRUB SERPL-MCNC: 0.6 MG/DL (ref 0–1.2)
BILIRUB UR QL STRIP: NEGATIVE
BUN SERPL-MCNC: 15 MG/DL (ref 8–23)
BUN/CREAT SERPL: 12 (ref 7–25)
CALCIUM SPEC-SCNC: 8.9 MG/DL (ref 8.6–10.5)
CHLORIDE SERPL-SCNC: 105 MMOL/L (ref 98–107)
CLARITY UR: CLEAR
CO2 SERPL-SCNC: 26 MMOL/L (ref 22–29)
COLOR UR: YELLOW
CREAT SERPL-MCNC: 1.25 MG/DL (ref 0.57–1)
DEPRECATED RDW RBC AUTO: 45 FL (ref 37–54)
EGFRCR SERPLBLD CKD-EPI 2021: 41.8 ML/MIN/1.73
EOSINOPHIL # BLD MANUAL: 0.42 10*3/MM3 (ref 0–0.4)
EOSINOPHIL NFR BLD MANUAL: 7.1 % (ref 0.3–6.2)
ERYTHROCYTE [DISTWIDTH] IN BLOOD BY AUTOMATED COUNT: 13 % (ref 12.3–15.4)
FLUAV SUBTYP SPEC NAA+PROBE: NOT DETECTED
FLUBV RNA ISLT QL NAA+PROBE: NOT DETECTED
GEN 5 2HR TROPONIN T REFLEX: 55 NG/L
GIANT PLATELETS: ABNORMAL
GLOBULIN UR ELPH-MCNC: 2.6 GM/DL
GLUCOSE SERPL-MCNC: 185 MG/DL (ref 65–99)
GLUCOSE UR STRIP-MCNC: NEGATIVE MG/DL
HCT VFR BLD AUTO: 34.1 % (ref 34–46.6)
HGB BLD-MCNC: 10.9 G/DL (ref 12–15.9)
HGB UR QL STRIP.AUTO: NEGATIVE
HOLD SPECIMEN: NORMAL
HYALINE CASTS UR QL AUTO: ABNORMAL /LPF
KETONES UR QL STRIP: NEGATIVE
LEUKOCYTE ESTERASE UR QL STRIP.AUTO: ABNORMAL
LYMPHOCYTES # BLD MANUAL: 1.2 10*3/MM3 (ref 0.7–3.1)
LYMPHOCYTES NFR BLD MANUAL: 2 % (ref 5–12)
MCH RBC QN AUTO: 29.9 PG (ref 26.6–33)
MCHC RBC AUTO-ENTMCNC: 32 G/DL (ref 31.5–35.7)
MCV RBC AUTO: 93.4 FL (ref 79–97)
MONOCYTES # BLD: 0.12 10*3/MM3 (ref 0.1–0.9)
NEUTROPHILS # BLD AUTO: 4.01 10*3/MM3 (ref 1.7–7)
NEUTROPHILS NFR BLD MANUAL: 68.4 % (ref 42.7–76)
NITRITE UR QL STRIP: NEGATIVE
NT-PROBNP SERPL-MCNC: 1555 PG/ML (ref 0–1800)
PH UR STRIP.AUTO: 6 [PH] (ref 5–8)
PLATELET # BLD AUTO: 207 10*3/MM3 (ref 140–450)
PMV BLD AUTO: 9.9 FL (ref 6–12)
POIKILOCYTOSIS BLD QL SMEAR: ABNORMAL
POTASSIUM SERPL-SCNC: 3.7 MMOL/L (ref 3.5–5.2)
PROT SERPL-MCNC: 6.4 G/DL (ref 6–8.5)
PROT UR QL STRIP: NEGATIVE
QT INTERVAL: 444 MS
QTC INTERVAL: 480 MS
RBC # BLD AUTO: 3.65 10*6/MM3 (ref 3.77–5.28)
RBC # UR STRIP: ABNORMAL /HPF
REF LAB TEST METHOD: ABNORMAL
SARS-COV-2 RNA RESP QL NAA+PROBE: NOT DETECTED
SODIUM SERPL-SCNC: 140 MMOL/L (ref 136–145)
SP GR UR STRIP: 1.01 (ref 1–1.03)
SQUAMOUS #/AREA URNS HPF: ABNORMAL /HPF
TROPONIN T DELTA: 3 NG/L
TROPONIN T SERPL HS-MCNC: 52 NG/L
UROBILINOGEN UR QL STRIP: ABNORMAL
VARIANT LYMPHS NFR BLD MANUAL: 1 % (ref 0–5)
VARIANT LYMPHS NFR BLD MANUAL: 19.4 % (ref 19.6–45.3)
WBC # UR STRIP: ABNORMAL /HPF
WBC MORPH BLD: NORMAL
WBC NRBC COR # BLD AUTO: 5.86 10*3/MM3 (ref 3.4–10.8)

## 2024-08-25 PROCEDURE — 99284 EMERGENCY DEPT VISIT MOD MDM: CPT | Performed by: EMERGENCY MEDICINE

## 2024-08-25 PROCEDURE — 93005 ELECTROCARDIOGRAM TRACING: CPT | Performed by: EMERGENCY MEDICINE

## 2024-08-25 PROCEDURE — 81001 URINALYSIS AUTO W/SCOPE: CPT | Performed by: EMERGENCY MEDICINE

## 2024-08-25 PROCEDURE — 80053 COMPREHEN METABOLIC PANEL: CPT | Performed by: EMERGENCY MEDICINE

## 2024-08-25 PROCEDURE — 85025 COMPLETE CBC W/AUTO DIFF WBC: CPT | Performed by: EMERGENCY MEDICINE

## 2024-08-25 PROCEDURE — 84484 ASSAY OF TROPONIN QUANT: CPT | Performed by: EMERGENCY MEDICINE

## 2024-08-25 PROCEDURE — 87636 SARSCOV2 & INF A&B AMP PRB: CPT | Performed by: EMERGENCY MEDICINE

## 2024-08-25 PROCEDURE — 83880 ASSAY OF NATRIURETIC PEPTIDE: CPT | Performed by: EMERGENCY MEDICINE

## 2024-08-25 PROCEDURE — 99284 EMERGENCY DEPT VISIT MOD MDM: CPT

## 2024-08-25 PROCEDURE — 36415 COLL VENOUS BLD VENIPUNCTURE: CPT

## 2024-08-25 PROCEDURE — 85007 BL SMEAR W/DIFF WBC COUNT: CPT | Performed by: EMERGENCY MEDICINE

## 2024-08-25 PROCEDURE — 71045 X-RAY EXAM CHEST 1 VIEW: CPT

## 2024-08-25 PROCEDURE — 93010 ELECTROCARDIOGRAM REPORT: CPT | Performed by: INTERNAL MEDICINE

## 2024-08-25 RX ORDER — ASPIRIN 325 MG
325 TABLET ORAL ONCE
Status: DISCONTINUED | OUTPATIENT
Start: 2024-08-25 | End: 2024-08-25

## 2024-08-25 RX ORDER — SODIUM CHLORIDE 0.9 % (FLUSH) 0.9 %
10 SYRINGE (ML) INJECTION AS NEEDED
Status: DISCONTINUED | OUTPATIENT
Start: 2024-08-25 | End: 2024-08-25 | Stop reason: HOSPADM

## 2024-08-25 NOTE — DISCHARGE INSTRUCTIONS
Your lab work did not show anything concerning.  Your chest x-ray was not concerning and your EKG was normal.  I suspect your travel and your stress and your sister's condition has taken a toll on how you feel in your body.  No doubt it is difficult to get good sleep at night.  I encourage you to follow-up with your primary care doctor within the next 2 weeks.

## 2024-08-25 NOTE — FSED PROVIDER NOTE
Subjective   History of Present Illness  88-year-old female here today because of 3 days of fatigue, heaviness in her chest and a sense of shortness of breath.  She came back from [place] where she was visiting her sister who is apparently end-stage and she is concerned and disturbed.  She is here with her daughter today.  She denies fever, headache, stiff or sore neck, rash, chest pain or back pain or abdominal pain or nausea or vomiting or diarrhea or swelling in her legs or change in vision.  She has a pacemaker.  She has a history of sick sinus and A-fib, 1 functional kidney and hypothyroid among many other problems but no history of CHF or fluid overload that I can find on her list.  Her daughter has been here twice with bronchitis in the past 6 weeks and she has been around her daughter.  Also she has been traveling.      Review of Systems    Past Medical History:   Diagnosis Date    Abnormal ECG     Acute bronchitis     Acute sinusitis     Anemia     Iron deficiency    Anesthesia complication     Anticoagulated on warfarin     Arthritis     Bladder dysfunction     Cancer of left kidney 06/08/2004    S/P Left Radical Neprectomy    Cardiac disease     Chronic dysfunction of left eustachian tube     CKD (chronic kidney disease)     stage 3    Colon cancer     Colon polyps     Depression     Diarrhea     Diverticulosis 08/08/2011    Descending Colon & Sigmoid Colon multiple diverticula    Epigastric pain     Fatigue     Frequent urination     GERD (gastroesophageal reflux disease)     Goiter     thyroid removed in november 2015    Health care maintenance     History of colitis     History of shingles     History of transfusion     Hypertension     Insomnia     Left atrial enlargement     Left ventricular hypertrophy     Mitral regurgitation     mild to moderate per echocardiogram 2016    Nerve pain     LUE D/T SHINGLES    Neuropathy     ARMS    Non morbid obesity due to excess calories     NANCY (obstructive  sleep apnea) treated with CPAP 04/21/2011    Overnight polysomnogram.  Weight 187 pounds.  AHI of mildly abnormal at 5.4 events per hour and RDI moderately abnormal at 16.6 events per hour.  No sleep-related hypoxia.    Paroxysmal atrial fibrillation     Permanent atrial fibrillation     Pulmonary hypertension     per echo 2016    Right atrial enlargement     S/P AV deshawn ablation     S/P placement of cardiac pacemaker     Severe obesity (BMI 35.0-35.9 with comorbidity)     SSS (sick sinus syndrome)     Third degree AV block     Thyroid cancer     URI (upper respiratory infection)     Urinary urgency        Allergies   Allergen Reactions    Sulfa Antibiotics Swelling     FACE AND TONGUE    Adhesive Tape Rash    Levaquin [Levofloxacin] Rash       Past Surgical History:   Procedure Laterality Date    ABDOMINAL SURGERY      ATRIAL ABLATION SURGERY N/A 09/13/2011    Comprehensive electrophysiology study, Left atrial pace & sense, 3-dimensional cardia mapping, Radiofrequency catheter ablation, Transseptal hear catheterization, Dr. Maldonado Melendez    ATRIAL ABLATION SURGERY N/A 02/11/2004    Dr. Maldonado Melendez    BLADDER SURGERY N/A     Bladder tacking procedure    BLADDER SUSPENSION N/A 05/2012    CARDIAC ELECTROPHYSIOLOGY PROCEDURE N/A 5/17/2016    Procedure: PPM generator change - dual BOSTON;  Surgeon: Maldonado Melendez MD;  Location:  [facility];  Service:     CARDIAC ELECTROPHYSIOLOGY PROCEDURE N/A 3/23/2017    Procedure: AV node ablation pt has BOSTON PPM;  Surgeon: Maldonado Melendez MD;  Location:  [facility];  Service:     CARDIAC ELECTROPHYSIOLOGY PROCEDURE N/A 1/6/2022    Procedure: PPM battery change--[place];  Surgeon: Maldonado Melendez MD;  Location:  [facility];  Service: Cardiology;  Laterality: N/A;    CARDIOVERSION N/A 01/26/2017    Dr. Melendez    CARDIOVERSION N/A 05/25/2017    Dr. Melendez    CATARACT EXTRACTION Bilateral     Dr. Gramajo    COLON RESECTION  Right 4/27/2018    Procedure: COLON RESECTION RIGHT LAPAROSCOPIC, possible open;  Surgeon: Rebeca Curran MD;  Location: [facility] MAIN OR;  Service: General    COLONOSCOPY N/A 04/01/2003    Normal colonoscopy except for an occasional diverticulosis, Dr. Nathan Macias    COLONOSCOPY N/A 4/25/2018    Procedure: COLONOSCOPY INTO CECUM AND TI WITH SALINE LIFT, HOT SNARE POLYPECTOMIES, BX'S, SPOT INJECTION, RESOLUTION CLIPS X2;  Surgeon: Rebeca Curran MD;  Location: [facility] ENDOSCOPY;  Service: Gastroenterology    COLONOSCOPY N/A 8/10/2020    Procedure: COLONOSCOPYTO ILEOCOLIC ANASTOMOSIS WITH COLD BX POLYPECTOMY;  Surgeon: Rebeca Curran MD;  Location: [facility] ENDOSCOPY;  Service: General;  Laterality: N/A;  HX COLON CANCER  --DIVERTICULOSIS, POLYPS, HEMORRHOIDS     CYSTOSCOPY BOTOX INJECTION OF BLADDER N/A 6/27/2016    Procedure: CYSTOSCOPY WITH BOTOX DETRUSOR INJECTION;  Surgeon: Salome Bowen MD;  Location: [facility] MAIN OR;  Service:     CYSTOSCOPY BOTOX INJECTION OF BLADDER N/A 05/29/2015    Dr. Salome Bowen    ENDOSCOPY N/A 06/10/2015    Antral Polyp: Gastric Mucosa w/ marked cautery artifact,  Hiatal Hernia, Dr. Rebeca Curran    ENDOSCOPY N/A 10/28/2013    Gastric polyp: polypoid hyperplastic gastric mucosa w/ focal ulceration, mixed acute & chronic inflammation.  Negative for intestinal metaplasia, no helicobacter organisms identified on diff-wuik stain, Dr. Rebeca Curran    ENDOSCOPY N/A 03/18/2013    Large prepyloric polyp, partially removed: fragment of polypoid hyperplastic gastric mucosa w/ foci of erosion & patchy mixed acute & chronic inflammation. No helicobacter organisms identified on diff-quick stain. Negative for intestinal metaplasia, negative for dysplasia, Dr. Rebeca Curran    ENDOSCOPY N/A 10/24/2012    large prepyloric mass (3cm) w/ adjacent nodules: hyperplastic polyp w/ mild chronic active gastritis, focal erosion & associated, Multiple fundic polyps: polypoid mucosal hyperplasia, Dr. Jose  Bina    ENDOSCOPY N/A 03/11/2003    Gastric Antral Biopsies: Fragments of Gastric Mucosa & Necrotic Tissue (Compatible w/ Ulcer) w/ chronic active inflammation.  Negative stain for Helicobacter organisms. Dr. Nathan Macias    ENDOSCOPY N/A 10/23/2017    Procedure: ESOPHAGOGASTRODUODENOSCOPY;  Surgeon: Rebeca Curran MD;  Location: [facility] ENDOSCOPY;  Service:     ENDOSCOPY  8/10/2020    Procedure: ESOPHAGOGASTRODUODENOSCOPY;  Surgeon: Rebeca Curran MD;  Location: [facility] ENDOSCOPY;  Service: General;;  GERD, HX GASTRIC POLYPS  --SMALL HIATAL HERNIA, SMALL GASTRIC POLYPS     ENDOSCOPY AND COLONOSCOPY N/A 08/08/2011    4cm hiatal hernia, Schatzki's ring, Diverticulosis, Large antral polyps, Dr. Rebeca Curran    INCISIONAL HERNIA REPAIR N/A 06/11/2015    Defect approximately 2.5 cm w/ a mass of incarcerated tissue approximately the size of a nectarine, Dr. Rebeca Curran    INSERT / REPLACE / REMOVE PACEMAKER      JOINT REPLACEMENT Bilateral     knee    KNEE ARTHROPLASTY      KNEE MINI REVISION Right 9/28/2016    Procedure: RT KNEE POLY INSERT CHANGE ;  Surgeon: Tommy Avila MD;  Location: [facility] MAIN OR;  Service:     LAPAROSCOPIC CHOLECYSTECTOMY N/A     NEPHRECTOMY RADICAL Left 06/08/2004    Incidentially found left renal mass 3-4 cm renal cell carcinoma, left lower pole, Dr. Salome Bowen    OTHER SURGICAL HISTORY      NEUROMUSCULAR BLOCKERS BOTULINUM TOXIN ONABOTULINUMTOXIN A    PACEMAKER IMPLANTATION N/A     Dr. Casillas    PACEMAKER REPLACEMENT N/A 09/24/2010    Implanted Generator is a [employer] ALTRUA 60 model S603DR, serial # [id number], Dr. Chavez Jimenez    SIGMOIDOSCOPY N/A 3/15/2021    Procedure: FLEXIBLE SIGMOIDOSCOPY WITH BIOPSY;  Surgeon: Rebeca Curran MD;  Location: [facility] ENDOSCOPY;  Service: General;  Laterality: N/A;  PERSONAL HX COLON CA, S/P RIGHT COLECTOMY, FOLLOW-UP LARGE RECTAL POLYP    --NORMAL RECTUM     TOTAL KNEE ARTHROPLASTY Left 05/21/2013    Dr. Miguel Pacheco    TOTAL THYROIDECTOMY N/A  11/16/2015    Dr. Gideon Ashley, [facility]    TRANSVAGINAL TAPING SUSPENSION N/A 03/16/2009    Mini Sling, Closed suprapubic cystostomy, Dr. Mcmahon Short    TUBAL ABDOMINAL LIGATION Bilateral        Family History   Problem Relation Age of Onset    Heart disease Other     Deep vein thrombosis Other     Hypertension Other     Breast cancer Sister         In her 60s.    Breast cancer Maternal Aunt     Breast cancer Sister         In her 60s.    Heart disease Mother     Hypertension Mother     Heart disease Father     Hypertension Father     Heart attack Father     Heart disease Daughter     Hypertension Daughter     Heart disease Son     Hypertension Son     Malig Hyperthermia Neg Hx        Social History     Socioeconomic History    Marital status:     Number of children: 0   Tobacco Use    Smoking status: Never    Smokeless tobacco: Never    Tobacco comments:     caffeine use   Vaping Use    Vaping status: Never Used   Substance and Sexual Activity    Alcohol use: No    Drug use: No    Sexual activity: Defer           Objective   Physical Exam  Vitals and nursing note reviewed.   Constitutional:       General: She is not in acute distress.     Appearance: She is well-developed. She is obese. She is not ill-appearing.   Cardiovascular:      Rate and Rhythm: Normal rate and regular rhythm.   Pulmonary:      Effort: Pulmonary effort is normal. No tachypnea, bradypnea, accessory muscle usage or respiratory distress.      Breath sounds: Normal breath sounds. No stridor. No decreased breath sounds, wheezing, rhonchi or rales.   Abdominal:      Palpations: Abdomen is soft. There is no mass.      Tenderness: There is no abdominal tenderness. There is no guarding or rebound.   Musculoskeletal:         General: Normal range of motion.      Right lower leg: No edema.      Left lower leg: No edema.      Comments: No evidence of edema and no tenderness no deformity no wounds   Skin:     General: Skin is warm and dry.       Capillary Refill: Capillary refill takes less than 2 seconds.      Coloration: Skin is not pale.      Findings: No ecchymosis, erythema or rash.   Neurological:      General: No focal deficit present.      Mental Status: She is alert and oriented to person, place, and time. She is disoriented.   Psychiatric:         Mood and Affect: Mood normal.         Behavior: Behavior normal.       Procedures           ED Course  ED Course as of 08/25/24 1522   Sun Aug 25, 2024   1311 Troponin is 52 I suspect this is related to kidney disease.  However we will have a 2-hour Trope done as well.  Sugars 185 which is quite elevated creatinine is 1.25 which is not surprising that fits with her previous numbers.  Hemoglobin is 10.9 which is slightly low but is not a level that we transfuse.  Swabs pending urine pending BNP pending [AR]   1312 COVID flu are negative. [AR]   1454 Blood pressure on patient has improved without medication. [AR]   1455 UA shows small leukocytes 1+ otherwise negative.  Second troponin is pending.  BNP is 1553 which is pretty much where she runs on upon review.  With an EGFR of 41.8 I am not surprised that she has an elevated troponin but the double check on a second troponin is good to do. [AR]   1456 Radiology reading  FINDINGS: A single portable view of the chest demonstrates a cardiac  pacer with leads terminating in the right atrium and right ventricle.  There is mild cardiomegaly. The pulmonary vasculature and interstitium  are mildly prominent consistent with mild congestive changes. There is  no evidence of consolidation or gross effusion.    So may have a very mild amount of fluid overload that may be giving her the symptoms.      [AR]   1456 Her vital signs are currently appropriate at 97% respiration 17 heart rate 70 and blood pressure is improved and no fever here.  Suggest is not a viral component or at least 1 creating a fever. [AR]   1513 Troponin #2 was 55 3 points above 52 small delta I  suspect this is still related to kidney function. [AR]   1514 On discussion with patient's daughter and results while patient was in the restroom and felt like much of what is going on is due to stress and fatigue traveling and her sisters declining health.  I concur as the workup is not profoundly out of range or concerning and lab work EKG chest x-ray.  Patient is able to ambulate on her own without safety concerns. [AR]      ED Course User Index  [AR] Tracy Quinn MD                                           Medical Decision Making  Differential for dyspnea includes, but not limited to: COPD, asthma, PE, MI, pneumothorax, hemopneumothorax, pneumonia, pleural effusion, pulmonary fluid overload, CHF, acute allergic reaction, carbon monoxide poisoning, pulmonary hypertension, rapid heart rate, and pulmonary fibrosis.     Will get lab work chest x-ray EKG is done swabs D-dimer BNP.  Patient's lung sounds are clear so I would be surprised if she has any fluid collection and she has no history of congestive heart failure but she has a history of sick sinus syndrome A-fib and third degree heart block for which she has a pacemaker which by EKG is doing his job.  She has exposure potentially to respiratory infection so she is getting tested for that and she has a history of travel.  Also potential for UTI which give her fatigue so we will check that.    Your lab work did not show anything concerning.  Your chest x-ray was not concerning and your EKG was normal.  I suspect your travel and your stress and your sister's condition has taken a toll on how you feel in your body.  No doubt it is difficult to get good sleep at night.  I encourage you to follow-up with your primary care doctor within the next 2 weeks.    She and her daughter understood and agreed.      Problems Addressed:  Weakness: complicated acute illness or injury    Amount and/or Complexity of Data Reviewed  Labs: ordered. Decision-making details  documented in ED Course.  Radiology: ordered.  ECG/medicine tests: ordered and independent interpretation performed.     Details: EKG ventricular paced rhythm rate of 70 no unusual other findings.    Risk  OTC drugs.  Prescription drug management.        Final diagnoses:   Weakness       ED Disposition  ED Disposition       ED Disposition   Discharge    Condition   Stable    Comment   --               Gabriella Paige MD  [address] [facility]  [address] [place], [place] [id number] [phone]    In 1 week  As needed         Medication List      No changes were made to your prescriptions during this visit.

## 2024-08-27 ENCOUNTER — OFFICE VISIT (OUTPATIENT)
Dept: INTERNAL MEDICINE | Facility: CLINIC | Age: 89
End: 2024-08-27
Payer: MEDICARE

## 2024-08-27 VITALS
BODY MASS INDEX: 38.46 KG/M2 | WEIGHT: 209 LBS | TEMPERATURE: 98.2 F | HEIGHT: 62 IN | SYSTOLIC BLOOD PRESSURE: 152 MMHG | OXYGEN SATURATION: 94 % | HEART RATE: 70 BPM | DIASTOLIC BLOOD PRESSURE: 70 MMHG

## 2024-08-27 DIAGNOSIS — F41.1 GAD (GENERALIZED ANXIETY DISORDER): Primary | ICD-10-CM

## 2024-08-27 DIAGNOSIS — F41.0 PANIC ATTACK: ICD-10-CM

## 2024-08-27 PROCEDURE — G2211 COMPLEX E/M VISIT ADD ON: HCPCS | Performed by: FAMILY MEDICINE

## 2024-08-27 PROCEDURE — 1126F AMNT PAIN NOTED NONE PRSNT: CPT | Performed by: FAMILY MEDICINE

## 2024-08-27 PROCEDURE — 99214 OFFICE O/P EST MOD 30 MIN: CPT | Performed by: FAMILY MEDICINE

## 2024-08-27 RX ORDER — ESCITALOPRAM OXALATE 10 MG/1
10 TABLET ORAL DAILY
Qty: 30 TABLET | Refills: 1 | Status: SHIPPED | OUTPATIENT
Start: 2024-08-27

## 2024-08-27 RX ORDER — LORAZEPAM 0.5 MG/1
0.5 TABLET ORAL EVERY 8 HOURS PRN
Qty: 10 TABLET | Refills: 0 | Status: SHIPPED | OUTPATIENT
Start: 2024-08-27

## 2024-08-27 NOTE — PROGRESS NOTES
Subjective   Anastacia Sarah is a 88 y.o. female.   Chief Complaint   Patient presents with    Shortness of Breath     Panic attacks/anxiety-patient was evaluated in ER on 8/25/2024 for chest pain and shortness of breath.  She had chest x-ray done, EKG, blood work, urine test, COVID/flu test.  All tests were negative.  She was under a lot of stress lately.  She was traveling with her sister in [place].  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  She did not sleep well.  She was exhausted.  It was stressful trip for her.  She lost 2 close friends lately.    She is scheduled to travel to the wedding of her grandson but is afraid to go.  She is afraid that she will get panic attack on the plane.  She does not want to make trouble to her family.  Her daughter ,who is today with patient ,reports that she had underlying anxiety even before the stress level got so high.  She does not feel depressed.  She has no suicidal ideations.  PHQ-9 at 7, GUIDO-7 at 13.    Review of Systems   Respiratory:  Positive for shortness of breath.    Cardiovascular: Negative.    Psychiatric/Behavioral:  Negative for suicidal ideas. The patient is nervous/anxious.        Objective   Wt Readings from Last 3 Encounters:   08/27/24 94.8 kg (209 lb)   08/25/24 91.6 kg (202 lb)   05/15/24 91.6 kg (202 lb)      Vitals:    08/27/24 1529   BP: 152/70   Pulse: 70   Temp: 98.2 °F (36.8 °C)   SpO2: 94%     Temp Readings from Last 3 Encounters:   08/27/24 98.2 °F (36.8 °C)   08/25/24 98.2 °F (36.8 °C)   01/17/24 96.8 °F (36 °C) (Temporal)     BP Readings from Last 3 Encounters:   08/27/24 152/70   08/25/24 154/89   05/15/24 144/90     Pulse Readings from Last 3 Encounters:   08/27/24 70   08/25/24 70   05/15/24 71     Body mass index is 38.22 kg/m².    Physical Exam  Constitutional:       Appearance: She is well-developed.   Cardiovascular:      Rate and Rhythm: Normal rate and regular rhythm.      Heart sounds: Normal heart sounds.   Pulmonary:      Effort:  Pulmonary effort is normal.      Breath sounds: Normal breath sounds.   Neurological:      Mental Status: She is alert and oriented to person, place, and time.   Psychiatric:         Behavior: Behavior normal.     Assessment & Plan   Diagnoses and all orders for this visit:    1. GUIDO (generalized anxiety disorder) (Primary)    2. Panic attack    Other orders  -     escitalopram (Lexapro) 10 MG tablet; Take 1 tablet by mouth Daily.  Dispense: 30 tablet; Refill: 1  -     LORazepam (Ativan) 0.5 MG tablet; Take 1 tablet by mouth Every 8 (Eight) Hours As Needed for Anxiety.  Dispense: 10 tablet; Refill: 0        GUIDO/panic attacks-we are starting escitalopram at 10 mg a day.  Side effects reviewed with patient and her daughter.  We are prescribing a few tablets of lorazepam to be used on the plane and as needed for panic attacks.  Plans for short-term use of lorazepam.  Warnings on drowsiness and increased risk for fall.  Follow-up in 6 weeks, or sooner if needed

## 2024-09-18 ENCOUNTER — PATIENT OUTREACH (OUTPATIENT)
Dept: CASE MANAGEMENT | Facility: OTHER | Age: 89
End: 2024-09-18
Payer: MEDICARE

## 2024-10-08 ENCOUNTER — OFFICE VISIT (OUTPATIENT)
Dept: INTERNAL MEDICINE | Facility: CLINIC | Age: 89
End: 2024-10-08
Payer: MEDICARE

## 2024-10-08 VITALS
TEMPERATURE: 97.3 F | SYSTOLIC BLOOD PRESSURE: 140 MMHG | BODY MASS INDEX: 37.91 KG/M2 | DIASTOLIC BLOOD PRESSURE: 80 MMHG | WEIGHT: 206 LBS | HEART RATE: 74 BPM | OXYGEN SATURATION: 94 % | HEIGHT: 62 IN

## 2024-10-08 DIAGNOSIS — F41.0 PANIC ATTACK: ICD-10-CM

## 2024-10-08 DIAGNOSIS — R35.0 URINARY FREQUENCY: ICD-10-CM

## 2024-10-08 DIAGNOSIS — F41.1 GAD (GENERALIZED ANXIETY DISORDER): Primary | ICD-10-CM

## 2024-10-08 DIAGNOSIS — Z23 NEED FOR INFLUENZA VACCINATION: ICD-10-CM

## 2024-10-08 LAB
BILIRUB BLD-MCNC: NEGATIVE MG/DL
CLARITY, POC: ABNORMAL
COLOR UR: YELLOW
EXPIRATION DATE: ABNORMAL
GLUCOSE UR STRIP-MCNC: NEGATIVE MG/DL
KETONES UR QL: NEGATIVE
LEUKOCYTE EST, POC: ABNORMAL
Lab: ABNORMAL
NITRITE UR-MCNC: POSITIVE MG/ML
PH UR: 5.5 [PH] (ref 5–8)
PROT UR STRIP-MCNC: NEGATIVE MG/DL
RBC # UR STRIP: NEGATIVE /UL
SP GR UR: 1.02 (ref 1–1.03)
UROBILINOGEN UR QL: NORMAL

## 2024-10-08 PROCEDURE — 99214 OFFICE O/P EST MOD 30 MIN: CPT | Performed by: FAMILY MEDICINE

## 2024-10-08 PROCEDURE — G0008 ADMIN INFLUENZA VIRUS VAC: HCPCS | Performed by: FAMILY MEDICINE

## 2024-10-08 PROCEDURE — 1126F AMNT PAIN NOTED NONE PRSNT: CPT | Performed by: FAMILY MEDICINE

## 2024-10-08 PROCEDURE — 90662 IIV NO PRSV INCREASED AG IM: CPT | Performed by: FAMILY MEDICINE

## 2024-10-08 PROCEDURE — 81003 URINALYSIS AUTO W/O SCOPE: CPT | Performed by: FAMILY MEDICINE

## 2024-10-08 RX ORDER — AMOXICILLIN 500 MG/1
500 CAPSULE ORAL 3 TIMES DAILY
Qty: 15 CAPSULE | Refills: 0 | Status: SHIPPED | OUTPATIENT
Start: 2024-10-08

## 2024-10-08 RX ORDER — ESCITALOPRAM OXALATE 20 MG/1
20 TABLET ORAL DAILY
Qty: 30 TABLET | Refills: 1 | Status: SHIPPED | OUTPATIENT
Start: 2024-10-08

## 2024-10-08 NOTE — PROGRESS NOTES
Subjective   Anastacia Sarah is a 88 y.o. female.   Chief Complaint   Patient presents with    Urinary Frequency    Anxiety       History of Present Illness     GUIDO/Panic attack-at last office visit we started patient on escitalopram at 10 mg a day.  She takes it every day. She is a little drowsy after taking it.  She takes it in the mornings.  Otherwise she has no side effects.  She reports improvement.  It helped to take the edge off.  She used lorazepam only twice.  She did not have more panic attacks.  She still worries a lot.     Urinary urgency and frequency, cloudy urine, no burning sensation with urination-symptoms started 2 to 3 days ago.  No fever, but she has some back pain.     Review of Systems   Constitutional:  Negative for fever.   Genitourinary:  Positive for frequency and urgency. Negative for dysuria and hematuria.   Psychiatric/Behavioral:  Negative for suicidal ideas. The patient is nervous/anxious.          Objective   Wt Readings from Last 3 Encounters:   10/08/24 93.4 kg (206 lb)   08/27/24 94.8 kg (209 lb)   08/25/24 91.6 kg (202 lb)      Vitals:    10/08/24 1458   BP: 140/80   Pulse: 74   Temp: 97.3 °F (36.3 °C)   SpO2: 94%     Temp Readings from Last 3 Encounters:   10/08/24 97.3 °F (36.3 °C)   08/27/24 98.2 °F (36.8 °C)   08/25/24 98.2 °F (36.8 °C)     BP Readings from Last 3 Encounters:   10/08/24 140/80   08/27/24 152/70   08/25/24 154/89     Pulse Readings from Last 3 Encounters:   10/08/24 74   08/27/24 70   08/25/24 70     Body mass index is 37.67 kg/m².    Physical Exam  Constitutional:       Appearance: She is well-developed.   Cardiovascular:      Rate and Rhythm: Normal rate and regular rhythm.      Heart sounds: Normal heart sounds.   Pulmonary:      Effort: Pulmonary effort is normal.      Breath sounds: Normal breath sounds.   Psychiatric:         Behavior: Behavior normal.         Assessment & Plan   Diagnoses and all orders for this visit:    1. GUIDO (generalized anxiety  disorder) (Primary)    2. Urinary frequency  -     POCT urinalysis dipstick, automated  -     Urine Culture - Urine, Urine, Clean Catch    3. Panic attack    Other orders  -     escitalopram (Lexapro) 20 MG tablet; Take 1 tablet by mouth Daily.  Dispense: 30 tablet; Refill: 1  -     amoxicillin (AMOXIL) 500 MG capsule; Take 1 capsule by mouth 3 (Three) Times a Day.  Dispense: 15 capsule; Refill: 0        GUIDO/panic attacks- better, but still uncontrolled.  We are increasing dose of escitalopram to 20 mg a day.  To help with drowsiness she will take it in the evenings.  Follow-up in 6 weeks, sooner if problems.    UTI-urine dip positive for nitrates and leukocytes.  We are sending urine for culture.  We discussed with patient waiting for culture results versus initiating treatment today and she prefers to start today.  We will treat with amoxicillin.  Follow-up as needed.

## 2024-10-09 RX ORDER — LISINOPRIL 20 MG/1
20 TABLET ORAL DAILY
Qty: 90 TABLET | Refills: 1 | Status: SHIPPED | OUTPATIENT
Start: 2024-10-09

## 2024-10-11 LAB
BACTERIA UR CULT: ABNORMAL
BACTERIA UR CULT: ABNORMAL
OTHER ANTIBIOTIC SUSC ISLT: ABNORMAL

## 2024-10-30 ENCOUNTER — CLINICAL SUPPORT NO REQUIREMENTS (OUTPATIENT)
Age: 89
End: 2024-10-30
Payer: MEDICARE

## 2024-10-30 ENCOUNTER — OFFICE VISIT (OUTPATIENT)
Age: 89
End: 2024-10-30
Payer: MEDICARE

## 2024-10-30 VITALS
SYSTOLIC BLOOD PRESSURE: 130 MMHG | BODY MASS INDEX: 38.46 KG/M2 | HEIGHT: 62 IN | DIASTOLIC BLOOD PRESSURE: 70 MMHG | WEIGHT: 209 LBS | HEART RATE: 70 BPM

## 2024-10-30 DIAGNOSIS — Z98.890 S/P AV NODAL ABLATION: ICD-10-CM

## 2024-10-30 DIAGNOSIS — I48.21 PERMANENT ATRIAL FIBRILLATION: Primary | ICD-10-CM

## 2024-10-30 DIAGNOSIS — I48.19 PERSISTENT ATRIAL FIBRILLATION: ICD-10-CM

## 2024-10-30 DIAGNOSIS — Z95.0 S/P PLACEMENT OF CARDIAC PACEMAKER: ICD-10-CM

## 2024-10-30 DIAGNOSIS — R06.02 SHORTNESS OF BREATH: ICD-10-CM

## 2024-10-30 DIAGNOSIS — I44.2 THIRD DEGREE AV BLOCK: Primary | ICD-10-CM

## 2024-10-30 PROCEDURE — 99214 OFFICE O/P EST MOD 30 MIN: CPT | Performed by: PHYSICIAN ASSISTANT

## 2024-10-30 PROCEDURE — 1159F MED LIST DOCD IN RCRD: CPT | Performed by: PHYSICIAN ASSISTANT

## 2024-10-30 PROCEDURE — 93000 ELECTROCARDIOGRAM COMPLETE: CPT | Performed by: PHYSICIAN ASSISTANT

## 2024-10-30 PROCEDURE — 1160F RVW MEDS BY RX/DR IN RCRD: CPT | Performed by: PHYSICIAN ASSISTANT

## 2024-10-30 NOTE — PROGRESS NOTES
ELECTROPHYSIOLOGY   Date of Office Visit: 10/30/2024  Patient Name: Anastacia Sarah  : 1935  Encounter Provider: Yobany Lim PA-C  Electrophysiologist: Dr. Melendez  CHIEF COMPLAINT / REASON FOR OFFICE VISIT     Follow-up and Shortness of Breath  Shortness of breath     HISTORY OF PRESENT ILLNESS     This is a 89 y.o. year old female who presents to [facility] CARDIOLOGY for a  symptom follow up visit for shortness of breath.    She has a history of permanent atrial fibrillation and had a prior AVN ablation in 2018. She has comorbid SSS and complete heart block s/p [place] dual chamber lead pacemaker.     She went to ER in August for chest pain and shortness of air. Testing was all negative and she had been more stressed due to travel and was tried on lexapro.     Her symptoms have been followed by her PCP to help manage this. It is getting better.      Device interrogation today shows normal device function with 100% RV paced beats with no new episodes or events. She has had chronic high RV lead thresholds, settings were attempted to be adjusted today but left the same to preserve battery.    Today the patient reports since August, she has had worsening fatigue and shortness of breath. Initially it was thought this was anxiety but tx with antidepressants have not helped.     >> proceeding this was 3 deaths in her family and a stressful travel situation.     She reports worsening dyspnea with exertion. She also will have shortness of breath at rest. She has ongoing fatigue and is not sleeping at night. She will mostly only sleep during day time naps.     She does admit to more abdominal fullness but has not noted any increased LE edema.     She denies nausea, vomiting dizziness, or near syncope. She has not felt any palpations.     On apixaban 5 mg twice daily. Denies bleeding complications.     PMHx:  SSS/ Complete heart block s/p dual chamber PPM, HTN, Perm AF s/p AVN  "ablation 2018, obesity, GERD, right renal mass, CKD stage III, NANCY    PHYSICAL EXAMINATION     Vital Signs:  /70   Pulse 70   Ht 157.5 cm (62.01\")   Wt 94.8 kg (209 lb)   BMI 38.21 kg/m²   Estimated body mass index is 38.21 kg/m² as calculated from the following:    Height as of this encounter: 157.5 cm (62.01\").    Weight as of this encounter: 94.8 kg (209 lb).             Physical Exam  Constitutional:       General: She is not in acute distress.     Appearance: Normal appearance.   HENT:      Head: Normocephalic and atraumatic.   Cardiovascular:      Rate and Rhythm: Normal rate and regular rhythm.      Comments: Device pocket swell seated and normal without surrounding hematoma or erythema   Pulmonary:      Effort: Pulmonary effort is normal.      Breath sounds: Normal breath sounds.   Musculoskeletal:      Right lower leg: No edema.      Left lower leg: No edema.   Skin:     General: Skin is warm and dry.   Neurological:      Mental Status: She is alert.          Cardiac Testing/Results     Cardiac Testing:   - Echo 2017: EF 64% with mild to moderate LVH. Mild to moderate MV R. LA volume moderately increased. Mild to moderate TR. RVSP 38.9 mmHg.     Result Review :  The following data was reviewed by: Yobany Lim PA-C on 10/30/2024:       Lab Results   Component Value Date     08/25/2024     01/17/2024    K 3.7 08/25/2024    K 4.2 01/17/2024     08/25/2024     01/17/2024    CO2 26.0 08/25/2024    CO2 25.5 01/17/2024    BUN 15 08/25/2024    BUN 21 01/17/2024    CREATININE 1.25 (H) 08/25/2024    CREATININE 1.16 (H) 01/17/2024    EGFRIFNONA 38 (L) 01/04/2022    EGFRIFNONA 43 (L) 11/18/2021    EGFRIFAFRI 50 (L) 12/06/2017    EGFRIFAFRI 52 (L) 08/07/2017    GLUCOSE 185 (H) 08/25/2024    GLUCOSE 131 (H) 01/17/2024    CALCIUM 8.9 08/25/2024    CALCIUM 9.2 01/17/2024    ALBUMIN 3.8 08/25/2024    ALBUMIN 3.8 01/17/2024    AST 20 08/25/2024    AST 28 01/17/2024    ALT 17 " 08/25/2024    ALT 10 01/17/2024     Lab Results   Component Value Date    WBC 5.86 08/25/2024    WBC 5.60 01/17/2024    HGB 10.9 (L) 08/25/2024    HGB 12.5 01/17/2024    HCT 34.1 08/25/2024    HCT 39.1 01/17/2024    MCV 93.4 08/25/2024    MCV 92.9 01/17/2024     08/25/2024     01/17/2024     Lab Results   Component Value Date    PROBNP 1,555.0 08/25/2024    PROBNP 1,462.0 05/02/2022     Lab Results   Component Value Date    TROPONINT 55 (C) 08/25/2024     Lab Results   Component Value Date    TSH 0.506 01/29/2020    TSH 3.540 12/10/2019                 ECG 12 Lead    Date/Time: 10/30/2024 9:33 AM  Performed by: Yobany Feliz PA-C    Authorized by: Yobany Feliz PA-C  Comparison: compared with previous ECG from 8/25/2024  Rhythm: paced  Rate: normal  BPM: 70  QRS axis: left  Comments: , underlying AF                 ASSESSMENT & PLAN       Diagnoses and all orders for this visit:    1-2. Permanent atrial fibrillation (Primary), S/P AV deshawn ablation  She has no new symptoms of palpitations since she had AVN ablation back in 2018.   She is no longer on metoprolol as she did not need rate control after a prior admission  She is on eliquis 5 mg twice daily (appropriate for weight and renal function), denies bleeding complications  3. S/P placement of cardiac pacemaker  Hx of CHB with normal device function on PPM today  She is dependent and there has been no new episodes or events  She has 100% RV paced beats, she has had higher RV thresholds and likely when gen change comes in a few years will need a new RV lead  4. Shortness of breath  Worse the past 3 months, occurring during a time of major stress and physical exertion on her end. Anxiety tx has not really improved symptoms  I wonder if she maybe had a stress induced cardiomyopathy and she is noting some more abdominal fullness on exam today. Will increase her lasix to 40 mg in AM and 20 mg at night x 1 week to see if this improves  symptoms.   I will call her for an update.   Her SOA is worse with exertion, if no improvement with diuresis then we will go back to her normal diuretic dosing and consider trying an antianginal short term. Given her age avoid any invasive intervention would be a preference           Follow Up:  Return in about 6 months (around 4/30/2025) for Dr. Melendez- Routine, Device check.  Patient was given instructions and counseling regarding her condition or for health maintenance advice. Please contact office if worsening symptoms or proceed to ER when appropriate.      Yobany Lim PA-C  10/30/24  10:16 EDT    MEDICATIONS         Discharge Medications            Accurate as of October 30, 2024 10:16 AM. If you have any questions, ask your nurse or doctor.                Continue These Medications        Instructions Start Date   CALCIUM PO   600 Units, Daily      cholecalciferol 25 MCG (1000 UT) tablet  Commonly known as: VITAMIN D3   1,000 Units, Daily      Eliquis 5 MG tablet tablet  Generic drug: apixaban   5 mg, Oral, Every 12 Hours      escitalopram 20 MG tablet  Commonly known as: Lexapro   20 mg, Oral, Daily      esomeprazole 40 MG capsule  Commonly known as: nexIUM   40 mg, Every Morning Before Breakfast      fluticasone 50 MCG/ACT nasal spray  Commonly known as: FLONASE   2 sprays, Nasal, Daily      furosemide 20 MG tablet  Commonly known as: LASIX   40 mg, Oral, Daily      GLUCOSAMINE-CHONDROITIN PO   1 tablet, Daily      lisinopril 20 MG tablet  Commonly known as: PRINIVIL,ZESTRIL   20 mg, Oral, Daily      loperamide 2 MG capsule  Commonly known as: IMODIUM   2 mg, Oral, 4 Times Daily PRN      Loratadine 10 MG capsule   10 mg, Daily PRN      LORazepam 0.5 MG tablet  Commonly known as: Ativan   0.5 mg, Oral, Every 8 Hours PRN      multivitamin tablet tablet  Commonly known as: THERAGRAN   1 tablet, Daily      PRESERVISION AREDS 2 PO   1 capsule, 2 times daily      PROBIOTIC ADVANCED PO   Take  by mouth.       REFRESH EYE ITCH RELIEF OP   Apply  to eye(s) as directed by provider.      Synthroid 112 MCG tablet  Generic drug: levothyroxine   125 mcg, Every Other Day      Synthroid 125 MCG tablet  Generic drug: levothyroxine   125 mcg, Every Other Day      vitamin B-12 1000 MCG tablet  Commonly known as: CYANOCOBALAMIN   1,000 mcg, Daily                   **Dragon Disclaimer: This note was dictated using an electronic transcription. The electronic translation of spoken language may permit erroneous, or at times, nonsensical words or phrases to be inadvertently transcribed. Although I have reviewed the note for such errors, some may still exist.

## 2024-11-06 ENCOUNTER — TELEPHONE (OUTPATIENT)
Age: 89
End: 2024-11-06
Payer: MEDICARE

## 2024-11-06 DIAGNOSIS — R06.02 SHORTNESS OF BREATH: Primary | ICD-10-CM

## 2024-11-06 RX ORDER — ISOSORBIDE MONONITRATE 30 MG/1
30 TABLET, EXTENDED RELEASE ORAL DAILY
Qty: 30 TABLET | Refills: 1 | Status: SHIPPED | OUTPATIENT
Start: 2024-11-06

## 2024-11-06 NOTE — TELEPHONE ENCOUNTER
----- Message from Yobany Feliz sent at 10/30/2024  9:54 AM EDT -----  Regarding: shortness of breath  Call to see how breathing is doing next week

## 2024-11-06 NOTE — TELEPHONE ENCOUNTER
Talked to pt. Still having dyspnea on exertion with waking. Increased diuretic did not help with her symptoms.     Go back to taking prior dose of diuretics.     Her symptoms sound suspiciously anginal and get a try her on isosorbide for a couple weeks and see if this helps with her symptoms.  I will check in with her at that point.  I sent this to her pharmacy on file.

## 2024-11-19 ENCOUNTER — OFFICE VISIT (OUTPATIENT)
Dept: INTERNAL MEDICINE | Facility: CLINIC | Age: 89
End: 2024-11-19
Payer: MEDICARE

## 2024-11-19 VITALS
SYSTOLIC BLOOD PRESSURE: 130 MMHG | WEIGHT: 204 LBS | OXYGEN SATURATION: 96 % | HEART RATE: 71 BPM | HEIGHT: 62 IN | TEMPERATURE: 97.5 F | DIASTOLIC BLOOD PRESSURE: 70 MMHG | BODY MASS INDEX: 37.54 KG/M2

## 2024-11-19 DIAGNOSIS — F41.1 GAD (GENERALIZED ANXIETY DISORDER): Primary | ICD-10-CM

## 2024-11-19 PROCEDURE — 1126F AMNT PAIN NOTED NONE PRSNT: CPT | Performed by: FAMILY MEDICINE

## 2024-11-19 PROCEDURE — 99213 OFFICE O/P EST LOW 20 MIN: CPT | Performed by: FAMILY MEDICINE

## 2024-11-19 PROCEDURE — 1159F MED LIST DOCD IN RCRD: CPT | Performed by: FAMILY MEDICINE

## 2024-11-19 PROCEDURE — G2211 COMPLEX E/M VISIT ADD ON: HCPCS | Performed by: FAMILY MEDICINE

## 2024-11-19 PROCEDURE — 1160F RVW MEDS BY RX/DR IN RCRD: CPT | Performed by: FAMILY MEDICINE

## 2024-11-19 RX ORDER — ESCITALOPRAM OXALATE 10 MG/1
10 TABLET ORAL DAILY
Qty: 90 TABLET | Refills: 1 | Status: SHIPPED | OUTPATIENT
Start: 2024-11-19

## 2024-11-19 NOTE — PROGRESS NOTES
Subjective   Anastacia Sarah is a 89 y.o. female.   Chief Complaint   Patient presents with    Shortness of Breath    Anxiety       History of Present Illness     GUIDO/Panic attack-at last office visit we increased dose of escitalopram to 20 mg a day.  She takes it every day. She has no side effects.  She reports no significant difference after dose was increased from 10 mg to 20 mg.  She did not have any more panic attacks.  She did not have to use lorazepam.      She followed up with cardiologist.  They tried higher dose of Lasix to help with shortness of breath, but it did not help and she was recently started on isosorbide.  She reports some improvement with it.  She is scheduled with her pulmonologist next month.    Review of Systems   Psychiatric/Behavioral:  Negative for suicidal ideas.          Objective   Wt Readings from Last 3 Encounters:   11/19/24 92.5 kg (204 lb)   10/30/24 94.8 kg (209 lb)   10/08/24 93.4 kg (206 lb)      Vitals:    11/19/24 1523   BP: 130/70   Pulse: 71   Temp: 97.5 °F (36.4 °C)   SpO2: 96%     Temp Readings from Last 3 Encounters:   11/19/24 97.5 °F (36.4 °C)   10/08/24 97.3 °F (36.3 °C)   08/27/24 98.2 °F (36.8 °C)     BP Readings from Last 3 Encounters:   11/19/24 130/70   10/30/24 130/70   10/08/24 140/80     Pulse Readings from Last 3 Encounters:   11/19/24 71   10/30/24 70   10/08/24 74     Body mass index is 37.3 kg/m².    Physical Exam  Constitutional:       Appearance: Normal appearance.   Cardiovascular:      Rate and Rhythm: Normal rate and regular rhythm.   Pulmonary:      Effort: Pulmonary effort is normal.      Breath sounds: Normal breath sounds.   Psychiatric:         Mood and Affect: Mood normal.         Behavior: Behavior normal.         Assessment & Plan   Diagnoses and all orders for this visit:    1. GUIDO (generalized anxiety disorder) (Primary)    Other orders  -     escitalopram (Lexapro) 10 MG tablet; Take 1 tablet by mouth Daily.  Dispense: 90 tablet; Refill:  1        GUIDO-we are decreasing dose of escitalopram to 10 mg a day as patient did not see a difference between 10 and 20 mg.  Follow-up in 6 months, or sooner if any problems.

## 2024-11-22 ENCOUNTER — TELEPHONE (OUTPATIENT)
Age: 89
End: 2024-11-22
Payer: MEDICARE

## 2024-12-02 DIAGNOSIS — R06.02 SHORTNESS OF BREATH: Primary | ICD-10-CM

## 2024-12-02 DIAGNOSIS — I48.19 PERSISTENT ATRIAL FIBRILLATION: ICD-10-CM

## 2024-12-02 RX ORDER — RANOLAZINE 500 MG/1
500 TABLET, EXTENDED RELEASE ORAL 2 TIMES DAILY
Qty: 60 TABLET | Refills: 0 | Status: SHIPPED | OUTPATIENT
Start: 2024-12-02

## 2024-12-02 RX ORDER — ESCITALOPRAM OXALATE 20 MG/1
20 TABLET ORAL DAILY
Qty: 30 TABLET | Refills: 1 | OUTPATIENT
Start: 2024-12-02

## 2024-12-02 NOTE — PROGRESS NOTES
Talked to pt, she is still having dyspnea on exertion. It did get better for a few days after increasing her lasix.    Stop isosorbide, Start Ranexa 500 mg x 30 days

## 2024-12-04 ENCOUNTER — TELEPHONE (OUTPATIENT)
Dept: CARDIOLOGY | Facility: CLINIC | Age: 89
End: 2024-12-04

## 2024-12-04 NOTE — TELEPHONE ENCOUNTER
Caller: Anastacia Sarah    Relationship: Self    Best call back number: [phone]    What is the best time to reach you: ANY    Who are you requesting to speak with (clinical staff, provider,  specific staff member): CLINICAL    Do you know the name of the person who called: NO VOICE MAIL    What was the call regarding: NO NOTESIN CHART NO VOICE MAIL PATIENT MISSED CALL FROM PRACTICE PLEASE ADVISE.    Is it okay if the provider responds through [employer]: YES

## 2024-12-10 DIAGNOSIS — I44.2 THIRD DEGREE AV BLOCK: ICD-10-CM

## 2024-12-10 DIAGNOSIS — I48.19 PERSISTENT ATRIAL FIBRILLATION: ICD-10-CM

## 2024-12-10 DIAGNOSIS — R06.02 SHORTNESS OF BREATH: Primary | ICD-10-CM

## 2024-12-13 ENCOUNTER — TELEPHONE (OUTPATIENT)
Dept: CARDIOLOGY | Facility: CLINIC | Age: 89
End: 2024-12-13
Payer: MEDICARE

## 2024-12-16 ENCOUNTER — HOSPITAL ENCOUNTER (OUTPATIENT)
Dept: CARDIOLOGY | Facility: HOSPITAL | Age: 89
Discharge: HOME | End: 2024-12-16
Admitting: PHYSICIAN ASSISTANT
Payer: MEDICARE

## 2024-12-16 ENCOUNTER — HOSPITAL ENCOUNTER (OUTPATIENT)
Dept: CARDIOLOGY | Facility: HOSPITAL | Age: 89
Discharge: HOME | End: 2024-12-16
Payer: MEDICARE

## 2024-12-16 VITALS
DIASTOLIC BLOOD PRESSURE: 80 MMHG | HEART RATE: 70 BPM | SYSTOLIC BLOOD PRESSURE: 120 MMHG | OXYGEN SATURATION: 93 % | HEIGHT: 62 IN | WEIGHT: 204 LBS | BODY MASS INDEX: 37.54 KG/M2

## 2024-12-16 VITALS — WEIGHT: 205.03 LBS | BODY MASS INDEX: 37.73 KG/M2 | HEIGHT: 62 IN

## 2024-12-16 DIAGNOSIS — R06.02 SHORTNESS OF BREATH: ICD-10-CM

## 2024-12-16 DIAGNOSIS — I44.2 THIRD DEGREE AV BLOCK: ICD-10-CM

## 2024-12-16 DIAGNOSIS — I48.19 PERSISTENT ATRIAL FIBRILLATION: ICD-10-CM

## 2024-12-16 LAB
AORTIC ARCH: 2.3 CM
ASCENDING AORTA: 3.2 CM
BH CV ECHO LEFT VENTRICLE GLOBAL LONGITUDINAL STRAIN: -17.7 %
BH CV ECHO MEAS - ACS: 1.71 CM
BH CV ECHO MEAS - AO MAX PG: 9.9 MMHG
BH CV ECHO MEAS - AO MEAN PG: 5 MMHG
BH CV ECHO MEAS - AO ROOT DIAM: 2.9 CM
BH CV ECHO MEAS - AO V2 MAX: 157 CM/SEC
BH CV ECHO MEAS - AO V2 VTI: 34.2 CM
BH CV ECHO MEAS - AVA(I,D): 1.75 CM2
BH CV ECHO MEAS - EDV(CUBED): 86.7 ML
BH CV ECHO MEAS - EDV(MOD-SP2): 102 ML
BH CV ECHO MEAS - EDV(MOD-SP4): 100 ML
BH CV ECHO MEAS - EF(MOD-BP): 55.3 %
BH CV ECHO MEAS - EF(MOD-SP2): 52 %
BH CV ECHO MEAS - EF(MOD-SP4): 58 %
BH CV ECHO MEAS - ESV(CUBED): 26 ML
BH CV ECHO MEAS - ESV(MOD-SP2): 49 ML
BH CV ECHO MEAS - ESV(MOD-SP4): 42 ML
BH CV ECHO MEAS - FS: 33 %
BH CV ECHO MEAS - IVS/LVPW: 1.03 CM
BH CV ECHO MEAS - IVSD: 1.5 CM
BH CV ECHO MEAS - LAT PEAK E' VEL: 8.2 CM/SEC
BH CV ECHO MEAS - LV DIASTOLIC VOL/BSA (35-75): 51.8 CM2
BH CV ECHO MEAS - LV MASS(C)D: 264.1 GRAMS
BH CV ECHO MEAS - LV MAX PG: 3.5 MMHG
BH CV ECHO MEAS - LV MEAN PG: 2 MMHG
BH CV ECHO MEAS - LV SYSTOLIC VOL/BSA (12-30): 21.7 CM2
BH CV ECHO MEAS - LV V1 MAX: 93.4 CM/SEC
BH CV ECHO MEAS - LV V1 VTI: 19.4 CM
BH CV ECHO MEAS - LVIDD: 4.4 CM
BH CV ECHO MEAS - LVIDS: 3 CM
BH CV ECHO MEAS - LVOT AREA: 3.1 CM2
BH CV ECHO MEAS - LVOT DIAM: 1.98 CM
BH CV ECHO MEAS - LVPWD: 1.46 CM
BH CV ECHO MEAS - MED PEAK E' VEL: 7.1 CM/SEC
BH CV ECHO MEAS - MR MAX PG: 124.6 MMHG
BH CV ECHO MEAS - MR MAX VEL: 558 CM/SEC
BH CV ECHO MEAS - MV A DUR: 0.12 SEC
BH CV ECHO MEAS - MV A MAX VEL: 59.1 CM/SEC
BH CV ECHO MEAS - MV DEC SLOPE: 667.5 CM/SEC2
BH CV ECHO MEAS - MV DEC TIME: 0.14 SEC
BH CV ECHO MEAS - MV E MAX VEL: 118 CM/SEC
BH CV ECHO MEAS - MV E/A: 2
BH CV ECHO MEAS - MV MAX PG: 7.4 MMHG
BH CV ECHO MEAS - MV MEAN PG: 2.6 MMHG
BH CV ECHO MEAS - MV P1/2T: 59.6 MSEC
BH CV ECHO MEAS - MV V2 VTI: 31.7 CM
BH CV ECHO MEAS - MVA(P1/2T): 3.7 CM2
BH CV ECHO MEAS - MVA(VTI): 1.89 CM2
BH CV ECHO MEAS - PA ACC TIME: 0.1 SEC
BH CV ECHO MEAS - PA V2 MAX: 92.3 CM/SEC
BH CV ECHO MEAS - PULM A REVS DUR: 0.12 SEC
BH CV ECHO MEAS - PULM A REVS VEL: 28.1 CM/SEC
BH CV ECHO MEAS - PULM DIAS VEL: 93.1 CM/SEC
BH CV ECHO MEAS - PULM S/D: 0.34
BH CV ECHO MEAS - PULM SYS VEL: 32 CM/SEC
BH CV ECHO MEAS - QP/QS: 0.31
BH CV ECHO MEAS - RAP SYSTOLE: 15 MMHG
BH CV ECHO MEAS - RV MAX PG: 0.79 MMHG
BH CV ECHO MEAS - RV V1 MAX: 44.6 CM/SEC
BH CV ECHO MEAS - RV V1 VTI: 5.3 CM
BH CV ECHO MEAS - RVOT DIAM: 2.1 CM
BH CV ECHO MEAS - RVSP: 59 MMHG
BH CV ECHO MEAS - SUP REN AO DIAM: 1.6 CM
BH CV ECHO MEAS - SV(LVOT): 60 ML
BH CV ECHO MEAS - SV(MOD-SP2): 53 ML
BH CV ECHO MEAS - SV(MOD-SP4): 58 ML
BH CV ECHO MEAS - SV(RVOT): 18.6 ML
BH CV ECHO MEAS - SVI(LVOT): 31.1 ML/M2
BH CV ECHO MEAS - SVI(MOD-SP2): 27.4 ML/M2
BH CV ECHO MEAS - SVI(MOD-SP4): 30 ML/M2
BH CV ECHO MEAS - TAPSE (>1.6): 2.14 CM
BH CV ECHO MEAS - TR MAX PG: 44 MMHG
BH CV ECHO MEAS - TR MAX VEL: 331.8 CM/SEC
BH CV ECHO MEASUREMENTS AVERAGE E/E' RATIO: 15.42
BH CV XLRA - RV BASE: 3.2 CM
BH CV XLRA - RV LENGTH: 7.8 CM
BH CV XLRA - RV MID: 3.4 CM
BH CV XLRA - TDI S': 9.6 CM/SEC
LEFT ATRIUM VOLUME INDEX: 41.5 ML/M2
SINUS: 3 CM
STJ: 2.8 CM

## 2024-12-16 PROCEDURE — 34310000005 RUBIDIUM CHLORIDE: Performed by: PHYSICIAN ASSISTANT

## 2024-12-16 PROCEDURE — 93356 MYOCRD STRAIN IMG SPCKL TRCK: CPT

## 2024-12-16 PROCEDURE — 93306 TTE W/DOPPLER COMPLETE: CPT

## 2024-12-16 PROCEDURE — 25510000001 PERFLUTREN 6.52 MG/ML SUSPENSION 2 ML VIAL: Performed by: PHYSICIAN ASSISTANT

## 2024-12-16 PROCEDURE — 25010000002 REGADENOSON 0.4 MG/5ML SOLUTION: Performed by: PHYSICIAN ASSISTANT

## 2024-12-16 PROCEDURE — 93017 CV STRESS TEST TRACING ONLY: CPT

## 2024-12-16 PROCEDURE — A9555 RB82 RUBIDIUM: HCPCS | Performed by: PHYSICIAN ASSISTANT

## 2024-12-16 PROCEDURE — 78492 MYOCRD IMG PET MLT RST&STRS: CPT

## 2024-12-16 RX ORDER — REGADENOSON 0.08 MG/ML
0.4 INJECTION, SOLUTION INTRAVENOUS
Status: COMPLETED | OUTPATIENT
Start: 2024-12-16 | End: 2024-12-16

## 2024-12-16 RX ADMIN — PERFLUTREN 2 ML: 6.52 INJECTION, SUSPENSION INTRAVENOUS at 13:04

## 2024-12-16 RX ADMIN — REGADENOSON 0.4 MG: 0.08 INJECTION, SOLUTION INTRAVENOUS at 13:33

## 2024-12-17 LAB
BH CV NUCLEAR PRIOR STUDY: 1
BH CV REST NUCLEAR ISOTOPE DOSE: 24.5 MCI
BH CV STRESS BP STAGE 1: NORMAL
BH CV STRESS COMMENTS STAGE 1: NORMAL
BH CV STRESS DOSE REGADENOSON STAGE 1: 0.4
BH CV STRESS DURATION MIN STAGE 1: 0
BH CV STRESS DURATION SEC STAGE 1: 10
BH CV STRESS HR STAGE 1: 70
BH CV STRESS NUCLEAR ISOTOPE DOSE: 24.5 MCI
BH CV STRESS PROTOCOL 1: NORMAL
BH CV STRESS RECOVERY BP: NORMAL MMHG
BH CV STRESS RECOVERY HR: 70 BPM
BH CV STRESS STAGE 1: 1
LV EF NUC BP: 60 %
MAXIMAL PREDICTED HEART RATE: 132 BPM
PERCENT MAX PREDICTED HR: 53.03 %
STRESS BASELINE BP: NORMAL MMHG
STRESS BASELINE HR: 70 BPM
STRESS O2 SAT REST: 96 %
STRESS PERCENT HR: 62 %
STRESS POST EXERCISE DUR SEC: 10 SEC
STRESS POST O2 SAT PEAK: 98 %
STRESS POST PEAK BP: NORMAL MMHG
STRESS POST PEAK HR: 70 BPM
STRESS TARGET HR: 112 BPM

## 2024-12-18 RX ORDER — SPIRONOLACTONE 25 MG/1
25 TABLET ORAL DAILY
Qty: 30 TABLET | Refills: 11 | Status: SHIPPED | OUTPATIENT
Start: 2024-12-18

## 2024-12-18 NOTE — PROGRESS NOTES
Fab Burkett,     You are seeing Mrs. Sarah tomorrow and we have been following her for the last few weeks.  She is having worsening progressive shortness of breath.  I ordered a new echo and it showed she now has grade 3 diastolic dysfunction as well as elevated RVSP suggesting pulmonary hypertension.    We are going to start her on spironolactone 25 mg daily and have her start taking Lasix 20 mg daily as well.     Just wanted to keep you in the loop to see if you guys have any other recommendations for the pulmonary hypertension thanks!    Yobany

## 2024-12-18 NOTE — PROGRESS NOTES
Called patient and reviewed the results of the echo and the stress test.  I talked to Dr. Anderson about the stress test and he does not feel that there were any high risk findings that would be associated with a significant blockage.  Her heart function was excellent during stress.    Echo is showing grade 3 diastolic dysfunction with elevated pulmonary pressures.  I feel like this could be the primary reason that she is having such shortness of breath.  I want to start her on spironolactone 25 mg daily and continue her on Lasix 20 mg daily.     She is seeing pulmonology on Friday and I am going to send in these results they can also evaluate her lungs at that point to see if they wanted to anything else for the pulmonary hypertension.

## 2024-12-19 ENCOUNTER — OFFICE VISIT (OUTPATIENT)
Dept: SLEEP MEDICINE | Facility: HOSPITAL | Age: 89
End: 2024-12-19
Payer: MEDICARE

## 2024-12-19 VITALS — HEART RATE: 71 BPM | HEIGHT: 62 IN | BODY MASS INDEX: 38.46 KG/M2 | WEIGHT: 209 LBS | OXYGEN SATURATION: 94 %

## 2024-12-19 DIAGNOSIS — G47.33 OSA (OBSTRUCTIVE SLEEP APNEA): Primary | ICD-10-CM

## 2024-12-19 DIAGNOSIS — E66.01 CLASS 2 SEVERE OBESITY DUE TO EXCESS CALORIES WITH SERIOUS COMORBIDITY AND BODY MASS INDEX (BMI) OF 38.0 TO 38.9 IN ADULT: ICD-10-CM

## 2024-12-19 DIAGNOSIS — G47.14 HYPERSOMNIA DUE TO MEDICAL CONDITION: ICD-10-CM

## 2024-12-19 DIAGNOSIS — E66.812 CLASS 2 SEVERE OBESITY DUE TO EXCESS CALORIES WITH SERIOUS COMORBIDITY AND BODY MASS INDEX (BMI) OF 38.0 TO 38.9 IN ADULT: ICD-10-CM

## 2024-12-19 PROCEDURE — G0463 HOSPITAL OUTPT CLINIC VISIT: HCPCS

## 2024-12-19 PROCEDURE — 1160F RVW MEDS BY RX/DR IN RCRD: CPT | Performed by: INTERNAL MEDICINE

## 2024-12-19 PROCEDURE — 99213 OFFICE O/P EST LOW 20 MIN: CPT | Performed by: INTERNAL MEDICINE

## 2024-12-19 PROCEDURE — 1159F MED LIST DOCD IN RCRD: CPT | Performed by: INTERNAL MEDICINE

## 2024-12-19 NOTE — PROGRESS NOTES
"Follow Up [facility] Disorders [facility] Note     Chief Complaint:  NANCY     Primary Care Physician: Gabriella Paige MD    Interval History:   The patient is a 89 y.o. female who I last saw 12/7/2023 and that note was reviewed.  The patient reports she is worse because she is having some breathing issues.  She goes to bed between 2 and 4 AM and gets out of bed 11:30 AM.  She will use the restroom during that time.    Review of Systems:    A complete review of systems was done and all were negative with the exception of some worsening shortness of breath with exertion, occasional wheezing, occasional cough, some abdominal bloating and some anxiety    Social History:    Social History     Socioeconomic History    Marital status:     Number of children: 0   Tobacco Use    Smoking status: Never    Smokeless tobacco: Never    Tobacco comments:     caffeine use   Vaping Use    Vaping status: Never Used   Substance and Sexual Activity    Alcohol use: No    Drug use: No    Sexual activity: Defer       Allergies:  Sulfa antibiotics, Adhesive tape, and Levaquin [levofloxacin]     Medication Review:  Reviewed.      Vital Signs:    Vitals:    12/19/24 1022   Pulse: 71   SpO2: 94%   Weight: 94.8 kg (209 lb)   Height: 157.5 cm (62.01\")     Body mass index is 38.21 kg/m².    Physical Exam:    Constitutional:  Well developed 89 y.o. female that appears in no apparent distress.  Awake & oriented times 3.  Normal mood with normal recent and remote memory and normal judgement.  Eyes:  Conjunctivae normal.  Oropharynx: Previously, moist mucous membranes without exudate and a large tongue that is scalloped and class III Mallampati airway.    Self-administered Harmans Sleepiness Scale test results: 15, previously 12  0-5 Lower normal daytime sleepiness  6-10 Higher normal daytime sleepiness  11-12 Mild, 13-15 Moderate, & 16-24 Severe excessive daytime sleepiness     Downloaded PAP Data Evaluated For Therapeutic Response and Compliance:  " DME is [facility] and the patient uses a nasal pillow.  Downloads between 9/20 and 12/18/2024 compliance 100%.  Average usage is 7 hours and 53 minutes.  Average AHI is normal without leak.  Average auto CPAP pressure is 11 and her new DreamStation auto CPAP is set at 10-15    I have reviewed the above results and compared them with the patient's last downloads and reviewed with the patient.    Impression:   Mild obstructive sleep apnea by overnight polysomnogram 4/21/2011, weight 187 pounds, adequately treated with new DreamStation auto CPAP.  Downloads demonstrate the patient to be at goal with good compliance and usage.  The patient has some persistent complaints of hypersomnolence.  Circadian rhythm sleep disorder, delayed sleep phase type    Plan:  Good sleep hygiene measures should be maintained.  Weight loss would be beneficial in this patient who has class II severe obesity by Body mass index is 38.21 kg/m².      After evaluating the patient and assessing results available, the patient is benefiting from the treatment being provided.     The patient will continue new DreamStation auto CPAP.  Potential side effects of not using PAP therapy reviewed and addressed as needed.  After clinical evaluation and review of downloads, I recommend no changes to the patient's pressures.  A new prescription will be sent to the patient's DME.    Due to circadian rhythm sleep disorder, delayed sleep phase type, I reviewed and asked the patient to try melatonin, 5-20 mg, 4 hours prior to desired sleep time.  I also described to her that sleeping and makes her sleep onset insomnia worse.    I answered all of the patient's questions.  The patient will call the [facility] for any problems and will follow up in 1 year.      Emerson Burkett MD  Sleep Medicine  12/19/24  10:39 EST

## 2025-01-03 DIAGNOSIS — R06.02 SHORTNESS OF BREATH: ICD-10-CM

## 2025-01-03 RX ORDER — ISOSORBIDE MONONITRATE 30 MG/1
30 TABLET, EXTENDED RELEASE ORAL DAILY
Qty: 30 TABLET | Refills: 1 | OUTPATIENT
Start: 2025-01-03

## 2025-03-17 ENCOUNTER — TELEPHONE (OUTPATIENT)
Age: 89
End: 2025-03-17
Payer: MEDICARE

## 2025-03-17 NOTE — TELEPHONE ENCOUNTER
PT called back and does not report any dizziness or passing out. Says she has been tired, but that is an ongoing issue for her. No symptoms correlate with the events. Told her we would call back if needed.

## 2025-03-17 NOTE — TELEPHONE ENCOUNTER
This pt has a [employer] dual chamber pacemaker that is currently programmed VVIR.     Pt with hx of permanent AF (on a-ban) and had an AVN ablation done in 2018.     Pt dependent per hx but per last in-office device check, she had an escaped rhythm in the 40s.     After reviewing most recent remote, pt had a NSVT event on 3/14/25. After review, it appears to possibly be oversensing some type of noise?             Pt had two other NSVT events from 3/13/25 that appear to show the same thing:                    Nothing of note in Epic of her having any type of procedure on these dates.     I tried calling the pt to ask about symptoms, no answer, LVM.     Below is pt's current settings. Pt does have hx of high RV thresholds.         Her next appt is scheduled for 4/30/25.     Do you want to see her sooner or for her to be seen in the device clinic sooner for further testing?    Also, it was noted that her RV sensitivity is programmed at 0.5mV. Since she has had an AVN ablation, do you want us to change her sensitivity to help cover up any noise if that is what is going on in the above episodes?

## 2025-03-18 ENCOUNTER — TELEPHONE (OUTPATIENT)
Dept: CARDIOLOGY | Age: 89
End: 2025-03-18

## 2025-03-18 ENCOUNTER — CLINICAL SUPPORT NO REQUIREMENTS (OUTPATIENT)
Age: 89
End: 2025-03-18
Payer: MEDICARE

## 2025-03-18 DIAGNOSIS — I44.2 THIRD DEGREE AV BLOCK: Primary | ICD-10-CM

## 2025-03-18 NOTE — TELEPHONE ENCOUNTER
Caller: Anastacia Sarah    Relationship: Self    Best call back number: [phone]     What was the call regarding: PT CALLING TO LET DEVICE DEPT KNOW SHE IS ON HER WAY - THEY SPOKE THIS MORNING -

## 2025-03-18 NOTE — TELEPHONE ENCOUNTER
She needs to come in and have sens setting increased a lot. Depending on what it is, I would put it way up. Let me know. We should do this today

## 2025-03-18 NOTE — TELEPHONE ENCOUNTER
I tried calling the pt this morning to see if she can come into the device clinic today to make some changes to her sensitivity. No answer, LVM.

## 2025-03-18 NOTE — TELEPHONE ENCOUNTER
I spoke to the pt. Pt is going to see if her daughter can bring her into the office this afternoon.

## 2025-03-18 NOTE — TELEPHONE ENCOUNTER
Pt was able to come into the device clinic this afternoon.     Pt did not have any further events.     Ran sensing test today, pt had no intrinsic R wave at VVI 40 bpm.     Increased RV sensitivity from 0.5mV to 4mV.     Will continue to monitor for any further events.     Pt's next appt is on 4/30/25.

## 2025-04-04 NOTE — PROGRESS NOTES
"Chief Complaint  Recurrent iron deficiency anemia, stage IIA (T3N0M0) ascending colon cancer, history of left renal cell carcinoma status post left nephrectomy in 2004, CKD3, enlarging right renal lesion, history of gastric polyps    Subjective        History of Present Illness  Patient returns today for a 1 year interval follow-up visit with laboratory studies to review.  She did follow-up with urology in regards to her suspected right renal cell carcinoma in February 2025.  She is seeing a new physician at [facility] urology Dr. Schrader.  She underwent ultrasound on 2/4/2025 which showed further increase in the right lower pole exophytic mass measuring 5.1 cm with internal flow on Doppler highly suspicious for renal cell carcinoma.  There was discussion regarding further management of this lesion in light of her previous left nephrectomy.  Plans are for a CT abdomen pelvis on 5/22/2025.  The patient has been depressed regarding the findings on ultrasound.  She notes that she has ongoing chronic fatigue.  She has lost weight, reports that she is not eating as well recently.  She has chronic GI issues related to her colon surgery with alternating constipation and diarrhea.  When she has diarrhea Imodium does help.  She has tried Metamucil in the past however this made her symptoms worse.  She is ambulating with use of a cane.  She is accompanied by her daughter today.  She does maintain ECOG performance status of 1.      Objective   Vital Signs:   /64   Pulse 74   Temp 98.3 °F (36.8 °C) (Oral)   Resp 17   Ht 157.5 cm (62.01\")   Wt 87 kg (191 lb 11.2 oz)   SpO2 93%   BMI 35.05 kg/m²     Physical Exam  Constitutional:       Appearance: She is well-developed.      Comments: Elderly female no distress, abulating with a walker   Eyes:      Conjunctiva/sclera: Conjunctivae normal.   Neck:      Thyroid: No thyromegaly.   Cardiovascular:      Rate and Rhythm: Normal rate. Rhythm irregular.      Heart sounds: No " murmur heard.     No friction rub. No gallop.   Pulmonary:      Effort: No respiratory distress.      Breath sounds: Normal breath sounds.   Abdominal:      General: Bowel sounds are normal. There is no distension.      Palpations: Abdomen is soft.      Tenderness: There is no abdominal tenderness.   Musculoskeletal:      Comments: Trace bilateral lower extremity edema with venous stasis changes   Lymphadenopathy:      Head:      Right side of head: No submandibular adenopathy.      Cervical: No cervical adenopathy.      Upper Body:      Right upper body: No supraclavicular adenopathy.      Left upper body: No supraclavicular adenopathy.   Skin:     General: Skin is warm and dry.      Findings: No rash.   Neurological:      Mental Status: She is alert and oriented to person, place, and time.      Cranial Nerves: No cranial nerve deficit.      Motor: No abnormal muscle tone.      Deep Tendon Reflexes: Reflexes normal.   Psychiatric:         Behavior: Behavior normal.              Result Review : Reviewed CBC, CMP, iron panel, ferritin from today.  Reviewed urology records including renal ultrasound 2/4/2025       Assessment and Plan     *Recurrent iron deficiency anemia and anemia secondary to CKD3:   The patient is intolerant of oral iron.   Prior GI evaluation showed no definitive evidence of GI blood loss.   She has underlying stage III chronic kidney disease, which is a contributing factor to her mild borderline anemia.   She has been treated in the past with IV iron in 2011 and 2012.    She had a lengthy absence from our office from 2015 until she was referred back on 9/21/17 with evidence of recurrent iron deficiency, likely related to malabsorption.  Her ferritin on 7/7/17 was 21 and hemoglobin on 8/7/17 was 11.1.  Her degree of anemia was mild with a hemoglobin of 11.4, microcytic indices with an MCV of 78.  Labs on 9/21/17 showed ferritin 20.5, iron saturation 8%, TIBC 441.  We did also check B12 and folate  "which were normal.  Erythropoietin was 32 consistent with a component of anemia secondary to chronic kidney disease. She did return stool cards for occult blood which were negative ×3 10/5/17.  She received additional Feraheme on 10/5 and 10/12/17.    She underwent EGD with Dr. Curran 10/23/17 with no evidence of active bleeding, small gastric polyps identified.    Labs on 1/25/2023 showed hemoglobin that decreased to 11.2 after previously in the 12-13 range.  Iron studies were stable to slightly diminished with iron 56, ferritin 62, iron saturation 15%, TIBC 365.  Iron stores suboptimal in the setting of anemia secondary to CKD3.  Patient intolerant of oral iron.  We did discuss the possibility of pursuing IV iron and she would like to defer this if possible.    Patient today with hemoglobin 11.2.  Iron studies show iron replete status with iron 99, ferritin 81.9, iron saturation 23%, TIBC 428.  We will check B12 and folate studies today.  Residual anemia secondary to CKD3a    *Stage IIA (T3N0M0) ascending colon cancer:   Developed significant diarrhea with blood per rectum.    Colonoscopy 4/25/18 with identification of polyps in the rectum, cecal polyp, mass in the right colon.  The cecal polyp was a sessile serrated adenoma, ascending colon \"mass\" showed hyperplastic polyp, rectal polyp was a tubulovillous adenoma with low-grade dysplasia.  CEA on 4/25/18 was normal at 1.65.  Chest x-ray 4/25/18 was unrevealing.  CT of the abdomen and pelvis 4/26/18 showed a 4 cm annular mass in the ascending colon with adjacent haziness in the mesentery but no lymphadenopathy, no evidence of metastatic disease.    Right hemicolectomy on 4/27/18 with pathology showing an invasive moderately differentiated adenocarcinoma arising in a sessile serrated adenoma with negative margins measuring 2.5 cm extending through the muscularis propria into brad-colorectal tissue, positive lymphovascular invasion, negative perineural invasion, " 10 lymph nodes negative.  There was a comment regarding an ulcerated small adjacent pericolonic abscess, raising the question of possible microscopic perforation.  Patient did have a few high risk features with less than 12 lymph nodes removed, positive lymphovascular invasion, and some possibility of microscopic perforation.  The tumor was microsatellite stable.    Given her age and comorbidities, the patient was a poor candidate for adjuvant chemotherapy and was not recommended.  Independent of this recommendation, the patient stated she was not interested in taking any adjuvant chemotherapy.  Therefore we will plan to monitor her clinically for evidence of recurrent disease.  Standard monitoring is with routine CEA levels however this does not appear to be informative for her given her normal preoperative value.  We will pursue annual interval follow-up CT.    CT scan from 4/24/2019 at a 1 year interval showed no evidence of recurrent disease.    5/22/2020 CEA normal at 1.69 and CT scan chest abdomen and pelvis 5/22/2020 showed no evidence of recurrent/metastatic disease.    The patient underwent delayed endoscopic evaluation with EGD and colonoscopy on 8/10/2020 with Dr. Willett, colonoscopy with tubular adenoma and hyperplastic polyp removed.  Flexible sigmoidoscopy with Dr. Curran on 3/15/2021 was negative  1 year interval follow-up CT performed 5/6/2021 which showed no evidence of recurrent malignancy.  There was however a comment regarding a subpleural opacification left lower lobe as well as a slight increase in normal sized mediastinal lymph nodes.  The lymph nodes however remained normal size and the change in size was felt to be insignificant, left lower lobe subpleural opacification was felt to be inflammatory.  Radiology however recommend 6-month interval follow-up study.  Annual surveillance CT scans chest abdomen and pelvis 7/6/2023 showed no evidence of recurrent disease.    After 5 years out from  surgery we do not plan any further routine CEA or CT monitoring.  Given her age, patient does not wish to pursue any further endoscopic evaluation.    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    Family history includes a sister with breast cancer around age 60, another sister with breast cancer around age 60, maternal aunt with breast cancer over the age of 50, maternal uncle with lung cancer.    Given the patient's multiple malignancies and family history, she was referred to the genetics clinic to discuss potential genetic testing, mainly for benefit of the patient's children and other family members.    Her colon cancer was microsatellite stable.   She did follow-up in the genetics clinic and underwent INVITAE panel test 7/31/18 showing VUS in MLH3 and MSH6 with no known clinical significance at this time.     *History of gastric polyps:   EGD 10/23/17 with small less than 6 mm gastric polyps, not resected.  Repeat EGD 8/10/2020 with small gastric polyps, not resected.    *History of left renal cell carcinoma with enlarging right renal lesion:   The patient underwent a left nephrectomy in 2004 with no clinical evidence of recurrent disease.    CT chest from 11/14/16 showed no evidence of recurrence.    CT abdomen and pelvis however from 4/26/18 showed a 2.2 cm right renal lesion which was indeterminate, possibly a proteinaceous cyst but could not rule out mass lesion.  The study was performed without contrast due to the patient's underlying chronic kidney disease.  She is unable to undergo renal mass protocol CT with contrast.  She is unable to undergo MRI due to her pacemaker.    Follow-up CT scan 4/24/2019 with no change in the right renal lesion at 2.1 cm, repeat at 1 year  interval  1 year interval follow-up CT (delayed due to viral pandemic) 5/22/2020 with increase in right renal mass from 2.1 up to 2.6 cm.  This is felt to possibly represent a proteinaceous cyst versus solid mass.  She is unable to undergo a renal protocol CT due to her renal dysfunction and solitary kidney and cannot undergo MRI due to her pacemaker.   Renal ultrasound 6/4/2020 showed a 3.6 cm mixed solid and cystic mass at the lower pole of the right kidney suspicious for renal cell carcinoma and recommended surgical excision.  Patient did follow-up with Dr. Trujillo in urology who recommended continued radiographic monitoring.  Short interval follow-up ultrasound on 9/15/2020 with stable 3.6 cm right renal lesion.   Renal ultrasound by urology on 1/7/2021 measured the lower pole right renal mass at 3.8 cm, was not directly compared to the prior study performed at [facility].    CT 5/6/2021 showed right renal lesion had increased in size from 2.6 up to 3.1 cm.  It was felt that this likely represents a hyperdense renal cyst however could not rule out solid lesion or complex lesion.    CT 11/15/2021 showed that the lower pole right renal lesion continued to increase gradually in size from 3.1 now up to 3.3 cm.  Change has been minimal however radiology did review multiple prior studies and the lesion has increased gradually over time, suspicious for possible malignancy.   Patient did follow-up with Dr. Trujillo in urology in January 2022.  He does not recommend pursuit of ablation of the renal lesion due to risk of hemorrhage and potential compromise of the patient's renal function in her solitary kidney.  CT 5/10/2022 showed further slight increase in the right renal mass from 3.1 x 3.2 up to 3.6 x 3.7 cm.  Given the continued gradual enlargement in size of the lesion there is high suspicion for slowly growing new primary renal cell carcinoma.    Dr. Trujillo is aware of the continued increase in size of the lesion and  does not plan to perform any intervention given her age, indolent nature of the disease, and risk to her renal function.  Therefore with no intervention planned   There does not appear to be any utility in further aggressive CT monitoring for this issue.    Dr. Trujillo did see the patient back in January 2023 with repeat ultrasound that did show slight further increase in the lesion from 3.8 up to 4.6 cm and some evidence of mild right hydronephrosis.  He plans a follow-up ultrasound in 1 year interval.  CT 7/6/2023 showed further increase in size in the right lower pole renal lesion from 3.8 x 2.9 up to 3.8 x 4.8 cm and additional stable lesions in the right kidney.    1 year interval renal ultrasound on 1/12/2024 at [facility] urology showed slight increase in size of the lesion over a 1 year period from 4.6 x 3.8 x 3.6 up to 4.5 x 3.9 x 4.1 cm.  Renal ultrasound on 2/4/2025 showed further increase in the right lower pole exophytic mass measuring 5.1 cm with internal flow on Doppler highly suspicious for renal cell carcinoma.  Patient returns today in follow-up having recently seen a new physician at [facility] urology Dr. Schrader.  She underwent ultrasound on 2/4/2025 which showed further increase in the right lower pole exophytic mass measuring 5.1 cm with internal flow on Doppler highly suspicious for renal cell carcinoma.  There was discussion regarding further management of this lesion in light of her previous left nephrectomy.  Plans are for a CT abdomen pelvis on 5/22/2025.  We will plan to see her back in early June to follow-up the CT results.  Patient and her daughter note that there was some mention regarding use of immunotherapy.  While this could be considered, I would have concern regarding the patient's age and asymptomatic status in terms of her renal cell carcinoma and potential adverse effects of immunotherapy.    *CKD3:   Status post left nephrectomy  Baseline creatinine in 1.2-1.8 range  Contributing  factor to anemia    *Abnormal appearance of cervix on CT scan  CT 5/10/2022 with bulky appearance of the cervix with demonstration of air in the lumen.  Recommended follow-up with direct visualization  Patient notes that she did have a urogynecologic procedure performed in the past which did involve the cervix.    Patient initially wanted to return to see urogynecology to review the issue however decided ultimately that she does not wish to pursue this.    CT abdomen and pelvis 7/6/2023 with similar appearance to the cervix with mildly bulky appearance with air in the lumen and small focus of air in the endometrial canal.  Patient does not wish to pursue evaluation of this.    *Fatigue  Patient reports ongoing difficulty with fatigue.  We are checking additional labs today with B12, folate, TSH, free T4.  Unclear whether this could be related to underlying malignancy.    *Diarrhea  Patient has experienced difficulty with alternating constipation and diarrhea that has been present since the time of her colonic surgery.  She has had more issue recently with diarrhea.  Imodium does help to some extent.  We are sending in today a prescription for Lomotil which she can alternate with Imodium as well.    PLAN:   Additional labs today with B12, folate, TSH, free T4  The patient is currently iron replete  Prescription sent for Lomotil 1 p.o. 4 times daily as needed for diarrhea.  Patient may alternate with Imodium.  Patient scheduled for follow-up with urology with CT abdomen and pelvis on 5/22/2025  MD visit in early to mid June 2025 with CBC, CMP.            I did spend 50 minutes total time caring for the patient today, time spent in review of records, face-to-face consultation, coordination of care, placement of orders, completion of documentation.    Addendum: Labs from today with borderline elevated potassium of 5.4.  Patient is continuing on spironolactone.  Has been managed by cardiology and will forward labs to  cardiology in regards to the borderline elevated potassium.

## 2025-04-08 ENCOUNTER — OFFICE VISIT (OUTPATIENT)
Dept: ONCOLOGY | Facility: CLINIC | Age: 89
End: 2025-04-08
Payer: MEDICARE

## 2025-04-08 ENCOUNTER — LAB (OUTPATIENT)
Dept: LAB | Facility: HOSPITAL | Age: 89
End: 2025-04-08
Payer: MEDICARE

## 2025-04-08 VITALS
DIASTOLIC BLOOD PRESSURE: 64 MMHG | OXYGEN SATURATION: 93 % | BODY MASS INDEX: 35.28 KG/M2 | HEIGHT: 62 IN | WEIGHT: 191.7 LBS | TEMPERATURE: 98.3 F | HEART RATE: 74 BPM | RESPIRATION RATE: 17 BRPM | SYSTOLIC BLOOD PRESSURE: 125 MMHG

## 2025-04-08 DIAGNOSIS — D63.1 ANEMIA IN STAGE 3A CHRONIC KIDNEY DISEASE: Primary | ICD-10-CM

## 2025-04-08 DIAGNOSIS — R53.82 CHRONIC FATIGUE: ICD-10-CM

## 2025-04-08 DIAGNOSIS — N18.31 ANEMIA IN STAGE 3A CHRONIC KIDNEY DISEASE: Primary | ICD-10-CM

## 2025-04-08 DIAGNOSIS — D50.8 OTHER IRON DEFICIENCY ANEMIA: ICD-10-CM

## 2025-04-08 DIAGNOSIS — C18.2 MALIGNANT NEOPLASM OF ASCENDING COLON: ICD-10-CM

## 2025-04-08 LAB
ALBUMIN SERPL-MCNC: 3.7 G/DL (ref 3.5–5.2)
ALBUMIN/GLOB SERPL: 1.2 G/DL
ALP SERPL-CCNC: 99 U/L (ref 39–117)
ALT SERPL W P-5'-P-CCNC: 15 U/L (ref 1–33)
ANION GAP SERPL CALCULATED.3IONS-SCNC: 14.4 MMOL/L (ref 5–15)
AST SERPL-CCNC: 21 U/L (ref 1–32)
BASOPHILS # BLD AUTO: 0.07 10*3/MM3 (ref 0–0.2)
BASOPHILS NFR BLD AUTO: 1.1 % (ref 0–1.5)
BILIRUB SERPL-MCNC: 0.4 MG/DL (ref 0–1.2)
BUN SERPL-MCNC: 34 MG/DL (ref 8–23)
BUN/CREAT SERPL: 20 (ref 7–25)
CALCIUM SPEC-SCNC: 9.4 MG/DL (ref 8.6–10.5)
CHLORIDE SERPL-SCNC: 107 MMOL/L (ref 98–107)
CO2 SERPL-SCNC: 20.6 MMOL/L (ref 22–29)
CREAT SERPL-MCNC: 1.7 MG/DL (ref 0.57–1)
DEPRECATED RDW RBC AUTO: 62.1 FL (ref 37–54)
EGFRCR SERPLBLD CKD-EPI 2021: 28.7 ML/MIN/1.73
EOSINOPHIL # BLD AUTO: 0.18 10*3/MM3 (ref 0–0.4)
EOSINOPHIL NFR BLD AUTO: 2.9 % (ref 0.3–6.2)
ERYTHROCYTE [DISTWIDTH] IN BLOOD BY AUTOMATED COUNT: 19.9 % (ref 12.3–15.4)
FERRITIN SERPL-MCNC: 81.9 NG/ML (ref 13–150)
GLOBULIN UR ELPH-MCNC: 3.1 GM/DL
GLUCOSE SERPL-MCNC: 116 MG/DL (ref 65–99)
HCT VFR BLD AUTO: 34.9 % (ref 34–46.6)
HGB BLD-MCNC: 11.2 G/DL (ref 12–15.9)
IMM GRANULOCYTES # BLD AUTO: 0.02 10*3/MM3 (ref 0–0.05)
IMM GRANULOCYTES NFR BLD AUTO: 0.3 % (ref 0–0.5)
IRON 24H UR-MRATE: 99 MCG/DL (ref 37–145)
IRON SATN MFR SERPL: 23 % (ref 20–50)
LYMPHOCYTES # BLD AUTO: 1.68 10*3/MM3 (ref 0.7–3.1)
LYMPHOCYTES NFR BLD AUTO: 27.4 % (ref 19.6–45.3)
MCH RBC QN AUTO: 27.7 PG (ref 26.6–33)
MCHC RBC AUTO-ENTMCNC: 32.1 G/DL (ref 31.5–35.7)
MCV RBC AUTO: 86.2 FL (ref 79–97)
MONOCYTES # BLD AUTO: 0.54 10*3/MM3 (ref 0.1–0.9)
MONOCYTES NFR BLD AUTO: 8.8 % (ref 5–12)
NEUTROPHILS NFR BLD AUTO: 3.64 10*3/MM3 (ref 1.7–7)
NEUTROPHILS NFR BLD AUTO: 59.5 % (ref 42.7–76)
NRBC BLD AUTO-RTO: 0 /100 WBC (ref 0–0.2)
PLATELET # BLD AUTO: 205 10*3/MM3 (ref 140–450)
PMV BLD AUTO: 9.7 FL (ref 6–12)
POTASSIUM SERPL-SCNC: 5.4 MMOL/L (ref 3.5–5.2)
PROT SERPL-MCNC: 6.8 G/DL (ref 6–8.5)
RBC # BLD AUTO: 4.05 10*6/MM3 (ref 3.77–5.28)
SODIUM SERPL-SCNC: 142 MMOL/L (ref 136–145)
TIBC SERPL-MCNC: 428 MCG/DL (ref 298–536)
TRANSFERRIN SERPL-MCNC: 287 MG/DL (ref 200–360)
WBC NRBC COR # BLD AUTO: 6.13 10*3/MM3 (ref 3.4–10.8)

## 2025-04-08 PROCEDURE — 84443 ASSAY THYROID STIM HORMONE: CPT | Performed by: INTERNAL MEDICINE

## 2025-04-08 PROCEDURE — 80053 COMPREHEN METABOLIC PANEL: CPT

## 2025-04-08 PROCEDURE — 85025 COMPLETE CBC W/AUTO DIFF WBC: CPT

## 2025-04-08 PROCEDURE — 82607 VITAMIN B-12: CPT | Performed by: INTERNAL MEDICINE

## 2025-04-08 PROCEDURE — 84466 ASSAY OF TRANSFERRIN: CPT

## 2025-04-08 PROCEDURE — 82746 ASSAY OF FOLIC ACID SERUM: CPT | Performed by: INTERNAL MEDICINE

## 2025-04-08 PROCEDURE — 82728 ASSAY OF FERRITIN: CPT

## 2025-04-08 PROCEDURE — 36415 COLL VENOUS BLD VENIPUNCTURE: CPT

## 2025-04-08 PROCEDURE — 84439 ASSAY OF FREE THYROXINE: CPT | Performed by: INTERNAL MEDICINE

## 2025-04-08 PROCEDURE — 83540 ASSAY OF IRON: CPT

## 2025-04-08 RX ORDER — DIPHENOXYLATE HYDROCHLORIDE AND ATROPINE SULFATE 2.5; .025 MG/1; MG/1
1 TABLET ORAL 4 TIMES DAILY PRN
Qty: 30 TABLET | Refills: 1 | Status: SHIPPED | OUTPATIENT
Start: 2025-04-08

## 2025-04-08 NOTE — LETTER
April 8, 2025     Gabriella Paige MD  [address]  [address]  [facility] [address]    Patient: Anastacia Sarah   YOB: 1935   Date of Visit: 4/8/2025     Dear Gabriella Paige MD:       Thank you for referring Anastacia Sarah to me for evaluation. Below are the relevant portions of my assessment and plan of care.    If you have questions, please do not hesitate to call me. I look forward to following Anastacia along with you.         Sincerely,        Maldonado Brower MD        CC: MD Inocente Currie John L Jr., MD  04/08/25 2310  Sign when Signing Visit  Chief Complaint  Recurrent iron deficiency anemia, stage IIA (T3N0M0) ascending colon cancer, history of left renal cell carcinoma status post left nephrectomy in 2004, CKD3, enlarging right renal lesion, history of gastric polyps    Subjective       History of Present Illness  Patient returns today for a 1 year interval follow-up visit with laboratory studies to review.  She did follow-up with urology in regards to her suspected right renal cell carcinoma in February 2025.  She is seeing a new physician at [facility] urology Dr. Schrader.  She underwent ultrasound on 2/4/2025 which showed further increase in the right lower pole exophytic mass measuring 5.1 cm with internal flow on Doppler highly suspicious for renal cell carcinoma.  There was discussion regarding further management of this lesion in light of her previous left nephrectomy.  Plans are for a CT abdomen pelvis on 5/22/2025.  The patient has been depressed regarding the findings on ultrasound.  She notes that she has ongoing chronic fatigue.  She has lost weight, reports that she is not eating as well recently.  She has chronic GI issues related to her colon surgery with alternating constipation and diarrhea.  When she has diarrhea Imodium does help.  She has tried Metamucil in the past however this made her symptoms worse.  She is ambulating with use of a cane.  She is  "accompanied by her daughter today.  She does maintain ECOG performance status of 1.      Objective  Vital Signs:   /64   Pulse 74   Temp 98.3 °F (36.8 °C) (Oral)   Resp 17   Ht 157.5 cm (62.01\")   Wt 87 kg (191 lb 11.2 oz)   SpO2 93%   BMI 35.05 kg/m²     Physical Exam  Constitutional:       Appearance: She is well-developed.      Comments: Elderly female no distress, abulating with a walker   Eyes:      Conjunctiva/sclera: Conjunctivae normal.   Neck:      Thyroid: No thyromegaly.   Cardiovascular:      Rate and Rhythm: Normal rate. Rhythm irregular.      Heart sounds: No murmur heard.     No friction rub. No gallop.   Pulmonary:      Effort: No respiratory distress.      Breath sounds: Normal breath sounds.   Abdominal:      General: Bowel sounds are normal. There is no distension.      Palpations: Abdomen is soft.      Tenderness: There is no abdominal tenderness.   Musculoskeletal:      Comments: Trace bilateral lower extremity edema with venous stasis changes   Lymphadenopathy:      Head:      Right side of head: No submandibular adenopathy.      Cervical: No cervical adenopathy.      Upper Body:      Right upper body: No supraclavicular adenopathy.      Left upper body: No supraclavicular adenopathy.   Skin:     General: Skin is warm and dry.      Findings: No rash.   Neurological:      Mental Status: She is alert and oriented to person, place, and time.      Cranial Nerves: No cranial nerve deficit.      Motor: No abnormal muscle tone.      Deep Tendon Reflexes: Reflexes normal.   Psychiatric:         Behavior: Behavior normal.              Result Review: Reviewed CBC, CMP, iron panel, ferritin from today.  Reviewed urology records including renal ultrasound 2/4/2025       Assessment and Plan     *Recurrent iron deficiency anemia and anemia secondary to CKD3:   The patient is intolerant of oral iron.   Prior GI evaluation showed no definitive evidence of GI blood loss.   She has underlying stage " "III chronic kidney disease, which is a contributing factor to her mild borderline anemia.   She has been treated in the past with IV iron in 2011 and 2012.    She had a lengthy absence from our office from 2015 until she was referred back on 9/21/17 with evidence of recurrent iron deficiency, likely related to malabsorption.  Her ferritin on 7/7/17 was 21 and hemoglobin on 8/7/17 was 11.1.  Her degree of anemia was mild with a hemoglobin of 11.4, microcytic indices with an MCV of 78.  Labs on 9/21/17 showed ferritin 20.5, iron saturation 8%, TIBC 441.  We did also check B12 and folate which were normal.  Erythropoietin was 32 consistent with a component of anemia secondary to chronic kidney disease. She did return stool cards for occult blood which were negative ×3 10/5/17.  She received additional Feraheme on 10/5 and 10/12/17.    She underwent EGD with Dr. Curran 10/23/17 with no evidence of active bleeding, small gastric polyps identified.    Labs on 1/25/2023 showed hemoglobin that decreased to 11.2 after previously in the 12-13 range.  Iron studies were stable to slightly diminished with iron 56, ferritin 62, iron saturation 15%, TIBC 365.  Iron stores suboptimal in the setting of anemia secondary to CKD3.  Patient intolerant of oral iron.  We did discuss the possibility of pursuing IV iron and she would like to defer this if possible.    Patient today with hemoglobin 11.2.  Iron studies show iron replete status with iron 99, ferritin 81.9, iron saturation 23%, TIBC 428.  We will check B12 and folate studies today.  Residual anemia secondary to CKD3a    *Stage IIA (T3N0M0) ascending colon cancer:   Developed significant diarrhea with blood per rectum.    Colonoscopy 4/25/18 with identification of polyps in the rectum, cecal polyp, mass in the right colon.  The cecal polyp was a sessile serrated adenoma, ascending colon \"mass\" showed hyperplastic polyp, rectal polyp was a tubulovillous adenoma with low-grade " dysplasia.  CEA on 4/25/18 was normal at 1.65.  Chest x-ray 4/25/18 was unrevealing.  CT of the abdomen and pelvis 4/26/18 showed a 4 cm annular mass in the ascending colon with adjacent haziness in the mesentery but no lymphadenopathy, no evidence of metastatic disease.    Right hemicolectomy on 4/27/18 with pathology showing an invasive moderately differentiated adenocarcinoma arising in a sessile serrated adenoma with negative margins measuring 2.5 cm extending through the muscularis propria into brad-colorectal tissue, positive lymphovascular invasion, negative perineural invasion, 10 lymph nodes negative.  There was a comment regarding an ulcerated small adjacent pericolonic abscess, raising the question of possible microscopic perforation.  Patient did have a few high risk features with less than 12 lymph nodes removed, positive lymphovascular invasion, and some possibility of microscopic perforation.  The tumor was microsatellite stable.    Given her age and comorbidities, the patient was a poor candidate for adjuvant chemotherapy and was not recommended.  Independent of this recommendation, the patient stated she was not interested in taking any adjuvant chemotherapy.  Therefore we will plan to monitor her clinically for evidence of recurrent disease.  Standard monitoring is with routine CEA levels however this does not appear to be informative for her given her normal preoperative value.  We will pursue annual interval follow-up CT.    CT scan from 4/24/2019 at a 1 year interval showed no evidence of recurrent disease.    5/22/2020 CEA normal at 1.69 and CT scan chest abdomen and pelvis 5/22/2020 showed no evidence of recurrent/metastatic disease.    The patient underwent delayed endoscopic evaluation with EGD and colonoscopy on 8/10/2020 with Dr. Willett, colonoscopy with tubular adenoma and hyperplastic polyp removed.  Flexible sigmoidoscopy with Dr. Curran on 3/15/2021 was negative  1 year interval  follow-up CT performed 5/6/2021 which showed no evidence of recurrent malignancy.  There was however a comment regarding a subpleural opacification left lower lobe as well as a slight increase in normal sized mediastinal lymph nodes.  The lymph nodes however remained normal size and the change in size was felt to be insignificant, left lower lobe subpleural opacification was felt to be inflammatory.  Radiology however recommend 6-month interval follow-up study.  Annual surveillance CT scans chest abdomen and pelvis 7/6/2023 showed no evidence of recurrent disease.    After 5 years out from surgery we do not plan any further routine CEA or CT monitoring.  Given her age, patient does not wish to pursue any further endoscopic evaluation.    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    Family history includes a sister with breast cancer around age 60, another sister with breast cancer around age 60, maternal aunt with breast cancer over the age of 50, maternal uncle with lung cancer.    Given the patient's multiple malignancies and family history, she was referred to the genetics clinic to discuss potential genetic testing, mainly for benefit of the patient's children and other family members.    Her colon cancer was microsatellite stable.   She did follow-up in the genetics clinic and underwent INVITAE panel test 7/31/18 showing VUS in MLH3 and MSH6 with no known clinical significance at this time.     *History of gastric polyps:   EGD 10/23/17 with small less than 6 mm gastric polyps, not resected.  Repeat EGD 8/10/2020 with small gastric polyps, not resected.    *History of left renal cell carcinoma with enlarging right renal lesion:   The patient underwent a left nephrectomy in 2004 with no  clinical evidence of recurrent disease.    CT chest from 11/14/16 showed no evidence of recurrence.    CT abdomen and pelvis however from 4/26/18 showed a 2.2 cm right renal lesion which was indeterminate, possibly a proteinaceous cyst but could not rule out mass lesion.  The study was performed without contrast due to the patient's underlying chronic kidney disease.  She is unable to undergo renal mass protocol CT with contrast.  She is unable to undergo MRI due to her pacemaker.    Follow-up CT scan 4/24/2019 with no change in the right renal lesion at 2.1 cm, repeat at 1 year interval  1 year interval follow-up CT (delayed due to viral pandemic) 5/22/2020 with increase in right renal mass from 2.1 up to 2.6 cm.  This is felt to possibly represent a proteinaceous cyst versus solid mass.  She is unable to undergo a renal protocol CT due to her renal dysfunction and solitary kidney and cannot undergo MRI due to her pacemaker.   Renal ultrasound 6/4/2020 showed a 3.6 cm mixed solid and cystic mass at the lower pole of the right kidney suspicious for renal cell carcinoma and recommended surgical excision.  Patient did follow-up with Dr. Trujillo in urology who recommended continued radiographic monitoring.  Short interval follow-up ultrasound on 9/15/2020 with stable 3.6 cm right renal lesion.   Renal ultrasound by urology on 1/7/2021 measured the lower pole right renal mass at 3.8 cm, was not directly compared to the prior study performed at [facility].    CT 5/6/2021 showed right renal lesion had increased in size from 2.6 up to 3.1 cm.  It was felt that this likely represents a hyperdense renal cyst however could not rule out solid lesion or complex lesion.    CT 11/15/2021 showed that the lower pole right renal lesion continued to increase gradually in size from 3.1 now up to 3.3 cm.  Change has been minimal however radiology did review multiple prior studies and the lesion has increased gradually over time, suspicious  for possible malignancy.   Patient did follow-up with Dr. Trujillo in urology in January 2022.  He does not recommend pursuit of ablation of the renal lesion due to risk of hemorrhage and potential compromise of the patient's renal function in her solitary kidney.  CT 5/10/2022 showed further slight increase in the right renal mass from 3.1 x 3.2 up to 3.6 x 3.7 cm.  Given the continued gradual enlargement in size of the lesion there is high suspicion for slowly growing new primary renal cell carcinoma.    Dr. Trujillo is aware of the continued increase in size of the lesion and does not plan to perform any intervention given her age, indolent nature of the disease, and risk to her renal function.  Therefore with no intervention planned   There does not appear to be any utility in further aggressive CT monitoring for this issue.    Dr. Trujillo did see the patient back in January 2023 with repeat ultrasound that did show slight further increase in the lesion from 3.8 up to 4.6 cm and some evidence of mild right hydronephrosis.  He plans a follow-up ultrasound in 1 year interval.  CT 7/6/2023 showed further increase in size in the right lower pole renal lesion from 3.8 x 2.9 up to 3.8 x 4.8 cm and additional stable lesions in the right kidney.    1 year interval renal ultrasound on 1/12/2024 at [facility] [place] urology showed slight increase in size of the lesion over a 1 year period from 4.6 x 3.8 x 3.6 up to 4.5 x 3.9 x 4.1 cm.  Renal ultrasound on 2/4/2025 showed further increase in the right lower pole exophytic mass measuring 5.1 cm with internal flow on Doppler highly suspicious for renal cell carcinoma.  Patient returns today in follow-up having recently seen a new physician at [facility] [place] urology Dr. Schrader.  She underwent ultrasound on 2/4/2025 which showed further increase in the right lower pole exophytic mass measuring 5.1 cm with internal flow on Doppler highly suspicious for renal cell carcinoma.  There was discussion  regarding further management of this lesion in light of her previous left nephrectomy.  Plans are for a CT abdomen pelvis on 5/22/2025.  We will plan to see her back in early June to follow-up the CT results.  Patient and her daughter note that there was some mention regarding use of immunotherapy.  While this could be considered, I would have concern regarding the patient's age and asymptomatic status in terms of her renal cell carcinoma and potential adverse effects of immunotherapy.    *CKD3:   Status post left nephrectomy  Baseline creatinine in 1.2-1.8 range  Contributing factor to anemia    *Abnormal appearance of cervix on CT scan  CT 5/10/2022 with bulky appearance of the cervix with demonstration of air in the lumen.  Recommended follow-up with direct visualization  Patient notes that she did have a urogynecologic procedure performed in the past which did involve the cervix.    Patient initially wanted to return to see urogynecology to review the issue however decided ultimately that she does not wish to pursue this.    CT abdomen and pelvis 7/6/2023 with similar appearance to the cervix with mildly bulky appearance with air in the lumen and small focus of air in the endometrial canal.  Patient does not wish to pursue evaluation of this.    *Fatigue  Patient reports ongoing difficulty with fatigue.  We are checking additional labs today with B12, folate, TSH, free T4.  Unclear whether this could be related to underlying malignancy.    *Diarrhea  Patient has experienced difficulty with alternating constipation and diarrhea that has been present since the time of her colonic surgery.  She has had more issue recently with diarrhea.  Imodium does help to some extent.  We are sending in today a prescription for Lomotil which she can alternate with Imodium as well.    PLAN:   Additional labs today with B12, folate, TSH, free T4  The patient is currently iron replete  Prescription sent for Lomotil 1 p.o. 4 times  daily as needed for diarrhea.  Patient may alternate with Imodium.  Patient scheduled for follow-up with urology with CT abdomen and pelvis on 5/22/2025  MD visit in early to mid June 2025 with CBC, CMP.            I did spend 50 minutes total time caring for the patient today, time spent in review of records, face-to-face consultation, coordination of care, placement of orders, completion of documentation.

## 2025-04-09 ENCOUNTER — RESULTS FOLLOW-UP (OUTPATIENT)
Dept: LAB | Facility: HOSPITAL | Age: 89
End: 2025-04-09
Payer: MEDICARE

## 2025-04-09 DIAGNOSIS — E87.5 HYPERKALEMIA: Primary | ICD-10-CM

## 2025-04-09 LAB
FOLATE SERPL-MCNC: >20 NG/ML (ref 4.78–24.2)
T4 FREE SERPL-MCNC: 1.24 NG/DL (ref 0.92–1.68)
TSH SERPL DL<=0.05 MIU/L-ACNC: 0.76 UIU/ML (ref 0.27–4.2)
VIT B12 BLD-MCNC: 1055 PG/ML (ref 211–946)

## 2025-04-09 NOTE — PROGRESS NOTES
Attempted to call patient left voicemail.  Her potassium came back elevated as well as her creatinine.  Please have her stop her spironolactone.  Will need to check a repeat lab of this in 1 week.    I would also like to know if she has been sick at all recently or maybe start a new supplement that could contribute to this thanks!!

## 2025-04-14 RX ORDER — LISINOPRIL 20 MG/1
20 TABLET ORAL DAILY
Qty: 90 TABLET | Refills: 1 | Status: SHIPPED | OUTPATIENT
Start: 2025-04-14

## 2025-04-17 ENCOUNTER — TELEMEDICINE - AUDIO (OUTPATIENT)
Dept: OTHER | Facility: HOSPITAL | Age: 89
End: 2025-04-17
Payer: MEDICARE

## 2025-04-17 NOTE — PROGRESS NOTES
OUTPATIENT ONCOLOGY NUTRITION ASSESSMENT    Patient Name: Anastacia Sarah  YOB: 1935  MRN: [id number]  Assessment Date: 4/17/2025    COMMENTS:  Nutrition risk screen for poor po intake and weight loss.  Spoke to patient on the phone.   She reports that her appetite is good and that she is eating normally. She has lost a few pounds but says that she needed to lose weight and is pleased with this weight loss.  She was having diarrhea but has been taking the lomotil and imodium as directed and says this has helped tremendously. Let her know the the RD's are available to her if she needs assistance.         Reason for Assessment Malnutrition Screening Tool Trigger     Diagnosis/Problem   Recurrent iron deficiency anemia, stage IIA (T3N0M0) ascending colon cancer, history of left renal cell carcinoma status post left nephrectomy in 2004, CKD3, enlarging right renal lesion, history of gastric polyps          Encounter Information        Nutrition/Diet History:     Oral Nutrition Supplements:    Factors/Symptoms Affecting Intake: Constipation, Diarrhea   Comments:      Medical/Surgical History Past Medical History:   Diagnosis Date    Abnormal ECG     Acute bronchitis     Acute sinusitis     Anemia     Iron deficiency    Anesthesia complication     Anticoagulated on warfarin     Arthritis     Bladder dysfunction     Cancer of left kidney 06/08/2004    S/P Left Radical Neprectomy    Cardiac disease     Chronic dysfunction of left eustachian tube     CKD (chronic kidney disease)     stage 3    Colon cancer     Colon polyps     Depression     Diarrhea     Diverticulosis 08/08/2011    Descending Colon & Sigmoid Colon multiple diverticula    Epigastric pain     Fatigue     Frequent urination     GERD (gastroesophageal reflux disease)     Goiter     thyroid removed in november 2015    Health care maintenance     History of colitis     History of shingles     History of transfusion     Hypertension     Insomnia      Left atrial enlargement     Left ventricular hypertrophy     Mitral regurgitation     mild to moderate per echocardiogram 2016    Nerve pain     LUE D/T SHINGLES    Neuropathy     ARMS    Non morbid obesity due to excess calories     NANCY (obstructive sleep apnea) treated with CPAP 04/21/2011    Overnight polysomnogram.  Weight 187 pounds.  AHI of mildly abnormal at 5.4 events per hour and RDI moderately abnormal at 16.6 events per hour.  No sleep-related hypoxia.    Paroxysmal atrial fibrillation     Permanent atrial fibrillation     Pulmonary hypertension     per echo 2016    Right atrial enlargement     S/P AV deshawn ablation     S/P placement of cardiac pacemaker     Severe obesity (BMI 35.0-35.9 with comorbidity)     SSS (sick sinus syndrome)     Third degree AV block     Thyroid cancer     URI (upper respiratory infection)     Urinary urgency        Past Surgical History:   Procedure Laterality Date    ABDOMINAL SURGERY      ATRIAL ABLATION SURGERY N/A 09/13/2011    Comprehensive electrophysiology study, Left atrial pace & sense, 3-dimensional cardia mapping, Radiofrequency catheter ablation, Transseptal hear catheterization, Dr. Maldonado Melendez    ATRIAL ABLATION SURGERY N/A 02/11/2004    Dr. Maldonado Melendez    BLADDER SURGERY N/A     Bladder tacking procedure    BLADDER SUSPENSION N/A 05/2012    CARDIAC ELECTROPHYSIOLOGY PROCEDURE N/A 5/17/2016    Procedure: PPM generator change - dual BOSTON;  Surgeon: Maldonado Melendez MD;  Location:  [facility];  Service:     CARDIAC ELECTROPHYSIOLOGY PROCEDURE N/A 3/23/2017    Procedure: AV node ablation pt has BOSTON PPM;  Surgeon: Maldonado Melendez MD;  Location:  [facility];  Service:     CARDIAC ELECTROPHYSIOLOGY PROCEDURE N/A 1/6/2022    Procedure: PPM battery change--[place];  Surgeon: Maldonado Melendez MD;  Location:  [facility];  Service: Cardiology;  Laterality: N/A;    CARDIOVERSION N/A 01/26/2017    Dr. Melendez     CARDIOVERSION N/A 05/25/2017    Dr. Melendez    CATARACT EXTRACTION Bilateral     Dr. Gramajo    COLON RESECTION Right 4/27/2018    Procedure: COLON RESECTION RIGHT LAPAROSCOPIC, possible open;  Surgeon: Rebeca Curran MD;  Location: [facility] OR;  Service: General    COLONOSCOPY N/A 04/01/2003    Normal colonoscopy except for an occasional diverticulosis, Dr. Nathan Macias    COLONOSCOPY N/A 4/25/2018    Procedure: COLONOSCOPY INTO CECUM AND TI WITH SALINE LIFT, HOT SNARE POLYPECTOMIES, BX'S, SPOT INJECTION, RESOLUTION CLIPS X2;  Surgeon: Rebeca Curran MD;  [place] [facility];  Service: Gastroenterology    COLONOSCOPY N/A 8/10/2020    Procedure: COLONOSCOPYTO ILEOCOLIC ANASTOMOSIS WITH COLD BX POLYPECTOMY;  Surgeon: Rebeca Curran MD;  Location[place] [facility];  Service: General;  Laterality: N/A;  HX COLON CANCER  --DIVERTICULOSIS, POLYPS, HEMORRHOIDS     CYSTOSCOPY BOTOX INJECTION OF BLADDER N/A 6/27/2016    Procedure: CYSTOSCOPY WITH BOTOX DETRUSOR INJECTION;  Surgeon: Salome Bowen MD;  Location: [facility];  Service:     CYSTOSCOPY BOTOX INJECTION OF BLADDER N/A 05/29/2015    Dr. Salome Bowen    ENDOSCOPY N/A 06/10/2015    Antral Polyp: Gastric Mucosa w/ marked cautery artifact,  Hiatal Hernia, Dr. Rebeca Curran    ENDOSCOPY N/A 10/28/2013    Gastric polyp: polypoid hyperplastic gastric mucosa w/ focal ulceration, mixed acute & chronic inflammation.  Negative for intestinal metaplasia, no helicobacter organisms identified on diff-wuik stain, Dr. Rebeca Curran    ENDOSCOPY N/A 03/18/2013    Large prepyloric polyp, partially removed: fragment of polypoid hyperplastic gastric mucosa w/ foci of erosion & patchy mixed acute & chronic inflammation. No helicobacter organisms identified on diff-quick stain. Negative for intestinal metaplasia, negative for dysplasia, Dr. Rebeca Curran    ENDOSCOPY N/A 10/24/2012    large prepyloric mass (3cm) w/ adjacent nodules: hyperplastic polyp w/ mild  chronic active gastritis, focal erosion & associated, Multiple fundic polyps: polypoid mucosal hyperplasia, Dr. Rebeca Curran    ENDOSCOPY N/A 03/11/2003    Gastric Antral Biopsies: Fragments of Gastric Mucosa & Necrotic Tissue (Compatible w/ Ulcer) w/ chronic active inflammation.  Negative stain for Helicobacter organisms. Dr. Nathan Macias    ENDOSCOPY N/A 10/23/2017    Procedure: ESOPHAGOGASTRODUODENOSCOPY;  Surgeon: Rebeca Curran MD;  Location: [facility] ENDOSCOPY;  Service:     ENDOSCOPY  8/10/2020    Procedure: ESOPHAGOGASTRODUODENOSCOPY;  Surgeon: Rebeca Curran MD;  Location: [facility] ENDOSCOPY;  Service: General;;  GERD, HX GASTRIC POLYPS  --SMALL HIATAL HERNIA, SMALL GASTRIC POLYPS     ENDOSCOPY AND COLONOSCOPY N/A 08/08/2011    4cm hiatal hernia, Schatzki's ring, Diverticulosis, Large antral polyps, Dr. Rebeca Curran    INCISIONAL HERNIA REPAIR N/A 06/11/2015    Defect approximately 2.5 cm w/ a mass of incarcerated tissue approximately the size of a nectarine, Dr. Rebeca Curran    INSERT / REPLACE / REMOVE PACEMAKER      JOINT REPLACEMENT Bilateral     knee    KNEE ARTHROPLASTY      KNEE MINI REVISION Right 9/28/2016    Procedure: RT KNEE POLY INSERT CHANGE ;  Surgeon: Tommy Avila MD;  Location: [facility] MAIN OR;  Service:     LAPAROSCOPIC CHOLECYSTECTOMY N/A     NEPHRECTOMY RADICAL Left 06/08/2004    Incidentially found left renal mass 3-4 cm renal cell carcinoma, left lower pole, Dr. Salome Bowen    OTHER SURGICAL HISTORY      NEUROMUSCULAR BLOCKERS BOTULINUM TOXIN ONABOTULINUMTOXIN A    PACEMAKER IMPLANTATION N/A     Dr. Casillas    PACEMAKER REPLACEMENT N/A 09/24/2010    Implanted Generator is a [facility] [employer] ALTRUA 60 model [id number], serial # [id number], Dr. Chavez Jimenez    SIGMOIDOSCOPY N/A 3/15/2021    Procedure: FLEXIBLE SIGMOIDOSCOPY WITH BIOPSY;  Surgeon: Rebeca Curran MD;  Location: [facility] ENDOSCOPY;  Service: General;  Laterality: N/A;  PERSONAL HX COLON CA, S/P RIGHT COLECTOMY, FOLLOW-UP LARGE RECTAL  "POLYP    --NORMAL RECTUM     TOTAL KNEE ARTHROPLASTY Left 05/21/2013    Dr. Miguel Pacheco    TOTAL THYROIDECTOMY N/A 11/16/2015    Dr. Gideon Ashley, [facility]    TRANSVAGINAL TAPING SUSPENSION N/A 03/16/2009    Mini Sling, Closed suprapubic cystostomy, Dr. Mcmahon Short    TUBAL ABDOMINAL LIGATION Bilateral                Anthropometrics        Current Height Ht Readings from Last 1 Encounters:   04/08/25 157.5 cm (62.01\")      Current Weight Wt Readings from Last 1 Encounters:   04/08/25 87 kg (191 lb 11.2 oz)      BMI  35   Ideal Body Weight (IBW) 110 lb   Usual Body Weight (UBW) 202 lb   Weight Change/Trend Loss   Weight History Wt Readings from Last 30 Encounters:   04/08/25 1607 87 kg (191 lb 11.2 oz)   12/19/24 1022 94.8 kg (209 lb)   12/16/24 1332 93 kg (205 lb 0.4 oz)   12/16/24 1303 92.5 kg (204 lb)   11/19/24 1523 92.5 kg (204 lb)   10/30/24 0922 94.8 kg (209 lb)   10/08/24 1458 93.4 kg (206 lb)   08/27/24 1529 94.8 kg (209 lb)   08/25/24 1151 91.6 kg (202 lb)   05/15/24 1302 91.6 kg (202 lb)   01/17/24 1259 91.9 kg (202 lb 11.2 oz)   12/07/23 0951 91.2 kg (201 lb)   09/21/23 0918 93.9 kg (207 lb)   09/11/23 1528 93 kg (205 lb)   07/12/23 1310 93.1 kg (205 lb 4.8 oz)   05/15/23 1147 93.4 kg (206 lb)   01/25/23 1349 95.7 kg (211 lb)   11/11/22 1203 96.2 kg (212 lb)   06/21/22 1455 97.5 kg (215 lb)   05/17/22 1324 98.2 kg (216 lb 6.4 oz)   03/09/22 1104 98 kg (216 lb)   01/06/22 1044 98.4 kg (217 lb)   11/18/21 1142 98.6 kg (217 lb 6.4 oz)   08/09/21 1000 102 kg (224 lb)   05/07/21 1330 101 kg (223 lb 8 oz)   03/22/21 1159 101 kg (223 lb)   03/15/21 0910 101 kg (223 lb 9 oz)   01/15/21 1351 98.9 kg (218 lb 1.6 oz)   01/13/21 1151 99.3 kg (219 lb)   10/13/20 1402 99.4 kg (219 lb 3.2 oz)          Medications           Current medications: Calcium, Glucosamine-Chondroitin, Ketotifen Fumarate, Loratadine, Multiple Vitamins-Minerals, Probiotic Product, apixaban, cholecalciferol, diphenoxylate-atropine, escitalopram, " "esomeprazole, fluticasone, furosemide, levothyroxine, lisinopril, loperamide, multivitamin, ranolazine, and vitamin B-12                 Tests/Procedures        Tests/Procedures No new tests/procedures     Labs       Pertinent Labs          Invalid input(s): \"LABALBU\", \"PROT\"          COVID19   Date Value Ref Range Status   08/25/2024 Not Detected Not Detected - Ref. Range Final     No results found for: \"HGBA1C\"       NUTRITION INTERVENTION / PLAN OF CARE      Intervention Goal(s) Reduce/improve symptoms         RD Intervention/Action Other:will be available as needed         Recommendations:       PO Diet Continue diet as tolerated      Supplements       Snacks       Other        Electronically signed by:  Aline Morton RD, LD  04/17/25 08:56 EDT      "

## 2025-04-30 ENCOUNTER — OFFICE VISIT (OUTPATIENT)
Age: 89
End: 2025-04-30
Payer: MEDICARE

## 2025-04-30 ENCOUNTER — CLINICAL SUPPORT NO REQUIREMENTS (OUTPATIENT)
Age: 89
End: 2025-04-30
Payer: MEDICARE

## 2025-04-30 VITALS
HEIGHT: 62 IN | BODY MASS INDEX: 36.62 KG/M2 | WEIGHT: 199 LBS | DIASTOLIC BLOOD PRESSURE: 82 MMHG | SYSTOLIC BLOOD PRESSURE: 130 MMHG | HEART RATE: 70 BPM

## 2025-04-30 DIAGNOSIS — I44.2 THIRD DEGREE AV BLOCK: Primary | ICD-10-CM

## 2025-04-30 DIAGNOSIS — Z98.890 S/P AV NODAL ABLATION: ICD-10-CM

## 2025-04-30 DIAGNOSIS — I48.21 PERMANENT ATRIAL FIBRILLATION: ICD-10-CM

## 2025-04-30 DIAGNOSIS — Z95.0 S/P PLACEMENT OF CARDIAC PACEMAKER: ICD-10-CM

## 2025-04-30 PROCEDURE — 93000 ELECTROCARDIOGRAM COMPLETE: CPT | Performed by: INTERNAL MEDICINE

## 2025-04-30 PROCEDURE — 99214 OFFICE O/P EST MOD 30 MIN: CPT | Performed by: INTERNAL MEDICINE

## 2025-04-30 RX ORDER — LIFITEGRAST 50 MG/ML
1 SOLUTION/ DROPS OPHTHALMIC
COMMUNITY
Start: 2025-03-04

## 2025-04-30 NOTE — PROGRESS NOTES
Date of Office Visit: 2025  Encounter Provider: Maldonado Melendez MD  Place of Service: [facility] CARDIOLOGY  Patient Name: Anastacia Sarah  : 1935    Subjective:     Encounter Date:2025      Patient ID: Anastacia Sarah is a 89 y.o. female who has a cc of perm AF and av block and a pacer.     She uses a walker. She has kidney cancer.     Moderate winters. No change (she has HFpEF)     MRA started but she had high K     There have been no hospital admission since the last visit.     There have been no bleeding events.       Past Medical History:   Diagnosis Date    Abnormal ECG     Acute bronchitis     Acute sinusitis     Anemia     Iron deficiency    Anesthesia complication     Anticoagulated on warfarin     Arthritis     Bladder dysfunction     Cancer of left kidney 2004    S/P Left Radical Neprectomy    Cardiac disease     Chronic dysfunction of left eustachian tube     CKD (chronic kidney disease)     stage 3    Colon cancer     Colon polyps     Depression     Diarrhea     Diverticulosis 2011    Descending Colon & Sigmoid Colon multiple diverticula    Epigastric pain     Fatigue     Frequent urination     GERD (gastroesophageal reflux disease)     Goiter     thyroid removed in 2015    Health care maintenance     History of colitis     History of shingles     History of transfusion     Hypertension     Insomnia     Left atrial enlargement     Left ventricular hypertrophy     Mitral regurgitation     mild to moderate per echocardiogram 2016    Nerve pain     LUE D/T SHINGLES    Neuropathy     ARMS    Non morbid obesity due to excess calories     NANCY (obstructive sleep apnea) treated with CPAP 2011    Overnight polysomnogram.  Weight 187 pounds.  AHI of mildly abnormal at 5.4 events per hour and RDI moderately abnormal at 16.6 events per hour.  No sleep-related hypoxia.    Paroxysmal atrial fibrillation     Permanent atrial fibrillation     Pulmonary  hypertension     per echo 2016    Right atrial enlargement     S/P AV deshawn ablation     S/P placement of cardiac pacemaker     Severe obesity (BMI 35.0-35.9 with comorbidity)     SSS (sick sinus syndrome)     Third degree AV block     Thyroid cancer     URI (upper respiratory infection)     Urinary urgency        Social History     Socioeconomic History    Marital status:     Number of children: 0   Tobacco Use    Smoking status: Never     Passive exposure: Past    Smokeless tobacco: Never    Tobacco comments:     caffeine use   Vaping Use    Vaping status: Never Used   Substance and Sexual Activity    Alcohol use: No    Drug use: No    Sexual activity: Defer       Family History   Problem Relation Age of Onset    Heart disease Other     Deep vein thrombosis Other     Hypertension Other     Breast cancer Sister         In her 60s.    Breast cancer Maternal Aunt     Breast cancer Sister         In her 60s.    Heart disease Mother     Hypertension Mother     Heart disease Father     Hypertension Father     Heart attack Father     Heart disease Daughter     Hypertension Daughter     Heart disease Son     Hypertension Son     Malig Hyperthermia Neg Hx        Review of Systems   Constitutional: Negative for fever and night sweats.   HENT:  Negative for ear pain and stridor.    Eyes:  Negative for discharge and visual halos.   Cardiovascular:  Negative for cyanosis.   Respiratory:  Negative for hemoptysis and sputum production.    Hematologic/Lymphatic: Negative for adenopathy.   Skin:  Negative for nail changes and unusual hair distribution.   Musculoskeletal:  Positive for arthritis and joint pain. Negative for gout and joint swelling.   Gastrointestinal:  Negative for bowel incontinence and flatus.   Genitourinary:  Negative for dysuria and flank pain.   Neurological:  Positive for loss of balance. Negative for seizures and tremors.   Psychiatric/Behavioral:  Negative for altered mental status. The patient is  "not nervous/anxious.             Objective:     Vitals:    04/30/25 1259   BP: 130/82   BP Location: Left arm   Patient Position: Sitting   Pulse: 70   Weight: 90.3 kg (199 lb)   Height: 157.5 cm (62.01\")         Eyes:      General:         Right eye: No discharge.         Left eye: No discharge.   HENT:      Head: Normocephalic and atraumatic.   Neck:      Thyroid: No thyromegaly.      Vascular: No JVD.   Pulmonary:      Effort: Pulmonary effort is normal.      Breath sounds: Normal breath sounds. No rales.   Cardiovascular:      Normal rate. Regular rhythm.      No gallop.    Edema:     Peripheral edema absent.   Abdominal:      General: Bowel sounds are normal.      Palpations: Abdomen is soft.      Tenderness: There is no abdominal tenderness.   Musculoskeletal: Normal range of motion.         General: No deformity. Skin:     General: Skin is warm and dry.      Findings: No erythema.   Neurological:      Mental Status: Alert and oriented to person, place, and time.      Motor: Normal muscle tone.   Psychiatric:         Behavior: Behavior normal.         Thought Content: Thought content normal.           ECG 12 Lead    Date/Time: 4/30/2025 1:30 PM  Performed by: Maldonado Melendez MD    Authorized by: Maldonado Melendez MD  Comparison: compared with previous ECG   Similar to previous ECG  Rhythm: atrial fibrillation and paced          Lab Review:       Assessment:          Diagnosis Plan   1. Third degree AV block        2. S/P AV deshawn ablation        3. S/P placement of cardiac pacemaker        4. Permanent atrial fibrillation               Plan:     I reviewed the pacemaker/ICD tracings and the pacing and sensing parameters are Abnormal.  AF burden is 100% RV pacing threshold of a 22 year old lead is at 1.9, but stable.     KIM --  echo in Dec found normal LVEF. Nuclear is uncertain. We are not doing angio due to one kidney and cancer in remaining kidney and co-morbid conditions     Given everything, CV status is " stable.

## 2025-05-12 DIAGNOSIS — E87.5 HYPERKALEMIA: ICD-10-CM

## 2025-05-13 DIAGNOSIS — D63.1 ANEMIA IN STAGE 3A CHRONIC KIDNEY DISEASE: ICD-10-CM

## 2025-05-13 DIAGNOSIS — N18.31 ANEMIA IN STAGE 3A CHRONIC KIDNEY DISEASE: ICD-10-CM

## 2025-05-13 DIAGNOSIS — R53.82 CHRONIC FATIGUE: ICD-10-CM

## 2025-05-20 ENCOUNTER — OFFICE VISIT (OUTPATIENT)
Dept: INTERNAL MEDICINE | Facility: CLINIC | Age: 89
End: 2025-05-20
Payer: MEDICARE

## 2025-05-20 VITALS
WEIGHT: 197 LBS | BODY MASS INDEX: 36.25 KG/M2 | SYSTOLIC BLOOD PRESSURE: 136 MMHG | DIASTOLIC BLOOD PRESSURE: 80 MMHG | HEIGHT: 62 IN | OXYGEN SATURATION: 95 % | TEMPERATURE: 95 F | HEART RATE: 72 BPM

## 2025-05-20 DIAGNOSIS — F41.1 GAD (GENERALIZED ANXIETY DISORDER): Primary | ICD-10-CM

## 2025-05-20 RX ORDER — ESCITALOPRAM OXALATE 10 MG/1
10 TABLET ORAL DAILY
Qty: 90 TABLET | Refills: 1 | Status: SHIPPED | OUTPATIENT
Start: 2025-05-20

## 2025-05-20 NOTE — PROGRESS NOTES
Subjective   Anastacia Sarah is a 89 y.o. female.   Chief Complaint   Patient presents with    Anxiety       History of Present Illness     GUIDO/panic attack-on escitalopram at 10 mg a day.  She takes it every day. She has no side effects.  Mood is okay, she worries some about her eyes, more than about kidneys.  She is scheduled for abdominal/pelvic CT in 2 days-follow-up on suspicious renal mass. She did not have any more panic attacks.  PHQ-9 at 7, GUIDO-7 at 3.    Review of Systems      Objective   Wt Readings from Last 3 Encounters:   05/20/25 89.4 kg (197 lb)   04/30/25 90.3 kg (199 lb)   04/08/25 87 kg (191 lb 11.2 oz)      Vitals:    05/20/25 1352   BP: 136/80   Pulse: 72   Temp: 95 °F (35 °C)   SpO2: 95%     Temp Readings from Last 3 Encounters:   05/20/25 95 °F (35 °C)   04/08/25 98.3 °F (36.8 °C) (Oral)   11/19/24 97.5 °F (36.4 °C)     BP Readings from Last 3 Encounters:   05/20/25 136/80   04/30/25 130/82   04/08/25 125/64     Pulse Readings from Last 3 Encounters:   05/20/25 72   04/30/25 70   04/08/25 74     Body mass index is 36.02 kg/m².    Physical Exam  Constitutional:       Appearance: She is well-developed.      Comments: Walks with a walker.   HENT:      Head: Normocephalic and atraumatic.   Cardiovascular:      Rate and Rhythm: Normal rate and regular rhythm.      Heart sounds: Normal heart sounds.   Pulmonary:      Effort: Pulmonary effort is normal.      Breath sounds: Normal breath sounds.   Skin:     General: Skin is warm and dry.   Neurological:      Mental Status: She is alert and oriented to person, place, and time.   Psychiatric:         Behavior: Behavior normal.         Assessment & Plan   Diagnoses and all orders for this visit:    1. GUIDO (generalized anxiety disorder) (Primary)    Other orders  -     escitalopram (Lexapro) 10 MG tablet; Take 1 tablet by mouth Daily.  Dispense: 90 tablet; Refill: 1        GUIDO-continue current treatment.  Follow-up in 6 months, sooner if any  problems.    I asked patient to do sit to stand exercise.  Handouts added to discharge summary.

## 2025-05-27 RX ORDER — ESCITALOPRAM OXALATE 10 MG/1
10 TABLET ORAL DAILY
Qty: 90 TABLET | Refills: 1 | Status: SHIPPED | OUTPATIENT
Start: 2025-05-27

## 2025-06-11 NOTE — PROGRESS NOTES
"Chief Complaint  Recurrent iron deficiency anemia, stage IIA (T3N0M0) ascending colon cancer, history of left renal cell carcinoma status post left nephrectomy in 2004, CKD3, enlarging right renal lesion, history of gastric polyps    Subjective        History of Present Illness    Patient returns today in follow-up with laboratory studies to review.  She has recently [facility] as well on 5/22/2025.  This is in monitoring an enlarging right renal lesion that is suspected to represent a new renal cell carcinoma.  The CT on 5/22/2025 did show further increase in size of the lesion from 5.1 up to 5.5 cm.  Urology had discussion with patient regarding options for management as this does appear to be a renal cell carcinoma.  Certainly surgery would carry significant risk of further renal dysfunction and need for dialysis.  There would also be the risks of complications from surgery given her previous abdominal surgeries.  Patient has elected not to pursue surgical resection.  There was discussion regarding potential role of radiation therapy.  Patient is scheduled to see radiation oncology on 6/18/2025.  Lesion is too large for an ablation procedure at this point.  Patient today is accompanied by her daughter.  She expresses reluctance to proceed with any treatment that would carry significant risks.  She would like to focus on quality of life.  Today, she reports ongoing difficulty with chronic diarrhea that does wax and wane and is alleviated with the use of Lomotil and Imodium as needed.  She is ambulating reasonably well without assistance today.      Objective   Vital Signs:   /70   Pulse 70   Temp 98.6 °F (37 °C) (Oral)   Resp 17   Ht 157.5 cm (62.01\")   Wt 89.8 kg (198 lb)   SpO2 95%   BMI 36.21 kg/m²     Physical Exam  Constitutional:       Appearance: She is well-developed.      Comments: Elderly female no distress, abulating without assistance   Eyes:      " Conjunctiva/sclera: Conjunctivae normal.   Neck:      Thyroid: No thyromegaly.   Cardiovascular:      Rate and Rhythm: Normal rate. Rhythm irregular.      Heart sounds: No murmur heard.     No friction rub. No gallop.   Pulmonary:      Effort: No respiratory distress.      Breath sounds: Normal breath sounds.   Abdominal:      General: Bowel sounds are normal. There is no distension.      Palpations: Abdomen is soft.      Tenderness: There is no abdominal tenderness.   Musculoskeletal:      Comments: Trace bilateral lower extremity edema with venous stasis changes   Lymphadenopathy:      Head:      Right side of head: No submandibular adenopathy.      Cervical: No cervical adenopathy.      Upper Body:      Right upper body: No supraclavicular adenopathy.      Left upper body: No supraclavicular adenopathy.   Skin:     General: Skin is warm and dry.      Findings: No rash.   Neurological:      Mental Status: She is alert and oriented to person, place, and time.      Cranial Nerves: No cranial nerve deficit.      Motor: No abnormal muscle tone.      Deep Tendon Reflexes: Reflexes normal.   Psychiatric:         Behavior: Behavior normal.              Result Review : Reviewed CBC, CMP, iron panel, ferritin from today.  Reviewed records from urology including CT abdomen pelvis 5/22/2025       Assessment and Plan     *Recurrent iron deficiency anemia and anemia secondary to CKD3:   The patient is intolerant of oral iron.   Prior GI evaluation showed no definitive evidence of GI blood loss.   She has underlying stage III chronic kidney disease, which is a contributing factor to her mild borderline anemia.   She has been treated in the past with IV iron in 2011 and 2012.    She had a lengthy absence from our office from 2015 until she was referred back on 9/21/17 with evidence of recurrent iron deficiency, likely related to malabsorption.  Her ferritin on 7/7/17 was 21 and hemoglobin on 8/7/17 was 11.1.  Her degree of  anemia was mild with a hemoglobin of 11.4, microcytic indices with an MCV of 78.  Labs on 9/21/17 showed ferritin 20.5, iron saturation 8%, TIBC 441.  We did also check B12 and folate which were normal.  Erythropoietin was 32 consistent with a component of anemia secondary to chronic kidney disease. She did return stool cards for occult blood which were negative ×3 10/5/17.  She received additional Feraheme on 10/5 and 10/12/17.    She underwent EGD with Dr. Curran 10/23/17 with no evidence of active bleeding, small gastric polyps identified.    Labs on 1/25/2023 showed hemoglobin that decreased to 11.2 after previously in the 12-13 range.  Iron studies were stable to slightly diminished with iron 56, ferritin 62, iron saturation 15%, TIBC 365.  Iron stores suboptimal in the setting of anemia secondary to CKD3.  Patient intolerant of oral iron.  We did discuss the possibility of pursuing IV iron and she would like to defer this if possible.    Labs on 4/8/25 with hemoglobin 11.2, iron replete status with iron 99, ferritin 81.9, iron saturation 23%, TIBC 428, B12 1055, folate greater than 20.  Labs today with hemoglobin that has declined from the 11-12 range down to 10.2.  We did recheck iron studies today which showed iron 62, ferritin fairly stable at 70.8, slight decrease in iron saturation 16% and TIBC of 390.  Patient previously did not wish to pursue IV iron.  We will continue to monitor her status with labs in 3 months with CBC, iron panel, ferritin and iron review.  If there has been further decline in hemoglobin and iron status we will need to reconsider use of IV iron.  I will see her in 6 months with repeat labs again.  Her residual anemia is related to CKD3a as well as borderline iron deficiency    *Stage IIA (T3N0M0) ascending colon cancer:   Developed significant diarrhea with blood per rectum.    Colonoscopy 4/25/18 with identification of polyps in the rectum, cecal polyp, mass in the right colon.   "The cecal polyp was a sessile serrated adenoma, ascending colon \"mass\" showed hyperplastic polyp, rectal polyp was a tubulovillous adenoma with low-grade dysplasia.  CEA on 4/25/18 was normal at 1.65.  Chest x-ray 4/25/18 was unrevealing.  CT of the abdomen and pelvis 4/26/18 showed a 4 cm annular mass in the ascending colon with adjacent haziness in the mesentery but no lymphadenopathy, no evidence of metastatic disease.    Right hemicolectomy on 4/27/18 with pathology showing an invasive moderately differentiated adenocarcinoma arising in a sessile serrated adenoma with negative margins measuring 2.5 cm extending through the muscularis propria into brad-colorectal tissue, positive lymphovascular invasion, negative perineural invasion, 10 lymph nodes negative.  There was a comment regarding an ulcerated small adjacent pericolonic abscess, raising the question of possible microscopic perforation.  Patient did have a few high risk features with less than 12 lymph nodes removed, positive lymphovascular invasion, and some possibility of microscopic perforation.  The tumor was microsatellite stable.    Given her age and comorbidities, the patient was a poor candidate for adjuvant chemotherapy and was not recommended.  Independent of this recommendation, the patient stated she was not interested in taking any adjuvant chemotherapy.  Therefore we will plan to monitor her clinically for evidence of recurrent disease.  Standard monitoring is with routine CEA levels however this does not appear to be informative for her given her normal preoperative value.  We will pursue annual interval follow-up CT.    CT scan from 4/24/2019 at a 1 year interval showed no evidence of recurrent disease.    5/22/2020 CEA normal at 1.69 and CT scan chest abdomen and pelvis 5/22/2020 showed no evidence of recurrent/metastatic disease.    The patient underwent delayed endoscopic evaluation with EGD and colonoscopy on 8/10/2020 with Dr. Willett, " colonoscopy with tubular adenoma and hyperplastic polyp removed.  Flexible sigmoidoscopy with Dr. Curran on 3/15/2021 was negative  1 year interval follow-up CT performed 5/6/2021 which showed no evidence of recurrent malignancy.  There was however a comment regarding a subpleural opacification left lower lobe as well as a slight increase in normal sized mediastinal lymph nodes.  The lymph nodes however remained normal size and the change in size was felt to be insignificant, left lower lobe subpleural opacification was felt to be inflammatory.  Radiology however recommend 6-month interval follow-up study.  Annual surveillance CT scans chest abdomen and pelvis 7/6/2023 showed no evidence of recurrent disease.    After 5 years out from surgery we do not plan any further routine CEA or CT monitoring.  Given her age, patient does not wish to pursue any further endoscopic evaluation.    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    Family history includes a sister with breast cancer around age 60, another sister with breast cancer around age 60, maternal aunt with breast cancer over the age of 50, maternal uncle with lung cancer.   Given the patient's multiple malignancies and family history, she was referred to the genetics clinic to discuss potential genetic testing, mainly for benefit of the patient's children and other family members.    Her colon cancer was microsatellite stable.   She did follow-up in the genetics clinic and underwent INVITAE panel test 7/31/18 showing VUS in MLH3 and MSH6 with no known clinical significance at this time.     *History of gastric polyps:   EGD 10/23/17 with small less than 6 mm gastric polyps, not resected.  Repeat EGD 8/10/2020 with small gastric polyps,  not resected.    *History of left renal cell carcinoma with enlarging right renal lesion suspicious for renal cell carcinoma:   The patient underwent a left nephrectomy in 2004 with no clinical evidence of recurrent disease.    CT chest from 11/14/16 showed no evidence of recurrence.    CT abdomen and pelvis however from 4/26/18 showed a 2.2 cm right renal lesion which was indeterminate, possibly a proteinaceous cyst but could not rule out mass lesion.  The study was performed without contrast due to the patient's underlying chronic kidney disease.  She is unable to undergo renal mass protocol CT with contrast.  She is unable to undergo MRI due to her pacemaker.    Follow-up CT scan 4/24/2019 with no change in the right renal lesion at 2.1 cm, repeat at 1 year interval  1 year interval follow-up CT (delayed due to viral pandemic) 5/22/2020 with increase in right renal mass from 2.1 up to 2.6 cm.  This is felt to possibly represent a proteinaceous cyst versus solid mass.  She is unable to undergo a renal protocol CT due to her renal dysfunction and solitary kidney and cannot undergo MRI due to her pacemaker.   Renal ultrasound 6/4/2020 showed a 3.6 cm mixed solid and cystic mass at the lower pole of the right kidney suspicious for renal cell carcinoma and recommended surgical excision.  Patient did follow-up with Dr. Trujillo in urology who recommended continued radiographic monitoring.  Short interval follow-up ultrasound on 9/15/2020 with stable 3.6 cm right renal lesion.   Renal ultrasound by urology on 1/7/2021 measured the lower pole right renal mass at 3.8 cm, was not directly compared to the prior study performed at [facility].    CT 5/6/2021 showed right renal lesion had increased in size from 2.6 up to 3.1 cm.  It was felt that this likely represents a hyperdense renal cyst however could not rule out solid lesion or complex lesion.    CT 11/15/2021 showed that the lower pole right renal lesion continued to  increase gradually in size from 3.1 now up to 3.3 cm.  Change has been minimal however radiology did review multiple prior studies and the lesion has increased gradually over time, suspicious for possible malignancy.   Patient did follow-up with Dr. Trujillo in urology in January 2022.  He does not recommend pursuit of ablation of the renal lesion due to risk of hemorrhage and potential compromise of the patient's renal function in her solitary kidney.  CT 5/10/2022 showed further slight increase in the right renal mass from 3.1 x 3.2 up to 3.6 x 3.7 cm.  Given the continued gradual enlargement in size of the lesion there is high suspicion for slowly growing new primary renal cell carcinoma.    Dr. Trujillo is aware of the continued increase in size of the lesion and does not plan to perform any intervention given her age, indolent nature of the disease, and risk to her renal function.  Therefore with no intervention planned   There does not appear to be any utility in further aggressive CT monitoring for this issue.    Dr. Trujillo did see the patient back in January 2023 with repeat ultrasound that did show slight further increase in the lesion from 3.8 up to 4.6 cm and some evidence of mild right hydronephrosis.  He plans a follow-up ultrasound in 1 year interval.  CT 7/6/2023 showed further increase in size in the right lower pole renal lesion from 3.8 x 2.9 up to 3.8 x 4.8 cm and additional stable lesions in the right kidney.    1 year interval renal ultrasound on 1/12/2024 at [facility] showed slight increase in size of the lesion over a 1 year period from 4.6 x 3.8 x 3.6 up to 4.5 x 3.9 x 4.1 cm.  Renal ultrasound on 2/4/2025 showed further increase in the right lower pole exophytic mass measuring 5.1 cm with internal flow on Doppler highly suspicious for renal cell carcinoma.  CT abdomen [facility] pelvis at [facility] on 5/22/2025 showed further increase in size of right renal lesion from 5.1 up to 5.5 cm  Patient  returns today in follow-up.  She was recently seen in follow-up by [facility] Dr. Schrader.  She underwent CT abdomen and pelvis on 5/22/2025 which showed further increase in the apparent right renal cell carcinoma from 5.1 up to 5.5 cm.  He discussed the potential for surgical intervention which would carry risk of worsening renal function and possible dialysis, potential complications given her previous abdominal surgeries.  The patient has decided against surgical intervention given the significant risks.  The lesion is too large for an ablative procedure.  Patient was referred to radiation oncology to discuss possibility of radiation therapy and is scheduled to be seen by Dr. Ash on 6/18/2025.  From our standpoint, would not recommend use of immunotherapy in the setting of localized disease from which the patient is asymptomatic given the potential risks of treatment.  This will be reconsidered if she develops metastatic disease in the future and still maintains an acceptable ECOG performance status.  I encouraged the patient to discuss the option of radiation with Dr. Ash.  She indicates that she likely will not elect to proceed with any treatment at this point.  She is being scheduled for a 6-month interval follow-up ultrasound by urology.  We did discuss the possibility of pursuing CT chest abdomen pelvis to assess for evidence of distant metastatic disease as well however patient was content to forego this for now.    *CKD3:   Status post left nephrectomy  Baseline creatinine in 1.2-1.8 range  Contributing factor to anemia    *Abnormal appearance of cervix on CT scan  CT 5/10/2022 with bulky appearance of the cervix with demonstration of air in the lumen.  Recommended follow-up with direct visualization  Patient notes that she did have a urogynecologic procedure performed in the past which did involve the cervix.    Patient initially wanted to return to see urogynecology to review the issue however  decided ultimately that she does not wish to pursue this.    CT abdomen and pelvis 7/6/2023 with similar appearance to the cervix with mildly bulky appearance with air in the lumen and small focus of air in the endometrial canal.  Patient does not wish to pursue evaluation of this.    *Diarrhea  Patient has experienced difficulty with alternating constipation and diarrhea that has been present since the time of her colonic surgery.   Patient continues to use Imodium and Lomotil as needed    PLAN:   Patient will continue using Imodium and Lomotil as needed for chronic diarrhea  Patient does not wish to proceed with surgical intervention for suspected enlarging right renal cell carcinoma due to significant risks  Patient will see radiation oncology as scheduled on 6/18/2025 to discuss tensional possibility for radiation therapy to the lesion although she is inclined not to pursue treatment  Patient does not wish to pursue IV iron treatment unless absolutely necessary  Patient is being scheduled by urology for 6-month interval follow-up visit with renal ultrasound same day  In 3 months CBC, stat iron panel/ferritin and RN review  MD visit in 6 months with CBC, CMP, stat iron panel/ferritin            I did spend 40 minutes total time caring for the patient today, time spent in review of records, face-to-face consultation, coordination of care, placement of orders, completion of documentation.  Patient and her daughter had multiple questions which were answered to their satisfaction.

## 2025-06-17 ENCOUNTER — OFFICE VISIT (OUTPATIENT)
Dept: ONCOLOGY | Facility: CLINIC | Age: 89
End: 2025-06-17
Payer: MEDICARE

## 2025-06-17 ENCOUNTER — TELEPHONE (OUTPATIENT)
Dept: RADIATION ONCOLOGY | Facility: HOSPITAL | Age: 89
End: 2025-06-17
Payer: MEDICARE

## 2025-06-17 ENCOUNTER — LAB (OUTPATIENT)
Dept: LAB | Facility: HOSPITAL | Age: 89
End: 2025-06-17
Payer: MEDICARE

## 2025-06-17 VITALS
BODY MASS INDEX: 36.44 KG/M2 | WEIGHT: 198 LBS | SYSTOLIC BLOOD PRESSURE: 136 MMHG | RESPIRATION RATE: 17 BRPM | DIASTOLIC BLOOD PRESSURE: 70 MMHG | HEIGHT: 62 IN | TEMPERATURE: 98.6 F | HEART RATE: 70 BPM | OXYGEN SATURATION: 95 %

## 2025-06-17 DIAGNOSIS — N18.31 ANEMIA IN STAGE 3A CHRONIC KIDNEY DISEASE: Primary | ICD-10-CM

## 2025-06-17 DIAGNOSIS — C18.2 MALIGNANT NEOPLASM OF ASCENDING COLON: ICD-10-CM

## 2025-06-17 DIAGNOSIS — D63.1 ANEMIA IN STAGE 3A CHRONIC KIDNEY DISEASE: Primary | ICD-10-CM

## 2025-06-17 DIAGNOSIS — D63.1 ANEMIA IN STAGE 3A CHRONIC KIDNEY DISEASE: ICD-10-CM

## 2025-06-17 DIAGNOSIS — N18.31 ANEMIA IN STAGE 3A CHRONIC KIDNEY DISEASE: ICD-10-CM

## 2025-06-17 LAB
ALBUMIN SERPL-MCNC: 3.9 G/DL (ref 3.5–5.2)
ALBUMIN/GLOB SERPL: 1.3 G/DL
ALP SERPL-CCNC: 107 U/L (ref 39–117)
ALT SERPL W P-5'-P-CCNC: 27 U/L (ref 1–33)
ANION GAP SERPL CALCULATED.3IONS-SCNC: 11.9 MMOL/L (ref 5–15)
AST SERPL-CCNC: 49 U/L (ref 1–32)
BASOPHILS # BLD AUTO: 0.1 10*3/MM3 (ref 0–0.2)
BASOPHILS NFR BLD AUTO: 1.6 % (ref 0–1.5)
BILIRUB SERPL-MCNC: 0.7 MG/DL (ref 0–1.2)
BUN SERPL-MCNC: 15.4 MG/DL (ref 8–23)
BUN/CREAT SERPL: 13.9 (ref 7–25)
CALCIUM SPEC-SCNC: 9.1 MG/DL (ref 8.6–10.5)
CHLORIDE SERPL-SCNC: 106 MMOL/L (ref 98–107)
CO2 SERPL-SCNC: 23.1 MMOL/L (ref 22–29)
CREAT SERPL-MCNC: 1.11 MG/DL (ref 0.57–1)
DEPRECATED RDW RBC AUTO: 51.4 FL (ref 37–54)
EGFRCR SERPLBLD CKD-EPI 2021: 47.9 ML/MIN/1.73
EOSINOPHIL # BLD AUTO: 0.56 10*3/MM3 (ref 0–0.4)
EOSINOPHIL NFR BLD AUTO: 9.2 % (ref 0.3–6.2)
ERYTHROCYTE [DISTWIDTH] IN BLOOD BY AUTOMATED COUNT: 14.9 % (ref 12.3–15.4)
FERRITIN SERPL-MCNC: 70.8 NG/ML (ref 13–150)
GLOBULIN UR ELPH-MCNC: 2.9 GM/DL
GLUCOSE SERPL-MCNC: 127 MG/DL (ref 65–99)
HCT VFR BLD AUTO: 32.2 % (ref 34–46.6)
HGB BLD-MCNC: 10.2 G/DL (ref 12–15.9)
IMM GRANULOCYTES # BLD AUTO: 0.03 10*3/MM3 (ref 0–0.05)
IMM GRANULOCYTES NFR BLD AUTO: 0.5 % (ref 0–0.5)
IRON 24H UR-MRATE: 62 MCG/DL (ref 37–145)
IRON SATN MFR SERPL: 16 % (ref 20–50)
LYMPHOCYTES # BLD AUTO: 1.28 10*3/MM3 (ref 0.7–3.1)
LYMPHOCYTES NFR BLD AUTO: 21 % (ref 19.6–45.3)
MCH RBC QN AUTO: 29.8 PG (ref 26.6–33)
MCHC RBC AUTO-ENTMCNC: 31.7 G/DL (ref 31.5–35.7)
MCV RBC AUTO: 94.2 FL (ref 79–97)
MONOCYTES # BLD AUTO: 0.56 10*3/MM3 (ref 0.1–0.9)
MONOCYTES NFR BLD AUTO: 9.2 % (ref 5–12)
NEUTROPHILS NFR BLD AUTO: 3.57 10*3/MM3 (ref 1.7–7)
NEUTROPHILS NFR BLD AUTO: 58.5 % (ref 42.7–76)
NRBC BLD AUTO-RTO: 0 /100 WBC (ref 0–0.2)
PLATELET # BLD AUTO: 224 10*3/MM3 (ref 140–450)
PMV BLD AUTO: 10.9 FL (ref 6–12)
POTASSIUM SERPL-SCNC: 4.9 MMOL/L (ref 3.5–5.2)
PROT SERPL-MCNC: 6.8 G/DL (ref 6–8.5)
RBC # BLD AUTO: 3.42 10*6/MM3 (ref 3.77–5.28)
SODIUM SERPL-SCNC: 141 MMOL/L (ref 136–145)
TIBC SERPL-MCNC: 390 MCG/DL (ref 298–536)
TRANSFERRIN SERPL-MCNC: 262 MG/DL (ref 200–360)
WBC NRBC COR # BLD AUTO: 6.1 10*3/MM3 (ref 3.4–10.8)

## 2025-06-17 PROCEDURE — 83540 ASSAY OF IRON: CPT | Performed by: INTERNAL MEDICINE

## 2025-06-17 PROCEDURE — 85025 COMPLETE CBC W/AUTO DIFF WBC: CPT

## 2025-06-17 PROCEDURE — 36415 COLL VENOUS BLD VENIPUNCTURE: CPT

## 2025-06-17 PROCEDURE — 80053 COMPREHEN METABOLIC PANEL: CPT

## 2025-06-17 PROCEDURE — 82728 ASSAY OF FERRITIN: CPT | Performed by: INTERNAL MEDICINE

## 2025-06-17 PROCEDURE — 84466 ASSAY OF TRANSFERRIN: CPT | Performed by: INTERNAL MEDICINE

## 2025-06-17 NOTE — LETTER
June 17, 2025     Gabriella Paige MD  [address]  [address]  [facility] [address]    Patient: Anastacia Sarah   YOB: 1935   Date of Visit: 6/17/2025     Dear Gabriella Paige MD:       Thank you for referring Anastacia Sarah to me for evaluation. Below are the relevant portions of my assessment and plan of care.    If you have questions, please do not hesitate to call me. I look forward to following Anastacia along with you.         Sincerely,        Maldonado Brower MD        CC: MD Henrry Currie MD Huber, Maldonado OAKES Jr., MD  06/17/25 1826  Sign when Signing Visit  Chief Complaint  Recurrent iron deficiency anemia, stage IIA (T3N0M0) ascending colon cancer, history of left renal cell carcinoma status post left nephrectomy in 2004, CKD3, enlarging right renal lesion, history of gastric polyps    Subjective       History of Present Illness    Patient returns today in follow-up with laboratory studies to review.  She has recently [facility] as well on 5/22/2025.  This is in monitoring an enlarging right renal lesion that is suspected to represent a new renal cell carcinoma.  The CT on 5/22/2025 did show further increase in size of the lesion from 5.1 up to 5.5 cm.  Urology had discussion with patient regarding options for management as this does appear to be a renal cell carcinoma.  Certainly surgery would carry significant risk of further renal dysfunction and need for dialysis.  There would also be the risks of complications from surgery given her previous abdominal surgeries.  Patient has elected not to pursue surgical resection.  There was discussion regarding potential role of radiation therapy.  Patient is scheduled to see radiation oncology on 6/18/2025.  Lesion is too large for an ablation procedure at this point.  Patient today is accompanied by her daughter.  She expresses reluctance to proceed with any treatment that would carry  "significant risks.  She would like to focus on quality of life.  Today, she reports ongoing difficulty with chronic diarrhea that does wax and wane and is alleviated with the use of Lomotil and Imodium as needed.  She is ambulating reasonably well without assistance today.      Objective  Vital Signs:   /70   Pulse 70   Temp 98.6 °F (37 °C) (Oral)   Resp 17   Ht 157.5 cm (62.01\")   Wt 89.8 kg (198 lb)   SpO2 95%   BMI 36.21 kg/m²     Physical Exam  Constitutional:       Appearance: She is well-developed.      Comments: Elderly female no distress, abulating without assistance   Eyes:      Conjunctiva/sclera: Conjunctivae normal.   Neck:      Thyroid: No thyromegaly.   Cardiovascular:      Rate and Rhythm: Normal rate. Rhythm irregular.      Heart sounds: No murmur heard.     No friction rub. No gallop.   Pulmonary:      Effort: No respiratory distress.      Breath sounds: Normal breath sounds.   Abdominal:      General: Bowel sounds are normal. There is no distension.      Palpations: Abdomen is soft.      Tenderness: There is no abdominal tenderness.   Musculoskeletal:      Comments: Trace bilateral lower extremity edema with venous stasis changes   Lymphadenopathy:      Head:      Right side of head: No submandibular adenopathy.      Cervical: No cervical adenopathy.      Upper Body:      Right upper body: No supraclavicular adenopathy.      Left upper body: No supraclavicular adenopathy.   Skin:     General: Skin is warm and dry.      Findings: No rash.   Neurological:      Mental Status: She is alert and oriented to person, place, and time.      Cranial Nerves: No cranial nerve deficit.      Motor: No abnormal muscle tone.      Deep Tendon Reflexes: Reflexes normal.   Psychiatric:         Behavior: Behavior normal.              Result Review: Reviewed CBC, CMP, iron panel, ferritin from today.  Reviewed records from urology including CT abdomen pelvis 5/22/2025       Assessment and Plan "     *Recurrent iron deficiency anemia and anemia secondary to CKD3:   The patient is intolerant of oral iron.   Prior GI evaluation showed no definitive evidence of GI blood loss.   She has underlying stage III chronic kidney disease, which is a contributing factor to her mild borderline anemia.   She has been treated in the past with IV iron in 2011 and 2012.    She had a lengthy absence from our office from 2015 until she was referred back on 9/21/17 with evidence of recurrent iron deficiency, likely related to malabsorption.  Her ferritin on 7/7/17 was 21 and hemoglobin on 8/7/17 was 11.1.  Her degree of anemia was mild with a hemoglobin of 11.4, microcytic indices with an MCV of 78.  Labs on 9/21/17 showed ferritin 20.5, iron saturation 8%, TIBC 441.  We did also check B12 and folate which were normal.  Erythropoietin was 32 consistent with a component of anemia secondary to chronic kidney disease. She did return stool cards for occult blood which were negative ×3 10/5/17.  She received additional Feraheme on 10/5 and 10/12/17.    She underwent EGD with Dr. Curran 10/23/17 with no evidence of active bleeding, small gastric polyps identified.    Labs on 1/25/2023 showed hemoglobin that decreased to 11.2 after previously in the 12-13 range.  Iron studies were stable to slightly diminished with iron 56, ferritin 62, iron saturation 15%, TIBC 365.  Iron stores suboptimal in the setting of anemia secondary to CKD3.  Patient intolerant of oral iron.  We did discuss the possibility of pursuing IV iron and she would like to defer this if possible.    Labs on 4/8/25 with hemoglobin 11.2, iron replete status with iron 99, ferritin 81.9, iron saturation 23%, TIBC 428, B12 1055, folate greater than 20.  Labs today with hemoglobin that has declined from the 11-12 range down to 10.2.  We did recheck iron studies today which showed iron 62, ferritin fairly stable at 70.8, slight decrease in iron saturation 16% and TIBC of  "390.  Patient previously did not wish to pursue IV iron.  We will continue to monitor her status with labs in 3 months with CBC, iron panel, ferritin and iron review.  If there has been further decline in hemoglobin and iron status we will need to reconsider use of IV iron.  I will see her in 6 months with repeat labs again.  Her residual anemia is related to CKD3a as well as borderline iron deficiency    *Stage IIA (T3N0M0) ascending colon cancer:   Developed significant diarrhea with blood per rectum.    Colonoscopy 4/25/18 with identification of polyps in the rectum, cecal polyp, mass in the right colon.  The cecal polyp was a sessile serrated adenoma, ascending colon \"mass\" showed hyperplastic polyp, rectal polyp was a tubulovillous adenoma with low-grade dysplasia.  CEA on 4/25/18 was normal at 1.65.  Chest x-ray 4/25/18 was unrevealing.  CT of the abdomen and pelvis 4/26/18 showed a 4 cm annular mass in the ascending colon with adjacent haziness in the mesentery but no lymphadenopathy, no evidence of metastatic disease.    Right hemicolectomy on 4/27/18 with pathology showing an invasive moderately differentiated adenocarcinoma arising in a sessile serrated adenoma with negative margins measuring 2.5 cm extending through the muscularis propria into brad-colorectal tissue, positive lymphovascular invasion, negative perineural invasion, 10 lymph nodes negative.  There was a comment regarding an ulcerated small adjacent pericolonic abscess, raising the question of possible microscopic perforation.  Patient did have a few high risk features with less than 12 lymph nodes removed, positive lymphovascular invasion, and some possibility of microscopic perforation.  The tumor was microsatellite stable.    Given her age and comorbidities, the patient was a poor candidate for adjuvant chemotherapy and was not recommended.  Independent of this recommendation, the patient stated she was not interested in taking any " adjuvant chemotherapy.  Therefore we will plan to monitor her clinically for evidence of recurrent disease.  Standard monitoring is with routine CEA levels however this does not appear to be informative for her given her normal preoperative value.  We will pursue annual interval follow-up CT.    CT scan from 4/24/2019 at a 1 year interval showed no evidence of recurrent disease.    5/22/2020 CEA normal at 1.69 and CT scan chest abdomen and pelvis 5/22/2020 showed no evidence of recurrent/metastatic disease.    The patient underwent delayed endoscopic evaluation with EGD and colonoscopy on 8/10/2020 with Dr. Willett, colonoscopy with tubular adenoma and hyperplastic polyp removed.  Flexible sigmoidoscopy with Dr. Curran on 3/15/2021 was negative  1 year interval follow-up CT performed 5/6/2021 which showed no evidence of recurrent malignancy.  There was however a comment regarding a subpleural opacification left lower lobe as well as a slight increase in normal sized mediastinal lymph nodes.  The lymph nodes however remained normal size and the change in size was felt to be insignificant, left lower lobe subpleural opacification was felt to be inflammatory.  Radiology however recommend 6-month interval follow-up study.  Annual surveillance CT scans chest abdomen and pelvis 7/6/2023 showed no evidence of recurrent disease.    After 5 years out from surgery we do not plan any further routine CEA or CT monitoring.  Given her age, patient does not wish to pursue any further endoscopic evaluation.    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    Family history includes a sister with breast cancer around age 60, another sister with breast cancer around age 60, maternal aunt with breast cancer over  the age of 50, maternal uncle with lung cancer.   Given the patient's multiple malignancies and family history, she was referred to the genetics clinic to discuss potential genetic testing, mainly for benefit of the patient's children and other family members.    Her colon cancer was microsatellite stable.   She did follow-up in the genetics clinic and underwent INVITAE panel test 7/31/18 showing VUS in MLH3 and MSH6 with no known clinical significance at this time.     *History of gastric polyps:   EGD 10/23/17 with small less than 6 mm gastric polyps, not resected.  Repeat EGD 8/10/2020 with small gastric polyps, not resected.    *History of left renal cell carcinoma with enlarging right renal lesion suspicious for renal cell carcinoma:   The patient underwent a left nephrectomy in 2004 with no clinical evidence of recurrent disease.    CT chest from 11/14/16 showed no evidence of recurrence.    CT abdomen and pelvis however from 4/26/18 showed a 2.2 cm right renal lesion which was indeterminate, possibly a proteinaceous cyst but could not rule out mass lesion.  The study was performed without contrast due to the patient's underlying chronic kidney disease.  She is unable to undergo renal mass protocol CT with contrast.  She is unable to undergo MRI due to her pacemaker.    Follow-up CT scan 4/24/2019 with no change in the right renal lesion at 2.1 cm, repeat at 1 year interval  1 year interval follow-up CT (delayed due to viral pandemic) 5/22/2020 with increase in right renal mass from 2.1 up to 2.6 cm.  This is felt to possibly represent a proteinaceous cyst versus solid mass.  She is unable to undergo a renal protocol CT due to her renal dysfunction and solitary kidney and cannot undergo MRI due to her pacemaker.   Renal ultrasound 6/4/2020 showed a 3.6 cm mixed solid and cystic mass at the lower pole of the right kidney suspicious for renal cell carcinoma and recommended surgical excision.  Patient did  follow-up with Dr. Trujillo in urology who recommended continued radiographic monitoring.  Short interval follow-up ultrasound on 9/15/2020 with stable 3.6 cm right renal lesion.   Renal ultrasound by urology on 1/7/2021 measured the lower pole right renal mass at 3.8 cm, was not directly compared to the prior study performed at [facility].    CT 5/6/2021 showed right renal lesion had increased in size from 2.6 up to 3.1 cm.  It was felt that this likely represents a hyperdense renal cyst however could not rule out solid lesion or complex lesion.    CT 11/15/2021 showed that the lower pole right renal lesion continued to increase gradually in size from 3.1 now up to 3.3 cm.  Change has been minimal however radiology did review multiple prior studies and the lesion has increased gradually over time, suspicious for possible malignancy.   Patient did follow-up with Dr. Trujillo in urology in January 2022.  He does not recommend pursuit of ablation of the renal lesion due to risk of hemorrhage and potential compromise of the patient's renal function in her solitary kidney.  CT 5/10/2022 showed further slight increase in the right renal mass from 3.1 x 3.2 up to 3.6 x 3.7 cm.  Given the continued gradual enlargement in size of the lesion there is high suspicion for slowly growing new primary renal cell carcinoma.    Dr. Trujillo is aware of the continued increase in size of the lesion and does not plan to perform any intervention given her age, indolent nature of the disease, and risk to her renal function.  Therefore with no intervention planned   There does not appear to be any utility in further aggressive CT monitoring for this issue.    Dr. Trujillo did see the patient back in January 2023 with repeat ultrasound that did show slight further increase in the lesion from 3.8 up to 4.6 cm and some evidence of mild right hydronephrosis.  He plans a follow-up ultrasound in 1 year interval.  CT 7/6/2023 showed further increase in  size in the right lower pole renal lesion from 3.8 x 2.9 up to 3.8 x 4.8 cm and additional stable lesions in the right kidney.    1 year interval renal ultrasound on 1/12/2024 at [facility] showed slight increase in size of the lesion over a 1 year period from 4.6 x 3.8 x 3.6 up to 4.5 x 3.9 x 4.1 cm.  Renal ultrasound on 2/4/2025 showed further increase in the right lower pole exophytic mass measuring 5.1 cm with internal flow on Doppler highly suspicious for renal cell carcinoma.  CT [facility] pelvis at [facility] on 5/22/2025 showed further increase in size of right renal lesion from 5.1 up to 5.5 cm  Patient returns today in follow-up.  She was recently seen in follow-up by [facility] Schrader.  She underwent CT abdomen and pelvis on 5/22/2025 which showed further increase in the apparent right renal cell carcinoma from 5.1 up to 5.5 cm.  He discussed the potential for surgical intervention which would carry risk of worsening renal function and possible dialysis, potential complications given her previous abdominal surgeries.  The patient has decided against surgical intervention given the significant risks.  The lesion is too large for an ablative procedure.  Patient was referred to radiation oncology to discuss possibility of radiation therapy and is scheduled to be seen by Dr. Ash on 6/18/2025.  From our standpoint, would not recommend use of immunotherapy in the setting of localized disease from which the patient is asymptomatic given the potential risks of treatment.  This will be reconsidered if she develops metastatic disease in the future and still maintains an acceptable ECOG performance status.  I encouraged the patient to discuss the option of radiation with Dr. Ash.  She indicates that she likely will not elect to proceed with any treatment at this point.  She is being scheduled for a 6-month interval follow-up ultrasound by urology.  We did discuss the possibility of pursuing CT  chest abdomen pelvis to assess for evidence of distant metastatic disease as well however patient was content to forego this for now.    *CKD3:   Status post left nephrectomy  Baseline creatinine in 1.2-1.8 range  Contributing factor to anemia    *Abnormal appearance of cervix on CT scan  CT 5/10/2022 with bulky appearance of the cervix with demonstration of air in the lumen.  Recommended follow-up with direct visualization  Patient notes that she did have a urogynecologic procedure performed in the past which did involve the cervix.    Patient initially wanted to return to see urogynecology to review the issue however decided ultimately that she does not wish to pursue this.    CT abdomen and pelvis 7/6/2023 with similar appearance to the cervix with mildly bulky appearance with air in the lumen and small focus of air in the endometrial canal.  Patient does not wish to pursue evaluation of this.    *Diarrhea  Patient has experienced difficulty with alternating constipation and diarrhea that has been present since the time of her colonic surgery.   Patient continues to use Imodium and Lomotil as needed    PLAN:   Patient will continue using Imodium and Lomotil as needed for chronic diarrhea  Patient does not wish to proceed with surgical intervention for suspected enlarging right renal cell carcinoma due to significant risks  Patient will see radiation oncology as scheduled on 6/18/2025 to discuss tensional possibility for radiation therapy to the lesion although she is inclined not to pursue treatment  Patient does not wish to pursue IV iron treatment unless absolutely necessary  Patient is being scheduled by urology for 6-month interval follow-up visit with renal ultrasound same day  In 3 months CBC, stat iron panel/ferritin and RN review  MD visit in 6 months with CBC, CMP, stat iron panel/ferritin            I did spend 40 minutes total time caring for the patient today, time spent in review of records,  face-to-face consultation, coordination of care, placement of orders, completion of documentation.  Patient and her daughter had multiple questions which were answered to their satisfaction.

## 2025-06-18 ENCOUNTER — CONSULT (OUTPATIENT)
Dept: RADIATION ONCOLOGY | Facility: HOSPITAL | Age: 89
End: 2025-06-18
Payer: MEDICARE

## 2025-06-18 VITALS
RESPIRATION RATE: 22 BRPM | OXYGEN SATURATION: 96 % | DIASTOLIC BLOOD PRESSURE: 86 MMHG | HEART RATE: 78 BPM | SYSTOLIC BLOOD PRESSURE: 167 MMHG | BODY MASS INDEX: 36.21 KG/M2 | WEIGHT: 198 LBS

## 2025-06-18 DIAGNOSIS — C64.1 RENAL CELL CARCINOMA OF RIGHT KIDNEY: Primary | ICD-10-CM

## 2025-06-18 NOTE — PROGRESS NOTES
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 female with a history of Afib, GERD, T3N0 rectal cancer s/p surgical resection, and left renal cell carcinoma s/p left nephrectomy in 2004. The patient has had a known right renal lesion for many years, which has slowly increased in size from 2cm in 2018 to 6cm on her most recent scan on 5/25/2025. Dr. Schrader has referred the patient for consideration of definitive radiation therapy.         Imaging:            Pathology:      No new relevant pathology       Labs:    Lab Results   Component Value Date    CREATININE 1.11 (H) 06/17/2025       CMP          8/25/2024    12:06 4/8/2025    15:49 6/17/2025    15:25   CMP   Glucose 185  116  127    BUN 15  34  15.4    Creatinine 1.25  1.70  1.11    EGFR 41.8  28.7  47.9    Sodium 140  142  141    Potassium 3.7  5.4  4.9    Chloride 105  107  106    Calcium 8.9  9.4  9.1    Total Protein 6.4  6.8  6.8    Albumin 3.8  3.7  3.9    Globulin 2.6  3.1  2.9    Total Bilirubin 0.6  0.4  0.7    Alkaline Phosphatase 95  99  107    AST (SGOT) 20  21  49    ALT (SGPT) 17  15  27    Albumin/Globulin Ratio 1.5  1.2  1.3    BUN/Creatinine Ratio 12.0  20.0  13.9    Anion Gap 9.0  14.4  11.9      CBC          8/25/2024    12:06 4/8/2025    15:49 6/17/2025    15:25   CBC   WBC 5.86  6.13  6.10    RBC 3.65  4.05  3.42    Hemoglobin 10.9  11.2  10.2    Hematocrit 34.1  34.9  32.2    MCV 93.4  86.2  94.2    MCH 29.9  27.7  29.8    MCHC 32.0  32.1  31.7    RDW 13.0  19.9  14.9    Platelets 207   205  224              Problem List:  Patient Active Problem List   Diagnosis    Permanent atrial fibrillation    Bladder dysfunction    Gastroesophageal reflux disease    Essential hypertension    Insomnia    Non morbid obesity due to excess calories    Sick sinus syndrome    Knee pain, chronic    Dizziness    Chronic fatigue    Class 2 severe obesity due to excess calories with serious comorbidity and body mass index (BMI) of 38.0 to 38.9 in adult    S/P placement of cardiac pacemaker    Iron deficiency anemia    Anemia in stage 3 chronic kidney disease    Adverse effect of iron and its compounds, sequela    NANCY (obstructive sleep apnea) treated with auto CPAP    Postoperative hypothyroidism    Third degree AV block    Colon polyp    Malignant neoplasm of ascending colon    Vitamin D deficiency    Rectal bleeding    S/P AV deshawn ablation    Right renal mass    Adenomatous rectal polyp, large    Hypersomnia due to medical condition          Medications:  Current Outpatient Medications on File Prior to Visit   Medication Sig Dispense Refill    CALCIUM PO Take 600 Units by mouth Daily. With D3, listed separetly      cholecalciferol (VITAMIN D3) 1000 UNITS tablet Take 1 tablet by mouth Daily. In addition to the calcium -D3 pill      diphenoxylate-atropine (LOMOTIL) 2.5-0.025 MG per tablet Take 1 tablet by mouth 4 (Four) Times a Day As Needed for Diarrhea. 30 tablet 1    Eliquis 5 MG tablet tablet TAKE 1 TABLET EVERY 12     HOURS 180 tablet 3    escitalopram (LEXAPRO) 10 MG tablet TAKE 1 TABLET BY MOUTH DAILY (Patient not taking: Reported on 6/17/2025) 90 tablet 1    esomeprazole (nexIUM) 40 MG capsule Take 1 capsule by mouth Every Morning Before Breakfast.      fluticasone (FLONASE) 50 MCG/ACT nasal spray 2 sprays into the nostril(s) as directed by provider Daily. 30 mL 0    furosemide (LASIX) 20 MG tablet TAKE 2 TABLETS BY MOUTH DAILY 180 tablet 3    GLUCOSAMINE-CHONDROITIN PO Take 1 tablet by mouth Daily.       Ketotifen Fumarate (REFRESH EYE ITCH RELIEF OP) Apply  to eye(s) as directed by provider.      lisinopril (PRINIVIL,ZESTRIL) 20 MG tablet TAKE 1 TABLET BY MOUTH DAILY 90 tablet 1    loperamide (IMODIUM) 2 MG capsule Take 1 capsule by mouth 4 (Four) Times a Day As Needed for Diarrhea.      Loratadine 10 MG capsule Take 1 capsule by mouth Daily As Needed.      Multiple Vitamins-Minerals (MULTIVITAMIN PO) Take 1 tablet by mouth Daily.      Multiple Vitamins-Minerals (PRESERVISION AREDS 2 PO) Take 1 capsule by mouth 2 (two) times a day.      Probiotic Product (PROBIOTIC ADVANCED PO) Take  by mouth.      SYNTHROID 112 MCG tablet Take 125 mcg by mouth Every Other Day. Pt taking 110 mg a day      Synthroid 125 MCG tablet Take 1 tablet by mouth Every Other Day.      vitamin B-12 (CYANOCOBALAMIN) 1000 MCG tablet Take 1 tablet by mouth Daily.      Xiidra 5 % ophthalmic solution 1 drop.       No current facility-administered medications on file prior to visit.          Allergies:  Allergies   Allergen Reactions    Sulfa Antibiotics Swelling     FACE AND TONGUE    Adhesive Tape Rash    Levaquin [Levofloxacin] Rash         Family History:  The patient has no family history of conditions which would be contraindications to radiation therapy  Sister - Breast Cancer  Sister - Breast Cancer    Social History:  Never Smoker    Distance From Clinic: <30 minutes    Patient has someone who can assist with transportation: yes      Review of Systems:    Review of Systems   Constitutional:  Positive for fatigue.   HENT:  Positive for hearing loss.    Respiratory:  Positive for cough and shortness of breath.    Cardiovascular:  Positive for palpitations.   Gastrointestinal:  Positive for diarrhea and nausea.   Genitourinary:  Positive for frequency.   Musculoskeletal:  Positive for arthralgias and back pain.   Neurological:  Positive for weakness and headaches.   Hematological:  Bruises/bleeds easily.   Psychiatric/Behavioral:  Positive for  "sleep disturbance.        Vital Signs:  /86   Pulse 78   Resp 22   Wt 89.8 kg (198 lb)   SpO2 96%   BMI 36.21 kg/m²   Estimated body mass index is 36.21 kg/m² as calculated from the following:    Height as of 6/17/25: 157.5 cm (62.01\").    Weight as of this encounter: 89.8 kg (198 lb).  Pain Score    06/18/25 1419   PainSc: 0-No pain         ECOG: Restricted in physically strenuous activity but ambulatory and able to carry out work of a light or sedentary nature, e.g., light house work, office work = 1    Physical Exam  Vitals reviewed.   Constitutional:       General: She is not in acute distress.     Appearance: Normal appearance.   HENT:      Head: Normocephalic and atraumatic.   Eyes:      Extraocular Movements: Extraocular movements intact.      Pupils: Pupils are equal, round, and reactive to light.   Pulmonary:      Effort: Pulmonary effort is normal.   Abdominal:      General: Abdomen is flat.      Palpations: Abdomen is soft.   Musculoskeletal:      Cervical back: Normal range of motion.   Skin:     General: Skin is warm and dry.   Neurological:      General: No focal deficit present.      Mental Status: She is alert and oriented to person, place, and time.   Psychiatric:         Mood and Affect: Mood normal.         Behavior: Behavior normal.          Result Review :  The following data was reviewed by: Henrry Ash MD on 06/18/2025:  Labs: Last Creatinine, CBC, CMP  Data reviewed : Radiologic studies CT AP            Diagnoses and all orders for this visit:    1. Renal cell carcinoma of right kidney (Primary)        Assessment:    Anastacia Sarah is an 89 y.o. female with a history of Afib, GERD, T3N0 rectal cancer s/p surgical resection, and left renal cell carcinoma s/p left nephrectomy in 2004. The patient has had a known right renal lesion for many years, which has slowly increased in size from 2cm in 2018 to 6cm on her most recent scan on 5/25/2025. Dr. Schrader has referred the " patient for consideration of definitive radiation therapy.     I met with the patient and discussed her prior medical history and workup to date in detail. I discussed the risks, benefits, side effects, and logistics of radiation treatment planning and delivery. I answered all of the patient's questions to her satisfaction. At the end of our conversation the patient was leaning towards omitting radiation therapy due to her advanced age. She is going to talk about it further with her children. She has my contact information and can reach out with additional questions or if she ultimately wishes to proceed with radiation.       Plan:    -At this time patient is leaning against pursuing definitive radiation therapy due to her advanced age.        I spent 60 minutes caring for Anastacia on this date of service. This time includes time spent by me in the following activities:preparing for the visit, reviewing tests, obtaining and/or reviewing a separately obtained history, documenting information in the medical record, independently interpreting results and communicating that information with the patient/family/caregiver, and care coordination  Follow Up   No follow-ups on file.  Patient was given instructions and counseling regarding her condition or for health maintenance advice. Please see specific information pulled into the AVS if appropriate.     Henrry Ash MD

## 2025-07-03 ENCOUNTER — HOSPITAL ENCOUNTER (OUTPATIENT)
Dept: RADIATION ONCOLOGY | Facility: HOSPITAL | Age: 89
Setting detail: RADIATION/ONCOLOGY SERIES
End: 2025-07-03
Payer: MEDICARE

## 2025-07-03 ENCOUNTER — HOSPITAL ENCOUNTER (OUTPATIENT)
Dept: RADIATION ONCOLOGY | Facility: HOSPITAL | Age: 89
Discharge: HOME | End: 2025-07-03

## 2025-07-03 DIAGNOSIS — C64.1 RENAL CELL CARCINOMA OF RIGHT KIDNEY: Primary | ICD-10-CM

## 2025-07-03 PROCEDURE — 77334 RADIATION TREATMENT AID(S): CPT | Performed by: STUDENT IN AN ORGANIZED HEALTH CARE EDUCATION/TRAINING PROGRAM

## 2025-07-03 PROCEDURE — 77332 RADIATION TREATMENT AID(S): CPT | Performed by: STUDENT IN AN ORGANIZED HEALTH CARE EDUCATION/TRAINING PROGRAM

## 2025-07-03 PROCEDURE — 77263 THER RADIOLOGY TX PLNG CPLX: CPT | Performed by: STUDENT IN AN ORGANIZED HEALTH CARE EDUCATION/TRAINING PROGRAM

## 2025-07-07 PROCEDURE — 77334 RADIATION TREATMENT AID(S): CPT | Performed by: STUDENT IN AN ORGANIZED HEALTH CARE EDUCATION/TRAINING PROGRAM

## 2025-07-07 PROCEDURE — 77332 RADIATION TREATMENT AID(S): CPT | Performed by: STUDENT IN AN ORGANIZED HEALTH CARE EDUCATION/TRAINING PROGRAM

## 2025-07-10 PROCEDURE — 77293 RESPIRATOR MOTION MGMT SIMUL: CPT | Performed by: STUDENT IN AN ORGANIZED HEALTH CARE EDUCATION/TRAINING PROGRAM

## 2025-07-10 PROCEDURE — 77300 RADIATION THERAPY DOSE PLAN: CPT | Performed by: STUDENT IN AN ORGANIZED HEALTH CARE EDUCATION/TRAINING PROGRAM

## 2025-07-10 PROCEDURE — 77301 RADIOTHERAPY DOSE PLAN IMRT: CPT | Performed by: STUDENT IN AN ORGANIZED HEALTH CARE EDUCATION/TRAINING PROGRAM

## 2025-07-10 PROCEDURE — 77338 DESIGN MLC DEVICE FOR IMRT: CPT | Performed by: STUDENT IN AN ORGANIZED HEALTH CARE EDUCATION/TRAINING PROGRAM

## 2025-07-15 ENCOUNTER — HOSPITAL ENCOUNTER (OUTPATIENT)
Dept: CT IMAGING | Facility: HOSPITAL | Age: 89
Discharge: HOME | End: 2025-07-15
Admitting: STUDENT IN AN ORGANIZED HEALTH CARE EDUCATION/TRAINING PROGRAM
Payer: MEDICARE

## 2025-07-15 ENCOUNTER — TELEPHONE (OUTPATIENT)
Dept: OTHER | Facility: HOSPITAL | Age: 89
End: 2025-07-15
Payer: MEDICARE

## 2025-07-15 DIAGNOSIS — C64.1 RENAL CELL CARCINOMA OF RIGHT KIDNEY: ICD-10-CM

## 2025-07-15 PROCEDURE — 71250 CT THORAX DX C-: CPT

## 2025-07-15 NOTE — TELEPHONE ENCOUNTER
Oncology Social Work  Distress Screening Follow-up    Diagnosis: Renal cell carcinoma of the right kidney     Location of Distress Screening: Radiation Oncology    Distress Level: 7 (2025  2:19 PM)    Physical Concerns:    Fatigue: Y  Sleep: Y    Practical Problems:    Treatment decisions: Y  Taking care of others: Y    Emotional Concerns:    Worry or anxiety: Y     Interventions:     Emotional Needs: emotional suppport/coping strategies    Comments:  Spoke to the patient via telephone; explained role of OSW and supportive oncology.  Discussed with the patient her distress screening score of 7 and her area of concerns.  The patient stated that she has been dealing with sleep issues since her spouse  20 years ago and her not getting sufficient sleep is causing her to be fatigued.  The patient stated that she worries a lot about others- her children as some are not in the best of health which is her worry/anxiety and taking care of others.  She states that she was initially unsure about getting radiation which in turn had an affect on her treatment decisions.  The patient states that she is doing well now as she begins radiation on 25.  The patient states that although she lives alone one of her daughters lives 8 minutes away from her home and another daughter lives 10 minutes away from her so if she has any needs they are near.  The patient states that her daughter Hien transports her to her appointments and she denies any transportation or financial needs.  The patient denies any supportive oncology needs and was informed that OSW contact information will be mailed to her along with resources for [employer] and [employer].  The patient was encouraged to contact OSW for any needs that may arise.  DARYN Rojo

## 2025-07-16 ENCOUNTER — HOSPITAL ENCOUNTER (OUTPATIENT)
Dept: RADIATION ONCOLOGY | Facility: HOSPITAL | Age: 89
Discharge: HOME | End: 2025-07-16

## 2025-07-16 LAB
RAD ONC ARIA COURSE ID: NORMAL
RAD ONC ARIA COURSE INTENT: NORMAL
RAD ONC ARIA COURSE LAST TREATMENT DATE: NORMAL
RAD ONC ARIA COURSE START DATE: NORMAL
RAD ONC ARIA COURSE TREATMENT ELAPSED DAYS: 0
RAD ONC ARIA FIRST TREATMENT DATE: NORMAL
RAD ONC ARIA PLAN FRACTIONS TREATED TO DATE: 1
RAD ONC ARIA PLAN ID: NORMAL
RAD ONC ARIA PLAN PRESCRIBED DOSE PER FRACTION: 10 GY
RAD ONC ARIA PLAN PRIMARY REFERENCE POINT: NORMAL
RAD ONC ARIA PLAN TOTAL FRACTIONS PRESCRIBED: 5
RAD ONC ARIA PLAN TOTAL PRESCRIBED DOSE: 5000 CGY
RAD ONC ARIA REFERENCE POINT DOSAGE GIVEN TO DATE: 10 GY
RAD ONC ARIA REFERENCE POINT DOSAGE GIVEN TO DATE: 8 GY
RAD ONC ARIA REFERENCE POINT ID: NORMAL
RAD ONC ARIA REFERENCE POINT ID: NORMAL
RAD ONC ARIA REFERENCE POINT SESSION DOSAGE GIVEN: 10 GY
RAD ONC ARIA REFERENCE POINT SESSION DOSAGE GIVEN: 8 GY

## 2025-07-16 PROCEDURE — 77373 STRTCTC BDY RAD THER TX DLVR: CPT | Performed by: STUDENT IN AN ORGANIZED HEALTH CARE EDUCATION/TRAINING PROGRAM

## 2025-07-16 PROCEDURE — 77435 SBRT MANAGEMENT: CPT | Performed by: STUDENT IN AN ORGANIZED HEALTH CARE EDUCATION/TRAINING PROGRAM

## 2025-07-18 ENCOUNTER — HOSPITAL ENCOUNTER (OUTPATIENT)
Dept: RADIATION ONCOLOGY | Facility: HOSPITAL | Age: 89
Discharge: HOME | End: 2025-07-18

## 2025-07-18 ENCOUNTER — RADIATION ONCOLOGY WEEKLY ASSESSMENT (OUTPATIENT)
Dept: RADIATION ONCOLOGY | Facility: HOSPITAL | Age: 89
End: 2025-07-18
Payer: MEDICARE

## 2025-07-18 VITALS
OXYGEN SATURATION: 95 % | SYSTOLIC BLOOD PRESSURE: 195 MMHG | DIASTOLIC BLOOD PRESSURE: 84 MMHG | RESPIRATION RATE: 16 BRPM | HEART RATE: 76 BPM | WEIGHT: 192 LBS | BODY MASS INDEX: 35.11 KG/M2

## 2025-07-18 DIAGNOSIS — C64.1 RENAL CELL CARCINOMA OF RIGHT KIDNEY: Primary | ICD-10-CM

## 2025-07-18 LAB
RAD ONC ARIA COURSE ID: NORMAL
RAD ONC ARIA COURSE INTENT: NORMAL
RAD ONC ARIA COURSE LAST TREATMENT DATE: NORMAL
RAD ONC ARIA COURSE START DATE: NORMAL
RAD ONC ARIA COURSE TREATMENT ELAPSED DAYS: 2
RAD ONC ARIA FIRST TREATMENT DATE: NORMAL
RAD ONC ARIA PLAN FRACTIONS TREATED TO DATE: 2
RAD ONC ARIA PLAN ID: NORMAL
RAD ONC ARIA PLAN PRESCRIBED DOSE PER FRACTION: 10 GY
RAD ONC ARIA PLAN PRIMARY REFERENCE POINT: NORMAL
RAD ONC ARIA PLAN TOTAL FRACTIONS PRESCRIBED: 5
RAD ONC ARIA PLAN TOTAL PRESCRIBED DOSE: 5000 CGY
RAD ONC ARIA REFERENCE POINT DOSAGE GIVEN TO DATE: 16 GY
RAD ONC ARIA REFERENCE POINT DOSAGE GIVEN TO DATE: 20 GY
RAD ONC ARIA REFERENCE POINT ID: NORMAL
RAD ONC ARIA REFERENCE POINT ID: NORMAL
RAD ONC ARIA REFERENCE POINT SESSION DOSAGE GIVEN: 10 GY
RAD ONC ARIA REFERENCE POINT SESSION DOSAGE GIVEN: 8 GY

## 2025-07-18 PROCEDURE — 77373 STRTCTC BDY RAD THER TX DLVR: CPT | Performed by: STUDENT IN AN ORGANIZED HEALTH CARE EDUCATION/TRAINING PROGRAM

## 2025-07-18 NOTE — PROGRESS NOTES
Radiation Oncology  On-Treatment Note      Patient: Anastacia Sarah    MRN: [id number]    Attending Physician: Henrry Ash MD     Diagnosis:     ICD-10-CM ICD-9-CM   1. Renal cell carcinoma of right kidney  C64.1 189.0       Radiation Therapy Visit:  Continue radiation therapy, Dosimetry plan remains acceptable, Films reviewed and remains acceptable, Pain assessed, Pain management planned, Radiation dose schedule reviewed and remains acceptable, Radiation technique remains acceptable, and Symptoms within expected range    Radiation Treatments       Active   Plans   R Kidney-SBRT   Most recent treatment: Dose planned: 1,000 cGy (fraction 2 on 7/18/2025)   Total: Dose planned: 5,000 cGy (5 fractions)   Elapsed Days: 2      Reference Points   Rx Rt Kidney ITV   Most recent treatment: Dose given: 1,000 cGy (on 7/18/2025)   Total: Dose given: 2,000 cGy   Elapsed Days: 2      Rx Rt Kidney PTV   Most recent treatment: Dose given: 800 cGy (on 7/18/2025)   Total: Dose given: 1,600 cGy   Elapsed Days: 2                      Physical Examination:  Vitals: Blood pressure (!) 195/84, pulse 76, resp. rate 16, weight 87.1 kg (192 lb), SpO2 95%, not currently breastfeeding.  Pain Score    07/18/25 1302   PainSc: 0-No pain       Ambulatory and capable of all selfcare but unable to carry out any work activities; up and about more than 50% of waking hours = 2    We examined the relevant areas: yes  Findings are within the expected range for this stage of treatment: yes  -------------------------------------------------------------------------------------------------------------------    ACTION ITEMS:  Patient tolerating treatment well and as expected for this stage in their treatment and Continue radiation therapy as planned    Estimated Completion Date: 7/25/2025    Follow up in 3 months with CT AP without contrast      Henrry Ash MD  Radiation Oncology

## 2025-07-21 ENCOUNTER — HOSPITAL ENCOUNTER (OUTPATIENT)
Dept: RADIATION ONCOLOGY | Facility: HOSPITAL | Age: 89
Discharge: HOME | End: 2025-07-21
Payer: MEDICARE

## 2025-07-21 LAB
RAD ONC ARIA COURSE ID: NORMAL
RAD ONC ARIA COURSE INTENT: NORMAL
RAD ONC ARIA COURSE LAST TREATMENT DATE: NORMAL
RAD ONC ARIA COURSE START DATE: NORMAL
RAD ONC ARIA COURSE TREATMENT ELAPSED DAYS: 5
RAD ONC ARIA FIRST TREATMENT DATE: NORMAL
RAD ONC ARIA PLAN FRACTIONS TREATED TO DATE: 3
RAD ONC ARIA PLAN ID: NORMAL
RAD ONC ARIA PLAN PRESCRIBED DOSE PER FRACTION: 10 GY
RAD ONC ARIA PLAN PRIMARY REFERENCE POINT: NORMAL
RAD ONC ARIA PLAN TOTAL FRACTIONS PRESCRIBED: 5
RAD ONC ARIA PLAN TOTAL PRESCRIBED DOSE: 5000 CGY
RAD ONC ARIA REFERENCE POINT DOSAGE GIVEN TO DATE: 24 GY
RAD ONC ARIA REFERENCE POINT DOSAGE GIVEN TO DATE: 30 GY
RAD ONC ARIA REFERENCE POINT ID: NORMAL
RAD ONC ARIA REFERENCE POINT ID: NORMAL
RAD ONC ARIA REFERENCE POINT SESSION DOSAGE GIVEN: 10 GY
RAD ONC ARIA REFERENCE POINT SESSION DOSAGE GIVEN: 8 GY

## 2025-07-21 PROCEDURE — 77373 STRTCTC BDY RAD THER TX DLVR: CPT | Performed by: STUDENT IN AN ORGANIZED HEALTH CARE EDUCATION/TRAINING PROGRAM

## 2025-07-22 PROCEDURE — 77336 RADIATION PHYSICS CONSULT: CPT | Performed by: STUDENT IN AN ORGANIZED HEALTH CARE EDUCATION/TRAINING PROGRAM

## 2025-07-23 ENCOUNTER — HOSPITAL ENCOUNTER (OUTPATIENT)
Dept: RADIATION ONCOLOGY | Facility: HOSPITAL | Age: 89
Discharge: HOME | End: 2025-07-23

## 2025-07-23 LAB
RAD ONC ARIA COURSE ID: NORMAL
RAD ONC ARIA COURSE INTENT: NORMAL
RAD ONC ARIA COURSE LAST TREATMENT DATE: NORMAL
RAD ONC ARIA COURSE START DATE: NORMAL
RAD ONC ARIA COURSE TREATMENT ELAPSED DAYS: 7
RAD ONC ARIA FIRST TREATMENT DATE: NORMAL
RAD ONC ARIA PLAN FRACTIONS TREATED TO DATE: 4
RAD ONC ARIA PLAN ID: NORMAL
RAD ONC ARIA PLAN PRESCRIBED DOSE PER FRACTION: 10 GY
RAD ONC ARIA PLAN PRIMARY REFERENCE POINT: NORMAL
RAD ONC ARIA PLAN TOTAL FRACTIONS PRESCRIBED: 5
RAD ONC ARIA PLAN TOTAL PRESCRIBED DOSE: 5000 CGY
RAD ONC ARIA REFERENCE POINT DOSAGE GIVEN TO DATE: 32 GY
RAD ONC ARIA REFERENCE POINT DOSAGE GIVEN TO DATE: 40 GY
RAD ONC ARIA REFERENCE POINT ID: NORMAL
RAD ONC ARIA REFERENCE POINT ID: NORMAL
RAD ONC ARIA REFERENCE POINT SESSION DOSAGE GIVEN: 10 GY
RAD ONC ARIA REFERENCE POINT SESSION DOSAGE GIVEN: 8 GY

## 2025-07-23 PROCEDURE — 77373 STRTCTC BDY RAD THER TX DLVR: CPT | Performed by: STUDENT IN AN ORGANIZED HEALTH CARE EDUCATION/TRAINING PROGRAM

## 2025-07-25 ENCOUNTER — HOSPITAL ENCOUNTER (OUTPATIENT)
Dept: RADIATION ONCOLOGY | Facility: HOSPITAL | Age: 89
Discharge: HOME | End: 2025-07-25

## 2025-07-25 LAB
MC_CV_MDC_IDC_RATE_1: 160
MC_CV_MDC_IDC_ZONE_ID: 1
MDC_IDC_MSMT_BATTERY_REMAINING_LONGEVITY: 18 MO
MDC_IDC_MSMT_BATTERY_REMAINING_PERCENTAGE: 36 %
MDC_IDC_MSMT_BATTERY_STATUS: NORMAL
MDC_IDC_MSMT_LEADCHNL_RA_DTM: NORMAL
MDC_IDC_MSMT_LEADCHNL_RV_DTM: NORMAL
MDC_IDC_MSMT_LEADCHNL_RV_IMPEDANCE_VALUE: 447
MDC_IDC_MSMT_LEADCHNL_RV_PACING_THRESHOLD_POLARITY: NORMAL
MDC_IDC_PG_IMPLANT_DTM: NORMAL
MDC_IDC_PG_MFG: NORMAL
MDC_IDC_PG_MODEL: NORMAL
MDC_IDC_PG_SERIAL: NORMAL
MDC_IDC_PG_TYPE: NORMAL
MDC_IDC_SESS_DTM: NORMAL
MDC_IDC_SESS_TYPE: NORMAL
MDC_IDC_SET_BRADY_AT_MODE_SWITCH_RATE: 170
MDC_IDC_SET_BRADY_LOWRATE: 70
MDC_IDC_SET_BRADY_MAX_SENSOR_RATE: 120
MDC_IDC_SET_BRADY_MODE: NORMAL
MDC_IDC_SET_LEADCHNL_RA_SENSING_SENSITIVITY: 0.25
MDC_IDC_SET_LEADCHNL_RV_PACING_AMPLITUDE: 5
MDC_IDC_SET_LEADCHNL_RV_PACING_POLARITY: NORMAL
MDC_IDC_SET_LEADCHNL_RV_PACING_PULSEWIDTH: 0.4
MDC_IDC_SET_LEADCHNL_RV_SENSING_POLARITY: NORMAL
MDC_IDC_SET_LEADCHNL_RV_SENSING_SENSITIVITY: 4
MDC_IDC_SET_ZONE_STATUS: NORMAL
MDC_IDC_SET_ZONE_TYPE: NORMAL
MDC_IDC_STAT_BRADY_RA_PERCENT_PACED: 0
MDC_IDC_STAT_BRADY_RV_PERCENT_PACED: 100
RAD ONC ARIA COURSE ID: NORMAL
RAD ONC ARIA COURSE INTENT: NORMAL
RAD ONC ARIA COURSE LAST TREATMENT DATE: NORMAL
RAD ONC ARIA COURSE START DATE: NORMAL
RAD ONC ARIA COURSE TREATMENT ELAPSED DAYS: 9
RAD ONC ARIA FIRST TREATMENT DATE: NORMAL
RAD ONC ARIA PLAN FRACTIONS TREATED TO DATE: 5
RAD ONC ARIA PLAN ID: NORMAL
RAD ONC ARIA PLAN PRESCRIBED DOSE PER FRACTION: 10 GY
RAD ONC ARIA PLAN PRIMARY REFERENCE POINT: NORMAL
RAD ONC ARIA PLAN TOTAL FRACTIONS PRESCRIBED: 5
RAD ONC ARIA PLAN TOTAL PRESCRIBED DOSE: 5000 CGY
RAD ONC ARIA REFERENCE POINT DOSAGE GIVEN TO DATE: 40 GY
RAD ONC ARIA REFERENCE POINT DOSAGE GIVEN TO DATE: 50 GY
RAD ONC ARIA REFERENCE POINT ID: NORMAL
RAD ONC ARIA REFERENCE POINT ID: NORMAL
RAD ONC ARIA REFERENCE POINT SESSION DOSAGE GIVEN: 10 GY
RAD ONC ARIA REFERENCE POINT SESSION DOSAGE GIVEN: 8 GY

## 2025-07-25 PROCEDURE — 77373 STRTCTC BDY RAD THER TX DLVR: CPT | Performed by: STUDENT IN AN ORGANIZED HEALTH CARE EDUCATION/TRAINING PROGRAM

## 2025-07-28 ENCOUNTER — TREATMENT (OUTPATIENT)
Dept: RADIATION ONCOLOGY | Facility: HOSPITAL | Age: 89
End: 2025-07-28
Payer: MEDICARE

## 2025-07-28 DIAGNOSIS — C64.1 RENAL CELL CARCINOMA OF RIGHT KIDNEY: Primary | ICD-10-CM

## 2025-07-28 NOTE — PROGRESS NOTES
Radiation Treatment Summary Note      Patient Name: Anastacia Sarah  : 1935    Attending Provider: Henrry Ash MD      Diagnosis:     ICD-10-CM ICD-9-CM   1. Renal cell carcinoma of right kidney  C64.1 189.0       Radiation Start Date: 2025    Radiation Completion Date: 2025      Prescription:     Site: Right Kidney   Laterality: Right  Total Dose: 5000cGy  Dose per Fraction: 1000cGy  Total Fractions: 5  Daily or BID: QOD  Modality: Photon  Technique: SBRT (2-5fx)  Bolus: No    Final Delivered Dose Deviated From Initially Prescribed Dose: No    Concurrent Chemotherapy: No    Patient Tolerated Treatment Without Unexpected Side Effects/Complications: Yes    ECOG: Ambulatory and capable of all selfcare but unable to carry out any work activities; up and about more than 50% of waking hours = 2    Pain Management Plan: None Indicated/PRN OTC    Follow-Up Plan: 3 months    Imaging Ordered for Follow-Up: Yes, describe: CT AP        Henrry Ash MD

## 2025-07-29 LAB
RAD ONC ARIA COURSE END DATE: NORMAL
RAD ONC ARIA COURSE ID: NORMAL
RAD ONC ARIA COURSE INTENT: NORMAL
RAD ONC ARIA COURSE LAST TREATMENT DATE: NORMAL
RAD ONC ARIA COURSE START DATE: NORMAL
RAD ONC ARIA COURSE TREATMENT ELAPSED DAYS: 9
RAD ONC ARIA FIRST TREATMENT DATE: NORMAL
RAD ONC ARIA PLAN FRACTIONS TREATED TO DATE: 5
RAD ONC ARIA PLAN ID: NORMAL
RAD ONC ARIA PLAN NAME: NORMAL
RAD ONC ARIA PLAN PRESCRIBED DOSE PER FRACTION: 10 GY
RAD ONC ARIA PLAN PRIMARY REFERENCE POINT: NORMAL
RAD ONC ARIA PLAN TOTAL FRACTIONS PRESCRIBED: 5
RAD ONC ARIA PLAN TOTAL PRESCRIBED DOSE: 5000 CGY
RAD ONC ARIA REFERENCE POINT DOSAGE GIVEN TO DATE: 40 GY
RAD ONC ARIA REFERENCE POINT DOSAGE GIVEN TO DATE: 50 GY
RAD ONC ARIA REFERENCE POINT ID: NORMAL
RAD ONC ARIA REFERENCE POINT ID: NORMAL

## 2025-08-23 ENCOUNTER — HOSPITAL ENCOUNTER (INPATIENT)
Facility: HOSPITAL | Age: 89
LOS: 3 days | Discharge: HOME | End: 2025-08-26
Attending: EMERGENCY MEDICINE | Admitting: HOSPITALIST
Payer: MEDICARE

## 2025-08-23 ENCOUNTER — APPOINTMENT (OUTPATIENT)
Facility: HOSPITAL | Age: 89
End: 2025-08-23
Payer: MEDICARE

## 2025-08-23 DIAGNOSIS — R06.03 RESPIRATORY DISTRESS: ICD-10-CM

## 2025-08-23 DIAGNOSIS — I50.9 ACUTE CONGESTIVE HEART FAILURE, UNSPECIFIED HEART FAILURE TYPE: Primary | ICD-10-CM

## 2025-08-23 DIAGNOSIS — R79.89 ELEVATED LACTIC ACID LEVEL: ICD-10-CM

## 2025-08-23 DIAGNOSIS — R73.9 HYPERGLYCEMIA: ICD-10-CM

## 2025-08-23 DIAGNOSIS — Z79.01 CHRONIC ANTICOAGULATION: ICD-10-CM

## 2025-08-23 DIAGNOSIS — I10 HYPERTENSION, UNSPECIFIED TYPE: ICD-10-CM

## 2025-08-23 DIAGNOSIS — J69.0 ASPIRATION PNEUMONIA OF BOTH LUNGS, UNSPECIFIED ASPIRATION PNEUMONIA TYPE, UNSPECIFIED PART OF LUNG: ICD-10-CM

## 2025-08-23 DIAGNOSIS — D69.6 THROMBOCYTOPENIA: ICD-10-CM

## 2025-08-23 DIAGNOSIS — R79.89 ELEVATED TROPONIN: ICD-10-CM

## 2025-08-23 LAB
ALBUMIN SERPL-MCNC: 3.9 G/DL (ref 3.5–5.2)
ALBUMIN/GLOB SERPL: 1.3 G/DL
ALP SERPL-CCNC: 107 U/L (ref 39–117)
ALT SERPL W P-5'-P-CCNC: 17 U/L (ref 1–33)
ANION GAP SERPL CALCULATED.3IONS-SCNC: 16.8 MMOL/L (ref 5–15)
AST SERPL-CCNC: 24 U/L (ref 1–32)
B PARAPERT DNA SPEC QL NAA+PROBE: NOT DETECTED
B PERT DNA SPEC QL NAA+PROBE: NOT DETECTED
BASOPHILS # BLD AUTO: 0.04 10*3/MM3 (ref 0–0.2)
BASOPHILS NFR BLD AUTO: 1 % (ref 0–1.5)
BILIRUB SERPL-MCNC: 0.8 MG/DL (ref 0–1.2)
BILIRUB UR QL STRIP: NEGATIVE
BUN SERPL-MCNC: 21.5 MG/DL (ref 8–23)
BUN/CREAT SERPL: 15.7 (ref 7–25)
C PNEUM DNA NPH QL NAA+NON-PROBE: NOT DETECTED
CALCIUM SPEC-SCNC: 9.1 MG/DL (ref 8.6–10.5)
CHLORIDE SERPL-SCNC: 105 MMOL/L (ref 98–107)
CLARITY UR: CLEAR
CO2 SERPL-SCNC: 20.2 MMOL/L (ref 22–29)
COLOR UR: YELLOW
CREAT SERPL-MCNC: 1.37 MG/DL (ref 0.57–1)
D-LACTATE SERPL-SCNC: 2.9 MMOL/L (ref 0.5–2)
D-LACTATE SERPL-SCNC: 4.5 MMOL/L (ref 0.5–2)
DEPRECATED RDW RBC AUTO: 52.1 FL (ref 37–54)
EGFRCR SERPLBLD CKD-EPI 2021: 37.2 ML/MIN/1.73
EOSINOPHIL # BLD AUTO: 0.12 10*3/MM3 (ref 0–0.4)
EOSINOPHIL NFR BLD AUTO: 2.9 % (ref 0.3–6.2)
ERYTHROCYTE [DISTWIDTH] IN BLOOD BY AUTOMATED COUNT: 15.8 % (ref 12.3–15.4)
FLUAV SUBTYP SPEC NAA+PROBE: NOT DETECTED
FLUAV SUBTYP SPEC NAA+PROBE: NOT DETECTED
FLUBV RNA NPH QL NAA+NON-PROBE: NOT DETECTED
FLUBV RNA NPH QL NAA+NON-PROBE: NOT DETECTED
GEN 5 1HR TROPONIN T REFLEX: 49 NG/L
GLOBULIN UR ELPH-MCNC: 3.1 GM/DL
GLUCOSE BLDC GLUCOMTR-MCNC: 224 MG/DL (ref 65–99)
GLUCOSE SERPL-MCNC: 244 MG/DL (ref 65–99)
GLUCOSE UR STRIP-MCNC: NEGATIVE MG/DL
HADV DNA SPEC NAA+PROBE: NOT DETECTED
HCOV 229E RNA SPEC QL NAA+PROBE: NOT DETECTED
HCOV HKU1 RNA SPEC QL NAA+PROBE: NOT DETECTED
HCOV NL63 RNA SPEC QL NAA+PROBE: NOT DETECTED
HCOV OC43 RNA SPEC QL NAA+PROBE: NOT DETECTED
HCT VFR BLD AUTO: 32.6 % (ref 34–46.6)
HGB BLD-MCNC: 10.5 G/DL (ref 12–15.9)
HGB UR QL STRIP.AUTO: ABNORMAL
HMPV RNA NPH QL NAA+NON-PROBE: NOT DETECTED
HPIV1 RNA ISLT QL NAA+PROBE: NOT DETECTED
HPIV2 RNA SPEC QL NAA+PROBE: NOT DETECTED
HPIV3 RNA NPH QL NAA+PROBE: DETECTED
HPIV4 P GENE NPH QL NAA+PROBE: NOT DETECTED
IMM GRANULOCYTES # BLD AUTO: 0.01 10*3/MM3 (ref 0–0.05)
IMM GRANULOCYTES NFR BLD AUTO: 0.2 % (ref 0–0.5)
KETONES UR QL STRIP: NEGATIVE
LEUKOCYTE ESTERASE UR QL STRIP.AUTO: NEGATIVE
LYMPHOCYTES # BLD AUTO: 0.64 10*3/MM3 (ref 0.7–3.1)
LYMPHOCYTES NFR BLD AUTO: 15.4 % (ref 19.6–45.3)
M PNEUMO IGG SER IA-ACNC: NOT DETECTED
MCH RBC QN AUTO: 29.1 PG (ref 26.6–33)
MCHC RBC AUTO-ENTMCNC: 32.2 G/DL (ref 31.5–35.7)
MCV RBC AUTO: 90.3 FL (ref 79–97)
MONOCYTES # BLD AUTO: 0.37 10*3/MM3 (ref 0.1–0.9)
MONOCYTES NFR BLD AUTO: 8.9 % (ref 5–12)
NEUTROPHILS NFR BLD AUTO: 2.97 10*3/MM3 (ref 1.7–7)
NEUTROPHILS NFR BLD AUTO: 71.6 % (ref 42.7–76)
NITRITE UR QL STRIP: POSITIVE
NT-PROBNP SERPL-MCNC: 5024 PG/ML (ref 0–1800)
PH UR STRIP.AUTO: <=5 [PH] (ref 5–8)
PLATELET # BLD AUTO: 123 10*3/MM3 (ref 140–450)
PMV BLD AUTO: 10.2 FL (ref 6–12)
POTASSIUM SERPL-SCNC: 4.3 MMOL/L (ref 3.5–5.2)
PROCALCITONIN SERPL-MCNC: 0.08 NG/ML (ref 0–0.25)
PROT SERPL-MCNC: 7 G/DL (ref 6–8.5)
PROT UR QL STRIP: NEGATIVE
QT INTERVAL: 433 MS
QTC INTERVAL: 468 MS
RBC # BLD AUTO: 3.61 10*6/MM3 (ref 3.77–5.28)
RHINOVIRUS RNA SPEC NAA+PROBE: NOT DETECTED
RSV RNA NPH QL NAA+NON-PROBE: NOT DETECTED
SARS-COV-2 RNA NPH QL NAA+NON-PROBE: NOT DETECTED
SARS-COV-2 RNA RESP QL NAA+PROBE: NOT DETECTED
SODIUM SERPL-SCNC: 142 MMOL/L (ref 136–145)
SP GR UR STRIP: 1.01 (ref 1–1.03)
TROPONIN T % DELTA: 2
TROPONIN T NUMERIC DELTA: 1 NG/L
TROPONIN T SERPL HS-MCNC: 48 NG/L
UROBILINOGEN UR QL STRIP: ABNORMAL
WBC NRBC COR # BLD AUTO: 4.15 10*3/MM3 (ref 3.4–10.8)

## 2025-08-23 PROCEDURE — 85025 COMPLETE CBC W/AUTO DIFF WBC: CPT | Performed by: EMERGENCY MEDICINE

## 2025-08-23 PROCEDURE — 99285 EMERGENCY DEPT VISIT HI MDM: CPT | Performed by: EMERGENCY MEDICINE

## 2025-08-23 PROCEDURE — 87040 BLOOD CULTURE FOR BACTERIA: CPT | Performed by: EMERGENCY MEDICINE

## 2025-08-23 PROCEDURE — 93010 ELECTROCARDIOGRAM REPORT: CPT | Performed by: INTERNAL MEDICINE

## 2025-08-23 PROCEDURE — 25010000002 METHYLPREDNISOLONE PER 125 MG: Performed by: EMERGENCY MEDICINE

## 2025-08-23 PROCEDURE — 83880 ASSAY OF NATRIURETIC PEPTIDE: CPT | Performed by: EMERGENCY MEDICINE

## 2025-08-23 PROCEDURE — 25810000003 SODIUM CHLORIDE 0.9 % SOLUTION 250 ML FLEX CONT: Performed by: EMERGENCY MEDICINE

## 2025-08-23 PROCEDURE — 93005 ELECTROCARDIOGRAM TRACING: CPT | Performed by: EMERGENCY MEDICINE

## 2025-08-23 PROCEDURE — 25010000002 AZITHROMYCIN PER 500 MG: Performed by: EMERGENCY MEDICINE

## 2025-08-23 PROCEDURE — 94799 UNLISTED PULMONARY SVC/PX: CPT

## 2025-08-23 PROCEDURE — 25010000002 CEFTRIAXONE PER 250 MG: Performed by: EMERGENCY MEDICINE

## 2025-08-23 PROCEDURE — 94640 AIRWAY INHALATION TREATMENT: CPT

## 2025-08-23 PROCEDURE — 87636 SARSCOV2 & INF A&B AMP PRB: CPT | Performed by: EMERGENCY MEDICINE

## 2025-08-23 PROCEDURE — 81003 URINALYSIS AUTO W/O SCOPE: CPT | Performed by: EMERGENCY MEDICINE

## 2025-08-23 PROCEDURE — 25010000002 FUROSEMIDE PER 20 MG: Performed by: EMERGENCY MEDICINE

## 2025-08-23 PROCEDURE — 83605 ASSAY OF LACTIC ACID: CPT | Performed by: HOSPITALIST

## 2025-08-23 PROCEDURE — 63710000001 INSULIN LISPRO (HUMAN) PER 5 UNITS: Performed by: HOSPITALIST

## 2025-08-23 PROCEDURE — 36415 COLL VENOUS BLD VENIPUNCTURE: CPT

## 2025-08-23 PROCEDURE — 80053 COMPREHEN METABOLIC PANEL: CPT | Performed by: EMERGENCY MEDICINE

## 2025-08-23 PROCEDURE — 84484 ASSAY OF TROPONIN QUANT: CPT | Performed by: EMERGENCY MEDICINE

## 2025-08-23 PROCEDURE — 0202U NFCT DS 22 TRGT SARS-COV-2: CPT | Performed by: HOSPITALIST

## 2025-08-23 PROCEDURE — 82948 REAGENT STRIP/BLOOD GLUCOSE: CPT

## 2025-08-23 PROCEDURE — 84145 PROCALCITONIN (PCT): CPT | Performed by: HOSPITALIST

## 2025-08-23 PROCEDURE — 94761 N-INVAS EAR/PLS OXIMETRY MLT: CPT

## 2025-08-23 PROCEDURE — 83605 ASSAY OF LACTIC ACID: CPT | Performed by: EMERGENCY MEDICINE

## 2025-08-23 PROCEDURE — 71045 X-RAY EXAM CHEST 1 VIEW: CPT

## 2025-08-23 RX ORDER — SODIUM CHLORIDE 0.9 % (FLUSH) 0.9 %
10 SYRINGE (ML) INJECTION AS NEEDED
Status: DISCONTINUED | OUTPATIENT
Start: 2025-08-23 | End: 2025-08-26 | Stop reason: HOSPADM

## 2025-08-23 RX ORDER — FUROSEMIDE 10 MG/ML
60 INJECTION INTRAMUSCULAR; INTRAVENOUS ONCE
Status: COMPLETED | OUTPATIENT
Start: 2025-08-23 | End: 2025-08-23

## 2025-08-23 RX ORDER — ONDANSETRON 2 MG/ML
4 INJECTION INTRAMUSCULAR; INTRAVENOUS EVERY 6 HOURS PRN
Status: DISCONTINUED | OUTPATIENT
Start: 2025-08-23 | End: 2025-08-26 | Stop reason: HOSPADM

## 2025-08-23 RX ORDER — METHYLPREDNISOLONE SODIUM SUCCINATE 125 MG/2ML
125 INJECTION, POWDER, LYOPHILIZED, FOR SOLUTION INTRAMUSCULAR; INTRAVENOUS ONCE
Status: COMPLETED | OUTPATIENT
Start: 2025-08-23 | End: 2025-08-23

## 2025-08-23 RX ORDER — DEXTROSE MONOHYDRATE 25 G/50ML
25 INJECTION, SOLUTION INTRAVENOUS
Status: DISCONTINUED | OUTPATIENT
Start: 2025-08-23 | End: 2025-08-26 | Stop reason: HOSPADM

## 2025-08-23 RX ORDER — LISINOPRIL 20 MG/1
20 TABLET ORAL DAILY
Status: DISCONTINUED | OUTPATIENT
Start: 2025-08-24 | End: 2025-08-26 | Stop reason: HOSPADM

## 2025-08-23 RX ORDER — BISACODYL 5 MG/1
5 TABLET, DELAYED RELEASE ORAL DAILY PRN
Status: DISCONTINUED | OUTPATIENT
Start: 2025-08-23 | End: 2025-08-26 | Stop reason: HOSPADM

## 2025-08-23 RX ORDER — NICOTINE POLACRILEX 4 MG
15 LOZENGE BUCCAL
Status: DISCONTINUED | OUTPATIENT
Start: 2025-08-23 | End: 2025-08-26 | Stop reason: HOSPADM

## 2025-08-23 RX ORDER — CHOLECALCIFEROL (VITAMIN D3) 25 MCG
1000 TABLET ORAL DAILY
Status: DISCONTINUED | OUTPATIENT
Start: 2025-08-24 | End: 2025-08-26 | Stop reason: HOSPADM

## 2025-08-23 RX ORDER — DIPHENOXYLATE HYDROCHLORIDE AND ATROPINE SULFATE 2.5; .025 MG/1; MG/1
1 TABLET ORAL DAILY
Status: DISCONTINUED | OUTPATIENT
Start: 2025-08-24 | End: 2025-08-26 | Stop reason: HOSPADM

## 2025-08-23 RX ORDER — FUROSEMIDE 10 MG/ML
40 INJECTION INTRAMUSCULAR; INTRAVENOUS EVERY 12 HOURS
Status: DISCONTINUED | OUTPATIENT
Start: 2025-08-24 | End: 2025-08-24

## 2025-08-23 RX ORDER — IPRATROPIUM BROMIDE AND ALBUTEROL SULFATE 2.5; .5 MG/3ML; MG/3ML
3 SOLUTION RESPIRATORY (INHALATION) ONCE
Status: COMPLETED | OUTPATIENT
Start: 2025-08-23 | End: 2025-08-23

## 2025-08-23 RX ORDER — IPRATROPIUM BROMIDE AND ALBUTEROL SULFATE 2.5; .5 MG/3ML; MG/3ML
3 SOLUTION RESPIRATORY (INHALATION)
Status: DISCONTINUED | OUTPATIENT
Start: 2025-08-23 | End: 2025-08-25

## 2025-08-23 RX ORDER — SODIUM CHLORIDE 0.9 % (FLUSH) 0.9 %
10 SYRINGE (ML) INJECTION EVERY 12 HOURS SCHEDULED
Status: DISCONTINUED | OUTPATIENT
Start: 2025-08-23 | End: 2025-08-26 | Stop reason: HOSPADM

## 2025-08-23 RX ORDER — POLYETHYLENE GLYCOL 3350 17 G/17G
17 POWDER, FOR SOLUTION ORAL DAILY PRN
Status: DISCONTINUED | OUTPATIENT
Start: 2025-08-23 | End: 2025-08-26 | Stop reason: HOSPADM

## 2025-08-23 RX ORDER — NITROGLYCERIN 0.4 MG/1
0.4 TABLET SUBLINGUAL
Status: DISCONTINUED | OUTPATIENT
Start: 2025-08-23 | End: 2025-08-26 | Stop reason: HOSPADM

## 2025-08-23 RX ORDER — IPRATROPIUM BROMIDE AND ALBUTEROL SULFATE 2.5; .5 MG/3ML; MG/3ML
3 SOLUTION RESPIRATORY (INHALATION)
Status: DISCONTINUED | OUTPATIENT
Start: 2025-08-23 | End: 2025-08-24

## 2025-08-23 RX ORDER — SODIUM CHLORIDE 9 MG/ML
40 INJECTION, SOLUTION INTRAVENOUS AS NEEDED
Status: DISCONTINUED | OUTPATIENT
Start: 2025-08-23 | End: 2025-08-26 | Stop reason: HOSPADM

## 2025-08-23 RX ORDER — PANTOPRAZOLE SODIUM 40 MG/1
40 TABLET, DELAYED RELEASE ORAL
Status: DISCONTINUED | OUTPATIENT
Start: 2025-08-24 | End: 2025-08-26 | Stop reason: HOSPADM

## 2025-08-23 RX ORDER — INSULIN LISPRO 100 [IU]/ML
2-9 INJECTION, SOLUTION INTRAVENOUS; SUBCUTANEOUS
Status: DISCONTINUED | OUTPATIENT
Start: 2025-08-23 | End: 2025-08-26 | Stop reason: HOSPADM

## 2025-08-23 RX ORDER — IBUPROFEN 600 MG/1
1 TABLET ORAL
Status: DISCONTINUED | OUTPATIENT
Start: 2025-08-23 | End: 2025-08-26 | Stop reason: HOSPADM

## 2025-08-23 RX ORDER — BUDESONIDE AND FORMOTEROL FUMARATE DIHYDRATE 160; 4.5 UG/1; UG/1
2 AEROSOL RESPIRATORY (INHALATION)
Status: DISCONTINUED | OUTPATIENT
Start: 2025-08-23 | End: 2025-08-26 | Stop reason: HOSPADM

## 2025-08-23 RX ORDER — BISACODYL 10 MG
10 SUPPOSITORY, RECTAL RECTAL DAILY PRN
Status: DISCONTINUED | OUTPATIENT
Start: 2025-08-23 | End: 2025-08-26 | Stop reason: HOSPADM

## 2025-08-23 RX ORDER — AMOXICILLIN 250 MG
2 CAPSULE ORAL 2 TIMES DAILY PRN
Status: DISCONTINUED | OUTPATIENT
Start: 2025-08-23 | End: 2025-08-26 | Stop reason: HOSPADM

## 2025-08-23 RX ORDER — ACETAMINOPHEN 160 MG/5ML
650 SOLUTION ORAL EVERY 4 HOURS PRN
Status: DISCONTINUED | OUTPATIENT
Start: 2025-08-23 | End: 2025-08-26 | Stop reason: HOSPADM

## 2025-08-23 RX ORDER — MULTIVITAMIN WITH IRON
1000 TABLET ORAL DAILY
Status: DISCONTINUED | OUTPATIENT
Start: 2025-08-24 | End: 2025-08-26 | Stop reason: HOSPADM

## 2025-08-23 RX ORDER — ESCITALOPRAM OXALATE 10 MG/1
10 TABLET ORAL DAILY
Status: DISCONTINUED | OUTPATIENT
Start: 2025-08-24 | End: 2025-08-26 | Stop reason: HOSPADM

## 2025-08-23 RX ORDER — ACETAMINOPHEN 650 MG/1
650 SUPPOSITORY RECTAL EVERY 4 HOURS PRN
Status: DISCONTINUED | OUTPATIENT
Start: 2025-08-23 | End: 2025-08-26 | Stop reason: HOSPADM

## 2025-08-23 RX ORDER — ACETAMINOPHEN 325 MG/1
650 TABLET ORAL EVERY 4 HOURS PRN
Status: DISCONTINUED | OUTPATIENT
Start: 2025-08-23 | End: 2025-08-26 | Stop reason: HOSPADM

## 2025-08-23 RX ORDER — L.ACID,PARA/B.BIFIDUM/S.THERM 8B CELL
1 CAPSULE ORAL DAILY
Status: DISCONTINUED | OUTPATIENT
Start: 2025-08-23 | End: 2025-08-26 | Stop reason: HOSPADM

## 2025-08-23 RX ORDER — ONDANSETRON 4 MG/1
4 TABLET, ORALLY DISINTEGRATING ORAL EVERY 6 HOURS PRN
Status: DISCONTINUED | OUTPATIENT
Start: 2025-08-23 | End: 2025-08-26 | Stop reason: HOSPADM

## 2025-08-23 RX ADMIN — Medication 1 CAPSULE: at 21:00

## 2025-08-23 RX ADMIN — CEFTRIAXONE SODIUM 1000 MG: 1 INJECTION, POWDER, FOR SOLUTION INTRAMUSCULAR; INTRAVENOUS at 13:34

## 2025-08-23 RX ADMIN — FUROSEMIDE 60 MG: 10 INJECTION, SOLUTION INTRAMUSCULAR; INTRAVENOUS at 13:35

## 2025-08-23 RX ADMIN — Medication 10 ML: at 21:03

## 2025-08-23 RX ADMIN — IPRATROPIUM BROMIDE AND ALBUTEROL SULFATE 3 ML: .5; 3 SOLUTION RESPIRATORY (INHALATION) at 13:03

## 2025-08-23 RX ADMIN — BUDESONIDE AND FORMOTEROL FUMARATE DIHYDRATE 2 PUFF: 160; 4.5 AEROSOL RESPIRATORY (INHALATION) at 19:44

## 2025-08-23 RX ADMIN — INSULIN LISPRO 4 UNITS: 100 INJECTION, SOLUTION INTRAVENOUS; SUBCUTANEOUS at 21:01

## 2025-08-23 RX ADMIN — METHYLPREDNISOLONE SODIUM SUCCINATE 125 MG: 125 INJECTION, POWDER, FOR SOLUTION INTRAMUSCULAR; INTRAVENOUS at 13:04

## 2025-08-23 RX ADMIN — APIXABAN 5 MG: 5 TABLET, FILM COATED ORAL at 21:00

## 2025-08-23 RX ADMIN — AZITHROMYCIN 500 MG: 500 INJECTION, POWDER, LYOPHILIZED, FOR SOLUTION INTRAVENOUS at 14:28

## 2025-08-23 RX ADMIN — IPRATROPIUM BROMIDE AND ALBUTEROL SULFATE 3 ML: .5; 3 SOLUTION RESPIRATORY (INHALATION) at 19:38

## 2025-08-24 ENCOUNTER — APPOINTMENT (OUTPATIENT)
Dept: CARDIOLOGY | Facility: HOSPITAL | Age: 89
End: 2025-08-24
Payer: MEDICARE

## 2025-08-24 LAB
ANION GAP SERPL CALCULATED.3IONS-SCNC: 17.1 MMOL/L (ref 5–15)
AORTIC DIMENSIONLESS INDEX: 0.82 (DI)
AV MEAN PRESS GRAD SYS DOP V1V2: 5.9 MMHG
AV VMAX SYS DOP: 164 CM/SEC
BH CV ECHO MEAS - AO MAX PG: 10.8 MMHG
BH CV ECHO MEAS - AO ROOT DIAM: 3.1 CM
BH CV ECHO MEAS - AO V2 VTI: 33.7 CM
BH CV ECHO MEAS - AVA(I,D): 2.6 CM2
BH CV ECHO MEAS - EDV(CUBED): 137.4 ML
BH CV ECHO MEAS - EDV(MOD-SP2): 137 ML
BH CV ECHO MEAS - EDV(MOD-SP4): 139 ML
BH CV ECHO MEAS - EF(MOD-SP2): 57.7 %
BH CV ECHO MEAS - EF(MOD-SP4): 54.7 %
BH CV ECHO MEAS - ESV(CUBED): 39.4 ML
BH CV ECHO MEAS - ESV(MOD-SP2): 58 ML
BH CV ECHO MEAS - ESV(MOD-SP4): 63 ML
BH CV ECHO MEAS - FS: 34 %
BH CV ECHO MEAS - IVS/LVPW: 1.11 CM
BH CV ECHO MEAS - IVSD: 1.34 CM
BH CV ECHO MEAS - LAT PEAK E' VEL: 11.3 CM/SEC
BH CV ECHO MEAS - LV DIASTOLIC VOL/BSA (35-75): 73.7 CM2
BH CV ECHO MEAS - LV MASS(C)D: 267.3 GRAMS
BH CV ECHO MEAS - LV MAX PG: 6.3 MMHG
BH CV ECHO MEAS - LV MEAN PG: 3.4 MMHG
BH CV ECHO MEAS - LV SYSTOLIC VOL/BSA (12-30): 33.4 CM2
BH CV ECHO MEAS - LV V1 MAX: 125.3 CM/SEC
BH CV ECHO MEAS - LV V1 VTI: 27.5 CM
BH CV ECHO MEAS - LVIDD: 5.2 CM
BH CV ECHO MEAS - LVIDS: 3.4 CM
BH CV ECHO MEAS - LVOT AREA: 3.2 CM2
BH CV ECHO MEAS - LVOT DIAM: 2.01 CM
BH CV ECHO MEAS - LVPWD: 1.21 CM
BH CV ECHO MEAS - MED PEAK E' VEL: 9.1 CM/SEC
BH CV ECHO MEAS - MR MAX PG: 120.7 MMHG
BH CV ECHO MEAS - MR MAX VEL: 549.4 CM/SEC
BH CV ECHO MEAS - MV A DUR: 0.13 SEC
BH CV ECHO MEAS - MV DEC SLOPE: 571.1 CM/SEC2
BH CV ECHO MEAS - MV DEC TIME: 0.15 SEC
BH CV ECHO MEAS - MV E MAX VEL: 143.4 CM/SEC
BH CV ECHO MEAS - MV MAX PG: 8.8 MMHG
BH CV ECHO MEAS - MV MEAN PG: 3.2 MMHG
BH CV ECHO MEAS - MV P1/2T: 79.7 MSEC
BH CV ECHO MEAS - MV V2 VTI: 38.7 CM
BH CV ECHO MEAS - MVA(P1/2T): 2.8 CM2
BH CV ECHO MEAS - MVA(VTI): 2.25 CM2
BH CV ECHO MEAS - PA ACC TIME: 0.07 SEC
BH CV ECHO MEAS - PA V2 MAX: 98.5 CM/SEC
BH CV ECHO MEAS - RAP SYSTOLE: 15 MMHG
BH CV ECHO MEAS - RV MAX PG: 2.22 MMHG
BH CV ECHO MEAS - RV V1 MAX: 74.4 CM/SEC
BH CV ECHO MEAS - RV V1 VTI: 13.3 CM
BH CV ECHO MEAS - RVSP: 75.9 MMHG
BH CV ECHO MEAS - SV(LVOT): 87.1 ML
BH CV ECHO MEAS - SV(MOD-SP2): 79 ML
BH CV ECHO MEAS - SV(MOD-SP4): 76 ML
BH CV ECHO MEAS - SVI(LVOT): 46.1 ML/M2
BH CV ECHO MEAS - SVI(MOD-SP2): 41.9 ML/M2
BH CV ECHO MEAS - SVI(MOD-SP4): 40.3 ML/M2
BH CV ECHO MEAS - TAPSE (>1.6): 0.92 CM
BH CV ECHO MEAS - TR MAX PG: 60.9 MMHG
BH CV ECHO MEAS - TR MAX VEL: 390.1 CM/SEC
BH CV ECHO MEASUREMENTS AVERAGE E/E' RATIO: 14.06
BH CV XLRA - RV BASE: 3.6 CM
BH CV XLRA - TDI S': 9 CM/SEC
BUN SERPL-MCNC: 28 MG/DL (ref 8–23)
BUN/CREAT SERPL: 20.1 (ref 7–25)
CALCIUM SPEC-SCNC: 9.1 MG/DL (ref 8.6–10.5)
CHLORIDE SERPL-SCNC: 104 MMOL/L (ref 98–107)
CO2 SERPL-SCNC: 20.9 MMOL/L (ref 22–29)
CREAT SERPL-MCNC: 1.39 MG/DL (ref 0.57–1)
D DIMER PPP FEU-MCNC: 0.29 MCGFEU/ML (ref 0–0.88)
D-LACTATE SERPL-SCNC: 2.7 MMOL/L (ref 0.5–2)
D-LACTATE SERPL-SCNC: 3.8 MMOL/L (ref 0.5–2)
EGFRCR SERPLBLD CKD-EPI 2021: 36.6 ML/MIN/1.73
GLUCOSE BLDC GLUCOMTR-MCNC: 150 MG/DL (ref 65–99)
GLUCOSE BLDC GLUCOMTR-MCNC: 166 MG/DL (ref 65–99)
GLUCOSE BLDC GLUCOMTR-MCNC: 212 MG/DL (ref 65–99)
GLUCOSE BLDC GLUCOMTR-MCNC: 216 MG/DL (ref 65–99)
GLUCOSE SERPL-MCNC: 147 MG/DL (ref 65–99)
HBA1C MFR BLD: 6.3 % (ref 4.8–5.6)
INR PPP: 1.64 (ref 0.9–1.1)
LEFT ATRIUM VOLUME INDEX: 43.9 ML/M2
LV EF BIPLANE MOD: 56.3 %
POTASSIUM SERPL-SCNC: 4.4 MMOL/L (ref 3.5–5.2)
PROTHROMBIN TIME: 19.5 SECONDS (ref 11.7–14.2)
SODIUM SERPL-SCNC: 142 MMOL/L (ref 136–145)
TSH SERPL DL<=0.05 MIU/L-ACNC: 1.1 UIU/ML (ref 0.27–4.2)

## 2025-08-24 PROCEDURE — 93306 TTE W/DOPPLER COMPLETE: CPT | Performed by: STUDENT IN AN ORGANIZED HEALTH CARE EDUCATION/TRAINING PROGRAM

## 2025-08-24 PROCEDURE — 25010000002 FUROSEMIDE PER 20 MG: Performed by: HOSPITALIST

## 2025-08-24 PROCEDURE — 80048 BASIC METABOLIC PNL TOTAL CA: CPT | Performed by: HOSPITALIST

## 2025-08-24 PROCEDURE — 85379 FIBRIN DEGRADATION QUANT: CPT | Performed by: NURSE PRACTITIONER

## 2025-08-24 PROCEDURE — 83605 ASSAY OF LACTIC ACID: CPT | Performed by: STUDENT IN AN ORGANIZED HEALTH CARE EDUCATION/TRAINING PROGRAM

## 2025-08-24 PROCEDURE — 25510000001 PERFLUTREN 6.52 MG/ML SUSPENSION 2 ML VIAL: Performed by: HOSPITALIST

## 2025-08-24 PROCEDURE — 94761 N-INVAS EAR/PLS OXIMETRY MLT: CPT

## 2025-08-24 PROCEDURE — 63710000001 PREDNISONE PER 1 MG: Performed by: STUDENT IN AN ORGANIZED HEALTH CARE EDUCATION/TRAINING PROGRAM

## 2025-08-24 PROCEDURE — 82948 REAGENT STRIP/BLOOD GLUCOSE: CPT

## 2025-08-24 PROCEDURE — 83605 ASSAY OF LACTIC ACID: CPT

## 2025-08-24 PROCEDURE — 85610 PROTHROMBIN TIME: CPT | Performed by: HOSPITALIST

## 2025-08-24 PROCEDURE — 94799 UNLISTED PULMONARY SVC/PX: CPT

## 2025-08-24 PROCEDURE — 84443 ASSAY THYROID STIM HORMONE: CPT | Performed by: HOSPITALIST

## 2025-08-24 PROCEDURE — 99222 1ST HOSP IP/OBS MODERATE 55: CPT | Performed by: NURSE PRACTITIONER

## 2025-08-24 PROCEDURE — 63710000001 INSULIN LISPRO (HUMAN) PER 5 UNITS: Performed by: HOSPITALIST

## 2025-08-24 PROCEDURE — 93306 TTE W/DOPPLER COMPLETE: CPT

## 2025-08-24 PROCEDURE — 83036 HEMOGLOBIN GLYCOSYLATED A1C: CPT | Performed by: HOSPITALIST

## 2025-08-24 RX ORDER — FUROSEMIDE 40 MG/1
40 TABLET ORAL DAILY
Status: DISCONTINUED | OUTPATIENT
Start: 2025-08-24 | End: 2025-08-25

## 2025-08-24 RX ORDER — PREDNISONE 20 MG/1
40 TABLET ORAL
Status: DISCONTINUED | OUTPATIENT
Start: 2025-08-24 | End: 2025-08-26 | Stop reason: HOSPADM

## 2025-08-24 RX ADMIN — Medication 1000 MCG: at 08:39

## 2025-08-24 RX ADMIN — INSULIN LISPRO 2 UNITS: 100 INJECTION, SOLUTION INTRAVENOUS; SUBCUTANEOUS at 17:01

## 2025-08-24 RX ADMIN — INSULIN LISPRO 2 UNITS: 100 INJECTION, SOLUTION INTRAVENOUS; SUBCUTANEOUS at 08:39

## 2025-08-24 RX ADMIN — IPRATROPIUM BROMIDE AND ALBUTEROL SULFATE 3 ML: .5; 3 SOLUTION RESPIRATORY (INHALATION) at 06:56

## 2025-08-24 RX ADMIN — LISINOPRIL 20 MG: 20 TABLET ORAL at 08:39

## 2025-08-24 RX ADMIN — ESCITALOPRAM 10 MG: 10 TABLET, FILM COATED ORAL at 08:39

## 2025-08-24 RX ADMIN — IPRATROPIUM BROMIDE AND ALBUTEROL SULFATE 3 ML: .5; 3 SOLUTION RESPIRATORY (INHALATION) at 20:25

## 2025-08-24 RX ADMIN — THERA TABS 1 TABLET: TAB at 08:39

## 2025-08-24 RX ADMIN — IPRATROPIUM BROMIDE AND ALBUTEROL SULFATE 3 ML: .5; 3 SOLUTION RESPIRATORY (INHALATION) at 10:35

## 2025-08-24 RX ADMIN — Medication 1 CAPSULE: at 08:39

## 2025-08-24 RX ADMIN — INSULIN LISPRO 4 UNITS: 100 INJECTION, SOLUTION INTRAVENOUS; SUBCUTANEOUS at 21:49

## 2025-08-24 RX ADMIN — PREDNISONE 40 MG: 20 TABLET ORAL at 12:07

## 2025-08-24 RX ADMIN — Medication 10 ML: at 20:59

## 2025-08-24 RX ADMIN — LEVOTHYROXINE SODIUM 125 MCG: 0.1 TABLET ORAL at 08:39

## 2025-08-24 RX ADMIN — Medication 1000 UNITS: at 08:39

## 2025-08-24 RX ADMIN — FUROSEMIDE 40 MG: 10 INJECTION, SOLUTION INTRAMUSCULAR; INTRAVENOUS at 00:21

## 2025-08-24 RX ADMIN — ACETAMINOPHEN 650 MG: 325 TABLET ORAL at 00:20

## 2025-08-24 RX ADMIN — INSULIN LISPRO 4 UNITS: 100 INJECTION, SOLUTION INTRAVENOUS; SUBCUTANEOUS at 11:02

## 2025-08-24 RX ADMIN — APIXABAN 5 MG: 5 TABLET, FILM COATED ORAL at 20:59

## 2025-08-24 RX ADMIN — FUROSEMIDE 40 MG: 40 TABLET ORAL at 11:02

## 2025-08-24 RX ADMIN — APIXABAN 5 MG: 5 TABLET, FILM COATED ORAL at 08:39

## 2025-08-24 RX ADMIN — PERFLUTREN 2 ML: 6.52 INJECTION, SUSPENSION INTRAVENOUS at 16:02

## 2025-08-24 RX ADMIN — PANTOPRAZOLE SODIUM 40 MG: 40 TABLET, DELAYED RELEASE ORAL at 05:28

## 2025-08-24 RX ADMIN — Medication 10 ML: at 08:44

## 2025-08-24 RX ADMIN — BUDESONIDE AND FORMOTEROL FUMARATE DIHYDRATE 2 PUFF: 160; 4.5 AEROSOL RESPIRATORY (INHALATION) at 06:55

## 2025-08-24 RX ADMIN — BUDESONIDE AND FORMOTEROL FUMARATE DIHYDRATE 2 PUFF: 160; 4.5 AEROSOL RESPIRATORY (INHALATION) at 20:25

## 2025-08-25 PROBLEM — J20.6 ACUTE BRONCHITIS DUE TO RHINOVIRUS: Status: ACTIVE | Noted: 2025-08-25

## 2025-08-25 LAB
ANION GAP SERPL CALCULATED.3IONS-SCNC: 16 MMOL/L (ref 5–15)
BUN SERPL-MCNC: 42 MG/DL (ref 8–23)
BUN/CREAT SERPL: 26.9 (ref 7–25)
CALCIUM SPEC-SCNC: 9.3 MG/DL (ref 8.6–10.5)
CHLORIDE SERPL-SCNC: 102 MMOL/L (ref 98–107)
CO2 SERPL-SCNC: 21 MMOL/L (ref 22–29)
CREAT SERPL-MCNC: 1.56 MG/DL (ref 0.57–1)
D-LACTATE SERPL-SCNC: 1.6 MMOL/L (ref 0.5–2)
D-LACTATE SERPL-SCNC: 3.4 MMOL/L (ref 0.5–2)
DEPRECATED RDW RBC AUTO: 49.1 FL (ref 37–54)
EGFRCR SERPLBLD CKD-EPI 2021: 31.8 ML/MIN/1.73
ERYTHROCYTE [DISTWIDTH] IN BLOOD BY AUTOMATED COUNT: 14.7 % (ref 12.3–15.4)
GLUCOSE BLDC GLUCOMTR-MCNC: 128 MG/DL (ref 65–99)
GLUCOSE BLDC GLUCOMTR-MCNC: 129 MG/DL (ref 65–99)
GLUCOSE BLDC GLUCOMTR-MCNC: 159 MG/DL (ref 65–99)
GLUCOSE BLDC GLUCOMTR-MCNC: 195 MG/DL (ref 65–99)
GLUCOSE SERPL-MCNC: 173 MG/DL (ref 65–99)
HCT VFR BLD AUTO: 29.2 % (ref 34–46.6)
HGB BLD-MCNC: 9.2 G/DL (ref 12–15.9)
MCH RBC QN AUTO: 28.8 PG (ref 26.6–33)
MCHC RBC AUTO-ENTMCNC: 31.5 G/DL (ref 31.5–35.7)
MCV RBC AUTO: 91.5 FL (ref 79–97)
PLATELET # BLD AUTO: 145 10*3/MM3 (ref 140–450)
PMV BLD AUTO: 10.8 FL (ref 6–12)
POTASSIUM SERPL-SCNC: 4 MMOL/L (ref 3.5–5.2)
RBC # BLD AUTO: 3.19 10*6/MM3 (ref 3.77–5.28)
SODIUM SERPL-SCNC: 139 MMOL/L (ref 136–145)
WBC NRBC COR # BLD AUTO: 7.09 10*3/MM3 (ref 3.4–10.8)

## 2025-08-25 PROCEDURE — 85027 COMPLETE CBC AUTOMATED: CPT | Performed by: STUDENT IN AN ORGANIZED HEALTH CARE EDUCATION/TRAINING PROGRAM

## 2025-08-25 PROCEDURE — 82948 REAGENT STRIP/BLOOD GLUCOSE: CPT

## 2025-08-25 PROCEDURE — 82948 REAGENT STRIP/BLOOD GLUCOSE: CPT | Performed by: HOSPITALIST

## 2025-08-25 PROCEDURE — 83605 ASSAY OF LACTIC ACID: CPT | Performed by: STUDENT IN AN ORGANIZED HEALTH CARE EDUCATION/TRAINING PROGRAM

## 2025-08-25 PROCEDURE — 94761 N-INVAS EAR/PLS OXIMETRY MLT: CPT

## 2025-08-25 PROCEDURE — 63710000001 PREDNISONE PER 1 MG: Performed by: STUDENT IN AN ORGANIZED HEALTH CARE EDUCATION/TRAINING PROGRAM

## 2025-08-25 PROCEDURE — 94760 N-INVAS EAR/PLS OXIMETRY 1: CPT

## 2025-08-25 PROCEDURE — 94799 UNLISTED PULMONARY SVC/PX: CPT

## 2025-08-25 PROCEDURE — 94664 DEMO&/EVAL PT USE INHALER: CPT

## 2025-08-25 PROCEDURE — 80048 BASIC METABOLIC PNL TOTAL CA: CPT | Performed by: STUDENT IN AN ORGANIZED HEALTH CARE EDUCATION/TRAINING PROGRAM

## 2025-08-25 PROCEDURE — 99232 SBSQ HOSP IP/OBS MODERATE 35: CPT | Performed by: PHYSICIAN ASSISTANT

## 2025-08-25 RX ORDER — LEVOTHYROXINE SODIUM 112 UG/1
112 TABLET ORAL
Status: DISCONTINUED | OUTPATIENT
Start: 2025-08-26 | End: 2025-08-26 | Stop reason: HOSPADM

## 2025-08-25 RX ORDER — GUAIFENESIN 200 MG/10ML
200 LIQUID ORAL ONCE AS NEEDED
Status: COMPLETED | OUTPATIENT
Start: 2025-08-25 | End: 2025-08-25

## 2025-08-25 RX ORDER — TORSEMIDE 20 MG/1
20 TABLET ORAL DAILY
Status: DISCONTINUED | OUTPATIENT
Start: 2025-08-26 | End: 2025-08-26 | Stop reason: HOSPADM

## 2025-08-25 RX ORDER — BIOTIN 5 MG
5 TABLET ORAL DAILY
COMMUNITY

## 2025-08-25 RX ADMIN — Medication 1000 MCG: at 09:23

## 2025-08-25 RX ADMIN — APIXABAN 5 MG: 5 TABLET, FILM COATED ORAL at 22:09

## 2025-08-25 RX ADMIN — PANTOPRAZOLE SODIUM 40 MG: 40 TABLET, DELAYED RELEASE ORAL at 05:47

## 2025-08-25 RX ADMIN — GUAIFENESIN 200 MG: 100 SOLUTION ORAL at 22:32

## 2025-08-25 RX ADMIN — ACETAMINOPHEN 650 MG: 325 TABLET ORAL at 22:20

## 2025-08-25 RX ADMIN — LISINOPRIL 20 MG: 20 TABLET ORAL at 09:24

## 2025-08-25 RX ADMIN — PREDNISONE 40 MG: 20 TABLET ORAL at 09:23

## 2025-08-25 RX ADMIN — THERA TABS 1 TABLET: TAB at 09:23

## 2025-08-25 RX ADMIN — BUDESONIDE AND FORMOTEROL FUMARATE DIHYDRATE 2 PUFF: 160; 4.5 AEROSOL RESPIRATORY (INHALATION) at 21:31

## 2025-08-25 RX ADMIN — LEVOTHYROXINE SODIUM 125 MCG: 0.1 TABLET ORAL at 09:23

## 2025-08-25 RX ADMIN — APIXABAN 5 MG: 5 TABLET, FILM COATED ORAL at 09:23

## 2025-08-25 RX ADMIN — Medication 1 CAPSULE: at 09:23

## 2025-08-25 RX ADMIN — BUDESONIDE AND FORMOTEROL FUMARATE DIHYDRATE 2 PUFF: 160; 4.5 AEROSOL RESPIRATORY (INHALATION) at 07:24

## 2025-08-25 RX ADMIN — IPRATROPIUM BROMIDE AND ALBUTEROL SULFATE 3 ML: .5; 3 SOLUTION RESPIRATORY (INHALATION) at 10:37

## 2025-08-25 RX ADMIN — IPRATROPIUM BROMIDE AND ALBUTEROL SULFATE 3 ML: .5; 3 SOLUTION RESPIRATORY (INHALATION) at 07:22

## 2025-08-25 RX ADMIN — ESCITALOPRAM 10 MG: 10 TABLET, FILM COATED ORAL at 09:24

## 2025-08-25 RX ADMIN — Medication 1000 UNITS: at 09:23

## 2025-08-26 ENCOUNTER — READMISSION MANAGEMENT (OUTPATIENT)
Dept: CALL CENTER | Facility: HOSPITAL | Age: 89
End: 2025-08-26
Payer: MEDICARE

## 2025-08-26 VITALS
HEIGHT: 62 IN | WEIGHT: 197.53 LBS | TEMPERATURE: 98.2 F | DIASTOLIC BLOOD PRESSURE: 110 MMHG | SYSTOLIC BLOOD PRESSURE: 166 MMHG | BODY MASS INDEX: 36.35 KG/M2 | HEART RATE: 70 BPM | OXYGEN SATURATION: 98 % | RESPIRATION RATE: 20 BRPM

## 2025-08-26 PROBLEM — I50.31 ACUTE HEART FAILURE WITH PRESERVED EJECTION FRACTION (HFPEF): Status: ACTIVE | Noted: 2025-08-26

## 2025-08-26 LAB
ANION GAP SERPL CALCULATED.3IONS-SCNC: 15.4 MMOL/L (ref 5–15)
BUN SERPL-MCNC: 41 MG/DL (ref 8–23)
BUN/CREAT SERPL: 32.3 (ref 7–25)
CALCIUM SPEC-SCNC: 9.5 MG/DL (ref 8.6–10.5)
CHLORIDE SERPL-SCNC: 105 MMOL/L (ref 98–107)
CO2 SERPL-SCNC: 19.6 MMOL/L (ref 22–29)
CREAT SERPL-MCNC: 1.27 MG/DL (ref 0.57–1)
DEPRECATED RDW RBC AUTO: 50.7 FL (ref 37–54)
EGFRCR SERPLBLD CKD-EPI 2021: 40.8 ML/MIN/1.73
ERYTHROCYTE [DISTWIDTH] IN BLOOD BY AUTOMATED COUNT: 15 % (ref 12.3–15.4)
GLUCOSE BLDC GLUCOMTR-MCNC: 89 MG/DL (ref 65–99)
GLUCOSE BLDC GLUCOMTR-MCNC: 98 MG/DL (ref 65–99)
GLUCOSE SERPL-MCNC: 114 MG/DL (ref 65–99)
HCT VFR BLD AUTO: 31.3 % (ref 34–46.6)
HGB BLD-MCNC: 10.1 G/DL (ref 12–15.9)
MCH RBC QN AUTO: 29.8 PG (ref 26.6–33)
MCHC RBC AUTO-ENTMCNC: 32.3 G/DL (ref 31.5–35.7)
MCV RBC AUTO: 92.3 FL (ref 79–97)
PLATELET # BLD AUTO: 164 10*3/MM3 (ref 140–450)
PMV BLD AUTO: 10.8 FL (ref 6–12)
POTASSIUM SERPL-SCNC: 4 MMOL/L (ref 3.5–5.2)
RBC # BLD AUTO: 3.39 10*6/MM3 (ref 3.77–5.28)
SODIUM SERPL-SCNC: 140 MMOL/L (ref 136–145)
WBC NRBC COR # BLD AUTO: 6.8 10*3/MM3 (ref 3.4–10.8)

## 2025-08-26 PROCEDURE — 82948 REAGENT STRIP/BLOOD GLUCOSE: CPT

## 2025-08-26 PROCEDURE — 63710000001 PREDNISONE PER 1 MG: Performed by: STUDENT IN AN ORGANIZED HEALTH CARE EDUCATION/TRAINING PROGRAM

## 2025-08-26 PROCEDURE — 82948 REAGENT STRIP/BLOOD GLUCOSE: CPT | Performed by: HOSPITALIST

## 2025-08-26 PROCEDURE — 80048 BASIC METABOLIC PNL TOTAL CA: CPT | Performed by: STUDENT IN AN ORGANIZED HEALTH CARE EDUCATION/TRAINING PROGRAM

## 2025-08-26 PROCEDURE — 94799 UNLISTED PULMONARY SVC/PX: CPT

## 2025-08-26 PROCEDURE — 99222 1ST HOSP IP/OBS MODERATE 55: CPT | Performed by: INTERNAL MEDICINE

## 2025-08-26 PROCEDURE — 99232 SBSQ HOSP IP/OBS MODERATE 35: CPT | Performed by: PHYSICIAN ASSISTANT

## 2025-08-26 PROCEDURE — 94664 DEMO&/EVAL PT USE INHALER: CPT

## 2025-08-26 PROCEDURE — 94761 N-INVAS EAR/PLS OXIMETRY MLT: CPT

## 2025-08-26 PROCEDURE — 85027 COMPLETE CBC AUTOMATED: CPT | Performed by: STUDENT IN AN ORGANIZED HEALTH CARE EDUCATION/TRAINING PROGRAM

## 2025-08-26 RX ORDER — PREDNISONE 20 MG/1
40 TABLET ORAL
Qty: 4 TABLET | Refills: 0 | Status: SHIPPED | OUTPATIENT
Start: 2025-08-26 | End: 2025-08-28

## 2025-08-26 RX ORDER — GUAIFENESIN 600 MG/1
1200 TABLET, EXTENDED RELEASE ORAL 2 TIMES DAILY
Qty: 28 TABLET | Refills: 0 | Status: SHIPPED | OUTPATIENT
Start: 2025-08-26 | End: 2025-09-02

## 2025-08-26 RX ORDER — LOPERAMIDE HYDROCHLORIDE 2 MG/1
2 CAPSULE ORAL 4 TIMES DAILY PRN
Status: DISCONTINUED | OUTPATIENT
Start: 2025-08-26 | End: 2025-08-26 | Stop reason: HOSPADM

## 2025-08-26 RX ORDER — TORSEMIDE 20 MG/1
20 TABLET ORAL DAILY
Qty: 30 TABLET | Refills: 0 | Status: SHIPPED | OUTPATIENT
Start: 2025-08-27 | End: 2025-09-26

## 2025-08-26 RX ADMIN — LISINOPRIL 20 MG: 20 TABLET ORAL at 08:55

## 2025-08-26 RX ADMIN — LEVOTHYROXINE SODIUM 112 MCG: 112 TABLET ORAL at 06:44

## 2025-08-26 RX ADMIN — Medication 1 CAPSULE: at 08:55

## 2025-08-26 RX ADMIN — EMPAGLIFLOZIN 10 MG: 10 TABLET, FILM COATED ORAL at 13:58

## 2025-08-26 RX ADMIN — PANTOPRAZOLE SODIUM 40 MG: 40 TABLET, DELAYED RELEASE ORAL at 06:44

## 2025-08-26 RX ADMIN — TORSEMIDE 20 MG: 20 TABLET ORAL at 08:55

## 2025-08-26 RX ADMIN — BUDESONIDE AND FORMOTEROL FUMARATE DIHYDRATE 2 PUFF: 160; 4.5 AEROSOL RESPIRATORY (INHALATION) at 13:28

## 2025-08-26 RX ADMIN — LOPERAMIDE HYDROCHLORIDE 2 MG: 2 CAPSULE ORAL at 09:19

## 2025-08-26 RX ADMIN — Medication 1000 UNITS: at 08:55

## 2025-08-26 RX ADMIN — ESCITALOPRAM 10 MG: 10 TABLET, FILM COATED ORAL at 08:56

## 2025-08-26 RX ADMIN — PREDNISONE 40 MG: 20 TABLET ORAL at 08:55

## 2025-08-26 RX ADMIN — APIXABAN 5 MG: 5 TABLET, FILM COATED ORAL at 08:55

## 2025-08-26 RX ADMIN — THERA TABS 1 TABLET: TAB at 08:55

## 2025-08-26 RX ADMIN — Medication 1000 MCG: at 08:56

## 2025-08-27 ENCOUNTER — TELEPHONE (OUTPATIENT)
Dept: CARDIOLOGY | Age: 89
End: 2025-08-27
Payer: MEDICARE

## 2025-08-27 ENCOUNTER — TRANSITIONAL CARE MANAGEMENT TELEPHONE ENCOUNTER (OUTPATIENT)
Dept: CALL CENTER | Facility: HOSPITAL | Age: 89
End: 2025-08-27
Payer: MEDICARE

## 2025-08-28 LAB
BACTERIA SPEC AEROBE CULT: NORMAL
BACTERIA SPEC AEROBE CULT: NORMAL

## (undated) DEVICE — THE CARR-LOCKE INJECTION NEEDLE IS A SINGLE USE, DISPOSABLE, FLEXIBLE SHEATH INJECTION NEEDLE USED FOR THE INJECTION OF VARIOUS TYPES OF MEDIA THROUGH FLEXIBLE ENDOSCOPES.

## (undated) DEVICE — TUBING, SUCTION, 1/4" X 10', STRAIGHT: Brand: MEDLINE

## (undated) DEVICE — ELECTRD BLD EXT EDGE/INSUL 6IN

## (undated) DEVICE — ADAPT CLN BIOGUARD AIR/H2O DISP

## (undated) DEVICE — SENSR O2 OXIMAX FNGR A/ 18IN NONSTR

## (undated) DEVICE — SUT PDS 0 CT1 36IN Z346H

## (undated) DEVICE — POOLE SUCTION HANDLE: Brand: CARDINAL HEALTH

## (undated) DEVICE — Device

## (undated) DEVICE — SPNG GZ WOVN 4X4IN 12PLY 10/BX STRL

## (undated) DEVICE — NDL HYPO PRECISIONGLIDE REG 25G 1 1/2

## (undated) DEVICE — THE TORRENT IRRIGATION SCOPE CONNECTOR IS USED WITH THE TORRENT IRRIGATION TUBING TO PROVIDE IRRIGATION FLUIDS SUCH AS STERILE WATER DURING GASTROINTESTINAL ENDOSCOPIC PROCEDURES WHEN USED IN CONJUNCTION WITH AN IRRIGATION PUMP (OR ELECTROSURGICAL UNIT).: Brand: TORRENT

## (undated) DEVICE — DRSNG SURESITE WNDW 4X4.5

## (undated) DEVICE — LN SMPL CO2 SHTRM SD STREAM W/M LUER

## (undated) DEVICE — Device: Brand: DEFENDO AIR/WATER/SUCTION AND BIOPSY VALVE

## (undated) DEVICE — TBG 02 CRUSH RESIST LF CLR 7FT

## (undated) DEVICE — ELECTRD BLD EDGE/INSUL1P 2.4X5.1MM STRL

## (undated) DEVICE — ENDOPATH PNEUMONEEDLE INSUFFLATION NEEDLES WITH LUER LOCK CONNECTORS 120MM: Brand: ENDOPATH

## (undated) DEVICE — SUT VIC 2/0 TIES 18IN J111T

## (undated) DEVICE — ENDOPATH XCEL UNIVERSAL TROCAR STABLILITY SLEEVES: Brand: ENDOPATH XCEL

## (undated) DEVICE — ADHS SKIN SURG TISS VISC PREMIERPRO EXOFIN HI/VISC FAST/DRY

## (undated) DEVICE — ANTIBACTERIAL UNDYED BRAIDED (POLYGLACTIN 910), SYNTHETIC ABSORBABLE SUTURE: Brand: COATED VICRYL

## (undated) DEVICE — PLASMABLADE PS200-040 4.0: Brand: PLASMABLADE™

## (undated) DEVICE — SKIN AFFIX SURG ADHESIVE 72/CS 0.55ML: Brand: MEDLINE

## (undated) DEVICE — KT ORCA ORCAPOD DISP STRL

## (undated) DEVICE — LOU EP: Brand: MEDLINE INDUSTRIES, INC.

## (undated) DEVICE — SNAR POLYP CAPTIVATOR CRSNT 27MM 240CM

## (undated) DEVICE — CANN O2 ETCO2 FITS ALL CONN CO2 SMPL A/ 7IN DISP LF

## (undated) DEVICE — ENDOPATH XCEL BLADELESS TROCARS WITH STABILITY SLEEVES: Brand: ENDOPATH XCEL

## (undated) DEVICE — WOUND RETRACTOR AND PROTECTOR: Brand: ALEXIS WOUND PROTECTOR-RETRACTOR

## (undated) DEVICE — COVER,MAYO STAND,STERILE: Brand: MEDLINE

## (undated) DEVICE — DRSNG SURESITE WNDW 2.38X2.75

## (undated) DEVICE — Device: Brand: SPOT EX ENDOSCOPIC TATTOO

## (undated) DEVICE — LOU LAP SIGMOID COLON: Brand: MEDLINE INDUSTRIES, INC.

## (undated) DEVICE — PREF.GUIDING SHEATH W/MULT.CRV: Brand: PREFACE

## (undated) DEVICE — Device: Brand: REFERENCE PATCH CARTO 3

## (undated) DEVICE — SOL NACL 0.9PCT 1000ML

## (undated) DEVICE — SINGLE-USE BIOPSY FORCEPS: Brand: RADIAL JAW 4

## (undated) DEVICE — SNAR POLYP SENSATION STDOVL 27 240 BX40

## (undated) DEVICE — GLV SURG BIOGEL LTX PF 6 1/2

## (undated) DEVICE — Device: Brand: SMARTABLATE

## (undated) DEVICE — DISPOSABLE GRASPER CARTRIDGE: Brand: DIRECT DRIVE REPOSABLE GRASPERS

## (undated) DEVICE — ENDOCUT SCISSOR TIP, DISPOSABLE: Brand: RENEW

## (undated) DEVICE — ECHELON FLEX POWERED PLUS ARTICULATING ENDOSCOPIC LINEAR CUTTER , 60MM: Brand: ECHELON FLEX

## (undated) DEVICE — CANNULA,ADULT,SOFT-TOUCH,7'TUBE,UC: Brand: PENDING

## (undated) DEVICE — PREP SOL POVIDONE/IODINE BT 4OZ

## (undated) DEVICE — 1842 FOAM BLOCK NEEDLE COUNTER: Brand: DEVON

## (undated) DEVICE — SUT VIC 0 TIES 18IN J912G

## (undated) DEVICE — VISUALIZATION SYSTEM: Brand: CLEARIFY

## (undated) DEVICE — SKIN PREP TRAY W/CHG: Brand: MEDLINE INDUSTRIES, INC.

## (undated) DEVICE — SUT VIC 3/0 TIES 18IN J110T

## (undated) DEVICE — BITEBLOCK OMNI BLOC

## (undated) DEVICE — PENCL E/S HNDSWCH ROCKR CB

## (undated) DEVICE — SUT VIC 5/0 PS2 18IN J495H

## (undated) DEVICE — CANN NASL CO2 TRULINK W/O2 A/

## (undated) DEVICE — LOU PACE DEFIB: Brand: MEDLINE INDUSTRIES, INC.

## (undated) DEVICE — Device: Brand: THERMOCOOL SF NAV

## (undated) DEVICE — ELECTRD ECG CARBON/SNP RL FM A/ 5PK

## (undated) DEVICE — THE SINGLE USE ETRAP – POLYP TRAP IS USED FOR SUCTION RETRIEVAL OF ENDOSCOPICALLY REMOVED POLYPS.: Brand: ETRAP

## (undated) DEVICE — SUT VIC 0 TN 27IN DYED JTN0G

## (undated) DEVICE — MSK ENDO PORT O2 POM ELITE CURAPLEX A/

## (undated) DEVICE — TOTAL TRAY, 16FR 10ML SIL FOLEY, URN: Brand: MEDLINE

## (undated) DEVICE — LAPAROSCOPIC SMOKE ELIMINATION DEVICE: Brand: PNEUVIEW XE

## (undated) DEVICE — PENCL EVAC ULTRAVAC SMOKE W/BLD